# Patient Record
Sex: MALE | Race: WHITE | Employment: UNEMPLOYED | URBAN - METROPOLITAN AREA
[De-identification: names, ages, dates, MRNs, and addresses within clinical notes are randomized per-mention and may not be internally consistent; named-entity substitution may affect disease eponyms.]

---

## 2020-05-11 ENCOUNTER — APPOINTMENT (OUTPATIENT)
Dept: GENERAL RADIOLOGY | Age: 60
DRG: 174 | End: 2020-05-11
Payer: MEDICAID

## 2020-05-11 ENCOUNTER — HOSPITAL ENCOUNTER (INPATIENT)
Age: 60
LOS: 5 days | Discharge: HOME | DRG: 174 | End: 2020-05-16
Attending: EMERGENCY MEDICINE
Payer: MEDICAID

## 2020-05-11 PROBLEM — R60.0 LEG EDEMA: Status: ACTIVE | Noted: 2020-05-11

## 2020-05-11 LAB
ALBUMIN SERPL-MCNC: 3.6 G/DL (ref 3.4–5)
ALP BLD-CCNC: 119 U/L (ref 40–129)
ALT SERPL-CCNC: 21 U/L (ref 10–40)
ANION GAP SERPL CALCULATED.3IONS-SCNC: 10 MMOL/L (ref 3–16)
AST SERPL-CCNC: 30 U/L (ref 15–37)
BASOPHILS ABSOLUTE: 0 K/UL (ref 0–0.2)
BASOPHILS RELATIVE PERCENT: 0.6 %
BILIRUB SERPL-MCNC: 1.7 MG/DL (ref 0–1)
BILIRUBIN DIRECT: 0.4 MG/DL (ref 0–0.3)
BILIRUBIN, INDIRECT: 1.3 MG/DL (ref 0–1)
BUN BLDV-MCNC: 5 MG/DL (ref 7–20)
CALCIUM SERPL-MCNC: 8.9 MG/DL (ref 8.3–10.6)
CHLORIDE BLD-SCNC: 95 MMOL/L (ref 99–110)
CO2: 26 MMOL/L (ref 21–32)
CREAT SERPL-MCNC: 0.7 MG/DL (ref 0.8–1.3)
EOSINOPHILS ABSOLUTE: 0.1 K/UL (ref 0–0.6)
EOSINOPHILS RELATIVE PERCENT: 1.6 %
GFR AFRICAN AMERICAN: >60
GFR NON-AFRICAN AMERICAN: >60
GLUCOSE BLD-MCNC: 97 MG/DL (ref 70–99)
HCT VFR BLD CALC: 50.4 % (ref 40.5–52.5)
HEMOGLOBIN: 17.4 G/DL (ref 13.5–17.5)
LYMPHOCYTES ABSOLUTE: 1.9 K/UL (ref 1–5.1)
LYMPHOCYTES RELATIVE PERCENT: 26 %
MCH RBC QN AUTO: 36.2 PG (ref 26–34)
MCHC RBC AUTO-ENTMCNC: 34.5 G/DL (ref 31–36)
MCV RBC AUTO: 105 FL (ref 80–100)
MONOCYTES ABSOLUTE: 0.7 K/UL (ref 0–1.3)
MONOCYTES RELATIVE PERCENT: 9.9 %
NEUTROPHILS ABSOLUTE: 4.4 K/UL (ref 1.7–7.7)
NEUTROPHILS RELATIVE PERCENT: 61.9 %
PDW BLD-RTO: 15.1 % (ref 12.4–15.4)
PLATELET # BLD: 156 K/UL (ref 135–450)
PMV BLD AUTO: 8.1 FL (ref 5–10.5)
POTASSIUM REFLEX MAGNESIUM: 3.7 MMOL/L (ref 3.5–5.1)
PRO-BNP: 2010 PG/ML (ref 0–124)
RBC # BLD: 4.8 M/UL (ref 4.2–5.9)
SODIUM BLD-SCNC: 131 MMOL/L (ref 136–145)
TOTAL PROTEIN: 6.9 G/DL (ref 6.4–8.2)
TROPONIN: 0.01 NG/ML
WBC # BLD: 7.2 K/UL (ref 4–11)

## 2020-05-11 PROCEDURE — 99285 EMERGENCY DEPT VISIT HI MDM: CPT

## 2020-05-11 PROCEDURE — 80076 HEPATIC FUNCTION PANEL: CPT

## 2020-05-11 PROCEDURE — 71045 X-RAY EXAM CHEST 1 VIEW: CPT

## 2020-05-11 PROCEDURE — 2060000000 HC ICU INTERMEDIATE R&B

## 2020-05-11 PROCEDURE — 6360000002 HC RX W HCPCS: Performed by: STUDENT IN AN ORGANIZED HEALTH CARE EDUCATION/TRAINING PROGRAM

## 2020-05-11 PROCEDURE — 6370000000 HC RX 637 (ALT 250 FOR IP): Performed by: STUDENT IN AN ORGANIZED HEALTH CARE EDUCATION/TRAINING PROGRAM

## 2020-05-11 PROCEDURE — 84484 ASSAY OF TROPONIN QUANT: CPT

## 2020-05-11 PROCEDURE — 96374 THER/PROPH/DIAG INJ IV PUSH: CPT

## 2020-05-11 PROCEDURE — 6360000002 HC RX W HCPCS: Performed by: INTERNAL MEDICINE

## 2020-05-11 PROCEDURE — 1200000000 HC SEMI PRIVATE

## 2020-05-11 PROCEDURE — 83880 ASSAY OF NATRIURETIC PEPTIDE: CPT

## 2020-05-11 PROCEDURE — 96375 TX/PRO/DX INJ NEW DRUG ADDON: CPT

## 2020-05-11 PROCEDURE — 93005 ELECTROCARDIOGRAM TRACING: CPT | Performed by: STUDENT IN AN ORGANIZED HEALTH CARE EDUCATION/TRAINING PROGRAM

## 2020-05-11 PROCEDURE — 85025 COMPLETE CBC W/AUTO DIFF WBC: CPT

## 2020-05-11 PROCEDURE — 84443 ASSAY THYROID STIM HORMONE: CPT

## 2020-05-11 PROCEDURE — 87641 MR-STAPH DNA AMP PROBE: CPT

## 2020-05-11 PROCEDURE — 36415 COLL VENOUS BLD VENIPUNCTURE: CPT

## 2020-05-11 PROCEDURE — 6370000000 HC RX 637 (ALT 250 FOR IP): Performed by: INTERNAL MEDICINE

## 2020-05-11 PROCEDURE — 2580000003 HC RX 258: Performed by: INTERNAL MEDICINE

## 2020-05-11 PROCEDURE — 80048 BASIC METABOLIC PNL TOTAL CA: CPT

## 2020-05-11 PROCEDURE — 2580000003 HC RX 258: Performed by: STUDENT IN AN ORGANIZED HEALTH CARE EDUCATION/TRAINING PROGRAM

## 2020-05-11 RX ORDER — LORAZEPAM 1 MG/1
2 TABLET ORAL
Status: DISCONTINUED | OUTPATIENT
Start: 2020-05-11 | End: 2020-05-16 | Stop reason: HOSPADM

## 2020-05-11 RX ORDER — THIAMINE MONONITRATE (VIT B1) 100 MG
100 TABLET ORAL DAILY
Status: DISCONTINUED | OUTPATIENT
Start: 2020-05-12 | End: 2020-05-16 | Stop reason: HOSPADM

## 2020-05-11 RX ORDER — SODIUM CHLORIDE 0.9 % (FLUSH) 0.9 %
10 SYRINGE (ML) INJECTION PRN
Status: DISCONTINUED | OUTPATIENT
Start: 2020-05-11 | End: 2020-05-16 | Stop reason: HOSPADM

## 2020-05-11 RX ORDER — SULFAMETHOXAZOLE AND TRIMETHOPRIM 800; 160 MG/1; MG/1
1 TABLET ORAL ONCE
Status: COMPLETED | OUTPATIENT
Start: 2020-05-11 | End: 2020-05-11

## 2020-05-11 RX ORDER — LORAZEPAM 2 MG/ML
4 INJECTION INTRAMUSCULAR
Status: DISCONTINUED | OUTPATIENT
Start: 2020-05-11 | End: 2020-05-16 | Stop reason: HOSPADM

## 2020-05-11 RX ORDER — ONDANSETRON 2 MG/ML
4 INJECTION INTRAMUSCULAR; INTRAVENOUS EVERY 6 HOURS PRN
Status: DISCONTINUED | OUTPATIENT
Start: 2020-05-11 | End: 2020-05-16 | Stop reason: HOSPADM

## 2020-05-11 RX ORDER — SODIUM CHLORIDE 0.9 % (FLUSH) 0.9 %
10 SYRINGE (ML) INJECTION PRN
Status: DISCONTINUED | OUTPATIENT
Start: 2020-05-11 | End: 2020-05-11 | Stop reason: SDUPTHER

## 2020-05-11 RX ORDER — LORAZEPAM 2 MG/ML
2 INJECTION INTRAMUSCULAR
Status: DISCONTINUED | OUTPATIENT
Start: 2020-05-11 | End: 2020-05-16 | Stop reason: HOSPADM

## 2020-05-11 RX ORDER — POLYETHYLENE GLYCOL 3350 17 G/17G
17 POWDER, FOR SOLUTION ORAL DAILY PRN
Status: DISCONTINUED | OUTPATIENT
Start: 2020-05-11 | End: 2020-05-16 | Stop reason: HOSPADM

## 2020-05-11 RX ORDER — FOLIC ACID 1 MG/1
1 TABLET ORAL DAILY
Status: DISCONTINUED | OUTPATIENT
Start: 2020-05-12 | End: 2020-05-16 | Stop reason: HOSPADM

## 2020-05-11 RX ORDER — PROMETHAZINE HYDROCHLORIDE 12.5 MG/1
12.5 TABLET ORAL EVERY 6 HOURS PRN
Status: DISCONTINUED | OUTPATIENT
Start: 2020-05-11 | End: 2020-05-16 | Stop reason: HOSPADM

## 2020-05-11 RX ORDER — LORAZEPAM 1 MG/1
4 TABLET ORAL
Status: DISCONTINUED | OUTPATIENT
Start: 2020-05-11 | End: 2020-05-16 | Stop reason: HOSPADM

## 2020-05-11 RX ORDER — CEPHALEXIN 500 MG/1
500 CAPSULE ORAL 2 TIMES DAILY
Status: ON HOLD | COMMUNITY
End: 2020-05-16 | Stop reason: HOSPADM

## 2020-05-11 RX ORDER — NADOLOL 20 MG/1
20 TABLET ORAL DAILY
COMMUNITY
End: 2020-07-06 | Stop reason: SINTOL

## 2020-05-11 RX ORDER — ACETAMINOPHEN 325 MG/1
650 TABLET ORAL EVERY 6 HOURS PRN
Status: DISCONTINUED | OUTPATIENT
Start: 2020-05-11 | End: 2020-05-16 | Stop reason: HOSPADM

## 2020-05-11 RX ORDER — PRIMIDONE 50 MG/1
50 TABLET ORAL NIGHTLY
COMMUNITY
End: 2020-05-22

## 2020-05-11 RX ORDER — FUROSEMIDE 10 MG/ML
20 INJECTION INTRAMUSCULAR; INTRAVENOUS ONCE
Status: COMPLETED | OUTPATIENT
Start: 2020-05-11 | End: 2020-05-11

## 2020-05-11 RX ORDER — LORAZEPAM 2 MG/ML
3 INJECTION INTRAMUSCULAR
Status: DISCONTINUED | OUTPATIENT
Start: 2020-05-11 | End: 2020-05-16 | Stop reason: HOSPADM

## 2020-05-11 RX ORDER — LORAZEPAM 1 MG/1
3 TABLET ORAL
Status: DISCONTINUED | OUTPATIENT
Start: 2020-05-11 | End: 2020-05-16 | Stop reason: HOSPADM

## 2020-05-11 RX ORDER — LORAZEPAM 1 MG/1
1 TABLET ORAL
Status: DISCONTINUED | OUTPATIENT
Start: 2020-05-11 | End: 2020-05-16 | Stop reason: HOSPADM

## 2020-05-11 RX ORDER — MULTIVITAMIN WITH IRON
1 TABLET ORAL DAILY
Status: DISCONTINUED | OUTPATIENT
Start: 2020-05-12 | End: 2020-05-16 | Stop reason: HOSPADM

## 2020-05-11 RX ORDER — ASPIRIN 81 MG/1
81 TABLET, CHEWABLE ORAL DAILY
Status: DISCONTINUED | OUTPATIENT
Start: 2020-05-12 | End: 2020-05-16 | Stop reason: HOSPADM

## 2020-05-11 RX ORDER — CLOTRIMAZOLE 1 %
CREAM (GRAM) TOPICAL 2 TIMES DAILY
Status: DISCONTINUED | OUTPATIENT
Start: 2020-05-11 | End: 2020-05-16 | Stop reason: HOSPADM

## 2020-05-11 RX ORDER — POTASSIUM CHLORIDE 20 MEQ/1
20 TABLET, EXTENDED RELEASE ORAL ONCE
Status: COMPLETED | OUTPATIENT
Start: 2020-05-11 | End: 2020-05-11

## 2020-05-11 RX ORDER — SODIUM CHLORIDE 0.9 % (FLUSH) 0.9 %
10 SYRINGE (ML) INJECTION EVERY 12 HOURS SCHEDULED
Status: DISCONTINUED | OUTPATIENT
Start: 2020-05-11 | End: 2020-05-16 | Stop reason: HOSPADM

## 2020-05-11 RX ORDER — CALAMINE
LOTION (ML) TOPICAL 2 TIMES DAILY
Status: DISCONTINUED | OUTPATIENT
Start: 2020-05-11 | End: 2020-05-12 | Stop reason: CLARIF

## 2020-05-11 RX ORDER — FUROSEMIDE 20 MG/1
20 TABLET ORAL DAILY
Status: ON HOLD | COMMUNITY
End: 2020-07-28 | Stop reason: HOSPADM

## 2020-05-11 RX ORDER — ACETAMINOPHEN 650 MG/1
650 SUPPOSITORY RECTAL EVERY 6 HOURS PRN
Status: DISCONTINUED | OUTPATIENT
Start: 2020-05-11 | End: 2020-05-16 | Stop reason: HOSPADM

## 2020-05-11 RX ORDER — FUROSEMIDE 10 MG/ML
40 INJECTION INTRAMUSCULAR; INTRAVENOUS ONCE
Status: COMPLETED | OUTPATIENT
Start: 2020-05-11 | End: 2020-05-11

## 2020-05-11 RX ORDER — PRIMIDONE 50 MG/1
50 TABLET ORAL NIGHTLY
Status: DISCONTINUED | OUTPATIENT
Start: 2020-05-12 | End: 2020-05-12

## 2020-05-11 RX ORDER — SODIUM CHLORIDE 0.9 % (FLUSH) 0.9 %
10 SYRINGE (ML) INJECTION EVERY 12 HOURS SCHEDULED
Status: DISCONTINUED | OUTPATIENT
Start: 2020-05-11 | End: 2020-05-11 | Stop reason: SDUPTHER

## 2020-05-11 RX ORDER — NADOLOL 20 MG/1
20 TABLET ORAL DAILY
Status: DISCONTINUED | OUTPATIENT
Start: 2020-05-12 | End: 2020-05-16 | Stop reason: HOSPADM

## 2020-05-11 RX ORDER — LORAZEPAM 2 MG/ML
1 INJECTION INTRAMUSCULAR
Status: DISCONTINUED | OUTPATIENT
Start: 2020-05-11 | End: 2020-05-16 | Stop reason: HOSPADM

## 2020-05-11 RX ADMIN — FUROSEMIDE 20 MG: 10 INJECTION, SOLUTION INTRAMUSCULAR; INTRAVENOUS at 23:09

## 2020-05-11 RX ADMIN — CEFAZOLIN SODIUM 1 G: 1 POWDER, FOR SOLUTION INTRAMUSCULAR; INTRAVENOUS at 18:59

## 2020-05-11 RX ADMIN — VANCOMYCIN HYDROCHLORIDE 1000 MG: 10 INJECTION, POWDER, LYOPHILIZED, FOR SOLUTION INTRAVENOUS at 23:24

## 2020-05-11 RX ADMIN — POTASSIUM CHLORIDE 20 MEQ: 20 TABLET, EXTENDED RELEASE ORAL at 23:09

## 2020-05-11 RX ADMIN — CLOTRIMAZOLE: 10 CREAM TOPICAL at 23:24

## 2020-05-11 RX ADMIN — Medication 10 ML: at 23:01

## 2020-05-11 RX ADMIN — FUROSEMIDE 40 MG: 10 INJECTION, SOLUTION INTRAVENOUS at 18:59

## 2020-05-11 RX ADMIN — SULFAMETHOXAZOLE AND TRIMETHOPRIM 1 TABLET: 800; 160 TABLET ORAL at 18:59

## 2020-05-11 ASSESSMENT — ENCOUNTER SYMPTOMS
SHORTNESS OF BREATH: 0
COLOR CHANGE: 1
COUGH: 0
BACK PAIN: 0
NAUSEA: 0
DIARRHEA: 0
SORE THROAT: 0
EYE REDNESS: 0
ABDOMINAL PAIN: 0
VOMITING: 0
EYE DISCHARGE: 0

## 2020-05-11 ASSESSMENT — PAIN SCALES - GENERAL
PAINLEVEL_OUTOF10: 0
PAINLEVEL_OUTOF10: 8
PAINLEVEL_OUTOF10: 0

## 2020-05-11 ASSESSMENT — PAIN DESCRIPTION - PAIN TYPE: TYPE: ACUTE PAIN

## 2020-05-11 ASSESSMENT — PAIN DESCRIPTION - LOCATION: LOCATION: CHEST

## 2020-05-11 ASSESSMENT — PAIN DESCRIPTION - DESCRIPTORS: DESCRIPTORS: THROBBING

## 2020-05-11 ASSESSMENT — PAIN DESCRIPTION - FREQUENCY: FREQUENCY: INTERMITTENT

## 2020-05-11 NOTE — ED PROVIDER NOTES
[id number] [facility] RESIDENT NOTE       Date of evaluation: 5/11/2020    Chief Complaint     Chest Pain and Leg Swelling    History of Present Illness     Karla Spears is a 62 y.o. male who presents to the ED c/o BLE redness x weeks and swelling x months. States that his legs have been red and itchy, so he's been scratching them which have caused them to bleed. He's been on oral Keflex then Doxy without improvement. A physician also started him on Lasix but he has never had an echo and does not carry a heart failure diagnosis. States he came to the ED today because he was unable to get in contact with anyone on an outpatient basis. While he was triaged with CP, he did not mention this to me until I brought it up. Describes intermittent, non-provoked sharp, non-radiating pain in the center of his chest lasting 30 minutes at a time for months. Review of Systems     Review of Systems   Constitutional: Negative for chills and fatigue. HENT: Negative for congestion and sore throat. Eyes: Negative for discharge and redness. Respiratory: Negative for cough and shortness of breath. Cardiovascular: Positive for chest pain and leg swelling. Gastrointestinal: Negative for abdominal pain, diarrhea, nausea and vomiting. Genitourinary: Negative for dysuria and hematuria. Musculoskeletal: Negative for back pain and neck pain. Skin: Positive for color change, rash and wound. Neurological: Negative for syncope and headaches. Psychiatric/Behavioral: Negative for self-injury and suicidal ideas. Past Medical, Surgical, Family, and Social History     He has no past medical history on file. He has no past surgical history on file. His family history is not on file. He reports that he has never smoked. He has never used smokeless tobacco. He reports current alcohol use. He reports that he does not use drugs.     Medications     Previous Medications CEPHALEXIN (KEFLEX) 500 MG CAPSULE    Take 500 mg by mouth 2 times daily    FUROSEMIDE (LASIX) 20 MG TABLET    Take 20 mg by mouth daily    MINERAL OIL-HYDROPHILIC PETROLATUM (AQUAPHOR) OINTMENT    Apply 85 g topically as needed for Dry Skin Apply topically as needed. NADOLOL (CORGARD) 20 MG TABLET    Take 20 mg by mouth daily    PRIMIDONE (MYSOLINE) 50 MG TABLET    Take 50 mg by mouth nightly       Allergies     He has No Known Allergies. Physical Exam     INITIAL VITALS: BP: 135/83, Temp: 98 °F (36.7 °C), Pulse: 66, Resp: 18, SpO2: 99 %   Physical Exam  Vitals signs and nursing note reviewed. Constitutional:       General: He is not in acute distress. Appearance: Normal appearance. He is not ill-appearing, toxic-appearing or diaphoretic. HENT:      Head: Normocephalic and atraumatic. Mouth/Throat:      Mouth: Mucous membranes are moist.   Eyes:      General: No scleral icterus. Conjunctiva/sclera: Conjunctivae normal.   Neck:      Musculoskeletal: Neck supple. Cardiovascular:      Rate and Rhythm: Normal rate and regular rhythm. Pulses: Normal pulses. Pulmonary:      Effort: Pulmonary effort is normal. No respiratory distress. Breath sounds: Normal breath sounds. Abdominal:      General: There is no distension. Musculoskeletal:         General: Swelling present. No deformity. Comments: There is 1+ pitting edema to the knee with erythema and warmth to the anterior shins with excoriations and scrabbing but no skip lesions, fluctuance, or crepitus. Palpable DP pulses. Skin:     General: Skin is warm and dry. Capillary Refill: Capillary refill takes less than 2 seconds. Neurological:      General: No focal deficit present. Mental Status: He is alert and oriented to person, place, and time. Mental status is at baseline.    Psychiatric:         Mood and Affect: Mood normal.         DiagnosticResults     EKG   Interpreted in conjunction with emergencydepartment Allergies, and Family Hx were reviewed. The patient was given the followingmedications:  Orders Placed This Encounter   Medications    ceFAZolin (ANCEF) 1 g in dextrose 5 % 50 mL IVPB (mini-bag)    sulfamethoxazole-trimethoprim (BACTRIM DS;SEPTRA DS) 800-160 MG per tablet 1 tablet    furosemide (LASIX) injection 40 mg       CONSULTS:  IP CONSULT TO HOSPITALIST    MEDICAL DECISION MAKING / ASSESSMENT / Rafaela Joelle is a 62 y.o. male presenting with acute on chronic BLE swelling and redness, along with atypical CP. VS reassuring. Exam with signs concerning for BLE edema and PVD with possible secondary infection 2/2 scratching. EKG with nonspecific findings. CXR negative. Labs significant for elevated BNP concerning for heart failure. Since he's failed outpatient cellulitis management twice and has never had a workup for heart failure, all in the setting of COVID and inability to access primary care, I feel that he warrants admission for further evaluation and workup. Given Lasix 40 mg IV to help with swelling and covered with ancef/bactrim for possible cellulitis component. This patient was also evaluated by the attending physician. All care plans werediscussed and agreed upon. Clinical Impression     1. Leg swelling    2. Cellulitis, unspecified cellulitis site        Disposition     PATIENT REFERRED TO:  No follow-up provider specified.     DISCHARGE MEDICATIONS:  New Prescriptions    No medications on file     DISPOSITION    Sebastián Trevino MD  Resident  05/11/20 5293

## 2020-05-11 NOTE — H&P
Hospital Medicine History & Physical      PCP: No primary care provider on file. Date of Admission: 5/11/2020    Date of Service: Pt seen/examined on 5/11/2020 and Admitted to Inpatient with expected LOS greater than two midnights due to medical therapy. Chief Complaint:  Leg edema, erythema      History Of Present Illness: The patient is a 62 y.o. male with medical h/o essential tremor and alcohol abuse, who presents to [facility] with 2-3 months of leg edema, erythema, leg pain and chest pains. Patient just established PCP and was started on oral lasix and antibiotics which did not help. Describes chest pain as pressure, which occurs mostly with physical exertion and resolves with rest. It did occur at rest as well on some occasions. 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 Stopped smoking 1 year ago. Drinks 2 beers daily. Denies any recreational drug use. No dyspnea, fevers or chills, no known sick contacts. No prior cardiac work up, no EKGs. Past Medical History:    History reviewed. No pertinent past medical history. Past Surgical History:    History reviewed. No pertinent surgical history. Medications Prior to Admission:    Prior to Admission medications    Medication Sig Start Date End Date Taking? Authorizing Provider   furosemide (LASIX) 20 MG tablet Take 20 mg by mouth daily   Yes Historical Provider, MD   mineral oil-hydrophilic petrolatum (AQUAPHOR) ointment Apply 85 g topically as needed for Dry Skin Apply topically as needed. Yes Historical Provider, MD   primidone (MYSOLINE) 50 MG tablet Take 50 mg by mouth nightly   Yes Historical Provider, MD   nadolol (CORGARD) 20 MG tablet Take 20 mg by mouth daily   Yes Historical Provider, MD   cephALEXin (KEFLEX) 500 MG capsule Take 500 mg by mouth 2 times daily   Yes Historical Provider, MD       Allergies:  Patient has no known allergies.     Social History:  The patient currently lives at home    TOBACCO:   reports that he has never smoked. He has never used smokeless tobacco.  ETOH:   reports current alcohol use. Family History:  Reviewed in detail and negative for DM, Early CAD, Cancer, CVA. Positive as follows:    History reviewed. No pertinent family history. REVIEW OF SYSTEMS:   Positive and negative as noted in the HPI. All other systems reviewed and negative. PHYSICAL EXAM:    BP (!) 140/85   Pulse 68   Temp 98 °F (36.7 °C) (Oral)   Resp 22   SpO2 99%     General appearance: No apparent distress appears stated age and cooperative. HEENT Normal cephalic, atraumatic without obvious deformity. Conjunctivae/corneas clear. Neck: Supple, No jugular venous distention/bruits. Trachea midline without thyromegaly or adenopathy with full range of motion. Lungs: Clear to auscultation, bilaterally without Rales/Wheezes/Rhonchi with good respiratory effort. Chest wall- raised scaly erythematous patch  Heart: Regular rate and rhythm with Normal S1/S2 without murmurs, rubs or gallops, point of maximum impulse non-displaced  Abdomen: Soft, non-tender or non-distended without rigidity or guarding and positive bowel sounds all four quadrants. Extremities: No clubbing, cyanosis, LE edema 1+ below knee level  Skin:  Patch on anterior chest wall- likely fungal, below knee erythema with excoriations, weeping lesions,warm to touch  Neurologic: Alert and oriented X 3, grossly non-focal.  Mental status: Alert, oriented, thought content appropriate.   Capillary refill is brisk  Peripheral pulses 2+    CXR:  I have reviewed the CXR with the following interpretation: no infiltrate  EKG:  I have reviewed the EKG with the following interpretation: sinus at 64 with non specific ST TW changes, QW in V1V2, no prior EKG available    CBC   Recent Labs     05/11/20  1726   WBC 7.2   HGB 17.4   HCT 50.4         RENAL  Recent Labs     05/11/20  1725   *   K 3.7   CL 95*   CO2 26   BUN 5* CREATININE 0.7*     LFT'S  No results for input(s): AST, ALT, ALB, BILIDIR, BILITOT, ALKPHOS in the last 72 hours. COAG  No results for input(s): INR in the last 72 hours. CARDIAC ENZYMES  Recent Labs     05/11/20  1725   TROPONINI 0.01       U/A:  No results found for: NITRITE, COLORU, WBCUA, RBCUA, MUCUS, BACTERIA, CLARITYU, SPECGRAV, LEUKOCYTESUR, BLOODU, GLUCOSEU, AMORPHOUS    ABG  No results found for: MGV6UPA, BEART, P1PKGDRE, PHART, THGBART, DTV6BAA, PO2ART, BOT9XPS        Active Hospital Problems    Diagnosis Date Noted    Leg edema [R60.0] 05/11/2020         ASSESSMENT/PLAN:    Chest pain:  Exertional, resolves with rest, but also has non exertional component. Risk factors- smoker. Troponin is negative. Will get lipid panel, EKG in am, ECHO and stress test  Consider cardiology consult  Start low dose aspirin, try nitro s/l I has chest pain while inpatient    Possible CHF:  Has leg edema and elevated BNP. Will give lasix tonight, recheck renal panel in am, ECHO, venous doppler, more lasix based on response    Bilateral leg cellulitis:  IV vancomycin, check MRSA nares  Wound care consult    Macrocytosis:  Possible from alcohol abuse. Will monitor with CIWA  Check TSH, folate, B12  States never had withdrawal from alcohol- will not start RTC benzos    Essential tremor:  Continue with home meds      DVT Prophylaxis: lovenox  Diet: No diet orders on file  Code Status: No Order  PT/OT Eval Status: at baseline    Enrike Rodriguez MD    Thank you No primary care provider on file. for the opportunity to be involved in this patient's care. If you have any questions or concerns please feel free to contact me at [phone].

## 2020-05-12 ENCOUNTER — APPOINTMENT (OUTPATIENT)
Dept: CARDIAC CATH/INVASIVE PROCEDURES | Age: 60
DRG: 174 | End: 2020-05-12
Payer: MEDICAID

## 2020-05-12 ENCOUNTER — APPOINTMENT (OUTPATIENT)
Dept: VASCULAR LAB | Age: 60
DRG: 174 | End: 2020-05-12
Payer: MEDICAID

## 2020-05-12 PROBLEM — R93.89 ABNORMAL ANGIOGRAM: Status: ACTIVE | Noted: 2020-05-12

## 2020-05-12 LAB
ANION GAP SERPL CALCULATED.3IONS-SCNC: 12 MMOL/L (ref 3–16)
APTT: 30.7 SEC (ref 24.2–36.2)
BUN BLDV-MCNC: 6 MG/DL (ref 7–20)
CALCIUM SERPL-MCNC: 8.6 MG/DL (ref 8.3–10.6)
CHLORIDE BLD-SCNC: 98 MMOL/L (ref 99–110)
CHOLESTEROL, TOTAL: 119 MG/DL (ref 0–199)
CO2: 25 MMOL/L (ref 21–32)
CREAT SERPL-MCNC: 0.7 MG/DL (ref 0.8–1.3)
EKG ATRIAL RATE: 64 BPM
EKG ATRIAL RATE: 65 BPM
EKG DIAGNOSIS: NORMAL
EKG DIAGNOSIS: NORMAL
EKG P AXIS: 42 DEGREES
EKG P AXIS: 52 DEGREES
EKG P-R INTERVAL: 172 MS
EKG P-R INTERVAL: 176 MS
EKG Q-T INTERVAL: 440 MS
EKG Q-T INTERVAL: 480 MS
EKG QRS DURATION: 84 MS
EKG QRS DURATION: 90 MS
EKG QTC CALCULATION (BAZETT): 453 MS
EKG QTC CALCULATION (BAZETT): 499 MS
EKG R AXIS: -13 DEGREES
EKG R AXIS: -29 DEGREES
EKG T AXIS: 83 DEGREES
EKG T AXIS: 84 DEGREES
EKG VENTRICULAR RATE: 64 BPM
EKG VENTRICULAR RATE: 65 BPM
FOLATE: 3.13 NG/ML (ref 4.78–24.2)
GFR AFRICAN AMERICAN: >60
GFR NON-AFRICAN AMERICAN: >60
GLUCOSE BLD-MCNC: 102 MG/DL (ref 70–99)
HCT VFR BLD CALC: 51.2 % (ref 40.5–52.5)
HDLC SERPL-MCNC: 48 MG/DL (ref 40–60)
HEMOGLOBIN: 17.7 G/DL (ref 13.5–17.5)
INR BLD: 1.14 (ref 0.86–1.14)
LDL CHOLESTEROL CALCULATED: 56 MG/DL
LV EF: 33 %
LVEF MODALITY: NORMAL
MAGNESIUM: 1.3 MG/DL (ref 1.8–2.4)
MCH RBC QN AUTO: 36.8 PG (ref 26–34)
MCHC RBC AUTO-ENTMCNC: 34.6 G/DL (ref 31–36)
MCV RBC AUTO: 106.2 FL (ref 80–100)
MRSA SCREEN RT-PCR: NORMAL
PDW BLD-RTO: 15.3 % (ref 12.4–15.4)
PLATELET # BLD: 161 K/UL (ref 135–450)
PMV BLD AUTO: 7.9 FL (ref 5–10.5)
POC ACT LR: 348 SEC
POC ACT LR: >400 SEC
POTASSIUM REFLEX MAGNESIUM: 3.7 MMOL/L (ref 3.5–5.1)
PROTHROMBIN TIME: 13.3 SEC (ref 10–13.2)
RBC # BLD: 4.82 M/UL (ref 4.2–5.9)
SODIUM BLD-SCNC: 135 MMOL/L (ref 136–145)
TRIGL SERPL-MCNC: 75 MG/DL (ref 0–150)
TSH REFLEX: 2.8 UIU/ML (ref 0.27–4.2)
VITAMIN B-12: 488 PG/ML (ref 211–911)
VLDLC SERPL CALC-MCNC: 15 MG/DL
WBC # BLD: 6.7 K/UL (ref 4–11)

## 2020-05-12 PROCEDURE — 4A023N7 MEASUREMENT OF CARDIAC SAMPLING AND PRESSURE, LEFT HEART, PERCUTANEOUS APPROACH: ICD-10-PCS | Performed by: INTERNAL MEDICINE

## 2020-05-12 PROCEDURE — 82607 VITAMIN B-12: CPT

## 2020-05-12 PROCEDURE — 93010 ELECTROCARDIOGRAM REPORT: CPT | Performed by: INTERNAL MEDICINE

## 2020-05-12 PROCEDURE — 92943 PRQ TRLUML REVSC CH OCC ANT: CPT

## 2020-05-12 PROCEDURE — 2580000003 HC RX 258: Performed by: INTERNAL MEDICINE

## 2020-05-12 PROCEDURE — 2500000003 HC RX 250 WO HCPCS

## 2020-05-12 PROCEDURE — 99152 MOD SED SAME PHYS/QHP 5/>YRS: CPT

## 2020-05-12 PROCEDURE — 2060000000 HC ICU INTERMEDIATE R&B

## 2020-05-12 PROCEDURE — 36415 COLL VENOUS BLD VENIPUNCTURE: CPT

## 2020-05-12 PROCEDURE — 83735 ASSAY OF MAGNESIUM: CPT

## 2020-05-12 PROCEDURE — C1874 STENT, COATED/COV W/DEL SYS: HCPCS

## 2020-05-12 PROCEDURE — B2111ZZ FLUOROSCOPY OF MULTIPLE CORONARY ARTERIES USING LOW OSMOLAR CONTRAST: ICD-10-PCS | Performed by: INTERNAL MEDICINE

## 2020-05-12 PROCEDURE — 99223 1ST HOSP IP/OBS HIGH 75: CPT | Performed by: INTERNAL MEDICINE

## 2020-05-12 PROCEDURE — 93458 L HRT ARTERY/VENTRICLE ANGIO: CPT | Performed by: INTERNAL MEDICINE

## 2020-05-12 PROCEDURE — 6360000002 HC RX W HCPCS: Performed by: INTERNAL MEDICINE

## 2020-05-12 PROCEDURE — B2151ZZ FLUOROSCOPY OF LEFT HEART USING LOW OSMOLAR CONTRAST: ICD-10-PCS | Performed by: INTERNAL MEDICINE

## 2020-05-12 PROCEDURE — 85027 COMPLETE CBC AUTOMATED: CPT

## 2020-05-12 PROCEDURE — 85347 COAGULATION TIME ACTIVATED: CPT

## 2020-05-12 PROCEDURE — 80048 BASIC METABOLIC PNL TOTAL CA: CPT

## 2020-05-12 PROCEDURE — 93970 EXTREMITY STUDY: CPT

## 2020-05-12 PROCEDURE — 2709999900 HC NON-CHARGEABLE SUPPLY

## 2020-05-12 PROCEDURE — 6370000000 HC RX 637 (ALT 250 FOR IP): Performed by: INTERNAL MEDICINE

## 2020-05-12 PROCEDURE — 93005 ELECTROCARDIOGRAM TRACING: CPT | Performed by: INTERNAL MEDICINE

## 2020-05-12 PROCEDURE — C1725 CATH, TRANSLUMIN NON-LASER: HCPCS

## 2020-05-12 PROCEDURE — 92921 HC PRQ CARDIAC ANGIO ADDL ART: CPT

## 2020-05-12 PROCEDURE — 78451 HT MUSCLE IMAGE SPECT SING: CPT

## 2020-05-12 PROCEDURE — 92920 PRQ TRLUML C ANGIOP 1ART&/BR: CPT | Performed by: INTERNAL MEDICINE

## 2020-05-12 PROCEDURE — 3430000000 HC RX DIAGNOSTIC RADIOPHARMACEUTICAL: Performed by: INTERNAL MEDICINE

## 2020-05-12 PROCEDURE — 6360000002 HC RX W HCPCS

## 2020-05-12 PROCEDURE — 6370000000 HC RX 637 (ALT 250 FOR IP)

## 2020-05-12 PROCEDURE — C8929 TTE W OR WO FOL WCON,DOPPLER: HCPCS

## 2020-05-12 PROCEDURE — 78452 HT MUSCLE IMAGE SPECT MULT: CPT

## 2020-05-12 PROCEDURE — 02703ZZ DILATION OF CORONARY ARTERY, ONE ARTERY, PERCUTANEOUS APPROACH: ICD-10-PCS | Performed by: INTERNAL MEDICINE

## 2020-05-12 PROCEDURE — 6360000004 HC RX CONTRAST MEDICATION: Performed by: INTERNAL MEDICINE

## 2020-05-12 PROCEDURE — A9502 TC99M TETROFOSMIN: HCPCS | Performed by: INTERNAL MEDICINE

## 2020-05-12 PROCEDURE — 1200000000 HC SEMI PRIVATE

## 2020-05-12 PROCEDURE — C1769 GUIDE WIRE: HCPCS

## 2020-05-12 PROCEDURE — C1894 INTRO/SHEATH, NON-LASER: HCPCS

## 2020-05-12 PROCEDURE — 92928 PRQ TCAT PLMT NTRAC ST 1 LES: CPT | Performed by: INTERNAL MEDICINE

## 2020-05-12 PROCEDURE — 85610 PROTHROMBIN TIME: CPT

## 2020-05-12 PROCEDURE — 027035Z DILATION OF CORONARY ARTERY, ONE ARTERY WITH TWO DRUG-ELUTING INTRALUMINAL DEVICES, PERCUTANEOUS APPROACH: ICD-10-PCS | Performed by: INTERNAL MEDICINE

## 2020-05-12 PROCEDURE — 82746 ASSAY OF FOLIC ACID SERUM: CPT

## 2020-05-12 PROCEDURE — C1887 CATHETER, GUIDING: HCPCS

## 2020-05-12 PROCEDURE — 99153 MOD SED SAME PHYS/QHP EA: CPT

## 2020-05-12 PROCEDURE — 80061 LIPID PANEL: CPT

## 2020-05-12 PROCEDURE — 85730 THROMBOPLASTIN TIME PARTIAL: CPT

## 2020-05-12 PROCEDURE — 93454 CORONARY ARTERY ANGIO S&I: CPT

## 2020-05-12 RX ORDER — SODIUM CHLORIDE 0.9 % (FLUSH) 0.9 %
10 SYRINGE (ML) INJECTION PRN
Status: DISCONTINUED | OUTPATIENT
Start: 2020-05-12 | End: 2020-05-12 | Stop reason: SDUPTHER

## 2020-05-12 RX ORDER — HEPARIN SODIUM 10000 [USP'U]/100ML
18 INJECTION, SOLUTION INTRAVENOUS CONTINUOUS
Status: DISCONTINUED | OUTPATIENT
Start: 2020-05-12 | End: 2020-05-13

## 2020-05-12 RX ORDER — HEPARIN SODIUM 5000 [USP'U]/ML
80 INJECTION, SOLUTION INTRAVENOUS; SUBCUTANEOUS PRN
Status: DISCONTINUED | OUTPATIENT
Start: 2020-05-12 | End: 2020-05-13

## 2020-05-12 RX ORDER — SODIUM CHLORIDE 0.9 % (FLUSH) 0.9 %
10 SYRINGE (ML) INJECTION EVERY 12 HOURS SCHEDULED
Status: DISCONTINUED | OUTPATIENT
Start: 2020-05-12 | End: 2020-05-12 | Stop reason: SDUPTHER

## 2020-05-12 RX ORDER — LANOLIN ALCOHOL/MO/W.PET/CERES
CREAM (GRAM) TOPICAL 2 TIMES DAILY
Status: DISCONTINUED | OUTPATIENT
Start: 2020-05-12 | End: 2020-05-16 | Stop reason: HOSPADM

## 2020-05-12 RX ORDER — CLOPIDOGREL BISULFATE 75 MG/1
75 TABLET ORAL DAILY
Status: DISCONTINUED | OUTPATIENT
Start: 2020-05-13 | End: 2020-05-16 | Stop reason: HOSPADM

## 2020-05-12 RX ORDER — MORPHINE SULFATE 2 MG/ML
2 INJECTION, SOLUTION INTRAMUSCULAR; INTRAVENOUS
Status: ACTIVE | OUTPATIENT
Start: 2020-05-12 | End: 2020-05-12

## 2020-05-12 RX ORDER — WARFARIN SODIUM 5 MG/1
5 TABLET ORAL DAILY
Status: DISCONTINUED | OUTPATIENT
Start: 2020-05-12 | End: 2020-05-14

## 2020-05-12 RX ORDER — ACETAMINOPHEN 325 MG/1
650 TABLET ORAL EVERY 4 HOURS PRN
Status: DISCONTINUED | OUTPATIENT
Start: 2020-05-12 | End: 2020-05-12 | Stop reason: SDUPTHER

## 2020-05-12 RX ORDER — HEPARIN SODIUM 5000 [USP'U]/ML
40 INJECTION, SOLUTION INTRAVENOUS; SUBCUTANEOUS PRN
Status: DISCONTINUED | OUTPATIENT
Start: 2020-05-12 | End: 2020-05-13

## 2020-05-12 RX ORDER — HEPARIN SODIUM 5000 [USP'U]/ML
80 INJECTION, SOLUTION INTRAVENOUS; SUBCUTANEOUS ONCE
Status: COMPLETED | OUTPATIENT
Start: 2020-05-12 | End: 2020-05-12

## 2020-05-12 RX ORDER — ATORVASTATIN CALCIUM 40 MG/1
40 TABLET, FILM COATED ORAL NIGHTLY
Status: DISCONTINUED | OUTPATIENT
Start: 2020-05-12 | End: 2020-05-16 | Stop reason: HOSPADM

## 2020-05-12 RX ORDER — ATROPINE SULFATE 0.4 MG/ML
0.5 AMPUL (ML) INJECTION
Status: DISCONTINUED | OUTPATIENT
Start: 2020-05-12 | End: 2020-05-12 | Stop reason: SDUPTHER

## 2020-05-12 RX ORDER — ATROPINE SULFATE 0.1 MG/ML
0.5 INJECTION INTRAVENOUS
Status: ACTIVE | OUTPATIENT
Start: 2020-05-12 | End: 2020-05-12

## 2020-05-12 RX ORDER — PRIMIDONE 50 MG/1
50 TABLET ORAL 2 TIMES DAILY
Status: DISCONTINUED | OUTPATIENT
Start: 2020-05-12 | End: 2020-05-16 | Stop reason: HOSPADM

## 2020-05-12 RX ORDER — SODIUM CHLORIDE 9 MG/ML
INJECTION, SOLUTION INTRAVENOUS CONTINUOUS
Status: ACTIVE | OUTPATIENT
Start: 2020-05-12 | End: 2020-05-12

## 2020-05-12 RX ORDER — ASPIRIN 81 MG/1
81 TABLET, CHEWABLE ORAL DAILY
Status: DISCONTINUED | OUTPATIENT
Start: 2020-05-13 | End: 2020-05-12 | Stop reason: SDUPTHER

## 2020-05-12 RX ORDER — LISINOPRIL 2.5 MG/1
2.5 TABLET ORAL DAILY
Status: DISCONTINUED | OUTPATIENT
Start: 2020-05-12 | End: 2020-05-16 | Stop reason: HOSPADM

## 2020-05-12 RX ORDER — MAGNESIUM SULFATE IN WATER 40 MG/ML
2 INJECTION, SOLUTION INTRAVENOUS ONCE
Status: COMPLETED | OUTPATIENT
Start: 2020-05-12 | End: 2020-05-12

## 2020-05-12 RX ORDER — ONDANSETRON 2 MG/ML
4 INJECTION INTRAMUSCULAR; INTRAVENOUS EVERY 6 HOURS PRN
Status: DISCONTINUED | OUTPATIENT
Start: 2020-05-12 | End: 2020-05-12 | Stop reason: SDUPTHER

## 2020-05-12 RX ADMIN — Medication: at 02:18

## 2020-05-12 RX ADMIN — HEPARIN SODIUM 6500 UNITS: 5000 INJECTION INTRAVENOUS; SUBCUTANEOUS at 20:01

## 2020-05-12 RX ADMIN — PRIMIDONE 50 MG: 50 TABLET ORAL at 02:01

## 2020-05-12 RX ADMIN — VANCOMYCIN HYDROCHLORIDE 1250 MG: 10 INJECTION, POWDER, LYOPHILIZED, FOR SOLUTION INTRAVENOUS at 21:51

## 2020-05-12 RX ADMIN — WARFARIN SODIUM 5 MG: 5 TABLET ORAL at 21:49

## 2020-05-12 RX ADMIN — MAGNESIUM SULFATE HEPTAHYDRATE 2 G: 40 INJECTION, SOLUTION INTRAVENOUS at 13:19

## 2020-05-12 RX ADMIN — PRIMIDONE 50 MG: 50 TABLET ORAL at 21:48

## 2020-05-12 RX ADMIN — Medication 10 ML: at 21:59

## 2020-05-12 RX ADMIN — IOHEXOL 200 ML: 350 INJECTION, SOLUTION INTRAVENOUS at 11:48

## 2020-05-12 RX ADMIN — CLOTRIMAZOLE: 10 CREAM TOPICAL at 21:48

## 2020-05-12 RX ADMIN — Medication: at 21:48

## 2020-05-12 RX ADMIN — SODIUM CHLORIDE: 9 INJECTION, SOLUTION INTRAVENOUS at 15:29

## 2020-05-12 RX ADMIN — TETROFOSMIN 10.3 MILLICURIE: 1.38 INJECTION, POWDER, LYOPHILIZED, FOR SOLUTION INTRAVENOUS at 08:11

## 2020-05-12 RX ADMIN — HEPARIN SODIUM 18 UNITS/KG/HR: 10000 INJECTION, SOLUTION INTRAVENOUS at 20:01

## 2020-05-12 RX ADMIN — ATORVASTATIN CALCIUM 40 MG: 40 TABLET, FILM COATED ORAL at 20:02

## 2020-05-12 RX ADMIN — Medication 10 ML: at 08:11

## 2020-05-12 RX ADMIN — LISINOPRIL 2.5 MG: 2.5 TABLET ORAL at 15:29

## 2020-05-12 ASSESSMENT — PAIN SCALES - GENERAL
PAINLEVEL_OUTOF10: 0

## 2020-05-12 NOTE — PLAN OF CARE
Problem: Pain:  Goal: Pain level will decrease  Description: Pain level will decrease  5/12/2020 0755 by Loretta Diaz RN  Outcome: Ongoing       Problem: Skin Integrity - Impaired:  Goal: Will show no infection signs and symptoms  Description: Will show no infection signs and symptoms  5/12/2020 0755 by Loretta Diaz RN  Outcome: Ongoing    No complaints of pain this morning.

## 2020-05-12 NOTE — PROGRESS NOTES
Clinical Pharmacy Progress Note    Admit date: 5/11/2020     Subjective/Objective:  Pt is a 56yom with PMHx that includes essential tremor and EtOH abuse who is admitted with chest pain, possible new onset HF, and B/L LE cellulitis. Pharmacy is consulted to dose Vancomycin per Dr. Aretha Lennon    Current antibiotics:  Vancomycin - Pharmacy to dose - day #2   1g IV x1 5/11 23:00   1.25g IV q12h (5/12 - current)      Recent Labs     05/11/20  1725 05/12/20  0615   * 135*   K 3.7 3.7   CL 95* 98*   CO2 26 25   BUN 5* 6*   CREATININE 0.7* 0.7*   GLUCOSE 97 102*       Estimated Creatinine Clearance: 111 mL/min (A) (based on SCr of 0.7 mg/dL (L)). Lab Results   Component Value Date    WBC 7.2 05/11/2020    HGB 17.4 05/11/2020    HCT 50.4 05/11/2020    .0 (H) 05/11/2020     05/11/2020       No results found for: PROTIME, INR    Height:  5' 9\" (175.3 cm)  Weight:  178 lb 9.6 oz (81 kg)    Vancomycin Levels:  Trough  5/13 @ 09:00 = pending     Culture results:  Nasal MRSA PCR (5/11) = pending    Prophylaxis:  VTE:  Enoxaparin  GI:  Not indicated      Assessment/Plan:  1)  B/L LE cellulitis:  Vancomycin - day #2  · Vancomycin - Pharmacy to dose  · Received 1g IV x1 last night. Based on estimated kinetics and age / size, will continue with 1.25g IV q12h. · Trough has been ordered with 4th total dose, due tomorrow 09:00.  · Clinical condition will be monitored closely, and levels will be ordered & dose adjustments made as appropriate.     Please call with questions--  Thanks--  Veronica Shelton, PharmD, [address], [address] [place]   5/12/2020 8:40 AM

## 2020-05-12 NOTE — CONSULTS
hematuria. · Musculoskeletal:  No gait disturbance, weakness or joint complaints. · Integumentary: No rash or pruritis. Endocrine: No malaise, fatigue or temperature intolerance. Hematologic/Lymphatic: No abnormal bruising or bleeding, blood clots or swollen lymph nodes. · Allergic/Immunologic: No nasal congestion or hives. · All other ROS are reviewed and are unremarkable. Physical Examination:    Vitals:    05/11/20 2130 05/11/20 2217 05/12/20 0154 05/12/20 0628   BP: 117/77 120/73 109/60 (!) 104/58   Pulse:  61 59 61   Resp:  20 20 20   Temp:  99.2 °F (37.3 °C) 98 °F (36.7 °C) 98.6 °F (37 °C)   TempSrc:  Oral Oral Oral   SpO2: 94% 97% 98% 95%   Weight:  177 lb 7.5 oz (80.5 kg)  178 lb 9.6 oz (81 kg)   Height:  5' 9\" (1.753 m)          EXAMl and General Appearance:  Healthy. And alert . HEENT: eyes and ears intact. No nasal masses  THYROID: not enlarged  LUNGS:  · Clear to auscultation and percussion  HEART and VASCULAR:  · The apical impulses not displaced  · Heart tones are crisp and normal  · Cervical veins are not engorged  · The carotid upstroke is normal in amplitude and contour without delay or bruit  · Peripheral pulses are symmetrical and full  · There is no clubbing, cyanosis of the extremities. · No peripheral edema  · Femoral Arteries: 2+ and equal  · Pedal Pulses:2+ and equal   ABDOMEN[de-identified]  · No masses or tenderness  · Liver/Spleen: No Abnormalities Noted  NEUROLOGICAL:  . Moves all extremities to command. Cranial nerves 2-12 are in tact. PSYCHIATRIC: alert and lucid  and oriented and appropriate  SKIN: Significant erythema of both lower extremities from the knees down including the feet. There is tenderness present. There had been some mild edema but it has apparently retreated overnight.   LYMPH NODES: none enlarged    ·   ·   ·     CBC with Differential:    Lab Results   Component Value Date    WBC 7.2 05/11/2020    RBC 4.80 05/11/2020    HGB 17.4 05/11/2020    HCT 50.4 05/11/2020  05/11/2020    .0 05/11/2020    MCH 36.2 05/11/2020    MCHC 34.5 05/11/2020    RDW 15.1 05/11/2020    LYMPHOPCT 26.0 05/11/2020    MONOPCT 9.9 05/11/2020    BASOPCT 0.6 05/11/2020    MONOSABS 0.7 05/11/2020    LYMPHSABS 1.9 05/11/2020    EOSABS 0.1 05/11/2020    BASOSABS 0.0 05/11/2020     BMP:    Lab Results   Component Value Date     05/12/2020    K 3.7 05/12/2020    CL 98 05/12/2020    CO2 25 05/12/2020    BUN 6 05/12/2020    LABALBU 3.6 05/11/2020    CREATININE 0.7 05/12/2020    CALCIUM 8.6 05/12/2020    GFRAA >60 05/12/2020    LABGLOM >60 05/12/2020     Uric Acid:  No components found for: URIC  PT/INR:  No results found for: PROTIME, INR  Last 3 Troponin:    Lab Results   Component Value Date    TROPONINI 0.01 05/11/2020     FLP:  No results found for: TRIG, HDL, LDLCALC, LDLDIRECT, LABVLDL    EKG:  . On 5/12/2020 sinus rhythm 65/min. Early transition. Anterior wall ST and T wave changes consistent with and suggesting ischemia. Prolonged QT interval.  echocardiogram on 5/12/2020 showed large apical mural thrombus. There is distal anterior wall apex akinetic segment consistent with coronary artery disease. Assessment/ Plan     Patient Active Problem List    Diagnosis Date Noted    Leg edema 05/11/2020     Patient has a mural thrombus which is surprising finding. Anteroapical focal segmental disease of his myocardium. He needs angiography. We will proceed with a cardiac cath to assess his coronary anatomy. He will need to be on anticoagulant therapy pending the findings on his coronary disease. Thanks for allowing me the opportunity  to participate in the evaluation and care of your patients.  Please call if we can assist further [phone]    This chart was likely completed using voice recognition technology and may contain unintended grammatical , phraseology,and/or punctuation errors  Geoffrey Parikh M.D., [facility]  5/12/202010:15 AM

## 2020-05-12 NOTE — ANESTHESIA PRE-OP
Intravenous Q12H Lyssa Marley MD        magnesium sulfate 2 g in 50 mL IVPB premix  2 g Intravenous Once Chris Loyd MD        nadolol (CORGARD) tablet 20 mg  20 mg Oral Daily Lyssa Marley MD        sodium chloride flush 0.9 % injection 10 mL  10 mL Intravenous 2 times per day Lyssa Marley MD   10 mL at 05/11/20 2301    sodium chloride flush 0.9 % injection 10 mL  10 mL Intravenous PRN Lyssa Marley MD        acetaminophen (TYLENOL) tablet 650 mg  650 mg Oral Q6H PRN Lyssa Marley MD        Or    acetaminophen (TYLENOL) suppository 650 mg  650 mg Rectal Q6H PRN Lyssa Marley MD        polyethylene glycol (GLYCOLAX) packet 17 g  17 g Oral Daily PRN Lyssa Marley MD        promethazine (PHENERGAN) tablet 12.5 mg  12.5 mg Oral Q6H PRN Lyssa Marley MD        Or    [facility]dansetron [facility] injection 4 mg  4 mg Intravenous Q6H PRN Lyssa Marley MD        enoxaparin (LOVENOX) injection 40 mg  40 mg Subcutaneous Daily Lyssa Marley MD        perflutren lipid microspheres (DEFINITY) injection 1.65 mg  1.5 mL Intravenous ONCE PRN Lyssa Marley MD        regadenoson (LEXISCAN) injection 0.4 mg  0.4 mg Intravenous ONCE PRN Lyssa Marley MD        clotrimazole (LOTRIMIN) 1 % cream   Topical BID Lyssa Marley MD        aspirin chewable tablet 81 mg  81 mg Oral Daily Lyssa Marley MD        folic acid (FOLVITE) tablet 1 mg  1 mg Oral Daily Lyssa Marley MD        multivitamin 1 tablet  1 tablet Oral Daily Lyssa Marley MD        vitamin B-1 (THIAMINE) tablet 100 mg  100 mg Oral Daily Lyssa Marley MD        LORazepam (ATIVAN) tablet 1 mg  1 mg Oral Q1H PRN Lyssa Marley MD        Or    LORazepam (ATIVAN) injection 1 mg  1 mg Intravenous Q1H PRN Lyssa Marley MD        Or    LORazepam (ATIVAN) tablet 2 mg  2 mg Oral Q1H PRN Lyssa Marley MD        Or    LORazepam (ATIVAN) injection 2 mg  2 mg Intravenous Q1H PRN Lyssa Marley MD        Or    LORazepam (ATIVAN) tablet 3 mg  3 mg Oral Q1H

## 2020-05-12 NOTE — CONSULTS
Clinical Pharmacy Consult Note    Admit date: 5/11/2020    Subjective/Objective:  63 yo male admitted with chest pain and leg swelling - red and itchy, \"so he's been scratching them which have caused them to bleed\"    Pharmacy is consulted to dose Vancomycin per Dr. Kristen Dominguez    Pertinent Medications:  Vancomycin 1000 mg IV Q 12 hr -- day # 1  Ancef 1 g IV once -- day # 1  Bactrim 800-160 mg PO once -- day # 1    PERTINENT LABS:  Recent Labs     05/11/20  1725   *   K 3.7   CL 95*   CO2 26   BUN 5*   CREATININE 0.7*       Estimated Creatinine Clearance: 111 mL/min (A) (based on SCr of 0.7 mg/dL (L)). Recent Labs     05/11/20  1726   WBC 7.2   HGB 17.4   HCT 50.4   .0*          Height:  5' 9\" (175.3 cm)  Weight: 177 lb 7.5 oz (80.5 kg)    Assessment/Plan:  1. Cellulitis:  Vancomycin day #1  Vancomycin  · Will start 1000 mg IV Q 12 hr  Provides ~ 12.4 mg/kg and is based on a half-life elimination of ~7.2 hours. · Clinical pharmacist will follow-up in AM.  · Renal function will be monitored closely and dosing will be adjusted as appropriate. Please call with any questions. Thank you for consulting pharmacy! Please call pharmacy with any questions!    Robyn Sánchez, [facility]   5/11/2020 10:48 PM

## 2020-05-12 NOTE — PROGRESS NOTES
[facility] Wound Ostomy Continence Nurse  Follow-up Progress Note       NAME:  Law Gavin  MEDICAL RECORD NUMBER:  [id number]  AGE:  62 y.o. GENDER:  male  :  1960  TODAY'S DATE:  2020    Subjective:   Wound Identification: BLE  Wound Type: venous  Contributing Factors: PVD, chronic lower leg edema with resulting hemosiderin staining and pruritis with current cellulitis        Patient Goal of Care:  [x] Wound Healing  [] Odor Control  [] Palliative Care  [] Pain Control   [] Other:     Objective:    /81   Pulse 71   Temp 97.9 °F (36.6 °C) (Oral)   Resp 18   Ht 5' 9\" (1.753 m)   Wt 178 lb 9.6 oz (81 kg)   SpO2 95%   BMI 26.37 kg/m²   Mitch Risk Score: Mitch Scale Score: 21  Assessment:   Measurements:  Wound 20 Leg Lower; Left excoriation (scratches) linear scattered around in dark cellulitic skin (Active)   Wound Image   2020  2:06 PM   Wound Traumatic 2020  2:06 PM   Wound Assessment Dry; Intact; Brown;Dark edges;Maroon;Red;Edema 2020  2:06 PM   Drainage Amount None 2020  2:06 PM   Odor None 2020  2:06 PM   Margins Attached edges 2020  2:06 PM   Simin-wound Assessment Edema; Hyperpigmented;Red;Burgundy 2020  2:06 PM   Number of days: 0       Wound 20 Leg Lower;Right excoriation (linear scratches) scattered within dark cellujlitic discolored skin (Active)   Wound Image   2020  2:06 PM   Wound Traumatic 2020  2:06 PM   Wound Assessment Dry; Intact; Brown;Dark edges;Edema;Maroon; Red 2020  2:06 PM   Drainage Amount None 2020  2:06 PM   Odor None 2020  2:06 PM   Margins Attached edges 2020  2:06 PM   Simin-wound Assessment Burgundy;Red;Hyperpigmented;Edema 2020  2:06 PM   Number of days: 0       Response to treatment: only c/o pain around big toenail corner due to thick, long onychomycosis nails  Plan:   Plan of Care:  Recommend Unna boots for pruritis and to calm skin down with light

## 2020-05-13 LAB
ANION GAP SERPL CALCULATED.3IONS-SCNC: 8 MMOL/L (ref 3–16)
ANION GAP SERPL CALCULATED.3IONS-SCNC: 9 MMOL/L (ref 3–16)
ANTI-XA UNFRAC HEPARIN: 0.43 IU/ML (ref 0.3–0.7)
ANTI-XA UNFRAC HEPARIN: 0.65 IU/ML (ref 0.3–0.7)
ANTI-XA UNFRAC HEPARIN: 0.73 IU/ML (ref 0.3–0.7)
APTT: 88.6 SEC (ref 24.2–36.2)
BACTERIA: ABNORMAL /HPF
BILIRUBIN URINE: ABNORMAL
BLOOD, URINE: NEGATIVE
BUN BLDV-MCNC: 9 MG/DL (ref 7–20)
BUN BLDV-MCNC: 9 MG/DL (ref 7–20)
CALCIUM SERPL-MCNC: 8.8 MG/DL (ref 8.3–10.6)
CALCIUM SERPL-MCNC: 8.9 MG/DL (ref 8.3–10.6)
CHLORIDE BLD-SCNC: 98 MMOL/L (ref 99–110)
CHLORIDE BLD-SCNC: 98 MMOL/L (ref 99–110)
CLARITY: CLEAR
CO2: 26 MMOL/L (ref 21–32)
CO2: 28 MMOL/L (ref 21–32)
COLOR: YELLOW
CREAT SERPL-MCNC: 0.7 MG/DL (ref 0.8–1.3)
CREAT SERPL-MCNC: 0.7 MG/DL (ref 0.8–1.3)
EKG ATRIAL RATE: 67 BPM
EKG DIAGNOSIS: NORMAL
EKG P AXIS: 50 DEGREES
EKG P-R INTERVAL: 168 MS
EKG Q-T INTERVAL: 444 MS
EKG QRS DURATION: 86 MS
EKG QTC CALCULATION (BAZETT): 469 MS
EKG R AXIS: -19 DEGREES
EKG T AXIS: 89 DEGREES
EKG VENTRICULAR RATE: 67 BPM
EPITHELIAL CELLS, UA: ABNORMAL /HPF (ref 0–5)
GFR AFRICAN AMERICAN: >60
GFR AFRICAN AMERICAN: >60
GFR NON-AFRICAN AMERICAN: >60
GFR NON-AFRICAN AMERICAN: >60
GLUCOSE BLD-MCNC: 100 MG/DL (ref 70–99)
GLUCOSE BLD-MCNC: 98 MG/DL (ref 70–99)
GLUCOSE URINE: 100 MG/DL
HCT VFR BLD CALC: 50.9 % (ref 40.5–52.5)
HEMOGLOBIN: 17.4 G/DL (ref 13.5–17.5)
INR BLD: 1.22 (ref 0.86–1.14)
INR BLD: 1.27 (ref 0.86–1.14)
KETONES, URINE: 15 MG/DL
LACTIC ACID: 1.3 MMOL/L (ref 0.4–2)
LEUKOCYTE ESTERASE, URINE: NEGATIVE
MCH RBC QN AUTO: 36.3 PG (ref 26–34)
MCHC RBC AUTO-ENTMCNC: 34.2 G/DL (ref 31–36)
MCV RBC AUTO: 106 FL (ref 80–100)
MICROSCOPIC EXAMINATION: YES
MUCUS: ABNORMAL /LPF
NITRITE, URINE: NEGATIVE
PDW BLD-RTO: 15.2 % (ref 12.4–15.4)
PH UA: 7.5 (ref 5–8)
PLATELET # BLD: 167 K/UL (ref 135–450)
PMV BLD AUTO: 7.8 FL (ref 5–10.5)
POTASSIUM REFLEX MAGNESIUM: 3.9 MMOL/L (ref 3.5–5.1)
POTASSIUM REFLEX MAGNESIUM: 4.1 MMOL/L (ref 3.5–5.1)
PROTEIN UA: ABNORMAL MG/DL
PROTHROMBIN TIME: 14.2 SEC (ref 10–13.2)
PROTHROMBIN TIME: 14.8 SEC (ref 10–13.2)
RBC # BLD: 4.8 M/UL (ref 4.2–5.9)
RBC UA: ABNORMAL /HPF (ref 0–4)
REASON FOR REJECTION: NORMAL
REJECTED TEST: NORMAL
SODIUM BLD-SCNC: 133 MMOL/L (ref 136–145)
SODIUM BLD-SCNC: 134 MMOL/L (ref 136–145)
SPECIFIC GRAVITY UA: 1.01 (ref 1–1.03)
URINE TYPE: ABNORMAL
UROBILINOGEN, URINE: >=8 E.U./DL
VANCOMYCIN TROUGH: 8.8 UG/ML (ref 10–20)
WBC # BLD: 6.8 K/UL (ref 4–11)
WBC UA: ABNORMAL /HPF (ref 0–5)

## 2020-05-13 PROCEDURE — 93010 ELECTROCARDIOGRAM REPORT: CPT | Performed by: INTERNAL MEDICINE

## 2020-05-13 PROCEDURE — 81001 URINALYSIS AUTO W/SCOPE: CPT

## 2020-05-13 PROCEDURE — 85730 THROMBOPLASTIN TIME PARTIAL: CPT

## 2020-05-13 PROCEDURE — 85520 HEPARIN ASSAY: CPT

## 2020-05-13 PROCEDURE — 6360000002 HC RX W HCPCS: Performed by: INTERNAL MEDICINE

## 2020-05-13 PROCEDURE — 80202 ASSAY OF VANCOMYCIN: CPT

## 2020-05-13 PROCEDURE — 2060000000 HC ICU INTERMEDIATE R&B

## 2020-05-13 PROCEDURE — 93005 ELECTROCARDIOGRAM TRACING: CPT | Performed by: INTERNAL MEDICINE

## 2020-05-13 PROCEDURE — 6370000000 HC RX 637 (ALT 250 FOR IP): Performed by: INTERNAL MEDICINE

## 2020-05-13 PROCEDURE — 80048 BASIC METABOLIC PNL TOTAL CA: CPT

## 2020-05-13 PROCEDURE — 2580000003 HC RX 258: Performed by: INTERNAL MEDICINE

## 2020-05-13 PROCEDURE — 85027 COMPLETE CBC AUTOMATED: CPT

## 2020-05-13 PROCEDURE — 85610 PROTHROMBIN TIME: CPT

## 2020-05-13 PROCEDURE — 99233 SBSQ HOSP IP/OBS HIGH 50: CPT | Performed by: INTERNAL MEDICINE

## 2020-05-13 PROCEDURE — 83605 ASSAY OF LACTIC ACID: CPT

## 2020-05-13 PROCEDURE — 36415 COLL VENOUS BLD VENIPUNCTURE: CPT

## 2020-05-13 RX ORDER — HEPARIN SODIUM 10000 [USP'U]/100ML
16 INJECTION, SOLUTION INTRAVENOUS CONTINUOUS
Status: DISCONTINUED | OUTPATIENT
Start: 2020-05-13 | End: 2020-05-13

## 2020-05-13 RX ADMIN — VANCOMYCIN HYDROCHLORIDE 1250 MG: 10 INJECTION, POWDER, LYOPHILIZED, FOR SOLUTION INTRAVENOUS at 20:28

## 2020-05-13 RX ADMIN — Medication: at 09:14

## 2020-05-13 RX ADMIN — THERA TABS 1 TABLET: TAB at 09:07

## 2020-05-13 RX ADMIN — VANCOMYCIN HYDROCHLORIDE 1250 MG: 10 INJECTION, POWDER, LYOPHILIZED, FOR SOLUTION INTRAVENOUS at 09:28

## 2020-05-13 RX ADMIN — FOLIC ACID 1 MG: 1 TABLET ORAL at 09:07

## 2020-05-13 RX ADMIN — HEPARIN SODIUM 16 UNITS/KG/HR: 10000 INJECTION, SOLUTION INTRAVENOUS at 14:19

## 2020-05-13 RX ADMIN — LISINOPRIL 2.5 MG: 2.5 TABLET ORAL at 09:07

## 2020-05-13 RX ADMIN — CLOPIDOGREL BISULFATE 75 MG: 75 TABLET ORAL at 09:11

## 2020-05-13 RX ADMIN — Medication 100 MG: at 09:07

## 2020-05-13 RX ADMIN — PRIMIDONE 50 MG: 50 TABLET ORAL at 20:03

## 2020-05-13 RX ADMIN — WARFARIN SODIUM 5 MG: 5 TABLET ORAL at 18:40

## 2020-05-13 RX ADMIN — ASPIRIN 81 MG 81 MG: 81 TABLET ORAL at 09:11

## 2020-05-13 RX ADMIN — Medication 10 ML: at 20:04

## 2020-05-13 RX ADMIN — Medication 10 ML: at 09:08

## 2020-05-13 RX ADMIN — NADOLOL 20 MG: 20 TABLET ORAL at 09:06

## 2020-05-13 RX ADMIN — PRIMIDONE 50 MG: 50 TABLET ORAL at 09:08

## 2020-05-13 RX ADMIN — CLOTRIMAZOLE: 10 CREAM TOPICAL at 09:14

## 2020-05-13 RX ADMIN — ATORVASTATIN CALCIUM 40 MG: 40 TABLET, FILM COATED ORAL at 20:10

## 2020-05-13 RX ADMIN — Medication: at 20:07

## 2020-05-13 RX ADMIN — CLOTRIMAZOLE: 10 CREAM TOPICAL at 20:05

## 2020-05-13 ASSESSMENT — PAIN SCALES - GENERAL
PAINLEVEL_OUTOF10: 0

## 2020-05-13 NOTE — PROGRESS NOTES
no clubbing, cyanosis of the extremities. · No peripheral edema  · Femoral Arteries: 2+ and equal  · Pedal Pulses: 2+ and equal   ABDOMEN[de-identified]  · No masses or tenderness  · Liver/Spleen: No Abnormalities Noted  NEUROLOGICAL:  . Moves all extremities to command. Cranial nerves 2-12 are in tact.   PSYCHIATRIC: alert and lucid and oriented and appropriate  SKIN: No lesions or rashes  LYMPH NODES: none enlarged      Medications:    Hydrocerin   Topical BID    primidone  50 mg Oral BID    vancomycin  1,250 mg Intravenous Q12H    lisinopril  2.5 mg Oral Daily    atorvastatin  40 mg Oral Nightly    clopidogrel  75 mg Oral Daily    warfarin  5 mg Oral Daily    nadolol  20 mg Oral Daily    sodium chloride flush  10 mL Intravenous 2 times per day    clotrimazole   Topical BID    aspirin  81 mg Oral Daily    folic acid  1 mg Oral Daily    multivitamin  1 tablet Oral Daily    thiamine  100 mg Oral Daily      heparin (porcine) 16 Units/kg/hr (05/13/20 1419)     heparin (porcine), heparin (porcine), sodium chloride flush, acetaminophen **OR** acetaminophen, polyethylene glycol, promethazine **OR** ondansetron, perflutren lipid microspheres, regadenoson, LORazepam **OR** LORazepam **OR** LORazepam **OR** LORazepam **OR** LORazepam **OR** LORazepam **OR** LORazepam **OR** LORazepam    Lab Data:  CBC:   Recent Labs     05/11/20  1726 05/12/20  1817 05/13/20  0831   WBC 7.2 6.7 6.8   HGB 17.4 17.7* 17.4   HCT 50.4 51.2 50.9   .0* 106.2* 106.0*    161 167     BMP:   Recent Labs     05/12/20  0615 05/13/20  0457 05/13/20  0831   * 134* 133*   K 3.7 3.9 4.1   CL 98* 98* 98*   CO2 25 28 26   BUN 6* 9 9   CREATININE 0.7* 0.7* 0.7*     Troponin:Troponin:   Lab Results   Component Value Date    TROPONINI 0.01 05/11/2020     LIVER PROFILE:   Recent Labs     05/11/20  1725   AST 30   ALT 21   BILIDIR 0.4*   BILITOT 1.7*   ALKPHOS 119     PT/INR:   Recent Labs     05/12/20  1817 05/13/20  0456 05/13/20  1015 PROTIME 13.3* 14.2* 14.8*   INR 1.14 1.22* 1.27*     APTT:   Recent Labs     05/12/20  1817 05/13/20  1015   APTT 30.7 88.6*     BNP:  No results for input(s): BNP in the last 72 hours. IMAGING: Cardiac cath 5/12/2020 with anterior wall complete occlusion. Treated with balloon angioplasty and stenting. There is anterior apical infarct with reduced ejection fraction and a mural thrombus. Assessment:  Patient Active Problem List    Diagnosis Date Noted    Abnormal angiogram 05/12/2020    Mural thrombus of cardiac apex     Coronary artery disease due to lipid rich plaque     Leg edema 05/11/2020       Plan:   Continue current medications. Core Measures:  · Discharge instructions:   · LVEF documented:   · ACEI for LV dysfunction:   CAD with complete LAD occlusion treated with stenting on 5/12/2020  Mural thrombus secondary to anterior apical myocardial infarction  DVT left lower leg  · Bilateral cellulitis lower legs  ·   · At this time the patient needs to be on a an anticoagulant along with an antiplatelet drug. Currently on Plavix and heparin and being transitioned to loading with warfarin. Low-dose aspirin for the time being and the Aggrastat has been discontinued. Work-up for hypercoagulable state is in progress. Hematology has been consulted. Anticipate probably home in a couple days if no evidence for pulmonary emboli. Thanks for allowing me  the opportunity to participate in the evaluation and care of your patient.  If there are questions please call me [phone]    This note was likely completed using voice recognition technology and may contain unintended grammatical, phraseology and/or punctuation  errors      Su Littlejohn M.D.,[facility]  5/13/2020 3:34 PM

## 2020-05-13 NOTE — PROGRESS NOTES
Clinical Pharmacy Progress Note    ADMIT DATE: 5/11/2020     Interval update: Echo (5/12) showed apical L ventricular thrombus. Venous doppler (5/12) showed acute LE DVT. S/p Cardiac cath (5/12) which showed complete occlusion of LAD, now s/p stent placement. Now on heparin gtt -- warfarin started 5/12. Pt is a 56yom with PMHx that includes essential tremor and EtOH abuse who is admitted with chest pain, possible new onset HF, and B/L LE cellulitis. Pharmacy is consulted to dose Vancomycin per Dr. Bernadette Larry    Patient remains on heparin gtt at bridge to warfarin therapy - pharmacy asked to dose warfarin per Dr. Bernadette Larry. Goal INR 2-3. Current antibiotics:   5/11  Vancomycin - pharmacy to dose - day #3    5/11   Vancomycin 1.25g IV q12h (5/12-current)    LABORATORY:  Recent Labs     05/13/20  0457 05/13/20  0831   * 133*   K 3.9 4.1   CL 98* 98*   CO2 28 26   BUN 9 9   CREATININE 0.7* 0.7*   GLUCOSE 98 100*     Estimated Creatinine Clearance: 111 mL/min (A) (based on SCr of 0.7 mg/dL (L)). Lab Results   Component Value Date    WBC 6.8 05/13/2020    HGB 17.4 05/13/2020    HCT 50.9 05/13/2020    .0 (H) 05/13/2020     05/13/2020       Lab Results   Component Value Date    PROTIME 14.2 (H) 05/13/2020    INR 1.22 (H) 05/13/2020     Vancomycin levels:  Trough = 8.8 mcg/mL 5/13 08:34 - timed appropriately, drawn prior to 2nd dose on 1g IV q12h    VITALS:  /65   Pulse 57   Temp 97.2 °F (36.2 °C) (Oral)   Resp 16   Ht 5' 9\" (1.753 m)   Wt 178 lb 9.6 oz (81 kg)   SpO2 99%   BMI 26.37 kg/m²     DIET: DIET CARDIAC;    PROPHYLAXIS:  Stress ulcer: not indicated  VTE:  Heparin gtt + warfarin    ASSESSMENT/PLAN:    1) Bilateral LE cellulitis: Vancomycin - Day #3   · Vancomycin--pharmacy to dose  · Received vancomycin 1g IV x1 on 5/11 PM. Vancomycin 1.25g IV q12h was ordered to start 5/12 AM, but AM dose was not given (unsure why).   · Vancomycin trough drawn this AM = 8.8 mcg/mL - this was

## 2020-05-13 NOTE — PLAN OF CARE
Problem: Skin Integrity - Impaired:  Goal: Will show no infection signs and symptoms  Description: Will show no infection signs and symptoms  Note: Skin assessment is complete and documented. Pt complains of bilateral leg itching and pt continues to scratch. Abrasions noted on BLE. Pt encouraged to not scratch legs. Pt verbalized understanding. Pt provided with lotion. Will continue to monitor.

## 2020-05-14 LAB
ANTI-XA UNFRAC HEPARIN: 0.45 IU/ML (ref 0.3–0.7)
ANTI-XA UNFRAC HEPARIN: 0.51 IU/ML (ref 0.3–0.7)
APTT: 32 SEC (ref 24.2–36.2)
APTT: 33 SEC (ref 24.2–36.2)
BASOPHILS ABSOLUTE: 0.1 K/UL (ref 0–0.2)
BASOPHILS RELATIVE PERCENT: 1.2 %
D DIMER: 423 NG/ML DDU (ref 0–229)
EOSINOPHILS ABSOLUTE: 0.3 K/UL (ref 0–0.6)
EOSINOPHILS RELATIVE PERCENT: 4.8 %
FIBRINOGEN: 222 MG/DL (ref 200–397)
HCT VFR BLD CALC: 45.9 % (ref 40.5–52.5)
HCT VFR BLD CALC: 46.6 % (ref 40.5–52.5)
HEMOGLOBIN: 16 G/DL (ref 13.5–17.5)
HEMOGLOBIN: 16 G/DL (ref 13.5–17.5)
INR BLD: 2.18 (ref 0.86–1.14)
LYMPHOCYTES ABSOLUTE: 1.6 K/UL (ref 1–5.1)
LYMPHOCYTES RELATIVE PERCENT: 24.4 %
MCH RBC QN AUTO: 36.1 PG (ref 26–34)
MCH RBC QN AUTO: 36.5 PG (ref 26–34)
MCHC RBC AUTO-ENTMCNC: 34.3 G/DL (ref 31–36)
MCHC RBC AUTO-ENTMCNC: 34.8 G/DL (ref 31–36)
MCV RBC AUTO: 105.1 FL (ref 80–100)
MCV RBC AUTO: 105.1 FL (ref 80–100)
MONOCYTES ABSOLUTE: 0.7 K/UL (ref 0–1.3)
MONOCYTES RELATIVE PERCENT: 10 %
NEUTROPHILS ABSOLUTE: 4 K/UL (ref 1.7–7.7)
NEUTROPHILS RELATIVE PERCENT: 59.6 %
PDW BLD-RTO: 15 % (ref 12.4–15.4)
PDW BLD-RTO: 15.1 % (ref 12.4–15.4)
PLATELET # BLD: 161 K/UL (ref 135–450)
PLATELET # BLD: 168 K/UL (ref 135–450)
PMV BLD AUTO: 7.3 FL (ref 5–10.5)
PMV BLD AUTO: 7.5 FL (ref 5–10.5)
PROTHROMBIN TIME: 25.5 SEC (ref 10–13.2)
RBC # BLD: 4.37 M/UL (ref 4.2–5.9)
RBC # BLD: 4.43 M/UL (ref 4.2–5.9)
VANCOMYCIN TROUGH: 27.7 UG/ML (ref 10–20)
WBC # BLD: 6.4 K/UL (ref 4–11)
WBC # BLD: 6.8 K/UL (ref 4–11)

## 2020-05-14 PROCEDURE — 2060000000 HC ICU INTERMEDIATE R&B

## 2020-05-14 PROCEDURE — 2580000003 HC RX 258: Performed by: INTERNAL MEDICINE

## 2020-05-14 PROCEDURE — 6370000000 HC RX 637 (ALT 250 FOR IP): Performed by: INTERNAL MEDICINE

## 2020-05-14 PROCEDURE — 85610 PROTHROMBIN TIME: CPT

## 2020-05-14 PROCEDURE — 85730 THROMBOPLASTIN TIME PARTIAL: CPT

## 2020-05-14 PROCEDURE — 6360000002 HC RX W HCPCS: Performed by: INTERNAL MEDICINE

## 2020-05-14 PROCEDURE — 80202 ASSAY OF VANCOMYCIN: CPT

## 2020-05-14 PROCEDURE — 85025 COMPLETE CBC W/AUTO DIFF WBC: CPT

## 2020-05-14 PROCEDURE — 85520 HEPARIN ASSAY: CPT

## 2020-05-14 PROCEDURE — 85384 FIBRINOGEN ACTIVITY: CPT

## 2020-05-14 PROCEDURE — 36415 COLL VENOUS BLD VENIPUNCTURE: CPT

## 2020-05-14 PROCEDURE — 99233 SBSQ HOSP IP/OBS HIGH 50: CPT | Performed by: INTERNAL MEDICINE

## 2020-05-14 PROCEDURE — 85379 FIBRIN DEGRADATION QUANT: CPT

## 2020-05-14 PROCEDURE — 85027 COMPLETE CBC AUTOMATED: CPT

## 2020-05-14 RX ORDER — HEPARIN SODIUM 5000 [USP'U]/ML
80 INJECTION, SOLUTION INTRAVENOUS; SUBCUTANEOUS PRN
Status: DISCONTINUED | OUTPATIENT
Start: 2020-05-14 | End: 2020-05-15 | Stop reason: ALTCHOICE

## 2020-05-14 RX ORDER — HEPARIN SODIUM 10000 [USP'U]/100ML
18 INJECTION, SOLUTION INTRAVENOUS CONTINUOUS
Status: DISCONTINUED | OUTPATIENT
Start: 2020-05-14 | End: 2020-05-15

## 2020-05-14 RX ORDER — HEPARIN SODIUM 5000 [USP'U]/ML
40 INJECTION, SOLUTION INTRAVENOUS; SUBCUTANEOUS PRN
Status: DISCONTINUED | OUTPATIENT
Start: 2020-05-14 | End: 2020-05-15 | Stop reason: ALTCHOICE

## 2020-05-14 RX ORDER — HEPARIN SODIUM 5000 [USP'U]/ML
80 INJECTION, SOLUTION INTRAVENOUS; SUBCUTANEOUS ONCE
Status: COMPLETED | OUTPATIENT
Start: 2020-05-14 | End: 2020-05-14

## 2020-05-14 RX ADMIN — CLOTRIMAZOLE: 10 CREAM TOPICAL at 08:54

## 2020-05-14 RX ADMIN — ASPIRIN 81 MG 81 MG: 81 TABLET ORAL at 08:55

## 2020-05-14 RX ADMIN — Medication 10 ML: at 08:57

## 2020-05-14 RX ADMIN — VANCOMYCIN HYDROCHLORIDE 1250 MG: 10 INJECTION, POWDER, LYOPHILIZED, FOR SOLUTION INTRAVENOUS at 20:26

## 2020-05-14 RX ADMIN — HEPARIN SODIUM 6550 UNITS: 5000 INJECTION INTRAVENOUS; SUBCUTANEOUS at 10:30

## 2020-05-14 RX ADMIN — NADOLOL 20 MG: 20 TABLET ORAL at 08:55

## 2020-05-14 RX ADMIN — Medication 100 MG: at 08:55

## 2020-05-14 RX ADMIN — CLOPIDOGREL BISULFATE 75 MG: 75 TABLET ORAL at 08:55

## 2020-05-14 RX ADMIN — ATORVASTATIN CALCIUM 40 MG: 40 TABLET, FILM COATED ORAL at 20:24

## 2020-05-14 RX ADMIN — VANCOMYCIN HYDROCHLORIDE 1250 MG: 10 INJECTION, POWDER, LYOPHILIZED, FOR SOLUTION INTRAVENOUS at 08:57

## 2020-05-14 RX ADMIN — LISINOPRIL 2.5 MG: 2.5 TABLET ORAL at 08:56

## 2020-05-14 RX ADMIN — Medication: at 20:25

## 2020-05-14 RX ADMIN — Medication: at 08:55

## 2020-05-14 RX ADMIN — PRIMIDONE 50 MG: 50 TABLET ORAL at 08:56

## 2020-05-14 RX ADMIN — CLOTRIMAZOLE: 10 CREAM TOPICAL at 20:25

## 2020-05-14 RX ADMIN — THERA TABS 1 TABLET: TAB at 08:56

## 2020-05-14 RX ADMIN — Medication 10 ML: at 20:25

## 2020-05-14 RX ADMIN — HEPARIN SODIUM 18 UNITS/KG/HR: 10000 INJECTION, SOLUTION INTRAVENOUS at 10:29

## 2020-05-14 RX ADMIN — PRIMIDONE 50 MG: 50 TABLET ORAL at 20:24

## 2020-05-14 RX ADMIN — FOLIC ACID 1 MG: 1 TABLET ORAL at 08:56

## 2020-05-14 ASSESSMENT — PAIN SCALES - GENERAL
PAINLEVEL_OUTOF10: 0

## 2020-05-14 NOTE — PROGRESS NOTES
Pt BP 90/53. Pt asymptomatic. Notified Dr. Faith Willingham. No new orders, monitor at this time.  Will continue to monitor VS.

## 2020-05-14 NOTE — PROGRESS NOTES
clubbing, cyanosis of the extremities. · No peripheral edema  · Femoral Arteries: 2+ and equal  · Pedal Pulses: 2+ and equal   ABDOMEN[de-identified]  · No masses or tenderness  · Liver/Spleen: No Abnormalities Noted  NEUROLOGICAL:  . Moves all extremities to command. Cranial nerves 2-12 are in tact.   PSYCHIATRIC: alert and lucid and oriented and appropriate  SKIN: No lesions or rashes  LYMPH NODES: none enlarged      Medications:    Hydrocerin   Topical BID    primidone  50 mg Oral BID    vancomycin  1,250 mg Intravenous Q12H    lisinopril  2.5 mg Oral Daily    atorvastatin  40 mg Oral Nightly    clopidogrel  75 mg Oral Daily    warfarin  5 mg Oral Daily    nadolol  20 mg Oral Daily    sodium chloride flush  10 mL Intravenous 2 times per day    clotrimazole   Topical BID    aspirin  81 mg Oral Daily    folic acid  1 mg Oral Daily    multivitamin  1 tablet Oral Daily    thiamine  100 mg Oral Daily      heparin (porcine) 18 Units/kg/hr (05/14/20 1029)     heparin (porcine), heparin (porcine), menthol-zinc oxide, sodium chloride flush, acetaminophen **OR** acetaminophen, polyethylene glycol, promethazine **OR** ondansetron, perflutren lipid microspheres, regadenoson, LORazepam **OR** LORazepam **OR** LORazepam **OR** LORazepam **OR** LORazepam **OR** LORazepam **OR** LORazepam **OR** LORazepam    Lab Data:  CBC:   Recent Labs     05/13/20  0831 05/14/20  0548 05/14/20  0958   WBC 6.8 6.8 6.4   HGB 17.4 16.0 16.0   HCT 50.9 45.9 46.6   .0* 105.1* 105.1*    161 168     BMP:   Recent Labs     05/12/20  0615 05/13/20  0457 05/13/20  0831   * 134* 133*   K 3.7 3.9 4.1   CL 98* 98* 98*   CO2 25 28 26   BUN 6* 9 9   CREATININE 0.7* 0.7* 0.7*     Troponin:Troponin:   Lab Results   Component Value Date    TROPONINI 0.01 05/11/2020     LIVER PROFILE:   Recent Labs     05/11/20  1725   AST 30   ALT 21   BILIDIR 0.4*   BILITOT 1.7*   ALKPHOS 119     PT/INR:   Recent Labs     05/13/20  0456 05/13/20  101

## 2020-05-14 NOTE — H&P
Full h&p dictated (# [id number]).      pls call w questions and concerns [phone]     No Gomez MD

## 2020-05-14 NOTE — PLAN OF CARE
Problem: Pain:  Goal: Pain level will decrease  Description: Pain level will decrease  Outcome: Ongoing  Note: Pt denies pain tonight. Eucerin to blateral LE redness, which pt states helps with itching he has sometimes to LEs. Pt instructed to notify this RN of any pain or discomfort. Pt verbalized understanding of all instructions. Will continue to monitor comfort. Problem: Skin Integrity - Impaired:  Goal: Will show no infection signs and symptoms  Description: Will show no infection signs and symptoms  5/14/2020 0238 by Javon Bonds RN  Outcome: Ongoing  Note: Pt bilateral LE redness and swelling. Scratch marks to bilateral LE. Instructed pt not to scratch or touch fragile skin to LE. Eucerin lotion applied to bilateral LE. Pt verbalized understanding of all instructions. Will continue to monitor comfort/skin integrity.

## 2020-05-14 NOTE — CONSULTS
I  would not recommend a thrombophilia workup. He will need to continue on  anticoagulation long-term given his unprovoked DVT and the presence of  thrombosis in his left ventricle. 2.  Microcytosis. If cytopenia develops, we will discuss performing a  bone marrow biopsy. This can be addressed as an outpatient moving  forward. Thank you for this interesting consult, please do not hesitate to  contact me for questions or concerns regarding this patient's  evaluation. He will have an appointment in my office within the next  four to six weeks to reassess anticoagulation, possible thrombophilia  and microcytosis.         Marisa Lizama MD    D: 05/14/2020 10:55:06       T: 05/14/2020 13:00:32     [email or web address]  Job#: [id number]     Doc#: [id number]    CC:

## 2020-05-15 ENCOUNTER — TELEPHONE (OUTPATIENT)
Dept: PHARMACY | Age: 60
End: 2020-05-15

## 2020-05-15 LAB
ANION GAP SERPL CALCULATED.3IONS-SCNC: 12 MMOL/L (ref 3–16)
ANTI-XA UNFRAC HEPARIN: 0.53 IU/ML (ref 0.3–0.7)
BUN BLDV-MCNC: 9 MG/DL (ref 7–20)
CALCIUM SERPL-MCNC: 9.2 MG/DL (ref 8.3–10.6)
CHLORIDE BLD-SCNC: 98 MMOL/L (ref 99–110)
CO2: 23 MMOL/L (ref 21–32)
CREAT SERPL-MCNC: 0.6 MG/DL (ref 0.8–1.3)
GFR AFRICAN AMERICAN: >60
GFR NON-AFRICAN AMERICAN: >60
GLUCOSE BLD-MCNC: 97 MG/DL (ref 70–99)
HCT VFR BLD CALC: 45.5 % (ref 40.5–52.5)
HEMOGLOBIN: 15.8 G/DL (ref 13.5–17.5)
INR BLD: 2.45 (ref 0.86–1.14)
MCH RBC QN AUTO: 36.5 PG (ref 26–34)
MCHC RBC AUTO-ENTMCNC: 34.7 G/DL (ref 31–36)
MCV RBC AUTO: 105 FL (ref 80–100)
PDW BLD-RTO: 15.3 % (ref 12.4–15.4)
PLATELET # BLD: 171 K/UL (ref 135–450)
PMV BLD AUTO: 7.4 FL (ref 5–10.5)
POTASSIUM SERPL-SCNC: 4.2 MMOL/L (ref 3.5–5.1)
PROTHROMBIN TIME: 28.7 SEC (ref 10–13.2)
RBC # BLD: 4.33 M/UL (ref 4.2–5.9)
SODIUM BLD-SCNC: 133 MMOL/L (ref 136–145)
VANCOMYCIN TROUGH: 11.6 UG/ML (ref 10–20)
WBC # BLD: 6.2 K/UL (ref 4–11)

## 2020-05-15 PROCEDURE — 6370000000 HC RX 637 (ALT 250 FOR IP): Performed by: INTERNAL MEDICINE

## 2020-05-15 PROCEDURE — 99233 SBSQ HOSP IP/OBS HIGH 50: CPT | Performed by: INTERNAL MEDICINE

## 2020-05-15 PROCEDURE — 85520 HEPARIN ASSAY: CPT

## 2020-05-15 PROCEDURE — 85027 COMPLETE CBC AUTOMATED: CPT

## 2020-05-15 PROCEDURE — 2580000003 HC RX 258: Performed by: INTERNAL MEDICINE

## 2020-05-15 PROCEDURE — 6360000002 HC RX W HCPCS: Performed by: INTERNAL MEDICINE

## 2020-05-15 PROCEDURE — 2060000000 HC ICU INTERMEDIATE R&B

## 2020-05-15 PROCEDURE — 80048 BASIC METABOLIC PNL TOTAL CA: CPT

## 2020-05-15 PROCEDURE — 85610 PROTHROMBIN TIME: CPT

## 2020-05-15 PROCEDURE — 36415 COLL VENOUS BLD VENIPUNCTURE: CPT

## 2020-05-15 PROCEDURE — 80202 ASSAY OF VANCOMYCIN: CPT

## 2020-05-15 RX ORDER — WARFARIN SODIUM 1 MG/1
1 TABLET ORAL
Status: COMPLETED | OUTPATIENT
Start: 2020-05-15 | End: 2020-05-15

## 2020-05-15 RX ADMIN — ANORECTAL OINTMENT: 15.7; .44; 24; 20.6 OINTMENT TOPICAL at 21:37

## 2020-05-15 RX ADMIN — CLOTRIMAZOLE: 10 CREAM TOPICAL at 08:58

## 2020-05-15 RX ADMIN — FOLIC ACID 1 MG: 1 TABLET ORAL at 08:57

## 2020-05-15 RX ADMIN — ATORVASTATIN CALCIUM 40 MG: 40 TABLET, FILM COATED ORAL at 21:35

## 2020-05-15 RX ADMIN — THERA TABS 1 TABLET: TAB at 08:57

## 2020-05-15 RX ADMIN — VANCOMYCIN HYDROCHLORIDE 1250 MG: 10 INJECTION, POWDER, LYOPHILIZED, FOR SOLUTION INTRAVENOUS at 21:25

## 2020-05-15 RX ADMIN — WARFARIN SODIUM 1 MG: 1 TABLET ORAL at 18:54

## 2020-05-15 RX ADMIN — Medication: at 21:37

## 2020-05-15 RX ADMIN — Medication 100 MG: at 08:56

## 2020-05-15 RX ADMIN — Medication 10 ML: at 08:58

## 2020-05-15 RX ADMIN — Medication 10 ML: at 20:49

## 2020-05-15 RX ADMIN — LISINOPRIL 2.5 MG: 2.5 TABLET ORAL at 08:57

## 2020-05-15 RX ADMIN — Medication: at 08:59

## 2020-05-15 RX ADMIN — ANORECTAL OINTMENT: 15.7; .44; 24; 20.6 OINTMENT TOPICAL at 08:59

## 2020-05-15 RX ADMIN — ASPIRIN 81 MG 81 MG: 81 TABLET ORAL at 08:57

## 2020-05-15 RX ADMIN — PRIMIDONE 50 MG: 50 TABLET ORAL at 21:35

## 2020-05-15 RX ADMIN — NADOLOL 20 MG: 20 TABLET ORAL at 08:57

## 2020-05-15 RX ADMIN — PRIMIDONE 50 MG: 50 TABLET ORAL at 09:01

## 2020-05-15 RX ADMIN — CLOTRIMAZOLE: 10 CREAM TOPICAL at 21:36

## 2020-05-15 RX ADMIN — CLOPIDOGREL BISULFATE 75 MG: 75 TABLET ORAL at 08:56

## 2020-05-15 RX ADMIN — VANCOMYCIN HYDROCHLORIDE 1250 MG: 10 INJECTION, POWDER, LYOPHILIZED, FOR SOLUTION INTRAVENOUS at 11:15

## 2020-05-15 ASSESSMENT — PAIN SCALES - GENERAL
PAINLEVEL_OUTOF10: 0
PAINLEVEL_OUTOF10: 0
PAINLEVEL_OUTOF10: 3
PAINLEVEL_OUTOF10: 0

## 2020-05-15 ASSESSMENT — PAIN DESCRIPTION - ORIENTATION: ORIENTATION: RIGHT;LEFT

## 2020-05-15 ASSESSMENT — PAIN DESCRIPTION - LOCATION: LOCATION: LEG

## 2020-05-15 ASSESSMENT — PAIN DESCRIPTION - PAIN TYPE: TYPE: ACUTE PAIN

## 2020-05-15 ASSESSMENT — PAIN DESCRIPTION - DESCRIPTORS: DESCRIPTORS: THROBBING

## 2020-05-15 ASSESSMENT — PAIN DESCRIPTION - ONSET: ONSET: GRADUAL

## 2020-05-15 ASSESSMENT — PAIN DESCRIPTION - FREQUENCY: FREQUENCY: INTERMITTENT

## 2020-05-15 ASSESSMENT — PAIN - FUNCTIONAL ASSESSMENT: PAIN_FUNCTIONAL_ASSESSMENT: ACTIVITIES ARE NOT PREVENTED

## 2020-05-15 ASSESSMENT — PAIN DESCRIPTION - PROGRESSION: CLINICAL_PROGRESSION: GRADUALLY IMPROVING

## 2020-05-15 NOTE — PLAN OF CARE
Problem: Pain:  Goal: Pain level will decrease  Description: Pain level will decrease  Outcome: Ongoing  Note: Offers no complaints of pain at this time. Problem: Skin Integrity - Impaired:  Goal: Will show no infection signs and symptoms  Description: Will show no infection signs and symptoms  Outcome: Ongoing     Problem: Nutrition Deficit:  Goal: Ability to achieve adequate nutritional intake will improve  Description: Ability to achieve adequate nutritional intake will improve  Outcome: Ongoing     Problem: Bleeding:  Goal: Will show no signs and symptoms of excessive bleeding  Description: Will show no signs and symptoms of excessive bleeding  Outcome: Ongoing  Note: Anti-Xa is 0.53 this morning. Will continue with medication as ordered. No signs or symptoms of bleeding noted.       Problem: Anxiety:  Goal: Level of anxiety will decrease  Description: Level of anxiety will decrease  Outcome: Ongoing

## 2020-05-15 NOTE — PROGRESS NOTES
Clinical Pharmacy Progress Note    ADMIT DATE: 5/11/2020     Interval update: Warfarin held last night d/t rapid increase in INR. Remains on IV heparin gtt. Pt is a 56yom with PMHx that includes essential tremor and EtOH abuse who was admitted 5/11 with chest pain - found to have CAD with LAD occlusion - now s/p PCI with stent placement (5/12). Patient remains on IV vancomycin for bilateral LE cellulitis. During admission, pt was also found to have LV thrombus and acute LLE DVT. Pharmacy is consulted to dose Vancomycin per Dr. Norbert Boone; re-consulted on 5/14 per Dr. Berta Mcmillan. Patient remains on heparin gtt at bridge to warfarin therapy - pharmacy asked to dose warfarin per Dr. Norbert Boone. Goal INR 2-3. Current antibiotics:   5/11  Vancomycin - pharmacy to dose - day #5    5/11   Vancomycin 1.25g IV q12h (5/12-current)    LABORATORY:  Recent Labs     05/13/20  0457 05/13/20  0831   * 133*   K 3.9 4.1   CL 98* 98*   CO2 28 26   BUN 9 9   CREATININE 0.7* 0.7*   GLUCOSE 98 100*     Estimated Creatinine Clearance: 111 mL/min (A) (based on SCr of 0.7 mg/dL (L)).     Lab Results   Component Value Date    WBC 6.4 05/14/2020    HGB 16.0 05/14/2020    HCT 46.6 05/14/2020    .1 (H) 05/14/2020     05/14/2020       Lab Results   Component Value Date    PROTIME 25.5 (H) 05/14/2020    INR 2.18 (H) 05/14/2020     Vancomycin levels:  Trough = 8.8 mcg/mL 5/13 08:34 - timed appropriately, drawn prior to 2nd dose on 1g IV q12h  Trough = 27.7 mcg/mL 5/14 20:42 - drawn ~15 min after vancomycin dose hung - not accurate level  Trough = 11.6 mcg/mL 5/15 09:06 - timed appropriately on 1.25g IV q12h    Microbiology:  MRSA DNA PCR (5/11): Negative    VITALS:  /67   Pulse 63   Temp 98.3 °F (36.8 °C) (Oral)   Resp 17   Ht 5' 9\" (1.753 m)   Wt 181 lb 6 oz (82.3 kg)   SpO2 94%   BMI 26.78 kg/m²     DIET: DIET CARDIAC;    PROPHYLAXIS:  Stress ulcer: not indicated  VTE:  Heparin gtt +

## 2020-05-15 NOTE — PROGRESS NOTES
Hospitalist Progress Note      PCP: No primary care provider on file. Date of Admission: 5/11/2020    Chief Complaint: leg edema, erythema    Subjective:  Patient seen and examined at the bedside. No complaints at this time.     PFHS: reviewed as documented 5/11/2020, no changes      Medications:  Reviewed    Infusion Medications    heparin (porcine) 18 Units/kg/hr (05/14/20 1029)     Scheduled Medications    warfarin (COUMADIN) daily dosing (placeholder)   Other See Admin Instructions    Hydrocerin   Topical BID    primidone  50 mg Oral BID    vancomycin  1,250 mg Intravenous Q12H    lisinopril  2.5 mg Oral Daily    atorvastatin  40 mg Oral Nightly    clopidogrel  75 mg Oral Daily    nadolol  20 mg Oral Daily    sodium chloride flush  10 mL Intravenous 2 times per day    clotrimazole   Topical BID    aspirin  81 mg Oral Daily    folic acid  1 mg Oral Daily    multivitamin  1 tablet Oral Daily    thiamine  100 mg Oral Daily     PRN Meds: heparin (porcine), heparin (porcine), menthol-zinc oxide, sodium chloride flush, acetaminophen **OR** acetaminophen, polyethylene glycol, promethazine **OR** ondansetron, perflutren lipid microspheres, regadenoson, LORazepam **OR** LORazepam **OR** LORazepam **OR** LORazepam **OR** LORazepam **OR** LORazepam **OR** LORazepam **OR** LORazepam      Intake/Output Summary (Last 24 hours) at 5/15/2020 0942  Last data filed at 5/14/2020 1845  Gross per 24 hour   Intake 120 ml   Output 400 ml   Net -280 ml         Physical Exam Performed:    /67   Pulse 55   Temp 98.3 °F (36.8 °C) (Oral)   Resp 17   Ht 5' 9\" (1.753 m)   Wt 181 lb 6 oz (82.3 kg)   SpO2 94%   BMI 26.78 kg/m²     General appearance:  No apparent distress, appears stated age  Eyes: Pupils equal, round, reactive to light, conjunctiva/corneas clear  Ears/Nose/Mouth/Throat: No external lesions or scars, hearing intact to voice  Neck: Trachea midline, no masses noted  Respiratory:  Normal respiratory effort. Clear to auscultation bilaterally  Cardiovascular: Regular rate and rhythm, nl S1/S2, w/o murmurs, rubs or gallops, no lower extremity edema  Abdomen: Soft, non-tender, non-distended, no hepatosplenomegaly  Musculoskeletal: No cyanosis, clubbing or petechiae, no lower extremity misalignment, asymmetry, or crepitation  Skin: Normal skin color, texture, turgor. No rashes or lesions noted, BL lower extremity erythema, warmth below the knee, stable  Psychiatric: Alert and oriented x4, good insight and judgment    Labs:   Recent Labs     05/13/20  0831 05/14/20  0548 05/14/20  0958   WBC 6.8 6.8 6.4   HGB 17.4 16.0 16.0   HCT 50.9 45.9 46.6    161 168     Recent Labs     05/13/20  0457 05/13/20  0831   * 133*   K 3.9 4.1   CL 98* 98*   CO2 28 26   BUN 9 9   CREATININE 0.7* 0.7*   CALCIUM 8.8 8.9     No results for input(s): AST, ALT, BILIDIR, BILITOT, ALKPHOS in the last 72 hours. Recent Labs     05/13/20  0456 05/13/20  1015 05/14/20  0958   INR 1.22* 1.27* 2.18*     No results for input(s): CKTOTAL, TROPONINI in the last 72 hours. Urinalysis:      Lab Results   Component Value Date    NITRU Negative 05/13/2020    WBCUA 0-2 05/13/2020    BACTERIA 1+ 05/13/2020    RBCUA 0-2 05/13/2020    BLOODU Negative 05/13/2020    SPECGRAV 1.015 05/13/2020    GLUCOSEU 100 05/13/2020       Radiology:  VL Extremity Venous Bilateral   Final Result      NM Cardiac Stress Test Nuclear Imaging   Final Result      XR CHEST PORTABLE   Final Result      No acute disease.                          Assessment/Plan:    Active Hospital Problems    Diagnosis Date Noted    Abnormal angiogram [R93.89] 05/12/2020    Mural thrombus of cardiac apex [I51.3]     Coronary artery disease due to lipid rich plaque [I25.10, I25.83]     Leg edema [R60.0] 05/11/2020       Plan:    # Chest pain  # LV thrombus  # Acute LLE DVT  -INR up to 2.18 today, will hold warfarin due to significant INR increase from yesterday  -Not fully anticoagulated despite therapeutic INR (has only gotten 2 d warfarin)  -Restarted heparin gtt yesterday, no evidence of bleeding  -Will continue heparin for now  -telemetry     # HF, chronic, combined  # Anterior apical myocardial infarction  # Chronic combined systolic and diastolic heart failure. No exacerbation.  -Continue lasix     # BL leg cellulitis  -IV vancomycin  -Wound care consult     # Macrocytosis  -?  EtOH abuse  -CIWA  -Folate low, continue repletion  -B12, TSH WNL     # Essential tremor  -Continue home meds    DVT Prophylaxis: on heparin gtt  Diet: DIET CARDIAC;  Code Status: Full Code    PT/OT Eval Status: complete    Dispo - inpt, keep today to watch INR    Lorri Markham MD    # Medical Decision Making Complexity: high     Problem              Established problem, stable (1): LV thrombus              Established problem, stable (1): DVT              Established problem, stable (1): Cellulitis              Established problem, stable (1): Essential tremor     Risk:  High:              Drugs Requiring Intensive Monitoring for Toxicity: Heparin gtt

## 2020-05-15 NOTE — PROGRESS NOTES
of the extremities. · No peripheral edema the peripheral edema is gone. There is still cellulitis appearance and redness on both lower extremities to the knees. · Femoral Arteries: 2+ and equal  · Pedal Pulses: 2+ and equal   ABDOMEN[de-identified]  · No masses or tenderness  · Liver/Spleen: No Abnormalities Noted  NEUROLOGICAL:  . Moves all extremities to command. Cranial nerves 2-12 are in tact.   PSYCHIATRIC: alert and lucid and oriented and appropriate  SKIN: No lesions or rashes  LYMPH NODES: none enlarged      Medications:    warfarin  1 mg Oral Once    warfarin (COUMADIN) daily dosing (placeholder)   Other See Admin Instructions    Hydrocerin   Topical BID    primidone  50 mg Oral BID    vancomycin  1,250 mg Intravenous Q12H    lisinopril  2.5 mg Oral Daily    atorvastatin  40 mg Oral Nightly    clopidogrel  75 mg Oral Daily    nadolol  20 mg Oral Daily    sodium chloride flush  10 mL Intravenous 2 times per day    clotrimazole   Topical BID    aspirin  81 mg Oral Daily    folic acid  1 mg Oral Daily    multivitamin  1 tablet Oral Daily    thiamine  100 mg Oral Daily       heparin (porcine), heparin (porcine), menthol-zinc oxide, sodium chloride flush, acetaminophen **OR** acetaminophen, polyethylene glycol, promethazine **OR** ondansetron, perflutren lipid microspheres, regadenoson, LORazepam **OR** LORazepam **OR** LORazepam **OR** LORazepam **OR** LORazepam **OR** LORazepam **OR** LORazepam **OR** LORazepam    Lab Data:  CBC:   Recent Labs     05/13/20  0831 05/14/20  0548 05/14/20  0958   WBC 6.8 6.8 6.4   HGB 17.4 16.0 16.0   HCT 50.9 45.9 46.6   .0* 105.1* 105.1*    161 168     BMP:   Recent Labs     05/13/20  0457 05/13/20  0831 05/15/20  1104   * 133* 133*   K 3.9 4.1 4.2   CL 98* 98* 98*   CO2 28 26 23   BUN 9 9 9   CREATININE 0.7* 0.7* 0.6*     Troponin:Troponin:   Lab Results   Component Value Date    TROPONINI 0.01 05/11/2020     LIVER PROFILE:   No results for input(s): AST, ALT, LIPASE, BILIDIR, BILITOT, ALKPHOS in the last 72 hours. Invalid input(s): AMYLASE,  ALB  PT/INR:   Recent Labs     05/13/20  1015 05/14/20  0958 05/15/20  1104   PROTIME 14.8* 25.5* 28.7*   INR 1.27* 2.18* 2.45*     APTT:   Recent Labs     05/13/20  1015 05/14/20  0548 05/14/20  0958   APTT 88.6* 32.0 33.0     BNP:  No results for input(s): BNP in the last 72 hours. IMAGING: Cardiac cath 5/12/2020 with anterior wall complete occlusion. Treated with balloon angioplasty and stenting. There is anterior apical infarct with reduced ejection fraction and a mural thrombus. Assessment:  Patient Active Problem List    Diagnosis Date Noted    Abnormal angiogram 05/12/2020    Mural thrombus of cardiac apex     Coronary artery disease due to lipid rich plaque     Leg edema 05/11/2020       Plan:   Continue current medications. Core Measures:  · Discharge instructions:   · LVEF documented:   · ACEI for LV dysfunction:   CAD with complete LAD occlusion treated with stenting on 5/12/2020  Mural thrombus secondary to anterior apical myocardial infarction  DVT left lower leg  · Bilateral cellulitis lower legs  ·   Can be discharged home from cardiology perspective. Pharmacy dosing the Coumadin. Heparin should be stopped. Podiatry hopefully can see him before discharge. Thanks for allowing me  the opportunity to participate in the evaluation and care of your patient.  If there are questions please call me [phone]    This note was likely completed using voice recognition technology and may contain unintended grammatical, phraseology and/or punctuation  errors      Puneet Leal M.D.,[facility]  5/15/2020 2:25 PM

## 2020-05-15 NOTE — CONSULTS
Department of Podiatry Consult Note  Resident       Reason for Consult:  LE edema  Requesting Physician:  Agnes Peck MD    CHIEF COMPLAINT:  LE edema    HISTORY OF PRESENT ILLNESS:                The patient is a 62 y.o. male with significant past medical history listed below who is consulted for LE edema. Patient states his legs are typically dry, red, and itchy, and admits to scratching them causing them to bleed. He states this has been going on for years, however, he recently noticed swelling in his legs which was very painful and is what brought him to the hospital. He states that since being admitted, the swelling has significantly decreased, but that his legs are still dry, itchy, and red. He denies any previous treatments and has not seen a podiatrist in the past. He has no other pedal complaints at this time. Past Medical History:    History reviewed. No pertinent past medical history. Past Surgical History:    History reviewed. No pertinent surgical history.   Current Medications:    Current Facility-Administered Medications: warfarin (COUMADIN) tablet 1 mg, 1 mg, Oral, Once  heparin (porcine) injection 6,550 Units, 80 Units/kg, Intravenous, PRN  heparin (porcine) injection 3,300 Units, 40 Units/kg, Intravenous, PRN  menthol-zinc oxide (CALMOSEPTINE) 0.44-20.6 % ointment, , Topical, BID PRN  warfarin (COUMADIN) daily dosing (placeholder), , Other, See Admin Instructions  Hydrocerin cream CREA, , Topical, BID  primidone (MYSOLINE) tablet 50 mg, 50 mg, Oral, BID  vancomycin (VANCOCIN) 1,250 mg in dextrose 5 % 250 mL IVPB, 1,250 mg, Intravenous, Q12H  lisinopril (PRINIVIL;ZESTRIL) tablet 2.5 mg, 2.5 mg, Oral, Daily  atorvastatin (LIPITOR) tablet 40 mg, 40 mg, Oral, Nightly  clopidogrel (PLAVIX) tablet 75 mg, 75 mg, Oral, Daily  nadolol (CORGARD) tablet 20 mg, 20 mg, Oral, Daily  sodium chloride flush 0.9 % injection 10 mL, 10 mL, Intravenous, 2 times per day  sodium chloride flush 0.9 % injection 10 mL, 45.9 46.6    168     Recent Labs     05/15/20  1104   *   K 4.2   CL 98*   CO2 23   BUN 9   CREATININE 0.6*     Recent Labs     05/14/20  0548 05/14/20  0958 05/15/20  1104   INR  --  2.18* 2.45*   APTT 32.0 33.0  --          VASCULAR: DP and PT pulses palpable 2/4. CFT is brisk to the digits of the foot b/l. Skin temperature is warm to cool from proximal to distal with no focal calor noted. No edema noted. No pain with calf compression b/l. NEUROLOGIC: Gross and epicritic sensation is intact b/l. Protective sensation is appreciated at all pedal sites b/l. DERMATOLOGIC: Nails 1-5 b/l are thickened and elongated. Webspaces 1-4 b/l are clean, dry, and intact. Diffuse xerosis noted to b/l LE with violaceous discoloration noted to b/l LE. Multiple excoriations noted to b/l LE with scant hemorrhagic drainage. MUSCULOSKELETAL: Muscle strength is 5/5 for all pedal groups tested. No pain with palpation of the foot or ankle b/l. Ankle joint ROM is decreased in dorsiflexion with the knee extended. No obvious biomechanical abnormalities. IMAGING  Venous duplex b/l LE 5/12/20  Impressions   Right Impression   No evidence of deep vein or superficial vein thrombosis involving the right   lower extremity. Left Impression   Acute totally occluding deep vein thrombosis involving the left Popliteal.   Acute totally occluding deep vein thrombosis involving the left Gastrocnemius   veins. Acute totally occluding superficial venous thrombosis involving the left small   saphneous vein.    No other evidence of deep vein or superficial vein thrombosis involving the   left lower extremity.       Conclusions        Summary        Acute deep vein thrombosis involving the left Popliteal vein, Gastrocnemius    veins and superficial vein thrombosis of the left small saphneous vein.        No other deep or superficial vein thrombosis of the bilateral lower    extremity exam.     IMPRESSION/RECOMMENDATIONS:

## 2020-05-16 VITALS
RESPIRATION RATE: 16 BRPM | OXYGEN SATURATION: 98 % | HEIGHT: 69 IN | HEART RATE: 57 BPM | TEMPERATURE: 98 F | WEIGHT: 177.03 LBS | DIASTOLIC BLOOD PRESSURE: 65 MMHG | SYSTOLIC BLOOD PRESSURE: 108 MMHG | BODY MASS INDEX: 26.22 KG/M2

## 2020-05-16 PROBLEM — R60.0 LEG EDEMA: Status: RESOLVED | Noted: 2020-05-11 | Resolved: 2020-05-16

## 2020-05-16 LAB
ANION GAP SERPL CALCULATED.3IONS-SCNC: 10 MMOL/L (ref 3–16)
ANTI-XA UNFRAC HEPARIN: <0.04 IU/ML (ref 0.3–0.7)
BUN BLDV-MCNC: 9 MG/DL (ref 7–20)
CALCIUM SERPL-MCNC: 8.9 MG/DL (ref 8.3–10.6)
CHLORIDE BLD-SCNC: 101 MMOL/L (ref 99–110)
CO2: 23 MMOL/L (ref 21–32)
CREAT SERPL-MCNC: 0.6 MG/DL (ref 0.8–1.3)
GFR AFRICAN AMERICAN: >60
GFR NON-AFRICAN AMERICAN: >60
GLUCOSE BLD-MCNC: 98 MG/DL (ref 70–99)
INR BLD: 2 (ref 0.86–1.14)
POTASSIUM SERPL-SCNC: 4 MMOL/L (ref 3.5–5.1)
PROTHROMBIN TIME: 23.4 SEC (ref 10–13.2)
SODIUM BLD-SCNC: 134 MMOL/L (ref 136–145)

## 2020-05-16 PROCEDURE — 85520 HEPARIN ASSAY: CPT

## 2020-05-16 PROCEDURE — 6360000002 HC RX W HCPCS: Performed by: INTERNAL MEDICINE

## 2020-05-16 PROCEDURE — 6370000000 HC RX 637 (ALT 250 FOR IP): Performed by: INTERNAL MEDICINE

## 2020-05-16 PROCEDURE — 36415 COLL VENOUS BLD VENIPUNCTURE: CPT

## 2020-05-16 PROCEDURE — 2580000003 HC RX 258: Performed by: INTERNAL MEDICINE

## 2020-05-16 PROCEDURE — 80048 BASIC METABOLIC PNL TOTAL CA: CPT

## 2020-05-16 PROCEDURE — 85610 PROTHROMBIN TIME: CPT

## 2020-05-16 RX ORDER — LANOLIN ALCOHOL/MO/W.PET/CERES
CREAM (GRAM) TOPICAL 2 TIMES DAILY
Qty: 1 CONTAINER | Refills: 0 | Status: SHIPPED | OUTPATIENT
Start: 2020-05-16 | End: 2021-08-22

## 2020-05-16 RX ORDER — LANOLIN ALCOHOL/MO/W.PET/CERES
100 CREAM (GRAM) TOPICAL DAILY
Qty: 30 TABLET | Refills: 3 | Status: SHIPPED | OUTPATIENT
Start: 2020-05-17 | End: 2020-08-27 | Stop reason: SDUPTHER

## 2020-05-16 RX ORDER — CLOPIDOGREL BISULFATE 75 MG/1
75 TABLET ORAL DAILY
Qty: 30 TABLET | Refills: 3 | Status: SHIPPED | OUTPATIENT
Start: 2020-05-17 | End: 2020-08-27 | Stop reason: SDUPTHER

## 2020-05-16 RX ORDER — CLOTRIMAZOLE 1 %
CREAM (GRAM) TOPICAL
Qty: 1 TUBE | Refills: 2 | Status: SHIPPED | OUTPATIENT
Start: 2020-05-16 | End: 2020-05-23

## 2020-05-16 RX ORDER — LISINOPRIL 2.5 MG/1
2.5 TABLET ORAL DAILY
Qty: 30 TABLET | Refills: 3 | Status: ON HOLD | OUTPATIENT
Start: 2020-05-17 | End: 2020-07-28 | Stop reason: SDUPTHER

## 2020-05-16 RX ORDER — ASPIRIN 81 MG/1
81 TABLET, CHEWABLE ORAL DAILY
Qty: 30 TABLET | Refills: 3 | Status: SHIPPED | OUTPATIENT
Start: 2020-05-17 | End: 2020-08-27

## 2020-05-16 RX ORDER — WARFARIN SODIUM 2.5 MG/1
2.5 TABLET ORAL DAILY
Status: DISCONTINUED | OUTPATIENT
Start: 2020-05-16 | End: 2020-05-16 | Stop reason: HOSPADM

## 2020-05-16 RX ORDER — WARFARIN SODIUM 2.5 MG/1
2.5 TABLET ORAL DAILY
Qty: 30 TABLET | Refills: 3 | Status: ON HOLD | OUTPATIENT
Start: 2020-05-16 | End: 2020-07-25 | Stop reason: CLARIF

## 2020-05-16 RX ORDER — ATORVASTATIN CALCIUM 40 MG/1
40 TABLET, FILM COATED ORAL NIGHTLY
Qty: 30 TABLET | Refills: 3 | Status: SHIPPED | OUTPATIENT
Start: 2020-05-16 | End: 2020-08-27 | Stop reason: SDUPTHER

## 2020-05-16 RX ORDER — FOLIC ACID 1 MG/1
1 TABLET ORAL DAILY
Qty: 30 TABLET | Refills: 3 | Status: SHIPPED | OUTPATIENT
Start: 2020-05-17 | End: 2020-08-27 | Stop reason: SDUPTHER

## 2020-05-16 RX ADMIN — FOLIC ACID 1 MG: 1 TABLET ORAL at 07:55

## 2020-05-16 RX ADMIN — PRIMIDONE 50 MG: 50 TABLET ORAL at 07:54

## 2020-05-16 RX ADMIN — Medication: at 07:57

## 2020-05-16 RX ADMIN — ASPIRIN 81 MG 81 MG: 81 TABLET ORAL at 07:54

## 2020-05-16 RX ADMIN — LISINOPRIL 2.5 MG: 2.5 TABLET ORAL at 07:56

## 2020-05-16 RX ADMIN — CLOPIDOGREL BISULFATE 75 MG: 75 TABLET ORAL at 07:55

## 2020-05-16 RX ADMIN — Medication 100 MG: at 07:55

## 2020-05-16 RX ADMIN — THERA TABS 1 TABLET: TAB at 07:55

## 2020-05-16 RX ADMIN — CLOTRIMAZOLE: 10 CREAM TOPICAL at 08:00

## 2020-05-16 RX ADMIN — ANORECTAL OINTMENT: 15.7; .44; 24; 20.6 OINTMENT TOPICAL at 07:57

## 2020-05-16 RX ADMIN — VANCOMYCIN HYDROCHLORIDE 1250 MG: 10 INJECTION, POWDER, LYOPHILIZED, FOR SOLUTION INTRAVENOUS at 08:09

## 2020-05-16 RX ADMIN — NADOLOL 20 MG: 20 TABLET ORAL at 07:56

## 2020-05-16 RX ADMIN — Medication 10 ML: at 07:56

## 2020-05-16 ASSESSMENT — PAIN SCALES - GENERAL
PAINLEVEL_OUTOF10: 0

## 2020-05-16 NOTE — PROGRESS NOTES
Hospitalist Progress Note      PCP: No primary care provider on file. Date of Admission: 5/11/2020    Chief Complaint: leg edema, erythema    Subjective:  Patient seen and examined at the bedside. No complaints at this time. Podiatry has seen, does not believe legs are infected, erma JARRETT. PFHS: reviewed as documented 5/11/2020, no changes      Medications:  Reviewed    Infusion Medications   Scheduled Medications    warfarin  2.5 mg Oral Daily    Hydrocerin   Topical BID    primidone  50 mg Oral BID    vancomycin  1,250 mg Intravenous Q12H    lisinopril  2.5 mg Oral Daily    atorvastatin  40 mg Oral Nightly    clopidogrel  75 mg Oral Daily    nadolol  20 mg Oral Daily    sodium chloride flush  10 mL Intravenous 2 times per day    clotrimazole   Topical BID    aspirin  81 mg Oral Daily    folic acid  1 mg Oral Daily    multivitamin  1 tablet Oral Daily    thiamine  100 mg Oral Daily     PRN Meds: menthol-zinc oxide, sodium chloride flush, acetaminophen **OR** acetaminophen, polyethylene glycol, promethazine **OR** ondansetron, perflutren lipid microspheres, regadenoson, LORazepam **OR** LORazepam **OR** LORazepam **OR** LORazepam **OR** LORazepam **OR** LORazepam **OR** LORazepam **OR** LORazepam      Intake/Output Summary (Last 24 hours) at 5/16/2020 0848  Last data filed at 5/16/2020 0006  Gross per 24 hour   Intake 1157 ml   Output 400 ml   Net 757 ml         Physical Exam Performed:    /65   Pulse 57   Temp 98 °F (36.7 °C) (Oral)   Resp 16   Ht 5' 9\" (1.753 m)   Wt 177 lb 0.5 oz (80.3 kg)   SpO2 98%   BMI 26.14 kg/m²     General appearance:  No apparent distress, appears stated age  Eyes: Pupils equal, round, reactive to light, conjunctiva/corneas clear  Ears/Nose/Mouth/Throat: No external lesions or scars, hearing intact to voice  Neck: Trachea midline, no masses noted  Respiratory:  Normal respiratory effort.  Clear to auscultation bilaterally  Cardiovascular: Regular rate and

## 2020-05-16 NOTE — PROGRESS NOTES
Patient discharging home. IV and telemetry removed. Discharge instructions and medications reviewed, patient verbalizes understanding and states he has no further questions at this time. Discharging with personal belongings and AVS packet. Leaving the unit via wheelchair with nursing staff.

## 2020-05-16 NOTE — CARE COORDINATION
Yes    ADLS:  Current PT AM-PAC Score:   /24  Current OT AM-PAC Score:   /24      Discharge Disposition: Home- No Services Needed    LOC at discharge: Not Applicable  CINDY Completed: Not Indicated    Notification completed in HENS/PAS?:  Not Applicable    IMM Completed:   Not Indicated    Transportation:  Transportation Plan for discharge: family   Mode of Transport: Private Car    Home Care:  Home Care ordered at discharge: Not Indicated    Durable Medical Equipment:  DME Provider: None   Equipment obtained during hospitalization: No DME Needs     Home Oxygen and Respiratory Equipment:  Oxygen needed at discharge?: Not Indicated    Referrals made at [facility] for outpatient continued care:  Not Applicable    Additional CM Notes:   SW reviewed chart and handoff by CARLY EDMOND Patient to discharge home today per medical team. No home needs reported. Family to provide transport at discharge. SW available should needs arise prior to leaving. Patient to follow up with medical providers outpatient. The Plan for Transition of Care is related to the following treatment goals Leg edema [R60.0]     The Patient and/or patient representative Patient was provided with a choice of provider and agrees with the discharge plan Yes    Freedom of choice list was provided with basic dialogue that supports the patient's individualized plan of care/goals and shares the quality data associated with the providers.  Yes    Care Transitions patient: No    LUBNA Beckham  The [address] [place] [address]   Case Management Department  Ph: [phone]

## 2020-05-16 NOTE — PLAN OF CARE
Patient being monitored for bleeding at this time. Pain/discomfort being managed with PRN analgesics per MD orders. Pt able to express presence and absence of pain and rate pain appropriately using numerical scale.

## 2020-05-18 ENCOUNTER — TELEPHONE (OUTPATIENT)
Dept: INTERNAL MEDICINE CLINIC | Age: 60
End: 2020-05-18

## 2020-05-18 ENCOUNTER — ANTI-COAG VISIT (OUTPATIENT)
Dept: PHARMACY | Age: 60
End: 2020-05-18
Payer: MEDICAID

## 2020-05-18 DIAGNOSIS — I51.3 MURAL THROMBUS OF CARDIAC APEX: ICD-10-CM

## 2020-05-18 PROBLEM — I82.402 ACUTE DEEP VEIN THROMBOSIS OF LEFT LOWER EXTREMITY (HCC): Status: ACTIVE | Noted: 2020-05-18

## 2020-05-18 LAB
INR BLD: 3.29 (ref 0.86–1.14)
PROTHROMBIN TIME: 38.6 SEC (ref 10–13.2)

## 2020-05-18 PROCEDURE — 99213 OFFICE O/P EST LOW 20 MIN: CPT

## 2020-05-18 NOTE — PROGRESS NOTES
ANTICOAGULATION SERVICE    Dianne He is a 62 y.o. 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     Anticoagulation Indication(s):  DVT, LV thrombus (acute)   Referring Physician:  Dr. Kathryn Salinas  Goal INR Range:  2.5-3 per Dr. Arpit Marcus  Duration of Anticoagulation Therapy:  Unknown   Time of day dose taken:  PM  Product patient has at home:  warfarin 2.5 mg (green)     Recent INR Results:  Lab Results   Component Value Date    INR 3.29 (H) 05/18/2020    INR 2.00 (H) 05/16/2020    INR 2.45 (H) 05/15/2020    INR 2.18 (H) 05/14/2020       INR Summary                            Warfarin regimen (mg)  Date INR   A/P    Sun Mon Tue Wed Thu Fri Sat Mg/wk  5/18 3.29 Above goal, reduce x 1 2.5   1.25/2.5 2.5 2.5 2.5 2.5 2.5 17.5   5/16 2.0 On d/c from [facility]  2.5 2.5 2.5 2.5 2.5 2.5 2.5 17.5     Last CBC:  Lab Results   Component Value Date    RBC 4.33 05/15/2020    HGB 15.8 05/15/2020    HCT 45.5 05/15/2020    .0 (H) 05/15/2020    MCH 36.5 (H) 05/15/2020    MPV 7.4 05/15/2020    RDW 15.3 05/15/2020     05/15/2020       Patient History:  Recent hospitalizations/HC visits 5/11-5/16 admit [facility] for chest pain:   -found to have CAD with LAD occlusion  -s/p PCI with stent placement on 5/12/20  -treated for bilateral LE cellulitis during admission  -also found to have LV thrombus and acute LLE DVT  -started on heparin gtt and bridged to warfarin with goal INR 2.5-3 per cardio  -hematuria during admission   Recent medication changes New meds on discharge from [facility] 5/16:  -warfarin  -Plavix  -ASA 81 mg  -Lipitor  -lisinopril  -folic acid + vit B-1   Medications taken regularly that may interact with warfarin or alter INR ASA 81 mg, Plavix, primidone BID   Warfarin dose taken as prescribed Yes   Does not use pillbox, but has system for remembering to take meds   Signs/symptoms of bleeding Hematuria noted during hospital admission, but patient denies any bleeding   Vitamin K intake Normally has ~0 servings of green, leafy vegetables per week: eats iceberg lettuce salad 1-2x per month; will avoid for the most part   Recent vomiting/diarrhea/fever, changes in weight or activity level None reported   Tobacco or alcohol use Patient reports quitting smoking ~1 year ago  Patient reports having 0 drinks per day (h/o alcohol abuse, but quit after hospitalization)   Upcoming surgeries or procedures None reported     Assessment/Plan:  Patient's INR was supratherapeutic today (3.29) according to INR goal of 2.5-3. INR is trending up quickly from 2.0 on 5/16. Spoke with patient over the phone. He seemed a little overwhelmed with all the new medications he was prescribed at the hospital, but states that he is taking them all as prescribed. Although INR is trending up quickly, due to acute DVT and mural thrombus, I hesitate to decrease warfarin dose too drastically. Patient was instructed to take reduced dose of 1.25 mg today, then resume warfarin 2.5 mg daily. Repeat INR in 3 days. Patient was reminded to maintain consistent vitamin K intake and call with any bleeding, medication changes, or fever/vomiting/diarrhea. As it was the first visit to [facility] for Erica Led, the following was also reviewed with the patient in detail:  · Reason for and intended duration of therapy  · INR goal and frequency of INR monitoring  · Medication Interactions: Call clinic if new any new medications are started--especially antibiotics. Avoid NSAIDs for pain. Use Tylenol instead. · Food-Drug Interactions: Foods high in vitamin K can change the effects of warfarin (decrease INR)--Stressed consistency with these foods. Reviewed a list of high vitamin K foods- mostly leafy green vegetables. · Side effects:  May bruise easier and

## 2020-05-19 ENCOUNTER — VIRTUAL VISIT (OUTPATIENT)
Dept: CARDIOLOGY CLINIC | Age: 60
End: 2020-05-19
Payer: MEDICAID

## 2020-05-19 VITALS — WEIGHT: 179 LBS | BODY MASS INDEX: 26.43 KG/M2

## 2020-05-19 PROBLEM — R93.89 ABNORMAL ANGIOGRAM: Chronic | Status: ACTIVE | Noted: 2020-05-12

## 2020-05-19 PROCEDURE — 99213 OFFICE O/P EST LOW 20 MIN: CPT | Performed by: NURSE PRACTITIONER

## 2020-05-19 NOTE — PATIENT INSTRUCTIONS
Plan:  On coumadin for mural thrombus and DVT   On plavix for JACK   Fu with me in 3 months Julianna         On coumadin for mural thrombus and DVT   On plavix for JACK   Fu with me in 3 months Julianna   Echo & blood work  PTV / assess mural thrombus

## 2020-05-21 DIAGNOSIS — I51.3 MURAL THROMBUS OF CARDIAC APEX: ICD-10-CM

## 2020-05-21 DIAGNOSIS — I25.10 CORONARY ARTERY DISEASE INVOLVING NATIVE CORONARY ARTERY OF NATIVE HEART WITHOUT ANGINA PECTORIS: ICD-10-CM

## 2020-05-21 LAB
ALBUMIN SERPL-MCNC: 3.9 G/DL (ref 3.4–5)
ALP BLD-CCNC: 100 U/L (ref 40–129)
ALT SERPL-CCNC: 48 U/L (ref 10–40)
AST SERPL-CCNC: 51 U/L (ref 15–37)
BILIRUB SERPL-MCNC: 0.6 MG/DL (ref 0–1)
BILIRUBIN DIRECT: <0.2 MG/DL (ref 0–0.3)
BILIRUBIN, INDIRECT: ABNORMAL MG/DL (ref 0–1)
CHOLESTEROL, TOTAL: 117 MG/DL (ref 0–199)
HDLC SERPL-MCNC: 51 MG/DL (ref 40–60)
LDL CHOLESTEROL CALCULATED: 44 MG/DL
MAGNESIUM: 1.7 MG/DL (ref 1.8–2.4)
TOTAL PROTEIN: 6.8 G/DL (ref 6.4–8.2)
TRIGL SERPL-MCNC: 109 MG/DL (ref 0–150)
TSH REFLEX FT4: 3.19 UIU/ML (ref 0.27–4.2)
VITAMIN D 25-HYDROXY: 21 NG/ML
VLDLC SERPL CALC-MCNC: 22 MG/DL

## 2020-05-22 ENCOUNTER — ANTI-COAG VISIT (OUTPATIENT)
Dept: PHARMACY | Age: 60
End: 2020-05-22
Payer: MEDICAID

## 2020-05-22 ENCOUNTER — OFFICE VISIT (OUTPATIENT)
Dept: INTERNAL MEDICINE CLINIC | Age: 60
End: 2020-05-22
Payer: MEDICAID

## 2020-05-22 VITALS
DIASTOLIC BLOOD PRESSURE: 62 MMHG | TEMPERATURE: 98.8 F | RESPIRATION RATE: 20 BRPM | HEART RATE: 62 BPM | WEIGHT: 183 LBS | OXYGEN SATURATION: 98 % | BODY MASS INDEX: 27.11 KG/M2 | SYSTOLIC BLOOD PRESSURE: 102 MMHG | HEIGHT: 69 IN

## 2020-05-22 DIAGNOSIS — I51.3 MURAL THROMBUS OF CARDIAC APEX: ICD-10-CM

## 2020-05-22 DIAGNOSIS — I25.10 CORONARY ARTERY DISEASE INVOLVING NATIVE CORONARY ARTERY OF NATIVE HEART WITHOUT ANGINA PECTORIS: ICD-10-CM

## 2020-05-22 LAB
INR BLD: 6.31 (ref 0.86–1.14)
PROTHROMBIN TIME: 74.6 SEC (ref 10–13.2)

## 2020-05-22 PROCEDURE — 99212 OFFICE O/P EST SF 10 MIN: CPT

## 2020-05-22 PROCEDURE — 99213 OFFICE O/P EST LOW 20 MIN: CPT | Performed by: STUDENT IN AN ORGANIZED HEALTH CARE EDUCATION/TRAINING PROGRAM

## 2020-05-22 RX ORDER — UBIDECARENONE 75 MG
100 CAPSULE ORAL DAILY
Qty: 30 TABLET | Refills: 2 | Status: ON HOLD | OUTPATIENT
Start: 2020-05-22 | End: 2020-07-25 | Stop reason: CLARIF

## 2020-05-22 ASSESSMENT — PATIENT HEALTH QUESTIONNAIRE - PHQ9
SUM OF ALL RESPONSES TO PHQ QUESTIONS 1-9: 0
SUM OF ALL RESPONSES TO PHQ9 QUESTIONS 1 & 2: 0
2. FEELING DOWN, DEPRESSED OR HOPELESS: 0
SUM OF ALL RESPONSES TO PHQ QUESTIONS 1-9: 0
1. LITTLE INTEREST OR PLEASURE IN DOING THINGS: 0

## 2020-05-22 ASSESSMENT — ENCOUNTER SYMPTOMS
WHEEZING: 0
BACK PAIN: 0
SORE THROAT: 0
ABDOMINAL DISTENTION: 0
SHORTNESS OF BREATH: 0
CHEST TIGHTNESS: 0

## 2020-05-22 NOTE — PATIENT INSTRUCTIONS
Please follow up with Cardiology   Complete ultrasound of your abdomen   Complete Lab work   Discontinue Primidone   Call back if symptoms of tremor worsen   Call cardiology for approval to return to work

## 2020-05-22 NOTE — PROGRESS NOTES
2020    Viki Holt (:  1960) is a 62 y.o. male, here for evaluation of the following medical concerns:    HPI    Patient is a 62year old male with a past medical history of CAD status post stent earlier this month to the LAD. He was also found to have a left ventricular thrombus and was put on warfarin. He is here to establish primary care. Coronary Artery Disease: Patient presents for routine coronary artery disease follow-up. Current symptoms: non-existent Pain radiation: none Patient also complains of no other symptoms. Pain aggravating factors: none since stent placement. Cardiac risk factors include dyslipidemia, hypertension, male gender and smoking/ tobacco exposure. Edema: Patient complains of edema. The location of the edema is lower leg(s) bilateral.  The edema has been severe. Onset of symptoms was 7 months ago, gradually worsening since that time. The edema is present all day. The patient states the problem is long-standing. The swelling has been aggravated by dependency of involved area, relieved by diuretics, support stockings, elevation of involved area, and been associated with shortness of breath. Review of Systems   Constitutional: Negative for activity change (can walk a block outside), appetite change, chills, diaphoresis and fatigue. HENT: Negative for nosebleeds and sore throat. Respiratory: Negative for chest tightness, shortness of breath and wheezing. Cardiovascular: Positive for leg swelling (much improved). Negative for chest pain (improving since stent. ) and palpitations. Gastrointestinal: Negative for abdominal distention. Musculoskeletal: Negative for arthralgias and back pain. Neurological: Negative for dizziness, seizures and light-headedness. Psychiatric/Behavioral: Negative for agitation, dysphoric mood and sleep disturbance. Prior to Visit Medications    Medication Sig Taking?  Authorizing Provider   aspirin 81 MG chewable tablet Take 1 tablet by mouth daily Yes Kevin Goncalves MD   warfarin (COUMADIN) 2.5 MG tablet Take 1 tablet by mouth daily Yes Kevin Goncalves MD   atorvastatin (LIPITOR) 40 MG tablet Take 1 tablet by mouth nightly Yes Kevin Goncalves MD   lisinopril (PRINIVIL;ZESTRIL) 2.5 MG tablet Take 1 tablet by mouth daily Yes Kevin Goncalves MD   clotrimazole (LOTRIMIN) 1 % cream Apply topically 2 times daily. Yes Kevin Goncalves MD   Skin Protectants, Misc. (HYDROCERIN) CREA cream Apply topically 2 times daily Yes Kevin Goncalves MD   menthol-zinc oxide (CALMOSEPTINE) 0.44-20.6 % OINT ointment Apply topically 2 times daily as needed (itching) Max 30 ml per day. Yes Kevin Goncalves MD   folic acid (FOLVITE) 1 MG tablet Take 1 tablet by mouth daily Yes Kevin Goncalves MD   clopidogrel (PLAVIX) 75 MG tablet Take 1 tablet by mouth daily Yes Kevin Goncalves MD   vitamin B-1 100 MG tablet Take 1 tablet by mouth daily Yes Kevin Goncalves MD   furosemide (LASIX) 20 MG tablet Take 20 mg by mouth daily Yes Historical Provider, MD   mineral oil-hydrophilic petrolatum (AQUAPHOR) ointment Apply 85 g topically as needed for Dry Skin Apply topically as needed. Yes Historical Provider, MD   primidone (MYSOLINE) 50 MG tablet Take 50 mg by mouth nightly Yes Historical Provider, MD   nadolol (CORGARD) 20 MG tablet Take 20 mg by mouth daily Yes Historical Provider, MD        No Known Allergies    Past Medical History:   Diagnosis Date    Alcohol abuse     CAD (coronary artery disease)     with complete LAD occlusion    CHF (congestive heart failure) (HCC)     LVEF    HTN (hypertension)     Lower extremity cellulitis     MI (mitral incompetence)        No past surgical history on file.     Social History     Socioeconomic History    Marital status: Single     Spouse name: Not on file    Number of children: Not on file    Years of education: Not on file    Highest education level: Not on file   Occupational History    Not on file

## 2020-05-22 NOTE — LETTER
[address] [facility] [facility] [address] [facility] [address]  Phone: [phone]  Fax: [phone]    Arabella Garcia MD        May 22, 2020     Patient: Mary Francisco   YOB: 1960   Date of Visit: 5/22/2020       To Whom It May Concern: It is my medical opinion that Mary Francisco may return to work on 5/25 with the following restrictions: decreased physical load initially with slow increase to normal work. .    If you have any questions or concerns, please don't hesitate to call.     Sincerely,        Arabella Garcia MD

## 2020-05-22 NOTE — PROGRESS NOTES
ANTICOAGULATION SERVICE    Viki Holt is a 62 y.o. 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     Anticoagulation Indication(s):  DVT, LV thrombus (acute)   Referring Physician:  Dr. Jj Wisdom  Goal INR Range:  2.5-3 per Dr. Elen Forbes  Duration of Anticoagulation Therapy:  Unknown   Time of day dose taken:  PM  Product patient has at home:  warfarin 2.5 mg (green)       INR Summary                            Warfarin regimen (mg)  Date INR   A/P    Sun Mon Tue Wed Thu Fri Sat Mg/wk  5/22 6.31 Above goal, hold + dec 1.25 1.25 INR   0 0 TBD  5/18 3.29 Above goal, reduce x 1 2.5   1.25/2.5 2.5 2.5 2.5 2.5 2.5 17.5   5/16 2.0 On d/c from [facility]  2.5 2.5 2.5 2.5 2.5 2.5 2.5 17.5     Last CBC:  Lab Results   Component Value Date    RBC 4.33 05/15/2020    HGB 15.8 05/15/2020    HCT 45.5 05/15/2020    .0 (H) 05/15/2020    MCH 36.5 (H) 05/15/2020    MPV 7.4 05/15/2020    RDW 15.3 05/15/2020     05/15/2020       Patient History:  Recent hospitalizations/HC visits 5/11-5/16 admit [facility] for chest pain:   -found to have CAD with LAD occlusion  -s/p PCI with stent placement on 5/12/20  -treated for bilateral LE cellulitis during admission  -also found to have LV thrombus and acute LLE DVT  -started on heparin gtt and bridged to warfarin with goal INR 2.5-3 per cardio  -hematuria during admission   Recent medication changes New meds on discharge from [facility] 5/16:  -warfarin  -Plavix  -ASA 81 mg  -Lipitor  -lisinopril  -folic acid + vit B-1   Medications taken regularly that may interact with warfarin or alter INR ASA 81 mg, Plavix, primidone BID   Warfarin dose taken as prescribed No, has been taking 1.25 mg daily  Does not use pillbox, but has system for remembering to take meds   Signs/symptoms of bleeding Hematuria noted during hospital admission, but patient denies any bleeding   Vitamin K intake Normally has ~0 servings of green, leafy vegetables per week: eats iceberg lettuce salad 1-2x per month; will avoid for the most part   Recent vomiting/diarrhea/fever, changes in weight or activity level None reported   Tobacco or alcohol use Patient reports quitting smoking ~1 year ago  Patient reports having 0 drinks per day (h/o alcohol abuse, but quit after hospitalization)   Upcoming surgeries or procedures None reported     Assessment/Plan:  Patient's INR was supratherapeutic today (6.31) according to INR goal of 2.5-3. INR is trending up quickly from 3.29 on 5/18. Spoke with patient by phone. He reports taking 1/2 of warfarin tablet (1.25 mg) daily since 5/18, which is actually a lower dose than instructed. He denies any diet changes, medication changes, illness, etc since Monday. Unclear why INR is trending up so drastically on such a low dose of warfarin. Given that patient is on triple therapy (warfarin/ASA/Plavix) and INR is significantly elevated, will need to hold warfarin due to risk of bleeding. However, due to acute DVT and LV thrombus, patient is likely at higher risk if INR drops too low. As patient is already taking a very low dose of warfarin, dosing options are limited. Patient may need a lower strength of warfarin prescribed. Patient was instructed to hold warfarin today and tomorrow, then resume 1.25 mg (half tablet) daily. Repeat INR in 4 days due to holiday weekend. Patient was reminded to maintain consistent vitamin K intake and call with any bleeding, medication changes, or fever/vomiting/diarrhea. Patient understands dosing directions and information discussed. Dosing schedule and follow up appointment given to patient. Progress note routed to referring physician's office. Patient acknowledges working in consult agreement with pharmacist as referred by his/her physician.      Next

## 2020-05-23 ENCOUNTER — HOSPITAL ENCOUNTER (EMERGENCY)
Age: 60
Discharge: HOME | End: 2020-05-23
Attending: EMERGENCY MEDICINE
Payer: MEDICAID

## 2020-05-23 VITALS
BODY MASS INDEX: 26.36 KG/M2 | OXYGEN SATURATION: 99 % | SYSTOLIC BLOOD PRESSURE: 125 MMHG | TEMPERATURE: 98 F | HEART RATE: 54 BPM | WEIGHT: 178 LBS | DIASTOLIC BLOOD PRESSURE: 68 MMHG | RESPIRATION RATE: 16 BRPM | HEIGHT: 69 IN

## 2020-05-23 LAB
ANION GAP SERPL CALCULATED.3IONS-SCNC: 11 MMOL/L (ref 3–16)
BASOPHILS ABSOLUTE: 0.1 K/UL (ref 0–0.2)
BASOPHILS RELATIVE PERCENT: 0.8 %
BUN BLDV-MCNC: 7 MG/DL (ref 7–20)
CALCIUM SERPL-MCNC: 8.7 MG/DL (ref 8.3–10.6)
CHLORIDE BLD-SCNC: 96 MMOL/L (ref 99–110)
CO2: 27 MMOL/L (ref 21–32)
CREAT SERPL-MCNC: 0.5 MG/DL (ref 0.8–1.3)
EOSINOPHILS ABSOLUTE: 0.3 K/UL (ref 0–0.6)
EOSINOPHILS RELATIVE PERCENT: 3.9 %
GFR AFRICAN AMERICAN: >60
GFR NON-AFRICAN AMERICAN: >60
GLUCOSE BLD-MCNC: 91 MG/DL (ref 70–99)
HCT VFR BLD CALC: 42.7 % (ref 40.5–52.5)
HEMOGLOBIN: 15.1 G/DL (ref 13.5–17.5)
INR BLD: 3.62 (ref 0.86–1.14)
LYMPHOCYTES ABSOLUTE: 1.9 K/UL (ref 1–5.1)
LYMPHOCYTES RELATIVE PERCENT: 26.1 %
MCH RBC QN AUTO: 36.4 PG (ref 26–34)
MCHC RBC AUTO-ENTMCNC: 35.4 G/DL (ref 31–36)
MCV RBC AUTO: 102.7 FL (ref 80–100)
MONOCYTES ABSOLUTE: 0.8 K/UL (ref 0–1.3)
MONOCYTES RELATIVE PERCENT: 10.3 %
NEUTROPHILS ABSOLUTE: 4.3 K/UL (ref 1.7–7.7)
NEUTROPHILS RELATIVE PERCENT: 58.9 %
PDW BLD-RTO: 14.4 % (ref 12.4–15.4)
PLATELET # BLD: 219 K/UL (ref 135–450)
PMV BLD AUTO: 7.8 FL (ref 5–10.5)
POTASSIUM SERPL-SCNC: 3.9 MMOL/L (ref 3.5–5.1)
PROTHROMBIN TIME: 42.6 SEC (ref 10–13.2)
RBC # BLD: 4.16 M/UL (ref 4.2–5.9)
SODIUM BLD-SCNC: 134 MMOL/L (ref 136–145)
WBC # BLD: 7.4 K/UL (ref 4–11)

## 2020-05-23 PROCEDURE — 85610 PROTHROMBIN TIME: CPT

## 2020-05-23 PROCEDURE — 80048 BASIC METABOLIC PNL TOTAL CA: CPT

## 2020-05-23 PROCEDURE — 2500000003 HC RX 250 WO HCPCS: Performed by: EMERGENCY MEDICINE

## 2020-05-23 PROCEDURE — 99283 EMERGENCY DEPT VISIT LOW MDM: CPT

## 2020-05-23 PROCEDURE — 85025 COMPLETE CBC W/AUTO DIFF WBC: CPT

## 2020-05-23 RX ORDER — TRANEXAMIC ACID 100 MG/ML
1000 INJECTION, SOLUTION INTRAVENOUS ONCE
Status: COMPLETED | OUTPATIENT
Start: 2020-05-23 | End: 2020-05-23

## 2020-05-23 RX ADMIN — TRANEXAMIC ACID 1000 MG: 1 INJECTION, SOLUTION INTRAVENOUS at 15:00

## 2020-05-23 ASSESSMENT — ENCOUNTER SYMPTOMS
ABDOMINAL PAIN: 0
VOMITING: 0
COUGH: 0
DIARRHEA: 0
NAUSEA: 0
SHORTNESS OF BREATH: 0

## 2020-05-26 DIAGNOSIS — I51.3 MURAL THROMBUS OF CARDIAC APEX: ICD-10-CM

## 2020-05-26 DIAGNOSIS — F10.19 ALCOHOL ABUSE WITH ALCOHOL-INDUCED DISORDER (HCC): ICD-10-CM

## 2020-05-26 DIAGNOSIS — I25.10 CORONARY ARTERY DISEASE INVOLVING NATIVE CORONARY ARTERY OF NATIVE HEART WITHOUT ANGINA PECTORIS: ICD-10-CM

## 2020-05-26 LAB
A/G RATIO: 1.4 (ref 1.1–2.2)
ALBUMIN SERPL-MCNC: 4 G/DL (ref 3.4–5)
ALP BLD-CCNC: 83 U/L (ref 40–129)
ALT SERPL-CCNC: 35 U/L (ref 10–40)
ANION GAP SERPL CALCULATED.3IONS-SCNC: 12 MMOL/L (ref 3–16)
AST SERPL-CCNC: 49 U/L (ref 15–37)
BILIRUB SERPL-MCNC: 1.1 MG/DL (ref 0–1)
BUN BLDV-MCNC: 9 MG/DL (ref 7–20)
CALCIUM SERPL-MCNC: 8.8 MG/DL (ref 8.3–10.6)
CHLORIDE BLD-SCNC: 100 MMOL/L (ref 99–110)
CO2: 25 MMOL/L (ref 21–32)
CREAT SERPL-MCNC: 0.7 MG/DL (ref 0.8–1.3)
GFR AFRICAN AMERICAN: >60
GFR NON-AFRICAN AMERICAN: >60
GLOBULIN: 2.9 G/DL
GLUCOSE BLD-MCNC: 94 MG/DL (ref 70–99)
HCT VFR BLD CALC: 43.4 % (ref 40.5–52.5)
HEMOGLOBIN: 15.3 G/DL (ref 13.5–17.5)
INR BLD: 1.63 (ref 0.86–1.14)
MCH RBC QN AUTO: 36 PG (ref 26–34)
MCHC RBC AUTO-ENTMCNC: 35.2 G/DL (ref 31–36)
MCV RBC AUTO: 102.5 FL (ref 80–100)
PDW BLD-RTO: 14.3 % (ref 12.4–15.4)
PLATELET # BLD: 247 K/UL (ref 135–450)
PMV BLD AUTO: 8.5 FL (ref 5–10.5)
POTASSIUM SERPL-SCNC: 3.6 MMOL/L (ref 3.5–5.1)
PROTHROMBIN TIME: 19 SEC (ref 10–13.2)
RBC # BLD: 4.24 M/UL (ref 4.2–5.9)
SODIUM BLD-SCNC: 137 MMOL/L (ref 136–145)
TOTAL PROTEIN: 6.9 G/DL (ref 6.4–8.2)
WBC # BLD: 11.1 K/UL (ref 4–11)

## 2020-05-27 ENCOUNTER — TELEPHONE (OUTPATIENT)
Dept: CARDIOLOGY CLINIC | Age: 60
End: 2020-05-27

## 2020-05-27 ENCOUNTER — ANTI-COAG VISIT (OUTPATIENT)
Dept: PHARMACY | Age: 60
End: 2020-05-27
Payer: MEDICAID

## 2020-05-27 PROCEDURE — 99212 OFFICE O/P EST SF 10 MIN: CPT

## 2020-05-27 NOTE — TELEPHONE ENCOUNTER
inr 1.6 and  Not sure if  they should bridge him. He is on tripple blood thinners and has acute dvt and lv thrombus. he is new to [facility]  [phone].  Coumadin clinic

## 2020-05-28 ENCOUNTER — ANTI-COAG VISIT (OUTPATIENT)
Dept: PHARMACY | Age: 60
End: 2020-05-28
Payer: MEDICAID

## 2020-05-28 ENCOUNTER — TELEPHONE (OUTPATIENT)
Dept: INTERNAL MEDICINE CLINIC | Age: 60
End: 2020-05-28

## 2020-05-28 DIAGNOSIS — I51.3 MURAL THROMBUS OF CARDIAC APEX: ICD-10-CM

## 2020-05-28 LAB
INR BLD: 1.86 (ref 0.86–1.14)
PROTHROMBIN TIME: 21.7 SEC (ref 10–13.2)

## 2020-05-28 PROCEDURE — 99212 OFFICE O/P EST SF 10 MIN: CPT

## 2020-05-28 NOTE — TELEPHONE ENCOUNTER
Noted, thanks! I addressed with patient yesterday. He will remain on warfarin+ASA+Plavix, but no Lovenox per Dr. Valerie Sue. Felicity Sweeney.  Jacqueline Rosales, Mary KayD, [facility]  [facility]  Ph: [phone]  5/28/2020 10:40 AM

## 2020-06-02 ENCOUNTER — HOSPITAL ENCOUNTER (OUTPATIENT)
Dept: ULTRASOUND IMAGING | Age: 60
Discharge: HOME | End: 2020-06-02
Payer: MEDICAID

## 2020-06-02 ENCOUNTER — ANTI-COAG VISIT (OUTPATIENT)
Dept: PHARMACY | Age: 60
End: 2020-06-02
Payer: MEDICAID

## 2020-06-02 PROCEDURE — 99212 OFFICE O/P EST SF 10 MIN: CPT

## 2020-06-02 PROCEDURE — 76700 US EXAM ABDOM COMPLETE: CPT

## 2020-06-02 NOTE — PROGRESS NOTES
warfarin)    New meds on discharge from [facility] 113 5/16:  -warfarin  -Plavix  -ASA 81 mg  -Lipitor  -lisinopril  -folic acid + vit B-1   Medications taken regularly that may interact with warfarin or alter INR ASA 81 mg, Plavix   Warfarin dose taken as prescribed Yes  Does not use pillbox, but has system for remembering to take meds   Signs/symptoms of bleeding Hematuria noted during hospital admission, but patient denies any bleeding   Vitamin K intake Normally has ~0 servings of green, leafy vegetables per week: eats iceberg lettuce salad 1-2x per month; will avoid for the most part   Recent vomiting/diarrhea/fever, changes in weight or activity level None reported   Tobacco or alcohol use Patient reports quitting smoking ~1 year ago  Patient reports having 0 drinks per day (h/o alcohol abuse, but quit after hospitalization)   Upcoming surgeries or procedures None reported     Assessment/Plan:  Patient's INR was subtherapeutic today (1.84), essentially unchanged from last INR. Patient's INR goal is 2.5-3. Spoke with patient by phone. He reports taking warfarin as instructed since last check. INR increased from 3.3 to 6.3 over four days on a very low warfarin dose of 1.25 mg daily. However, after holding one warfarin dose on 5/22, patient's INR dropped from 6.3 on 5/22 to 3.6 on 5/23. On 5/27, patient discontinued primidone. Primidone has a significant, but delayed interaction with warfarin. Normally, we would anticipate needing a 30-60% warfarin dose decrease after stopping primidone. Because patient has only been taking warfarin for 2 weeks, we may not see the full potential of this interaction. Due to acute DVT and LV thrombus, patient has a high clotting risk when INR is subtherapeutic. However, as he is already taking warfarin+ASA+Plavix, adding Lovenox would significantly increase bleeding risk. Dr. Ryne ePrla recommended continuing triple therapy with warfarin+ASA+Plavix, but no Lovenox bridge. Patient has taken 10 mg total warfarin over the past week. Anticipate that patient will need ~11.25-12.5 mg/wk. I hesitate to increase warfarin too much based on labile INRs, triple therapy, and the possibility that his INR may still increase after d/c of primidone; however, his INR has been subtherapeutic since 5/26 with an acute DVT and LV thrombus. Patient was instructed to take warfarin 2.5 mg today and tomorrow, then 1.25 mg on Thursday. Repeat INR  6/5 before echo at [facility]. Patient seems to have difficulty keeping track of all the recent changes to his medication regimen; therefore, warfarin dosing instructions should be reviewed with patient thoroughly. Patient was reminded to maintain consistent vitamin K intake and call with any bleeding, medication changes, or fever/vomiting/diarrhea. Patient understands dosing directions and information discussed. Dosing schedule and follow up appointment given to patient. Progress note routed to referring physician's office. Patient acknowledges working in consult agreement with pharmacist as referred by his/her physician. Next INR Check:  6/5 per lab    Please call [facility] at [phone] with any questions. Thanks!   Leonel Thomas, Mary KayD, [facility]  [facility]   Tori Goldstein [address] Ph: [phone]  Angelene Holter Ph: [phone]  6/2/2020 3:38 PM    CLINICAL PHARMACY CONSULT: MED RECONCILIATION/REVIEW ADDENDUM    For Pharmacy Admin Tracking Only    PHSO: No  Total # of Interventions Recommended: 1  - Increased Dose #: 1  - Maintenance Safety Lab Monitoring #: 1  Total Interventions Accepted: 1  Time Spent (min): 15    Sunshine Sandoval PharmD

## 2020-06-03 PROCEDURE — 99212 OFFICE O/P EST SF 10 MIN: CPT

## 2020-06-05 ENCOUNTER — ANTI-COAG VISIT (OUTPATIENT)
Dept: PHARMACY | Age: 60
End: 2020-06-05
Payer: MEDICAID

## 2020-06-05 ENCOUNTER — HOSPITAL ENCOUNTER (OUTPATIENT)
Dept: NON INVASIVE DIAGNOSTICS | Age: 60
Discharge: HOME | End: 2020-06-05
Payer: MEDICAID

## 2020-06-05 DIAGNOSIS — I51.3 MURAL THROMBUS OF CARDIAC APEX: ICD-10-CM

## 2020-06-05 LAB
INR BLD: 2.99 (ref 0.86–1.14)
LV EF: 35 %
LVEF MODALITY: NORMAL
PROTHROMBIN TIME: 35.1 SEC (ref 10–13.2)

## 2020-06-05 PROCEDURE — C8923 2D TTE W OR W/O FOL W/CON,CO: HCPCS

## 2020-06-05 PROCEDURE — 6360000004 HC RX CONTRAST MEDICATION: Performed by: INTERNAL MEDICINE

## 2020-06-05 PROCEDURE — 99211 OFF/OP EST MAY X REQ PHY/QHP: CPT

## 2020-06-05 RX ADMIN — PERFLUTREN 2.2 MG: 6.52 INJECTION, SUSPENSION INTRAVENOUS at 08:18

## 2020-06-05 NOTE — PROGRESS NOTES
monitor INR closely. Patient has taken 11.25 mg of warfarin over the past week and INR is now therapeutic. Therefore, patient was instructed to take warfarin 2.5 mg on Tue+Sat, and 1.25 mg on all other days (11.25 mg/week). Repeat INR in 3-5 days. Patient seems to have difficulty keeping track of all the recent changes to his medication regimen; therefore, warfarin dosing instructions should be reviewed with patient thoroughly. Patient was reminded to maintain consistent vitamin K intake and call with any bleeding, medication changes, or fever/vomiting/diarrhea. Patient understands dosing directions and information discussed. Dosing schedule and follow up appointment given to patient. Progress note routed to referring physician's office. Patient acknowledges working in consult agreement with pharmacist as referred by his/her physician. Next INR Check:  6/8-6/10 per lab depending on patient schedule    Please call [facility] at [phone] with any questions. Thanks! Matthew Cadena.  Jaylene Munoz, PharmD, [facility]  [facility]  Ph: [phone]  6/5/2020 12:01 PM    CLINICAL PHARMACY CONSULT: MED RECONCILIATION/REVIEW ADDENDUM    For Pharmacy Admin Tracking Only    PHSO: No  Total # of Interventions Recommended: 1  - Increased Dose #: 1  - Maintenance Safety Lab Monitoring #: 1  Total Interventions Accepted: 1  Time Spent (min): 15

## 2020-06-09 DIAGNOSIS — I51.3 MURAL THROMBUS OF CARDIAC APEX: ICD-10-CM

## 2020-06-09 LAB
INR BLD: 3.14 (ref 0.86–1.14)
PROTHROMBIN TIME: 36.8 SEC (ref 10–13.2)

## 2020-06-10 ENCOUNTER — ANTI-COAG VISIT (OUTPATIENT)
Dept: PHARMACY | Age: 60
End: 2020-06-10
Payer: MEDICAID

## 2020-06-10 PROCEDURE — 99211 OFF/OP EST MAY X REQ PHY/QHP: CPT

## 2020-06-10 NOTE — PROGRESS NOTES
ANTICOAGULATION SERVICE    Adin Ortega is a 62 y.o. 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 Anticoagulation Indication(s):  DVT, LV thrombus (acute)   Referring Physician:  Dr. Mandy Baires (sent e-referral to Dr. Irwin Muñiz on 5/27)  Goal INR Range:  2.5-3 per Dr. Irwin Muñiz  Duration of Anticoagulation Therapy:  Unknown   Time of day dose taken:  PM  Product patient has at home:  warfarin 2.5 mg (green)      INR Summary                            Warfarin regimen (mg)  Date INR   A/P    Sun Mon Tue Wed Thu Fri Sat Mg/wk  6/9 3.14 Above goal, continue  1.25 1.25 2.5 1.25 1.25 1.25 2.5 11.25  6/5 2.99 At goal, see plan  1.25 1.25 2.5 1.25 1.25 1.25 2.5 11.25  6/2 1.84 Below goal, increase    2.5 2.5 1.25 INR  5/28 1.86 Below goal, continue  1.25 1.25 INR  1.25 1.25 1.25 8.75  5/26 1.63 Below goal, bolus x 1     2.5 INR      5/22 6.31 Above goal, hold + dec 1.25 1.25 INR   0 0 TBD  5/18 3.29 Above goal, reduce x 1 2.5   1.25/2.5 2.5 2.5 2.5 2.5 2.5 17.5   5/16 2.0 On d/c from [facility]  2.5 2.5 2.5 2.5 2.5 2.5 2.5 17.5     Last CBC:  Lab Results   Component Value Date    RBC 4.24 05/26/2020    HGB 15.3 05/26/2020    HCT 43.4 05/26/2020    .5 (H) 05/26/2020    MCH 36.0 (H) 05/26/2020    MPV 8.5 05/26/2020    RDW 14.3 05/26/2020     05/26/2020       Patient History:  Recent hospitalizations/HC visits 6/2: Abdominal u/s impression: Cholelithiasis or gallbladder sludge. No sonographic sequelae of acute cholecystitis.     5/23 [facility] ED for LLE bleeding from scratch: no meds prescribed; INR=3.62    5/11-5/16 admit [facility] for chest pain:   -found to have CAD with LAD occlusion  -s/p PCI with stent placement on 5/12/20  -treated for bilateral LE cellulitis during admission  -also found to have LV thrombus and acute LLE DVT  -started on heparin gtt and bridged to warfarin with goal INR 2.5-3 per cardio  -hematuria during admission   Recent medication changes 5/27 d/c primidone (strong interaction with warfarin)    New meds on discharge from [facility] 5/16:  -warfarin  -Plavix  -ASA 81 mg  -Lipitor  -lisinopril  -folic acid + vit B-1   Medications taken regularly that may interact with warfarin or alter INR ASA 81 mg, Plavix, off primidone   Warfarin dose taken as prescribed Yes  Does not use pillbox, but has system for remembering to take meds   Signs/symptoms of bleeding Hematuria noted during hospital admission, but patient denies any bleeding   Vitamin K intake Normally has ~0 servings of green, leafy vegetables per week: eats iceberg lettuce salad 1-2x per month; will avoid for the most part   Recent vomiting/diarrhea/fever, changes in weight or activity level None reported   Tobacco or alcohol use Patient reports quitting smoking ~1 year ago  Patient reports having 0 drinks per day (h/o alcohol abuse, but quit after hospitalization)   Upcoming surgeries or procedures None reported     Assessment/Plan:  Patient's INR was slightly supratherapeutic (3.14) according to INR goal of 2.5-3. Spoke with patient by phone. He reports taking warfarin as instructed since last check. INR initially increased from 3.3 to 6.3 over four days on a very low warfarin dose of 1.25 mg daily. However, after holding one warfarin dose on 5/22, patient's INR dropped from 6.3 on 5/22 to 3.6 on 5/23. On 5/27, patient discontinued primidone. Primidone has a significant, but delayed interaction with warfarin. Normally, would anticipate needing a 30-60% warfarin dose reduction after stopping primidone. However, since patient had only been taking warfarin for 2 weeks, may not see the full effect of this interaction. Due to acute DVT and LV thrombus, patient has a high clotting risk when INR is subtherapeutic.   However, as he is taking triple therapy with warfarin+ASA+Plavix, he also has high bleeding risk when INR is supratherapeutic. Will need to monitor INR closely. Patient has taken 11.25 mg of warfarin over the past week and INR only increased slightly from last check. Therefore, patient was instructed to continue warfarin 2.5 mg on Tue+Sat, and 1.25 mg on all other days (11.25 mg/week). Repeat INR in 3 days. Patient seems to have difficulty keeping track of all the recent changes to his medication regimen; therefore, warfarin dosing instructions should be reviewed with patient thoroughly. Patient was reminded to maintain consistent vitamin K intake and call with any bleeding, medication changes, or fever/vomiting/diarrhea. Patient understands dosing directions and information discussed. Dosing schedule and follow up appointment given to patient. Progress note routed to referring physician's office. Patient acknowledges working in consult agreement with pharmacist as referred by his/her physician. Next INR Check:  6/12 per lab    Please call [place] [address] [facility] at [phone] with any questions. Thanks! Katlyn Andres.  Carleen Urrutia, PharmD, BCPS  [place] [address] [facility]  Ph: [phone]  6/10/2020 8:56 AM    CLINICAL PHARMACY CONSULT: MED RECONCILIATION/REVIEW ADDENDUM    For Pharmacy Admin Tracking Only    PHSO: No  Total # of Interventions Recommended: 0  - Maintenance Safety Lab Monitoring #: 1  Total Interventions Accepted: 0  Time Spent (min): 15

## 2020-06-12 ENCOUNTER — ANTI-COAG VISIT (OUTPATIENT)
Dept: PHARMACY | Age: 60
End: 2020-06-12
Payer: MEDICAID

## 2020-06-12 LAB — INTERNATIONAL NORMALIZATION RATIO, POC: 4.3

## 2020-06-12 PROCEDURE — 85610 PROTHROMBIN TIME: CPT

## 2020-06-12 PROCEDURE — 99212 OFFICE O/P EST SF 10 MIN: CPT

## 2020-06-19 ENCOUNTER — ANTI-COAG VISIT (OUTPATIENT)
Dept: PHARMACY | Age: 60
End: 2020-06-19
Payer: MEDICAID

## 2020-06-19 LAB — INTERNATIONAL NORMALIZATION RATIO, POC: 2.7

## 2020-06-19 PROCEDURE — 85610 PROTHROMBIN TIME: CPT

## 2020-06-19 PROCEDURE — 99211 OFF/OP EST MAY X REQ PHY/QHP: CPT

## 2020-06-26 ENCOUNTER — ANTI-COAG VISIT (OUTPATIENT)
Dept: PHARMACY | Age: 60
End: 2020-06-26
Payer: MEDICAID

## 2020-06-26 LAB — INTERNATIONAL NORMALIZATION RATIO, POC: 2.4

## 2020-06-26 PROCEDURE — 85610 PROTHROMBIN TIME: CPT

## 2020-06-26 PROCEDURE — 99211 OFF/OP EST MAY X REQ PHY/QHP: CPT

## 2020-06-26 NOTE — PROGRESS NOTES
ANTICOAGULATION SERVICE    Ashley Perdue is a 62 y.o. 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 Anticoagulation Indication(s):  DVT, LV thrombus (acute)   Referring Physician:  Dr. Mirian Astorga (sent e-referral to Dr. Alison Healy on 5/27)  Goal INR Range:  2.5-3 per Dr. Alison Healy  Duration of Anticoagulation Therapy:  Unknown   Time of day dose taken:  PM  Product patient has at home:  warfarin 2.5 mg (green)      INR Summary                            Warfarin regimen (mg)  Date INR   A/P    Sun Mon Tue Wed Thu Fri Sat Mg/wk  6/26 2.4 Below goal, continue  1.25 1.25 1.25 1.25 1.25 1.25 2.5 10  6/19 2.7 At goal, no change  1.25 1.25 1.25 1.25 1.25 1.25 2.5 10  6/12 4.3 Above goal, hold + dec 1.25 1.25 1.25 1.25 1.25 0/1.25 2.5 10  6/9 3.14 Above goal, continue  1.25 1.25 2.5 1.25 1.25 1.25 2.5 11.25  6/5 2.99 At goal, see plan  1.25 1.25 2.5 1.25 1.25 1.25 2.5 11.25 6/2 1.84 Below goal, increase    2.5 2.5 1.25 INR  5/28 1.86 Below goal, continue  1.25 1.25 INR  1.25 1.25 1.25 8.75  5/26 1.63 Below goal, bolus x 1     2.5 INR      5/23 3.62 Per [facility] ED  5/22 6.31 Above goal, hold + dec 1.25 1.25 INR   0 0 TBD  5/18 3.29 Above goal, reduce x 1 2.5   1.25/2.5 2.5 2.5 2.5 2.5 2.5 17.5   5/16 2.0 On d/c from [facility]  2.5 2.5 2.5 2.5 2.5 2.5 2.5 17.5     Last CBC:  Lab Results   Component Value Date    RBC 4.24 05/26/2020    HGB 15.3 05/26/2020    HCT 43.4 05/26/2020    .5 (H) 05/26/2020    MCH 36.0 (H) 05/26/2020    MPV 8.5 05/26/2020    RDW 14.3 05/26/2020     05/26/2020       Patient History:  Recent hospitalizations/HC visits 6/2: Abdominal u/s impression: Cholelithiasis or gallbladder sludge. No sonographic sequelae of acute cholecystitis.     5/23 [facility] ED for LLE bleeding from scratch: no meds prescribed; INR=3.62    5/11-5/16 admit

## 2020-06-30 ENCOUNTER — HOSPITAL ENCOUNTER (EMERGENCY)
Age: 60
Discharge: HOME | End: 2020-06-30
Attending: EMERGENCY MEDICINE
Payer: MEDICAID

## 2020-06-30 VITALS
BODY MASS INDEX: 26.66 KG/M2 | SYSTOLIC BLOOD PRESSURE: 159 MMHG | WEIGHT: 180 LBS | RESPIRATION RATE: 16 BRPM | HEART RATE: 103 BPM | DIASTOLIC BLOOD PRESSURE: 77 MMHG | HEIGHT: 69 IN | OXYGEN SATURATION: 95 % | TEMPERATURE: 97.8 F

## 2020-06-30 LAB
ANION GAP SERPL CALCULATED.3IONS-SCNC: 15 MMOL/L (ref 3–16)
BASOPHILS ABSOLUTE: 0.1 K/UL (ref 0–0.2)
BASOPHILS RELATIVE PERCENT: 0.8 %
BUN BLDV-MCNC: 8 MG/DL (ref 7–20)
CALCIUM SERPL-MCNC: 8.8 MG/DL (ref 8.3–10.6)
CHLORIDE BLD-SCNC: 101 MMOL/L (ref 99–110)
CO2: 25 MMOL/L (ref 21–32)
CREAT SERPL-MCNC: 0.7 MG/DL (ref 0.8–1.3)
EOSINOPHILS ABSOLUTE: 0 K/UL (ref 0–0.6)
EOSINOPHILS RELATIVE PERCENT: 0.4 %
GFR AFRICAN AMERICAN: >60
GFR NON-AFRICAN AMERICAN: >60
GLUCOSE BLD-MCNC: 110 MG/DL (ref 70–99)
HCT VFR BLD CALC: 42.7 % (ref 40.5–52.5)
HEMOGLOBIN: 14.9 G/DL (ref 13.5–17.5)
INR BLD: 3.89 (ref 0.86–1.14)
LYMPHOCYTES ABSOLUTE: 1.7 K/UL (ref 1–5.1)
LYMPHOCYTES RELATIVE PERCENT: 13.3 %
MCH RBC QN AUTO: 35 PG (ref 26–34)
MCHC RBC AUTO-ENTMCNC: 35 G/DL (ref 31–36)
MCV RBC AUTO: 100 FL (ref 80–100)
MONOCYTES ABSOLUTE: 0.7 K/UL (ref 0–1.3)
MONOCYTES RELATIVE PERCENT: 5.3 %
NEUTROPHILS ABSOLUTE: 10 K/UL (ref 1.7–7.7)
NEUTROPHILS RELATIVE PERCENT: 80.2 %
PDW BLD-RTO: 13.3 % (ref 12.4–15.4)
PLATELET # BLD: 155 K/UL (ref 135–450)
PMV BLD AUTO: 8 FL (ref 5–10.5)
POTASSIUM REFLEX MAGNESIUM: 3.7 MMOL/L (ref 3.5–5.1)
PROTHROMBIN TIME: 45.7 SEC (ref 10–13.2)
RBC # BLD: 4.27 M/UL (ref 4.2–5.9)
SODIUM BLD-SCNC: 141 MMOL/L (ref 136–145)
WBC # BLD: 12.5 K/UL (ref 4–11)

## 2020-06-30 PROCEDURE — 85610 PROTHROMBIN TIME: CPT

## 2020-06-30 PROCEDURE — 2580000003 HC RX 258: Performed by: STUDENT IN AN ORGANIZED HEALTH CARE EDUCATION/TRAINING PROGRAM

## 2020-06-30 PROCEDURE — 80048 BASIC METABOLIC PNL TOTAL CA: CPT

## 2020-06-30 PROCEDURE — 96361 HYDRATE IV INFUSION ADD-ON: CPT

## 2020-06-30 PROCEDURE — 2580000003 HC RX 258

## 2020-06-30 PROCEDURE — 93005 ELECTROCARDIOGRAM TRACING: CPT | Performed by: STUDENT IN AN ORGANIZED HEALTH CARE EDUCATION/TRAINING PROGRAM

## 2020-06-30 PROCEDURE — 99284 EMERGENCY DEPT VISIT MOD MDM: CPT

## 2020-06-30 PROCEDURE — 85025 COMPLETE CBC W/AUTO DIFF WBC: CPT

## 2020-06-30 PROCEDURE — 96360 HYDRATION IV INFUSION INIT: CPT

## 2020-06-30 RX ORDER — 0.9 % SODIUM CHLORIDE 0.9 %
500 INTRAVENOUS SOLUTION INTRAVENOUS ONCE
Status: DISCONTINUED | OUTPATIENT
Start: 2020-06-30 | End: 2020-06-30

## 2020-06-30 RX ORDER — 0.9 % SODIUM CHLORIDE 0.9 %
1000 INTRAVENOUS SOLUTION INTRAVENOUS ONCE
Status: COMPLETED | OUTPATIENT
Start: 2020-06-30 | End: 2020-06-30

## 2020-06-30 RX ORDER — SODIUM CHLORIDE 9 MG/ML
INJECTION, SOLUTION INTRAVENOUS
Status: COMPLETED
Start: 2020-06-30 | End: 2020-06-30

## 2020-06-30 RX ADMIN — SODIUM CHLORIDE 1000 ML: 9 INJECTION, SOLUTION INTRAVENOUS at 21:41

## 2020-06-30 RX ADMIN — SODIUM CHLORIDE 1000 ML: 9 INJECTION, SOLUTION INTRAVENOUS at 18:43

## 2020-06-30 RX ADMIN — Medication 1000 ML: at 18:43

## 2020-06-30 ASSESSMENT — ENCOUNTER SYMPTOMS
NAUSEA: 0
DIARRHEA: 1
SHORTNESS OF BREATH: 0
VOMITING: 0
COUGH: 1
ABDOMINAL PAIN: 0
BLOOD IN STOOL: 0

## 2020-06-30 NOTE — ED TRIAGE NOTES
Pt is in the ED with CC dizziness and shakiness that started this morning. Denied N/V. He has had diarrhea. Recent stent placement due to DVT's.

## 2020-06-30 NOTE — ED PROVIDER NOTES
ED Attending Attestation Note     Date of evaluation: 6/30/2020    This patient was seen by the resident. I have seen and examined the patient, agree with the workup, evaluation, management and diagnosis. The care plan has been discussed. I have reviewed the ECG and concur with the resident's interpretation. My assessment reveals patient with lightheadedness with diarrhea. Markedly orthostatic, treated with IVF with improvement. Essential tremor likely contributory but unable to take primidone d/t warfarin interaction. Will advise cards followup to discuss change to DOAC.      Curtis Ballesteros MD  07/03/20 0105

## 2020-06-30 NOTE — ED NOTES
Patient states that he needs to have a bowel movement but is unsteady on his feet. Anabell Wilkins ED Tech set up a bedside commode and the patient refused to go.      Kassandra La RN  06/30/20 [id number]

## 2020-06-30 NOTE — ED NOTES
Pt to restroom immediately upon entering the unit. Episode of diarrhea. Urine sample at the bedside.       Fox Kaur RN  06/30/20 [id number]

## 2020-06-30 NOTE — ED PROVIDER NOTES
[address] [facility] RESIDENT NOTE       Date of evaluation: 6/30/2020    Chief Complaint     Dizziness (started this morning) and Diarrhea      History of Present Illness     Mino Ha is a 62 y.o. male who presents c/o diarrhea for the past two days and dizziness and shaky arms since this morning. He has a PMH consisting of CAD, DVT 5/11/2020, cellulitis, mural thrombus of cardiac apex, essential tremor, and heart failure with reduced EF, status post PTCA JACK to LAD. He reports no blood in the diarrhea and recalls no sick contacts. The diarrhea occurs 3X daily, but he has found relief with an OTC medication. He has had no appetite today because of the diarrhea. The dizziness started this morning when he woke up. He reports no falls, but it is difficult to walk because he feels like falling is a possibility. He denies feeling lightheaded or any syncope episodes. Regarding the shaky hands, this occurs both at rest and with use. He was previously on primidone for his essential tremors which helped, but was d/c at his last f/u with cardiology. Review of Systems     Review of Systems   Constitutional: Positive for appetite change. Negative for chills and fever. Respiratory: Positive for cough. Negative for shortness of breath. Cardiovascular: Negative for chest pain and palpitations. Gastrointestinal: Positive for diarrhea. Negative for abdominal pain, blood in stool, nausea and vomiting. Neurological: Positive for dizziness. Negative for syncope, weakness, light-headedness and headaches. All other systems reviewed and are negative. Past Medical, Surgical, Family, and Social History     He has a past medical history of Alcohol abuse, CAD (coronary artery disease), CHF (congestive heart failure) ([address] HTN (hypertension), Lower extremity cellulitis, and MI (mitral incompetence).   He has a past surgical history that includes Percutaneous Transluminal Coronary Angio. His family history includes Heart Disease in his father; Stroke in his mother. He reports that he has quit smoking. His smoking use included cigarettes. He has a 20.00 pack-year smoking history. He has never used smokeless tobacco. He reports current alcohol use of about 2.0 standard drinks of alcohol per week. He reports that he does not use drugs. Medications     Previous Medications    ASPIRIN 81 MG CHEWABLE TABLET    Take 1 tablet by mouth daily    ATORVASTATIN (LIPITOR) 40 MG TABLET    Take 1 tablet by mouth nightly    CLOPIDOGREL (PLAVIX) 75 MG TABLET    Take 1 tablet by mouth daily    FOLIC ACID (FOLVITE) 1 MG TABLET    Take 1 tablet by mouth daily    FUROSEMIDE (LASIX) 20 MG TABLET    Take 20 mg by mouth daily    LISINOPRIL (PRINIVIL;ZESTRIL) 2.5 MG TABLET    Take 1 tablet by mouth daily    MINERAL OIL-HYDROPHILIC PETROLATUM (AQUAPHOR) OINTMENT    Apply 85 g topically as needed for Dry Skin Apply topically as needed. NADOLOL (CORGARD) 20 MG TABLET    Take 20 mg by mouth daily    SKIN PROTECTANTS, MISC. (HYDROCERIN) CREA CREAM    Apply topically 2 times daily    VITAMIN B-1 100 MG TABLET    Take 1 tablet by mouth daily    VITAMIN B-12 (CYANOCOBALAMIN) 100 MCG TABLET    Take 1 tablet by mouth daily    WARFARIN (COUMADIN) 2.5 MG TABLET    Take 1 tablet by mouth daily       Allergies     He has No Known Allergies. Physical Exam     INITIAL VITALS: BP: (!) 157/88, Temp: 97.8 °F (36.6 °C), Pulse: 68, Resp: 20, SpO2: 98 %   Physical Exam  Vitals signs and nursing note reviewed. Constitutional:       General: He is not in acute distress. Appearance: Normal appearance. He is obese. HENT:      Head: Normocephalic and atraumatic. Cardiovascular:      Rate and Rhythm: Normal rate and regular rhythm. Heart sounds: Normal heart sounds. No murmur. No friction rub. No gallop. Pulmonary:      Effort: Pulmonary effort is normal. No respiratory distress.       Breath sounds: No wheezing, rhonchi or rales. Abdominal:      General: Abdomen is flat. Bowel sounds are normal. There is no distension. Palpations: Abdomen is soft. Tenderness: There is no abdominal tenderness. There is no guarding or rebound. Skin:     General: Skin is warm and dry. Neurological:      General: No focal deficit present. Mental Status: He is alert and oriented to person, place, and time.          Diagnostic Results     EKG   Interpreted inconjunction with emergency department physician Cheyenne Mari MD  Rhythm: normal sinus   Rate: normal  Axis: left  Ectopy: none  Conduction: normal  ST Segments: normal  T Waves: inversion in  v1  Q Waves: none  Clinical Impression: no acute changes      LABS:   Results for orders placed or performed during the hospital encounter of 96/29/51   Basic Metabolic Panel w/ Reflex to MG   Result Value Ref Range    Sodium 141 136 - 145 mmol/L    Potassium reflex Magnesium 3.7 3.5 - 5.1 mmol/L    Chloride 101 99 - 110 mmol/L    CO2 25 21 - 32 mmol/L    Anion Gap 15 3 - 16    Glucose 110 (H) 70 - 99 mg/dL    BUN 8 7 - 20 mg/dL    CREATININE 0.7 (L) 0.8 - 1.3 mg/dL    GFR Non-African American >60 >60    GFR African American >60 >60    Calcium 8.8 8.3 - 10.6 mg/dL   CBC Auto Differential   Result Value Ref Range    WBC 12.5 (H) 4.0 - 11.0 K/uL    RBC 4.27 4.20 - 5.90 M/uL    Hemoglobin 14.9 13.5 - 17.5 g/dL    Hematocrit 42.7 40.5 - 52.5 %    .0 80.0 - 100.0 fL    MCH 35.0 (H) 26.0 - 34.0 pg    MCHC 35.0 31.0 - 36.0 g/dL    RDW 13.3 12.4 - 15.4 %    Platelets 278 470 - 975 K/uL    MPV 8.0 5.0 - 10.5 fL    Neutrophils % 80.2 %    Lymphocytes % 13.3 %    Monocytes % 5.3 %    Eosinophils % 0.4 %    Basophils % 0.8 %    Neutrophils Absolute 10.0 (H) 1.7 - 7.7 K/uL    Lymphocytes Absolute 1.7 1.0 - 5.1 K/uL    Monocytes Absolute 0.7 0.0 - 1.3 K/uL    Eosinophils Absolute 0.0 0.0 - 0.6 K/uL    Basophils Absolute 0.1 0.0 - 0.2 K/uL   Protime-INR   Result 1. Orthostatic hypotension    2. Essential tremor    3. Diarrhea, unspecified type    4. Dehydration        Disposition     PATIENT REFERRED TO:  No follow-up provider specified.     DISCHARGE MEDICATIONS:  New Prescriptions    No medications on file       Daniel [address]ullon [address], DO  06/30/20 7740

## 2020-07-01 LAB
EKG ATRIAL RATE: 65 BPM
EKG DIAGNOSIS: NORMAL
EKG P AXIS: 46 DEGREES
EKG P-R INTERVAL: 172 MS
EKG Q-T INTERVAL: 456 MS
EKG QRS DURATION: 86 MS
EKG QTC CALCULATION (BAZETT): 474 MS
EKG R AXIS: -23 DEGREES
EKG T AXIS: 41 DEGREES
EKG VENTRICULAR RATE: 65 BPM

## 2020-07-01 NOTE — ED NOTES
Discharge instructions reviewed with patient. Pt verbalized understanding. IV d/c. Pt tolerated well. Pt discharged home with family.      Gena Stein RN  06/30/20 [id number]

## 2020-07-06 ENCOUNTER — OFFICE VISIT (OUTPATIENT)
Dept: INTERNAL MEDICINE CLINIC | Age: 60
End: 2020-07-06
Payer: MEDICAID

## 2020-07-06 VITALS
HEIGHT: 69 IN | HEART RATE: 63 BPM | OXYGEN SATURATION: 97 % | RESPIRATION RATE: 20 BRPM | BODY MASS INDEX: 26.78 KG/M2 | WEIGHT: 180.8 LBS | DIASTOLIC BLOOD PRESSURE: 69 MMHG | TEMPERATURE: 97 F | SYSTOLIC BLOOD PRESSURE: 104 MMHG

## 2020-07-06 PROCEDURE — 99213 OFFICE O/P EST LOW 20 MIN: CPT | Performed by: STUDENT IN AN ORGANIZED HEALTH CARE EDUCATION/TRAINING PROGRAM

## 2020-07-06 RX ORDER — CARVEDILOL 3.12 MG/1
3.12 TABLET ORAL 2 TIMES DAILY
Qty: 60 TABLET | Refills: 3 | Status: SHIPPED | OUTPATIENT
Start: 2020-07-06 | End: 2020-08-27 | Stop reason: SDUPTHER

## 2020-07-06 ASSESSMENT — ENCOUNTER SYMPTOMS
CHEST TIGHTNESS: 0
BACK PAIN: 0
ABDOMINAL DISTENTION: 0
APNEA: 0
COUGH: 0
SHORTNESS OF BREATH: 0
STRIDOR: 0
ABDOMINAL PAIN: 0
CHOKING: 0
WHEEZING: 0

## 2020-07-06 NOTE — PROGRESS NOTES
2020     Jeremy Mueller (:  1960) is a 62 y.o. male, here for evaluation of the following medical concerns:    HPI  Patient is a 62year old male with a past medical history of, alcohol abuse, HFrEF ( 35%),CAD status post stent to the LAD in may. He was also found to have a left ventricular thrombus and was put on warfarin. He established care with us in may. Since his last visit he reports he has been doing overall well. He is complaint with all his medications. He does report some dizziness this last week and also had an ED visit during which he was noted to be orthostatic. He was given IVFs and discharged. Today he denies any fevers, chills, nausea, vomiting, chest pain, SOB, abdominal pain, or any lower extremity edema. Continues to drink 2-3 beers daily, advised to cut down. Review of Systems   Constitutional: Negative for activity change, appetite change, chills, diaphoresis and fatigue. HENT: Negative for congestion. Respiratory: Negative for apnea, cough, choking, chest tightness, shortness of breath, wheezing and stridor. Cardiovascular: Negative for chest pain, palpitations and leg swelling. Gastrointestinal: Negative for abdominal distention and abdominal pain. Genitourinary: Negative for difficulty urinating and dysuria. Musculoskeletal: Negative for back pain. Neurological: Positive for dizziness. Negative for seizures, light-headedness and headaches. Psychiatric/Behavioral: Negative for agitation. Prior to Visit Medications    Medication Sig Taking?  Authorizing Provider   carvedilol (COREG) 3.125 MG tablet Take 1 tablet by mouth 2 times daily Yes Silvestre Pandey MD   vitamin B-12 (CYANOCOBALAMIN) 100 MCG tablet Take 1 tablet by mouth daily Yes Abdulkadir Mehta MD   aspirin 81 MG chewable tablet Take 1 tablet by mouth daily Yes Vangie Conner MD   warfarin (COUMADIN) 2.5 MG tablet Take 1 tablet by mouth daily Yes Vangie Conner MD   atorvastatin (LIPITOR) 40 MG tablet Take 1 tablet by mouth nightly Yes Joli Nyhan, MD   lisinopril (PRINIVIL;ZESTRIL) 2.5 MG tablet Take 1 tablet by mouth daily Yes Joli Nyhan, MD   Skin Protectants, Misc. (HYDROCERIN) CREA cream Apply topically 2 times daily Yes Joli Nyhan, MD   folic acid (FOLVITE) 1 MG tablet Take 1 tablet by mouth daily Yes Joli Nyhan, MD   clopidogrel (PLAVIX) 75 MG tablet Take 1 tablet by mouth daily Yes Joli Nyhan, MD   vitamin B-1 100 MG tablet Take 1 tablet by mouth daily Yes Joli Nyhan, MD   furosemide (LASIX) 20 MG tablet Take 20 mg by mouth daily Yes Historical Provider, MD   mineral oil-hydrophilic petrolatum (AQUAPHOR) ointment Apply 85 g topically as needed for Dry Skin Apply topically as needed. Yes Historical Provider, MD        Social History     Tobacco Use    Smoking status: Former Smoker     Packs/day: 0.50     Years: 40.00     Pack years: 20.00     Types: Cigarettes    Smokeless tobacco: Never Used   Substance Use Topics    Alcohol use: Yes     Alcohol/week: 2.0 standard drinks     Types: 2 Cans of beer per week     Comment: previously was drinkning 5 cans per day        Vitals:    07/06/20 0820 07/06/20 0822 07/06/20 0827   BP: 129/65 106/66 104/69   Site: Left Upper Arm Left Upper Arm Right Upper Arm   Position: Sitting Sitting Standing   Cuff Size: Medium Adult Medium Adult Medium Adult   Pulse: 62  63   Resp: 20     Temp: 97 °F (36.1 °C)     TempSrc: Oral     SpO2: 97%     Weight: 180 lb 12.8 oz (82 kg)     Height: 5' 9\" (1.753 m)       Estimated body mass index is 26.7 kg/m² as calculated from the following:    Height as of this encounter: 5' 9\" (1.753 m). Weight as of this encounter: 180 lb 12.8 oz (82 kg). Physical Exam  Constitutional:       Appearance: He is normal weight. HENT:      Head: Normocephalic and atraumatic. Nose: Nose normal.      Mouth/Throat:      Mouth: Mucous membranes are moist.   Eyes:      Extraocular Movements: Extraocular movements intact. Pupils: Pupils are equal, round, and reactive to light. Neck:      Musculoskeletal: Normal range of motion and neck supple. Cardiovascular:      Rate and Rhythm: Normal rate and regular rhythm. Pulses: Normal pulses. Pulmonary:      Effort: Pulmonary effort is normal. No respiratory distress. Breath sounds: Normal breath sounds. No stridor. Abdominal:      General: Abdomen is flat. Palpations: Abdomen is soft. Musculoskeletal: Normal range of motion. Skin:     General: Skin is warm. Neurological:      General: No focal deficit present. Mental Status: He is alert and oriented to person, place, and time. Mental status is at baseline. Cranial Nerves: No cranial nerve deficit. Psychiatric:         Mood and Affect: Mood normal.         Behavior: Behavior normal.         ASSESSMENT/PLAN:  1. Mural thrombus of cardiac apex  - currently on warfarin follow by cardiology     2. Coronary artery disease involving native coronary artery of native heart without angina pectoris  - continue Aspirin 81 mg   - Continue plavix      3. Alcohol abuse with alcohol-induced disorder ([facility] [address]  - US ABDOMEN COMPLETE and COMPREHENSIVE METABOLIC PANEL done on previous visit, results reviewed with no significant findings.      4. Essential Tremor   - Primidone discontinued last visit. - Nadolol discontinued due to dizziniess, we will switch him to Coreg 3.125 BID      5. Heart failure with reduced EF   - Coreg 3.125 BID   - Continue lasix 20 mg daily     Return in about 3 months (around 10/6/2020). An electronic signature was used to authenticate this note.     --Yandy Monterroso MD on 7/6/2020 at 9:19 AM

## 2020-07-08 ENCOUNTER — ANTI-COAG VISIT (OUTPATIENT)
Dept: PHARMACY | Age: 60
End: 2020-07-08
Payer: MEDICAID

## 2020-07-08 LAB — INTERNATIONAL NORMALIZATION RATIO, POC: 3.3

## 2020-07-08 PROCEDURE — 85610 PROTHROMBIN TIME: CPT

## 2020-07-08 PROCEDURE — 99212 OFFICE O/P EST SF 10 MIN: CPT

## 2020-07-08 NOTE — PROGRESS NOTES
ANTICOAGULATION SERVICE    Chris Chacon is a 62 y.o. male with PMHx significant for acute LLE DVT (5/12/20), mural thrombus 2/2 anterior apical MI (s/p LAD stent 5/12/20), alcohol abuse who presents to clinic 7/8/2020 for anticoagulation monitoring and adjustment.     Anticoagulation Indication(s):  DVT, LV thrombus (acute)   Referring Physician:  Dr. Eloise Cheema  Goal INR Range:  2.5-3 per Dr. Eloise Cheema  Duration of Anticoagulation Therapy:  Unknown   Time of day dose taken:  PM  Product patient has at home:  warfarin 2.5 mg (green)      INR Summary                            Warfarin regimen (mg)  Date INR   A/P    Sun Mon Tue Wed Thu Fri Sat Mg/wk  7/8 3.3  Above goal, decrease  1.25 1.25 1.25 1.25 1.25 1.25 1.25 8.75 6/26 2.4 Below goal, continue  1.25 1.25 1.25 1.25 1.25 1.25 2.5 10  6/19 2.7 At goal, no change  1.25 1.25 1.25 1.25 1.25 1.25 2.5 10  6/12 4.3 Above goal, hold + dec 1.25 1.25 1.25 1.25 1.25 0/1.25 2.5 10  6/9 3.14 Above goal, continue  1.25 1.25 2.5 1.25 1.25 1.25 2.5 11.25  6/5 2.99 At goal, see plan  1.25 1.25 2.5 1.25 1.25 1.25 2.5 11.25  6/2 1.84 Below goal, increase    2.5 2.5 1.25 INR  5/28 1.86 Below goal, continue  1.25 1.25 INR  1.25 1.25 1.25 8.75  5/26 1.63 Below goal, bolus x 1     2.5 INR      5/23 3.62 Per [facility] ED  5/22 6.31 Above goal, hold + dec 1.25 1.25 INR   0 0 TBD  5/18 3.29 Above goal, reduce x 1 2.5   1.25/2.5 2.5 2.5 2.5 2.5 2.5 17.5   5/16 2.0 On d/c from [facility]  2.5 2.5 2.5 2.5 2.5 2.5 2.5 17.5     Last CBC:  Lab Results   Component Value Date    RBC 4.27 06/30/2020    HGB 14.9 06/30/2020    HCT 42.7 06/30/2020    .0 06/30/2020    MCH 35.0 (H) 06/30/2020    MPV 8.0 06/30/2020    RDW 13.3 06/30/2020     06/30/2020       Patient History:  Recent hospitalizations/HC visits -7/6 PCP for ED follow-up: nadolol switched to carvedilol   -6/30 [facility] ED for dizziness, shaking, diarrhea: patient was given fluids for orthostatic hypotension; no meds prescribed; he continues to experience dizziness and shaking. He remains off primidone. Due to acute DVT and LV thrombus, patient has a high clotting risk when INR is subtherapeutic. However, as he is taking triple therapy with warfarin+ASA+Plavix, he also has high bleeding risk when INR is supratherapeutic. Will need to monitor INR closely. As INR is elevated despite patient taking a lower dose of warfarin than prescribed, he was instructed to decrease warfarin dose to 1.25 mg daily for now. Repeat INR in 1 week. Patient seems to have difficulty keeping track of all the recent changes to his medication regimen; therefore, warfarin dosing instructions should be reviewed with patient thoroughly. Patient was reminded to maintain consistent vitamin K intake and call with any bleeding, medication changes, or fever/vomiting/diarrhea. Patient understands dosing directions and information discussed. Dosing schedule and follow up appointment given to patient. Progress note routed to referring physician's office. Patient acknowledges working in consult agreement with pharmacist as referred by his/her physician. Next INR Check:  7/16    Please call [facility] at [phone] with any questions. Thanks! Sheridan Titus.  Ben Michele, PharmD, [facility]  [facility]  Ph: [phone]  7/8/2020 3:46 PM    CLINICAL PHARMACY CONSULT: MED RECONCILIATION/REVIEW ADDENDUM    For Pharmacy Admin Tracking Only    PHSO: No  Total # of Interventions Recommended: 1  - Decreased Dose #: 1  - Maintenance Safety Lab Monitoring #: 1  Total Interventions Accepted: 1  Time Spent (min): 30

## 2020-07-16 ENCOUNTER — ANTI-COAG VISIT (OUTPATIENT)
Dept: PHARMACY | Age: 60
End: 2020-07-16
Payer: MEDICAID

## 2020-07-16 LAB — INTERNATIONAL NORMALIZATION RATIO, POC: 2.5

## 2020-07-16 PROCEDURE — 85610 PROTHROMBIN TIME: CPT

## 2020-07-16 PROCEDURE — 99211 OFF/OP EST MAY X REQ PHY/QHP: CPT

## 2020-07-16 NOTE — PROGRESS NOTES
ANTICOAGULATION SERVICE    Blaire Guillory is a 62 y.o. male with PMHx significant for acute LLE DVT (5/12/20), mural thrombus 2/2 anterior apical MI (s/p LAD stent 5/12/20), alcohol abuse who presents to clinic 7/16/2020 for anticoagulation monitoring and adjustment.     Anticoagulation Indication(s):  DVT, LV thrombus (acute)   Referring Physician:  Dr. Quentin Connors  Goal INR Range:  2.5-3 per Dr. Quentin Connors  Duration of Anticoagulation Therapy:  Unknown   Time of day dose taken:  PM  Product patient has at home:  warfarin 2.5 mg (green)      INR Summary                            Warfarin regimen (mg)  Date INR   A/P    Sun Mon Tue Wed Thu Fri Sat Mg/wk  7/16 2.5 At goal, no change  1.25 1.25 1.25 1.25 1.25 1.25 1.25 8.75 7/8 3.3  Above goal, decrease  1.25 1.25 1.25 1.25 1.25 1.25 1.25 8.75 6/26 2.4 Below goal, continue  1.25 1.25 1.25 1.25 1.25 1.25 2.5 10  6/19 2.7 At goal, no change  1.25 1.25 1.25 1.25 1.25 1.25 2.5 10  6/12 4.3 Above goal, hold + dec 1.25 1.25 1.25 1.25 1.25 0/1.25 2.5 10  6/9 3.14 Above goal, continue  1.25 1.25 2.5 1.25 1.25 1.25 2.5 11.25  6/5 2.99 At goal, see plan  1.25 1.25 2.5 1.25 1.25 1.25 2.5 11.25  6/2 1.84 Below goal, increase    2.5 2.5 1.25 INR  5/28 1.86 Below goal, continue  1.25 1.25 INR  1.25 1.25 1.25 8.75 5/26 1.63 Below goal, bolus x 1     2.5 INR      5/23 3.62 Per [facility] ED  5/22 6.31 Above goal, hold + dec 1.25 1.25 INR   0 0 TBD  5/18 3.29 Above goal, reduce x 1 2.5   1.25/2.5 2.5 2.5 2.5 2.5 2.5 17.5   5/16 2.0 On d/c from [facility]  2.5 2.5 2.5 2.5 2.5 2.5 2.5 17.5     Last CBC:  Lab Results   Component Value Date    RBC 4.27 06/30/2020    HGB 14.9 06/30/2020    HCT 42.7 06/30/2020    .0 06/30/2020    MCH 35.0 (H) 06/30/2020    MPV 8.0 06/30/2020    RDW 13.3 06/30/2020     06/30/2020       Patient History:  Recent hospitalizations/HC visits -7/6 PCP for ED follow-up: nadolol switched to carvedilol   -6/30 [facility] ED for dizziness, shaking, diarrhea: patient was given fluids for orthostatic hypotension; no meds prescribed; INR=3.89  -5/23 [facility] ED for LLE bleeding from scratch: no meds prescribed; INR=3.62    5/11-5/16 admit [facility] for chest pain:   -found to have CAD with LAD occlusion  -s/p PCI with stent placement on 5/12/20  -treated for bilateral LE cellulitis during admission  -also found to have LV thrombus and acute LLE DVT  -started on heparin gtt and bridged to warfarin with goal INR 2.5-3 per cardio  -hematuria during admission   Recent medication changes -7/6 nadolol switched to carvedilol  -5/27 d/c primidone (strong interaction with warfarin)   Medications taken regularly that may interact with warfarin or alter INR ASA 81 mg, Plavix, off primidone   Warfarin dose taken as prescribed No, has been taking 1.25 mg daily  Does not use pillbox, but has system for remembering to take meds   Signs/symptoms of bleeding Hematuria noted during hospital admission, but patient denies any bleeding   Vitamin K intake Normally has ~0 servings of green, leafy vegetables per week: eats iceberg lettuce salad 1-2x per month; will avoid for the most part   Recent vomiting/diarrhea/fever, changes in weight or activity level None reported   Tobacco or alcohol use Patient reports quitting smoking ~1 year ago  Patient reports having 0 drinks per day (h/o alcohol abuse, but quit after hospitalization)   Upcoming surgeries or procedures None reported     Assessment/Plan:  Patient's INR was therapeutic today (2.5) according to INR goal of 2.5-3. Patient denies missed/incorrect warfarin doses, diet changes, and medication changes. INR has been fluctuating widely since starting warfarin. Due to acute DVT and LV thrombus, patient has a high clotting risk when INR is subtherapeutic. However, as he is taking triple therapy with warfarin+ASA+Plavix, he also has high bleeding risk when INR is supratherapeutic. Will need to monitor INR closely.             Patient was instructed to continue warfarin dose of 1.25 mg daily. Repeat INR in 2 weeks. Patient seems to have difficulty keeping track of all the recent changes to his medication regimen; therefore, warfarin dosing instructions should be reviewed with patient thoroughly. Patient was reminded to maintain consistent vitamin K intake and call with any bleeding, medication changes, or fever/vomiting/diarrhea. Patient understands dosing directions and information discussed. Dosing schedule and follow up appointment given to patient. Progress note routed to referring physician's office. Patient acknowledges working in consult agreement with pharmacist as referred by his/her physician. Next INR Check:  7/30    Please call [facility] at [phone] with any questions. Thanks!   Jessi Arzate, PGY1 Pharmacy Resident  [facility]  Ph: [phone]  7/16/2020 3:57 PM    CLINICAL PHARMACY CONSULT: MED RECONCILIATION/REVIEW ADDENDUM    For Pharmacy Admin Tracking Only    PHSO: No  Total # of Interventions Recommended: 0  - Maintenance Safety Lab Monitoring #: 1  Total Interventions Accepted: 0  Time Spent (min): 15

## 2020-07-25 ENCOUNTER — APPOINTMENT (OUTPATIENT)
Dept: MRI IMAGING | Age: 60
DRG: 720 | End: 2020-07-25
Payer: MEDICAID

## 2020-07-25 ENCOUNTER — HOSPITAL ENCOUNTER (INPATIENT)
Age: 60
LOS: 3 days | Discharge: HOME | DRG: 720 | End: 2020-07-28
Attending: EMERGENCY MEDICINE | Admitting: INTERNAL MEDICINE
Payer: MEDICAID

## 2020-07-25 ENCOUNTER — APPOINTMENT (OUTPATIENT)
Dept: CT IMAGING | Age: 60
DRG: 720 | End: 2020-07-25
Payer: MEDICAID

## 2020-07-25 PROBLEM — E87.6 HYPOKALEMIA: Status: ACTIVE | Noted: 2020-07-25

## 2020-07-25 LAB
A/G RATIO: 1.4 (ref 1.1–2.2)
ALBUMIN SERPL-MCNC: 3.8 G/DL (ref 3.4–5)
ALP BLD-CCNC: 118 U/L (ref 40–129)
ALT SERPL-CCNC: 31 U/L (ref 10–40)
ANION GAP SERPL CALCULATED.3IONS-SCNC: 16 MMOL/L (ref 3–16)
AST SERPL-CCNC: 45 U/L (ref 15–37)
BACTERIA: ABNORMAL /HPF
BASOPHILS ABSOLUTE: 0.2 K/UL (ref 0–0.2)
BASOPHILS RELATIVE PERCENT: 1 %
BILIRUB SERPL-MCNC: 2.2 MG/DL (ref 0–1)
BILIRUBIN URINE: NEGATIVE
BLOOD, URINE: ABNORMAL
BUN BLDV-MCNC: 7 MG/DL (ref 7–20)
C DIFF TOXIN/ANTIGEN: ABNORMAL
CALCIUM SERPL-MCNC: 7.8 MG/DL (ref 8.3–10.6)
CHLORIDE BLD-SCNC: 92 MMOL/L (ref 99–110)
CLARITY: CLEAR
CO2: 23 MMOL/L (ref 21–32)
COLOR: YELLOW
CREAT SERPL-MCNC: 0.6 MG/DL (ref 0.8–1.3)
EKG ATRIAL RATE: 96 BPM
EKG DIAGNOSIS: NORMAL
EKG P AXIS: 51 DEGREES
EKG P-R INTERVAL: 170 MS
EKG Q-T INTERVAL: 402 MS
EKG QRS DURATION: 88 MS
EKG QTC CALCULATION (BAZETT): 507 MS
EKG R AXIS: -18 DEGREES
EKG T AXIS: 63 DEGREES
EKG VENTRICULAR RATE: 96 BPM
EOSINOPHILS ABSOLUTE: 0 K/UL (ref 0–0.6)
EOSINOPHILS RELATIVE PERCENT: 0.2 %
EPITHELIAL CELLS, UA: ABNORMAL /HPF (ref 0–5)
GFR AFRICAN AMERICAN: >60
GFR NON-AFRICAN AMERICAN: >60
GLOBULIN: 2.8 G/DL
GLUCOSE BLD-MCNC: 107 MG/DL (ref 70–99)
GLUCOSE URINE: NEGATIVE MG/DL
HCT VFR BLD CALC: 38 % (ref 40.5–52.5)
HEMOGLOBIN: 13.6 G/DL (ref 13.5–17.5)
INR BLD: 7.47 (ref 0.86–1.14)
KETONES, URINE: NEGATIVE MG/DL
LEUKOCYTE ESTERASE, URINE: ABNORMAL
LYMPHOCYTES ABSOLUTE: 1.5 K/UL (ref 1–5.1)
LYMPHOCYTES RELATIVE PERCENT: 7.8 %
MAGNESIUM: 0.9 MG/DL (ref 1.8–2.4)
MAGNESIUM: 1.9 MG/DL (ref 1.8–2.4)
MCH RBC QN AUTO: 36.2 PG (ref 26–34)
MCHC RBC AUTO-ENTMCNC: 35.8 G/DL (ref 31–36)
MCV RBC AUTO: 101.3 FL (ref 80–100)
MICROSCOPIC EXAMINATION: YES
MONOCYTES ABSOLUTE: 0.8 K/UL (ref 0–1.3)
MONOCYTES RELATIVE PERCENT: 4 %
MUCUS: ABNORMAL /LPF
NEUTROPHILS ABSOLUTE: 16.7 K/UL (ref 1.7–7.7)
NEUTROPHILS RELATIVE PERCENT: 87 %
NITRITE, URINE: NEGATIVE
PDW BLD-RTO: 14.6 % (ref 12.4–15.4)
PH UA: 6 (ref 5–8)
PLATELET # BLD: 140 K/UL (ref 135–450)
PMV BLD AUTO: 8.2 FL (ref 5–10.5)
POTASSIUM REFLEX MAGNESIUM: 2.7 MMOL/L (ref 3.5–5.1)
POTASSIUM SERPL-SCNC: 3.1 MMOL/L (ref 3.5–5.1)
POTASSIUM SERPL-SCNC: 3.9 MMOL/L (ref 3.5–5.1)
PROTEIN UA: NEGATIVE MG/DL
PROTHROMBIN TIME: 88.4 SEC (ref 10–13.2)
RBC # BLD: 3.76 M/UL (ref 4.2–5.9)
RBC UA: ABNORMAL /HPF (ref 0–4)
SODIUM BLD-SCNC: 131 MMOL/L (ref 136–145)
SPECIFIC GRAVITY UA: 1.01 (ref 1–1.03)
TOTAL PROTEIN: 6.6 G/DL (ref 6.4–8.2)
TROPONIN: <0.01 NG/ML
URINE TYPE: ABNORMAL
UROBILINOGEN, URINE: 0.2 E.U./DL
WBC # BLD: 19.2 K/UL (ref 4–11)
WBC UA: ABNORMAL /HPF (ref 0–5)

## 2020-07-25 PROCEDURE — 83735 ASSAY OF MAGNESIUM: CPT

## 2020-07-25 PROCEDURE — 2580000003 HC RX 258: Performed by: PHYSICIAN ASSISTANT

## 2020-07-25 PROCEDURE — 36415 COLL VENOUS BLD VENIPUNCTURE: CPT

## 2020-07-25 PROCEDURE — 6360000002 HC RX W HCPCS: Performed by: PHYSICIAN ASSISTANT

## 2020-07-25 PROCEDURE — 93005 ELECTROCARDIOGRAM TRACING: CPT | Performed by: EMERGENCY MEDICINE

## 2020-07-25 PROCEDURE — 81001 URINALYSIS AUTO W/SCOPE: CPT

## 2020-07-25 PROCEDURE — 6360000004 HC RX CONTRAST MEDICATION: Performed by: PHYSICIAN ASSISTANT

## 2020-07-25 PROCEDURE — 70553 MRI BRAIN STEM W/O & W/DYE: CPT

## 2020-07-25 PROCEDURE — 87449 NOS EACH ORGANISM AG IA: CPT

## 2020-07-25 PROCEDURE — 6370000000 HC RX 637 (ALT 250 FOR IP): Performed by: STUDENT IN AN ORGANIZED HEALTH CARE EDUCATION/TRAINING PROGRAM

## 2020-07-25 PROCEDURE — 85025 COMPLETE CBC W/AUTO DIFF WBC: CPT

## 2020-07-25 PROCEDURE — 6370000000 HC RX 637 (ALT 250 FOR IP): Performed by: INTERNAL MEDICINE

## 2020-07-25 PROCEDURE — 70450 CT HEAD/BRAIN W/O DYE: CPT

## 2020-07-25 PROCEDURE — 84484 ASSAY OF TROPONIN QUANT: CPT

## 2020-07-25 PROCEDURE — 87324 CLOSTRIDIUM AG IA: CPT

## 2020-07-25 PROCEDURE — 99285 EMERGENCY DEPT VISIT HI MDM: CPT

## 2020-07-25 PROCEDURE — 1200000000 HC SEMI PRIVATE

## 2020-07-25 PROCEDURE — 6360000002 HC RX W HCPCS: Performed by: STUDENT IN AN ORGANIZED HEALTH CARE EDUCATION/TRAINING PROGRAM

## 2020-07-25 PROCEDURE — 6370000000 HC RX 637 (ALT 250 FOR IP): Performed by: PHYSICIAN ASSISTANT

## 2020-07-25 PROCEDURE — 80053 COMPREHEN METABOLIC PANEL: CPT

## 2020-07-25 PROCEDURE — 84132 ASSAY OF SERUM POTASSIUM: CPT

## 2020-07-25 PROCEDURE — A9579 GAD-BASE MR CONTRAST NOS,1ML: HCPCS | Performed by: PHYSICIAN ASSISTANT

## 2020-07-25 PROCEDURE — 85610 PROTHROMBIN TIME: CPT

## 2020-07-25 RX ORDER — MAGNESIUM SULFATE IN WATER 40 MG/ML
4 INJECTION, SOLUTION INTRAVENOUS ONCE
Status: COMPLETED | OUTPATIENT
Start: 2020-07-25 | End: 2020-07-25

## 2020-07-25 RX ORDER — ASPIRIN 81 MG/1
81 TABLET, CHEWABLE ORAL DAILY
Status: DISCONTINUED | OUTPATIENT
Start: 2020-07-26 | End: 2020-07-25

## 2020-07-25 RX ORDER — ATORVASTATIN CALCIUM 20 MG/1
40 TABLET, FILM COATED ORAL NIGHTLY
Status: DISCONTINUED | OUTPATIENT
Start: 2020-07-25 | End: 2020-07-28 | Stop reason: HOSPADM

## 2020-07-25 RX ORDER — CLOPIDOGREL BISULFATE 75 MG/1
75 TABLET ORAL ONCE
Status: DISCONTINUED | OUTPATIENT
Start: 2020-07-25 | End: 2020-07-25

## 2020-07-25 RX ORDER — POTASSIUM CHLORIDE AND SODIUM CHLORIDE 900; 300 MG/100ML; MG/100ML
INJECTION, SOLUTION INTRAVENOUS CONTINUOUS
Status: DISCONTINUED | OUTPATIENT
Start: 2020-07-25 | End: 2020-07-25 | Stop reason: SDUPTHER

## 2020-07-25 RX ORDER — ASPIRIN 81 MG/1
81 TABLET, CHEWABLE ORAL DAILY
Status: DISCONTINUED | OUTPATIENT
Start: 2020-07-25 | End: 2020-07-25

## 2020-07-25 RX ORDER — UBIDECARENONE 75 MG
100 CAPSULE ORAL DAILY
Status: DISCONTINUED | OUTPATIENT
Start: 2020-07-25 | End: 2020-07-28 | Stop reason: HOSPADM

## 2020-07-25 RX ORDER — UBIDECARENONE 75 MG
100 CAPSULE ORAL NIGHTLY
COMMUNITY
End: 2020-08-24 | Stop reason: SDUPTHER

## 2020-07-25 RX ORDER — ACETAMINOPHEN 325 MG/1
325 TABLET ORAL ONCE
Status: COMPLETED | OUTPATIENT
Start: 2020-07-25 | End: 2020-07-25

## 2020-07-25 RX ORDER — WARFARIN SODIUM 2.5 MG/1
1.25 TABLET ORAL NIGHTLY
COMMUNITY
End: 2020-08-07 | Stop reason: DRUGHIGH

## 2020-07-25 RX ORDER — 0.9 % SODIUM CHLORIDE 0.9 %
500 INTRAVENOUS SOLUTION INTRAVENOUS ONCE
Status: COMPLETED | OUTPATIENT
Start: 2020-07-25 | End: 2020-07-25

## 2020-07-25 RX ORDER — CLOPIDOGREL BISULFATE 75 MG/1
75 TABLET ORAL DAILY
Status: DISCONTINUED | OUTPATIENT
Start: 2020-07-26 | End: 2020-07-28 | Stop reason: HOSPADM

## 2020-07-25 RX ORDER — POTASSIUM CHLORIDE 20 MEQ/1
40 TABLET, EXTENDED RELEASE ORAL ONCE
Status: COMPLETED | OUTPATIENT
Start: 2020-07-25 | End: 2020-07-25

## 2020-07-25 RX ORDER — ASPIRIN 81 MG/1
81 TABLET, CHEWABLE ORAL DAILY
Status: DISCONTINUED | OUTPATIENT
Start: 2020-07-26 | End: 2020-07-28 | Stop reason: HOSPADM

## 2020-07-25 RX ORDER — CARVEDILOL 3.12 MG/1
3.12 TABLET ORAL 2 TIMES DAILY WITH MEALS
Status: DISCONTINUED | OUTPATIENT
Start: 2020-07-25 | End: 2020-07-28 | Stop reason: HOSPADM

## 2020-07-25 RX ORDER — ACETAMINOPHEN 500 MG
1000 TABLET ORAL EVERY 6 HOURS PRN
Status: DISCONTINUED | OUTPATIENT
Start: 2020-07-26 | End: 2020-07-28 | Stop reason: HOSPADM

## 2020-07-25 RX ORDER — MAGNESIUM SULFATE IN WATER 40 MG/ML
2 INJECTION, SOLUTION INTRAVENOUS ONCE
Status: COMPLETED | OUTPATIENT
Start: 2020-07-25 | End: 2020-07-25

## 2020-07-25 RX ORDER — ACETAMINOPHEN 325 MG/1
650 TABLET ORAL EVERY 4 HOURS PRN
Status: DISCONTINUED | OUTPATIENT
Start: 2020-07-25 | End: 2020-07-25

## 2020-07-25 RX ORDER — LISINOPRIL 5 MG/1
2.5 TABLET ORAL DAILY
Status: DISCONTINUED | OUTPATIENT
Start: 2020-07-26 | End: 2020-07-28 | Stop reason: HOSPADM

## 2020-07-25 RX ORDER — SODIUM CHLORIDE, SODIUM LACTATE, POTASSIUM CHLORIDE, CALCIUM CHLORIDE 600; 310; 30; 20 MG/100ML; MG/100ML; MG/100ML; MG/100ML
1000 INJECTION, SOLUTION INTRAVENOUS ONCE
Status: DISCONTINUED | OUTPATIENT
Start: 2020-07-25 | End: 2020-07-25

## 2020-07-25 RX ORDER — PHYTONADIONE 5 MG/1
2.5 TABLET ORAL ONCE
Status: COMPLETED | OUTPATIENT
Start: 2020-07-25 | End: 2020-07-25

## 2020-07-25 RX ADMIN — PHYTONADIONE 2.5 MG: 5 TABLET ORAL at 11:10

## 2020-07-25 RX ADMIN — Medication 125 MG: at 21:13

## 2020-07-25 RX ADMIN — MAGNESIUM SULFATE HEPTAHYDRATE 2 G: 40 INJECTION, SOLUTION INTRAVENOUS at 11:10

## 2020-07-25 RX ADMIN — VITAM B12 100 MCG: 100 TAB at 16:02

## 2020-07-25 RX ADMIN — SODIUM CHLORIDE 500 ML: 9 INJECTION, SOLUTION INTRAVENOUS at 11:10

## 2020-07-25 RX ADMIN — POTASSIUM CHLORIDE 40 MEQ: 20 TABLET, EXTENDED RELEASE ORAL at 11:10

## 2020-07-25 RX ADMIN — MAGNESIUM SULFATE HEPTAHYDRATE 4 G: 40 INJECTION, SOLUTION INTRAVENOUS at 15:40

## 2020-07-25 RX ADMIN — GADOTERIDOL 17 ML: 279.3 INJECTION, SOLUTION INTRAVENOUS at 14:09

## 2020-07-25 RX ADMIN — ATORVASTATIN CALCIUM 40 MG: 20 TABLET, FILM COATED ORAL at 20:17

## 2020-07-25 RX ADMIN — CARVEDILOL 3.12 MG: 3.12 TABLET, FILM COATED ORAL at 16:02

## 2020-07-25 RX ADMIN — POTASSIUM CHLORIDE: 2 INJECTION, SOLUTION, CONCENTRATE INTRAVENOUS at 15:40

## 2020-07-25 RX ADMIN — ACETAMINOPHEN 325 MG: 325 TABLET ORAL at 21:36

## 2020-07-25 RX ADMIN — ACETAMINOPHEN 650 MG: 325 TABLET ORAL at 20:18

## 2020-07-25 ASSESSMENT — PAIN SCALES - GENERAL
PAINLEVEL_OUTOF10: 0

## 2020-07-25 ASSESSMENT — ENCOUNTER SYMPTOMS
DIARRHEA: 1
VOMITING: 0
SHORTNESS OF BREATH: 1
EYE REDNESS: 0
NAUSEA: 1
ABDOMINAL PAIN: 0
COUGH: 0
EYE DISCHARGE: 0
SORE THROAT: 0
BLOOD IN STOOL: 0
CONSTIPATION: 0

## 2020-07-25 NOTE — PROGRESS NOTES
Pt has temperature of 102. 3. He is reporting that he has been SOB the past few days and this is new for him. He denies chills, chest pain, or other symptoms. He denies recent travel. His c-diff sample result is positive. Secure message sent to medical resident to report above and to request order for tylenol, awaiting response.

## 2020-07-25 NOTE — PROGRESS NOTES
Three residents came to see patient then went down to MRI.   Pharmacy aware that patient does not know medications when he took them last and also aware of low bp

## 2020-07-25 NOTE — ED PROVIDER NOTES
[facility]          PHYSICIAN ASSISTANT NOTE       Date of evaluation: 7/25/2020    Chief Complaint     Tremors and Dizziness    History of Present Illness     HPI:  This is a 62 y.o. male with past medical history of CHF with an ejection fraction of 35%, coronary artery disease status post LAD stenting in May 2020, hypertension and DVT presenting with complaints of dizziness that has been going on for the last month. He states that the dizziness is worse upon standing and is not present when he is just laying down. He states that when he does stand up he does not feel as if the room is spinning but rather that he becomes very off balance like he may fall, but denies feeling as if he would pass out. He states that he was seen for this at the end of June where he was given IV fluids and ultimately discharged. He states he saw his primary care provider following this visit and they stopped his nadolol and started him on Coreg 3.125 mg twice a day. He additionally takes Lasix and lisinopril. Patient follows with the Coumadin clinic for his warfarin dosing and has been in a therapeutic range at his most recent visit approximately 10 days ago. He currently denies any headaches, fevers or chills, he states he sometimes has dry heaving but has had no productive vomiting, no chest pain, no shortness of breath but does state every now and then he feels as if he has to take a deep breath. He denies any abdominal pain or changes to bladder habits. He does endorse diarrhea which she states is improving although not completely resolved, he was having diarrhea several times a day, he now has it intermittently and uses over-the-counter antidiarrheal agents for management. He denies any blood in his stool. He does endorse a tremor that is worse with intention. He has had this for over 2 years and was taking nadolol for this.   He does also have a history of alcohol abuse but states he has been cutting back significantly, per the patient and his sister, he used to get drunk nearly daily until he had his heart attack in May and he has been cutting back since then, he states that he was drinking 1 beer a day but more recently has been drinking 2 beers every few days and he states he has not had a drink of alcohol in the last 3 days. With the exception of the above, there are no aggravating or alleviating factors. Review of Systems     Review of Systems   Constitutional: Negative for chills and fever. HENT: Negative for sore throat. Eyes: Negative for discharge and redness. Respiratory: Positive for shortness of breath (\"feels like I have to take a deep breath sometimes\"). Negative for cough. Cardiovascular: Negative for chest pain. Gastrointestinal: Positive for diarrhea and nausea (dry heaving). Negative for abdominal pain, blood in stool, constipation and vomiting. Genitourinary: Negative for dysuria, frequency and hematuria. Musculoskeletal: Positive for gait problem (feels off balance). Skin: Negative for rash. Neurological: Positive for dizziness. Negative for facial asymmetry. Psychiatric/Behavioral: Negative. Alcohol use     Past Medical, Surgical, Family, and Social History     He has a past medical history of Alcohol abuse, CAD (coronary artery disease), CHF (congestive heart failure) ([address] HTN (hypertension), Lower extremity cellulitis, and MI (mitral incompetence). He has a past surgical history that includes Percutaneous Transluminal Coronary Angio. His family history includes Heart Disease in his father; Stroke in his mother. He reports that he has quit smoking. His smoking use included cigarettes. He has a 20.00 pack-year smoking history. He has never used smokeless tobacco. He reports current alcohol use of about 2.0 standard drinks of alcohol per week. He reports that he does not use drugs.     Medications     Previous Medications ASPIRIN 81 MG CHEWABLE TABLET    Take 1 tablet by mouth daily    ATORVASTATIN (LIPITOR) 40 MG TABLET    Take 1 tablet by mouth nightly    CARVEDILOL (COREG) 3.125 MG TABLET    Take 1 tablet by mouth 2 times daily    CLOPIDOGREL (PLAVIX) 75 MG TABLET    Take 1 tablet by mouth daily    FOLIC ACID (FOLVITE) 1 MG TABLET    Take 1 tablet by mouth daily    FUROSEMIDE (LASIX) 20 MG TABLET    Take 20 mg by mouth daily    LISINOPRIL (PRINIVIL;ZESTRIL) 2.5 MG TABLET    Take 1 tablet by mouth daily    MINERAL OIL-HYDROPHILIC PETROLATUM (AQUAPHOR) OINTMENT    Apply 85 g topically as needed for Dry Skin Apply topically as needed. SKIN PROTECTANTS, MISC. (HYDROCERIN) CREA CREAM    Apply topically 2 times daily    VITAMIN B-1 100 MG TABLET    Take 1 tablet by mouth daily    VITAMIN B-12 (CYANOCOBALAMIN) 100 MCG TABLET    Take 1 tablet by mouth daily    WARFARIN (COUMADIN) 2.5 MG TABLET    Take 1 tablet by mouth daily       Allergies     He has No Known Allergies. Physical Exam     INITIAL VITALS: BP: 114/72, Temp: 98 °F (36.7 °C), Pulse: 97, Resp: 22, SpO2: 97 %  Physical Exam  Constitutional:       Appearance: Normal appearance. HENT:      Head: Normocephalic and atraumatic. Mouth/Throat:      Pharynx: Oropharynx is clear. Eyes:      General: No scleral icterus. Right eye: No discharge. Left eye: No discharge. Extraocular Movements: Extraocular movements intact. Conjunctiva/sclera: Conjunctivae normal.   Neck:      Musculoskeletal: Normal range of motion. Cardiovascular:      Rate and Rhythm: Normal rate and regular rhythm. Pulmonary:      Effort: Pulmonary effort is normal.      Breath sounds: Normal breath sounds. Abdominal:      General: There is no distension. Palpations: Abdomen is soft. Tenderness: There is no abdominal tenderness. There is no guarding. Musculoskeletal: Normal range of motion. Skin:     General: Skin is warm.    Neurological:      General: No focal deficit present. Mental Status: He is alert. Motor: Tremor (L>R hand, worse with intention) present. Comments: Neuro Exam    MENTAL STATUS:   Appearance: well-developed, well-nourished and in no acute distress   Language/Speech: No aphasia and No dysarthria  Atten/concentration: Awake and Alert  Orientation: Oriented to self, year, month, day, date, situation. Fund of Knowledge: Knows the president     CRANIAL NERVES:    CN VII: Facial motion intact and symmetric  CN IX/X: Palate elevation symmetric BL   CN XI: Sternocleidomastoid 5/5 symmetric strength and Trapezius 5/5 symmetric strength  CN XII: Tongue protrudes midline, no deviation     MOTOR:   Tone: Normal in all 4 extremities  Strength: Deltoid 5/5 BL        Biceps 5/5 BL        Wrist Flexion 5/5 BL        Wrist Extension 5/5 BL         5/5 BL        Hip Flexion 5/5 BL        Quadriceps 5/5 BL        Hamstrings 5/5 BL        Plantar Flexion 5/5 BL        Dorsiflexion 5/5 BL     GAIT: unsteady gait      Psychiatric:         Mood and Affect: Mood normal.         Diagnostic Results     EKG:  Seen and Interpreted by myself and the attending physician,     Indication:  Finding: No evidence for acute ischemia or ST segment changes. Ventricular Rate: 96  MI: Interval: 170  QRS Duration: 88  QT: 402  QTc: 507  P-R-T axes: 51 -18 63    RADIOLOGY:  CT Head WO Contrast   Final Result         1. No hemorrhage. 2. Focal area of hypoattenuation involving cortical and subcortical white matter in the high right parietal lobe. This is age-indeterminate, however, given patient's history this could be a remote insult. There is any further concern, MRI is indicated.          MRI BRAIN W WO CONTRAST    (Results Pending)       LABS:   Results for orders placed or performed during the hospital encounter of 07/25/20   Comprehensive Metabolic Panel w/ Reflex to MG   Result Value Ref Range    Sodium 131 (L) 136 - 145 mmol/L    Potassium reflex Magnesium 2.7 (LL) 3.5 - 5.1 mmol/L    Chloride 92 (L) 99 - 110 mmol/L    CO2 23 21 - 32 mmol/L    Anion Gap 16 3 - 16    Glucose 107 (H) 70 - 99 mg/dL    BUN 7 7 - 20 mg/dL    CREATININE 0.6 (L) 0.8 - 1.3 mg/dL    GFR Non-African American >60 >60    GFR African American >60 >60    Calcium 7.8 (L) 8.3 - 10.6 mg/dL    Total Protein 6.6 6.4 - 8.2 g/dL    Alb 3.8 3.4 - 5.0 g/dL    Albumin/Globulin Ratio 1.4 1.1 - 2.2    Total Bilirubin 2.2 (H) 0.0 - 1.0 mg/dL    Alkaline Phosphatase 118 40 - 129 U/L    ALT 31 10 - 40 U/L    AST 45 (H) 15 - 37 U/L    Globulin 2.8 g/dL   CBC Auto Differential   Result Value Ref Range    WBC 19.2 (H) 4.0 - 11.0 K/uL    RBC 3.76 (L) 4.20 - 5.90 M/uL    Hemoglobin 13.6 13.5 - 17.5 g/dL    Hematocrit 38.0 (L) 40.5 - 52.5 %    .3 (H) 80.0 - 100.0 fL    MCH 36.2 (H) 26.0 - 34.0 pg    MCHC 35.8 31.0 - 36.0 g/dL    RDW 14.6 12.4 - 15.4 %    Platelets 806 844 - 953 K/uL    MPV 8.2 5.0 - 10.5 fL    Neutrophils % 87.0 %    Lymphocytes % 7.8 %    Monocytes % 4.0 %    Eosinophils % 0.2 %    Basophils % 1.0 %    Neutrophils Absolute 16.7 (H) 1.7 - 7.7 K/uL    Lymphocytes Absolute 1.5 1.0 - 5.1 K/uL    Monocytes Absolute 0.8 0.0 - 1.3 K/uL    Eosinophils Absolute 0.0 0.0 - 0.6 K/uL    Basophils Absolute 0.2 0.0 - 0.2 K/uL   Troponin   Result Value Ref Range    Troponin <0.01 <0.01 ng/mL   Protime-INR   Result Value Ref Range    Protime 88.4 (H) 10.0 - 13.2 sec    INR 7.47 () 0.86 - 1.14   Urinalysis, reflex to microscopic   Result Value Ref Range    Color, UA Yellow Straw/Yellow    Clarity, UA Clear Clear    Glucose, Ur Negative Negative mg/dL    Bilirubin Urine Negative Negative    Ketones, Urine Negative Negative mg/dL    Specific Gravity, UA 1.015 1.005 - 1.030    Blood, Urine TRACE-INTACT (A) Negative    pH, UA 6.0 5.0 - 8.0    Protein, UA Negative Negative mg/dL    Urobilinogen, Urine 0.2 <2.0 E.U./dL    Nitrite, Urine Negative Negative    Leukocyte Esterase, Urine TRACE (A) Negative    Microscopic Examination YES     Urine Type NotGiven    Magnesium   Result Value Ref Range    Magnesium 0.90 (LL) 1.80 - 2.40 mg/dL   EKG 12 Lead   Result Value Ref Range    Ventricular Rate 96 BPM    Atrial Rate 96 BPM    P-R Interval 170 ms    QRS Duration 88 ms    Q-T Interval 402 ms    QTc Calculation (Bazett) 507 ms    P Axis 51 degrees    R Axis -18 degrees    T Axis 63 degrees    Diagnosis       EKG performed in ER and to be interpreted by ER physician. Confirmed by MD, ER ([id number]),  Satya [facility] ([phone]) on 7/25/2020 9:39:17 AM       RECENT VITALS:  BP: 125/75, Temp: 98 °F (36.7 °C), Pulse: 93, Resp: 25, SpO2: 97 %     Procedures     None    ED Course     Nursing Notes, Past Medical Hx,Past Surgical Hx, Social Hx, Allergies, and Family Hx were reviewed. The patient was given the following medications:  Orders Placed This Encounter   Medications    DISCONTD: lactated ringers infusion 1,000 mL    0.9 % sodium chloride bolus    phytonadione (VITAMIN K) tablet 2.5 mg    potassium chloride (KLOR-CON M) extended release tablet 40 mEq    DISCONTD: 0.9% NaCl with KCl 40 mEq infusion    magnesium sulfate 2 g in 50 mL IVPB premix    potassium chloride 40 mEq in sodium chloride 0.9 % 1,000 mL infusion       CONSULTS:  IP CONSULT TO HOSPITALIST    MEDICAL DECISION MAKING / ASSESSMENT / PLAN   Vital signs, medical history, social history, allergies and nursing notes reviewed. Vitals:  /75   Pulse 93   Temp 98 °F (36.7 °C) (Oral)   Resp 25   SpO2 97%     Briefly this is a 62 y.o. male who presents to the emergency department with dizziness. Patient presented afebrile with normal vitals, but orthostatic hypotension. Patient was in no acute distress, nontoxic and non-hypoxic. Patient was able to complete full sentences at bedside. Patient was not using accessory muscles. Patient was able to cooperate with history and physical exam.  Patient's previous charts, labs and imaging was reviewed.   Please see HPI and physical for further details. On exam, this patient is in no acute cardiorespiratory distress, his lungs are clear to auscultation bilaterally and heart rate and rhythm are regular, he does seem to be having frequent PVCs but otherwise his EKG reveals no acute ST segment elevations or depressions, no dysrhythmia. His abdomen is diffusely nontender to palpation without rebound, guarding or rigidity. He has no focal neurologic deficits but does have a tremor to his bilateral hands, worse on the left, that is worse with intention. When the patient stands he becomes quite dizzy and off balance which is consistent with his blood pressure reduction with standing where his blood pressure falls to 64/55 and his heart rate increased to 122 consistent with orthostatic hypotension. Work-up today includes a CBC which reveals a new leukocytosis of 19.2, no anemia, comprehensive metabolic panel reveals evidence of hypokalemia with a potassium of 2.7, creatinine is 0.6, sodium is also low at 131 with a chloride of 92. Magnesium is subsequently low at 0.9. Urinalysis reveals trace leukocytes, microscopic urinalysis is pending at the time of admission, the patient's INR today is elevated to 7.47, therefore the patient is given 2.5 mg of vitamin K. He has no signs today of acute bleed. CT of the head without contrast reveals no hemorrhage, there is focal areas of hypoattenuation in the high right parietal lobes that is age-indeterminate consistent with possible remote insult, therefore we did order an MRI of the brain which is pending at the time of admission. The patient is ultimately initiated on electrolyte repletion with 40 mEq of oral potassium as well as 40 mEq of IV potassium over 4 hours, he is given small aliquots of IV fluids starting with a 500 mL bolus of normal saline, and provided 2 g of IV magnesium.   The patient is admitted for ongoing management, I did discuss with the admitting team and the benefit

## 2020-07-25 NOTE — H&P
Internal Medicine PGY- 1 Resident History & Physical      PCP: No primary care provider on file. Date of Admission: 7/25/2020    Chief Complaint:  Orthostatic light-headedness and tremors    History Of Present Illness:      Reji Bah is a 62 y.o. male w/ PMHX of CHF with rEF 35%, CAD s/p LAD stent (May 2020), HTN and DVT who presented to [facility] with complaints of light-headedness on standing, worsening intention tremors and bouts of diarrhea. The patient said that he feels off-balance  In his head, while standing, without any sensation of spinning or otherwise. He said the last time he was admitted for this reason, he was given IV fluids and discharged. He also said that he has had his intention tremors for quite sometime, and that his PCP had changed his medications recently. He also c/o of diarrhea, stating he was having 5-6 BMs, watery with clumps everyday for about 3 weeks, which he tried to control with anti-diarrheals and it helped him. He was not able to give much information about his warfarin/coumadin use. He also c/o having blurred vision. He denied any feelings of dizziness, pain, N/V, headache. Past Medical History:        Diagnosis Date    Alcohol abuse     CAD (coronary artery disease)     with complete LAD occlusion    CHF (congestive heart failure) (HCC)     LVEF    HTN (hypertension)     Lower extremity cellulitis     MI (mitral incompetence)        Past Surgical History:          Procedure Laterality Date    PTCA         Medications Prior to Admission:      Prior to Admission medications    Medication Sig Start Date End Date Taking?  Authorizing Provider   aspirin 81 MG chewable tablet Take 1 tablet by mouth daily 5/17/20  Yes Rere Harrington MD   carvedilol (COREG) 3.125 MG tablet Take 1 tablet by mouth 2 times daily 7/6/20   Ruth Holm MD   vitamin B-12 (CYANOCOBALAMIN) 100 MCG tablet Take 1 tablet by mouth daily 5/22/20 7/6/20  Wilfrido Aragon MD warfarin (COUMADIN) 2.5 MG tablet Take 1 tablet by mouth daily 5/16/20   Osei Salinas MD   atorvastatin (LIPITOR) 40 MG tablet Take 1 tablet by mouth nightly 5/16/20   Osei Salinas MD   lisinopril (PRINIVIL;ZESTRIL) 2.5 MG tablet Take 1 tablet by mouth daily 5/17/20   Osei Salinas MD   Skin Protectants, Misc. (HYDROCERIN) CREA cream Apply topically 2 times daily 5/16/20   Osei Salinas MD   folic acid (FOLVITE) 1 MG tablet Take 1 tablet by mouth daily 5/17/20   Osei Salinas MD   clopidogrel (PLAVIX) 75 MG tablet Take 1 tablet by mouth daily 5/17/20   Osei Salinas MD   vitamin B-1 100 MG tablet Take 1 tablet by mouth daily 5/17/20   Osei Salinas MD   furosemide (LASIX) 20 MG tablet Take 20 mg by mouth daily    Historical Provider, MD   mineral oil-hydrophilic petrolatum (AQUAPHOR) ointment Apply 85 g topically as needed for Dry Skin Apply topically as needed. Historical Provider, MD       Allergies:  Patient has no known allergies. Social History:      The patient currently lives with his sister    TOBACCO:   reports that he has quit smoking. His smoking use included cigarettes. He has a 20.00 pack-year smoking history. He has never used smokeless tobacco.  ETOH:   reports current alcohol use of about 2.0 standard drinks of alcohol per week. Dc Alexander he has been cutting down on his alcohol use, and hasn't had a drink for a while. History:          Problem Relation Age of Onset    Stroke Mother     Heart Disease Father        REVIEW OF SYSTEMS:   Pertinent positives as noted in the HPI. All other systems reviewed and negative. ROS: Review of Systems    10 point ROS negative except as listed above. PHYSICALEXAM PERFORMED:    /65   Pulse 84   Temp 99.3 °F (37.4 °C) (Oral)   Resp 16   SpO2 100%     General appearance:  No apparent distress, appears stated age and cooperative. HEENT:  Normal cephalic, atraumaticwithout obvious deformity. Pupils equal, round, and reactive to light.

## 2020-07-25 NOTE — CONSULTS
Clinical Pharmacy Consult Note    Admit date: 7/25/2020    Subjective/Objective:  Carol Ann Munroe is a 62 y.o. 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 Patient presented to [facility] with tremors and dizziness. Pharmacy is consulted to dose warfarin per Dr. Carlos Eduardo Huynh. Home anticoagulation regimen: 1.25mg daily   INR is managed outpatient by Dru Mitchell   · Last appt 7/16, INR = 2.5  · INR goal = 2.5-3.5   · Next INR check scheduled 7/30       Date INR Warfarin Dose   7/25 7.47 HOLD   Vit K 2.5mg x1                      Recent Labs     07/25/20  0931   *   K 2.7*   CL 92*   CO2 23   BUN 7   CREATININE 0.6*   GLUCOSE 107*       CrCl cannot be calculated (Unknown ideal weight.). Lab Results   Component Value Date    WBC 19.2 (H) 07/25/2020    HGB 13.6 07/25/2020    HCT 38.0 (L) 07/25/2020    .3 (H) 07/25/2020     07/25/2020       Lab Results   Component Value Date    PROTIME 88.4 (H) 07/25/2020    INR 7.47 (HH) 07/25/2020       Assessment/Plan:  1. Anticoagulation: DVT + mural thrombus (INR goal = 2.5-3.5)   · INR today = 7.47, elevated for unknown reason. · Vitamin K 2.5mg x1 given upon admission. Will hold warfarin until INR approaches therapeutic level. Warfarin placeholder and daily INRs ordered. · Medication profile reviewed for potential drug interactions. No significant drug interactions with warfarin noted. · Daily INR will be monitored and dose adjustments made as needed. Please call with any questions. Thanks for consulting pharmacy!   Sherry Faith, PharmD, [facility]: [phone]  [address] [place] [address] wireless: [phone]  7/25/2020 3:32 PM

## 2020-07-25 NOTE — ED PROVIDER NOTES
ED Attending Attestation Note     Date of evaluation: 7/25/2020    This patient was seen by the advance practice provider. I have seen and examined the patient, agree with the workup, evaluation, management and diagnosis. The care plan has been discussed. I have reviewed the ECG and concur with the ANNETTA's interpretation. My assessment reveals an overall well-appearing gentleman, in no acute distress. He presents to the emergency department today with complaints primarily of orthostatic dizziness and lightheadedness, as well as persistent primarily left upper extremity intention tremor. He has been seen and evaluated for these symptoms similarly in the past.  On my examination, he has no focal weakness, but does express dizziness when he stands up. He is noted to be significantly orthostatic on vital signs. His head CT shows what may be a subacute infarct, although he does not have focal anterior circulation findings on examination. His laboratory evaluation reveals significant metabolic derangements, in the setting of a persistent diarrhea. Repletion has been initiated in the emergency department, but will need to be continued inpatient.          Stacy Benitez MD  07/25/20 0403

## 2020-07-25 NOTE — PROGRESS NOTES
Home Med List is complete. Discussed home meds with sister, Ronny Ames [phone]. She sets up his pill box and ensures that he gets his morning and evening doses. She is very knowledgeable about his meds and able to speak to changes in the last couple of months. Sister states he did have all morning meds.          Juan C Khan PharmD., BCPS   7/25/2020 2:07 PM  Wireless: [phone] c/o dysuria x today , states was here last week for same

## 2020-07-25 NOTE — PROGRESS NOTES
Secure message sent to Dr. Val Gonzáles to notify him patient has arrived and needs orders for unit. Attempted to call sister to see when patient took medication last per patient's request.  Fall precautions in place. Bed alarm is on and shown how to use call light. Also placed on telemetry.   Chart given to [facility]

## 2020-07-25 NOTE — PROGRESS NOTES
4 Eyes Admission Assessment     I agree as the admission nurse that 2 RN's have performed a thorough Head to Toe Skin Assessment on the patient. ALL assessment sites listed below have been assessed on admission. Areas assessed by both nurses:   [x]   Head, Face, and Ears   [x]   Shoulders, Back, and Chest  [x]   Arms, Elbows, and Hands   [x]   Coccyx, Sacrum, and Ischum  [x]   Legs, Feet, and Heels        Does the Patient have Skin Breakdown?   Eddie left leg, bruising scatter especially on left foot and left chest, elbows red but blanchable, scabs scattered and healed scabs        Mitch Prevention initiated:  n/a   Wound Care Orders initiated:  n/a      WOC nurse consulted for Pressure Injury (Stage 3,4, Unstageable, DTI, NWPT, and Complex wounds):  n/a    Nurse 1 eSignature: Electronically signed by Betsy Figueroa RN on 7/25/20 at 6:02 PM EDT    **SHARE this note so that the co-signing nurse is able to place an eSignature    Nurse 2 eSignature: Electronically signed by Jona Hernandez RN on 7/25/20 at 6:13 PM EDT

## 2020-07-25 NOTE — H&P
Hypomagnesemia:   - replace Mg and K  - K>4 and Mg >2      CHF with rEF 35%:  - lisinopril 2.5  - Coreg    CAD s/p LAD stent:  - Plavix  - Aspirin    HTN: Normotensive now  - Coreg  - lisinopril  - hold lasix for now    DVT:  - Hx.  Of DVT  - INR 7.47  - No DVT prophylaxis for now, check INR tomorrow      Diet: DIET GENERAL; Low Sodium (2 GM)  Code Status: Prior    Discussed the patient with MD Kecia Dee MD  Internal Medicine Resident PGY-1  Contact via [employer]

## 2020-07-25 NOTE — ED NOTES
Report called to RN caring for [id number]. MRI tech has been notified that patient has been taken to his room.      Merline Hertz, RN  07/25/20 1131

## 2020-07-26 LAB
ALBUMIN SERPL-MCNC: 3.2 G/DL (ref 3.4–5)
ALP BLD-CCNC: 97 U/L (ref 40–129)
ALT SERPL-CCNC: 19 U/L (ref 10–40)
ANION GAP SERPL CALCULATED.3IONS-SCNC: 10 MMOL/L (ref 3–16)
AST SERPL-CCNC: 28 U/L (ref 15–37)
BASOPHILS ABSOLUTE: 0.1 K/UL (ref 0–0.2)
BASOPHILS RELATIVE PERCENT: 0.4 %
BILIRUB SERPL-MCNC: 2 MG/DL (ref 0–1)
BILIRUBIN DIRECT: 0.6 MG/DL (ref 0–0.3)
BILIRUBIN, INDIRECT: 1.4 MG/DL (ref 0–1)
BUN BLDV-MCNC: 7 MG/DL (ref 7–20)
CALCIUM SERPL-MCNC: 7.2 MG/DL (ref 8.3–10.6)
CHLORIDE BLD-SCNC: 98 MMOL/L (ref 99–110)
CO2: 24 MMOL/L (ref 21–32)
CREAT SERPL-MCNC: 0.5 MG/DL (ref 0.8–1.3)
EOSINOPHILS ABSOLUTE: 0 K/UL (ref 0–0.6)
EOSINOPHILS RELATIVE PERCENT: 0.1 %
GFR AFRICAN AMERICAN: >60
GFR NON-AFRICAN AMERICAN: >60
GLUCOSE BLD-MCNC: 110 MG/DL (ref 70–99)
HCT VFR BLD CALC: 32.3 % (ref 40.5–52.5)
HEMOGLOBIN: 11.6 G/DL (ref 13.5–17.5)
INR BLD: 2.08 (ref 0.86–1.14)
LYMPHOCYTES ABSOLUTE: 1.2 K/UL (ref 1–5.1)
LYMPHOCYTES RELATIVE PERCENT: 7.7 %
MAGNESIUM: 2.2 MG/DL (ref 1.8–2.4)
MCH RBC QN AUTO: 36.6 PG (ref 26–34)
MCHC RBC AUTO-ENTMCNC: 35.8 G/DL (ref 31–36)
MCV RBC AUTO: 102.2 FL (ref 80–100)
MONOCYTES ABSOLUTE: 1.3 K/UL (ref 0–1.3)
MONOCYTES RELATIVE PERCENT: 8 %
NEUTROPHILS ABSOLUTE: 13.4 K/UL (ref 1.7–7.7)
NEUTROPHILS RELATIVE PERCENT: 83.8 %
PDW BLD-RTO: 14.5 % (ref 12.4–15.4)
PLATELET # BLD: 98 K/UL (ref 135–450)
PLATELET SLIDE REVIEW: ABNORMAL
PMV BLD AUTO: 7.9 FL (ref 5–10.5)
POTASSIUM REFLEX MAGNESIUM: 3.3 MMOL/L (ref 3.5–5.1)
PROTHROMBIN TIME: 24.3 SEC (ref 10–13.2)
RBC # BLD: 3.16 M/UL (ref 4.2–5.9)
SODIUM BLD-SCNC: 132 MMOL/L (ref 136–145)
TOTAL PROTEIN: 5.6 G/DL (ref 6.4–8.2)
TSH REFLEX: 1.3 UIU/ML (ref 0.27–4.2)
WBC # BLD: 15.9 K/UL (ref 4–11)

## 2020-07-26 PROCEDURE — 85610 PROTHROMBIN TIME: CPT

## 2020-07-26 PROCEDURE — 6360000002 HC RX W HCPCS: Performed by: STUDENT IN AN ORGANIZED HEALTH CARE EDUCATION/TRAINING PROGRAM

## 2020-07-26 PROCEDURE — 6370000000 HC RX 637 (ALT 250 FOR IP): Performed by: STUDENT IN AN ORGANIZED HEALTH CARE EDUCATION/TRAINING PROGRAM

## 2020-07-26 PROCEDURE — 36415 COLL VENOUS BLD VENIPUNCTURE: CPT

## 2020-07-26 PROCEDURE — 84443 ASSAY THYROID STIM HORMONE: CPT

## 2020-07-26 PROCEDURE — 2580000003 HC RX 258: Performed by: STUDENT IN AN ORGANIZED HEALTH CARE EDUCATION/TRAINING PROGRAM

## 2020-07-26 PROCEDURE — 80076 HEPATIC FUNCTION PANEL: CPT

## 2020-07-26 PROCEDURE — 85025 COMPLETE CBC W/AUTO DIFF WBC: CPT

## 2020-07-26 PROCEDURE — 80048 BASIC METABOLIC PNL TOTAL CA: CPT

## 2020-07-26 PROCEDURE — 83735 ASSAY OF MAGNESIUM: CPT

## 2020-07-26 PROCEDURE — 1200000000 HC SEMI PRIVATE

## 2020-07-26 PROCEDURE — 2500000003 HC RX 250 WO HCPCS: Performed by: STUDENT IN AN ORGANIZED HEALTH CARE EDUCATION/TRAINING PROGRAM

## 2020-07-26 RX ORDER — SODIUM CHLORIDE 0.9 % (FLUSH) 0.9 %
10 SYRINGE (ML) INJECTION PRN
Status: DISCONTINUED | OUTPATIENT
Start: 2020-07-26 | End: 2020-07-28 | Stop reason: HOSPADM

## 2020-07-26 RX ORDER — POTASSIUM CHLORIDE 20 MEQ/1
40 TABLET, EXTENDED RELEASE ORAL
Status: COMPLETED | OUTPATIENT
Start: 2020-07-26 | End: 2020-07-26

## 2020-07-26 RX ORDER — WARFARIN SODIUM 2.5 MG/1
1.25 TABLET ORAL DAILY
Status: DISCONTINUED | OUTPATIENT
Start: 2020-07-26 | End: 2020-07-27

## 2020-07-26 RX ORDER — SODIUM CHLORIDE 0.9 % (FLUSH) 0.9 %
10 SYRINGE (ML) INJECTION EVERY 12 HOURS SCHEDULED
Status: DISCONTINUED | OUTPATIENT
Start: 2020-07-26 | End: 2020-07-28 | Stop reason: HOSPADM

## 2020-07-26 RX ORDER — SODIUM CHLORIDE, SODIUM LACTATE, POTASSIUM CHLORIDE, CALCIUM CHLORIDE 600; 310; 30; 20 MG/100ML; MG/100ML; MG/100ML; MG/100ML
INJECTION, SOLUTION INTRAVENOUS CONTINUOUS
Status: DISPENSED | OUTPATIENT
Start: 2020-07-26 | End: 2020-07-26

## 2020-07-26 RX ADMIN — POTASSIUM CHLORIDE 40 MEQ: 20 TABLET, EXTENDED RELEASE ORAL at 10:58

## 2020-07-26 RX ADMIN — Medication 125 MG: at 09:01

## 2020-07-26 RX ADMIN — Medication 125 MG: at 20:09

## 2020-07-26 RX ADMIN — Medication 125 MG: at 10:58

## 2020-07-26 RX ADMIN — POTASSIUM CHLORIDE 40 MEQ: 20 TABLET, EXTENDED RELEASE ORAL at 14:55

## 2020-07-26 RX ADMIN — Medication 10 ML: at 20:09

## 2020-07-26 RX ADMIN — SODIUM CHLORIDE, POTASSIUM CHLORIDE, SODIUM LACTATE AND CALCIUM CHLORIDE: 600; 310; 30; 20 INJECTION, SOLUTION INTRAVENOUS at 09:00

## 2020-07-26 RX ADMIN — ASPIRIN 81 MG: 81 TABLET, CHEWABLE ORAL at 09:00

## 2020-07-26 RX ADMIN — WARFARIN SODIUM 1.25 MG: 2.5 TABLET ORAL at 16:37

## 2020-07-26 RX ADMIN — POTASSIUM CHLORIDE 40 MEQ: 20 TABLET, EXTENDED RELEASE ORAL at 09:00

## 2020-07-26 RX ADMIN — Medication 125 MG: at 14:55

## 2020-07-26 RX ADMIN — ATORVASTATIN CALCIUM 40 MG: 20 TABLET, FILM COATED ORAL at 20:09

## 2020-07-26 RX ADMIN — CLOPIDOGREL BISULFATE 75 MG: 75 TABLET ORAL at 09:00

## 2020-07-26 RX ADMIN — FOLIC ACID: 5 INJECTION, SOLUTION INTRAMUSCULAR; INTRAVENOUS; SUBCUTANEOUS at 16:37

## 2020-07-26 RX ADMIN — VITAM B12 100 MCG: 100 TAB at 09:00

## 2020-07-26 ASSESSMENT — PAIN SCALES - GENERAL
PAINLEVEL_OUTOF10: 0

## 2020-07-26 NOTE — PROGRESS NOTES
Secure message sent to Dr. Edwin Moreland done: Lying 176/132 HR 85, Sitting 105/67 HR 93, standing 84/63  & denies lightheadedness.

## 2020-07-26 NOTE — PROGRESS NOTES
Sister was updated on phone with plan for patient. She was also made aware of patient being in isolation and unable to have visitors. Did discuss this with patient as well.

## 2020-07-26 NOTE — PROGRESS NOTES
Internal Medicine PGY- 3 Resident Progress Note    PCP: Dr. Louis Kraft    Date of Admission: 7/25/2020    Chief Complaint: Dizziness    HPI:    Jeremy Mueller is a 62 y.o. male w/ PMHX of systolic CHF (EF 57%, global hypokinesis), CAD s/p LAD stent (May 2020), on coumadin for DVT and LV apical thrombus (not visualized on follow-up echo 6/2020), HTN and DVT who presented to [facility], [facility] with complaints of light-headedness and dizziness upon standing. He states that he was seen for this problem at the end of June where he was given IV fluids and ultimately discharged. He saw his primary care provider following this visit and they stopped his nadolol and started him on Coreg 3.125 mg twice a day. He also endorses having diarrhea for the past three weeks and has taken antidiarrheal medications. He has been having 5-6 BMs, watery with clumps everyday. He denies having fever, chest pain, SOB, abdominal pain, or changes in his urinary symptoms. He does not know if he has lost any weight but endorses decrease oral intake. The patient also endorses feeling off-balance  In his head. He also said that he has had his intention tremors for quite sometime, and that his PCP had changed his medications recently (nodolol to coreg). He was not able to give much information about his warfarin/coumadin use. He also c/o having blurred vision. He mentions that he used to drink 2 can of beers a day; however, he has cut down on his ETOH drinking. In ED, his /72, HR 97, T 98, and SpO2 97. CT head was done: No hemorrhage, Focal area of hypoattenuation involving cortical and subcortical white matter in the high right parietal lobe. MRI brain did not identify acute infarction, recent intracranial hemorrhage, or mass lesion. Scattered areas of small infarcts with suggested cortical laminar necrosis within the bilateral parietal lobes and right temporal lobe. Mild diffuse atrophy and chronic small vessel ischemic change. Patient was started on oral vancomycin for C.difficile diarrhea. UA with 10-20 WBC and trace leuk esterase, will not treat so as not to worsen C.difficile diarrhea with antibiotics. Febrile overnight to Tmax 102. 3. He was orthostatic positive this morning for heart rate, will give judicious fluids given CHF history. Subjective:    Patient still with dizziness, VINCENT for the past three weeks since he was last seen in the ED 3 weeks ago for orthostatic hypotension. No recent abx use. He denies chest pain, palpitations. He had to stop working due to the dizziness and blurry vision. He wants to get back to work and really needs the dizziness to stop. Denies ear pain, nausea, vomiting. Diarrhea. Denies any significant weight loss. No family history of Parkinsons or Diabetes. He has an essential tremor for less than a year. Denies numbness. Denies alcohol use, states he drinks 2 beers daily. Medications:  Reviewed    Infusion Medications    lactated ringers 75 mL/hr at 07/26/20 0900     Scheduled Medications    potassium chloride  40 mEq Oral Q2H    warfarin  1.25 mg Oral Daily    vitamin B-12  100 mcg Oral Daily    atorvastatin  40 mg Oral Nightly    carvedilol  3.125 mg Oral BID WC    clopidogrel  75 mg Oral Daily    [Held by provider] lisinopril  2.5 mg Oral Daily    aspirin  81 mg Oral Daily    vancomycin  125 mg Oral 4x daily     PRN Meds:       Intake/Output Summary (Last 24 hours) at 7/26/2020 1229  Last data filed at 7/26/2020 1049  Gross per 24 hour   Intake 840 ml   Output 1 ml   Net 839 ml       Physical Exam Performed:    /88   Pulse 61   Temp 99.6 °F (37.6 °C) (Oral)   Resp 16   Ht 5' 9\" (1.753 m)   Wt 180 lb (81.6 kg)   SpO2 96%   BMI 26.58 kg/m²     General appearance:  No apparent distress, appears stated age and cooperative. HEENT:  Normal cephalic, atraumaticwithout obvious deformity. Pupils equal, round, and reactive to light. Extra ocular muscles intact. Respiratory:  Normal respiratory effort. Clear to auscultation, bilaterally without Rales/Wheezes/Rhonchi. Cardiovascular:  Regular rate and rhythm with normal S1/S2 without murmurs, rubs or gallops. Abdomen: Soft,non-tender, non-distended with normal bowel sounds. Musculoskeletal:  No clubbing, cyanosis or edema bilaterally. Full range of motion without deformity. Decreased strength on arms bilaterally. Positive for intention tremor both arms (L>R). Neurologic: Neurovascularly intact with some focalsensory/motor deficits. Cranial nerves: CN I - XI, grossly non-focal  CN XII: Tongue protrudes midline with tremor  Psychiatric:  Alert and oriented,thought content appropriate, normal insight  Peripheral Pulses: +2 palpable, equal bilaterally     Labs:   Recent Labs     07/25/20 0931 07/26/20  0541   WBC 19.2* 15.9*   HGB 13.6 11.6*   HCT 38.0* 32.3*    98*     Recent Labs     07/25/20  0931 07/25/20  1647 07/25/20  2231 07/26/20  0541   *  --   --  132*   K 2.7* 3.1* 3.9 3.3*   CL 92*  --   --  98*   CO2 23  --   --  24   BUN 7  --   --  7   CREATININE 0.6*  --   --  0.5*   CALCIUM 7.8*  --   --  7.2*     Recent Labs     07/25/20  0931   AST 45*   ALT 31   BILITOT 2.2*   ALKPHOS 118     Recent Labs     07/25/20  0931 07/26/20  0541   INR 7.47* 2.08*     Recent Labs     07/25/20  0931   TROPONINI <0.01       Urinalysis:      Lab Results   Component Value Date    NITRU Negative 07/25/2020    WBCUA 10-20 07/25/2020    BACTERIA 1+ 07/25/2020    RBCUA 3-4 07/25/2020    BLOODU TRACE-INTACT 07/25/2020    SPECGRAV 1.015 07/25/2020    GLUCOSEU Negative 07/25/2020       Radiology:  MRI BRAIN W WO CONTRAST   Final Result      No acute infarction, recent intracranial hemorrhage, or mass lesion. Scattered areas of small infarcts with suggested cortical laminar necrosis within the bilateral parietal lobes and right temporal lobe. Mild diffuse atrophy and chronic small vessel ischemic change. Favored artifactual signal abnormalities within the dural venous sinuses as detailed. CT Head WO Contrast   Final Result         1. No hemorrhage. 2. Focal area of hypoattenuation involving cortical and subcortical white matter in the high right parietal lobe. This is age-indeterminate, however, given patient's history this could be a remote insult. There is any further concern, MRI is indicated. Assessment/Plan:    Esperanza Ayoub, 62 y.o. male w/ PMHX of CHF with rEF 35%, CAD s/p LAD stent (May 2020), HTN and DVT who presented to [facility], [facility]. with complaints of light-headedness. Dizziness 2/2 orthostatic hypotension vs dehydration d/t C.diff colitis: Pt mentions that his dizziness is worse upon standing and is not present when he is just laying down. Takes Lasix and lisinopril. Was seen by the end of June for dizziness where he was given IV fluids and ultimately discharged. Has had diarrhea for past three weeks. On lab, he has elevated WBC (19.2). - Orthostatic positive this morning for HR (89 to 122 from lying to standing), will give judicious fluids given CHF history. - encourage PO fluid intake   - treating C. Diff   - holding lasix d/t dehydration   - [facility] referral upon discharge for blurry vision.  - consider MRV to r/o venous sinus thrombosis, however patient already anticoagulated    Severe C.difficile colitis  -leukocytosis and febrile overnight  Patient was started on oral vancomycin 125 mg PO QID x 14 days  UA with 10-20 WBC and trace leuk esterase, will not treat so as not to worsen C.difficile diarrhea with antibiotics. Essential tremor 2/2 unclear etiology: Had this for over 2 years and was taking nadolol for this. CT head was done: Focal area of hypoattenuation involving cortical and subcortical white matter in the high right parietal lobe.    - MRI Scattered areas of small infarcts with suggested cortical laminar necrosis within the bilateral parietal lobes and right temporal lobe.  -made worse with primidone    Elevated INR:   -pharmacy to dose coumadin  -s/p vitamin K for INR 7.47 on admission  -daily INR  -goal 5.1-0.7 INR    Systolic heart failure (EF 35%, global hypokinesis)  - last echo 6/5/2020 with some improvement from 5/2020 echo (EF 30% and apical mural thrombus, not visualized on most recent echo)  - f/u limited echo  - lisinopril 2.5  - Coreg 3.125 mg PO BID, hold for HR <60 and BP <110  - K>4 and Mg >2    CAD s/p LAD stent:  - Plavix  - Aspirin    HTN  - Coreg with hold parameters  - holding lasix and lisinopril for soft BPs    DVT ppx: coumadin  Diet: DIET GENERAL; Low Sodium (2 GM)  Code Status: Prior    Discussed the patient with MD Katty Tejada MD  Internal Medicine Resident PGY-3  Contact via [employer]

## 2020-07-26 NOTE — PROGRESS NOTES
Pt SBA to BR and returned to bed. Repositioned self in bed for comfort. Pt denies pain. HR elevated, although immediately after ambulation. All other VS WDL. No fever. Pt denies other needs at this time other than rest. Call light is in reach. Bed alarm is engaged.

## 2020-07-26 NOTE — PLAN OF CARE
Problem: Falls - Risk of:  Goal: Will remain free from falls  Description: Will remain free from falls  Outcome: Ongoing   Pt is a high fall risk. High fall precautions are in place: nonskid socks, yellow blanket, fall wristband, call light in reach, bed is locked and in lowest position, bed alarm engaged. Problem: Skin Integrity:  Goal: Will show no infection signs and symptoms  Description: Will show no infection signs and symptoms  Outcome: Ongoing   Encourage to turn and reposition q 2 hrs.

## 2020-07-26 NOTE — CONSULTS
Neurology Consult Note  Reason for Consult: abnormal CT and MRI    Chief complaint: woozy feeling in head    Dr Geoffrey Moseley MD asked me to see Laron Melo in consultation for evaluation of abnormal CT and MRI    History of Present Illness:  Laron Melo is a 62 y.o. male who presents with dizziness. I obtained my information via interview w/ the patient, supplemented by chart review. The patient is a poor historian so most details were obtained from the chart. The patient has a hx of action tremor. This is documented as far back as April of 2018. It was suspected at the time that his hx of ETOH abuse and cerebellar atrophy noted on brain imaging was responsible. Nadolol was started at the time for symptomatic control. More recently he had been on primidone, though this year he was diagnosed w/ a DVT and cardiac mural thrombus (s/p PCI w/ stents) for which he was put on warfarin (also on asa/Plavix). Primidone was stopped by Cardiology and he was again started on Nadolol. For the past 4+ weeks, the patient has been feeling very woozy when upright. If he lays down or stays still, he feels OK. He has not fully lost consciousness. He has fallen once w/out injury. He was seen in the ED at the end of June, diagnosed w/ orthostatic hypotension. Nadolol was stopped, Coreg initiated. He has continued to feel woozy when upright. He says for the past month or so his vision was been constantly blurry, R>L though can fluctuate. He says he can see better long distances, not so much up close. He recently has been complaining of some diarrhea. Given his ongoing symptoms, he decided to come to the ED yesterday to be evaluated. CT head w/ age indeterminate R parietal hypoattenuation. He was again found to be significantly orthostatic then standing (BP 64/55, ). WBC was elevated. He is c.diff positive. Currently, he is comfortably lying in bed in no acute distress. Medical History:  Past Medical History:   Diagnosis Date    Alcohol abuse     CAD (coronary artery disease)     with complete LAD occlusion    CHF (congestive heart failure) (HCC)     LVEF    HTN (hypertension)     Lower extremity cellulitis     MI (mitral incompetence)      Past Surgical History:   Procedure Laterality Date    PTCA       Scheduled Meds:   potassium chloride  40 mEq Oral Q2H    warfarin  1.25 mg Oral Daily    vitamin B-12  100 mcg Oral Daily    atorvastatin  40 mg Oral Nightly    carvedilol  3.125 mg Oral BID WC    clopidogrel  75 mg Oral Daily    [Held by provider] lisinopril  2.5 mg Oral Daily    aspirin  81 mg Oral Daily    vancomycin  125 mg Oral 4x daily     Continuous Infusions:   lactated ringers 75 mL/hr at 07/26/20 0900     Medications Prior to Admission:   warfarin (COUMADIN) 2.5 MG tablet, Take 1.25 mg by mouth nightly  vitamin B-12 (CYANOCOBALAMIN) 100 MCG tablet, Take 100 mcg by mouth nightly  carvedilol (COREG) 3.125 MG tablet, Take 1 tablet by mouth 2 times daily  aspirin 81 MG chewable tablet, Take 1 tablet by mouth daily  atorvastatin (LIPITOR) 40 MG tablet, Take 1 tablet by mouth nightly  lisinopril (PRINIVIL;ZESTRIL) 2.5 MG tablet, Take 1 tablet by mouth daily  Skin Protectants, Misc.  (HYDROCERIN) CREA cream, Apply topically 2 times daily (Patient taking differently: Apply topically 2 times daily Apply topically to legs)  folic acid (FOLVITE) 1 MG tablet, Take 1 tablet by mouth daily  clopidogrel (PLAVIX) 75 MG tablet, Take 1 tablet by mouth daily  vitamin B-1 100 MG tablet, Take 1 tablet by mouth daily  furosemide (LASIX) 20 MG tablet, Take 20 mg by mouth daily  mineral oil-hydrophilic petrolatum (AQUAPHOR) ointment, Apply 85 g topically as needed for Dry Skin Apply topically to both legs    No Known Allergies    Family History   Problem Relation Age of Onset    Stroke Mother     Heart Disease Father      Social History     Tobacco Use   Smoking Status intact  CN9: palate elevated symmetrically  CN11: trap full strength on shoulder shrug  CN12: tongue midline with protrusion  Strength: No pronator drift. Good strength in all 4 extremities. Action tremor. Deep tendon reflexes: normal in all 4 extremities  Sensory: light touch intact in all 4 extremities. Cerebellar/coordination: finger nose finger normal without ataxia  Tone: normal in all 4 extremities  Gait: deferred at this time for safety given orthostatic hypotension. Labs  Glucose 110  Na 132  K 2.7 -> 3.3  BUN 7  Cr 0.5  Mg 0.90 -> 2.20    WBC 19.2K -> 15.9K  Hg 11.6  Platelets 98    INR 4.21 -> 2.08    +c. diff  UA trace LE, 10-20 WBCs, 1+ bacteria. Studies  MRI brain w/wo 7/25/20, independently reviewed  No acute infarction, recent intracranial hemorrhage, or mass lesion. Scattered areas of small infarcts with suggested cortical laminar necrosis within the bilateral parietal lobes and right temporal lobe. Mild diffuse atrophy and chronic small vessel ischemic change. Favored artifactual signal abnormalities within the dural venous sinuses as detailed. TTE 6/5/20  EF 35%    Impresion  1. MRI brain is w/out any acute findings. 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 There is some question of venous sinus thrombosis, though favored to be artifactual per Radiology. 2.  Chronic action tremor. May be exacerbated in the setting of acute issues. 3.  Severe orthostatic hypotension. 4.  Cardiomyopathy. 5.  C.diff. 6.  Hypomagnesemia. 7.  Supra-therapeutic INR. 8.  Hx ETOH abuse. Recommendations  1. MRV head would be reasonable to r/o venous sinus thrombosis given his blurry vision, though may not necessarily  as he is already anticoagulated. Will leave up to primary team.    2.  Will discuss options for tremor w/ attending this afternoon. 3.  Management of identified acute medical issues underway.     4. Will likely need Ophthalmology evaluation.       Dariela Madison NP  [address] [place] [address] [id number] Neurology    A copy of this note was provided for Dr Pauline Hensley MD

## 2020-07-26 NOTE — PROGRESS NOTES
Secure message sent to Dr. Venkatesh Ferreira for them to review UA results from yesterday? Also potassium 3.3, na 132, cl 98, creat. 0.5. Orthostatics done per order lying 106/61 hr 96, sitting 99/62 hr 111, standing 111/79 hr 112. Clarifying if coreg is to be given. Also spoke with pharmacy and notified them of INR 2.08 and they said it is still ok for patient to get aspirin and plavix this morning.

## 2020-07-26 NOTE — PROGRESS NOTES
Secure message sent to Dr. Yovany Velazquez to report temperature of 101.1 post tylenol. Awaiting response/orders. Pt has ice packs placed in axillary regions and cool wash cloth to forehead. Blankets are off.

## 2020-07-26 NOTE — PROGRESS NOTES
Clinical Pharmacy Consult Note    Admit date: 7/25/2020    Subjective/Objective:  Cris Gotti is a 62 y.o. 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 Patient presented to [facility] with tremors, dizziness, and diarrhea. Interval update:  C.diff positive - started on oral Vancomycin. Pharmacy is consulted to dose warfarin per Dr. Heide Lagos. Home anticoagulation regimen: 1.25mg daily   INR is managed outpatient by [facility]   · Last appt 7/16, INR = 2.5  · INR goal = 2.5-3.0  · Next INR check scheduled 7/30       Date INR Warfarin Dose Other   7/25 7.47 -- Vit K 2.5mg   7/26 2.08                     Recent Labs     07/25/20  0931  07/25/20  2231 07/26/20  0541   *  --   --  132*   K 2.7*   < > 3.9 3.3*   CL 92*  --   --  98*   CO2 23  --   --  24   BUN 7  --   --  7   CREATININE 0.6*  --   --  0.5*   GLUCOSE 107*  --   --  110*    < > = values in this interval not displayed. Estimated Creatinine Clearance: 155 mL/min (A) (based on SCr of 0.5 mg/dL (L)). Lab Results   Component Value Date    WBC 15.9 (H) 07/26/2020    HGB 11.6 (L) 07/26/2020    HCT 32.3 (L) 07/26/2020    .2 (H) 07/26/2020    PLT 98 (L) 07/26/2020       Lab Results   Component Value Date    PROTIME 24.3 (H) 07/26/2020    INR 2.08 (H) 07/26/2020       Assessment/Plan:  1) DVT + mural thrombus, on Warfarin:  Pharmacy to dose, target INR = 2.5-3.0 per coumadin clinic   · INR today = 2.08, down from 7.41 after receiving Vit K yesterday. Elevated INR on admission likely 2/2 significant diarrhea the last few weeks at home. · Will resume home dose of Warfarin 1.25mg po daily. · INR may drop further tomorrow due to Vit K - may need to consider bridge therapy with Enoxaparin if INR < 2.  · Daily INR will be monitored closely and dose adjustments will be made as appropriate.       Please call with questions--  Thanks--  Alexx Yuen, PharmD, [address], [facility]  [phone]   7/26/2020 10:52 AM

## 2020-07-27 LAB
ANION GAP SERPL CALCULATED.3IONS-SCNC: 9 MMOL/L (ref 3–16)
BASOPHILS ABSOLUTE: 0 K/UL (ref 0–0.2)
BASOPHILS RELATIVE PERCENT: 0.3 %
BUN BLDV-MCNC: 8 MG/DL (ref 7–20)
CALCIUM SERPL-MCNC: 7.6 MG/DL (ref 8.3–10.6)
CHLORIDE BLD-SCNC: 101 MMOL/L (ref 99–110)
CO2: 24 MMOL/L (ref 21–32)
CREAT SERPL-MCNC: <0.5 MG/DL (ref 0.8–1.3)
EOSINOPHILS ABSOLUTE: 0.1 K/UL (ref 0–0.6)
EOSINOPHILS RELATIVE PERCENT: 1.5 %
GFR AFRICAN AMERICAN: >60
GFR NON-AFRICAN AMERICAN: >60
GLUCOSE BLD-MCNC: 96 MG/DL (ref 70–99)
HCT VFR BLD CALC: 29.4 % (ref 40.5–52.5)
HCT VFR BLD CALC: 32.3 % (ref 40.5–52.5)
HEMOGLOBIN: 10.6 G/DL (ref 13.5–17.5)
HEMOGLOBIN: 11.4 G/DL (ref 13.5–17.5)
INR BLD: 1.43 (ref 0.86–1.14)
LYMPHOCYTES ABSOLUTE: 1.7 K/UL (ref 1–5.1)
LYMPHOCYTES RELATIVE PERCENT: 19 %
MAGNESIUM: 1.8 MG/DL (ref 1.8–2.4)
MCH RBC QN AUTO: 37.1 PG (ref 26–34)
MCHC RBC AUTO-ENTMCNC: 36.1 G/DL (ref 31–36)
MCV RBC AUTO: 102.7 FL (ref 80–100)
MONOCYTES ABSOLUTE: 0.8 K/UL (ref 0–1.3)
MONOCYTES RELATIVE PERCENT: 9.1 %
NEUTROPHILS ABSOLUTE: 6.3 K/UL (ref 1.7–7.7)
NEUTROPHILS RELATIVE PERCENT: 70.1 %
OCCULT BLOOD DIAGNOSTIC: ABNORMAL
PDW BLD-RTO: 14.5 % (ref 12.4–15.4)
PLATELET # BLD: 91 K/UL (ref 135–450)
PMV BLD AUTO: 8.2 FL (ref 5–10.5)
POTASSIUM REFLEX MAGNESIUM: 3.2 MMOL/L (ref 3.5–5.1)
PROTHROMBIN TIME: 16.7 SEC (ref 10–13.2)
RBC # BLD: 2.86 M/UL (ref 4.2–5.9)
SODIUM BLD-SCNC: 134 MMOL/L (ref 136–145)
WBC # BLD: 8.9 K/UL (ref 4–11)

## 2020-07-27 PROCEDURE — 97165 OT EVAL LOW COMPLEX 30 MIN: CPT

## 2020-07-27 PROCEDURE — 1200000000 HC SEMI PRIVATE

## 2020-07-27 PROCEDURE — 6360000002 HC RX W HCPCS: Performed by: STUDENT IN AN ORGANIZED HEALTH CARE EDUCATION/TRAINING PROGRAM

## 2020-07-27 PROCEDURE — 85014 HEMATOCRIT: CPT

## 2020-07-27 PROCEDURE — 85018 HEMOGLOBIN: CPT

## 2020-07-27 PROCEDURE — 93308 TTE F-UP OR LMTD: CPT

## 2020-07-27 PROCEDURE — 6370000000 HC RX 637 (ALT 250 FOR IP): Performed by: STUDENT IN AN ORGANIZED HEALTH CARE EDUCATION/TRAINING PROGRAM

## 2020-07-27 PROCEDURE — 2580000003 HC RX 258: Performed by: STUDENT IN AN ORGANIZED HEALTH CARE EDUCATION/TRAINING PROGRAM

## 2020-07-27 PROCEDURE — 85610 PROTHROMBIN TIME: CPT

## 2020-07-27 PROCEDURE — 97116 GAIT TRAINING THERAPY: CPT

## 2020-07-27 PROCEDURE — 85025 COMPLETE CBC W/AUTO DIFF WBC: CPT

## 2020-07-27 PROCEDURE — G0328 FECAL BLOOD SCRN IMMUNOASSAY: HCPCS

## 2020-07-27 PROCEDURE — 83735 ASSAY OF MAGNESIUM: CPT

## 2020-07-27 PROCEDURE — 36415 COLL VENOUS BLD VENIPUNCTURE: CPT

## 2020-07-27 PROCEDURE — 80048 BASIC METABOLIC PNL TOTAL CA: CPT

## 2020-07-27 PROCEDURE — 97530 THERAPEUTIC ACTIVITIES: CPT

## 2020-07-27 PROCEDURE — 97161 PT EVAL LOW COMPLEX 20 MIN: CPT

## 2020-07-27 RX ORDER — POTASSIUM CHLORIDE 20 MEQ/1
40 TABLET, EXTENDED RELEASE ORAL ONCE
Status: COMPLETED | OUTPATIENT
Start: 2020-07-27 | End: 2020-07-27

## 2020-07-27 RX ORDER — PANTOPRAZOLE SODIUM 40 MG/1
40 TABLET, DELAYED RELEASE ORAL
Status: DISCONTINUED | OUTPATIENT
Start: 2020-07-27 | End: 2020-07-28 | Stop reason: HOSPADM

## 2020-07-27 RX ORDER — HEPARIN SODIUM 10000 [USP'U]/100ML
12 INJECTION, SOLUTION INTRAVENOUS CONTINUOUS
Status: CANCELLED | OUTPATIENT
Start: 2020-07-27

## 2020-07-27 RX ORDER — 0.9 % SODIUM CHLORIDE 0.9 %
500 INTRAVENOUS SOLUTION INTRAVENOUS ONCE
Status: COMPLETED | OUTPATIENT
Start: 2020-07-27 | End: 2020-07-27

## 2020-07-27 RX ORDER — FOLIC ACID 1 MG/1
1 TABLET ORAL DAILY
Status: DISCONTINUED | OUTPATIENT
Start: 2020-07-27 | End: 2020-07-28 | Stop reason: HOSPADM

## 2020-07-27 RX ORDER — MAGNESIUM SULFATE 1 G/100ML
1 INJECTION INTRAVENOUS ONCE
Status: COMPLETED | OUTPATIENT
Start: 2020-07-27 | End: 2020-07-27

## 2020-07-27 RX ORDER — HEPARIN SODIUM 5000 [USP'U]/ML
60 INJECTION, SOLUTION INTRAVENOUS; SUBCUTANEOUS PRN
Status: CANCELLED | OUTPATIENT
Start: 2020-07-27

## 2020-07-27 RX ORDER — POTASSIUM CHLORIDE 20 MEQ/1
40 TABLET, EXTENDED RELEASE ORAL 2 TIMES DAILY WITH MEALS
Status: DISCONTINUED | OUTPATIENT
Start: 2020-07-27 | End: 2020-07-28 | Stop reason: HOSPADM

## 2020-07-27 RX ORDER — HEPARIN SODIUM 5000 [USP'U]/ML
30 INJECTION, SOLUTION INTRAVENOUS; SUBCUTANEOUS PRN
Status: CANCELLED | OUTPATIENT
Start: 2020-07-27

## 2020-07-27 RX ADMIN — ENOXAPARIN SODIUM 80 MG: 80 INJECTION SUBCUTANEOUS at 21:36

## 2020-07-27 RX ADMIN — PANTOPRAZOLE SODIUM 40 MG: 40 TABLET, DELAYED RELEASE ORAL at 13:09

## 2020-07-27 RX ADMIN — SODIUM CHLORIDE 500 ML: 9 INJECTION, SOLUTION INTRAVENOUS at 11:07

## 2020-07-27 RX ADMIN — Medication 125 MG: at 08:13

## 2020-07-27 RX ADMIN — POTASSIUM CHLORIDE 40 MEQ: 20 TABLET, EXTENDED RELEASE ORAL at 09:59

## 2020-07-27 RX ADMIN — VITAM B12 100 MCG: 100 TAB at 08:13

## 2020-07-27 RX ADMIN — Medication 125 MG: at 21:36

## 2020-07-27 RX ADMIN — ENOXAPARIN SODIUM 80 MG: 80 INJECTION SUBCUTANEOUS at 11:35

## 2020-07-27 RX ADMIN — POTASSIUM CHLORIDE 40 MEQ: 20 TABLET, EXTENDED RELEASE ORAL at 08:13

## 2020-07-27 RX ADMIN — Medication 10 ML: at 08:14

## 2020-07-27 RX ADMIN — Medication 125 MG: at 17:36

## 2020-07-27 RX ADMIN — POTASSIUM CHLORIDE 40 MEQ: 20 TABLET, EXTENDED RELEASE ORAL at 17:36

## 2020-07-27 RX ADMIN — ATORVASTATIN CALCIUM 40 MG: 20 TABLET, FILM COATED ORAL at 21:36

## 2020-07-27 RX ADMIN — MAGNESIUM SULFATE HEPTAHYDRATE 1 G: 1 INJECTION, SOLUTION INTRAVENOUS at 10:00

## 2020-07-27 RX ADMIN — FOLIC ACID 1 MG: 1 TABLET ORAL at 13:09

## 2020-07-27 RX ADMIN — Medication 125 MG: at 11:35

## 2020-07-27 RX ADMIN — Medication 10 ML: at 21:36

## 2020-07-27 RX ADMIN — ASPIRIN 81 MG: 81 TABLET, CHEWABLE ORAL at 08:13

## 2020-07-27 RX ADMIN — CLOPIDOGREL BISULFATE 75 MG: 75 TABLET ORAL at 08:13

## 2020-07-27 ASSESSMENT — ENCOUNTER SYMPTOMS
ABDOMINAL DISTENTION: 0
VOMITING: 0
NAUSEA: 0
BLOOD IN STOOL: 0
SHORTNESS OF BREATH: 0
COLOR CHANGE: 0
ABDOMINAL PAIN: 0
DIARRHEA: 1

## 2020-07-27 ASSESSMENT — PAIN SCALES - GENERAL: PAINLEVEL_OUTOF10: 0

## 2020-07-27 NOTE — PROGRESS NOTES
Scheduled evening medications administered. Pt denies pain. SBA to BR and returned to bed. Pt repositions self frequently. Denies other needs at this time. Call light is in reach. Bed alarm is engaged.

## 2020-07-27 NOTE — PROGRESS NOTES
Medical Student Progress Note    Admit Date: 2020    PCP: No primary care provider on file. : 1960  MRN: [id number]  Chief Complaint   Patient presents with    Tremors    Dizziness       62 y. o. male w/ PMHx of systolic CHF (EF 33%, global hypokinesis), CAD s/p LAD stent (May 2020), on coumadin and DAPT for DVT and LV apical thrombus (not visualized on follow-up echo 2020) and HTN who p/w light-headedness, dizziness upon standing and diarrhea. Was seen for same problem at end of ; given IV fluids and d/c'd. PCP following this admission stopped nadolol and started Coreg 3.125 mg BID. Endorses diarrhea for the past 3 weeks and has taken antidiarrheal medications with 5-6 BMs, watery with clumps daily. Denied fever, chest pain, SOB, abdominal pain, or changes in his urinary symptoms. Unsure of recent weight loss but endorses decrease oral intake. Also endorses feeling off-balance in his head. Patient has hx of intention tremor for approximately 2 years. C/o blurred vision. Patient used to drink 2 can of beers a day; however, he has cut down on his ETOH drinking.     In ED, patient was stable sating on RA. Labs were significant for leukocytosis of 19.2, K 2.7, Mg 0.9. INR was supratherapeutic at 7.47. CT head showed no hemorrhage, focal area of hypoattenuation involving cortical and subcortical white matter in the high right parietal lobe. MRI brain did not identify acute infarction, recent intracranial hemorrhage, or mass lesion. Scattered areas of small infarcts with suggested cortical laminar necrosis within the bilateral parietal lobes and right temporal lobe. Mild diffuse atrophy and chronic small vessel ischemic change. In the ED, given electrolyte replacement,  mL bolus, 2.5 mg Vitamin K, and started on oral vancomycin for C.difficile diarrhea. UA with 10-20 WBC and trace leuk esterase, however will not treat so as not to worsen C.difficile diarrhea with antibiotics. He was orthostatic positive since admission for heart rate and pressures, judicious fluids given CHF history. Subjective:     No acute events overnight. Patient seen at bedside this morning. Patient states that he is still having diarrhea, endorsed 3 bouts by 930 am but does say that they have become less watery. Patient states he is still dizzy. Denies any significant weight loss. No family history of Parkinsons or Diabetes. States he drinks 2 beers daily but since heart attack in May he has significantly cut back, having 2 beers every 3 days. The patient denies fevers, chills, chest pain, shortness of breath, nausea, vomiting, or constipation. Data:     Scheduled Meds:   potassium chloride  40 mEq Oral BID WC    sodium chloride  500 mL Intravenous Once    enoxaparin  80 mg Subcutaneous BID    warfarin  1.25 mg Oral Daily    sodium chloride flush  10 mL Intravenous 2 times per day    vitamin B-12  100 mcg Oral Daily    atorvastatin  40 mg Oral Nightly    [Held by provider] carvedilol  3.125 mg Oral BID WC    clopidogrel  75 mg Oral Daily    [Held by provider] lisinopril  2.5 mg Oral Daily    aspirin  81 mg Oral Daily    vancomycin  125 mg Oral 4x daily     Continuous Infusions:  PRN Meds:sodium chloride flush, acetaminophen  I/O last 3 completed shifts: In: 1100 [P.O.:720; I.V.:380]  Out: 1 [Urine:1]  I/O this shift:  In: 120 [P.O.:120]  Out: -     Intake/Output Summary (Last 24 hours) at 7/27/2020 1124  Last data filed at 7/27/2020 0827  Gross per 24 hour   Intake 980 ml   Output --   Net 980 ml       Objective:     Vitals: /69   Pulse 77   Temp 98.6 °F (37 °C) (Oral)   Resp 18   Ht 5' 9\" (1.753 m)   Wt 176 lb 2.4 oz (79.9 kg)   SpO2 98%   BMI 26.01 kg/m²   I/O last 3 completed shifts: In: 1100 [P.O.:720;  I.V.:380]  Out: 1 [Urine:1]  I/O this shift:  In: 120 [P.O.:120]  Out: -     Intake/Output Summary (Last 24 hours) at 7/27/2020 1124  Last data filed at 7/27/2020 0827  Gross per 24 hour   Intake 980 ml   Output --   Net 980 ml       General appearance:  No apparent distress, appears stated age and cooperative. HEENT:  Normal cephalic, atraumaticwithout obvious deformity. Pupils equal, round, and reactive to light. Extra ocular muscles intact. Respiratory:  Normal respiratory effort. Clear to auscultation, bilaterally without Rales/Wheezes/Rhonchi. Cardiovascular:  Regular rate and rhythm with normal S1/S2 without murmurs, rubs or gallops. Abdomen: Soft,non-tender, non-distended with normal bowel sounds. Musculoskeletal:  No clubbing, cyanosis or edema bilaterally.  Full range of motion without deformity. Good strength in all 4 extremities. Intention tremor b/l (L>R). Neurologic: Neurovascularly intact with some focalsensory/motor deficits. Cranial nerves: CN I - XII, grossly non-focal  Psychiatric:  Alert and oriented,thought content appropriate, normal insight  Peripheral Pulses: +2 palpable, equal bilaterally       LABS:    CBC:   Recent Labs     07/25/20 0931 07/26/20 0541 07/27/20 0444   WBC 19.2* 15.9* 8.9   HGB 13.6 11.6* 10.6*   HCT 38.0* 32.3* 29.4*   .3* 102.2* 102.7*    98* 91*                                                                BMP:    Recent Labs     07/25/20 0931  07/25/20 2231 07/26/20 0541 07/27/20 0444   *  --   --  132* 134*   K 2.7*   < > 3.9 3.3* 3.2*   CL 92*  --   --  98* 101   CO2 23  --   --  24 24   BUN 7  --   --  7 8   CREATININE 0.6*  --   --  0.5* <0.5*   GLUCOSE 107*  --   --  110* 96    < > = values in this interval not displayed. LFT's:   Recent Labs     07/25/20 0931 07/26/20 0541   AST 45* 28   ALT 31 19   BILITOT 2.2* 2.0*   ALKPHOS 118 97       Troponin:   Recent Labs     07/25/20 0931   TROPONINI <0.01       BNP:No results for input(s): BNP in the last 72 hours. Lipids: No results for input(s): CHOL, HDL in the last 72 hours.     Invalid input(s): LDLCALCU    ABGs: No results found for: PHART, VBG7JCT, PO2ART    INR:   Recent Labs     07/25/20  0931 07/26/20  0541 07/27/20  0444   INR 7.47* 2.08* 1.43*       U/A:  Recent Labs     07/25/20  1120   COLORU Yellow   PHUR 6.0   WBCUA 10-20*   RBCUA 3-4   MUCUS 2+*   BACTERIA 1+*   CLARITYU Clear   SPECGRAV 1.015   LEUKOCYTESUR TRACE*   UROBILINOGEN 0.2   BILIRUBINUR Negative   BLOODU TRACE-INTACT*   GLUCOSEU Negative      -----------------------------------------------------------------  RAD:   MRI BRAIN W WO CONTRAST   Final Result      No acute infarction, recent intracranial hemorrhage, or mass lesion. Scattered areas of small infarcts with suggested cortical laminar necrosis within the bilateral parietal lobes and right temporal lobe. Mild diffuse atrophy and chronic small vessel ischemic change. Favored artifactual signal abnormalities within the dural venous sinuses as detailed. CT Head WO Contrast   Final Result         1. No hemorrhage. 2. Focal area of hypoattenuation involving cortical and subcortical white matter in the high right parietal lobe. This is age-indeterminate, however, given patient's history this could be a remote insult. There is any further concern, MRI is indicated. Echo: pending    Assessment/Plan:     Laurie Pack, 62 y.o. male w/ PMHX of CHF with rEF 35%, CAD s/p LAD stent (May 2020), HTN and DVT who presented to [facility] with complaints of light-headedness.      Dizziness 2/2 orthostatic hypotension 2/2 dehydration d/t C.diff colitis; Dizziness worse upon standing and not present when laying down. On home Lasix and lisinopril. Recently seen in ED (end of June) for dizziness; given IVF and d/c'd. Diarrhea for past 3 weeks. Elevated WBC (19.2). - Orthostatic positive today for BP and HR (120/70 to 92/53 from lying to standing w/ HR 74 to 99), giving 500 cc bolus today, given CHF history will be judicious   - Encourage PO fluid intake   - Treating C.  Diff   - Holding lasix d/t dehydration - [facility] referral upon d/c for blurry vision (past 1 month, R> L, intermittent)  - Consider MRV to r/o venous sinus thrombosis, however unlikely given pt on Warfarin and DAPT  - PT/OT consulted      Dehydration 2/2 diarrhea from severe C. difficile colitis  - Improving with treatment, afebrile overnight, leukocytosis resolved. Diarrhea improving  - Oral vancomycin 125 mg PO QID x 14 days (on day 3)     Acute macrocytic anemia of unknown etiology; Hbg decreasing since admission (13.6 > 11.6 > 10.6). .5. History of EtOH abuse. LFTs wnl. Per pt, no history of falls but given EtOH abuse, CT head was done on admission which was negative for hemorrhage. Benign physical exam. Per pt, no abdominal pain, hematuria or hematochezia. US abd (June 2020) showed normal liver size and echogenicity, no SM. No history of esophageal varices. No EGD on file. Patient is on aspirin, plavix and coumadin.   - FOBT positive  - GI consulted; appreciate recs   - Repeat H&H increased from 10.6 to 11.4  - H&H q12h   - Folate 1 mg and B12 100 mcg PO   - Clear liquid diet   - Protonix 40 mg   - Will change Lovenox bridge to low dose heparin infusion    Systolic heart failure (EF 35%, global hypokinesis); last echo 6/5/2020 with some improvement from 5/2020 echo (EF 30% and apical mural thrombus, not visualized on most recent echo)  - Currently euvolemic/ hypovolemic   - F/u limited echo today   - Lisinopril 2.5 held for now d/t soft BPs  - Coreg 3.125 mg PO BID, hold for HR <60 and BP <110  - Keep K> 4 and Mg> 2; electrolytes replenished this morning     Elevated INR likely 2/2 severe diarrhea; S/p vitamin K for INR 7.47 on admission. Home dose is 1.25 mg qd. INR managed outpatient by [facility] anticoagulation clinic. Last appt on 7/16 INR was 2.5.  Next INR check scheduled 7/30  - Resumed home dose Warfarin 1.25 mg PO daily  - INR this morning 1.48 likely d/t vitamin K given on admission  - Received 1 dose of Lovenox 80 mg for bridge this morning for INR <2, will change to heparin infusion tonight at 9 PM given concern for bleed. Will discontinue once INR >2.5   - Daily INR  - Goal 2.5-3.0 INR     Chronic essential tremor 2/2 unclear etiology: 2 year hx; Initially on Nadolol, switched to Primidone (however DVT (2020) for which Warfarin and DAPT were started, Primidone was d/c by Cardiology and he was again started back on Nadolol). Seen in ED for orthostatic hypotension end of June and Nadolol was switched to Coreg.   - CT head showed focal area of hypoattenuation involving cortical and subcortical white matter in the high R parietal lobe  - MRI scattered areas of small infarcts with suggested cortical laminar necrosis within the bilateral parietal lobes and right temporal lobe. - Avoid primidone as CI d/t pt on A/C  - Avoid BB d/t severe orthostatic hypotension   - Neurology consulted; appreciate recs - tremors likely 2/2 to severe EtOH abuse and cerebellar atrophy  - Pt does not wish to try any new medications for tremor at this point     CHRONIC ISSUES:  CAD s/p LAD stent: continue home DAPT and statin   HTN: Coreg with hold parameters, holding lasix (orthostatic hypotension) and lisinopril for soft BPs  Hx LLE DVT and cardiac mural thrombus 2/2 anterior apical MI s/p LAD stent 5/2020: on heparin and DAPT     Code Status:Prior   FEN: DIET GENERAL; Low Sodium (2 GM)  PPx: coumadin, heparin starting 9 PM   Dispo:  Wards    Patient will be discussed with senior resident and attending, Troy Quinn MD    Electronically Signed:  ARTHUR Haro  7/27/2020 11:24 AM

## 2020-07-27 NOTE — PROGRESS NOTES
Clinical Pharmacy Consult Note    Admit date: 7/25/2020    Subjective/Objective:  Laurie Wray is a 62 y.o. 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 Patient presented to [facility] with tremors, dizziness, and diarrhea. C.diff positive; on oral vancomycin. Interval update:  No events overnight. Per Neurology, no good therapeutic options for his tremors. Pharmacy is consulted to dose warfarin per Dr. Melanie Mosquera. Home anticoagulation regimen: 1.25mg daily   INR is managed outpatient by [facility]   · Last appt 7/16, INR = 2.5  · INR goal = 2.5-3.0  · Next INR check scheduled 7/30       Date INR Warfarin Dose Other   7/25 7.47 -- Vit K 2.5mg   7/26 2.08 1.25mg    7/27 1.43               Recent Labs     07/26/20  0541 07/27/20  0444   * 134*   K 3.3* 3.2*   CL 98* 101   CO2 24 24   BUN 7 8   CREATININE 0.5* <0.5*   GLUCOSE 110* 96       CrCl cannot be calculated (This lab value cannot be used to calculate CrCl because it is not a number: <0.5). Lab Results   Component Value Date    WBC 8.9 07/27/2020    HGB 10.6 (L) 07/27/2020    HCT 29.4 (L) 07/27/2020    .7 (H) 07/27/2020    PLT 91 (L) 07/27/2020       Lab Results   Component Value Date    PROTIME 16.7 (H) 07/27/2020    INR 1.43 (H) 07/27/2020       Assessment/Plan:  1) DVT + mural thrombus, on Warfarin:  Pharmacy to dose, target INR = 2.5-3.0 per coumadin clinic   · INR today = 1.43, a decrease from yesterday. Decrease expected as he received Vit K on admission. (Elevated INR on admission likely 2/2 significant diarrhea the last few weeks at home.)  · Currently on home dose of Warfarin 1.25mg po daily. · Continue home dose. Reluctant to administer warfarin bolus, as patient with ongoing EtOH abuse. · Perfect Serve sent to Dr. Jinny Flores to recommend bridging until INR 2 or greater.   Enoxaparin 80mg SQ BID is appropriate, or a standard dose heparin weight-based drip. · Daily INR will be monitored closely and dose adjustments will be made as appropriate.       Please call with questions--  Thanks--  Pedro Walton PharmD., [facility]   7/27/2020 10:30 AM  Wireless: [phone]

## 2020-07-27 NOTE — PROGRESS NOTES
Physician Progress Note      PATIENT:               Roma Taylor  CSN #:                  [id number]  :                       1960  ADMIT DATE:       2020 8:45 AM  DISCH DATE:  RESPONDING  PROVIDER #:        Chilango Larson MD          QUERY TEXT:    Pt with C difficile colitis . Pt noted to have  sepsis documented . If   possible, please document in the progress notes and discharge summary the   present on admission status of sepsis. The medical record reflects the following:  Risk Factors: C difficile colitis  Clinical Indicators: Documentation of Dehydration secondary to diarrhea   secondary to sepsis C. difficile colitis  Start oral vancomycin  SIRS on admission: WBC 19.2, Temp 99.3- 102.3 within 10 hrs of stay, HR 90-     Infectious source: C  difficile colitis  Organ dysfunction/Other: none   : C. Difficile positive  Treatment: Oral vancomycin, 500 ml NS boluses x 2  Options provided:  -- Sepsis, present on admission  -- Sepsis, not present on admission,  -- Other - I will add my own diagnosis  -- Disagree - Clinically unable to determine / Unknown  -- Refer to Clinical Documentation Reviewer    PROVIDER RESPONSE TEXT:    Will add to progress note.     Query created by: Radha Longoria on 2020 2:20 PM      Electronically signed by:  Chilango Larson MD 2020 6:51 PM

## 2020-07-27 NOTE — CARE COORDINATION
Case Management Assessment           Initial Evaluation                Date / Time of Evaluation: 7/27/2020 7:23 PM                 Assessment Completed by: Steve Victoria     Spoke with patient regarding discharge needs. Pt did well with therapy and denies needs from CM at d/c. Patient Name: Mino Ha     YOB: 1960  Diagnosis: Hypokalemia [E87.6]  Hypokalemia [E87.6]     Date / Time: 7/25/2020  8:45 AM    Patient Admission Status: Inpatient    If patient is discharged prior to next notation, then this note serves as note for discharge by case management. Current PCP: No primary care provider on file. Clinic Patient: No    Chart Reviewed: Yes  Patient/ Family Interviewed: Yes    Initial assessment completed at bedside with: patient    Hospitalization in the last 30 days: No    Emergency Contacts:  Extended Emergency Contact Information  Primary Emergency Contact: Joannandjennifer Fernandez  [place] Phone: [phone]  Mobile Phone: [phone]  Relation: Brother/Sister    Advance Directives:   Code Status: Prior    Healthcare Power of : No  Agent: NA  Contact Number: NA    Financial  Payor: Jina Raymundo / Plan: Jina Raymundo / Product Type: *No Product type* /     Pre-cert required for SNF: Yes    [address], [address] [place] [id number]  Phone: [phone] Fax: [phone]      Potential assistance Purchasing Medications: Potential Assistance Purchasing Medications: No  Does Patient want to participate in local refill/ meds to beds program?: Not Assessed    Meds To Beds General Rules:  1. Can ONLY be done Monday- Friday between 8:30am-5pm  2. Prescription(s) must be in pharmacy by 3pm to be filled same day  3. Copy of patient's insurance/ prescription drug card and patient face sheet must be sent along with the prescription(s)  4.

## 2020-07-27 NOTE — CONSULTS
Consult Note      Danielle Siddiqui  1960    Consultant:  Margi Yu MD  Reason for Consult: On anticoagulation with drop in Hgb, concern for GIB  Requesting Physician:  Dr. Steve Naranjo,     CHIEF COMPLAINT:  Dizziness, diarrhea    History Obtained From: patient    HISTORY OF PRESENT ILLNESS:                Mr. Precious Reid is a 63 yo gentleman with PMHx of HFrEF, CAD s/p stent in May 2020, on DAPT since March 2020 for DVT and left apical thrombus. Thrombus not visualized on repeat ECHO in June 2020. He was admitted for dizziness upon standing and ~3 weeks of diarrhea. Work-up revealed c diff toxin/Ag. He was given vitamin K in ED for INR of 7.47. GI consulted for recommendations regarding anticoagulation in setting of suspected GI bleed; patient has +FOBT today and Hgb is 11.4 from 13.6 on admission. Mr. Precious Reid denies abdominal pain, n/v, hematemesis, history of bleeds. Denies heavy etOH or NSAID use. He had one episode of black diarrhea about a week ago, but none since; denies hematochezia. Still having multiple episodes of diarrhea per day; has been able to tolerate clear liquids, but not much appetite. Past Medical History:    Past Medical History:   Diagnosis Date    Alcohol abuse     CAD (coronary artery disease)     with complete LAD occlusion    CHF (congestive heart failure) (HCC)     LVEF    HTN (hypertension)     Lower extremity cellulitis     MI (mitral incompetence)        Past Surgical History:    Past Surgical History:   Procedure Laterality Date    PTCA         Medications at Home:  Prior to Admission medications    Medication Sig Start Date End Date Taking?  Authorizing Provider   warfarin (COUMADIN) 2.5 MG tablet Take 1.25 mg by mouth nightly   Yes Historical Provider, MD   vitamin B-12 (CYANOCOBALAMIN) 100 MCG tablet Take 100 mcg by mouth nightly   Yes Historical Provider, MD   carvedilol (COREG) 3.125 MG tablet Take 1 tablet by mouth 2 times daily 7/6/20  Yes Ren Ontiveros MD   aspirin 81 MG chewable tablet Take 1 tablet by mouth daily 5/17/20  Yes Edwina Means MD   atorvastatin (LIPITOR) 40 MG tablet Take 1 tablet by mouth nightly 5/16/20  Yes Edwina Means MD   lisinopril (PRINIVIL;ZESTRIL) 2.5 MG tablet Take 1 tablet by mouth daily 5/17/20  Yes Edwina Means MD   Skin Protectants, Misc.  (HYDROCERIN) CREA cream Apply topically 2 times daily  Patient taking differently: Apply topically 2 times daily Apply topically to legs 5/16/20  Yes Edwina Means MD   folic acid (FOLVITE) 1 MG tablet Take 1 tablet by mouth daily 5/17/20  Yes Edwina Means MD   clopidogrel (PLAVIX) 75 MG tablet Take 1 tablet by mouth daily 5/17/20  Yes Edwina Means MD   vitamin B-1 100 MG tablet Take 1 tablet by mouth daily 5/17/20  Yes Edwina Means MD   furosemide (LASIX) 20 MG tablet Take 20 mg by mouth daily   Yes Historical Provider, MD   mineral oil-hydrophilic petrolatum (AQUAPHOR) ointment Apply 85 g topically as needed for Dry Skin Apply topically to both legs   Yes Historical Provider, MD       Current Medications:      Current Facility-Administered Medications:     potassium chloride (KLOR-CON M) extended release tablet 40 mEq, 40 mEq, Oral, BID WC, Talisha Camara MD, 40 mEq at 07/27/20 0959    [Held by provider] enoxaparin (LOVENOX) injection 80 mg, 80 mg, Subcutaneous, BID, Jett Maravilla MD, 80 mg at 55/22/84 7872    folic acid (FOLVITE) tablet 1 mg, 1 mg, Oral, Daily, Jett Maravilla MD, 1 mg at 07/27/20 1309    pantoprazole (PROTONIX) tablet 40 mg, 40 mg, Oral, QAM AC, Jett Maravilla MD, 40 mg at 07/27/20 1309    sodium chloride flush 0.9 % injection 10 mL, 10 mL, Intravenous, 2 times per day, Phong Liz MD, 10 mL at 07/27/20 0814    sodium chloride flush 0.9 % injection 10 mL, 10 mL, Intravenous, PRN, Phong Liz MD    vitamin B-12 (CYANOCOBALAMIN) tablet 100 mcg, 100 mcg, Oral, Daily, Juan Hernadez MD, 100 mcg at 07/27/20 0813    atorvastatin (LIPITOR) tablet 40 mg, 40 mg, Oral, Nightly, Chase Tran MD, 40 mg at 07/26/20 2009    [Held by provider] carvedilol (COREG) tablet 3.125 mg, 3.125 mg, Oral, BID WC, Chiquis Curiel MD, Stopped at 07/26/20 1700    clopidogrel (PLAVIX) tablet 75 mg, 75 mg, Oral, Daily, Chase Tran MD, 75 mg at 07/27/20 0813    [Held by provider] lisinopril (PRINIVIL;ZESTRIL) tablet 2.5 mg, 2.5 mg, Oral, Daily, Chase Tran MD, Stopped at 07/26/20 0900    aspirin chewable tablet 81 mg, 81 mg, Oral, Daily, Chase Tran MD, 81 mg at 07/27/20 0813    vancomycin (VANCOCIN) oral solution 125 mg, 125 mg, Oral, 4x daily, Chiquis Curiel MD, 663 mg at 07/27/20 1135    acetaminophen (TYLENOL) tablet 1,000 mg, 1,000 mg, Oral, Q6H PRN, Rere Harrington MD    Allergies:  Patient has no known allergies. Social History:    Social History     Tobacco Use    Smoking status: Former Smoker     Packs/day: 0.50     Years: 40.00     Pack years: 20.00     Types: Cigarettes    Smokeless tobacco: Never Used   Substance Use Topics    Alcohol use: Yes     Alcohol/week: 2.0 standard drinks     Types: 2 Cans of beer per week     Comment: previously was drinkning 5 cans per day    Drug use: Never         Family History:   Family History   Problem Relation Age of Onset    Stroke Mother     Heart Disease Father        REVIEW OF SYSTEMS:    Review of Systems   Constitutional: Positive for appetite change. HENT: Negative for congestion. Eyes: Positive for visual disturbance. Respiratory: Negative for shortness of breath. Cardiovascular: Negative for chest pain. Gastrointestinal: Positive for diarrhea. Negative for abdominal distention, abdominal pain, blood in stool, nausea and vomiting. Skin: Negative for color change. Neurological: Positive for dizziness and tremors.             PHYSICAL EXAM:      Vitals:    /64   Pulse 82   Temp 98.7 °F (37.1 °C) (Oral)   Resp 16   Ht 5' 9\" (1.753 m)   Wt 176 lb 2.4 oz (79.9 kg)   SpO2 97% BMI 26.01 kg/m²     General: No acute distress  HEENT: PERRL, EOMI, oral mucosa moist and intact, no scleral icterus  Neck: no thyromegaly  Lymphatic: no cervical or supraclavicular lymphadenopathy  Heart: no m/r/g; +s1/s2 rrr  Lungs: CTA bilaterally  Abdomen: soft, nontender, nondistended, BS+  Extremities: 2+pulses, no edema  Skin: no rashes or lesions  Neuro: a&o x 3; no gross deficit  DATA:    Recent Labs     07/25/20  0931 07/26/20  0541 07/27/20  0444 07/27/20  1400   WBC 19.2* 15.9* 8.9  --    HGB 13.6 11.6* 10.6* 11.4*   HCT 38.0* 32.3* 29.4* 32.3*   .3* 102.2* 102.7*  --     98* 91*  --      Recent Labs     07/25/20 0931  07/25/20 2231 07/26/20  0541 07/27/20  0444   *  --   --  132* 134*   K 2.7*   < > 3.9 3.3* 3.2*   CL 92*  --   --  98* 101   CO2 23  --   --  24 24   BUN 7  --   --  7 8   CREATININE 0.6*  --   --  0.5* <0.5*    < > = values in this interval not displayed. Recent Labs     07/25/20  0931 07/26/20  0541   AST 45* 28   ALT 31 19   BILIDIR  --  0.6*   BILITOT 2.2* 2.0*   ALKPHOS 118 97     No results for input(s): LIPASE, AMYLASE in the last 72 hours. Recent Labs     07/25/20 0931 07/26/20  0541 07/27/20  0444   PROT 6.6 5.6*  --    INR 7.47* 2.08* 1.43*     No results for input(s): PTT in the last 72 hours. No results for input(s): OCCULTBLD in the last 72 hours. Imaging:  No new abdominal imaging      IMPRESSION/RECOMMENDATIONS:      Mr. Maribel Lin is a 63 yo gentleman with PMHx of HFrEF, on DAPT and anticoagulation for DVT, left apical thrombus, and CAD s/p stent in May 2020. Work-up positive for c-diff infection.   No abdominal pain on exam.    Mixed indirect/direct hyperbilirubinemia noted, appears to be chronic;    Differential includes Gilbert's syndrome; Hepatic enzymes WNL    +FOBT, Hgb decrease by >2 in 48 hours; 10.6 today however recheck was 11.4  Do not recommend stopping DAPT as patient has cardiac stent placed <1 year  Recommend continuing Lovenox at current dose given history of apical  thrombus and DVT    Will plan on EGD tomorrow AM to assess for upper GI bleed  Plan for colonoscopy with resolution of c diff infection      Thank you for allowing me to participate in Wendy Cabrera's care.    Radha Carcamo MD

## 2020-07-27 NOTE — PLAN OF CARE
Problem: Falls - Risk of:  Goal: Will remain free from falls  Description: Will remain free from falls  7/27/2020 0846 by Marianne Weir RN  Outcome: Ongoing  Note: Patient has remained free from falls. Bed is low and locked, bed alarm on, side rails up 2/4, non skid socks on patient. Call light and bedside table are within reach. Patient calls out appropriately. Will continue to monitor. Problem: Skin Integrity:  Goal: Absence of new skin breakdown  Description: Absence of new skin breakdown  Outcome: Ongoing  Note: Patient shows no signs of new skin breakdown. Patient encouraged to turn and reposition frequently. Will continue to monitor.

## 2020-07-27 NOTE — PROGRESS NOTES
YUNIOR  Entrance Stairs - Rails: Both  Bathroom Shower/Tub: Tub/Shower unit  Home Equipment: (no DME)  ADL Assistance: Independent  Homemaking Assistance: Independent  Homemaking Responsibilities: Yes  Ambulation Assistance: Independent  Transfer Assistance: Independent  Active : Yes  Type of occupation: [employer]  Additional Comments: 1 fall in past 3 months; fell against chair when lightheaded       Objective   Vision: Within Functional Limits(Blurry vision from one eye to the other)  Hearing: Within functional limits      Orientation  Overall Orientation Status: Within Normal Limits        Balance  Sitting Balance: Independent  Standing Balance: Independent    Functional Mobility  Functional - Mobility Device: No device  Activity: To/from bathroom(60' in room)  Assist Level: Independent  Functional Mobility Comments: pt steady, no LOB observed, denied fatigue. Reported mild dizziness that did not worsen or improve with mobility    Orthostatics:   BP supine 125/76  BP seated 120/79. Pt reported dizziness with onset of sitting eob and remained throughout mobility/walking. BP standing 113/75    Toilet Transfers  Toilet - Technique: Ambulating  Equipment Used: Standard toilet  Toilet Transfer: Independent    ADL  Feeding: Independent  Grooming: Independent  LE Dressing: Independent  Toileting: Independent           Bed mobility  Supine to Sit: Independent  Sit to Supine: Independent     Transfers  Sit to stand: Independent  Stand to sit: Independent        Cognition  Overall Cognitive Status: WNL      LUE AROM (degrees)  LUE AROM : WNL  RUE AROM (degrees)  RUE AROM : WNL  LUE Strength  Gross LUE Strength: WFL  RUE Strength  Gross RUE Strength: WFL           Treatment consisted of:  ADLs, transfer training, education on activity promotion and fall precautions.            Plan   Plan  Times per week: discharge             AM-PAC Score     AM-PAC Inpatient Daily Activity Raw Score: 24 (07/27/20 1356)  AM-PAC Inpatient ADL T-Scale Score : 57.54 (07/27/20 1356)  ADL Inpatient CMS 0-100% Score: 0 (07/27/20 1356)  ADL Inpatient CMS G-Code Modifier : CH (07/27/20 1356)      Therapy Time   Individual Concurrent Group Co-treatment   Time In 1310         Time Out 1333         Minutes 23         Timed Code Treatment Minutes:   8  Total Treatment Minutes:  [facility] OTR/L, 5159

## 2020-07-27 NOTE — PROGRESS NOTES
Progress Note    Admit Date: 2020    PCP: No primary care provider on file. : 1960  MRN: [id number]  Chief Complaint   Patient presents with    Tremors    Dizziness       62 y. o. male w/ PMHx of systolic CHF (EF 42%, global hypokinesis), CAD s/p LAD stent (May 2020), on coumadin and DAPT for DVT and LV apical thrombus (not visualized on follow-up echo 2020) and HTN who p/w light-headedness, dizziness upon standing and diarrhea. Was seen for same problem at end of ; given IV fluids and d/c'd. PCP following this admission stopped nadolol and started Coreg 3.125 mg BID. Endorses diarrhea for the past 3 weeks and has taken antidiarrheal medications with 5-6 BMs, watery with clumps daily. Denied fever, chest pain, SOB, abdominal pain, or changes in his urinary symptoms. Unsure of recent weight loss but endorses decrease oral intake. Also endorses feeling off-balance in his head. Patient has hx of intention tremor for approximately 2 years. C/o blurred vision. Patient used to drink 2 can of beers a day; however, he has cut down on his ETOH drinking.     In ED, patient was stable sating on RA. Labs were significant for leukocytosis of 19.2, K 2.7, Mg 0.9. INR was supratherapeutic at 7.47. CT head showed no hemorrhage, focal area of hypoattenuation involving cortical and subcortical white matter in the high right parietal lobe. MRI brain did not identify acute infarction, recent intracranial hemorrhage, or mass lesion. Scattered areas of small infarcts with suggested cortical laminar necrosis within the bilateral parietal lobes and right temporal lobe. Mild diffuse atrophy and chronic small vessel ischemic change. In the ED, given electrolyte replacement,  mL bolus, 2.5 mg Vitamin K, and started on oral vancomycin for C.difficile diarrhea. UA with 10-20 WBC and trace leuk esterase, however will not treat so as not to worsen C.difficile diarrhea with antibiotics.  He was orthostatic positive since admission for heart rate and pressures, judicious fluids given CHF history. Subjective:     No acute events overnight. Patient states that he is still having diarrhea, but stool less watery. Still dizzy. Denies irritative or obstructive LUTS  No fevers, chills, chest pain, shortness of breath, nausea, vomiting, or constipation. Data:     Scheduled Meds:   potassium chloride  40 mEq Oral BID WC    enoxaparin  80 mg Subcutaneous BID    folic acid  1 mg Oral Daily    pantoprazole  40 mg Oral QAM AC    warfarin  1.25 mg Oral Daily    sodium chloride flush  10 mL Intravenous 2 times per day    vitamin B-12  100 mcg Oral Daily    atorvastatin  40 mg Oral Nightly    [Held by provider] carvedilol  3.125 mg Oral BID WC    clopidogrel  75 mg Oral Daily    [Held by provider] lisinopril  2.5 mg Oral Daily    aspirin  81 mg Oral Daily    vancomycin  125 mg Oral 4x daily     Continuous Infusions:  PRN Meds:sodium chloride flush, acetaminophen  I/O last 3 completed shifts: In: 720 [P.O.:720]  Out: -   No intake/output data recorded. Intake/Output Summary (Last 24 hours) at 7/27/2020 1508  Last data filed at 7/27/2020 1242  Gross per 24 hour   Intake 720 ml   Output --   Net 720 ml       Objective:     Vitals: /64   Pulse 82   Temp 98.7 °F (37.1 °C) (Oral)   Resp 16   Ht 5' 9\" (1.753 m)   Wt 176 lb 2.4 oz (79.9 kg)   SpO2 97%   BMI 26.01 kg/m²   I/O last 3 completed shifts: In: 720 [P.O.:720]  Out: -   No intake/output data recorded. Intake/Output Summary (Last 24 hours) at 7/27/2020 1508  Last data filed at 7/27/2020 1242  Gross per 24 hour   Intake 720 ml   Output --   Net 720 ml       General appearance:  No apparent distress, appears stated age and cooperative. Respiratory:  Normal respiratory effort. Clear to auscultation, bilaterally  Cardiovascular:  Regular rate and rhythm with normal S1/S2 without murmurs, rubs or gallops.   Abdomen: Soft,non-tender, non-distended with normal bowel sounds. Neurologic:  grossly non-focal  Psychiatric:  Alert and oriented,thought content appropriate, normal insight        LABS:    CBC:   Recent Labs     07/25/20  0931 07/26/20  0541 07/27/20 0444 07/27/20  1400   WBC 19.2* 15.9* 8.9  --    HGB 13.6 11.6* 10.6* 11.4*   HCT 38.0* 32.3* 29.4* 32.3*   .3* 102.2* 102.7*  --     98* 91*  --                                                                 BMP:    Recent Labs     07/25/20 0931  07/25/20 2231 07/26/20 0541 07/27/20 0444   *  --   --  132* 134*   K 2.7*   < > 3.9 3.3* 3.2*   CL 92*  --   --  98* 101   CO2 23  --   --  24 24   BUN 7  --   --  7 8   CREATININE 0.6*  --   --  0.5* <0.5*   GLUCOSE 107*  --   --  110* 96    < > = values in this interval not displayed. LFT's:   Recent Labs     07/25/20 0931 07/26/20  0541   AST 45* 28   ALT 31 19   BILITOT 2.2* 2.0*   ALKPHOS 118 97       Troponin:   Recent Labs     07/25/20 0931   TROPONINI <0.01       BNP:No results for input(s): BNP in the last 72 hours. Lipids: No results for input(s): CHOL, HDL in the last 72 hours. Invalid input(s): LDLCALCU    ABGs: No results found for: PHART, INA9VRO, PO2ART    INR:   Recent Labs     07/25/20 0931 07/26/20 0541 07/27/20 0444   INR 7.47* 2.08* 1.43*       U/A:  Recent Labs     07/25/20  1120   COLORU Yellow   PHUR 6.0   WBCUA 10-20*   RBCUA 3-4   MUCUS 2+*   BACTERIA 1+*   CLARITYU Clear   SPECGRAV 1.015   LEUKOCYTESUR TRACE*   UROBILINOGEN 0.2   BILIRUBINUR Negative   BLOODU TRACE-INTACT*   GLUCOSEU Negative      -----------------------------------------------------------------  RAD:   MRI BRAIN W WO CONTRAST   Final Result      No acute infarction, recent intracranial hemorrhage, or mass lesion. Scattered areas of small infarcts with suggested cortical laminar necrosis within the bilateral parietal lobes and right temporal lobe.       Mild diffuse atrophy and chronic small vessel ischemic change. Favored artifactual signal abnormalities within the dural venous sinuses as detailed. CT Head WO Contrast   Final Result         1. No hemorrhage. 2. Focal area of hypoattenuation involving cortical and subcortical white matter in the high right parietal lobe. This is age-indeterminate, however, given patient's history this could be a remote insult. There is any further concern, MRI is indicated. Echo: pending    Assessment/Plan:     62 y.o. male w/  CHF with rEF 35%, CAD s/p LAD stent (May 2020), HTN and DVT who presented to [facility]. with complaints of light-headedness.          Dizziness   Orthostatic hypotension  C.diff colitis, severe C. difficile colitis   Sepsis: POA. Sec to C diff colitis. Acute on chronic anemia with macrocytosis  Chronic Systolic heart failure (EF 35%, global hypokinesis)  CAD s/p LAD stent: continue home DAPT and statin   HTN: Coreg with hold parameters, holding lasix (orthostatic hypotension) and lisinopril for soft BPs  Hx LLE DVT and cardiac mural thrombus 2/2 anterior apical MI s/p LAD stent 5/2020: on heparin and DAPT         Sepsis: POA   SIRS on admission: WBC 19.2, Temp 99.3- 102.3 within 10 hrs of stay, HR 90-   Sec to C diff infection, colitis. Resolving    Continue PO vancomycin    Drop in hgb concerning, diogo as on [address] and DAPT  His dizziness may be from GIB  Monitor hgb more closely  Check stool OB. GI consult    Echo 6/5/2020 with some improvement from 5/2020 echo (EF 30% and apical mural thrombus, not visualized on most recent echo)    Hold AC until cleared by GI, if stool shows OB positive stool, or further decline in hgb. Resume home Folic acid: low Folate recently  Counseling re ETOH    - Protonix IV 40 mg BID  - Clears/NPO    O/p F/u for tremors      Code Status:Prior   FEN: DIET CLEAR LIQUID; Low Sodium (2 GM)  PPx: SCDs  Dispo:  Wero Vang MD  7/27/2020 3:08 PM

## 2020-07-27 NOTE — PROGRESS NOTES
Angio. Restrictions  Restrictions/Precautions  Restrictions/Precautions: Seizure, Fall Risk, Contact Precautions  Position Activity Restriction  Other position/activity restrictions: C-Diff-Contact Isolation, orthostatic+  Vision/Hearing  Vision: Within Functional Limits(Blurry vision from one eye to the other)  Hearing: Within functional limits     Subjective  General  Chart Reviewed: Yes  Patient assessed for rehabilitation services?: Yes  Additional Pertinent Hx: Pt is a 61 y/o male presenting to [facility] with dizziness. PMH: PMHX of systolic CHF (EF 43%, global hypokinesis), CAD s/p LAD stent (May 2020), on coumadin for DVT and LV apical thrombus (not visualized on follow-up echo 6/2020), HTN, DVT and action tremor. April of 2018 it was suspected at the time that his hx of ETOH abuse and cerebellar atrophy noted on brain imaging was responsible. Response To Previous Treatment: Not applicable  Referring Practitioner: Pao Perdomo MD  Referral Date : 07/27/20  Subjective  Subjective: Pt agreeable to therapy.   Pain Screening  Patient Currently in Pain: Denies  Vital Signs  Patient Currently in Pain: Denies       Orientation  Orientation  Overall Orientation Status: Within Normal Limits  Social/Functional History  Social/Functional History  Lives With: Family(sister)  Type of Home: House  Home Layout: Two level, Able to Live on Main level with bedroom/bathroom  Home Access: Stairs to enter with rails  Entrance Stairs - Number of Steps: 6 YUNIOR  Entrance Stairs - Rails: Both  Bathroom Shower/Tub: Tub/Shower unit  Home Equipment: (no DME)  ADL Assistance: Independent  Homemaking Assistance: Independent  Homemaking Responsibilities: Yes  Ambulation Assistance: Independent  Transfer Assistance: Independent  Active : Yes  Type of occupation: [employer]  Additional Comments: 1 fall in past 3 months; fell against chair when lightheaded  Cognition   Cognition  Overall Cognitive Status: WNL    Objective          AROM RLE (degrees)  RLE AROM: WFL  AROM LLE (degrees)  LLE AROM : WFL  Strength RLE  Strength RLE: WFL  Strength LLE  Strength LLE: WFL  Tone RLE  RLE Tone: Normotonic  Tone LLE  LLE Tone: Normotonic  Sensation  Overall Sensation Status: WFL  Bed mobility  Supine to Sit: Independent  Sit to Supine: Independent  Transfers  Sit to Stand: Independent  Stand to sit: Independent  Bed to Chair: Independent  Comment: without AD  Ambulation  Ambulation?: Yes  More Ambulation?: No  Ambulation 1  Surface: level tile  Device: No Device  Assistance: Independent  Quality of Gait: Pt demos minimal gait deviations from his baseline. Pt reports slight off balance that improves with distance with no overt LOB. Gait Deviations: None  Distance: 150 feet  Stairs/Curb  Stairs?: No     Balance  Posture: Fair  Sitting - Static: Good  Sitting - Dynamic: Good  Standing - Static: Good  Standing - Dynamic: Good        Plan   Plan  Times per week: 1x only  Safety Devices  Type of devices: All fall risk precautions in place, Bed alarm in place, Call light within reach, Left in bed, Patient at risk for falls, Nurse notified, Gait belt    AM-PAC Score  AM-PAC Inpatient Mobility Raw Score : 23 (07/27/20 1432)  AM-PAC Inpatient T-Scale Score : 56.93 (07/27/20 1432)  Mobility Inpatient CMS 0-100% Score: 11.2 (07/27/20 1432)  Mobility Inpatient CMS G-Code Modifier : CI (07/27/20 1432)        Goals  Short term goals  Time Frame for Short term goals: 1x visit only, no goals needed  Patient Goals   Patient goals :  To feel better and go home     Therapy Time   Individual Concurrent Group Co-treatment   Time In 1310         Time Out 1331         Minutes 21         Timed Code Treatment Minutes: 11 Minutes(10 minute eval)     Paulie Mccauley, PT

## 2020-07-28 ENCOUNTER — ANESTHESIA (OUTPATIENT)
Dept: ENDOSCOPY | Age: 60
DRG: 720 | End: 2020-07-28
Payer: MEDICAID

## 2020-07-28 ENCOUNTER — ANESTHESIA EVENT (OUTPATIENT)
Dept: ENDOSCOPY | Age: 60
DRG: 720 | End: 2020-07-28
Payer: MEDICAID

## 2020-07-28 VITALS
TEMPERATURE: 97.9 F | OXYGEN SATURATION: 99 % | BODY MASS INDEX: 26.09 KG/M2 | SYSTOLIC BLOOD PRESSURE: 136 MMHG | RESPIRATION RATE: 16 BRPM | HEIGHT: 69 IN | HEART RATE: 73 BPM | WEIGHT: 176.15 LBS | DIASTOLIC BLOOD PRESSURE: 78 MMHG

## 2020-07-28 VITALS
DIASTOLIC BLOOD PRESSURE: 67 MMHG | OXYGEN SATURATION: 99 % | SYSTOLIC BLOOD PRESSURE: 108 MMHG | TEMPERATURE: 96.8 F | RESPIRATION RATE: 14 BRPM

## 2020-07-28 LAB
ANION GAP SERPL CALCULATED.3IONS-SCNC: 9 MMOL/L (ref 3–16)
BASOPHILS ABSOLUTE: 0 K/UL (ref 0–0.2)
BASOPHILS RELATIVE PERCENT: 0.5 %
BUN BLDV-MCNC: 6 MG/DL (ref 7–20)
CALCIUM SERPL-MCNC: 8 MG/DL (ref 8.3–10.6)
CHLORIDE BLD-SCNC: 106 MMOL/L (ref 99–110)
CO2: 21 MMOL/L (ref 21–32)
CREAT SERPL-MCNC: <0.5 MG/DL (ref 0.8–1.3)
EOSINOPHILS ABSOLUTE: 0.1 K/UL (ref 0–0.6)
EOSINOPHILS RELATIVE PERCENT: 1.9 %
GFR AFRICAN AMERICAN: >60
GFR NON-AFRICAN AMERICAN: >60
GLUCOSE BLD-MCNC: 85 MG/DL (ref 70–99)
HCT VFR BLD CALC: 30.7 % (ref 40.5–52.5)
HCT VFR BLD CALC: 38 % (ref 40.5–52.5)
HEMOGLOBIN: 11 G/DL (ref 13.5–17.5)
HEMOGLOBIN: 13.3 G/DL (ref 13.5–17.5)
INR BLD: 1.28 (ref 0.86–1.14)
LYMPHOCYTES ABSOLUTE: 1.8 K/UL (ref 1–5.1)
LYMPHOCYTES RELATIVE PERCENT: 31.1 %
MCH RBC QN AUTO: 36.8 PG (ref 26–34)
MCHC RBC AUTO-ENTMCNC: 35.9 G/DL (ref 31–36)
MCV RBC AUTO: 102.4 FL (ref 80–100)
MONOCYTES ABSOLUTE: 0.7 K/UL (ref 0–1.3)
MONOCYTES RELATIVE PERCENT: 11.7 %
NEUTROPHILS ABSOLUTE: 3.2 K/UL (ref 1.7–7.7)
NEUTROPHILS RELATIVE PERCENT: 54.8 %
PDW BLD-RTO: 14.6 % (ref 12.4–15.4)
PLATELET # BLD: 109 K/UL (ref 135–450)
PMV BLD AUTO: 8.2 FL (ref 5–10.5)
POTASSIUM REFLEX MAGNESIUM: 4.3 MMOL/L (ref 3.5–5.1)
PROTHROMBIN TIME: 14.9 SEC (ref 10–13.2)
RBC # BLD: 3 M/UL (ref 4.2–5.9)
SODIUM BLD-SCNC: 136 MMOL/L (ref 136–145)
WBC # BLD: 5.9 K/UL (ref 4–11)

## 2020-07-28 PROCEDURE — 85025 COMPLETE CBC W/AUTO DIFF WBC: CPT

## 2020-07-28 PROCEDURE — 7100000010 HC PHASE II RECOVERY - FIRST 15 MIN: Performed by: INTERNAL MEDICINE

## 2020-07-28 PROCEDURE — 2580000003 HC RX 258: Performed by: NURSE ANESTHETIST, CERTIFIED REGISTERED

## 2020-07-28 PROCEDURE — 2500000003 HC RX 250 WO HCPCS: Performed by: NURSE ANESTHETIST, CERTIFIED REGISTERED

## 2020-07-28 PROCEDURE — 6370000000 HC RX 637 (ALT 250 FOR IP): Performed by: STUDENT IN AN ORGANIZED HEALTH CARE EDUCATION/TRAINING PROGRAM

## 2020-07-28 PROCEDURE — 3609012400 HC EGD TRANSORAL BIOPSY SINGLE/MULTIPLE: Performed by: INTERNAL MEDICINE

## 2020-07-28 PROCEDURE — 6360000002 HC RX W HCPCS: Performed by: NURSE ANESTHETIST, CERTIFIED REGISTERED

## 2020-07-28 PROCEDURE — 85018 HEMOGLOBIN: CPT

## 2020-07-28 PROCEDURE — 2709999900 HC NON-CHARGEABLE SUPPLY: Performed by: INTERNAL MEDICINE

## 2020-07-28 PROCEDURE — 0DB98ZX EXCISION OF DUODENUM, VIA NATURAL OR ARTIFICIAL OPENING ENDOSCOPIC, DIAGNOSTIC: ICD-10-PCS | Performed by: INTERNAL MEDICINE

## 2020-07-28 PROCEDURE — 85014 HEMATOCRIT: CPT

## 2020-07-28 PROCEDURE — 3700000001 HC ADD 15 MINUTES (ANESTHESIA): Performed by: INTERNAL MEDICINE

## 2020-07-28 PROCEDURE — 88305 TISSUE EXAM BY PATHOLOGIST: CPT

## 2020-07-28 PROCEDURE — 85610 PROTHROMBIN TIME: CPT

## 2020-07-28 PROCEDURE — 7100000011 HC PHASE II RECOVERY - ADDTL 15 MIN: Performed by: INTERNAL MEDICINE

## 2020-07-28 PROCEDURE — 36415 COLL VENOUS BLD VENIPUNCTURE: CPT

## 2020-07-28 PROCEDURE — 2580000003 HC RX 258: Performed by: STUDENT IN AN ORGANIZED HEALTH CARE EDUCATION/TRAINING PROGRAM

## 2020-07-28 PROCEDURE — 88342 IMHCHEM/IMCYTCHM 1ST ANTB: CPT

## 2020-07-28 PROCEDURE — 3700000000 HC ANESTHESIA ATTENDED CARE: Performed by: INTERNAL MEDICINE

## 2020-07-28 PROCEDURE — 80048 BASIC METABOLIC PNL TOTAL CA: CPT

## 2020-07-28 RX ORDER — LIDOCAINE HYDROCHLORIDE 20 MG/ML
INJECTION, SOLUTION INTRAVENOUS PRN
Status: DISCONTINUED | OUTPATIENT
Start: 2020-07-28 | End: 2020-07-28 | Stop reason: SDUPTHER

## 2020-07-28 RX ORDER — PANTOPRAZOLE SODIUM 40 MG/1
40 TABLET, DELAYED RELEASE ORAL
Qty: 30 TABLET | Refills: 3 | Status: SHIPPED | OUTPATIENT
Start: 2020-07-29 | End: 2021-01-05

## 2020-07-28 RX ORDER — ONDANSETRON 2 MG/ML
4 INJECTION INTRAMUSCULAR; INTRAVENOUS
Status: DISCONTINUED | OUTPATIENT
Start: 2020-07-28 | End: 2020-07-28 | Stop reason: HOSPADM

## 2020-07-28 RX ORDER — SODIUM CHLORIDE, SODIUM LACTATE, POTASSIUM CHLORIDE, CALCIUM CHLORIDE 600; 310; 30; 20 MG/100ML; MG/100ML; MG/100ML; MG/100ML
INJECTION, SOLUTION INTRAVENOUS CONTINUOUS PRN
Status: DISCONTINUED | OUTPATIENT
Start: 2020-07-28 | End: 2020-07-28 | Stop reason: SDUPTHER

## 2020-07-28 RX ORDER — PANTOPRAZOLE SODIUM 40 MG/1
40 TABLET, DELAYED RELEASE ORAL
Qty: 30 TABLET | Refills: 3 | Status: CANCELLED | OUTPATIENT
Start: 2020-07-29

## 2020-07-28 RX ORDER — GLYCOPYRROLATE 0.2 MG/ML
INJECTION INTRAMUSCULAR; INTRAVENOUS PRN
Status: DISCONTINUED | OUTPATIENT
Start: 2020-07-28 | End: 2020-07-28 | Stop reason: SDUPTHER

## 2020-07-28 RX ORDER — PROPOFOL 10 MG/ML
INJECTION, EMULSION INTRAVENOUS PRN
Status: DISCONTINUED | OUTPATIENT
Start: 2020-07-28 | End: 2020-07-28 | Stop reason: SDUPTHER

## 2020-07-28 RX ORDER — LISINOPRIL 2.5 MG/1
2.5 TABLET ORAL NIGHTLY
Qty: 30 TABLET | Refills: 3 | Status: SHIPPED | OUTPATIENT
Start: 2020-07-28 | End: 2020-08-27 | Stop reason: SDUPTHER

## 2020-07-28 RX ADMIN — PROPOFOL 50 MG: 10 INJECTION, EMULSION INTRAVENOUS at 09:04

## 2020-07-28 RX ADMIN — LIDOCAINE HYDROCHLORIDE 50 MG: 20 INJECTION, SOLUTION INTRAVENOUS at 09:05

## 2020-07-28 RX ADMIN — PROPOFOL 50 MG: 10 INJECTION, EMULSION INTRAVENOUS at 09:03

## 2020-07-28 RX ADMIN — PROPOFOL 100 MG: 10 INJECTION, EMULSION INTRAVENOUS at 09:01

## 2020-07-28 RX ADMIN — LIDOCAINE HYDROCHLORIDE 50 MG: 20 INJECTION, SOLUTION INTRAVENOUS at 09:01

## 2020-07-28 RX ADMIN — Medication 125 MG: at 11:37

## 2020-07-28 RX ADMIN — Medication 10 ML: at 07:31

## 2020-07-28 RX ADMIN — GLYCOPYRROLATE 0.2 MG: 0.2 INJECTION, SOLUTION INTRAMUSCULAR; INTRAVENOUS at 09:01

## 2020-07-28 RX ADMIN — SODIUM CHLORIDE, SODIUM LACTATE, POTASSIUM CHLORIDE, AND CALCIUM CHLORIDE: 600; 310; 30; 20 INJECTION, SOLUTION INTRAVENOUS at 08:03

## 2020-07-28 RX ADMIN — Medication 125 MG: at 17:00

## 2020-07-28 RX ADMIN — PROPOFOL 50 MG: 10 INJECTION, EMULSION INTRAVENOUS at 09:07

## 2020-07-28 ASSESSMENT — PULMONARY FUNCTION TESTS
PIF_VALUE: 1

## 2020-07-28 ASSESSMENT — PAIN SCALES - GENERAL: PAINLEVEL_OUTOF10: 0

## 2020-07-28 NOTE — PROGRESS NOTES
Waiting on gi to clarify anticoagulation dose sister aware , she will pick him up when we get info from medical resident

## 2020-07-28 NOTE — ANESTHESIA POSTPROCEDURE EVALUATION
Department of Anesthesiology  Postprocedure Note    Patient: Bharathi Granger  MRN: [id number]  YOB: 1960  Date of evaluation: 7/28/2020  Time:  9:41 AM     Procedure Summary     Date:  07/28/20 Room / Location:  [facility]    Anesthesia Start:  2442 Anesthesia Stop:  0369    Procedure:  EGD BIOPSY (N/A ) Diagnosis:  (GI BLEED)    Surgeon:  Maria T Hoffmann MD Responsible Provider:  Jacque Hester DO    Anesthesia Type:  MAC ASA Status:  3          Anesthesia Type: MAC    Genaro Phase I: Genaro Score: 10    Genaro Phase II:      Last vitals: Reviewed and per EMR flowsheets.        Anesthesia Post Evaluation    Patient location during evaluation: bedside  Patient participation: complete - patient participated  Level of consciousness: awake  Pain score: 0  Airway patency: patent  Nausea & Vomiting: no nausea and no vomiting  Complications: no  Cardiovascular status: blood pressure returned to baseline  Respiratory status: acceptable  Hydration status: euvolemic

## 2020-07-28 NOTE — CARE COORDINATION
Case Management Assessment            Discharge Note                    Date / Time of Note: 7/28/2020 4:40 PM                  Discharge Note Completed by: Zachary Spain    Patient Name: Reji Bah   YOB: 1960  Diagnosis: Hypokalemia [E87.6]  Hypokalemia [E87.6]   Date / Time: 7/25/2020  8:45 AM    Current PCP: No primary care provider on file. Clinic patient: No    Hospitalization in the last 30 days: No    Advance Directives:  Code Status: Prior  [place] DNR form completed and on chart: No    Financial:  Payor: Jina Raymundo / Plan: Jina Raymundo / Product Type: *No Product type* /      Pharmacy:    [address], [address] [place] [id number]  Phone: [phone] Fax: [phone]      Assistance purchasing medications?: Potential Assistance Purchasing Medications: No  Assistance provided by Case Management: None at this time    Does patient want to participate in local refill/ meds to beds program?: Not Assessed    Meds To Beds General Rules:  1. Can ONLY be done Monday- Friday between 8:30am-5pm  2. Prescription(s) must be in pharmacy by 3pm to be filled same day  3. Copy of patient's insurance/ prescription drug card and patient face sheet must be sent along with the prescription(s)  4. Cost of Rx cannot be added to hospital bill. If financial assistance is needed, please contact unit  or ;  or  CANNOT provide pharmacy voucher for patients co-pays  5.  Patients can then  the prescription on their way out of the hospital at discharge, or pharmacy can deliver to the bedside if staff is available. (payment due at time of pick-up or delivery - cash, check, or card accepted)     Able to afford home medications/ co-pay costs: Yes    ADLS:  Current PT AM-PAC Score: 23 /24  Current OT AM-PAC Score: 24 /24      DISCHARGE Disposition: Home- No Services Needed    LOC at discharge: Not Applicable  CINDY Completed: Not Indicated    Notification completed in HENS/PAS?:  Not Applicable    IMM Completed:   Not Indicated    Transportation:  Transportation PLAN for discharge: family   Mode of Transport: Private Car    The Plan for Transition of Care is related to the following treatment goals of Hypokalemia [E87.6]  Hypokalemia [E87.6]    The Patient and/or patient representative Mio Christian and his family were provided with a choice of provider and agrees with the discharge plan Not Indicated    Freedom of choice list was provided with basic dialogue that supports the patient's individualized plan of care/goals and shares the quality data associated with the providers.  Not Indicated    Care Transitions patient: No    Magno Cruz RN  [facility]  Case Management Department  Ph: [phone]  Fax: [phone]

## 2020-07-28 NOTE — PROGRESS NOTES
Will instruct family member on lovenox injections , and discharge instructions , plan discharge to car per wheel chair

## 2020-07-28 NOTE — DISCHARGE SUMMARY
INTERNAL MEDICINE DEPARTMENT AT [address] [place] [address]  DISCHARGE SUMMARY    Patient ID: Rob Ha                                             Discharge Date: 7/28/2020   Patient's PCP: No primary care provider on file. Discharge Physician: Maryse Ledezma MD  Admit Date: 7/25/2020   Admitting Physician: Mario Santana MD    DISCHARGE DIAGNOSES:  1- Clostridioides difficile colitis    Chronic, Stable Medical Conditions, POA:  1. CAD s/p LAD stent  2. HTN  3. LLE DVT   4. Essential Tremor  5. HFrEF    Hospital Course:     Mr. Julisa Ramirez is a 62 y.o. male with history of HFrEF (EF of 35%), CAD s/p LAD stent (5/2020), HTN, LV apical thrombus and DVT on coumadin DAPT, who presented to the hospital with lightheadedness for 1 month and diarrhea for 3 weeks. He was recently seen at the end of June for lightheadedness and was discharged after receiving fluids. On admission he was positive for c.difficile toxin. Mr. Julisa Ramirez denied prior history of c.difficile. WBC was 19.2, hemoglobin was 13.6, MCV was 101.3, and INR was supratherapeutic at 7.37. CT head and MRI brain were negative for acute infarction, recent intracranial hemorrhage, or mass lesion. He was started on oral vancomycin as well as vitamin B12. Coumadin was held and vitamin K was given. Home blood pressure medication was also held for lower pressures. Hemoglobin began to drop 7/26 to 11.6. Orthostatics were positive. Due to orthostatic hypotension, natalol for chronic essential tremors was held. Mr. Julisa Ramirez declined to try any new medications for the tremor. On 7/27 Hgb declined further to 10.6. Folate was added. Started lovenox bridge in order to restart warfarin. However, due to concern for GI bleed, Protonix was started and lovenox and warfarin were re-held. Plavix and aspirin were continued due to cardiac risk. Echocardiogram was virtually unchanged from June with EF of 35-40% and global LV hypokinesis. EGD was completeted 7/28, which showed small gastric ulcerations and duodenitis. H.pylori and celiac disease testing is pending. At time of discharge diarrheal symptoms have greatly improved and blood pressure is stable. Hemoglobin is stable at 11.0 without signs of active bleeding. INR was 1.28. He was discharged with a short term lovenox bridge with warfarin, and plan for rapid follow up. The patient was found to not be in any acute distress, with vital signs within normal limits, and no new abnormalities on physical examination. Further, the patient expressed appropriate understanding of, and agreement with, the discharge recommendations, medications, and plan. Physical Exam:  /78   Pulse 73   Temp 97.9 °F (36.6 °C) (Oral)   Resp 16   Ht 5' 9\" (1.753 m)   Wt 176 lb 2.4 oz (79.9 kg)   SpO2 99%   BMI 26.01 kg/m²   General appearance: alert, appears stated age and cooperative  Head: Normocephalic, without obvious abnormality, atraumatic. Eyes: conjunctivae/corneas clear. EOM's intact. Ears: normal TM's and external ear canals both ears  Nose: Nares normal. Septum midline. Mucosa normal. No drainage or sinus tenderness. Throat: lips, mucosa, and tongue normal; teeth and gums fair. Heart: regular rate and rhythm, S1, S2 normal, no murmur, click, rub or gallop  Abdomen: soft, non-tender; bowel sounds normal; no masses,  no organomegaly, no distension or fluid shifts. Extremities: extremities normal, atraumatic, minimal edema, L lower extremity with mild peripheral cyanosis. Neurologic: Grossly normal, minimal disability on finger-to-nose testing. Consults: GI, Neurology  Disposition: Home  Discharged Condition: Stable  Follow Up:  With Cardiology and outpatient clinic in 3 days    Laboratory Results:  Recent Labs     07/28/20  0459 07/27/20  1400 07/27/20  0444 07/26/20  0541 07/25/20  0931   WBC 5.9  --  8.9 15.9* 19.2*   HGB 11.0* 11.4* 10.6* 11.6* 13.6   HCT 30.7* 32.3* 29.4* 32.3* 38.0*   MCV 102.4*  --  102.7* 102.2* 101.3*   *  --  91* 98* 140     Lab Results   Component Value Date    INR 1.28 (H) 07/28/2020    INR 1.43 (H) 07/27/2020    INR 2.08 (H) 07/26/2020    INR 7.47 (HH) 07/25/2020    PROTIME 14.9 (H) 07/28/2020    PROTIME 16.7 (H) 07/27/2020    PROTIME 24.3 (H) 07/26/2020    PROTIME 88.4 (H) 07/25/2020       Imaging Results:  MRI BRAIN W WO CONTRAST   Final Result      No acute infarction, recent intracranial hemorrhage, or mass lesion. Scattered areas of small infarcts with suggested cortical laminar necrosis within the bilateral parietal lobes and right temporal lobe. Mild diffuse atrophy and chronic small vessel ischemic change. Favored artifactual signal abnormalities within the dural venous sinuses as detailed. CT Head WO Contrast   Final Result         1. No hemorrhage. 2. Focal area of hypoattenuation involving cortical and subcortical white matter in the high right parietal lobe. This is age-indeterminate, however, given patient's history this could be a remote insult. There is any further concern, MRI is indicated.              DISCHARGE MEDICATION:     Medication List      START taking these medications    enoxaparin 80 MG/0.8ML injection  Commonly known as:  LOVENOX  Inject 0.8 mLs into the skin 2 times daily for 5 days     pantoprazole 40 MG tablet  Commonly known as:  PROTONIX  Take 1 tablet by mouth every morning (before breakfast)  Start taking on:  July 29, 2020     vancomycin 50 mg/mL oral solution  Commonly known as:  VANCOCIN  Take 2.5 mLs by mouth 4 times daily for 7 days        CHANGE how you take these medications    Hydrocerin Crea cream  Apply topically 2 times daily  What changed:  additional instructions     lisinopril 2.5 MG tablet  Commonly known as:  PRINIVIL;ZESTRIL  Take 1 tablet by mouth nightly  What changed:  when to take this        CONTINUE taking these medications    aspirin 81 MG chewable tablet  Take 1 tablet by mouth daily atorvastatin 40 MG tablet  Commonly known as:  LIPITOR  Take 1 tablet by mouth nightly     carvedilol 3.125 MG tablet  Commonly known as:  Coreg  Take 1 tablet by mouth 2 times daily     clopidogrel 75 MG tablet  Commonly known as:  PLAVIX  Take 1 tablet by mouth daily     folic acid 1 MG tablet  Commonly known as:  FOLVITE  Take 1 tablet by mouth daily     mineral oil-hydrophilic petrolatum ointment     thiamine 100 MG tablet  Take 1 tablet by mouth daily     vitamin B-12 100 MCG tablet  Commonly known as:  CYANOCOBALAMIN     warfarin 2.5 MG tablet  Commonly known as:  COUMADIN  Take as directed. If you are unsure how to take this medication, talk to your nurse or doctor. STOP taking these medications    furosemide 20 MG tablet  Commonly known as:  LASIX           Where to Get Your Medications      These medications were sent to [facility] [address], [address], [address] [place] [id number]    Phone:  [phone]   · enoxaparin 80 MG/0.8ML injection  · lisinopril 2.5 MG tablet  · pantoprazole 40 MG tablet     You can get these medications from any pharmacy    Bring a paper prescription for each of these medications  · vancomycin 50 mg/mL oral solution       Activity: as tolerated.   Diet: regular diet     Time Spent on discharge is more than 30 minutes    Signed:  Carlyn Shay MD   Internal Medicine, PGY1  7/28/2020

## 2020-07-28 NOTE — PROGRESS NOTES
Clinical Pharmacy Consult Note    Admit date: 7/25/2020    Subjective/Objective:  Devi Bhatia is a 62 y.o. 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 Patient presented to [facility] with tremors, dizziness, and diarrhea. C.diff positive; on oral vancomycin. Interval update:  Warfarin stopped, 2/2 drop in Hgb. Enoxaparin given last PM, but held today (along with held ASA and clopidogrel this AM) to await EGD results from this AM.     Pharmacy is signed off warfarin consult, as it has been PR'ed. Home anticoagulation regimen: 1.25mg daily   INR is managed outpatient by [facility]   · Last appt 7/16, INR = 2.5  · INR goal = 2.5-3.0  · Next INR check scheduled 7/30       Date INR Warfarin Dose Other   7/25 7.47 -- Vit K 2.5mg   7/26 2.08 1.25mg    7/27 1.43 --    7/28 1.28         Recent Labs     07/27/20  0444 07/28/20  0459   * 136   K 3.2* 4.3    106   CO2 24 21   BUN 8 6*   CREATININE <0.5* <0.5*   GLUCOSE 96 85       CrCl cannot be calculated (This lab value cannot be used to calculate CrCl because it is not a number: <0.5). Lab Results   Component Value Date    WBC 5.9 07/28/2020    HGB 11.0 (L) 07/28/2020    HCT 30.7 (L) 07/28/2020    .4 (H) 07/28/2020     (L) 07/28/2020       Lab Results   Component Value Date    PROTIME 14.9 (H) 07/28/2020    INR 1.28 (H) 07/28/2020       Assessment/Plan:  1) DVT + mural thrombus:    · Warfarin stopped yesterday, in anticipation of EGD today. · Current bridge with enoxaparin on hold today, too. · Please re-consult pharmacy if dosing assistance desired once able to resume.     Please call with questions--  Thanks--  Chelita MuñizD., BCPS   7/28/2020 11:44 AM  Wireless: [phone]

## 2020-07-28 NOTE — ANESTHESIA PRE PROCEDURE
Department of Anesthesiology  Preprocedure Note       Name:  Augie Sadler   Age:  62 y.o.  :  1960                                          MRN:  [id number]         Date:  2020      Surgeon: Roman Chew):  Artemisa Snellen, MD    Procedure: Procedure(s):  EGD DIAGNOSTIC ONLY    Medications prior to admission:   Prior to Admission medications    Medication Sig Start Date End Date Taking? Authorizing Provider   warfarin (COUMADIN) 2.5 MG tablet Take 1.25 mg by mouth nightly   Yes Historical Provider, MD   vitamin B-12 (CYANOCOBALAMIN) 100 MCG tablet Take 100 mcg by mouth nightly   Yes Historical Provider, MD   carvedilol (COREG) 3.125 MG tablet Take 1 tablet by mouth 2 times daily 20  Yes Denny Contreras MD   aspirin 81 MG chewable tablet Take 1 tablet by mouth daily 20  Yes Ramandeep Soto MD   atorvastatin (LIPITOR) 40 MG tablet Take 1 tablet by mouth nightly 20  Yes Ramandeep Soto MD   lisinopril (PRINIVIL;ZESTRIL) 2.5 MG tablet Take 1 tablet by mouth daily 20  Yes Ramandeep Soto MD   Skin Protectants, Misc.  (HYDROCERIN) CREA cream Apply topically 2 times daily  Patient taking differently: Apply topically 2 times daily Apply topically to legs 20  Yes Ramandeep Soto MD   folic acid (FOLVITE) 1 MG tablet Take 1 tablet by mouth daily 20  Yes Ramandeep Soto MD   clopidogrel (PLAVIX) 75 MG tablet Take 1 tablet by mouth daily 20  Yes Ramandeep Soto MD   vitamin B-1 100 MG tablet Take 1 tablet by mouth daily 20  Yes Ramandeep Soto MD   furosemide (LASIX) 20 MG tablet Take 20 mg by mouth daily   Yes Historical Provider, MD   mineral oil-hydrophilic petrolatum (AQUAPHOR) ointment Apply 85 g topically as needed for Dry Skin Apply topically to both legs   Yes Historical Provider, MD       Current medications:    Current Facility-Administered Medications   Medication Dose Route Frequency Provider Last Rate Last Dose    potassium chloride (KLOR-CON M) extended release tablet 40 mEq  40 mEq Oral BID  Marcos Hernandez MD   40 mEq at 07/27/20 1736    enoxaparin (LOVENOX) injection 80 mg  80 mg Subcutaneous BID Sharon Ley MD   80 mg at 46/68/20 0462    folic acid (FOLVITE) tablet 1 mg  1 mg Oral Daily Zen Gonzalez MD   1 mg at 07/27/20 1309    pantoprazole (PROTONIX) tablet 40 mg  40 mg Oral QAM AC Zen Gonzalez MD   40 mg at 07/27/20 1309    sodium chloride flush 0.9 % injection 10 mL  10 mL Intravenous 2 times per day Aftab Watt MD   10 mL at 07/27/20 2136    sodium chloride flush 0.9 % injection 10 mL  10 mL Intravenous PRN Aftab Watt MD        vitamin B-12 (CYANOCOBALAMIN) tablet 100 mcg  100 mcg Oral Daily Chase Tran MD   100 mcg at 07/27/20 0813    atorvastatin (LIPITOR) tablet 40 mg  40 mg Oral Nightly Chase Tran MD   40 mg at 07/27/20 2136    [Held by provider] carvedilol (COREG) tablet 3.125 mg  3.125 mg Oral BID  Aftab Watt MD   Stopped at 07/26/20 1700    clopidogrel (PLAVIX) tablet 75 mg  75 mg Oral Daily Chase Tran MD   75 mg at 07/27/20 0813    [Held by provider] lisinopril (PRINIVIL;ZESTRIL) tablet 2.5 mg  2.5 mg Oral Daily Chase Tran MD   Stopped at 07/26/20 0900    aspirin chewable tablet 81 mg  81 mg Oral Daily Chase Tran MD   81 mg at 07/27/20 0813    vancomycin (VANCOCIN) oral solution 125 mg  125 mg Oral 4x daily Aftab Watt MD   484 mg at 07/27/20 2136    acetaminophen (TYLENOL) tablet 1,000 mg  1,000 mg Oral Q6H PRN Rere Harrington MD           Allergies:  No Known Allergies    Problem List:    Patient Active Problem List   Diagnosis Code    Mural 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    Coronary artery disease due to lipid rich plaque I25.10, I25.83    Abnormal angiogram R93.89    Acute deep vein thrombosis of left lower extremity (HCC) I82.402    Hypokalemia E87.6       Past Medical History:        Diagnosis Date    Alcohol abuse     CAD (coronary artery disease)     with complete LAD occlusion    CHF (congestive heart failure) (HCC)     LVEF    HTN (hypertension)     Lower extremity cellulitis     MI (mitral incompetence)        Past Surgical History:        Procedure Laterality Date    PTCA         Social History:    Social History     Tobacco Use    Smoking status: Former Smoker     Packs/day: 0.50     Years: 40.00     Pack years: 20.00     Types: Cigarettes    Smokeless tobacco: Never Used   Substance Use Topics    Alcohol use: Yes     Alcohol/week: 2.0 standard drinks     Types: 2 Cans of beer per week     Comment: previously was drinkning 5 cans per day                                Counseling given: Not Answered      Vital Signs (Current):   Vitals:    07/27/20 1416 07/27/20 1849 07/27/20 2245 07/28/20 0200   BP: 104/64 117/75 126/80 (!) 142/79   Pulse: 82 74 81 94   Resp: 16 16 16 16   Temp: 98.7 °F (37.1 °C) 98.7 °F (37.1 °C) 98.2 °F (36.8 °C) 97.9 °F (36.6 °C)   TempSrc: Oral Oral Oral Oral   SpO2: 97% 99% 99% 100%   Weight:       Height:                                                  BP Readings from Last 3 Encounters:   07/28/20 (!) 142/79   07/06/20 104/69   06/30/20 (!) 159/77       NPO Status:                                                                                 BMI:   Wt Readings from Last 3 Encounters:   07/27/20 176 lb 2.4 oz (79.9 kg)   07/06/20 180 lb 12.8 oz (82 kg)   06/30/20 180 lb (81.6 kg)     Body mass index is 26.01 kg/m².     CBC:   Lab Results   Component Value Date    WBC 5.9 07/28/2020    RBC 3.00 07/28/2020    HGB 11.0 07/28/2020    HCT 30.7 07/28/2020    .4 07/28/2020    RDW 14.6 07/28/2020     07/28/2020       CMP:   Lab Results   Component Value Date     07/28/2020    K 4.3 07/28/2020     07/28/2020    CO2 21 07/28/2020    BUN 6 07/28/2020    CREATININE <0.5 07/28/2020    GFRAA >60 07/28/2020    AGRATIO 1.4 07/25/2020    LABGLOM >60 07/28/2020    GLUCOSE 85 07/28/2020    PROT 5.6 07/26/2020    CALCIUM 8.0 07/28/2020    BILITOT 2.0 07/26/2020    ALKPHOS 97 07/26/2020    AST 28 07/26/2020    ALT 19 07/26/2020       POC Tests: No results for input(s): POCGLU, POCNA, POCK, POCCL, POCBUN, POCHEMO, POCHCT in the last 72 hours. Coags:   Lab Results   Component Value Date    PROTIME 14.9 07/28/2020    INR 1.28 07/28/2020    APTT 33.0 05/14/2020       HCG (If Applicable): No results found for: PREGTESTUR, PREGSERUM, HCG, HCGQUANT     ABGs: No results found for: PHART, PO2ART, GRK4AWJ, ZMP3XOK, BEART, H5HSRMQL     Type & Screen (If Applicable):  No results found for: LABABO, LABRH    Drug/Infectious Status (If Applicable):  No results found for: HIV, HEPCAB    COVID-19 Screening (If Applicable): No results found for: COVID19      Anesthesia Evaluation  Patient summary reviewed and Nursing notes reviewed  Airway: Mallampati: II  TM distance: >3 FB   Neck ROM: full  Mouth opening: > = 3 FB Dental: normal exam         Pulmonary:Negative Pulmonary ROS and normal exam  breath sounds clear to auscultation            Patient did not smoke on day of surgery. Cardiovascular:    (+) hypertension: mild, valvular problems/murmurs: MR, CAD: no interval change, CHF: no interval change,       ECG reviewed  Rhythm: regular             Beta Blocker:  Dose within 24 Hrs         Neuro/Psych:   (+) psychiatric history: stable with treatment            GI/Hepatic/Renal: Neg GI/Hepatic/Renal ROS            Endo/Other: Negative Endo/Other ROS                    Abdominal:       Abdomen: soft. Vascular:                                        Anesthesia Plan      MAC     ASA 3       Induction: intravenous. MIPS: Postoperative opioids intended and Prophylactic antiemetics administered. Anesthetic plan and risks discussed with patient. Use of blood products discussed with patient whom consented to blood products. Plan discussed with attending and CRNA.     Attending anesthesiologist reviewed and agrees with Pre Eval content              Benjy Cherry DO   7/28/2020

## 2020-07-28 NOTE — PROGRESS NOTES
Patient is alert and oriented. Vital signs are stable. Patient denies any pain. Patient ambulates x1 with a walker. Patient tolerates ambulation well. Patient NPO since midnight. Bed is in the lowest position. Bed alarm is activated. Call light is within reach. Will continue to monitor and reassess.

## 2020-07-28 NOTE — PLAN OF CARE
Problem: Falls - Risk of:  Goal: Will remain free from falls  Description: Will remain free from falls  7/27/2020 2059 by Leslie Seals RN  Outcome: Ongoing  Patient remains free from falls during this shift. Patient is up x1 person stand by assist. Bed is in the lowest position and the bed and chair alarm is activated. Anti-slip socks are on. Call light is within reach. Will continue to monitor and reassess. Problem: Skin Integrity:  Goal: Absence of new skin breakdown  Description: Absence of new skin breakdown  7/27/2020 2100 by Leslie Seals RN  Outcome: Ongoing  Skin assessed by RN. Patient has no new skin breakdown thus far this shift. Will continue to monitor and reassess.

## 2020-07-29 ENCOUNTER — TELEPHONE (OUTPATIENT)
Dept: PHARMACY | Age: 60
End: 2020-07-29

## 2020-07-29 RX ORDER — VANCOMYCIN HYDROCHLORIDE 125 MG/1
125 CAPSULE ORAL 4 TIMES DAILY
Qty: 28 CAPSULE | Refills: 0 | Status: SHIPPED | OUTPATIENT
Start: 2020-07-29 | End: 2020-08-05

## 2020-07-29 NOTE — TELEPHONE ENCOUNTER
Patient's sister, Moy Callejas, called today stating discharge instructions from hospital said to take warfarin 2.5mg daily + Lovenox bridge upon discharge. Patient's usual home dose is 1.25mg daily, and INR was 7 upon hospital admission. Discussed with Moy Callejas that I would recommend continuing previous home dose of 1.25mg daily + Lovenox bridge for now. Patient did take 2.5mg last evening, and will start Lovenox injections today. Patient is getting numerous labs drawn on Friday, therefore will add INR to labs and call patient and Moy Callejas with results and instructions on what to do based on level.      Sunday Katina, PharmD, BCPS  [facility]  [place]: [phone]  [place]: [phone]  7/29/2020 8:58 AM

## 2020-07-30 ENCOUNTER — OFFICE VISIT (OUTPATIENT)
Dept: CARDIOLOGY CLINIC | Age: 60
End: 2020-07-30
Payer: MEDICAID

## 2020-07-30 VITALS
WEIGHT: 176.8 LBS | HEIGHT: 69 IN | BODY MASS INDEX: 26.19 KG/M2 | SYSTOLIC BLOOD PRESSURE: 110 MMHG | TEMPERATURE: 97.6 F | HEART RATE: 72 BPM | DIASTOLIC BLOOD PRESSURE: 64 MMHG

## 2020-07-30 PROCEDURE — 1111F DSCHRG MED/CURRENT MED MERGE: CPT | Performed by: NURSE PRACTITIONER

## 2020-07-30 PROCEDURE — 3017F COLORECTAL CA SCREEN DOC REV: CPT | Performed by: NURSE PRACTITIONER

## 2020-07-30 PROCEDURE — G8427 DOCREV CUR MEDS BY ELIG CLIN: HCPCS | Performed by: NURSE PRACTITIONER

## 2020-07-30 PROCEDURE — 99214 OFFICE O/P EST MOD 30 MIN: CPT | Performed by: NURSE PRACTITIONER

## 2020-07-30 PROCEDURE — G8419 CALC BMI OUT NRM PARAM NOF/U: HCPCS | Performed by: NURSE PRACTITIONER

## 2020-07-30 PROCEDURE — 1036F TOBACCO NON-USER: CPT | Performed by: NURSE PRACTITIONER

## 2020-07-30 NOTE — PATIENT INSTRUCTIONS
Plan:  Now on asa, plavix and coumadin, echo x 2 no mural thrombus / will recheck his us legs to asses for DVT if none would consider stopping coumadin and cont asa /plavix  / goes to coumadin clinic   VL extremities venus duy for DVT with hx DVT   Fu in 2-3 weeks   emotional and educational support given / much needed

## 2020-07-30 NOTE — PROGRESS NOTES
[address]  Office Visit           Diana Valencia MD,  [address]  APRN FNP CVNP       Cardiology             Laurie Wray  1960 July 30, 2020    Primary Cardiologist:  Loren Morales       CC: Interval Hx:  CAD/ stent / Mural thrombus / now on coumadin      [facility] 5/12/20 PTCA stent to the LAD proximally with a drug-eluting stent [place]  PTCA to the diagonal branch    Today no c/o cp / SOB/edema/PND or JC / c/o some dizziness b/p soft has been dehydrated with diarrhea   Encouraged fluids / he is followed by PCP for diarrhea   He c/o diarrhea today / no c/o fever/cough /  rash / n/v  /  Now on vanco with improvement   Now on asa, plavix and coumadin, echo x 2 no mural / will recheck his us legs to asses for DVT if none would consider stopping coumadin and cont asa /plavix        7/27/20 echo  Limited echo. No changes from prior echo 6/5/20. Left ventricular cavity size is normal. Normal left ventricular wall   thickness. Left ventricular function is reduced with ejection fraction   estimated at 35-40%. Global left ventricular hypokinesis is present. 6/5/20 echo  Left ventricular cavity size is normal. Normal left ventricular wall   thickness. Left ventricular function is reduced with ejection fraction   estimated at 35%. Global left ventricular hypokinesis is present. No   evidence of an apical thrombus in the left ventricle. 5/12/20 echo  Left ventricular cavity size is normal. Normal left ventricular wall   thickness. Left ventricular function is reduced with ejection fraction   estimated at 30-35%. Global left ventricular hypokinesis is present. Diastolic filling parameters suggest grade I diastolic dysfunction. There is   evidence of an apical thrombus in the left ventricle. Aortic valve appears   slightly thickened/calcified but opens adequately. Mild tricuspid   regurgitation.  Estimated pulmonary artery systolic pressure is at 34 mmHg   assuming a right atrial pressure of 3 mmHg. 5/12/20 us legs    Acute deep vein thrombosis involving the left Popliteal vein, Gastrocnemius     veins and superficial vein thrombosis of the left small saphneous vein. 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 Review of Systems:  Constitutional: Denies  fatigue, weakness, night sweats or fever. HEENT: Denies new visual changes, ringing in ears, nosebleeds,nasal congestion  Respiratory: Denies new or change in SOB, PND, orthopnea or cough. Cardiovascular: see HPI  GI: Denies N/V, diarrhea, constipation, abdominal pain, change in bowel habits, melena or hematochezia  : Denies urinary frequency, urgency, incontinence, hematuria or dysuria. Skin: Denies rash, hives, or cyanosis  Musculoskeletal: Denies joint or muscle aches/pain  Neurological: Denies syncope or TIA-like symptoms.   Psychiatric: Denies anxiety, insomnia or depression     Past Medical History:   Diagnosis Date    Alcohol abuse     CAD (coronary artery disease)     with complete LAD occlusion    CHF (congestive heart failure) (HCC)     LVEF    HTN (hypertension)     Lower extremity cellulitis     MI (mitral incompetence)      Past Surgical History:   Procedure Laterality Date    PTCA      UPPER GASTROINTESTINAL ENDOSCOPY N/A 7/28/2020    EGD BIOPSY performed by Mary Hameed MD at [facility] ENDOSCOPY     Family History   Problem Relation Age of Onset    Stroke Mother     Heart Disease Father      Social History     Tobacco Use    Smoking status: Former Smoker Packs/day: 0.50     Years: 40.00     Pack years: 20.00     Types: Cigarettes    Smokeless tobacco: Never Used   Substance Use Topics    Alcohol use: Yes     Alcohol/week: 2.0 standard drinks     Types: 2 Cans of beer per week     Comment: previously was drinkning 5 cans per day    Drug use: Never       No Known Allergies  Current Outpatient Medications   Medication Sig Dispense Refill    vancomycin (VANCOCIN) 125 MG capsule Take 1 capsule by mouth 4 times daily for 7 days 28 capsule 0    enoxaparin (LOVENOX) 80 MG/0.8ML injection Inject 0.8 mLs into the skin 2 times daily for 5 days 8 mL 0    lisinopril (PRINIVIL;ZESTRIL) 2.5 MG tablet Take 1 tablet by mouth nightly 30 tablet 3    pantoprazole (PROTONIX) 40 MG tablet Take 1 tablet by mouth every morning (before breakfast) 30 tablet 3    warfarin (COUMADIN) 2.5 MG tablet Take 1.25 mg by mouth nightly      vitamin B-12 (CYANOCOBALAMIN) 100 MCG tablet Take 100 mcg by mouth nightly      carvedilol (COREG) 3.125 MG tablet Take 1 tablet by mouth 2 times daily 60 tablet 3    aspirin 81 MG chewable tablet Take 1 tablet by mouth daily 30 tablet 3    atorvastatin (LIPITOR) 40 MG tablet Take 1 tablet by mouth nightly 30 tablet 3    Skin Protectants, Misc. (HYDROCERIN) CREA cream Apply topically 2 times daily (Patient taking differently: Apply topically 2 times daily Apply topically to legs) 1 Container 0    folic acid (FOLVITE) 1 MG tablet Take 1 tablet by mouth daily 30 tablet 3    clopidogrel (PLAVIX) 75 MG tablet Take 1 tablet by mouth daily 30 tablet 3    vitamin B-1 100 MG tablet Take 1 tablet by mouth daily 30 tablet 3    mineral oil-hydrophilic petrolatum (AQUAPHOR) ointment Apply 85 g topically as needed for Dry Skin Apply topically to both legs       No current facility-administered medications for this visit.         Physical Exam:   /64   Pulse 72   Temp 97.6 °F (36.4 °C)   Ht 5' 9\" (1.753 m)   Wt 176 lb 12.8 oz (80.2 kg)   BMI 26.11 kg/m² stent / Mural thrombus / now on coumadin      [facility] 5/12/20 PTCA stent to the LAD proximally with a drug-eluting stent [place]  PTCA to the diagonal branch    Today no c/o cp / SOB/edema/PND or CJ   He c/o diarrhea today / no c/o fever/cough /  rash / n/v     Now on vanco   Now on asa, plavix and coumadin, echo x 2 no mural / will recheck his us legs to asses for DVT if none would consider stopping coumadin and cont asa /plavix        7/27/20 echo  Limited echo. No changes from prior echo 6/5/20. Left ventricular cavity size is normal. Normal left ventricular wall   thickness. Left ventricular function is reduced with ejection fraction   estimated at 35-40%. Global left ventricular hypokinesis is present. 6/5/20 echo  Left ventricular cavity size is normal. Normal left ventricular wall   thickness. Left ventricular function is reduced with ejection fraction   estimated at 35%. Global left ventricular hypokinesis is present. No   evidence of an apical thrombus in the left ventricle. 5/12/20 echo  Left ventricular cavity size is normal. Normal left ventricular wall   thickness. Left ventricular function is reduced with ejection fraction   estimated at 30-35%. Global left ventricular hypokinesis is present. Diastolic filling parameters suggest grade I diastolic dysfunction. There is   evidence of an apical thrombus in the left ventricle. Aortic valve appears   slightly thickened/calcified but opens adequately. Mild tricuspid   regurgitation. Estimated pulmonary artery systolic pressure is at 34 mmHg   assuming a right atrial pressure of 3 mmHg. DVT   5/12/20 us legs    Acute deep vein thrombosis involving the left Popliteal vein, Gastrocnemius     veins and superficial vein thrombosis of the left small saphneous vein. Alcoholism  Improved    Tremor  Managed per PCP    Plan:   Today no c/o cp / SOB/edema/PND or JC   He c/o diarrhea today / no c/o fever/cough /  rash / n/v   now on vanco   Now on asa, plavix and coumadin, echo x 2 no mural thrombus / will recheck his us legs to asses for DVT if none would consider stopping coumadin and cont asa /plavix  / goes to coumadin clinic   VL extremities venus duy for DVT with hx DVT   Fu in 2-3 weeks   emotional and educational support given / much needed     Fe Paris APRN,CVNP

## 2020-07-31 ENCOUNTER — ANTI-COAG VISIT (OUTPATIENT)
Dept: PHARMACY | Age: 60
End: 2020-07-31

## 2020-07-31 DIAGNOSIS — I95.1 ORTHOSTATIC HYPOTENSION: ICD-10-CM

## 2020-07-31 DIAGNOSIS — R79.1 ELEVATED INR: ICD-10-CM

## 2020-07-31 DIAGNOSIS — E87.6 HYPOKALEMIA: ICD-10-CM

## 2020-07-31 LAB
ANION GAP SERPL CALCULATED.3IONS-SCNC: 12 MMOL/L (ref 3–16)
BASOPHILS ABSOLUTE: 0 K/UL (ref 0–0.2)
BASOPHILS RELATIVE PERCENT: 0.7 %
BUN BLDV-MCNC: 5 MG/DL (ref 7–20)
CALCIUM SERPL-MCNC: 8.4 MG/DL (ref 8.3–10.6)
CHLORIDE BLD-SCNC: 103 MMOL/L (ref 99–110)
CO2: 25 MMOL/L (ref 21–32)
CREAT SERPL-MCNC: 0.6 MG/DL (ref 0.8–1.3)
EOSINOPHILS ABSOLUTE: 0.1 K/UL (ref 0–0.6)
EOSINOPHILS RELATIVE PERCENT: 2.1 %
GFR AFRICAN AMERICAN: >60
GFR NON-AFRICAN AMERICAN: >60
GLUCOSE BLD-MCNC: 89 MG/DL (ref 70–99)
HCT VFR BLD CALC: 34.3 % (ref 40.5–52.5)
HEMOGLOBIN: 11.9 G/DL (ref 13.5–17.5)
INR BLD: 4.03 (ref 0.86–1.14)
LYMPHOCYTES ABSOLUTE: 2 K/UL (ref 1–5.1)
LYMPHOCYTES RELATIVE PERCENT: 34.7 %
MCH RBC QN AUTO: 36.1 PG (ref 26–34)
MCHC RBC AUTO-ENTMCNC: 34.8 G/DL (ref 31–36)
MCV RBC AUTO: 103.6 FL (ref 80–100)
MONOCYTES ABSOLUTE: 0.9 K/UL (ref 0–1.3)
MONOCYTES RELATIVE PERCENT: 15.3 %
NEUTROPHILS ABSOLUTE: 2.7 K/UL (ref 1.7–7.7)
NEUTROPHILS RELATIVE PERCENT: 47.2 %
PDW BLD-RTO: 15.1 % (ref 12.4–15.4)
PLATELET # BLD: 234 K/UL (ref 135–450)
PMV BLD AUTO: 7.8 FL (ref 5–10.5)
POTASSIUM SERPL-SCNC: 3.9 MMOL/L (ref 3.5–5.1)
PROTHROMBIN TIME: 47.4 SEC (ref 10–13.2)
RBC # BLD: 3.31 M/UL (ref 4.2–5.9)
SODIUM BLD-SCNC: 140 MMOL/L (ref 136–145)
WBC # BLD: 5.8 K/UL (ref 4–11)

## 2020-07-31 PROCEDURE — 99212 OFFICE O/P EST SF 10 MIN: CPT | Performed by: PHARMACIST

## 2020-07-31 NOTE — PROGRESS NOTES
PCP for ED follow-up: nadolol switched to carvedilol   -6/30 [facility] ED for dizziness, shaking, diarrhea: patient was given fluids for orthostatic hypotension; no meds prescribed; INR=3.89  -5/23 [facility] ED for LLE bleeding from scratch: no meds prescribed; INR=3.62    5/11-5/16 admit [facility] for chest pain:   -found to have CAD with LAD occlusion  -s/p PCI with stent placement on 5/12/20  -treated for bilateral LE cellulitis during admission  -also found to have LV thrombus and acute LLE DVT  -started on heparin gtt and bridged to warfarin with goal INR 2.5-3 per cardio  -hematuria during admission   Recent medication changes -7/6 nadolol switched to carvedilol  -5/27 d/c primidone (strong interaction with warfarin)   Medications taken regularly that may interact with warfarin or alter INR ASA 81 mg, Plavix, off primidone   Warfarin dose taken as prescribed No, has been taking 1.25 mg daily  Does not use pillbox, but has system for remembering to take meds   Signs/symptoms of bleeding Hematuria noted during hospital admission, but patient denies any bleeding   Vitamin K intake Normally has ~0 servings of green, leafy vegetables per week: eats iceberg lettuce salad 1-2x per month; will avoid for the most part   Recent vomiting/diarrhea/fever, changes in weight or activity level None reported   Tobacco or alcohol use Patient reports quitting smoking ~1 year ago  Patient reports having 0 drinks per day (h/o alcohol abuse, but quit after hospitalization)   Upcoming surgeries or procedures None reported     Assessment/Plan:  Patient's INR was supratherapeutic today (4.0) according to INR goal of 2.5-3. Patient denies missed/incorrect warfarin doses, diet changes, and medication changes. INR has been fluctuating widely since starting warfarin. Due to acute DVT and LV thrombus, patient has a high clotting risk when INR is subtherapeutic.   However, as he is taking triple therapy with warfarin+ASA+Plavix, he also has high bleeding risk when INR is supratherapeutic. Will need to monitor INR closely. Patient was recently admitted at [facility] from 7/25-7/28 for [address] On admission his INR was 7.37, head CT and MRI were negative for bleed. Patient received 2.5mg of oral vitamin K on 7/25. Hgb also dropped from 11.6 to 10.6 and concerned for GI bleed, pantoprazole was started per GI recommendations. EGD showed small gastric ulcerations and duodenitis. He was discharged on oral vancomycin and pantoprazole, and furosemide was d/c'd, none of which should affect INRs. His warfarin was re-started upon discharge and he was put on a Lovenox bridge given INR of 1.28. See anticoag track for inpatient dosing information. Patient saw Cardiology Fletcher Matos) yesterday (7/30) who noted: \"Now on asa, plavix and coumadin, echo x 2 no mural thrombus / will recheck his us legs to asses for DVT if none would consider stopping coumadin and cont asa /plavix\". Patient is following up with her in 2 weeks so will see what she recommends at that time. Patient was instructed to HOLD warfarin tonight and tomorrow and take 1.25mg (1/2 tablet) on Sunday. Instructed patient to stop Lovenox injections. Left instructions on VM for patient, and sister Karla Pathak. Karla Pathak called back stating she received the instructions. Repeat INR on Monday at clinic. Will likely need to change tablet strength from 2.5mg tablets to 1mg tablets to allow for more precise dose adjustments. Will discuss with patient at next appointment. Patient seems to have difficulty keeping track of all the recent changes to his medication regimen; therefore, warfarin dosing instructions should be reviewed with patient thoroughly. Patient was reminded to maintain consistent vitamin K intake and call with any bleeding, medication changes, or fever/vomiting/diarrhea. Patient understands dosing directions and information discussed. Dosing schedule and follow up appointment given to patient.   Progress note routed to referring physician's office. Patient acknowledges working in consult agreement with pharmacist as referred by his/her physician. Next INR Check:  8/3    Please call [facility] at [phone] with any questions. Thanks!   Silvia Camacho, PharmD, [facility]  [facility]  [place]: [phone]  [place]: [phone]  7/31/2020 1:58 PM    CLINICAL PHARMACY CONSULT: MED RECONCILIATION/REVIEW ADDENDUM    For Pharmacy Admin Tracking Only    PHSO: No  Total # of Interventions Recommended: 1  - Decreased Dose #: 1  - Maintenance Safety Lab Monitoring #: 1  Total Interventions Accepted: 1  Time Spent (min): 45

## 2020-08-03 ENCOUNTER — ANTI-COAG VISIT (OUTPATIENT)
Dept: PHARMACY | Age: 60
End: 2020-08-03
Payer: MEDICAID

## 2020-08-03 LAB — INTERNATIONAL NORMALIZATION RATIO, POC: 2.1

## 2020-08-03 PROCEDURE — 99212 OFFICE O/P EST SF 10 MIN: CPT

## 2020-08-03 PROCEDURE — 85610 PROTHROMBIN TIME: CPT

## 2020-08-03 NOTE — PROGRESS NOTES
ANTICOAGULATION SERVICE    Lou Preston is a 62 y.o. male with PMHx significant for acute LLE DVT (5/12/20), mural thrombus 2/2 anterior apical MI (s/p LAD stent 5/12/20), alcohol abuse who presents to clinic 8/3/2020 for anticoagulation monitoring and adjustment.     Anticoagulation Indication(s):  DVT, LV thrombus (acute)   Referring Physician:  Dr. Carl Victoria  Goal INR Range:  2.5-3 per Dr. Carl Victoria  Duration of Anticoagulation Therapy:  Unknown   Time of day dose taken:  PM  Product patient has at home:  warfarin 2.5 mg (green)      INR Summary                            Warfarin regimen (mg)  Date INR   A/P    Sun Mon Tue Wed Thu Fri Sat Mg/wk  8/3 2.1 Below goal, resume  1.25 1.25 1.25 1.25 1.25 1.25 1.25 8.75 7/31 4.03 Above goal, see plan  1.25 INR    0 0   7/16 2.5 At goal, no change  1.25 1.25 1.25 1.25 1.25 1.25 1.25 8.75 7/8 3.3  Above goal, decrease  1.25 1.25 1.25 1.25 1.25 1.25 1.25 8.75 6/26 2.4 Below goal, continue  1.25 1.25 1.25 1.25 1.25 1.25 2.5 10  6/19 2.7 At goal, no change  1.25 1.25 1.25 1.25 1.25 1.25 2.5 10  6/12 4.3 Above goal, hold + dec 1.25 1.25 1.25 1.25 1.25 0/1.25 2.5 10  6/9 3.14 Above goal, continue  1.25 1.25 2.5 1.25 1.25 1.25 2.5 11.25  6/5 2.99 At goal, see plan  1.25 1.25 2.5 1.25 1.25 1.25 2.5 11.25  6/2 1.84 Below goal, increase    2.5 2.5 1.25 INR  5/28 1.86 Below goal, continue  1.25 1.25 INR  1.25 1.25 1.25 8.75 5/26 1.63 Below goal, bolus x 1     2.5 INR      5/23 3.62 Per [facility] ED  5/22 6.31 Above goal, hold + dec 1.25 1.25 INR   0 0 TBD  5/18 3.29 Above goal, reduce x 1 2.5   1.25/2.5 2.5 2.5 2.5 2.5 2.5 17.5   5/16 2.0 On d/c from [facility]  2.5 2.5 2.5 2.5 2.5 2.5 2.5 17.5     Last CBC:  Lab Results   Component Value Date    RBC 3.31 (L) 07/31/2020    HGB 11.9 (L) 07/31/2020    HCT 34.3 (L) 07/31/2020    .6 (H) 07/31/2020    MCH 36.1 (H) 07/31/2020    MPV 7.8 07/31/2020    RDW 15.1 07/31/2020     07/31/2020       Patient History:  Recent hospitalizations/HC visits -7/30 cardio NP: no med changes, see note below    7/25-7/28 admit [facility] for dizziness, orthostatic hypotension, and C. difficile:  -INR=7.37 on admission   -head CT and MRI negative for bleed  -patient received 2.5mg of oral vitamin K on 7/25   -Hgb dropped from 11.6 to 10.6 and there was concern for GIB   -EGD showed small gastric ulcerations and duodenitis   -warfarin restarted on discharge + Lovenox bridge given INR of 1.28    -7/6 PCP for ED follow-up: nadolol switched to carvedilol   -6/30 [facility] ED for dizziness, shaking, diarrhea: patient was given fluids for orthostatic hypotension; no meds prescribed; INR=3.89  -5/23 [facility] ED for LLE bleeding from scratch: no meds prescribed; INR=3.62    5/11-5/16 admit [facility] for chest pain:   -found to have CAD with LAD occlusion  -s/p PCI with stent placement on 5/12/20  -treated for bilateral LE cellulitis during admission  -also found to have LV thrombus and acute LLE DVT  -started on heparin gtt and bridged to warfarin with goal INR 2.5-3 per cardio  -hematuria during admission   Recent medication changes -7/28 on discharge from [facility]:   --oral vancomycin x 7 days  --start Protonix  --resume warfarin + Lovenox  --d/c Lasix    -7/6 nadolol switched to carvedilol  -5/27 d/c primidone (strong interaction with warfarin)   Medications taken regularly that may interact with warfarin or alter INR ASA 81 mg, Plavix, off primidone   Warfarin dose taken as prescribed See anticoag track for inpatient warfarin dosing  Does not use pillbox, but has system for remembering to take meds   Signs/symptoms of bleeding Hematuria noted during hospital admission, but patient denies any bleeding   Vitamin K intake Normally has ~0 servings of green, leafy vegetables per week: eats iceberg lettuce salad 1-2x per month; will avoid for the most part   Recent vomiting/diarrhea/fever, changes in weight or activity level None reported   Tobacco or alcohol use Patient reports Progress note routed to referring physician's office. Patient acknowledges working in consult agreement with pharmacist as referred by his/her physician. Next INR Check:  8/7    Please call [facility] at [phone] with any questions. Thanks! Elias Truong.  Brayden García, PharmD, [facility]  [facility]  Ph: [phone]  8/3/2020 6:13 PM    CLINICAL PHARMACY CONSULT: MED RECONCILIATION/REVIEW ADDENDUM    For Pharmacy Admin Tracking Only    PHSO: No  Total # of Interventions Recommended: 0  - Maintenance Safety Lab Monitoring #: 1  Total Interventions Accepted: 0  Time Spent (min): 30

## 2020-08-04 ENCOUNTER — OFFICE VISIT (OUTPATIENT)
Dept: INTERNAL MEDICINE CLINIC | Age: 60
End: 2020-08-04
Payer: MEDICAID

## 2020-08-04 VITALS
DIASTOLIC BLOOD PRESSURE: 82 MMHG | WEIGHT: 177 LBS | OXYGEN SATURATION: 99 % | TEMPERATURE: 98.1 F | HEART RATE: 78 BPM | RESPIRATION RATE: 18 BRPM | BODY MASS INDEX: 26.14 KG/M2 | SYSTOLIC BLOOD PRESSURE: 138 MMHG

## 2020-08-04 PROCEDURE — 99213 OFFICE O/P EST LOW 20 MIN: CPT | Performed by: STUDENT IN AN ORGANIZED HEALTH CARE EDUCATION/TRAINING PROGRAM

## 2020-08-04 ASSESSMENT — ENCOUNTER SYMPTOMS
GASTROINTESTINAL NEGATIVE: 1
RESPIRATORY NEGATIVE: 1

## 2020-08-04 NOTE — PROGRESS NOTES
2020     Mino Ha (:  1960) is a 62 y.o. male, here for evaluation of the following medical concerns:    Patient is a 62year old male with a past medical history of, alcohol abuse, HFrEF ( 35%),CAD status post stent to the LAD in may. He was also found to have a left ventricular thrombus and DVT, he was put on warfarin. He was hospitalized on - for orthostatic hypotension. His lasix was discontinued. Additionally, he tested positive for c.diff and was started on  PO vanc. He presents today for hospital follow-up. CAD s/p LAD stent- No CP or SOB. Follows with cardiology. Compliant with ASA and plavix. Diarrhea, c.diff- Patient reports that diarrhea has nearly resolved. He no longer is passing watery stools, just loose stools. Complaint with PO vanc, has 3 more doses to complete. Appetite ok. No abdominal pain, nausea/vomiting. Orthostatic hypotension- Lasix stopped while hospitalized. Tolerating BB, ACE. Denies dizziness, but still says he feels weak overall. /82 today. HFrEF- Follows with cardiology. Last ECHO shows EF of 35%. Denies SOB, lower extremity edema. No issues with ambulation/exertion. Mural thrombus, LE DVT- On warfarin. Would like to get off warfarin completely if possible. If longer term AC is needed, patient would like to transition to NOAC. Denies any leg cramping, swelling or skin discoloration. Tremor- Was previously on primidone which was discontinued due to interaction with warfarin. He can't identity what triggers his tremor. He says sometimes it's worse with activity other times it occurs with rest.     Review of Systems   Constitutional: Negative. HENT: Negative. Respiratory: Negative. Cardiovascular: Negative. Gastrointestinal: Negative. Genitourinary: Negative. Musculoskeletal: Negative. Neurological:        Hand tremor    Hematological: Negative. Prior to Visit Medications    Medication Sig Taking? Authorizing Provider   vancomycin (VANCOCIN) 125 MG capsule Take 1 capsule by mouth 4 times daily for 7 days Yes Chantal Espinoza MD   lisinopril (PRINIVIL;ZESTRIL) 2.5 MG tablet Take 1 tablet by mouth nightly Yes Chantal Espinoza MD   pantoprazole (PROTONIX) 40 MG tablet Take 1 tablet by mouth every morning (before breakfast) Yes Chantal Espinoza MD   warfarin (COUMADIN) 2.5 MG tablet Take 1.25 mg by mouth nightly Yes Historical Provider, MD   vitamin B-12 (CYANOCOBALAMIN) 100 MCG tablet Take 100 mcg by mouth nightly Yes Historical Provider, MD   carvedilol (COREG) 3.125 MG tablet Take 1 tablet by mouth 2 times daily Yes Radha Cruz MD   aspirin 81 MG chewable tablet Take 1 tablet by mouth daily Yes Yo Gray MD   atorvastatin (LIPITOR) 40 MG tablet Take 1 tablet by mouth nightly Yes Yo Gray MD   Skin Protectants, Misc. (HYDROCERIN) CREA cream Apply topically 2 times daily  Patient taking differently: Apply topically 2 times daily Apply topically to legs Yes Yo Gray MD   folic acid (FOLVITE) 1 MG tablet Take 1 tablet by mouth daily Yes Yo Gray MD   clopidogrel (PLAVIX) 75 MG tablet Take 1 tablet by mouth daily Yes Yo Gray MD   vitamin B-1 100 MG tablet Take 1 tablet by mouth daily Yes Yo Gray MD   mineral oil-hydrophilic petrolatum (AQUAPHOR) ointment Apply 85 g topically as needed for Dry Skin Apply topically to both legs Yes Historical Provider, MD        Social History     Tobacco Use    Smoking status: Former Smoker     Packs/day: 0.50     Years: 40.00     Pack years: 20.00     Types: Cigarettes    Smokeless tobacco: Never Used   Substance Use Topics    Alcohol use:  Yes     Alcohol/week: 2.0 standard drinks     Types: 2 Cans of beer per week     Comment: previously was drinkning 5 cans per day        Vitals:    08/04/20 1538   BP: 138/82   Site: Right Upper Arm   Position: Sitting   Cuff Size: Medium Adult   Pulse: 78   Resp: 18   Temp: 98.1 °F (36.7 °C)   TempSrc: Oral   SpO2: 99%   Weight: 177 lb (80.3 kg)     Estimated body mass index is 26.14 kg/m² as calculated from the following:    Height as of 7/30/20: 5' 9\" (1.753 m). Weight as of this encounter: 177 lb (80.3 kg). Physical Exam  Constitutional:       Appearance: Normal appearance. HENT:      Head: Normocephalic. Nose: Nose normal.      Mouth/Throat:      Mouth: Mucous membranes are moist.   Eyes:      Pupils: Pupils are equal, round, and reactive to light. Neck:      Musculoskeletal: Normal range of motion. Cardiovascular:      Rate and Rhythm: Normal rate and regular rhythm. Heart sounds: No murmur. No friction rub. No gallop. Pulmonary:      Effort: Pulmonary effort is normal.      Breath sounds: Normal breath sounds. No wheezing, rhonchi or rales. Abdominal:      General: Abdomen is flat. Bowel sounds are normal.      Palpations: Abdomen is soft. Tenderness: There is no abdominal tenderness. Musculoskeletal: Normal range of motion. General: No swelling. Skin:     General: Skin is warm and dry. Neurological:      General: No focal deficit present. Mental Status: He is alert. Comments: No tremor appreciated on exam.          ASSESSMENT/PLAN:  1. Coronary artery disease involving native coronary artery of native heart without angina pectoris- Stable. Compliant with ASA and Plavix. Follows with cardiology closely. No chest pain or SOB. - Cont ASA, plavix, ACE and BB  - Cards on board     2. Mural thrombus of cardiac apex- Echo x 2 no mural. Per Cardiology note, plan to recheck lower extremity dopplers to assess for DVT, if none would consider stopping coumadin and continue with just asa /plavix  If long term AC is needed, patient would like to transition to NOAC if possible  - Cont warfarin, last INR 2.1     3. Hypertension, unspecified type- Stable. No further episodes of orthostatic hypotension since hospital discharge. - Cont BB, ACE.   - Hold lasix     4.  Clostridium difficile diarrhea- Resolving  - Complete course of PO vanc     5. Systolic heart failure, unspecified HF chronicity ([facility] [address]- Stable   - Cont medications. Hold lasix. No concern for fluid overload status at this time       Return in about 3 months (around 11/4/2020). An electronic signature was used to authenticate this note.     --Dori Barry MD on 8/4/2020 at 4:13 PM

## 2020-08-07 ENCOUNTER — ANTI-COAG VISIT (OUTPATIENT)
Dept: PHARMACY | Age: 60
End: 2020-08-07
Payer: MEDICAID

## 2020-08-07 ENCOUNTER — HOSPITAL ENCOUNTER (OUTPATIENT)
Dept: VASCULAR LAB | Age: 60
Discharge: HOME | End: 2020-08-07
Payer: MEDICAID

## 2020-08-07 ENCOUNTER — TELEPHONE (OUTPATIENT)
Dept: CARDIOLOGY CLINIC | Age: 60
End: 2020-08-07

## 2020-08-07 LAB — INTERNATIONAL NORMALIZATION RATIO, POC: 3.4

## 2020-08-07 PROCEDURE — 85610 PROTHROMBIN TIME: CPT

## 2020-08-07 PROCEDURE — 99212 OFFICE O/P EST SF 10 MIN: CPT

## 2020-08-07 PROCEDURE — 93970 EXTREMITY STUDY: CPT

## 2020-08-07 RX ORDER — WARFARIN SODIUM 1 MG/1
1 TABLET ORAL DAILY
Qty: 30 TABLET | Refills: 3 | Status: SHIPPED | OUTPATIENT
Start: 2020-08-07 | End: 2020-08-27

## 2020-08-07 NOTE — TELEPHONE ENCOUNTER
Kristian with vascular lab at [address] [place] [address] calling in to let Parul Smith know patient went home because he is already on medication. But the patients Venus Duplex is different from the previous. Does not look acute but it is subacute now its seen bilaterally. Before it was only in the left leg. Patient still on coumadin so he was sent home. His test was to see if he could get off of coumadin. patient would like to know if he can get off of coumadin. Please call patient to advise.      Chad Wilson # [phone]

## 2020-08-07 NOTE — PROGRESS NOTES
ANTICOAGULATION SERVICE    Xiao Meneses is a 62 y.o. 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     Anticoagulation Indication(s):  DVT, LV thrombus (acute)   Referring Physician:  Dr. Pam Philippe  Goal INR Range:  2.5-3 per Dr. Pam Philippe  Duration of Anticoagulation Therapy:  Unknown   Time of day dose taken:  PM  Product patient has at home:  warfarin 2.5 mg (green)      INR Summary                            Warfarin regimen (mg)  Date INR   A/P    Sun Mon Tue Wed Thu Fri Sat Mg/wk  8/7 3.4 Above goal, decrease  1 1 1 1 1 1 1 7  8/3 2.1 Below goal, resume  1.25 1.25 1.25 1.25 1.25 1.25 1.25 8.75   7/31 4.03 Above goal, see plan  1.25 INR    0 0   7/16 2.5 At goal, no change  1.25 1.25 1.25 1.25 1.25 1.25 1.25 8.75  7/8 3.3  Above goal, decrease  1.25 1.25 1.25 1.25 1.25 1.25 1.25 8.75 6/26 2.4 Below goal, continue  1.25 1.25 1.25 1.25 1.25 1.25 2.5 10  6/19 2.7 At goal, no change  1.25 1.25 1.25 1.25 1.25 1.25 2.5 10  6/12 4.3 Above goal, hold + dec 1.25 1.25 1.25 1.25 1.25 0/1.25 2.5 10  6/9 3.14 Above goal, continue  1.25 1.25 2.5 1.25 1.25 1.25 2.5 11.25  6/5 2.99 At goal, see plan  1.25 1.25 2.5 1.25 1.25 1.25 2.5 11.25  6/2 1.84 Below goal, increase    2.5 2.5 1.25 INR  5/28 1.86 Below goal, continue  1.25 1.25 INR  1.25 1.25 1.25 8.75  5/26 1.63 Below goal, bolus x 1     2.5 INR      5/23 3.62 Per [facility] ED  5/22 6.31 Above goal, hold + dec 1.25 1.25 INR   0 0 TBD  5/18 3.29 Above goal, reduce x 1 2.5   1.25/2.5 2.5 2.5 2.5 2.5 2.5 17.5   5/16 2.0 On d/c from [facility]  2.5 2.5 2.5 2.5 2.5 2.5 2.5 17.5     Last CBC:  Lab Results   Component Value Date    RBC 3.31 (L) 07/31/2020    HGB 11.9 (L) 07/31/2020    HCT 34.3 (L) 07/31/2020    .6 (H) 07/31/2020    MCH 36.1 (H) 07/31/2020    MPV 7.8 07/31/2020    RDW 15.1 07/31/2020     07/31/2020       Patient History:  Recent hospitalizations/HC visits -7/30 cardio NP: no med changes, see note below    7/25-7/28 admit [facility] for dizziness, orthostatic hypotension, and C. difficile:  -INR=7.37 on admission   -head CT and MRI negative for bleed  -patient received 2.5mg of oral vitamin K on 7/25   -Hgb dropped from 11.6 to 10.6 and there was concern for GIB   -EGD showed small gastric ulcerations and duodenitis   -warfarin restarted on discharge + Lovenox bridge given INR of 1.28    -7/6 PCP for ED follow-up: nadolol switched to carvedilol   -6/30 [facility] ED for dizziness, shaking, diarrhea: patient was given fluids for orthostatic hypotension; no meds prescribed; INR=3.89  -5/23 [facility] ED for LLE bleeding from scratch: no meds prescribed; INR=3.62    5/11-5/16 admit [facility] for chest pain:   -found to have CAD with LAD occlusion  -s/p PCI with stent placement on 5/12/20  -treated for bilateral LE cellulitis during admission  -also found to have LV thrombus and acute LLE DVT  -started on heparin gtt and bridged to warfarin with goal INR 2.5-3 per cardio  -hematuria during admission   Recent medication changes -7/28 on discharge from [facility]:   --oral vancomycin x 7 days  --start Protonix  --resume warfarin + Lovenox  --d/c Lasix    -7/6 nadolol switched to carvedilol  -5/27 d/c primidone (strong interaction with warfarin)   Medications taken regularly that may interact with warfarin or alter INR ASA 81 mg, Plavix, off primidone   Warfarin dose taken as prescribed See anticoag track for inpatient warfarin dosing  Does not use pillbox, but has system for remembering to take meds   Signs/symptoms of bleeding Hematuria noted during hospital admission, but patient denies any bleeding   Vitamin K intake Normally has ~0 servings of green, leafy vegetables per week: eats iceberg lettuce salad 1-2x per month; will avoid for the most part   Recent vomiting/diarrhea/fever, changes in weight or activity level None reported Tobacco or alcohol use Patient reports quitting smoking ~1 year ago  Patient reports having 0 drinks per day (h/o alcohol abuse, but quit after hospitalization)   Upcoming surgeries or procedures None reported     Assessment/Plan:  Patient's INR was supratherapeutic today (3.4) according to INR goal of 2.5-3. Patient was recently hospitalized as detailed above, but none of the medication changes should affect the INR level (Protonix, vancomycin, d/c Lasix). Patient reports that he has finished oral vancomycin course and his diarrhea has improved. He is no longer taking Lovenox injections due to INR of 4.03 on 7/31. INR has been fluctuating widely since starting warfarin. Due to acute DVT and LV thrombus (now resolved), patient has a high clotting risk when INR is subtherapeutic. However, as he is taking triple therapy with warfarin+ASA+Plavix, he also has high bleeding risk when INR is supratherapeutic. Will need to monitor INR closely. Patient saw cardiology NP Lindsay Jaime on 7/30, who noted: \"Now on asa, plavix and coumadin; echo x 2 no mural thrombus; will recheck US legs to asses for DVT; if none, would consider stopping coumadin and cont asa/plavix\". Patient has LE venous duplex scan scheduled on 8/7 @ [place] [id number]. He is following up with cardio NP on 8/20. Patient was instructed to decrease warfarin dose to 1 mg daily (20% reduction). Rx for 1 mg tablets sent to [facility]. Repeat INR in 1 week. Patient seems to have difficulty keeping track of all the recent changes to his medication regimen; therefore, warfarin dosing instructions should be reviewed with patient thoroughly. He is also becoming increasingly frustrated with routine INR checks. Patient was reminded to maintain consistent vitamin K intake and call with any bleeding, medication changes, or fever/vomiting/diarrhea. Patient understands dosing directions and information discussed.   Dosing schedule and follow up appointment given to patient. Progress note routed to referring physician's office. Patient acknowledges working in consult agreement with pharmacist as referred by his/her physician. Next INR Check:  8/17    Please call [facility] at [phone] with any questions. Thanks! Unknown Mg.  Parviz Armstrong, PharmD, [facility]  [facility]  Ph: [phone]  8/7/2020 10:27 AM    CLINICAL PHARMACY CONSULT: MED RECONCILIATION/REVIEW ADDENDUM    For Pharmacy Admin Tracking Only    PHSO: No  Total # of Interventions Recommended: 2  - Decreased Dose #: 1  - Refills Provided #: 1  - Maintenance Safety Lab Monitoring #: 1  Total Interventions Accepted: 2  Time Spent (min): 30

## 2020-08-09 NOTE — TELEPHONE ENCOUNTER
Now on asa, plavix and coumadin,   Will discuss VL results   Cont meds for now   Will discuss on visit 8/20/20 with pt

## 2020-08-16 ENCOUNTER — TELEPHONE (OUTPATIENT)
Dept: OTHER | Facility: CLINIC | Age: 60
End: 2020-08-16

## 2020-08-16 ENCOUNTER — HOSPITAL ENCOUNTER (EMERGENCY)
Age: 60
Discharge: ANOTHER HOSPITAL | End: 2020-08-16
Attending: EMERGENCY MEDICINE
Payer: MEDICAID

## 2020-08-16 ENCOUNTER — APPOINTMENT (OUTPATIENT)
Dept: CT IMAGING | Age: 60
End: 2020-08-16
Payer: MEDICAID

## 2020-08-16 VITALS
RESPIRATION RATE: 17 BRPM | BODY MASS INDEX: 25.18 KG/M2 | DIASTOLIC BLOOD PRESSURE: 86 MMHG | SYSTOLIC BLOOD PRESSURE: 161 MMHG | HEIGHT: 69 IN | HEART RATE: 110 BPM | WEIGHT: 170 LBS | TEMPERATURE: 98.9 F | OXYGEN SATURATION: 100 %

## 2020-08-16 LAB
ANION GAP SERPL CALCULATED.3IONS-SCNC: 15 MMOL/L (ref 3–16)
BASOPHILS ABSOLUTE: 0 K/UL (ref 0–0.2)
BASOPHILS RELATIVE PERCENT: 0.3 %
BUN BLDV-MCNC: 6 MG/DL (ref 7–20)
C-REACTIVE PROTEIN: <0.3 MG/L (ref 0–5.1)
CALCIUM SERPL-MCNC: 8.2 MG/DL (ref 8.3–10.6)
CHLORIDE BLD-SCNC: 100 MMOL/L (ref 99–110)
CO2: 21 MMOL/L (ref 21–32)
CREAT SERPL-MCNC: 0.7 MG/DL (ref 0.8–1.3)
EOSINOPHILS ABSOLUTE: 0 K/UL (ref 0–0.6)
EOSINOPHILS RELATIVE PERCENT: 0.1 %
GFR AFRICAN AMERICAN: >60
GFR NON-AFRICAN AMERICAN: >60
GLUCOSE BLD-MCNC: 111 MG/DL (ref 70–99)
HCT VFR BLD CALC: 37.5 % (ref 40.5–52.5)
HEMOGLOBIN: 13.1 G/DL (ref 13.5–17.5)
LYMPHOCYTES ABSOLUTE: 1.2 K/UL (ref 1–5.1)
LYMPHOCYTES RELATIVE PERCENT: 8.4 %
MCH RBC QN AUTO: 35.6 PG (ref 26–34)
MCHC RBC AUTO-ENTMCNC: 35 G/DL (ref 31–36)
MCV RBC AUTO: 101.7 FL (ref 80–100)
MONOCYTES ABSOLUTE: 0.8 K/UL (ref 0–1.3)
MONOCYTES RELATIVE PERCENT: 5.5 %
NEUTROPHILS ABSOLUTE: 11.9 K/UL (ref 1.7–7.7)
NEUTROPHILS RELATIVE PERCENT: 85.7 %
PDW BLD-RTO: 14.1 % (ref 12.4–15.4)
PLATELET # BLD: 152 K/UL (ref 135–450)
PMV BLD AUTO: 8 FL (ref 5–10.5)
POTASSIUM REFLEX MAGNESIUM: 4.1 MMOL/L (ref 3.5–5.1)
RBC # BLD: 3.69 M/UL (ref 4.2–5.9)
SEDIMENTATION RATE, ERYTHROCYTE: 9 MM/HR (ref 0–20)
SODIUM BLD-SCNC: 136 MMOL/L (ref 136–145)
WBC # BLD: 13.8 K/UL (ref 4–11)

## 2020-08-16 PROCEDURE — 6370000000 HC RX 637 (ALT 250 FOR IP): Performed by: STUDENT IN AN ORGANIZED HEALTH CARE EDUCATION/TRAINING PROGRAM

## 2020-08-16 PROCEDURE — 86140 C-REACTIVE PROTEIN: CPT

## 2020-08-16 PROCEDURE — 85025 COMPLETE CBC W/AUTO DIFF WBC: CPT

## 2020-08-16 PROCEDURE — 80048 BASIC METABOLIC PNL TOTAL CA: CPT

## 2020-08-16 PROCEDURE — 85652 RBC SED RATE AUTOMATED: CPT

## 2020-08-16 PROCEDURE — 70481 CT ORBIT/EAR/FOSSA W/DYE: CPT

## 2020-08-16 PROCEDURE — 6360000004 HC RX CONTRAST MEDICATION: Performed by: EMERGENCY MEDICINE

## 2020-08-16 PROCEDURE — 99284 EMERGENCY DEPT VISIT MOD MDM: CPT

## 2020-08-16 RX ORDER — LORAZEPAM 0.5 MG/1
0.5 TABLET ORAL ONCE
Status: COMPLETED | OUTPATIENT
Start: 2020-08-16 | End: 2020-08-16

## 2020-08-16 RX ORDER — TETRACAINE HYDROCHLORIDE 5 MG/ML
1 SOLUTION OPHTHALMIC ONCE
Status: COMPLETED | OUTPATIENT
Start: 2020-08-16 | End: 2020-08-16

## 2020-08-16 RX ADMIN — IOPAMIDOL 80 ML: 755 INJECTION, SOLUTION INTRAVENOUS at 16:54

## 2020-08-16 RX ADMIN — LORAZEPAM 0.5 MG: 0.5 TABLET ORAL at 13:33

## 2020-08-16 RX ADMIN — TETRACAINE HYDROCHLORIDE 1 DROP: 5 SOLUTION OPHTHALMIC at 13:33

## 2020-08-16 ASSESSMENT — VISUAL ACUITY
OU: 20/25
OS: 20/25

## 2020-08-16 ASSESSMENT — PAIN DESCRIPTION - LOCATION: LOCATION: EYE

## 2020-08-16 ASSESSMENT — PAIN DESCRIPTION - PAIN TYPE: TYPE: ACUTE PAIN

## 2020-08-16 ASSESSMENT — PAIN DESCRIPTION - ORIENTATION: ORIENTATION: RIGHT

## 2020-08-16 ASSESSMENT — ENCOUNTER SYMPTOMS
EYE REDNESS: 0
VOMITING: 0
DIARRHEA: 0
SHORTNESS OF BREATH: 0
COUGH: 0
EYE PAIN: 1
RHINORRHEA: 0

## 2020-08-16 ASSESSMENT — PAIN SCALES - GENERAL: PAINLEVEL_OUTOF10: 6

## 2020-08-16 NOTE — ED TRIAGE NOTES
Pt arrived to ED with right eye pain and swelling that started yesterday and worsening of tremors in hands.

## 2020-08-16 NOTE — ED PROVIDER NOTES
[id number] RESIDENT NOTE       Date of evaluation: 8/16/2020    Chief Complaint     Eye pain and tremors    History of Present Illness     Lou Preston is a 56 y.o. male with a significant history as below who presented to the emergency department with right eye pain and swelling, and tremors. The patient reports that yesterday he woke up and noticed swelling around his right eye as well as pain. He states that this has significantly worsened since the onset. Pain is aching and throbbing in nature, the pain is nonradiating, the pain worsens when he tries to manipulate his eye and is currently a 4 out of 10 in severity. He is unsure of the pain worsens when he moves his eye. Patient denies any trauma or injuries to the area. Patient denies any headache, nausea, vomiting or issues with his vision prior to the onset of symptoms. He denies any history of glaucoma, exposure to any chemicals or trauma. Patient also reports that he has a history of essential tremors, states that his tremors have been gradually worsening over the past several weeks. Patient reports that he was taken off of his medication for central tremors when he was started on Coumadin for a DVT because of a interaction with Coumadin. he denies headache, dizziness, focal weakness, numbness, neck pain, neck stiffness, chest pain, palpitations, leg pain, leg swelling, back pain, dysuria, frequency, urgency, abdominal pain, black/bloody stool,nausea, vomiting, diarrhea, rash, weight loss, fever, or chills. With the exception of above, the patient denies any aggravating or alleviating factors as well as any other associated signs or symptoms. Review of Systems     Review of Systems   Constitutional: Negative for chills and fever. HENT: Negative for ear pain and rhinorrhea. Eyes: Positive for pain and visual disturbance. Negative for redness.    Respiratory: Negative for cough and shortness of breath. Cardiovascular: Negative for chest pain and palpitations. Gastrointestinal: Negative for diarrhea and vomiting. Endocrine: Negative for polydipsia and polyuria. Genitourinary: Negative for dysuria and hematuria. Musculoskeletal: Negative for neck pain and neck stiffness. Skin: Negative for rash and wound. Neurological: Negative for dizziness and weakness. Psychiatric/Behavioral: Negative for behavioral problems and confusion. Past Medical, Surgical, Family, and Social History     He has a past medical history of Alcohol abuse, CAD (coronary artery disease), CHF (congestive heart failure) ([address] Essential tremor, HTN (hypertension), Lower extremity cellulitis, and MI (mitral incompetence). He has a past surgical history that includes Percutaneous Transluminal Coronary Angio and Upper gastrointestinal endoscopy (N/A, 7/28/2020). His family history includes Heart Disease in his father; Stroke in his mother. He reports that he has quit smoking. His smoking use included cigarettes. He has a 20.00 pack-year smoking history. He has never used smokeless tobacco. He reports current alcohol use of about 2.0 standard drinks of alcohol per week. He reports that he does not use drugs.     Medications     Previous Medications    ASPIRIN 81 MG CHEWABLE TABLET    Take 1 tablet by mouth daily    ATORVASTATIN (LIPITOR) 40 MG TABLET    Take 1 tablet by mouth nightly    CARVEDILOL (COREG) 3.125 MG TABLET    Take 1 tablet by mouth 2 times daily    CLOPIDOGREL (PLAVIX) 75 MG TABLET    Take 1 tablet by mouth daily    FOLIC ACID (FOLVITE) 1 MG TABLET    Take 1 tablet by mouth daily    LISINOPRIL (PRINIVIL;ZESTRIL) 2.5 MG TABLET    Take 1 tablet by mouth nightly    MINERAL OIL-HYDROPHILIC PETROLATUM (AQUAPHOR) OINTMENT    Apply 85 g topically as needed for Dry Skin Apply topically to both legs    PANTOPRAZOLE (PROTONIX) 40 MG TABLET    Take 1 tablet by mouth every morning (before breakfast)  LORazepam (ATIVAN) tablet 0.5 mg    tetracaine (TETRAVISC) 0.5 % ophthalmic solution 1 drop    iopamidol (ISOVUE-370) 76 % injection 80 mL       CONSULTS:  None    MEDICAL DECISION MAKING / ASSESSMENT / Dora Pacheco is a 56 y.o. male who presented to the emergency department with a history and presentation as described above in HPI. The patient was evaluated by myself and the ED Attending Physician. A full history and physical was performed. On presentation the patient was uncomfortable, in mild pain and in no acute distress. Vitals were unremarkable, and he was hemodynamically stable. Exam as above. Based upon history and physical examination, there was concern for periorbital cellulitis, orbital cellulitis, and orbital injury. Given the patient's history and physical exam, there is low suspicion for acute angle-closure glaucoma or chemical injury. The patient was treated with tetracaine. Exam was limited secondary to the amount of soft tissue edema as well as discomfort. I attempted to take intraocular pressure measurements of the patient's right orbit which was difficult and also limited, measurements of 41, 40 and 42 were obtained but likely inaccurate given the difficulty with examination. Labs were largely unremarkable with the exception of a leukocytosis of 13. CRP and ESR were within normal limits. CT orbit significant for Marked right periorbital/supraorbital soft tissue swelling and edema, soft tissue swelling is preseptal, abutting the globe with a small amount of fluid or air immediately anterior to the globe, no intraorbital edema or mass, well-defined low-density or fluid collection, 11 x 17, is present superficially just lateral to the orbit. Given the patient's measured elevated intraocular pressure, limited examination and significant edema, I believe the patient would benefit from ophthalmology evaluation.   I discussed the case with ophthalmology at [facility] who agreed that the patient should be transferred to the emergency department for evaluation by ophthalmology. I discussed the case with Dr. Rivera Hernandes, [address] [place] [address] emergency physician who accepted the patient for transfer. At this time the patient has been accepted for transfer to [address] [place] [address] ED for further evaluation by ophtho and further management. All results were discussed with the patient at length, concerns were addressed and allquestions answered. Risks, benefits, and alternatives were discussed with the patient, and he agrees on this disposition. The patient will continue to be monitored here in the emergency department until which time he is moved to his new treatment location. This patient was also evaluated by the attending physician. All care plans were discussed and agreed upon. Clinical Impression     1. Periorbital swelling    2. Chemosis of right conjunctiva        Disposition     PATIENT REFERRED TO:  No follow-up provider specified.     DISCHARGE MEDICATIONS:  New Prescriptions    No medications on file       DISPOSITION Decision To Transfer 08/16/2020 04:52:18 Rivka López MD  Resident  08/16/20 [id number]

## 2020-08-16 NOTE — TELEPHONE ENCOUNTER
Writer's attempt to contact ED provider was unsuccessful. No Decision on disposition at this time. Will continue to monitor for disposition recommendation.

## 2020-08-16 NOTE — ED PROVIDER NOTES
ED Attending Attestation Note     Date of evaluation: 8/16/2020    This patient was seen by the resident. I have seen and examined the patient, agree with the workup, evaluation, management and diagnosis. The care plan has been discussed. This is a 64-year-old male with a past medical history of coronary artery disease status post stents, hypertension, and left lower extremity DVT now with right lower extremity chronic DVT on Coumadin that presents today for evaluation of right eye pain/swelling. He is accompanied today by his eldest sister. The patient does not wear contacts. He denies any trauma. He states that he did have some itching of the right eye. He denies any pain with extraocular movements. On exam, he is alert and oriented x4. He has market swelling of the right eye, when the eye is manually open, there is some ecchymosis that is noted on the right eye, pupils remain equal reactive to light bilaterally. His visual acuity was noted to be 20/25 in the left eye, right eye visual acuity was unable to be obtained. Both eyes visual acuity was noted to be 20/25. Given the large amount of swelling, work-up in the emergency department will consist of labs as well as a CT maxilla face to evaluate for abscess versus hematoma. The work-up shows no acute findings, anticipate patient be discharged home with preseptal cellulitis   diagnosis with antibiotics with ophthalmology follow-up and return precautions. Please see resident note for reassessment and final disposition.      Lizabeth Munoz MD  08/16/20 [id number]

## 2020-08-17 ENCOUNTER — TELEPHONE (OUTPATIENT)
Dept: PHARMACY | Age: 60
End: 2020-08-17

## 2020-08-20 NOTE — TELEPHONE ENCOUNTER
Patient remains admitted. Eliquis 5 mg BID was started on 8/18. Emy Fuentes.  Cierra Norris, PharmD, BCPS  [facility]  Ph: [phone]  8/20/2020 9:20 AM

## 2020-08-21 ENCOUNTER — ANTI-COAG VISIT (OUTPATIENT)
Dept: PHARMACY | Age: 60
End: 2020-08-21

## 2020-08-21 NOTE — TELEPHONE ENCOUNTER
Patient's sister Jing Pavon: [phone] ext [phone]) LVM notifying clinic that patient was discharged from [facility] on 8/20. He is now taking Eliquis instead of warfarin. He has an appointment with cardiology NP on 8/27 for follow up. Will discharge patient from [place] [address] as INR monitoring is no longer necessary. Patient was informed that he no longer needs to come to clinic for INR tests. Daniella Adamson.  Karla Hernandez, PharmD, [facility]  [facility]  Ph: [phone]  8/21/2020 1:30 PM

## 2020-08-24 RX ORDER — UBIDECARENONE 75 MG
100 CAPSULE ORAL NIGHTLY
Qty: 30 TABLET | Refills: 0 | Status: SHIPPED | OUTPATIENT
Start: 2020-08-24 | End: 2020-08-27 | Stop reason: SDUPTHER

## 2020-08-27 ENCOUNTER — OFFICE VISIT (OUTPATIENT)
Dept: CARDIOLOGY CLINIC | Age: 60
End: 2020-08-27
Payer: MEDICAID

## 2020-08-27 VITALS
BODY MASS INDEX: 25.98 KG/M2 | WEIGHT: 175.4 LBS | HEART RATE: 80 BPM | DIASTOLIC BLOOD PRESSURE: 80 MMHG | SYSTOLIC BLOOD PRESSURE: 136 MMHG | HEIGHT: 69 IN | TEMPERATURE: 98.9 F

## 2020-08-27 PROCEDURE — 99214 OFFICE O/P EST MOD 30 MIN: CPT | Performed by: NURSE PRACTITIONER

## 2020-08-27 PROCEDURE — 1036F TOBACCO NON-USER: CPT | Performed by: NURSE PRACTITIONER

## 2020-08-27 PROCEDURE — G8419 CALC BMI OUT NRM PARAM NOF/U: HCPCS | Performed by: NURSE PRACTITIONER

## 2020-08-27 PROCEDURE — 1111F DSCHRG MED/CURRENT MED MERGE: CPT | Performed by: NURSE PRACTITIONER

## 2020-08-27 PROCEDURE — G8427 DOCREV CUR MEDS BY ELIG CLIN: HCPCS | Performed by: NURSE PRACTITIONER

## 2020-08-27 PROCEDURE — 3017F COLORECTAL CA SCREEN DOC REV: CPT | Performed by: NURSE PRACTITIONER

## 2020-08-27 RX ORDER — UBIDECARENONE 75 MG
100 CAPSULE ORAL NIGHTLY
Qty: 90 TABLET | Refills: 3 | Status: ON HOLD | OUTPATIENT
Start: 2020-08-27 | End: 2021-09-07

## 2020-08-27 RX ORDER — APIXABAN 5 MG/1
5 TABLET, FILM COATED ORAL 2 TIMES DAILY
Qty: 60 TABLET | Refills: 3 | Status: SHIPPED | OUTPATIENT
Start: 2020-08-27 | End: 2020-12-28

## 2020-08-27 RX ORDER — LANOLIN ALCOHOL/MO/W.PET/CERES
100 CREAM (GRAM) TOPICAL DAILY
Qty: 90 TABLET | Refills: 3 | Status: SHIPPED | OUTPATIENT
Start: 2020-08-27 | End: 2021-06-16

## 2020-08-27 RX ORDER — LISINOPRIL 2.5 MG/1
2.5 TABLET ORAL NIGHTLY
Qty: 30 TABLET | Refills: 3 | Status: SHIPPED | OUTPATIENT
Start: 2020-08-27 | End: 2020-12-28

## 2020-08-27 RX ORDER — CLOPIDOGREL BISULFATE 75 MG/1
75 TABLET ORAL DAILY
Qty: 30 TABLET | Refills: 3 | Status: SHIPPED | OUTPATIENT
Start: 2020-08-27 | End: 2020-12-28

## 2020-08-27 RX ORDER — ATORVASTATIN CALCIUM 40 MG/1
40 TABLET, FILM COATED ORAL NIGHTLY
Qty: 30 TABLET | Refills: 3 | Status: SHIPPED | OUTPATIENT
Start: 2020-08-27 | End: 2020-12-28

## 2020-08-27 RX ORDER — APIXABAN 5 MG/1
5 TABLET, FILM COATED ORAL 2 TIMES DAILY
COMMUNITY
Start: 2020-08-18 | End: 2020-08-27 | Stop reason: SDUPTHER

## 2020-08-27 RX ORDER — FOLIC ACID 1 MG/1
1 TABLET ORAL DAILY
Qty: 90 TABLET | Refills: 3 | Status: SHIPPED | OUTPATIENT
Start: 2020-08-27 | End: 2022-08-01 | Stop reason: SDUPTHER

## 2020-08-27 RX ORDER — CARVEDILOL 3.12 MG/1
3.12 TABLET ORAL 2 TIMES DAILY
Qty: 60 TABLET | Refills: 3 | Status: SHIPPED | OUTPATIENT
Start: 2020-08-27 | End: 2020-12-15

## 2020-08-27 NOTE — PROGRESS NOTES
[facility]  Office Visit           Yelena Goode MD,  [address] [facility]       Cardiology             Xiao Peer  1960 August 27, 2020    Primary Cardiologist:  Pam Philippe     CC:Interval Hx: CAD/ stent / Mural thrombus / now on coumadin    [facility] 5/12/20 PTCA stent to the LAD proximally with a drug-eluting stent Ezra PTCA to the diagonal branch  echo  6/20 EF WAS 35%  echo 7/20 EF 35-40%   5/12/20 us legs Acute deep vein thrombosis involving the left Popliteal vein, Gastrocnemius   veins and superficial vein thrombosis of the left small saphneous vein. Essential tremors  / hx alcoholism   MRI brain 7/20   No acute infarction, recent intracranial hemorrhage, or mass lesion. Scattered areas of small infarcts with suggested cortical laminar necrosis within the bilateral parietal lobes and right temporal lobe. Mild diffuse atrophy and chronic small vessel ischemic change.         Favored artifactual signal abnormalities within the dural venous sinuses as detailed.       PMH: CAD with complete LAD occlusion treated with stenting on 5/12/2020  Mural thrombus secondary to anterior apical myocardial infarction  Hx DVT left lower leg / bilateral cellulitis lower legs   Hx Alcoholism  tremor /stable     Today no c/o cp/SOB edema  He has recently had eye infection and on abx   He was changed from coumadin to eliquis ( was having trouble with INR ranges)    after reviewing his   VSS wt  stable    Last visit plan: Now on asa, plavix and coumadin, echo x 2 no mural thrombus / will recheck his us legs to asses for DVT if none would consider stopping coumadin and cont asa /plavix  / goes to coumadin clinic   VL extremities venus duy for DVT with hx DVT     Today we are reviewing his us leg 8/7/20 Monika Friday is an aging acute deep venous thrombosis involving the right common     femoral vein, subacute deep venous thrombosis involving the right popliteal Domenica Tri, and aging acute superficial venous thrombosis in the saphenofemoral     junction.     There is evidence of a chronic thrombotic process noted in the right     posterior tibial vein.     There is deep venous reflux in the right common femoral vein and superficial     venous reflux in the right saphenofemoral junction.     There is a subacute deep venous thrombosis involving the left deep femoral     vein, femoral vein, popliteal vein, and some of the gastrocnemius veins.    Elesa Pound is an aging acute deep venous thrombosis in some of the left     gastrocnemius veins.     There is evidence of a chronic thrombotic process noted in the left great     saphenous vein at the distal thigh, posterior tibial vein, peroneal vein,     and soleal vein.     There is evidence of deep venous reflux in the left femoral vein. I have examined pt and reviewed notes / any lab work EKGs stress test, angiograms, & images reviewed  I  have spent >20 minutes of face to face time with the patient with more than 50% spent counseling and coordinating care this patient. Review of Systems:  Constitutional: Denies  fatigue, weakness, night sweats or fever. HEENT: Denies new visual changes, ringing in ears, nosebleeds,nasal congestion  Respiratory: Denies new or change in SOB, PND, orthopnea or cough. Cardiovascular: see HPI  GI: Denies N/V, diarrhea, constipation, abdominal pain, change in bowel habits, melena or hematochezia  : Denies urinary frequency, urgency, incontinence, hematuria or dysuria. Skin: Denies rash, hives, or cyanosis  Musculoskeletal: Denies joint or muscle aches/pain  Neurological: Denies syncope or TIA-like symptoms.   Psychiatric: Denies anxiety, insomnia or depression     Past Medical History:   Diagnosis Date    Alcohol abuse     CAD (coronary artery disease)     with complete LAD occlusion    CHF (congestive heart failure) (HCC)     LVEF    Essential tremor     HTN (hypertension)     Lower extremity cellulitis     MI (mitral incompetence)      Past Surgical History:   Procedure Laterality Date    PTCA      UPPER GASTROINTESTINAL ENDOSCOPY N/A 7/28/2020    EGD BIOPSY performed by Avtar Jade MD at [address] ENDOSCOPY     Family History   Problem Relation Age of Onset    Stroke Mother     Heart Disease Father      Social History     Tobacco Use    Smoking status: Former Smoker     Packs/day: 0.50     Years: 40.00     Pack years: 20.00     Types: Cigarettes    Smokeless tobacco: Never Used   Substance Use Topics    Alcohol use: Yes     Alcohol/week: 2.0 standard drinks     Types: 2 Cans of beer per week     Comment: previously was drinkning 5 cans per day    Drug use: Never       No Known Allergies  Current Outpatient Medications   Medication Sig Dispense Refill    ELIQUIS 5 MG TABS tablet Take 5 mg by mouth 2 times daily      WHITE PETROLATUM EX Apply 61 % topically daily      vitamin B-12 (CYANOCOBALAMIN) 100 MCG tablet Take 1 tablet by mouth nightly 30 tablet 0    lisinopril (PRINIVIL;ZESTRIL) 2.5 MG tablet Take 1 tablet by mouth nightly 30 tablet 3    pantoprazole (PROTONIX) 40 MG tablet Take 1 tablet by mouth every morning (before breakfast) 30 tablet 3    carvedilol (COREG) 3.125 MG tablet Take 1 tablet by mouth 2 times daily 60 tablet 3    aspirin 81 MG chewable tablet Take 1 tablet by mouth daily 30 tablet 3    atorvastatin (LIPITOR) 40 MG tablet Take 1 tablet by mouth nightly 30 tablet 3    Skin Protectants, Misc.  (HYDROCERIN) CREA cream Apply topically 2 times daily (Patient taking differently: Apply topically 2 times daily Apply topically to legs) 1 Container 0    folic acid (FOLVITE) 1 MG tablet Take 1 tablet by mouth daily 30 tablet 3    clopidogrel (PLAVIX) 75 MG tablet Take 1 tablet by mouth daily 30 tablet 3    vitamin B-1 100 MG tablet Take 1 tablet by mouth daily 30 tablet 3    mineral oil-hydrophilic petrolatum (AQUAPHOR) ointment Apply 85 g topically as needed for Dry Skin Apply topically to both legs       No current facility-administered medications for this visit. Physical Exam:   /80   Pulse 80   Temp 98.9 °F (37.2 °C)   Ht 5' 9\" (1.753 m)   Wt 175 lb 6.4 oz (79.6 kg)   BMI 25.90 kg/m²   BP Readings from Last 3 Encounters:   08/27/20 136/80   08/16/20 (!) 161/86   08/04/20 138/82     Pulse Readings from Last 3 Encounters:   08/27/20 80   08/16/20 110   08/04/20 78     Wt Readings from Last 3 Encounters:   08/27/20 175 lb 6.4 oz (79.6 kg)   08/16/20 170 lb (77.1 kg)   08/04/20 177 lb (80.3 kg)     Constitutional: He is oriented to person, place, and time. He appears well-developed and well-nourished. In no acute distress. HEENT: Normocephalic and atraumatic. Sclerae anicteric. No xanthelasmas. Conjunctiva white, no subconjunctival hemorrhage   External inspection of ears nose teeth & gums   Eyes:PERRLA EOM's intact. Neck: Neck supple. No JVD present. Carotids without bruits. No mass and no thyromegaly present. No lymphadenopathy present. Cardiovascular: RRR, normal S1 and S2; no murmur/gallop or rub, PMI nondisplaced  Pulmonary/Chest: Effort normal.  Lungs clear to auscultation. Chest wall nontender  Abdominal: soft, nontender, nondistended. + bowel sounds; no organomegaly or bruits. Aorta normal  Extremities: No edema, cyanosis, or clubbing. Pulses are 2+ radial/carotid/dorsalis pedis bilaterally. Cap refill brisk. Neurological: No cranial nerve deficit. Psychiatric: He has a normal mood and affect.  His speech is normal and behavior is normal.     Lab Review:   Lab Results   Component Value Date    TRIG 109 05/21/2020    HDL 51 05/21/2020    LDLCALC 44 05/21/2020    LABVLDL 22 05/21/2020     Lab Results   Component Value Date     08/16/2020    K 4.1 08/16/2020     08/16/2020    CO2 21 08/16/2020    BUN 6 08/16/2020    CREATININE 0.7 08/16/2020    GLUCOSE 111 08/16/2020    CALCIUM 8.2 08/16/2020      Lab Results   Component Value Date WBC 13.8 (H) 08/16/2020    HGB 13.1 (L) 08/16/2020    HCT 37.5 (L) 08/16/2020    .7 (H) 08/16/2020     08/16/2020       I have reviewed any available labs, images, any stress test, [facility] on this pt         Assessment:    CAD/ stent / Mural thrombus / now on eliquis / his echo reveal no thrombi but has DVT   LHC 5/12/20 PTCA stent to the LAD proximally with a drug-eluting stent Ezra PTCA to the diagonal branch  CAD with complete LAD occlusion treated with stenting on 5/12/2020      HFrEF   Compensated   echo  6/20 EF WAS 35%  echo 7/20 EF 35-40%     DVT   5/12/20 us legs Acute deep vein thrombosis involving the left Popliteal vein, Gastrocnemius   veins and superficial vein thrombosis of the left small saphneous vein. Essential tremors  / hx alcoholism   MRI brain 7/20   No acute infarction, recent intracranial hemorrhage, or mass lesion. Scattered areas of small infarcts with suggested cortical laminar necrosis within the bilateral parietal lobes and right temporal lobe. Mild diffuse atrophy and chronic small vessel ischemic change.         Favored artifactual signal abnormalities within the dural venous sinuses as detailed.       Hx mural thrombus secondary to anterior apical myocardial infarction  See echo     Hx DVT left lower leg / bilateral cellulitis lower legs     Today we are reviewing his us leg 8/7/20  Olman Martell is an aging acute deep venous thrombosis involving the right common     femoral vein, subacute deep venous thrombosis involving the right popliteal     vein, and aging acute superficial venous thrombosis in the saphenofemoral     junction.     There is evidence of a chronic thrombotic process noted in the right     posterior tibial vein.     There is deep venous reflux in the right common femoral vein and superficial     venous reflux in the right saphenofemoral junction.     There is a subacute deep venous thrombosis involving the left deep femoral     vein, femoral vein, popliteal vein, and some of the gastrocnemius veins.    Monika Friday is an aging acute deep venous thrombosis in some of the left     gastrocnemius veins.     There is evidence of a chronic thrombotic process noted in the left great     saphenous vein at the distal thigh, posterior tibial vein, peroneal vein,     and soleal vein.     There is evidence of deep venous reflux in the left femoral vein.         Hx Alcoholism  tremor /stable     GERD  PPI if needed       Plan:  Last visit plan: Now on asa, plavix and coumadin, echo x 2 no mural thrombus / will recheck his us legs to asses for DVT if none would consider stopping coumadin and cont asa /plavix  / goes to coumadin clinic   VL extremities venus duy for DVT with hx DVT       He has recently had eye infection and on abx   He was changed from coumadin to eliquis ( was having trouble with INR ranges)   with the result of us of lower ext we will continue the eliquis and plavix dc the asa   Cont coreg / lisinopril / lipitor / eliquis / plavix and stop the asa  Fu and fu in one month      [place]in Damico APRN,CVNP

## 2020-10-03 DIAGNOSIS — I51.3 MURAL THROMBUS OF CARDIAC APEX: ICD-10-CM

## 2020-10-03 DIAGNOSIS — E87.6 HYPOKALEMIA: ICD-10-CM

## 2020-10-03 DIAGNOSIS — I25.83 CORONARY ARTERY DISEASE DUE TO LIPID RICH PLAQUE: ICD-10-CM

## 2020-10-03 DIAGNOSIS — I82.402 ACUTE DEEP VEIN THROMBOSIS (DVT) OF LEFT LOWER EXTREMITY, UNSPECIFIED VEIN (HCC): ICD-10-CM

## 2020-10-03 DIAGNOSIS — E55.9 VITAMIN D DEFICIENCY: ICD-10-CM

## 2020-10-03 DIAGNOSIS — I25.10 CORONARY ARTERY DISEASE DUE TO LIPID RICH PLAQUE: ICD-10-CM

## 2020-10-03 LAB
A/G RATIO: 1.4 (ref 1.1–2.2)
ALBUMIN SERPL-MCNC: 3.3 G/DL (ref 3.4–5)
ALP BLD-CCNC: 134 U/L (ref 40–129)
ALT SERPL-CCNC: 38 U/L (ref 10–40)
ANION GAP SERPL CALCULATED.3IONS-SCNC: 11 MMOL/L (ref 3–16)
AST SERPL-CCNC: 83 U/L (ref 15–37)
BILIRUB SERPL-MCNC: 0.5 MG/DL (ref 0–1)
BILIRUBIN DIRECT: <0.2 MG/DL (ref 0–0.3)
BILIRUBIN, INDIRECT: NORMAL MG/DL (ref 0–1)
BUN BLDV-MCNC: 5 MG/DL (ref 7–20)
CALCIUM SERPL-MCNC: 8.2 MG/DL (ref 8.3–10.6)
CHLORIDE BLD-SCNC: 105 MMOL/L (ref 99–110)
CHOLESTEROL, TOTAL: 84 MG/DL (ref 0–199)
CO2: 26 MMOL/L (ref 21–32)
CREAT SERPL-MCNC: 0.6 MG/DL (ref 0.8–1.3)
GFR AFRICAN AMERICAN: >60
GFR NON-AFRICAN AMERICAN: >60
GLOBULIN: 2.4 G/DL
GLUCOSE BLD-MCNC: 98 MG/DL (ref 70–99)
HCT VFR BLD CALC: 36.5 % (ref 40.5–52.5)
HDLC SERPL-MCNC: 56 MG/DL (ref 40–60)
HEMOGLOBIN: 12.4 G/DL (ref 13.5–17.5)
LDL CHOLESTEROL CALCULATED: ABNORMAL MG/DL
LDL CHOLESTEROL DIRECT: 18 MG/DL
MAGNESIUM: 1.7 MG/DL (ref 1.8–2.4)
MCH RBC QN AUTO: 34 PG (ref 26–34)
MCHC RBC AUTO-ENTMCNC: 34 G/DL (ref 31–36)
MCV RBC AUTO: 99.9 FL (ref 80–100)
PDW BLD-RTO: 13.2 % (ref 12.4–15.4)
PLATELET # BLD: 178 K/UL (ref 135–450)
PMV BLD AUTO: 8.6 FL (ref 5–10.5)
POTASSIUM SERPL-SCNC: 4.2 MMOL/L (ref 3.5–5.1)
RBC # BLD: 3.65 M/UL (ref 4.2–5.9)
SODIUM BLD-SCNC: 142 MMOL/L (ref 136–145)
TOTAL PROTEIN: 5.7 G/DL (ref 6.4–8.2)
TRIGL SERPL-MCNC: 371 MG/DL (ref 0–150)
TSH REFLEX FT4: 2.04 UIU/ML (ref 0.27–4.2)
VITAMIN D 25-HYDROXY: 14.6 NG/ML
VLDLC SERPL CALC-MCNC: ABNORMAL MG/DL
WBC # BLD: 5.7 K/UL (ref 4–11)

## 2020-10-08 ENCOUNTER — OFFICE VISIT (OUTPATIENT)
Dept: CARDIOLOGY CLINIC | Age: 60
End: 2020-10-08
Payer: MEDICAID

## 2020-10-08 ENCOUNTER — TELEPHONE (OUTPATIENT)
Dept: CARDIOLOGY CLINIC | Age: 60
End: 2020-10-08

## 2020-10-08 VITALS
BODY MASS INDEX: 25.62 KG/M2 | DIASTOLIC BLOOD PRESSURE: 80 MMHG | WEIGHT: 173 LBS | TEMPERATURE: 97.8 F | SYSTOLIC BLOOD PRESSURE: 110 MMHG | HEIGHT: 69 IN | HEART RATE: 76 BPM

## 2020-10-08 PROCEDURE — G8484 FLU IMMUNIZE NO ADMIN: HCPCS | Performed by: NURSE PRACTITIONER

## 2020-10-08 PROCEDURE — 1036F TOBACCO NON-USER: CPT | Performed by: NURSE PRACTITIONER

## 2020-10-08 PROCEDURE — G8419 CALC BMI OUT NRM PARAM NOF/U: HCPCS | Performed by: NURSE PRACTITIONER

## 2020-10-08 PROCEDURE — 3017F COLORECTAL CA SCREEN DOC REV: CPT | Performed by: NURSE PRACTITIONER

## 2020-10-08 PROCEDURE — G8427 DOCREV CUR MEDS BY ELIG CLIN: HCPCS | Performed by: NURSE PRACTITIONER

## 2020-10-08 PROCEDURE — 99214 OFFICE O/P EST MOD 30 MIN: CPT | Performed by: NURSE PRACTITIONER

## 2020-10-08 NOTE — PROGRESS NOTES
[facility]  Office Visit           Lieutenant Marleen SEGOVIA,  [facility] [address]       Cardiology             Sarah Martin  1960 October 8, 2020    Primary Cardiologist: Helen Valentino Hx:Mr Julissa Rodriguez is here with CAD/ stent / Mural thrombus / off coumadin on eliquis/ no c/o bleeding or falls      Cont eliquis plavix no asa   CAD with complete LAD occlusion treated with stenting on 5/12/2020  Mural thrombus secondary to anterior apical myocardial infarction  Hx DVT left lower leg / bilateral cellulitis lower legs   Hx Alcoholism  tremor [place]  Low mag start mg OTC supplement    Vit D 14.6 / start  Vit D supplement     LHC 5/12/20 PTCA stent to the LAD proximally with a drug-eluting stent Duncan PTCA to the diagonal branch  echo  6/20 EF WAS 35%  echo 7/20 EF 35-40%   5/12/20 us legs Acute deep vein thrombosis involving the left Popliteal vein, Gastrocnemius   veins and superficial vein thrombosis of the left small saphneous vein.      8/7/20 us lower ext   Juris Nurys is an aging acute deep venous thrombosis involving the right common     femoral vein, subacute deep venous thrombosis involving the right popliteal     vein, and aging acute superficial venous thrombosis in the saphenofemoral     junction.     There is evidence of a chronic thrombotic process noted in the right     posterior tibial vein.     There is deep venous reflux in the right common femoral vein and superficial     venous reflux in the right saphenofemoral junction.     There is a subacute deep venous thrombosis involving the left deep femoral     vein, femoral vein, popliteal vein, and some of the gastrocnemius veins.    Juris Nurys is an aging acute deep venous thrombosis in some of the left     gastrocnemius veins.     There is evidence of a chronic thrombotic process noted in the left great     saphenous vein at the distal thigh, posterior tibial vein, peroneal vein,     and soleal vein.    Jamin Nurys is evidence of deep venous reflux in the left femoral vein. I have examined pt and reviewed notes / any lab work EKGs stress test, angiograms, & images reviewed  I  have spent >20 minutes of face to face time with the patient with more than 50% spent counseling and coordinating care this patient. Review of Systems:  Constitutional: Denies  fatigue, weakness, night sweats or fever. HEENT: Denies new visual changes, ringing in ears, nosebleeds,nasal congestion  Respiratory: Denies new or change in SOB, PND, orthopnea or cough. Cardiovascular: see HPI  GI: Denies N/V, diarrhea, constipation, abdominal pain, change in bowel habits, melena or hematochezia  : Denies urinary frequency, urgency, incontinence, hematuria or dysuria. Skin: Denies rash, hives, or cyanosis  Musculoskeletal: Denies joint or muscle aches/pain  Neurological: Denies syncope or TIA-like symptoms. Psychiatric: Denies anxiety, insomnia or depression     Past Medical History:   Diagnosis Date    Alcohol abuse     CAD (coronary artery disease)     with complete LAD occlusion    CHF (congestive heart failure) (HCC)     LVEF    Essential tremor     HTN (hypertension)     Lower extremity cellulitis     MI (mitral incompetence)      Past Surgical History:   Procedure Laterality Date    PTCA      UPPER GASTROINTESTINAL ENDOSCOPY N/A 7/28/2020    EGD BIOPSY performed by Bk Dean MD at [address] ENDOSCOPY     Family History   Problem Relation Age of Onset    Stroke Mother     Heart Disease Father      Social History     Tobacco Use    Smoking status: Former Smoker     Packs/day: 0.50     Years: 40.00     Pack years: 20.00     Types: Cigarettes    Smokeless tobacco: Never Used   Substance Use Topics    Alcohol use:  Yes     Alcohol/week: 2.0 standard drinks     Types: 2 Cans of beer per week     Comment: previously was drinkning 5 cans per day    Drug use: Never       No Known Allergies  Current no subconjunctival hemorrhage   External inspection of ears nose teeth & gums   Eyes:PERRLA EOM's intact. Neck: Neck supple. No JVD present. Carotids without bruits. No mass and no thyromegaly present. No lymphadenopathy present. Cardiovascular: RRR, normal S1 and S2; no murmur/gallop or rub, PMI nondisplaced  Pulmonary/Chest: Effort normal.  Lungs clear to auscultation. Chest wall nontender  Abdominal: soft, nontender, nondistended. + bowel sounds; no organomegaly or bruits. Aorta normal  Extremities: No edema, cyanosis, or clubbing. Pulses are 2+ radial/carotid/dorsalis pedis bilaterally. Cap refill brisk. Neurological: No cranial nerve deficit. Psychiatric: He has a normal mood and affect.  His speech is normal and behavior is normal.     Lab Review:   Lab Results   Component Value Date    TRIG 371 10/03/2020    HDL 56 10/03/2020    LDLCALC see below 10/03/2020    LDLDIRECT 18 10/03/2020    LABVLDL see below 10/03/2020     Lab Results   Component Value Date     10/03/2020    K 4.2 10/03/2020    K 4.1 08/16/2020     10/03/2020    CO2 26 10/03/2020    BUN 5 10/03/2020    CREATININE 0.6 10/03/2020    GLUCOSE 98 10/03/2020    CALCIUM 8.2 10/03/2020      Lab Results   Component Value Date    WBC 5.7 10/03/2020    HGB 12.4 (L) 10/03/2020    HCT 36.5 (L) 10/03/2020    MCV 99.9 10/03/2020     10/03/2020         I have reviewed any available labs, images, any stress test, [facility] on this pt       Assessment:    CAD/ stent   CAD with complete LAD occlusion treated with stenting on 5/12/2020    complaint with meds   Discussed nutrition / sodium intake / fluid intake   Recommend activity as tolerated   On GDMT     LHC 5/12/20 PTCA stent to the LAD proximally with a drug-eluting stent Ezra PTCA to the diagonal branch  echo  6/20 EF WAS 35%  echo 7/20 EF 35-40%     HFrEF  35-40%     Mural thrombus / off coumadin on eliquis/ no c/o bleeding or falls     secondary to anterior apical myocardial infarction Cont eliquis plavix no asa     Hx DVT left lower leg / bilateral cellulitis lower legs     Hx alcoholism tremor /stable     Low mag start mg OTC supplement      Vit D 14.6 / start  Vit D supplement     DVC   5/12/20 us legs acute deep vein thrombosis involving the left Popliteal vein, Gastrocnemius   veins and superficial vein thrombosis of the left small saphneous vein.      8/7/20 us lower ext   Kalyanianearika Emmett is an aging acute deep venous thrombosis involving the right common     femoral vein, subacute deep venous thrombosis involving the right popliteal     vein, and aging acute superficial venous thrombosis in the saphenofemoral     junction.     There is evidence of a chronic thrombotic process noted in the right     posterior tibial vein.     There is deep venous reflux in the right common femoral vein and superficial     venous reflux in the right saphenofemoral junction.     There is a subacute deep venous thrombosis involving the left deep femoral     vein, femoral vein, popliteal vein, and some of the gastrocnemius veins.    Esthelaa Emmett is an aging acute deep venous thrombosis in some of the left     gastrocnemius veins.     There is evidence of a chronic thrombotic process noted in the left great     saphenous vein at the distal thigh, posterior tibial vein, peroneal vein,     and soleal vein.     There is evidence of deep venous reflux in the left femoral vein.         Plan:  Fu in 6 months /blood work PTV     SHERRI Ramirez

## 2020-12-15 RX ORDER — CARVEDILOL 3.12 MG/1
TABLET ORAL
Qty: 180 TABLET | Refills: 3 | Status: SHIPPED | OUTPATIENT
Start: 2020-12-15 | End: 2022-08-01 | Stop reason: SDUPTHER

## 2020-12-15 NOTE — TELEPHONE ENCOUNTER
Requested Prescriptions     Pending Prescriptions Disp Refills    carvedilol (COREG) 3.125 MG tablet [Pharmacy Med Name: CARVEDILOL 3.125MG TABLETS] 180 tablet 3     Sig: TAKE 1 TABLET BY MOUTH TWICE DAILY                Last Office Visit: 10/8/2020     Next Office Visit: 4/8/2021

## 2020-12-28 RX ORDER — LISINOPRIL 2.5 MG/1
TABLET ORAL
Qty: 30 TABLET | Refills: 3 | Status: SHIPPED | OUTPATIENT
Start: 2020-12-28 | End: 2021-05-03

## 2020-12-28 RX ORDER — CLOPIDOGREL BISULFATE 75 MG/1
75 TABLET ORAL DAILY
Qty: 30 TABLET | Refills: 3 | Status: SHIPPED | OUTPATIENT
Start: 2020-12-28 | End: 2021-05-03

## 2020-12-28 RX ORDER — ATORVASTATIN CALCIUM 40 MG/1
TABLET, FILM COATED ORAL
Qty: 30 TABLET | Refills: 3 | Status: SHIPPED | OUTPATIENT
Start: 2020-12-28 | End: 2021-05-03

## 2020-12-28 RX ORDER — APIXABAN 5 MG/1
TABLET, FILM COATED ORAL
Qty: 60 TABLET | Refills: 3 | Status: SHIPPED | OUTPATIENT
Start: 2020-12-28 | End: 2021-05-10

## 2021-01-05 RX ORDER — PANTOPRAZOLE SODIUM 40 MG/1
TABLET, DELAYED RELEASE ORAL
Qty: 30 TABLET | Refills: 2 | Status: SHIPPED | OUTPATIENT
Start: 2021-01-05 | End: 2021-03-29

## 2021-01-10 ENCOUNTER — APPOINTMENT (OUTPATIENT)
Dept: GENERAL RADIOLOGY | Age: 61
DRG: 422 | End: 2021-01-10
Payer: MEDICAID

## 2021-01-10 ENCOUNTER — HOSPITAL ENCOUNTER (INPATIENT)
Age: 61
LOS: 2 days | Discharge: HOME | DRG: 422 | End: 2021-01-12
Attending: EMERGENCY MEDICINE | Admitting: INTERNAL MEDICINE
Payer: MEDICAID

## 2021-01-10 ENCOUNTER — APPOINTMENT (OUTPATIENT)
Dept: CT IMAGING | Age: 61
DRG: 422 | End: 2021-01-10
Payer: MEDICAID

## 2021-01-10 DIAGNOSIS — F10.930 ALCOHOL WITHDRAWAL SYNDROME WITHOUT COMPLICATION (HCC): ICD-10-CM

## 2021-01-10 DIAGNOSIS — R29.6 FREQUENT FALLS: ICD-10-CM

## 2021-01-10 DIAGNOSIS — D53.9 MACROCYTIC ANEMIA: ICD-10-CM

## 2021-01-10 DIAGNOSIS — R55 POSTURAL DIZZINESS WITH PRESYNCOPE: Primary | ICD-10-CM

## 2021-01-10 DIAGNOSIS — R42 POSTURAL DIZZINESS WITH PRESYNCOPE: Primary | ICD-10-CM

## 2021-01-10 PROBLEM — F10.139 ALCOHOL ABUSE WITH WITHDRAWAL (HCC): Status: ACTIVE | Noted: 2021-01-10

## 2021-01-10 LAB
A/G RATIO: 0.9 (ref 1.1–2.2)
ALBUMIN SERPL-MCNC: 3.1 G/DL (ref 3.4–5)
ALP BLD-CCNC: 167 U/L (ref 40–129)
ALT SERPL-CCNC: 30 U/L (ref 10–40)
AMPHETAMINE SCREEN, URINE: NORMAL
ANION GAP SERPL CALCULATED.3IONS-SCNC: 7 MMOL/L (ref 3–16)
AST SERPL-CCNC: 93 U/L (ref 15–37)
BARBITURATE SCREEN URINE: NORMAL
BASOPHILS ABSOLUTE: 0 K/UL (ref 0–0.2)
BASOPHILS RELATIVE PERCENT: 0.5 %
BENZODIAZEPINE SCREEN, URINE: NORMAL
BILIRUB SERPL-MCNC: 2.3 MG/DL (ref 0–1)
BILIRUBIN URINE: NEGATIVE
BLOOD, URINE: NEGATIVE
BUN BLDV-MCNC: 7 MG/DL (ref 7–20)
CALCIUM SERPL-MCNC: 8.1 MG/DL (ref 8.3–10.6)
CANNABINOID SCREEN URINE: NORMAL
CHLORIDE BLD-SCNC: 99 MMOL/L (ref 99–110)
CLARITY: CLEAR
CO2: 27 MMOL/L (ref 21–32)
COCAINE METABOLITE SCREEN URINE: NORMAL
COLOR: YELLOW
CREAT SERPL-MCNC: <0.5 MG/DL (ref 0.8–1.3)
EOSINOPHILS ABSOLUTE: 0 K/UL (ref 0–0.6)
EOSINOPHILS RELATIVE PERCENT: 0.6 %
ETHANOL: NORMAL MG/DL (ref 0–0.08)
GFR AFRICAN AMERICAN: >60
GFR NON-AFRICAN AMERICAN: >60
GLOBULIN: 3.3 G/DL
GLUCOSE BLD-MCNC: 105 MG/DL (ref 70–99)
GLUCOSE URINE: NEGATIVE MG/DL
HCT VFR BLD CALC: 39.1 % (ref 40.5–52.5)
HEMOGLOBIN: 13.2 G/DL (ref 13.5–17.5)
INR BLD: 1.62 (ref 0.86–1.14)
KETONES, URINE: NEGATIVE MG/DL
LEUKOCYTE ESTERASE, URINE: NEGATIVE
LYMPHOCYTES ABSOLUTE: 2.3 K/UL (ref 1–5.1)
LYMPHOCYTES RELATIVE PERCENT: 28.1 %
Lab: NORMAL
MCH RBC QN AUTO: 34.8 PG (ref 26–34)
MCHC RBC AUTO-ENTMCNC: 33.8 G/DL (ref 31–36)
MCV RBC AUTO: 102.9 FL (ref 80–100)
METHADONE SCREEN, URINE: NORMAL
MICROSCOPIC EXAMINATION: NORMAL
MONOCYTES ABSOLUTE: 0.6 K/UL (ref 0–1.3)
MONOCYTES RELATIVE PERCENT: 7.6 %
NEUTROPHILS ABSOLUTE: 5.3 K/UL (ref 1.7–7.7)
NEUTROPHILS RELATIVE PERCENT: 63.2 %
NITRITE, URINE: NEGATIVE
OPIATE SCREEN URINE: NORMAL
OXYCODONE URINE: NORMAL
PDW BLD-RTO: 13.9 % (ref 12.4–15.4)
PH UA: 6
PH UA: 6 (ref 5–8)
PHENCYCLIDINE SCREEN URINE: NORMAL
PLATELET # BLD: 135 K/UL (ref 135–450)
PMV BLD AUTO: 8.4 FL (ref 5–10.5)
POTASSIUM REFLEX MAGNESIUM: 3.8 MMOL/L (ref 3.5–5.1)
PRO-BNP: 720 PG/ML (ref 0–124)
PROPOXYPHENE SCREEN: NORMAL
PROTEIN UA: NEGATIVE MG/DL
PROTHROMBIN TIME: 18.9 SEC (ref 10–13.2)
RBC # BLD: 3.8 M/UL (ref 4.2–5.9)
SODIUM BLD-SCNC: 133 MMOL/L (ref 136–145)
SPECIFIC GRAVITY UA: 1.01 (ref 1–1.03)
TOTAL PROTEIN: 6.4 G/DL (ref 6.4–8.2)
TROPONIN: <0.01 NG/ML
URINE REFLEX TO CULTURE: NORMAL
URINE TYPE: NORMAL
UROBILINOGEN, URINE: 0.2 E.U./DL
WBC # BLD: 8.4 K/UL (ref 4–11)

## 2021-01-10 PROCEDURE — 83880 ASSAY OF NATRIURETIC PEPTIDE: CPT

## 2021-01-10 PROCEDURE — 99284 EMERGENCY DEPT VISIT MOD MDM: CPT

## 2021-01-10 PROCEDURE — 96361 HYDRATE IV INFUSION ADD-ON: CPT

## 2021-01-10 PROCEDURE — 85610 PROTHROMBIN TIME: CPT

## 2021-01-10 PROCEDURE — 6370000000 HC RX 637 (ALT 250 FOR IP): Performed by: STUDENT IN AN ORGANIZED HEALTH CARE EDUCATION/TRAINING PROGRAM

## 2021-01-10 PROCEDURE — 81003 URINALYSIS AUTO W/O SCOPE: CPT

## 2021-01-10 PROCEDURE — 2580000003 HC RX 258: Performed by: STUDENT IN AN ORGANIZED HEALTH CARE EDUCATION/TRAINING PROGRAM

## 2021-01-10 PROCEDURE — 36415 COLL VENOUS BLD VENIPUNCTURE: CPT

## 2021-01-10 PROCEDURE — 80307 DRUG TEST PRSMV CHEM ANLYZR: CPT

## 2021-01-10 PROCEDURE — 2500000003 HC RX 250 WO HCPCS: Performed by: STUDENT IN AN ORGANIZED HEALTH CARE EDUCATION/TRAINING PROGRAM

## 2021-01-10 PROCEDURE — 84484 ASSAY OF TROPONIN QUANT: CPT

## 2021-01-10 PROCEDURE — 93005 ELECTROCARDIOGRAM TRACING: CPT | Performed by: INTERNAL MEDICINE

## 2021-01-10 PROCEDURE — G0378 HOSPITAL OBSERVATION PER HR: HCPCS

## 2021-01-10 PROCEDURE — 71045 X-RAY EXAM CHEST 1 VIEW: CPT

## 2021-01-10 PROCEDURE — 80053 COMPREHEN METABOLIC PANEL: CPT

## 2021-01-10 PROCEDURE — 1200000000 HC SEMI PRIVATE

## 2021-01-10 PROCEDURE — 85025 COMPLETE CBC W/AUTO DIFF WBC: CPT

## 2021-01-10 PROCEDURE — 96360 HYDRATION IV INFUSION INIT: CPT

## 2021-01-10 PROCEDURE — 70450 CT HEAD/BRAIN W/O DYE: CPT

## 2021-01-10 PROCEDURE — G0480 DRUG TEST DEF 1-7 CLASSES: HCPCS

## 2021-01-10 PROCEDURE — 6360000002 HC RX W HCPCS: Performed by: STUDENT IN AN ORGANIZED HEALTH CARE EDUCATION/TRAINING PROGRAM

## 2021-01-10 RX ORDER — DIAZEPAM 5 MG/1
5 TABLET ORAL ONCE
Status: COMPLETED | OUTPATIENT
Start: 2021-01-10 | End: 2021-01-10

## 2021-01-10 RX ORDER — LANOLIN ALCOHOL/MO/W.PET/CERES
CREAM (GRAM) TOPICAL 2 TIMES DAILY
Status: DISCONTINUED | OUTPATIENT
Start: 2021-01-10 | End: 2021-01-12 | Stop reason: HOSPADM

## 2021-01-10 RX ORDER — ONDANSETRON 2 MG/ML
4 INJECTION INTRAMUSCULAR; INTRAVENOUS EVERY 6 HOURS PRN
Status: CANCELLED | OUTPATIENT
Start: 2021-01-10

## 2021-01-10 RX ORDER — MULTIVITAMIN WITH IRON
1 TABLET ORAL DAILY
Status: CANCELLED | OUTPATIENT
Start: 2021-01-10

## 2021-01-10 RX ORDER — LISINOPRIL 2.5 MG/1
2.5 TABLET ORAL NIGHTLY
Status: DISCONTINUED | OUTPATIENT
Start: 2021-01-10 | End: 2021-01-12 | Stop reason: HOSPADM

## 2021-01-10 RX ORDER — MAGNESIUM 200 MG
400 TABLET ORAL DAILY
COMMUNITY
End: 2022-08-01 | Stop reason: SDUPTHER

## 2021-01-10 RX ORDER — LANOLIN ALCOHOL/MO/W.PET/CERES
100 CREAM (GRAM) TOPICAL DAILY
Status: DISCONTINUED | OUTPATIENT
Start: 2021-01-13 | End: 2021-01-12 | Stop reason: HOSPADM

## 2021-01-10 RX ORDER — MOXIFLOXACIN 5 MG/ML
1 SOLUTION/ DROPS OPHTHALMIC DAILY
Status: DISCONTINUED | OUTPATIENT
Start: 2021-01-11 | End: 2021-01-12 | Stop reason: HOSPADM

## 2021-01-10 RX ORDER — SODIUM CHLORIDE, SODIUM LACTATE, POTASSIUM CHLORIDE, CALCIUM CHLORIDE 600; 310; 30; 20 MG/100ML; MG/100ML; MG/100ML; MG/100ML
1000 INJECTION, SOLUTION INTRAVENOUS ONCE
Status: COMPLETED | OUTPATIENT
Start: 2021-01-10 | End: 2021-01-10

## 2021-01-10 RX ORDER — UBIDECARENONE 75 MG
100 CAPSULE ORAL NIGHTLY
Status: DISCONTINUED | OUTPATIENT
Start: 2021-01-10 | End: 2021-01-12 | Stop reason: HOSPADM

## 2021-01-10 RX ORDER — PREDNISOLONE ACETATE 10 MG/ML
1 SUSPENSION/ DROPS OPHTHALMIC DAILY
Status: DISCONTINUED | OUTPATIENT
Start: 2021-01-11 | End: 2021-01-12 | Stop reason: HOSPADM

## 2021-01-10 RX ORDER — ATORVASTATIN CALCIUM 40 MG/1
40 TABLET, FILM COATED ORAL NIGHTLY
Status: DISCONTINUED | OUTPATIENT
Start: 2021-01-10 | End: 2021-01-12 | Stop reason: HOSPADM

## 2021-01-10 RX ORDER — MOXIFLOXACIN 5 MG/ML
1 SOLUTION/ DROPS OPHTHALMIC DAILY
COMMUNITY
Start: 2021-01-11 | End: 2021-01-13

## 2021-01-10 RX ORDER — ACETAMINOPHEN 325 MG/1
650 TABLET ORAL EVERY 6 HOURS PRN
Status: DISCONTINUED | OUTPATIENT
Start: 2021-01-10 | End: 2021-01-12 | Stop reason: HOSPADM

## 2021-01-10 RX ORDER — FOLIC ACID 1 MG/1
1 TABLET ORAL DAILY
Status: CANCELLED | OUTPATIENT
Start: 2021-01-10

## 2021-01-10 RX ORDER — PROMETHAZINE HYDROCHLORIDE 25 MG/1
12.5 TABLET ORAL EVERY 6 HOURS PRN
Status: CANCELLED | OUTPATIENT
Start: 2021-01-10

## 2021-01-10 RX ORDER — PREDNISOLONE ACETATE 10 MG/ML
1 SUSPENSION/ DROPS OPHTHALMIC DAILY
COMMUNITY
Start: 2021-01-11 | End: 2021-01-13

## 2021-01-10 RX ORDER — ACETAMINOPHEN 650 MG/1
650 SUPPOSITORY RECTAL EVERY 6 HOURS PRN
Status: DISCONTINUED | OUTPATIENT
Start: 2021-01-10 | End: 2021-01-12 | Stop reason: HOSPADM

## 2021-01-10 RX ORDER — LANOLIN ALCOHOL/MO/W.PET/CERES
100 CREAM (GRAM) TOPICAL DAILY
Status: CANCELLED | OUTPATIENT
Start: 2021-01-10

## 2021-01-10 RX ORDER — POLYETHYLENE GLYCOL 3350 17 G/17G
17 POWDER, FOR SOLUTION ORAL DAILY PRN
Status: DISCONTINUED | OUTPATIENT
Start: 2021-01-10 | End: 2021-01-12 | Stop reason: HOSPADM

## 2021-01-10 RX ORDER — PANTOPRAZOLE SODIUM 40 MG/1
40 TABLET, DELAYED RELEASE ORAL
Status: DISCONTINUED | OUTPATIENT
Start: 2021-01-11 | End: 2021-01-12 | Stop reason: HOSPADM

## 2021-01-10 RX ORDER — CLOPIDOGREL BISULFATE 75 MG/1
75 TABLET ORAL DAILY
Status: DISCONTINUED | OUTPATIENT
Start: 2021-01-11 | End: 2021-01-12 | Stop reason: HOSPADM

## 2021-01-10 RX ORDER — SODIUM CHLORIDE 0.9 % (FLUSH) 0.9 %
10 SYRINGE (ML) INJECTION EVERY 12 HOURS SCHEDULED
Status: DISCONTINUED | OUTPATIENT
Start: 2021-01-10 | End: 2021-01-12 | Stop reason: HOSPADM

## 2021-01-10 RX ORDER — CARVEDILOL 3.12 MG/1
1 TABLET ORAL 2 TIMES DAILY
Status: CANCELLED | OUTPATIENT
Start: 2021-01-10

## 2021-01-10 RX ORDER — SODIUM CHLORIDE 0.9 % (FLUSH) 0.9 %
10 SYRINGE (ML) INJECTION PRN
Status: DISCONTINUED | OUTPATIENT
Start: 2021-01-10 | End: 2021-01-12 | Stop reason: HOSPADM

## 2021-01-10 RX ORDER — CLOPIDOGREL BISULFATE 75 MG/1
1 TABLET ORAL DAILY
Status: CANCELLED | OUTPATIENT
Start: 2021-01-10

## 2021-01-10 RX ADMIN — Medication 10 ML: at 21:31

## 2021-01-10 RX ADMIN — VITAM B12 100 MCG: 100 TAB at 21:31

## 2021-01-10 RX ADMIN — FOLIC ACID: 5 INJECTION, SOLUTION INTRAMUSCULAR; INTRAVENOUS; SUBCUTANEOUS at 16:01

## 2021-01-10 RX ADMIN — LISINOPRIL 2.5 MG: 2.5 TABLET ORAL at 21:30

## 2021-01-10 RX ADMIN — DIAZEPAM 5 MG: 5 TABLET ORAL at 11:19

## 2021-01-10 RX ADMIN — ATORVASTATIN CALCIUM 40 MG: 40 TABLET, FILM COATED ORAL at 21:30

## 2021-01-10 RX ADMIN — Medication: at 21:31

## 2021-01-10 RX ADMIN — APIXABAN 5 MG: 5 TABLET, FILM COATED ORAL at 21:30

## 2021-01-10 RX ADMIN — SODIUM CHLORIDE, POTASSIUM CHLORIDE, SODIUM LACTATE AND CALCIUM CHLORIDE 1000 ML: 600; 310; 30; 20 INJECTION, SOLUTION INTRAVENOUS at 11:20

## 2021-01-10 ASSESSMENT — ENCOUNTER SYMPTOMS
VOMITING: 0
ABDOMINAL PAIN: 0
NAUSEA: 0
COUGH: 0
BLOOD IN STOOL: 0
CONSTIPATION: 0
RHINORRHEA: 0
DIARRHEA: 0
SORE THROAT: 0

## 2021-01-10 ASSESSMENT — PAIN SCALES - GENERAL: PAINLEVEL_OUTOF10: 0

## 2021-01-10 NOTE — ED PROVIDER NOTES
[address] [facility] RESIDENT NOTE       Date of evaluation: 1/10/2021    Chief Complaint     Dizziness (dizzy, freq falls, diff taking a deep breath)      History of Present Illness     Glory Moses is a 56 y.o. male with coronary artery disease status post PCI to the LAD, CHF, alcohol use disorder who presents with dizziness and frequent falls. The patient is brought in by his sister who reports that he has had several falls over the past 4 days. He describes these episodes as always occurring after rising from a seated position when he gets dizzy, without chest pain or trouble breathing, and then seems to stumble. His sister believes that he is having increasing \"shaking\" that leads to the falls. He reports he has not lost consciousness with the falls, and states he has not hit his head. Sister voices concerns regarding alcohol use, the patient reports he does not drink daily anymore, but previously drank 3 to 4 16 ounce beers daily. He has never been admitted to the ICU for alcohol withdrawal.  He reports no history of alcohol withdrawal seizures. No recent medication changes. Additionally he does report some issues taking deep breaths, but has no chest pain or trouble breathing at rest.  No lower extremity edema. He is on anticoagulation for prior DVT. No neck, back pain, weakness, numbness, tingling, visual or voice changes. Review of Systems     Review of Systems   Constitutional: Negative for chills, fever and unexpected weight change. HENT: Negative for rhinorrhea and sore throat. Eyes: Negative for visual disturbance. Respiratory: Negative for cough. Cardiovascular: Negative for chest pain and leg swelling. Gastrointestinal: Negative for abdominal pain, blood in stool, constipation, diarrhea, nausea and vomiting. Genitourinary: Negative for dysuria. Musculoskeletal: Positive for gait problem. Skin: Negative for rash. Neurological: Positive for tremors and light-headedness. Negative for dizziness, speech difficulty, weakness and headaches. Physical Exam     INITIAL VITALS: BP: (!) 145/90, Temp: 98 °F (36.7 °C), Pulse: 68, Resp: 20, SpO2: 100 %   General:  Chronically ill but nontoxic male  HEENT:  Normocephalic, atraumatic. PERRL. Conjunctivae pink, moist. No scleral icterus. No nasal discharge. Moist mucus membranes. Neck:  Supple. Trachea midline. Pulmonary:   Normal respiratory effort. CTAB  Cardiac: Regular rate and rhythm. No murmurs, gallops or rubs. Radial pulses 2+ bilaterally. Capillary refill < 2 seconds in fingers. Abdomen:  Soft, not tender, not distended. Bowel sounds present. Musculoskeletal:  Normal bulk and tone for age. Skin:  Warm, dry. No rashes, ecchymosis or cyanosis. Neuro: Alert and oriented x 4. Cranial nerves intact bilaterally. Moving all extremities equally, strength is at least antigravity with resistance in the major muscle groups in the upper and lower extremities. There is a diffuse intention tremor throughout. There are no cerebellar signs including truncal ataxia, dysmetria, dysdiadochokinesia. Gait antalgic. Speech is fluent without dysarthria. Extremities: 1+ bilateral lower extremity pitting edema. Psych: Euthymic affect. Normal rate and rhythm of speech with full prosody. Linear thought process. ED Course     Nursing Notes, Past Medical Hx, Past Surgical Hx, Social Hx, Allergies, and Family Hx were reviewed. The patient was given the following medications:  Orders Placed This Encounter   Medications    lactated ringers infusion 1,000 mL    diazePAM (VALIUM) tablet 5 mg       CONSULTS:  IP CONSULT TO HOSPITALIST    MEDICAL DECISION MAKING / ASSESSMENT / Nissa Noah is a 56 y.o. male who presented to the emergency department with Dizziness (dizzy, freq falls, diff taking a deep breath)   with a history and presentation as described above in HPI.  The patient was evaluated by myself and the ED attending physician. All management and disposition plans were discussed and agreed upon. ED Course as of Tom 10 1326   Georgia Borden Tom 10, 2021   1102 The patient presents with several falls over the past 4 days that sound orthostatic in nature in the context of a history of problematic alcohol use. Initial exam he is hypertensive, tremulous, raising concerns for early withdrawal - his last drink was on the evening prior to presentation. His orthostasis may be due to relative volume depletion as well, although he is certainly not in hypovolemic shock. Broad laboratory evaluation was obtained. He was given lactated Ringer's for fluid resuscitation as well as a small dose of diazepam.  CT of the head was obtained given his history of Eliquis use and falls. [CS]   1140 ECG demonstrates signs of prior ischemia with Q waves in V3, no significant changes compared to prior. Head CT demonstrates no intracranial hemorrhage. Labs are notable for evidence of chronic alcohol use including hyperbilirubinemia, elevated INR, macrocytosis with mild anemia. He does have mild hyponatremia, which is likely due to poor oral intake. His BNP is elevated to 720, which is actually improved from prior. Given his anticoagulation, frequent falls, and risk for significant intracranial hemorrhage as a result, he will be admitted for further evaluation. 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    [CS]   1211 On reassessment the patient's tremulousness has improved after diazepam, as well as his blood pressure. He was symptomatic with orthostasis testing.    [CS]      ED Course User Index  [CS] Vlad Shea MD       This patient was also evaluated by the attending physician. All care plans were discussed and agreed upon. Clinical Impression     1.  Postural dizziness with presyncope 2. Frequent falls    3. Macrocytic anemia    4. Alcohol withdrawal syndrome without complication ([address]        Disposition     At this time the patient has been admitted for further evaluation and management. The patient will continue to be monitored here in the emergency department until which time he is moved to his new treatment location. PATIENT REFERRED TO:  No follow-up provider specified. DISCHARGE MEDICATIONS:  New Prescriptions    No medications on file       DISPOSITION        Diagnostic Results     EKG   Interpreted in conjunction with emergency department physician No att. providers found  Normal sinus rhythm with a rate of 69 bpm, left axis deviation, Q waves in V3, V1, no evidence of acute ischemia. No significant change compared to prior. RADIOLOGY:  XR CHEST PORTABLE   Final Result      No acute cardiopulmonary disease. CT Head WO Contrast   Final Result      No acute intracranial hemorrhage or mass effect.           LABS:   Results for orders placed or performed during the hospital encounter of 01/10/21   Comprehensive Metabolic Panel w/ Reflex to MG   Result Value Ref Range    Sodium 133 (L) 136 - 145 mmol/L    Potassium reflex Magnesium 3.8 3.5 - 5.1 mmol/L    Chloride 99 99 - 110 mmol/L    CO2 27 21 - 32 mmol/L    Anion Gap 7 3 - 16    Glucose 105 (H) 70 - 99 mg/dL    BUN 7 7 - 20 mg/dL    CREATININE <0.5 (L) 0.8 - 1.3 mg/dL    GFR Non-African American >60 >60    GFR African American >60 >60    Calcium 8.1 (L) 8.3 - 10.6 mg/dL    Total Protein 6.4 6.4 - 8.2 g/dL    Alb 3.1 (L) 3.4 - 5.0 g/dL    Albumin/Globulin Ratio 0.9 (L) 1.1 - 2.2    Total Bilirubin 2.3 (H) 0.0 - 1.0 mg/dL    Alkaline Phosphatase 167 (H) 40 - 129 U/L    ALT 30 10 - 40 U/L    AST 93 (H) 15 - 37 U/L    Globulin 3.3 g/dL   Brain Natriuretic Peptide   Result Value Ref Range    Pro- (H) 0 - 124 pg/mL   Troponin   Result Value Ref Range    Troponin <0.01 <0.01 ng/mL   Ethanol   Result Value Ref Range Ethanol Lvl None Detected mg/dL   CBC Auto Differential   Result Value Ref Range    WBC 8.4 4.0 - 11.0 K/uL    RBC 3.80 (L) 4.20 - 5.90 M/uL    Hemoglobin 13.2 (L) 13.5 - 17.5 g/dL    Hematocrit 39.1 (L) 40.5 - 52.5 %    .9 (H) 80.0 - 100.0 fL    MCH 34.8 (H) 26.0 - 34.0 pg    MCHC 33.8 31.0 - 36.0 g/dL    RDW 13.9 12.4 - 15.4 %    Platelets 940 196 - 834 K/uL    MPV 8.4 5.0 - 10.5 fL    Neutrophils % 63.2 %    Lymphocytes % 28.1 %    Monocytes % 7.6 %    Eosinophils % 0.6 %    Basophils % 0.5 %    Neutrophils Absolute 5.3 1.7 - 7.7 K/uL    Lymphocytes Absolute 2.3 1.0 - 5.1 K/uL    Monocytes Absolute 0.6 0.0 - 1.3 K/uL    Eosinophils Absolute 0.0 0.0 - 0.6 K/uL    Basophils Absolute 0.0 0.0 - 0.2 K/uL   PT - INR   Result Value Ref Range    Protime 18.9 (H) 10.0 - 13.2 sec    INR 1.62 (H) 0.86 - 1.14       ED BEDSIDE ULTRASOUND:  None     RECENT VITALS:  BP: (!) 145/90, Temp: 98 °F (36.7 °C), Pulse: 76, Resp: 18, SpO2: 100 %     Procedures     None     Past Medical, Surgical, Family, and Social History     He has a past medical history of Alcohol abuse, CAD (coronary artery disease), CHF (congestive heart failure) ([facility] [address] Essential tremor, HTN (hypertension), Lower extremity cellulitis, and MI (mitral incompetence). He has a past surgical history that includes Percutaneous Transluminal Coronary Angio and Upper gastrointestinal endoscopy (N/A, 7/28/2020). His family history includes Heart Disease in his father; Stroke in his mother. He reports that he has quit smoking. His smoking use included cigarettes. He has a 20.00 pack-year smoking history. He has never used smokeless tobacco. He reports current alcohol use of about 2.0 standard drinks of alcohol per week. He reports that he does not use drugs.     Medications     Previous Medications    ATORVASTATIN (LIPITOR) 40 MG TABLET    TAKE 1 TABLET BY MOUTH EVERY NIGHT    CARVEDILOL (COREG) 3.125 MG TABLET    TAKE 1 TABLET BY MOUTH TWICE DAILY    CLOPIDOGREL

## 2021-01-10 NOTE — LETTER
[address] [facility]  [address] [address]  Phone: [phone]             January 12, 2021    Patient: Roly Loera   YOB: 1960   Date of Visit: 1/10/2021       To Whom It May Concern:    Esha Stein was seen and treated in our facility  beginning 1/10/2021 until . He may return to work on Friday, January 15, 2021.       Sincerely,       Charlotte Gibbons RN         Signature:__________________________________

## 2021-01-10 NOTE — H&P
Internal Medicine PGY- 1 Resident History & Physical      PCP: Nathaly Lewis MD    Date of Admission: 1/10/2021    Chief Complaint:  Dizziness and frequent falls    History Of Present Illness:      Liberty Burr is a 56 y.o. male w/ PMHX of CAD (s/p PCI to LAD), CHF, alcohol abuse who presented to [facility] with dizziness and frequent falls. He endorses dizziness when standing up from sitting for the last few months. He says he has fallen twice, one time a while back, in which he let himself down gently to the floor, and another time this past Thursday. This past Thursday he felll onto his knees and elbows. Ever since then, he says his symptoms of dizziness have worsened. He reports having quit drinking alcohol a few weeks ago, and denies any withdrawal symptoms since. He used to drink 3 large beers a day for more years than he can remember. He also endorses some very mild RUQ/RLQ abdominal pain. He denies N/V, F/C, CP, SOB, diaphoresis, dizziness at rest.     In the ED he was given LR for fluid resuscitation and a dose of diazepam. EKG showed signs of prior ischemia with no significant changes compared to prior. CT head showed no acute intracranial findings. Labs were significant for elevated total bilirubin, ALP, and AST. Past Medical History:        Diagnosis Date    Alcohol abuse     CAD (coronary artery disease)     with complete LAD occlusion    CHF (congestive heart failure) (HCC)     LVEF    Essential tremor     HTN (hypertension)     Lower extremity cellulitis     MI (mitral incompetence)        Past Surgical History:          Procedure Laterality Date    PTCA      UPPER GASTROINTESTINAL ENDOSCOPY N/A 7/28/2020    EGD BIOPSY performed by Patricia Velásquez MD at [facility] ENDOSCOPY       Medications Prior to Admission:      Prior to Admission medications    Medication Sig Start Date End Date Taking?  Authorizing Provider   pantoprazole (PROTONIX) 40 MG tablet TAKE 1 TABLET BY MOUTH EVERY MORNING( BEFORE BREAKFAST) 1/5/21   Jesi Rojas MD   ELIQUIS 5 MG TABS tablet TAKE 1 TABLET BY MOUTH TWICE DAILY 12/28/20   MERVIN Cohn CNP   atorvastatin (LIPITOR) 40 MG tablet TAKE 1 TABLET BY MOUTH EVERY NIGHT 12/28/20   MERVIN Cohn CNP   clopidogrel (PLAVIX) 75 MG tablet TAKE 1 TABLET BY MOUTH DAILY 12/28/20   MERVIN Cohn CNP   lisinopril (PRINIVIL;ZESTRIL) 2.5 MG tablet TAKE 1 TABLET BY MOUTH EVERY NIGHT 12/28/20   MERVIN Cohn CNP   carvedilol (COREG) 3.125 MG tablet TAKE 1 TABLET BY MOUTH TWICE DAILY 12/15/20   MERVIN Cohn CNP   WHITE PETROLATUM EX Apply 61 % topically daily 5/16/20   Historical Provider, MD   folic acid (FOLVITE) 1 MG tablet Take 1 tablet by mouth daily 8/27/20   MERVIN Cohn CNP   thiamine 100 MG tablet Take 1 tablet by mouth daily 8/27/20   MERVIN Cohn CNP   vitamin B-12 (CYANOCOBALAMIN) 100 MCG tablet Take 1 tablet by mouth nightly 8/27/20   MERVIN Cohn CNP   Skin Protectants, Misc. (HYDROCERIN) CREA cream Apply topically 2 times daily  Patient taking differently: Apply topically 2 times daily Apply topically to legs 5/16/20   Radha Hatch MD   mineral oil-hydrophilic petrolatum (AQUAPHOR) ointment Apply 85 g topically as needed for Dry Skin Apply topically to both legs    Historical Provider, MD       Allergies:  Patient has no known allergies. Social History:      The patient currently lives at home with his sister. TOBACCO:   reports that he has quit smoking. His smoking use included cigarettes. He has a 20.00 pack-year smoking history. He has never used smokeless tobacco.  ETOH:   reports current alcohol use of about 2.0 standard drinks of alcohol per week. History:          Problem Relation Age of Onset    Stroke Mother     Heart Disease Father        REVIEW OF SYSTEMS:   Pertinentpositives as noted in the HPI. All other systems reviewed and negative.   ROS: Review of Systems All other systems reviewed and are negative. 10 point ROS negative except as listed above. PHYSICALEXAM PERFORMED:    BP (!) 141/84   Pulse 73   Temp 98 °F (36.7 °C) (Oral)   Resp 20   Ht 5' 9\" (1.753 m)   Wt 180 lb (81.6 kg)   SpO2 97%   BMI 26.58 kg/m²     General appearance:  No apparent distress, appears stated age and cooperative. HEENT:  Normal cephalic, atraumaticwithout obvious deformity. Pupils equal, round, and reactive to light. Extra ocular muscles intact. Respiratory:  Normal respiratory effort. Clear to auscultation, bilaterally without Rales/Wheezes/Rhonchi. Cardiovascular:  Regular rate and rhythm with normal S1/S2 without murmurs, rubs or gallops. Abdomen: Soft,non-tender, non-distended with normal bowel sounds. Musculoskeletal:  No clubbing, cyanosis or edemabilaterally. Full range of motion without deformity. Scrapes on bilateral knees from fall. Neurologic: Neurovascularly intact without any focalsensory/motor deficits.  Cranial nerves: II-XII intact, grossly non-focal.  Psychiatric:  Alert and oriented,thought content appropriate, normal insight  Peripheral Pulses: +2 palpable, equal bilaterally     Labs:     Recent Labs     01/10/21  1111   WBC 8.4   HGB 13.2*   HCT 39.1*        Recent Labs     01/10/21  1111   *   K 3.8   CL 99   CO2 27   BUN 7   CREATININE <0.5*   CALCIUM 8.1*     Recent Labs     01/10/21  1111   AST 93*   ALT 30   BILITOT 2.3*   ALKPHOS 167*     Recent Labs     01/10/21  1111   INR 1.62*     Recent Labs     01/10/21  1111   TROPONINI <0.01       Urinalysis:   Lab Results   Component Value Date    NITRU Negative 01/10/2021    WBCUA 10-20 07/25/2020    BACTERIA 1+ 07/25/2020    RBCUA 3-4 07/25/2020    BLOODU Negative 01/10/2021    SPECGRAV 1.010 01/10/2021    GLUCOSEU Negative 01/10/2021       Radiology:   CXR: I have reviewed the CXR with the following interpretation:   EKG:  I have reviewed the EKG with the following interpretation:

## 2021-01-10 NOTE — PROGRESS NOTES
CIWA of 4 upon arrival to [facility]. Orthostatics retaken-bp decreased when pt stood up-but not significantly. Reported feeling slightly lightheaded. Placed on tele-NSR. Urine sample collected and sent to lab. Skin assessment complete-see 4eye. Band-Aid reapplied to elbow &gauze to L arm. Lungs clear, on Rm air. A+Ox4. Bed alarm on. States he has pain in RUQ/epigastric region-says it's not bad. IV Vitamins infusing.  Will cont to monitor

## 2021-01-10 NOTE — ED PROVIDER NOTES
ED Attending Attestation Note     Date of evaluation: 1/10/2021    This patient was seen by the resident. I have seen and examined the patient, agree with the workup, evaluation, management and diagnosis. The care plan has been discussed. I have reviewed the ECG and concur with the resident's interpretation. My assessment reveals a well appearing male, looks slightly older than stated age, sitting up on the stretcher in no distress. He has no m/r/g, clear breath sounds bilaterally. Mildly tremulous but a&ox4, moving all extremities purposefully. Abrasion noted over the L knee.        Aurelia Lizama MD  01/10/21 1496

## 2021-01-10 NOTE — PROGRESS NOTES
4 Eyes Admission Assessment     I agree as the admission nurse that 2 RN's have performed a thorough Head to Toe Skin Assessment on the patient. ALL assessment sites listed below have been assessed on admission. Areas assessed by both nurses:   [x]   Head, Face, and Ears   [x]   Shoulders, Back, and Chest  [x]   Arms, Elbows, and Hands   [x]   Coccyx, Sacrum, and Ischium  [x]   Legs, Feet, and Heels        Does the Patient have Skin Breakdown? Bilateral abrasions on knees (scabbing over). 2 Bruises RUQ and scattered elsewhere, blanchable redness buttocks, Left elbow and forearm wounds from fall prior to admission (covered scabs). LLE discoloration below the knee (maikel)-hx dvt--normal for pt.         Mitch Prevention initiated:  No   Wound Care Orders initiated:  No      [facility] nurse consulted for Pressure Injury (Stage 3,4, Unstageable, DTI, NWPT, and Complex wounds) or Mitch score 18 or lower:  No      Nurse 1 eSignature: Electronically signed by Marga Jarvis RN on 1/10/21 at 3:15 PM EST    **SHARE this note so that the co-signing nurse is able to place an eSignature**    Nurse 2 eSignature: Electronically signed by Wilfrid Gore on 1/10/21 at 4:21 PM EST

## 2021-01-10 NOTE — ED NOTES
PT presents to the ED from home with sister at bedside. Pt and his sister states pt has been [place] a lot\" and having frequent falls. Pt with large abrasions to bilateral knees. Pt states he takes Eliquis.       Juliette Posada RN  01/10/21 0379

## 2021-01-11 ENCOUNTER — APPOINTMENT (OUTPATIENT)
Dept: ULTRASOUND IMAGING | Age: 61
DRG: 422 | End: 2021-01-11
Payer: MEDICAID

## 2021-01-11 ENCOUNTER — APPOINTMENT (OUTPATIENT)
Dept: MRI IMAGING | Age: 61
DRG: 422 | End: 2021-01-11
Payer: MEDICAID

## 2021-01-11 LAB
A/G RATIO: 0.9 (ref 1.1–2.2)
ALBUMIN SERPL-MCNC: 2.5 G/DL (ref 3.4–5)
ALBUMIN SERPL-MCNC: 2.6 G/DL (ref 3.4–5)
ALP BLD-CCNC: 125 U/L (ref 40–129)
ALP BLD-CCNC: 127 U/L (ref 40–129)
ALT SERPL-CCNC: 21 U/L (ref 10–40)
ALT SERPL-CCNC: 22 U/L (ref 10–40)
ANION GAP SERPL CALCULATED.3IONS-SCNC: 7 MMOL/L (ref 3–16)
AST SERPL-CCNC: 78 U/L (ref 15–37)
AST SERPL-CCNC: 78 U/L (ref 15–37)
BASOPHILS ABSOLUTE: 0 K/UL (ref 0–0.2)
BASOPHILS RELATIVE PERCENT: 0.7 %
BILIRUB SERPL-MCNC: 2.4 MG/DL (ref 0–1)
BILIRUB SERPL-MCNC: 2.5 MG/DL (ref 0–1)
BILIRUBIN DIRECT: 0.8 MG/DL (ref 0–0.3)
BILIRUBIN, INDIRECT: 1.6 MG/DL (ref 0–1)
BUN BLDV-MCNC: 7 MG/DL (ref 7–20)
CALCIUM SERPL-MCNC: 8.2 MG/DL (ref 8.3–10.6)
CHLORIDE BLD-SCNC: 104 MMOL/L (ref 99–110)
CO2: 27 MMOL/L (ref 21–32)
CREAT SERPL-MCNC: <0.5 MG/DL (ref 0.8–1.3)
EKG ATRIAL RATE: 69 BPM
EKG DIAGNOSIS: NORMAL
EKG P AXIS: 33 DEGREES
EKG P-R INTERVAL: 162 MS
EKG Q-T INTERVAL: 434 MS
EKG QRS DURATION: 82 MS
EKG QTC CALCULATION (BAZETT): 465 MS
EKG R AXIS: -28 DEGREES
EKG T AXIS: 63 DEGREES
EKG VENTRICULAR RATE: 69 BPM
EOSINOPHILS ABSOLUTE: 0 K/UL (ref 0–0.6)
EOSINOPHILS RELATIVE PERCENT: 0.6 %
FOLATE: 11.23 NG/ML (ref 4.78–24.2)
GFR AFRICAN AMERICAN: >60
GFR NON-AFRICAN AMERICAN: >60
GLOBULIN: 2.9 G/DL
GLUCOSE BLD-MCNC: 85 MG/DL (ref 70–99)
HCT VFR BLD CALC: 33.3 % (ref 40.5–52.5)
HEMOGLOBIN: 11.4 G/DL (ref 13.5–17.5)
LIPASE: 30 U/L (ref 13–60)
LYMPHOCYTES ABSOLUTE: 1.9 K/UL (ref 1–5.1)
LYMPHOCYTES RELATIVE PERCENT: 38.9 %
MAGNESIUM: 1.3 MG/DL (ref 1.8–2.4)
MCH RBC QN AUTO: 35.2 PG (ref 26–34)
MCHC RBC AUTO-ENTMCNC: 34.2 G/DL (ref 31–36)
MCV RBC AUTO: 103 FL (ref 80–100)
MONOCYTES ABSOLUTE: 0.3 K/UL (ref 0–1.3)
MONOCYTES RELATIVE PERCENT: 6.9 %
NEUTROPHILS ABSOLUTE: 2.6 K/UL (ref 1.7–7.7)
NEUTROPHILS RELATIVE PERCENT: 52.9 %
PDW BLD-RTO: 13.6 % (ref 12.4–15.4)
PLATELET # BLD: 83 K/UL (ref 135–450)
PLATELET SLIDE REVIEW: ABNORMAL
PMV BLD AUTO: 8.7 FL (ref 5–10.5)
POTASSIUM REFLEX MAGNESIUM: 4.2 MMOL/L (ref 3.5–5.1)
RBC # BLD: 3.23 M/UL (ref 4.2–5.9)
SODIUM BLD-SCNC: 138 MMOL/L (ref 136–145)
TOTAL PROTEIN: 5.2 G/DL (ref 6.4–8.2)
TOTAL PROTEIN: 5.4 G/DL (ref 6.4–8.2)
VITAMIN B-12: 904 PG/ML (ref 211–911)
WBC # BLD: 4.9 K/UL (ref 4–11)

## 2021-01-11 PROCEDURE — G0378 HOSPITAL OBSERVATION PER HR: HCPCS

## 2021-01-11 PROCEDURE — 70551 MRI BRAIN STEM W/O DYE: CPT

## 2021-01-11 PROCEDURE — 36415 COLL VENOUS BLD VENIPUNCTURE: CPT

## 2021-01-11 PROCEDURE — 82746 ASSAY OF FOLIC ACID SERUM: CPT

## 2021-01-11 PROCEDURE — 96365 THER/PROPH/DIAG IV INF INIT: CPT

## 2021-01-11 PROCEDURE — 76705 ECHO EXAM OF ABDOMEN: CPT

## 2021-01-11 PROCEDURE — 80053 COMPREHEN METABOLIC PANEL: CPT

## 2021-01-11 PROCEDURE — 83735 ASSAY OF MAGNESIUM: CPT

## 2021-01-11 PROCEDURE — 83690 ASSAY OF LIPASE: CPT

## 2021-01-11 PROCEDURE — 2580000003 HC RX 258: Performed by: STUDENT IN AN ORGANIZED HEALTH CARE EDUCATION/TRAINING PROGRAM

## 2021-01-11 PROCEDURE — 96366 THER/PROPH/DIAG IV INF ADDON: CPT

## 2021-01-11 PROCEDURE — 6360000002 HC RX W HCPCS: Performed by: STUDENT IN AN ORGANIZED HEALTH CARE EDUCATION/TRAINING PROGRAM

## 2021-01-11 PROCEDURE — 85025 COMPLETE CBC W/AUTO DIFF WBC: CPT

## 2021-01-11 PROCEDURE — 2500000003 HC RX 250 WO HCPCS: Performed by: STUDENT IN AN ORGANIZED HEALTH CARE EDUCATION/TRAINING PROGRAM

## 2021-01-11 PROCEDURE — 6370000000 HC RX 637 (ALT 250 FOR IP): Performed by: STUDENT IN AN ORGANIZED HEALTH CARE EDUCATION/TRAINING PROGRAM

## 2021-01-11 PROCEDURE — 1200000000 HC SEMI PRIVATE

## 2021-01-11 PROCEDURE — 6360000004 HC RX CONTRAST MEDICATION: Performed by: STUDENT IN AN ORGANIZED HEALTH CARE EDUCATION/TRAINING PROGRAM

## 2021-01-11 PROCEDURE — 82607 VITAMIN B-12: CPT

## 2021-01-11 PROCEDURE — C8923 2D TTE W OR W/O FOL W/CON,CO: HCPCS

## 2021-01-11 RX ADMIN — Medication: at 20:03

## 2021-01-11 RX ADMIN — CLOPIDOGREL BISULFATE 75 MG: 75 TABLET ORAL at 07:50

## 2021-01-11 RX ADMIN — PANTOPRAZOLE SODIUM 40 MG: 40 TABLET, DELAYED RELEASE ORAL at 06:50

## 2021-01-11 RX ADMIN — MOXIFLOXACIN HYDROCHLORIDE 1 DROP: 5 SOLUTION/ DROPS OPHTHALMIC at 07:52

## 2021-01-11 RX ADMIN — ATORVASTATIN CALCIUM 40 MG: 40 TABLET, FILM COATED ORAL at 20:03

## 2021-01-11 RX ADMIN — VITAM B12 100 MCG: 100 TAB at 20:03

## 2021-01-11 RX ADMIN — PREDNISOLONE ACETATE 1 DROP: 10 SUSPENSION/ DROPS OPHTHALMIC at 07:51

## 2021-01-11 RX ADMIN — MAGNESIUM SULFATE HEPTAHYDRATE 6 G: 500 INJECTION, SOLUTION INTRAMUSCULAR; INTRAVENOUS at 10:39

## 2021-01-11 RX ADMIN — FOLIC ACID: 5 INJECTION, SOLUTION INTRAMUSCULAR; INTRAVENOUS; SUBCUTANEOUS at 10:39

## 2021-01-11 RX ADMIN — Medication 10 ML: at 07:52

## 2021-01-11 RX ADMIN — PERFLUTREN 1.65 MG: 6.52 INJECTION, SUSPENSION INTRAVENOUS at 10:12

## 2021-01-11 RX ADMIN — LISINOPRIL 2.5 MG: 2.5 TABLET ORAL at 20:03

## 2021-01-11 RX ADMIN — Medication: at 07:51

## 2021-01-11 RX ADMIN — APIXABAN 5 MG: 5 TABLET, FILM COATED ORAL at 07:50

## 2021-01-11 RX ADMIN — Medication 10 ML: at 20:04

## 2021-01-11 RX ADMIN — APIXABAN 5 MG: 5 TABLET, FILM COATED ORAL at 20:03

## 2021-01-11 NOTE — PROGRESS NOTES
Physician Progress Note      PATIENT:               Gia Armando  CSN #:                  [id number]  :                       1960  ADMIT DATE:       1/10/2021 10:40 AM  DISCH DATE:  RESPONDING  PROVIDER #:        Kristy Villeda MD          QUERY TEXT:    Pt admitted with Syncope likely 2/2 orthostatic hypotension and has CHF   documented. If possible, please document in progress notes and discharge   summary further specificity regarding the type and acuity of CHF:    The medical record reflects the following:  Risk Factors: 55 y/o with a Hx of CHF  Clinical Indicators: H&P: \"CHF  ? Echo 20: EF   ?35-40% with global?LV?hypokinesis. \"  Per Card NP PN dated on 10/8/2020 :   \"HFrEF 35-40%  Treatment: - holding coreg, on lisinopril  Options provided:  -- Chronic Systolic CHF  -- Other - I will add my own diagnosis  -- Disagree - Not applicable / Not valid  -- Disagree - Clinically unable to determine / Unknown  -- Refer to Clinical Documentation Reviewer    PROVIDER RESPONSE TEXT:    This patient has chronic systolic CHF.     Query created by: Asuncion Gold on 2021 12:07 PM      Electronically signed by:  Kristy Villeda MD 2021 1:43 PM

## 2021-01-11 NOTE — PROGRESS NOTES
Patient A&Ox4. VVS. Patient denies pain. Patient CIWA score remains a 4. Patient having visible tremors. Patient denies needs at this time. Call light in reach, bed alarm activated. Will continue to monitor.

## 2021-01-11 NOTE — PROGRESS NOTES
Progress Note      PCP: Ashly Whittington MD    Date of Admission: 1/10/2021    Chief Complaint: dizziness and fall     Hospital Course:     Pepe Cancino is a 56 y.o. male w/ PMHX of CAD (s/p PCI to LAD), CHF, alcohol abuse who presented to [facility] with a fall and frequent episodes of dizziness and lightheadedness. He endorses dizziness when standing up from sitting for the last few months. He experienced a near-syncope 7-8 months age; He states that he lowered himself gently to the floor prior to falling. This past Thursday he felll onto his knees and elbows after leaving work. He did hit his head on the floor as well. Ever since then, he says his symptoms of dizziness have worsened. He reports drinking alcohol, and denies any withdrawal symptoms since. He used to drink 3 large beers a day for more years than he can remember. He also endorses some very mild RUQ/RLQ abdominal pain. He denies N/V, F/C, CP, SOB, diaphoresis, dizziness at rest.      In the ED he was given LR for fluid resuscitation and a dose of diazepam. EKG showed signs of prior ischemia with no significant changes compared to prior. CT head showed no acute intracranial findings. Labs were significant for elevated total bilirubin, ALP, and AST    Subjective:      He continues to experience dizziness and lightheadedness today and denies any changes to his symptoms since yesterday. He also denies any abdominal pain, headaches, chest pain, and dyspnea today. His last bowel movement was this morning.      Medications:  Reviewed    Infusion Medications   Scheduled Medications    magnesium sulfate  6 g Intravenous Once    atorvastatin  40 mg Oral Nightly    apixaban  5 mg Oral BID    lisinopril  2.5 mg Oral Nightly    pantoprazole  40 mg Oral QAM AC    vitamin B-12  100 mcg Oral Nightly    Hydrocerin   Topical BID    sodium chloride flush  10 mL Intravenous 2 times per day    clopidogrel  75 mg Oral Daily    folic acid, thiamine, multi-vitamin with vitamin K infusion   Intravenous Daily    [START ON 1/13/2021] thiamine  100 mg Oral Daily    moxifloxacin  1 drop Right Eye Daily    prednisoLONE acetate  1 drop Right Eye Daily     PRN Meds: sodium chloride flush, polyethylene glycol, acetaminophen **OR** acetaminophen, perflutren lipid microspheres      Intake/Output Summary (Last 24 hours) at 1/11/2021 0950  Last data filed at 1/11/2021 0059  Gross per 24 hour   Intake 160 ml   Output 480 ml   Net -320 ml       Physical Exam Performed:    /80   Pulse 73   Temp 98 °F (36.7 °C) (Oral)   Resp 16   Ht 5' 9\" (1.753 m)   Wt 176 lb 2.4 oz (79.9 kg)   SpO2 97%   BMI 26.01 kg/m²     General appearance: No apparent distress, appears stated age and cooperative. Respiratory:  Normal respiratory effort. Clear to auscultation, bilaterally without Rales/Wheezes/Rhonchi. Cardiovascular: Regular rate and rhythm with normal S1/S2 without murmurs, rubs or gallops. Abdomen: Soft, non-tender, non-distended with normal bowel sounds. Cadena's negative   Musculoskeletal: No clubbing, cyanosis or edema bilaterally. Full range of motion without deformity. Skin: Skin color, texture, turgor normal.  B/L lesions on knees   Neurologic:  Neurovascularly intact without any focal sensory/motor deficits.  Cranial nerves: II-XII intact, grossly non-focal, heel to shin test negative   Psychiatric: Alert and oriented, thought content appropriate, normal insight        Labs:   Recent Labs     01/10/21  1111   WBC 8.4   HGB 13.2*   HCT 39.1*        Recent Labs     01/10/21  1111 01/11/21  0530   * 138   K 3.8 4.2   CL 99 104   CO2 27 27   BUN 7 7   CREATININE <0.5* <0.5*   CALCIUM 8.1* 8.2*     Recent Labs     01/10/21  1111 01/11/21  0530   AST 93* 78*   ALT 30 21   BILITOT 2.3* 2.5*   ALKPHOS 167* 127     Recent Labs     01/10/21  1111   INR 1.62*     Recent Labs     01/10/21  1111   TROPONINI <0.01       Urinalysis:      Lab Results Component Value Date    NITRU Negative 01/10/2021    WBCUA 10-20 07/25/2020    BACTERIA 1+ 07/25/2020    RBCUA 3-4 07/25/2020    BLOODU Negative 01/10/2021    SPECGRAV 1.010 01/10/2021    GLUCOSEU Negative 01/10/2021       Radiology:  US ABDOMEN LIMITED Specify organ? LIVER, GALLBLADDER   Final Result   IMPRESSION :      Gallstone versus tumefactive sludge in the gallbladder. Otherwise unremarkable study. XR CHEST PORTABLE   Final Result      No acute cardiopulmonary disease. CT Head WO Contrast   Final Result      No acute intracranial hemorrhage or mass effect. Assessment/Plan:    Enmanuel Barragan is a 56 y.o. male w/ PMHx significant for acute LLE DVT (5/12/20), mural thrombus 2/2 anterior apical MI (s/p LAD stent 5/12/20), alcohol abuse who presented after a fall. Dizziness and near-syncopal episode likely 2/2 orthostatic hypotension  Received IVF resuscitation in ED. CT head shows no acute intracranial disease.  - MRI brain wo contrast ordered  - B12, folate ordered   orthostatic vitals >20mmHg drop   - holding coreg  - tele  - fall precautions  - UDS negative   - q4 neuro checks  - limited echo: LVEF 50-55%, borderline normal left systolic function        Alcohol abuse  Received diazepam in ED.  - CIWA  - Rally pack  - alcohol cessation counseling  - on protonix     Abnormal LFTs  , total bili 2.3. AST 93 on admission. RUQ U/S - gallstones vs tumefactive sludge. Patient denies abdominal pain, Cadena's negative    - lipase wnl (30.0)   - hepatic function panel ordered      Thrombocytopenia. 135 on admission. 83 today. May be 2/2 to liver disease  - daily CBC  - continue monitoring      Macrocytic Anemia ()  -unclear baseline  -f/u folate and B12    Hx of LLE DVT (05/2020)  - on eliquis     CHF   Echo 7/27/20: EF ~ 35-40% with global LV hypokinesis.   - holding coreg  - on lisinopril     CAD s/p LAD stent (05/2020)  Mural thrombus 2/2 anterior apical MI in May 2020.  - on lipitor  - on plavix      DVT Prophylaxis: Eliquis  Diet: DIET CARDIAC; Carb Control: 4 carb choices (60 gms)/meal; Low Sodium (2 GM)  Code Status: Full Code    PT/OT Eval Status: ordered     Dispo - inpatient     Sanket Din

## 2021-01-11 NOTE — PLAN OF CARE
Problem: Falls - Risk of:  Goal: Will remain free from falls  Description: Will remain free from falls  Outcome: Ongoing  Note: SBA dt tremors. Calls appropriately. Bed in lowest position w/ wheels locked and 2/4 rails up. Near nursing station     Problem: Nutrition Deficit:  Goal: Ability to achieve adequate nutritional intake will improve  Description: Ability to achieve adequate nutritional intake will improve  Outcome: Ongoing  Note: Took two bites of dinner. Little appetite at this time. Denied snacks offered.

## 2021-01-12 VITALS
DIASTOLIC BLOOD PRESSURE: 63 MMHG | SYSTOLIC BLOOD PRESSURE: 103 MMHG | WEIGHT: 176.15 LBS | TEMPERATURE: 98.2 F | BODY MASS INDEX: 26.09 KG/M2 | HEART RATE: 93 BPM | OXYGEN SATURATION: 100 % | HEIGHT: 69 IN | RESPIRATION RATE: 16 BRPM

## 2021-01-12 LAB
A/G RATIO: 1 (ref 1.1–2.2)
ALBUMIN SERPL-MCNC: 3 G/DL (ref 3.4–5)
ALP BLD-CCNC: 149 U/L (ref 40–129)
ALT SERPL-CCNC: 21 U/L (ref 10–40)
ANION GAP SERPL CALCULATED.3IONS-SCNC: 11 MMOL/L (ref 3–16)
AST SERPL-CCNC: 70 U/L (ref 15–37)
BASOPHILS ABSOLUTE: 0 K/UL (ref 0–0.2)
BASOPHILS RELATIVE PERCENT: 0.2 %
BILIRUB SERPL-MCNC: 1.7 MG/DL (ref 0–1)
BUN BLDV-MCNC: 7 MG/DL (ref 7–20)
CALCIUM SERPL-MCNC: 8.2 MG/DL (ref 8.3–10.6)
CHLORIDE BLD-SCNC: 101 MMOL/L (ref 99–110)
CO2: 24 MMOL/L (ref 21–32)
CREAT SERPL-MCNC: <0.5 MG/DL (ref 0.8–1.3)
EOSINOPHILS ABSOLUTE: 0.1 K/UL (ref 0–0.6)
EOSINOPHILS RELATIVE PERCENT: 0.9 %
GFR AFRICAN AMERICAN: >60
GFR NON-AFRICAN AMERICAN: >60
GLOBULIN: 3.1 G/DL
GLUCOSE BLD-MCNC: 63 MG/DL (ref 70–99)
HCT VFR BLD CALC: 37.3 % (ref 40.5–52.5)
HEMOGLOBIN: 12.4 G/DL (ref 13.5–17.5)
LYMPHOCYTES ABSOLUTE: 1.6 K/UL (ref 1–5.1)
LYMPHOCYTES RELATIVE PERCENT: 26.3 %
MAGNESIUM: 1.9 MG/DL (ref 1.8–2.4)
MCH RBC QN AUTO: 34.6 PG (ref 26–34)
MCHC RBC AUTO-ENTMCNC: 33.3 G/DL (ref 31–36)
MCV RBC AUTO: 104.1 FL (ref 80–100)
MONOCYTES ABSOLUTE: 0.4 K/UL (ref 0–1.3)
MONOCYTES RELATIVE PERCENT: 6.4 %
NEUTROPHILS ABSOLUTE: 4 K/UL (ref 1.7–7.7)
NEUTROPHILS RELATIVE PERCENT: 66.2 %
PDW BLD-RTO: 13.3 % (ref 12.4–15.4)
PLATELET # BLD: 96 K/UL (ref 135–450)
PMV BLD AUTO: 8.7 FL (ref 5–10.5)
POTASSIUM REFLEX MAGNESIUM: 4.4 MMOL/L (ref 3.5–5.1)
RBC # BLD: 3.58 M/UL (ref 4.2–5.9)
SODIUM BLD-SCNC: 136 MMOL/L (ref 136–145)
TOTAL PROTEIN: 6.1 G/DL (ref 6.4–8.2)
WBC # BLD: 6 K/UL (ref 4–11)

## 2021-01-12 PROCEDURE — G0378 HOSPITAL OBSERVATION PER HR: HCPCS

## 2021-01-12 PROCEDURE — 85025 COMPLETE CBC W/AUTO DIFF WBC: CPT

## 2021-01-12 PROCEDURE — 36415 COLL VENOUS BLD VENIPUNCTURE: CPT

## 2021-01-12 PROCEDURE — 6360000002 HC RX W HCPCS: Performed by: STUDENT IN AN ORGANIZED HEALTH CARE EDUCATION/TRAINING PROGRAM

## 2021-01-12 PROCEDURE — 2580000003 HC RX 258: Performed by: STUDENT IN AN ORGANIZED HEALTH CARE EDUCATION/TRAINING PROGRAM

## 2021-01-12 PROCEDURE — 97161 PT EVAL LOW COMPLEX 20 MIN: CPT

## 2021-01-12 PROCEDURE — 97165 OT EVAL LOW COMPLEX 30 MIN: CPT

## 2021-01-12 PROCEDURE — 97116 GAIT TRAINING THERAPY: CPT

## 2021-01-12 PROCEDURE — 97530 THERAPEUTIC ACTIVITIES: CPT

## 2021-01-12 PROCEDURE — 83735 ASSAY OF MAGNESIUM: CPT

## 2021-01-12 PROCEDURE — 80053 COMPREHEN METABOLIC PANEL: CPT

## 2021-01-12 PROCEDURE — 2500000003 HC RX 250 WO HCPCS: Performed by: STUDENT IN AN ORGANIZED HEALTH CARE EDUCATION/TRAINING PROGRAM

## 2021-01-12 PROCEDURE — 6370000000 HC RX 637 (ALT 250 FOR IP): Performed by: STUDENT IN AN ORGANIZED HEALTH CARE EDUCATION/TRAINING PROGRAM

## 2021-01-12 RX ADMIN — MOXIFLOXACIN HYDROCHLORIDE 1 DROP: 5 SOLUTION/ DROPS OPHTHALMIC at 08:43

## 2021-01-12 RX ADMIN — FOLIC ACID: 5 INJECTION, SOLUTION INTRAMUSCULAR; INTRAVENOUS; SUBCUTANEOUS at 08:42

## 2021-01-12 RX ADMIN — PREDNISOLONE ACETATE 1 DROP: 10 SUSPENSION/ DROPS OPHTHALMIC at 08:43

## 2021-01-12 RX ADMIN — PANTOPRAZOLE SODIUM 40 MG: 40 TABLET, DELAYED RELEASE ORAL at 06:02

## 2021-01-12 RX ADMIN — Medication: at 08:42

## 2021-01-12 RX ADMIN — Medication 10 ML: at 08:46

## 2021-01-12 RX ADMIN — CLOPIDOGREL BISULFATE 75 MG: 75 TABLET ORAL at 08:42

## 2021-01-12 RX ADMIN — APIXABAN 5 MG: 5 TABLET, FILM COATED ORAL at 08:42

## 2021-01-12 ASSESSMENT — PAIN SCALES - GENERAL: PAINLEVEL_OUTOF10: 0

## 2021-01-12 NOTE — PROGRESS NOTES
Patient alert and oriented. VSS and afebrile. Patient has stated no pain. Patient had a CIWA score of 6. Patient has been mildly anxious throughout the shift with visual tremor noted in bilateral upper extremties with arms extended. Patient was able to receive MRI tonight and has since come back up to the unit. Patient has been up to the bathroom and has been voiding adequately. Fall precautions in place with bed alarm on. No needs stated at this time.

## 2021-01-12 NOTE — PROGRESS NOTES
Discharge Progress Note  1/12/2021 4:21 PM    Data:  Discharge order received. Action:  IV D/C'd without difficulty. See Doc Flowsheets for assessment. Patient given discharge instructions with prescriptions. Response:  Patient verbalized understanding of discharge instructions. Patient left with all belongings.     Stacy Ortega RN  ________________________________________________________________________

## 2021-01-12 NOTE — PLAN OF CARE
Problem: Falls - Risk of:  Goal: Absence of physical injury  Description: Absence of physical injury  Outcome: Ongoing  Note: Patient has socks and bed alarm on. Patient has been calling out when needing to use bathroom and has been stand-by assistance. Patient has call light in reach and bed in lowest position. Will continue to monitor. Problem: Fluid Volume - Deficit:  Goal: Absence of fluid volume deficit signs and symptoms  Description: Absence of fluid volume deficit signs and symptoms  Outcome: Ongoing  Note: Patient has been able to drink an adequate amount of fluids and has been voiding adequately. No signs of fluid deficiency noted.

## 2021-01-12 NOTE — DISCHARGE SUMMARY
Hospitalist Discharge Summary    Patient ID:  Michael Perdue  [id number]  66 y.o.  1960    Admit date: 1/10/2021    Discharge date: 1/12/2021    Disposition: home    Admission Diagnoses:   Patient Active Problem List   Diagnosis    Mural thrombus of cardiac apex    Coronary artery disease due to lipid rich plaque    Abnormal angiogram    Acute deep vein thrombosis of left lower extremity (HCC)    Hypokalemia    Alcohol abuse with withdrawal       Discharge Diagnoses: Active Problems:    Alcohol abuse with withdrawal  Resolved Problems:    * No resolved hospital problems. *      Code Status:  Full Code    Condition:  Stable    Discharge Diet: Diet:  DIET CARDIAC; Carb Control: 4 carb choices (60 gms)/meal; Low Sodium (2 GM)    PCP to do list: Follow for improvement of symptoms    Hospital Course:60 y.o. male w/ PMHX of CAD (s/p PCI to LAD), CHF, alcohol abuse who presented to [facility] with dizziness and frequent falls. He endorses dizziness when standing up from sitting for the last few months. He says he has fallen twice, one time a while back, in which he let himself down gently to the floor, and another time this past Thursday. This past Thursday he felll onto his knees and elbows. Ever since then, he says his symptoms of dizziness have worsened. Due to these complaints patient presented to the hospital.  He was admitted to the hospital for further management. Following problems were addressed during his stay    Dizziness and near-syncopal episode   Received IVF resuscitation in ED.  CT head shows no acute intracranial disease.  - CT head and MRI of the brain were unremarkable for any acute problems  -Vitamin B12 and folate levels were normal  - limited echo: LVEF 50-55%, borderline normal left systolic function   -Patient symptoms likely due to dehydration as symptoms improved with IV fluid hydration    Alcohol abuse  - CIWA  - Rally pack  - alcohol cessation counseling  - on protonix     Abnormal LFTs  , total bili 2.3. AST 93 on admission. RUQ U/S - gallstones vs tumefactive sludge. Patient denies abdominal pain, Cadena's negative    - lipase wnl (30.0)      Thrombocytopenia  -Monitor as inpatient       Hx of LLE DVT (05/2020)  - on eliquis     CHF   Echo 7/27/20: EF ~ 35-40% with global LV hypokinesis. - holding coreg  - on lisinopril     CAD s/p LAD stent (05/2020)  Mural thrombus 2/2 anterior apical MI in May 2020.  - on lipitor  - on plavix       Discharge Medications:   Current Discharge Medication List        Current Discharge Medication List        Current Discharge Medication List      CONTINUE these medications which have NOT CHANGED    Details   magnesium 200 MG TABS tablet Take 200 mg by mouth daily      prednisoLONE acetate (PRED FORTE) 1 % ophthalmic suspension Place 1 drop into the right eye daily One drop R eye daily  I/11, I/12, I/13      moxifloxacin (VIGAMOX) 0.5 % ophthalmic solution Place 1 drop into the right eye daily One drop daily 1/11, 1/12, 1/13      pantoprazole (PROTONIX) 40 MG tablet TAKE 1 TABLET BY MOUTH EVERY MORNING( BEFORE BREAKFAST)  Qty: 30 tablet, Refills: 2      ELIQUIS 5 MG TABS tablet TAKE 1 TABLET BY MOUTH TWICE DAILY  Qty: 60 tablet, Refills: 3      atorvastatin (LIPITOR) 40 MG tablet TAKE 1 TABLET BY MOUTH EVERY NIGHT  Qty: 30 tablet, Refills: 3      clopidogrel (PLAVIX) 75 MG tablet TAKE 1 TABLET BY MOUTH DAILY  Qty: 30 tablet, Refills: 3      lisinopril (PRINIVIL;ZESTRIL) 2.5 MG tablet TAKE 1 TABLET BY MOUTH EVERY NIGHT  Qty: 30 tablet, Refills: 3      carvedilol (COREG) 3.125 MG tablet TAKE 1 TABLET BY MOUTH TWICE DAILY  Qty: 180 tablet, Refills: 3      folic acid (FOLVITE) 1 MG tablet Take 1 tablet by mouth daily  Qty: 90 tablet, Refills: 3      thiamine 100 MG tablet Take 1 tablet by mouth daily  Qty: 90 tablet, Refills: 3      Skin Protectants, Misc.  (HYDROCERIN) CREA cream Apply topically 2 times daily  Qty: 1 Container, Refills: 0      mineral oil-hydrophilic petrolatum (AQUAPHOR) ointment Apply 85 g topically as needed for Dry Skin Apply topically to both legs      WHITE PETROLATUM EX Apply 61 % topically daily      vitamin B-12 (CYANOCOBALAMIN) 100 MCG tablet Take 1 tablet by mouth nightly  Qty: 90 tablet, Refills: 3           Current Discharge Medication List              Procedures: none    Assessment on Discharge: Stable, improved     Discharge ROS:  A complete review of systems was asked and negative except for none    Discharge Exam:  /63   Pulse 93   Temp 98.2 °F (36.8 °C) (Oral)   Resp 16   Ht 5' 9\" (1.753 m)   Wt 176 lb 2.4 oz (79.9 kg)   SpO2 100%   BMI 26.01 kg/m²     Gen: NAD  HEENT: NC/AT, moist mucous membranes, no oropharyngeal erythema or exudate  Neck: supple, trachea midline, no anterior cervical or SC LAD  Heart:  Normal s1/s2, RRR, no murmurs, gallops, or rubs.  no leg edema  Lungs:  CTA bilaterally, no wheeze,no rales or rhonchi, no use of accessory muscles  Abd: bowel sounds present, soft, nontender, nondistended, no masses  Extrem:  No clubbing, cyanosis,  no edema  Skin: no lesion or masses  Psych:  A & O x3  Neuro: grossly intact, moves all four extremities    Pertinent Studies During Hospital Stay:  Radiology:  Echo Limited    Result Date: 1/11/2021  Transthoracic Echocardiography Report (TTE)  Demographics   Patient Name       Kathie Gonzalez   Date of Study      01/11/2021        Gender              Male   Patient Number     [id number]        Date of Birth       1960   Visit Number       [id number]         Age                 56 year(s)   Accession Number   [id number]        Room Number         2152   Corporate ID       [id number]          [address], [place],                                                           [address] [facility]   Ordering Physician Jorge Schwarz MD Interpreting        [facility]                                       Physician           Jessenia Mera, Dimension: 3.93 cm  LV Septum Diastolic: 1.2 cm  LV PW Diastolic: 3.27 cm        AO Root Dimension: 3.3 cm  RV Diastolic Dimension: 4.22 cm LA Dimension: 4 cm  Aorta   Aortic Root: 3.3 cm      Ct Head Wo Contrast    Result Date: 1/10/2021  EXAM: CT HEAD WO CONTRAST INDICATION: fall on blood thinners, head pain COMPARISON: July 25, 2020 TECHNIQUE: Standard per department protocol without contrast. Up-to-date CT dose lowering techniques were utilized. FINDINGS: No acute intracranial hemorrhage. No extra-axial fluid collections. Small remote infarcts are present in the right frontal lobe and left parietal lobe. Mild patchy hypoattenuation the periventricular and subcortical white matter. The ventricles and extra-axial spaces are normal in size and configuration. The globes and orbits are normal in appearance. The paranasal sinuses and mastoid air cells are clear. No abnormality of the calvarium. No acute intracranial hemorrhage or mass effect. Xr Chest Portable    Result Date: 1/10/2021  EXAM: XR CHEST PORTABLE INDICATION: dyspnea after fall, eval rib fractures, hemoPTX COMPARISON: May 11, 2020 FINDINGS: Medical Devices: None. Lungs: Clear. Heart and Mediastinum: Normal. Other: N/A. No acute cardiopulmonary disease. Us Abdomen Limited Specify Organ? Liver, Gallbladder    Result Date: 1/11/2021  ULTRASOUND ABDOMEN LIMITED History : Abdominal pain, elevated LFTs Comparison : 6/2/2020 COMMENTS : Liver : Normal Gallbladder and Bile ducts : Noncalcified adherent gallbladder calculus versus tumefactive sludge measuring 7 mm similar to prior. No evidence of acute cholecystitis. No ductal dilation. Pancreas : Visualized portions are unremarkable. Right kidney : Normal     IMPRESSION : Gallstone versus tumefactive sludge in the gallbladder. Otherwise unremarkable study. Mri Brain Wo Contrast    Result Date: 1/11/2021  MRI OF THE HEAD WITHOUT CONTRAST: HISTORY: Dizziness, history of coronary artery disease. 6.1 (L) 6.4 - 8.2 g/dL    Alb 3.0 (L) 3.4 - 5.0 g/dL    Albumin/Globulin Ratio 1.0 (L) 1.1 - 2.2    Total Bilirubin 1.7 (H) 0.0 - 1.0 mg/dL    Alkaline Phosphatase 149 (H) 40 - 129 U/L    ALT 21 10 - 40 U/L    AST 70 (H) 15 - 37 U/L    Globulin 3.1 g/dL   CBC auto differential    Collection Time: 01/12/21  6:01 AM   Result Value Ref Range    WBC 6.0 4.0 - 11.0 K/uL    RBC 3.58 (L) 4.20 - 5.90 M/uL    Hemoglobin 12.4 (L) 13.5 - 17.5 g/dL    Hematocrit 37.3 (L) 40.5 - 52.5 %    .1 (H) 80.0 - 100.0 fL    MCH 34.6 (H) 26.0 - 34.0 pg    MCHC 33.3 31.0 - 36.0 g/dL    RDW 13.3 12.4 - 15.4 %    Platelets 96 (L) 927 - 450 K/uL    MPV 8.7 5.0 - 10.5 fL    Neutrophils % 66.2 %    Lymphocytes % 26.3 %    Monocytes % 6.4 %    Eosinophils % 0.9 %    Basophils % 0.2 %    Neutrophils Absolute 4.0 1.7 - 7.7 K/uL    Lymphocytes Absolute 1.6 1.0 - 5.1 K/uL    Monocytes Absolute 0.4 0.0 - 1.3 K/uL    Eosinophils Absolute 0.1 0.0 - 0.6 K/uL    Basophils Absolute 0.0 0.0 - 0.2 K/uL         Follow up: with Danya Elder MD    Note that over 30 minutes was spent in preparing discharge papers, discussing discharge with patient, medication review, etc.    Thank you Danya Elder MD for the opportunity to be involved in this patient's care. If you have any questions or concerns please feel free to contact me at [phone].     Electronically signed by [facility], MD on 1/12/2021 at 1:08 PM

## 2021-01-12 NOTE — PROGRESS NOTES
Occupational Therapy   Occupational Therapy Initial Assessment/tx/discharge  Date: 2021   Patient Name: Dwayne Valle  MRN: [id number]     : 1960    Date of Service: 2021    Discharge Recommendations:  Dwayne Valle scored a 24/24 on the [facility] ADL Inpatient form. At this time, no further OT is recommended upon discharge due to pt's level of indep and assist at home. OT Equipment Recommendations  Equipment Needed: No    Assessment   Assessment: Pt is functioning close to baseline, S/I with functional transfers, SBA for safety during functional mobility, indep LE dressing. With sit to stand transfer, pt demonstrated orthostatic hypotension, but was asymptomatic. OT educated pt in moving slowly with supine<>sit and sit ><stand-he verbalized understanding. He resides with sister, who does [de-identified] of home mgt. No acute OT needs, d/c OT. Recommend home with PRN assist.  Decision Making: Low Complexity  OT Education: OT Role  Patient Education: moving slowly during transitional movements, verbalized understanding  REQUIRES OT FOLLOW UP: No  Activity Tolerance  Activity Tolerance: seated CF=891/73, standing BP=96/65-no symptoms  Safety Devices  Safety Devices in place: Not Applicable           Patient Diagnosis(es): The primary encounter diagnosis was Postural dizziness with presyncope. Diagnoses of Frequent falls, Macrocytic anemia, and Alcohol withdrawal syndrome without complication ([address] were also pertinent to this visit. has a past medical history of Alcohol abuse, CAD (coronary artery disease), CHF (congestive heart failure) ([address] Essential tremor, HTN (hypertension), Lower extremity cellulitis, and MI (mitral incompetence). has a past surgical history that includes Percutaneous Transluminal Coronary Angio and Upper gastrointestinal endoscopy (N/A, 2020).            Restrictions  Position Activity Restriction  Other position/activity restrictions: up with assist    Subjective   General  Chart Reviewed: Yes  Patient assessed for rehabilitation services?: Yes  Additional Pertinent Hx: 1/10 admit from ED: sister brought him after multiple falls in 4 days, reporting dizziness & difficulty taking a deep breath. Hx of ETOH, now showing signs of withdrawal (tremors, anxiety). CT head / chest xr / brain MRI neg. for major acute changes. PMHx: CAD s/p PCI to LAD, CHF, and hx of alcohol abuse. Family / Caregiver Present: No  Subjective  Subjective: Pt supine in bed, reporting he is being discharged today.   Pain Assessment  Pain Assessment: 0-10  Pain Level: 0    Social/Functional History  Social/Functional History  Lives With: (sister-she works at the [place])  Type of Home: [employer] Layout: Two level, 1/2 bath on main level, Laundry in basement(bedroom and full bath upstairs)  Home Access: Stairs to enter with rails  Entrance Stairs - Number of Steps: 4  Entrance Stairs - Rails: Right  Bathroom Shower/Tub: Tub/Shower unit  Bathroom Toilet: Standard  Bathroom Equipment: Hand-held shower  ADL Assistance: Independent  Homemaking Assistance: Needs assistance(sister does most of it)  Homemaking Responsibilities: Yes  Ambulation Assistance: Independent  Transfer Assistance: Independent  Active : Yes  Occupation: Full time employment  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 time  Additional Comments: 2 within a year and last fall Thursday and unable to get up, due to dizziness       Objective        Orientation  Overall Orientation Status: Within Functional Limits     Functional Mobility  Functional Mobility Comments: without A device, in room, moving quickly, steady, SBA  Toilet Transfers  Toilet - Technique: Ambulating(without assistive device)  Equipment Used: (simulated 3 in1 commode)  Toilet Transfer: Supervision(S/I for safety)  ADL  LE Dressing: Independent(doffing/donning hospital socks)  Additional Comments: pt declined donning street clothes        Bed mobility  Supine to Sit: Independent(flat bed, no bed rail)  Sit to Supine: Independent  Scooting: (flat bed, no bed rail)  Transfers  Sit to stand: Independent  Stand to sit:  Independent     Cognition  Overall Cognitive Status: WFL        Sensation  Overall Sensation Status: WFL        LUE AROM (degrees)  LUE AROM : WNL  Left Hand AROM (degrees)  Left Hand AROM: WNL  RUE AROM (degrees)  RUE AROM : WNL  Right Hand AROM (degrees)  Right Hand AROM: WNL  LUE Strength  Gross LUE Strength: WFL  RUE Strength  Gross RUE Strength: WFL     Hand Dominance  Hand Dominance: Right         Patient participated in OT eval and tx including ADLs and transfer     Plan   Plan  Plan Comment: d/c OT    G-Code     OutComes Score                                                  AM-PAC Score        AM-PAC Inpatient Daily Activity Raw Score: 24 (01/12/21 1349)  AM-PAC Inpatient ADL T-Scale Score : 57.54 (01/12/21 1349)  ADL Inpatient CMS 0-100% Score: 0 (01/12/21 1349)  ADL Inpatient CMS G-Code Modifier : CH (01/12/21 1349)            Therapy Time   Individual Concurrent Group Co-treatment   Time In 3624         Time Out 1338         Minutes 23           Timed Code Treatment Minutes:  8    Total Treatment Minutes:  Cayla 119, OTR/L 94 31 11

## 2021-01-12 NOTE — CARE COORDINATION
/24      DISCHARGE Disposition: Home- No Services Needed    LOC at discharge: Not Applicable  CINDY Completed: Not Indicated    Notification completed in HENS/PAS?:  Not Applicable    IMM Completed:   Not Indicated    Transportation:  Transportation PLAN for discharge: family   Mode of Transport: [address] ordered at discharge: Not [address]: Not Applicable  Orders faxed: No    Durable Medical Equipment:  DME Provider: none  Equipment obtained during hospitalization:     Home Oxygen and Respiratory Equipment:  Oxygen needed at discharge?: [address]: Not Applicable  Portable tank available for discharge?: Not Indicated    Dialysis:  Dialysis patient: No    Dialysis Center:  Not Applicable      Additional CM Notes: Pt from home no needs a DC    The Plan for Transition of Care is related to the following treatment goals of Alcohol abuse with withdrawal [F10.139]    The Patient and/or patient representative Angy Sage and his family were provided with a choice of provider and agrees with the discharge plan Yes    Freedom of choice list was provided with basic dialogue that supports the patient's individualized plan of care/goals and shares the quality data associated with the providers.  Not Indicated    Care Transitions patient: No    Torres Jacques RN  [facility]  Case Management Department  Ph: [phone]  Fax: [phone]

## 2021-01-12 NOTE — PLAN OF CARE
Problem: Falls - Risk of:  Goal: Will remain free from falls  Description: Will remain free from falls  Outcome: Completed  Note: Pt is A&Ox4, is high fall risk, Pt educated and encouraged to use call light to notify and wait for assistance from staff before getting OOB, pt uses call light appropriately, has made no unsafe movements, no attempts to get OOB without assistance. Pt's bed alarm remained on throughout shift, bed in lowest position, 2/4 side rails up, call light and bedside table within reach. No falls so far this shift, will continue to monitor. Goal: Absence of physical injury  Description: Absence of physical injury  1/12/2021 1215 by Mary Richardson RN  Outcome: Completed  1/12/2021 0101 by Anmol Hernandez  Outcome: Ongoing  Note: Patient has socks and bed alarm on. Patient has been calling out when needing to use bathroom and has been stand-by assistance. Patient has call light in reach and bed in lowest position. Will continue to monitor. Problem: Discharge Planning:  Goal: Discharged to appropriate level of care  Description: Discharged to appropriate level of care  Outcome: Completed     Problem: Fluid Volume - Deficit:  Goal: Absence of fluid volume deficit signs and symptoms  Description: Absence of fluid volume deficit signs and symptoms  1/12/2021 1215 by Mary Richardson RN  Outcome: Completed  1/12/2021 0101 by Anmol Hernandez  Outcome: Ongoing  Note: Patient has been able to drink an adequate amount of fluids and has been voiding adequately. No signs of fluid deficiency noted.        Problem: Nutrition Deficit:  Goal: Ability to achieve adequate nutritional intake will improve  Description: Ability to achieve adequate nutritional intake will improve  Outcome: Completed     Problem: Sleep Pattern Disturbance:  Goal: Appears well-rested  Description: Appears well-rested  Outcome: Completed     Problem: Violence - Risk of, Self/Other-Directed:  Goal: Knowledge of developmental care interventions  Description: Absence of violence  Outcome: Completed

## 2021-03-29 RX ORDER — PANTOPRAZOLE SODIUM 40 MG/1
TABLET, DELAYED RELEASE ORAL
Qty: 30 TABLET | Refills: 1 | Status: SHIPPED | OUTPATIENT
Start: 2021-03-29 | End: 2021-06-01

## 2021-04-15 ENCOUNTER — OFFICE VISIT (OUTPATIENT)
Dept: CARDIOLOGY CLINIC | Age: 61
End: 2021-04-15
Payer: MEDICAID

## 2021-04-15 VITALS
DIASTOLIC BLOOD PRESSURE: 60 MMHG | SYSTOLIC BLOOD PRESSURE: 110 MMHG | WEIGHT: 191.2 LBS | BODY MASS INDEX: 28.32 KG/M2 | HEIGHT: 69 IN | OXYGEN SATURATION: 97 % | HEART RATE: 70 BPM

## 2021-04-15 DIAGNOSIS — R93.89 ABNORMAL ANGIOGRAM: ICD-10-CM

## 2021-04-15 DIAGNOSIS — E87.6 HYPOKALEMIA: ICD-10-CM

## 2021-04-15 DIAGNOSIS — I51.3 MURAL THROMBUS OF CARDIAC APEX: Primary | ICD-10-CM

## 2021-04-15 PROCEDURE — 1036F TOBACCO NON-USER: CPT | Performed by: NURSE PRACTITIONER

## 2021-04-15 PROCEDURE — G8427 DOCREV CUR MEDS BY ELIG CLIN: HCPCS | Performed by: NURSE PRACTITIONER

## 2021-04-15 PROCEDURE — 3017F COLORECTAL CA SCREEN DOC REV: CPT | Performed by: NURSE PRACTITIONER

## 2021-04-15 PROCEDURE — 99214 OFFICE O/P EST MOD 30 MIN: CPT | Performed by: NURSE PRACTITIONER

## 2021-04-15 PROCEDURE — G8419 CALC BMI OUT NRM PARAM NOF/U: HCPCS | Performed by: NURSE PRACTITIONER

## 2021-04-15 NOTE — PROGRESS NOTES
the right common     femoral vein, subacute deep venous thrombosis involving the right popliteal     vein, and aging acute superficial venous thrombosis in the saphenofemoral     junction.     There is evidence of a chronic thrombotic process noted in the right     posterior tibial vein.     There is deep venous reflux in the right common femoral vein and superficial     venous reflux in the right saphenofemoral junction.     There is a subacute deep venous thrombosis involving the left deep femoral     vein, femoral vein, popliteal vein, and some of the gastrocnemius veins.    Lodema [place] is an aging acute deep venous thrombosis in some of the left     gastrocnemius veins.     There is evidence of a chronic thrombotic process noted in the left great     saphenous vein at the distal thigh, posterior tibial vein, peroneal vein,     and soleal vein.     There is evidence of deep venous reflux in the left femoral vein        Last EKG Echo:Stress Test:if any any past Angiograms: last labs; reviewed with pt. / the results are utilized to determine my plan of care.   20-29 minutes with pt including reviewing tests/meds/plan of care       Past Medical History:   Diagnosis Date    Alcohol abuse     CAD (coronary artery disease)     with complete LAD occlusion    CHF (congestive heart failure) (HCC)     LVEF    Essential tremor     HTN (hypertension)     Lower extremity cellulitis     MI (mitral incompetence)      Social History:    Social History     Tobacco Use   Smoking Status Former Smoker    Packs/day: 0.50    Years: 40.00    Pack years: 20.00    Types: Cigarettes   Smokeless Tobacco Never Used     Current Medications:  Current Outpatient Medications   Medication Sig Dispense Refill    pantoprazole (PROTONIX) 40 MG tablet TAKE 1 TABLET BY MOUTH EVERY MORNING BEFORE BREAKFAST 30 tablet 1    magnesium 200 MG TABS tablet Take 200 mg by mouth daily      ELIQUIS 5 MG TABS tablet TAKE 1 TABLET BY MOUTH TWICE DAILY 60 tablet 3    atorvastatin (LIPITOR) 40 MG tablet TAKE 1 TABLET BY MOUTH EVERY NIGHT 30 tablet 3    clopidogrel (PLAVIX) 75 MG tablet TAKE 1 TABLET BY MOUTH DAILY 30 tablet 3    lisinopril (PRINIVIL;ZESTRIL) 2.5 MG tablet TAKE 1 TABLET BY MOUTH EVERY NIGHT 30 tablet 3    carvedilol (COREG) 3.125 MG tablet TAKE 1 TABLET BY MOUTH TWICE DAILY 180 tablet 3    WHITE PETROLATUM EX Apply 61 % topically daily      folic acid (FOLVITE) 1 MG tablet Take 1 tablet by mouth daily 90 tablet 3    thiamine 100 MG tablet Take 1 tablet by mouth daily 90 tablet 3    vitamin B-12 (CYANOCOBALAMIN) 100 MCG tablet Take 1 tablet by mouth nightly 90 tablet 3    Skin Protectants, Misc. (HYDROCERIN) CREA cream Apply topically 2 times daily (Patient taking differently: Apply topically 2 times daily Apply topically to legs) 1 Container 0    mineral oil-hydrophilic petrolatum (AQUAPHOR) ointment Apply 85 g topically as needed for Dry Skin Apply topically to both legs       No current facility-administered medications for this visit. REVIEW OF SYSTEMS:    CONSTITUTIONAL: No major weight gain or loss, night sweats, fever, fatigue, or weakness. HEENT: No new vision difficulties or ringing in the ears. RESPIRATORY: No new SOB, PND, orthopnea or cough. CARDIOVASCULAR: See HPI  GI: No N/V/D, constipation, or abdominal pain. No black/tarry stools  : No urinary urgency, incontinence, or hematuria. SKIN: No cyanosis or skin lesions. MUSCULOSKELETAL: No new muscle or joint pain. NEUROLOGICAL: No syncope or TIA-like symptoms.   PSYCHIATRIC: No anxiety, pain, insomnia or depression        Objective:   PHYSICAL EXAM:        Vitals:    04/15/21 1058   BP: 110/60   Site: Right Upper Arm   Position: Sitting   Cuff Size: Medium Adult   Pulse: 70   SpO2: 97%   Weight: 191 lb 3.2 oz (86.7 kg)   Height: 5' 9\" (1.753 m)      VITALS:  /60 (Site: Right Upper Arm, Position: Sitting, Cuff Size: Medium Adult)   Pulse 70   Ht 5' 9\" (1.753 m) this visit and reveals the following:    Assessment:     CAD/ stent / Mural thrombus / off coumadin on eliquis/ no c/o bleeding or falls      Cont eliquis plavix no asa   CAD with complete LAD occlusion treated with stenting on 5/12/2020    Mural thrombus secondary to anterior apical myocardial infarction  On eliquis     Hx DVT left lower leg / bilateral cellulitis lower legs     Hx Alcoholism  States drinking less     tremor Sapna Helm    Low mag start mg OTC supplement      Vit D 14.6 / started  Vit D supplement   recheck     TTE 1/10/2021  Limited echo. Definity contrast administered. Left ventricular cavity size is normal. There is mild concentric left   ventricular hypertrophy. Overall left ventricular systolic function appears   borderline normal with an estimated ejection fraction of 50-55%. There is   hypokinesis of the basal-mid inferior and inferolateral wall. No evidence of   left ventricular mass or thrombus noted. Hx CVA 1/2021 MRI brain   No acute infarction, evidence of intracranial hemorrhage or mass lesion. Evidence of mild chronic small vessel white matter disease. Small remote cortical infarcts in the right frontoparietal and left parietal regions.        [facility] 5/12/20 PTCA stent to the LAD proximally with a drug-eluting stent Ezra PTCA to the diagonal branch  echo  6/20 EF WAS 35%  echo 7/20 EF 35-40%     5/12/20 us legs Acute deep vein thrombosis involving the left Popliteal vein, Gastrocnemius   veins and superficial vein thrombosis of the left small saphneous vein.       8/7/20 us lower ext   Rebecca Copper is an aging acute deep venous thrombosis involving the right common     femoral vein, subacute deep venous thrombosis involving the right popliteal     vein, and aging acute superficial venous thrombosis in the saphenofemoral     junction.     There is evidence of a chronic thrombotic process noted in the right     posterior tibial vein.     There is deep venous reflux in the right common femoral vein and superficial     venous reflux in the right saphenofemoral junction.     There is a subacute deep venous thrombosis involving the left deep femoral     vein, femoral vein, popliteal vein, and some of the gastrocnemius veins.    Lodema [place] is an aging acute deep venous thrombosis in some of the left     gastrocnemius veins.     There is evidence of a chronic thrombotic process noted in the left great     saphenous vein at the distal thigh, posterior tibial vein, peroneal vein,     and soleal vein.     There is evidence of deep venous reflux in the left femoral vein      Plan:   No c/o cp/SOB/edema/PND or JC    complaint with meds   Discussed nutrition / sodium intake / fluid intake   Recommend activity as tolerated   On plavix and eliquis/ no NSAIDS  / no c/ofalls or bleeds / Hgb 12.4 stable   Will cont eliquis due to hx DVT/ CVA and mural thrombus   Fu in 6 months blood work PTV     Overall the patient is stable from CV standpoint    I have addressed the patient's cardiac risk factors and adjusted pharmacologic treatment as needed. In addition, I have reinforced the need for patient directed risk factor modification. Further evaluation will be based upon the patient's clinical course and testing results. All questions and concerns were addressed to the patient. Alternatives to my treatment were discussed. The patient verbalizes understanding not to stop medications without discussing with us. Patient is on a beta-blocker  Patient is on an ACE / ARB  Patient is on a statin  Dual Antiplatelet therapy    Discussed exercise: 30min of sustained cardiovascular exercise most days of the week   Discussed Low saturated fat / Cholesterol diet. Thank you for allowing us to participate in the care of Davidaluca Krueger.     Jonathan Kelsey     Documentation of today's visit sent to PCP

## 2021-05-03 RX ORDER — ATORVASTATIN CALCIUM 40 MG/1
TABLET, FILM COATED ORAL
Qty: 30 TABLET | Refills: 3 | Status: SHIPPED | OUTPATIENT
Start: 2021-05-03 | End: 2021-08-26

## 2021-05-03 RX ORDER — LISINOPRIL 2.5 MG/1
TABLET ORAL
Qty: 30 TABLET | Refills: 3 | Status: SHIPPED | OUTPATIENT
Start: 2021-05-03 | End: 2021-08-26

## 2021-05-03 RX ORDER — CLOPIDOGREL BISULFATE 75 MG/1
75 TABLET ORAL DAILY
Qty: 30 TABLET | Refills: 3 | Status: ON HOLD | OUTPATIENT
Start: 2021-05-03 | End: 2021-09-09 | Stop reason: HOSPADM

## 2021-05-10 RX ORDER — APIXABAN 5 MG/1
TABLET, FILM COATED ORAL
Qty: 180 TABLET | Refills: 3 | Status: ON HOLD | OUTPATIENT
Start: 2021-05-10 | End: 2021-09-09 | Stop reason: HOSPADM

## 2021-06-01 RX ORDER — PANTOPRAZOLE SODIUM 40 MG/1
TABLET, DELAYED RELEASE ORAL
Qty: 30 TABLET | Refills: 0 | Status: SHIPPED | OUTPATIENT
Start: 2021-06-01 | End: 2021-08-22

## 2021-06-16 RX ORDER — CALCIUM CARBONATE/VITAMIN D3 600 MG-10
TABLET ORAL
Qty: 90 TABLET | Refills: 3 | Status: SHIPPED | OUTPATIENT
Start: 2021-06-16 | End: 2022-08-01 | Stop reason: SDUPTHER

## 2021-08-09 ENCOUNTER — OFFICE VISIT (OUTPATIENT)
Dept: INTERNAL MEDICINE CLINIC | Age: 61
End: 2021-08-09
Payer: MEDICAID

## 2021-08-09 VITALS
HEIGHT: 69 IN | WEIGHT: 179 LBS | BODY MASS INDEX: 26.51 KG/M2 | OXYGEN SATURATION: 98 % | HEART RATE: 114 BPM | TEMPERATURE: 98.1 F | SYSTOLIC BLOOD PRESSURE: 102 MMHG | RESPIRATION RATE: 20 BRPM | DIASTOLIC BLOOD PRESSURE: 66 MMHG

## 2021-08-09 DIAGNOSIS — R21 RASH: ICD-10-CM

## 2021-08-09 DIAGNOSIS — F10.139 ALCOHOL ABUSE WITH WITHDRAWAL (HCC): ICD-10-CM

## 2021-08-09 DIAGNOSIS — R10.13 EPIGASTRIC ABDOMINAL PAIN: Primary | ICD-10-CM

## 2021-08-09 PROCEDURE — 99213 OFFICE O/P EST LOW 20 MIN: CPT | Performed by: STUDENT IN AN ORGANIZED HEALTH CARE EDUCATION/TRAINING PROGRAM

## 2021-08-09 RX ORDER — DIAPER,BRIEF,INFANT-TODD,DISP
EACH MISCELLANEOUS
Qty: 1 TUBE | Refills: 1 | Status: SHIPPED | OUTPATIENT
Start: 2021-08-09 | End: 2021-08-16

## 2021-08-09 ASSESSMENT — ENCOUNTER SYMPTOMS
CHEST TIGHTNESS: 0
COUGH: 0
WHEEZING: 0
RESPIRATORY NEGATIVE: 1
CONSTIPATION: 0
SHORTNESS OF BREATH: 0
ABDOMINAL PAIN: 1
VOMITING: 1
EYES NEGATIVE: 1
DIARRHEA: 1
NAUSEA: 1

## 2021-08-09 NOTE — LETTER
[address] [place] [address] [address] [facility] [address]  Phone: [phone]  Fax: [phone]    oSphie Wray MD        August 9, 2021    [address] [facility] [address]      To Whom It May Concern:    Elise Landon was absent from work due to illness from Saturday August 7th through Monday August 9th. He was seen in my office today Aug.9th and needs to be off work from August 9th  through Saturday August 14th. He may return to work on Sunday August 15th,2021. If you have any questions or concerns, please don't hesitate to call.     Sincerely,        Sophie Wray MD

## 2021-08-09 NOTE — PATIENT INSTRUCTIONS
Please get your fasting labs drawn soon as possible. You can use hydrocortisone cream for the rash on your hand. Please stop drinking alcohol. Please drink water and/or Gatorade to stay hydrated. Try to eat very lightly for now. If your belly pain gets much worse or you get much more dizzy please present to the emergency department.

## 2021-08-09 NOTE — PROGRESS NOTES
Outpatient Clinic Visit Note    Patient: Rand Valadez  : 1960 (83 y.o.)  Date: 2021    CC: Dizziness, diarrhea    HPI:    Suzie Echols is a 64-year-old male with a past medical history as below who presents today for dizziness and diarrhea for the past 2 to 3 weeks. Patient reports diarrhea, epigastric abdominal pain with tenderness to palpation, alcohol use. He also endorses more recently dizziness with standing. Patient states that he drinks 1-2 beers 2-3 times a week. Speaking to the sister who he lives with outside of the room, he seems to drink daily at least 1 tall beer. According to the sister \" he knows better than to drink in front of me\". He has been having poor p.o. intake aside from the beer and soda pop [coke]. Patient has also been retching and vomiting every now and then. He denied any hematemesis, hematochezia, melena. Denies difficulty flushing  stool, denied especially foul stool. Endorses less frequent urination. Otherwise denied any chest pain, fever, recent illness, shortness of breath, dysuria. Patient also states that he began having a rash on his left wrist and hand and is requesting a cream for it. ROS:  Review of Systems   Constitutional: Positive for chills and fatigue. Negative for fever. HENT: Negative. Eyes: Negative. Respiratory: Negative. Negative for cough, chest tightness, shortness of breath and wheezing. Cardiovascular: Negative. Negative for chest pain, palpitations and leg swelling. Gastrointestinal: Positive for abdominal pain, diarrhea, nausea and vomiting. Negative for constipation. Endocrine: Negative. Negative for polyuria. Genitourinary: Negative. Negative for dysuria, frequency and urgency. Musculoskeletal: Negative. Skin: Negative. Neurological: Positive for dizziness, tremors (Chronic) and light-headedness. Negative for weakness and headaches. Psychiatric/Behavioral: Positive for agitation.        Past Medical History:    Past Medical History:   Diagnosis Date    Alcohol abuse     CAD (coronary artery disease)     with complete LAD occlusion    CHF (congestive heart failure) (HCC)     LVEF    Essential tremor     HTN (hypertension)     Lower extremity cellulitis     MI (mitral incompetence)        Past Surgical History:  Past Surgical History:   Procedure Laterality Date    PTCA      UPPER GASTROINTESTINAL ENDOSCOPY N/A 7/28/2020    EGD BIOPSY performed by Chico Damon MD at [facility] Medications:  Prior to Visit Medications    Medication Sig Taking? Authorizing Provider   hydrocortisone (ALA-NAVI) 1 % cream Apply topically 2 times daily. Yes Jean Temple MD   Thiamine Mononitrate (B1) 100 MG TABS TAKE 1 TABLET BY MOUTH DAILY Yes Henry Rash, APRN - CNP   pantoprazole (PROTONIX) 40 MG tablet TAKE 1 TABLET BY MOUTH EVERY MORNING BEFORE BREAKFAST Yes Basim Lacey MD   ELIQUIS 5 MG TABS tablet TAKE 1 TABLET BY MOUTH TWICE DAILY Yes [place] Rash, APRN - CNP   atorvastatin (LIPITOR) 40 MG tablet TAKE 1 TABLET BY MOUTH EVERY NIGHT Yes [place] Rash, APRN - CNP   clopidogrel (PLAVIX) 75 MG tablet TAKE 1 TABLET BY MOUTH DAILY Yes [place] Rash, APRN - CNP   lisinopril (PRINIVIL;ZESTRIL) 2.5 MG tablet TAKE 1 TABLET BY MOUTH EVERY NIGHT Yes Henry Rash, APRN - CNP   magnesium 200 MG TABS tablet Take 200 mg by mouth daily Yes Historical Provider, MD   carvedilol (COREG) 3.125 MG tablet TAKE 1 TABLET BY MOUTH TWICE DAILY Yes [place] Rash, APRN - CNP   WHITE PETROLATUM EX Apply 61 % topically daily Yes Historical Provider, MD   folic acid (FOLVITE) 1 MG tablet Take 1 tablet by mouth daily Yes Henry Rash, APRN - CNP   vitamin B-12 (CYANOCOBALAMIN) 100 MCG tablet Take 1 tablet by mouth nightly Yes [place] Rash, APRN - CNP   Skin Protectants, Misc.  (HYDROCERIN) CREA cream Apply topically 2 times daily  Patient taking differently: Apply topically 2 times daily Apply topically hematochezia. Denies difficulty flushing  stool, denied especially foul stool.  -     AFL - Raphael Bowser MD, Gastroenterology, [place]  -     CBC WITH AUTO DIFFERENTIAL; Future  -     COMPREHENSIVE METABOLIC PANEL; Future  -     LIPASE; Future  -     HEPATITIS PANEL, ACUTE; Future  -     Lipid, Fasting; Future    Alcohol abuse with withdrawal ([facility] [address]  - Per patient he drinks 1-2 beers 2-3 times a week. Per sister who he lives with, patient drinks daily. Counseled on importance of cessation of drinking. Rash  -     hydrocortisone (ALA-NAVI) 1 % cream; Apply topically 2 times daily. Will see him back in office in 3 weeks.     Dispo: Pt has been staffed with Dr. Geri Pineda  _______________  Saloni Leonard MD  Internal Medicine, PGY-2  8/9/2021 4:51 PM

## 2021-08-22 ENCOUNTER — APPOINTMENT (OUTPATIENT)
Dept: CT IMAGING | Age: 61
End: 2021-08-22
Payer: MEDICAID

## 2021-08-22 ENCOUNTER — HOSPITAL ENCOUNTER (INPATIENT)
Age: 61
LOS: 1 days | Discharge: HOME | End: 2021-08-23
Attending: STUDENT IN AN ORGANIZED HEALTH CARE EDUCATION/TRAINING PROGRAM | Admitting: INTERNAL MEDICINE
Payer: MEDICAID

## 2021-08-22 DIAGNOSIS — R10.13 ABDOMINAL PAIN, EPIGASTRIC: Primary | ICD-10-CM

## 2021-08-22 PROBLEM — E87.8 ELECTROLYTE ABNORMALITY: Status: ACTIVE | Noted: 2021-08-22

## 2021-08-22 LAB
ALBUMIN SERPL-MCNC: 2.8 G/DL (ref 3.4–5)
ALP BLD-CCNC: 275 U/L (ref 40–129)
ALT SERPL-CCNC: 44 U/L (ref 10–40)
ANION GAP SERPL CALCULATED.3IONS-SCNC: 15 MMOL/L (ref 3–16)
AST SERPL-CCNC: 158 U/L (ref 15–37)
BASOPHILS ABSOLUTE: 0 K/UL (ref 0–0.2)
BASOPHILS RELATIVE PERCENT: 0.5 %
BILIRUB SERPL-MCNC: 2.7 MG/DL (ref 0–1)
BILIRUBIN DIRECT: 1 MG/DL (ref 0–0.3)
BILIRUBIN, INDIRECT: 1.7 MG/DL (ref 0–1)
BUN BLDV-MCNC: 7 MG/DL (ref 7–20)
CALCIUM SERPL-MCNC: 7.8 MG/DL (ref 8.3–10.6)
CHLORIDE BLD-SCNC: 88 MMOL/L (ref 99–110)
CO2: 23 MMOL/L (ref 21–32)
CREAT SERPL-MCNC: 0.9 MG/DL (ref 0.8–1.3)
EOSINOPHILS ABSOLUTE: 0 K/UL (ref 0–0.6)
EOSINOPHILS RELATIVE PERCENT: 0.2 %
ETHANOL: NORMAL MG/DL (ref 0–0.08)
GFR AFRICAN AMERICAN: >60
GFR NON-AFRICAN AMERICAN: >60
GLUCOSE BLD-MCNC: 110 MG/DL (ref 70–99)
HCT VFR BLD CALC: 34.3 % (ref 40.5–52.5)
HEMOGLOBIN: 11.9 G/DL (ref 13.5–17.5)
INR BLD: 2.13 (ref 0.88–1.12)
LIPASE: 32 U/L (ref 13–60)
LYMPHOCYTES ABSOLUTE: 1.6 K/UL (ref 1–5.1)
LYMPHOCYTES RELATIVE PERCENT: 17.6 %
MAGNESIUM: 0.9 MG/DL (ref 1.8–2.4)
MCH RBC QN AUTO: 36 PG (ref 26–34)
MCHC RBC AUTO-ENTMCNC: 34.6 G/DL (ref 31–36)
MCV RBC AUTO: 103.8 FL (ref 80–100)
MONOCYTES ABSOLUTE: 0.7 K/UL (ref 0–1.3)
MONOCYTES RELATIVE PERCENT: 7.6 %
NEUTROPHILS ABSOLUTE: 6.8 K/UL (ref 1.7–7.7)
NEUTROPHILS RELATIVE PERCENT: 74.1 %
PDW BLD-RTO: 15.3 % (ref 12.4–15.4)
PLATELET # BLD: 118 K/UL (ref 135–450)
PMV BLD AUTO: 9.1 FL (ref 5–10.5)
POTASSIUM REFLEX MAGNESIUM: 2.8 MMOL/L (ref 3.5–5.1)
PRO-BNP: 364 PG/ML (ref 0–124)
PROTHROMBIN TIME: 24.9 SEC (ref 9.9–12.7)
RBC # BLD: 3.31 M/UL (ref 4.2–5.9)
SODIUM BLD-SCNC: 126 MMOL/L (ref 136–145)
TOTAL PROTEIN: 6.8 G/DL (ref 6.4–8.2)
TROPONIN: <0.01 NG/ML
WBC # BLD: 9.1 K/UL (ref 4–11)

## 2021-08-22 PROCEDURE — 96365 THER/PROPH/DIAG IV INF INIT: CPT

## 2021-08-22 PROCEDURE — 6370000000 HC RX 637 (ALT 250 FOR IP): Performed by: STUDENT IN AN ORGANIZED HEALTH CARE EDUCATION/TRAINING PROGRAM

## 2021-08-22 PROCEDURE — 85025 COMPLETE CBC W/AUTO DIFF WBC: CPT

## 2021-08-22 PROCEDURE — 96368 THER/DIAG CONCURRENT INF: CPT

## 2021-08-22 PROCEDURE — 93005 ELECTROCARDIOGRAM TRACING: CPT | Performed by: INTERNAL MEDICINE

## 2021-08-22 PROCEDURE — 82077 ASSAY SPEC XCP UR&BREATH IA: CPT

## 2021-08-22 PROCEDURE — 6360000002 HC RX W HCPCS: Performed by: STUDENT IN AN ORGANIZED HEALTH CARE EDUCATION/TRAINING PROGRAM

## 2021-08-22 PROCEDURE — 99285 EMERGENCY DEPT VISIT HI MDM: CPT

## 2021-08-22 PROCEDURE — 74177 CT ABD & PELVIS W/CONTRAST: CPT

## 2021-08-22 PROCEDURE — 70450 CT HEAD/BRAIN W/O DYE: CPT

## 2021-08-22 PROCEDURE — 1200000000 HC SEMI PRIVATE

## 2021-08-22 PROCEDURE — 85610 PROTHROMBIN TIME: CPT

## 2021-08-22 PROCEDURE — 96366 THER/PROPH/DIAG IV INF ADDON: CPT

## 2021-08-22 PROCEDURE — 2580000003 HC RX 258: Performed by: STUDENT IN AN ORGANIZED HEALTH CARE EDUCATION/TRAINING PROGRAM

## 2021-08-22 PROCEDURE — 6360000004 HC RX CONTRAST MEDICATION: Performed by: STUDENT IN AN ORGANIZED HEALTH CARE EDUCATION/TRAINING PROGRAM

## 2021-08-22 PROCEDURE — 80076 HEPATIC FUNCTION PANEL: CPT

## 2021-08-22 PROCEDURE — 83735 ASSAY OF MAGNESIUM: CPT

## 2021-08-22 PROCEDURE — 80048 BASIC METABOLIC PNL TOTAL CA: CPT

## 2021-08-22 PROCEDURE — 96375 TX/PRO/DX INJ NEW DRUG ADDON: CPT

## 2021-08-22 PROCEDURE — 84484 ASSAY OF TROPONIN QUANT: CPT

## 2021-08-22 PROCEDURE — 83880 ASSAY OF NATRIURETIC PEPTIDE: CPT

## 2021-08-22 PROCEDURE — 83690 ASSAY OF LIPASE: CPT

## 2021-08-22 PROCEDURE — 36415 COLL VENOUS BLD VENIPUNCTURE: CPT

## 2021-08-22 RX ORDER — M-VIT,TX,IRON,MINS/CALC/FOLIC 27MG-0.4MG
1 TABLET ORAL ONCE
Status: COMPLETED | OUTPATIENT
Start: 2021-08-22 | End: 2021-08-22

## 2021-08-22 RX ORDER — LORAZEPAM 1 MG/1
4 TABLET ORAL
Status: DISCONTINUED | OUTPATIENT
Start: 2021-08-22 | End: 2021-08-23 | Stop reason: HOSPADM

## 2021-08-22 RX ORDER — SODIUM CHLORIDE 9 MG/ML
25 INJECTION, SOLUTION INTRAVENOUS PRN
Status: DISCONTINUED | OUTPATIENT
Start: 2021-08-22 | End: 2021-08-23 | Stop reason: HOSPADM

## 2021-08-22 RX ORDER — POTASSIUM CHLORIDE 7.45 MG/ML
10 INJECTION INTRAVENOUS
Status: DISPENSED | OUTPATIENT
Start: 2021-08-22 | End: 2021-08-22

## 2021-08-22 RX ORDER — MULTIVITAMIN WITH IRON
1 TABLET ORAL DAILY
Status: DISCONTINUED | OUTPATIENT
Start: 2021-08-23 | End: 2021-08-23 | Stop reason: HOSPADM

## 2021-08-22 RX ORDER — POLYETHYLENE GLYCOL 3350 17 G/17G
17 POWDER, FOR SOLUTION ORAL DAILY PRN
Status: DISCONTINUED | OUTPATIENT
Start: 2021-08-22 | End: 2021-08-23 | Stop reason: HOSPADM

## 2021-08-22 RX ORDER — LORAZEPAM 2 MG/ML
2 INJECTION INTRAMUSCULAR
Status: DISCONTINUED | OUTPATIENT
Start: 2021-08-22 | End: 2021-08-23 | Stop reason: HOSPADM

## 2021-08-22 RX ORDER — ONDANSETRON 2 MG/ML
8 INJECTION INTRAMUSCULAR; INTRAVENOUS ONCE
Status: COMPLETED | OUTPATIENT
Start: 2021-08-22 | End: 2021-08-22

## 2021-08-22 RX ORDER — SODIUM CHLORIDE 9 MG/ML
INJECTION, SOLUTION INTRAVENOUS CONTINUOUS
Status: CANCELLED | OUTPATIENT
Start: 2021-08-22

## 2021-08-22 RX ORDER — LORAZEPAM 1 MG/1
1 TABLET ORAL
Status: DISCONTINUED | OUTPATIENT
Start: 2021-08-22 | End: 2021-08-23 | Stop reason: HOSPADM

## 2021-08-22 RX ORDER — SODIUM CHLORIDE 0.9 % (FLUSH) 0.9 %
5-40 SYRINGE (ML) INJECTION EVERY 12 HOURS SCHEDULED
Status: DISCONTINUED | OUTPATIENT
Start: 2021-08-22 | End: 2021-08-23 | Stop reason: HOSPADM

## 2021-08-22 RX ORDER — ONDANSETRON 4 MG/1
4 TABLET, ORALLY DISINTEGRATING ORAL EVERY 8 HOURS PRN
Status: DISCONTINUED | OUTPATIENT
Start: 2021-08-22 | End: 2021-08-23 | Stop reason: HOSPADM

## 2021-08-22 RX ORDER — LORAZEPAM 2 MG/ML
1 INJECTION INTRAMUSCULAR
Status: DISCONTINUED | OUTPATIENT
Start: 2021-08-22 | End: 2021-08-23 | Stop reason: HOSPADM

## 2021-08-22 RX ORDER — LORAZEPAM 1 MG/1
2 TABLET ORAL
Status: DISCONTINUED | OUTPATIENT
Start: 2021-08-22 | End: 2021-08-23 | Stop reason: HOSPADM

## 2021-08-22 RX ORDER — CARVEDILOL 3.12 MG/1
3.12 TABLET ORAL 2 TIMES DAILY WITH MEALS
Status: DISCONTINUED | OUTPATIENT
Start: 2021-08-23 | End: 2021-08-23 | Stop reason: HOSPADM

## 2021-08-22 RX ORDER — LORAZEPAM 2 MG/ML
3 INJECTION INTRAMUSCULAR
Status: DISCONTINUED | OUTPATIENT
Start: 2021-08-22 | End: 2021-08-23 | Stop reason: HOSPADM

## 2021-08-22 RX ORDER — GAUZE BANDAGE 2" X 2"
100 BANDAGE TOPICAL DAILY
Status: DISCONTINUED | OUTPATIENT
Start: 2021-08-23 | End: 2021-08-23 | Stop reason: HOSPADM

## 2021-08-22 RX ORDER — ONDANSETRON 2 MG/ML
4 INJECTION INTRAMUSCULAR; INTRAVENOUS EVERY 6 HOURS PRN
Status: DISCONTINUED | OUTPATIENT
Start: 2021-08-22 | End: 2021-08-23 | Stop reason: HOSPADM

## 2021-08-22 RX ORDER — FOLIC ACID 1 MG/1
1 TABLET ORAL ONCE
Status: COMPLETED | OUTPATIENT
Start: 2021-08-22 | End: 2021-08-22

## 2021-08-22 RX ORDER — ATORVASTATIN CALCIUM 40 MG/1
40 TABLET, FILM COATED ORAL DAILY
Status: DISCONTINUED | OUTPATIENT
Start: 2021-08-23 | End: 2021-08-23 | Stop reason: HOSPADM

## 2021-08-22 RX ORDER — LORAZEPAM 2 MG/ML
4 INJECTION INTRAMUSCULAR
Status: DISCONTINUED | OUTPATIENT
Start: 2021-08-22 | End: 2021-08-23 | Stop reason: HOSPADM

## 2021-08-22 RX ORDER — LISINOPRIL 2.5 MG/1
2.5 TABLET ORAL DAILY
Status: DISCONTINUED | OUTPATIENT
Start: 2021-08-23 | End: 2021-08-23 | Stop reason: HOSPADM

## 2021-08-22 RX ORDER — DIAPER,BRIEF,INFANT-TODD,DISP
EACH MISCELLANEOUS 2 TIMES DAILY
COMMUNITY

## 2021-08-22 RX ORDER — CLOPIDOGREL BISULFATE 75 MG/1
75 TABLET ORAL DAILY
Status: DISCONTINUED | OUTPATIENT
Start: 2021-08-23 | End: 2021-08-23 | Stop reason: HOSPADM

## 2021-08-22 RX ORDER — SODIUM CHLORIDE 0.9 % (FLUSH) 0.9 %
5-40 SYRINGE (ML) INJECTION PRN
Status: DISCONTINUED | OUTPATIENT
Start: 2021-08-22 | End: 2021-08-23 | Stop reason: HOSPADM

## 2021-08-22 RX ORDER — LORAZEPAM 1 MG/1
3 TABLET ORAL
Status: DISCONTINUED | OUTPATIENT
Start: 2021-08-22 | End: 2021-08-23 | Stop reason: HOSPADM

## 2021-08-22 RX ORDER — ACETAMINOPHEN 650 MG/1
650 SUPPOSITORY RECTAL EVERY 6 HOURS PRN
Status: DISCONTINUED | OUTPATIENT
Start: 2021-08-22 | End: 2021-08-23 | Stop reason: HOSPADM

## 2021-08-22 RX ORDER — ACETAMINOPHEN 325 MG/1
650 TABLET ORAL EVERY 6 HOURS PRN
Status: DISCONTINUED | OUTPATIENT
Start: 2021-08-22 | End: 2021-08-23 | Stop reason: HOSPADM

## 2021-08-22 RX ORDER — GAUZE BANDAGE 2" X 2"
100 BANDAGE TOPICAL ONCE
Status: COMPLETED | OUTPATIENT
Start: 2021-08-22 | End: 2021-08-22

## 2021-08-22 RX ADMIN — IOPAMIDOL 80 ML: 755 INJECTION, SOLUTION INTRAVENOUS at 17:03

## 2021-08-22 RX ADMIN — MAGNESIUM SULFATE 6000 MG: 500 INJECTION, SOLUTION INTRAMUSCULAR; INTRAVENOUS at 16:47

## 2021-08-22 RX ADMIN — FOLIC ACID 1 MG: 1 TABLET ORAL at 16:28

## 2021-08-22 RX ADMIN — THIAMINE HCL TAB 100 MG 100 MG: 100 TAB at 16:28

## 2021-08-22 RX ADMIN — ONDANSETRON 8 MG: 2 INJECTION INTRAMUSCULAR; INTRAVENOUS at 14:39

## 2021-08-22 RX ADMIN — MULTIPLE VITAMINS W/ MINERALS TAB 1 TABLET: TAB at 16:28

## 2021-08-22 RX ADMIN — POTASSIUM CHLORIDE 10 MEQ: 7.45 INJECTION INTRAVENOUS at 18:19

## 2021-08-22 RX ADMIN — POTASSIUM CHLORIDE 10 MEQ: 7.45 INJECTION INTRAVENOUS at 20:48

## 2021-08-22 RX ADMIN — POTASSIUM CHLORIDE 10 MEQ: 7.45 INJECTION INTRAVENOUS at 19:24

## 2021-08-22 RX ADMIN — POTASSIUM CHLORIDE 10 MEQ: 7.45 INJECTION INTRAVENOUS at 22:35

## 2021-08-22 RX ADMIN — APIXABAN 5 MG: 5 TABLET, FILM COATED ORAL at 22:43

## 2021-08-22 RX ADMIN — POTASSIUM CHLORIDE 10 MEQ: 7.45 INJECTION INTRAVENOUS at 16:49

## 2021-08-22 NOTE — H&P
Internal Medicine  PGY 1  History & Physical      CC Chest pain    History Obtained From:  patient    HISTORY OF PRESENT ILLNESS:  Mr. Sarah Cr is a 60 yo male with PMH Etoh abuse, CAD s/p stent 5/2020, CHF, HTN, DVT 5/2020 p/w N/V/diarrhea and falls. Patient states that for the last two weeks he has been increasingly unbalanced and has fallen several times. He says he has not hit his head however. He states that he feels wobbly and is having a hard time getting around. His daughter states that he has always had a resting tremor but now it seems like it has gotten worse. Patient states that he feels shaky. He has a history of alcohol use but says that he only has 1-2 drinks per day with his last drink being yesterday. His daughter states that she thinks he drinks more than that. The patient also complains of nausea and vomiting. He has vomited several times but now just dry heaves. This has been causing him epigastric abdominal pain as well. His vomitus has not been bloody. He also complains of nonbloody diarrhea. Patient afebrile and HDS in ED. Labs significant for Na 126, K 2.8, Cl 88, Mg 0.90, ProBNP 364, alk phos 275. ALT 44, , direct bili 1.0, indirect 1.7, albumin 2.8, PT/INR 24/2. 13. CT head without incranial abnormality and CTAP with diffuse heterogeneous liver suggestive of chronic hepatocellular disease.      Past Medical History:        Diagnosis Date    Alcohol abuse     CAD (coronary artery disease)     with complete LAD occlusion    CHF (congestive heart failure) (HCC)     LVEF    Essential tremor     HTN (hypertension)     Lower extremity cellulitis     MI (mitral incompetence)    ·     Past Surgical History:        Procedure Laterality Date    PTCA      UPPER GASTROINTESTINAL ENDOSCOPY N/A 7/28/2020    EGD BIOPSY performed by Cielo Roland MD at [facility]   ·     Medications Priorto Admission:    Medication Details Provider Last Reconciliation Status   hydrocortisone 1 % cream Apply topically 2 times daily Kwame Bergman MD Not Ordered   Thiamine Mononitrate (B1) 100 MG TABS TAKE 1 TABLET BY MOUTH DAILY Kwame Bergman MD Not Ordered   ELIQUIS 5 MG TABS tablet TAKE 1 TABLET BY MOUTH TWICE DAILY wKame Bergman MD Reordered   Ordered as: apixaban (ELIQUIS) tablet 5 mg - 5 mg, Oral, 2 TIMES DAILY, First dose on Sun 8/22/21 at 2215 ANTICOAGULANT    atorvastatin (LIPITOR) 40 MG tablet TAKE 1 TABLET BY MOUTH EVERY NIGHT Kwame Bergman MD Reordered   Ordered as: atorvastatin (LIPITOR) tablet 40 mg - 40 mg, Oral, DAILY, First dose on Mon 8/23/21 at 0900   clopidogrel (PLAVIX) 75 MG tablet TAKE 1 TABLET BY MOUTH DAILY Kwame Bergman MD Reordered   Ordered as: clopidogrel (PLAVIX) tablet 75 mg - 75 mg, Oral, DAILY, First dose on Mon 8/23/21 at 0900   lisinopril (PRINIVIL;ZESTRIL) 2.5 MG tablet TAKE 1 TABLET BY MOUTH EVERY NIGHT Kwame Bergman MD Reordered   Ordered as: lisinopril (PRINIVIL;ZESTRIL) tablet 2.5 mg - 2.5 mg, Oral, DAILY, First dose on Mon 8/23/21 at 0900   magnesium 200 MG TABS tablet Take 200 mg by mouth daily Kwame Bergman MD Not Ordered   carvedilol (COREG) 3.125 MG tablet TAKE 1 TABLET BY MOUTH TWICE DAILY Kwame Bergman MD Reordered   Ordered as: carvedilol (COREG) tablet 3.125 mg - 3.125 mg, Oral, 2 TIMES DAILY WITH MEALS, First dose on Mon 8/23/21 at 0800 Administer with food to minimize the risk of orthostatic hypotension    vitamin B-12 (CYANOCOBALAMIN) 100 MCG tablet Take 1 tablet by mouth nightly Kwame Bergman MD Not Ordered   pantoprazole (PROTONIX) 40 MG tablet TAKE 1 TABLET BY MOUTH EVERY MORNING BEFORE BREAKFAST Kwame Bergman MD Not Ordered   WHITE PETROLATUM EX Apply 61 % topically daily Kwame Bergman MD Not Ordered   thiamine 100 MG tablet Take 1 tablet by mouth daily Kwame Bergman MD Not Ordered   Skin Protectants, Misc.  (HYDROCERIN) CREA cream Apply topically 2 times daily Kwame Bergman MD Not Ordered    Patient taking differently: Apply topically 2 times daily Apply topically to legs     mineral oil-hydrophilic petrolatum (AQUAPHOR) ointment Apply 85 g topically as needed for Dry Skin Apply topically to both legs Gianna Roque MD Not Ordered       Allergies:  Patient has no known allergies. Social History:   · TOBACCO:   reports that he has quit smoking. His smoking use included cigarettes. He has a 20.00 pack-year smoking history. He has never used smokeless tobacco.  · ETOH:   reports current alcohol use of about 2.0 standard drinks of alcohol per week. · DRUGS : Denies   · Patient currently lives with daughter. Family History:       Problem Relation Age of Onset    Stroke Mother     Heart Disease Father    ·     Review of Systems    ROS: A 10 point review of systems was conducted, significant findings as noted in HPI. Physical Exam     Vitals:    08/22/21 1530   BP: 105/70   Pulse: 87   Resp: 20   Temp:    SpO2: 93%     General: NAD  Head: NCAT  Neck: supple, no LAD, no thyromegaly  Cardiac: RRR, normal S1/S2, no m/r/g  Pulm: CTAB   Abdo: nondistended, nontender, no masses or organomegaly   Skin: no lesions  Ext: no swelling, PT pulses 1+   Neuro: CN II-XII grossly intact, resting tremor in b/l upper extremities that worsens with intention, finger to nose normal, AxOx3, sensation normal, strength 5/5 throughout    DATA:    Labs:  CBC:   Recent Labs     08/22/21  1441   WBC 9.1   HGB 11.9*   HCT 34.3*   *       BMP:   Recent Labs     08/22/21  1441   *   K 2.8*   CL 88*   CO2 23   BUN 7   CREATININE 0.9   GLUCOSE 110*     LFT's:   Recent Labs     08/22/21  1441   *   ALT 44*   BILITOT 2.7*   ALKPHOS 275*     Troponin:   Recent Labs     08/22/21  1441   TROPONINI <0.01     BNP:No results for input(s): BNP in the last 72 hours. ABGs: No results for input(s): PHART, CID6AJW, PO2ART in the last 72 hours. INR: No results for input(s): INR in the last 72 hours.     U/A:No results for input(s): NITRITE, COLORU, PHUR, LABCAST, WBCUA, RBCUA, MUCUS, TRICHOMONAS, YEAST, BACTERIA, CLARITYU, SPECGRAV, LEUKOCYTESUR, UROBILINOGEN, BILIRUBINUR, BLOODU, GLUCOSEU, AMORPHOUS in the last 72 hours. Invalid input(s): KETONESU    CT Head WO Contrast   Final Result      No acute intracranial abnormality or mass effect. CT ABDOMEN PELVIS W IV CONTRAST Additional Contrast? None   Final Result         1. Diffuse heterogenous liver suggestive of chronic hepatocellular disease. Recanalized paramedical brain suggestive of early cirrhosis. Recommended correlation with LFT and follow-up as indicated. 2. 5 mm mm hypoattenuating lesion in the spleen, specific. 3. Small perihepatic free fluid. 4. Mild colonic diverticulosis. ASSESSMENT AND PLAN:  Symptoms are likely due to alcohol abuse. Electrolyte abnormalities, PTINR, thrombocytopenia, increased MCV and chronically abnormal LFTs support this reasoning. Nausea, vomiting, diarrhea and weakness  This is likely due to patient's history of chronic alcohol abuse. LFT's consistent with chronic alcohol abuse and notable for synthetic liver dysfunction. Patient presented with multiple electrolyte abnormalities which would explain weakness. Expect to get better with elec repletion. - replete elecs as needed   - zofran for nausea   - cdiff, GI panel, fecal fat, giardia antigen, O and P, stool rotavirus       Hyponatremia, hypochloremia, hypomagnesemia and hypokalemia   Likely due to poor PO, vomiting and diarrhea   - fluid restrict, expect hyponatremia to resolve in morning  - daily renal panel   - urine sodium, urine osmolality, serum osmolality   - sodium Q6H     Alcohol abuse  - CIWA protocol with Ativan   - daily thiamine   - elec replacement PRN   - etoh level and UDS     Thrombocytopenia   Likely due to liver dysfunction  - monitor with daily CBC     Tremor  Likely benign tremor but will monitory.  If worsens could be signs of alcohol withdrawal.

## 2021-08-22 NOTE — ED TRIAGE NOTES
Patient reports to ED with chest pain, tremors, dizziness, recent falls x2. Patients sister at bedside, reports patient fell down the steps a couple weeks ago d/t the tremors/dizziness and fell last night. Stated with chest pain yesterday. Also has had diarrhea, nausea, for a few weeks now.

## 2021-08-22 NOTE — ED PROVIDER NOTES
ED Attending Attestation Note     Date of evaluation: 8/22/2021    This patient was seen by the resident. I have seen and examined the patient, agree with the workup, evaluation, management and diagnosis. The care plan has been discussed. I have reviewed the ECG and concur with the resident's interpretation. My assessment reveals-year-old male presenting with a chief complaint of 3 days of substernal chest pain as well as intermittent dizziness which is present for the past week. Dates that he feels a lightheaded type sensation which is associated with intermittent falls. History of coronary artery disease. Does not think he hit his head when he fell. He denies any headache at this point. Labs and CT scan pending at this time, likely admission.      Dori Larsen MD  08/22/21 [id number]

## 2021-08-22 NOTE — ED PROVIDER NOTES
[address] [facility] RESIDENT NOTE       Date of evaluation: 8/22/2021    Chief Complaint     Chest Pain    History of Present Illness     Rand Valadez is a 59 y.o. male with a history of EtOH abuse, CAD, mural thrombus, DVT, CHF, HTN who presents with several complaints. His sister presents with him and provides some of the history, pt lives with her. Patient has been having nausea, dry heaving, and diarrhea for the past 2 weeks. He has an essential tremor, which his sister thinks has worsened over the past several weeks as well. He has been very unsteady on his feet, and has had 2 falls within the past 2 days. 2 days ago, he fell down about 11 stairs. He did not hit his head or lose consciousness. Last night, he lost his balance and fell while holding onto a wall. He also complains of epigastric pain, which he feels is secondary to all the dry heaving. Pt minimizes his drinking but his sister states that she thinks he drinks every day. Pt states he drinks 1-2 times per week, a few beers at a time. Review of Systems     Review of Systems  Complete ROS reviewed and negative except as noted in HPI     Past Medical, Surgical, Family, and Social History     He has a past medical history of Alcohol abuse, CAD (coronary artery disease), CHF (congestive heart failure) ([address] Essential tremor, HTN (hypertension), Lower extremity cellulitis, and MI (mitral incompetence). He has a past surgical history that includes Percutaneous Transluminal Coronary Angio and Upper gastrointestinal endoscopy (N/A, 7/28/2020). His family history includes Heart Disease in his father; Stroke in his mother. He reports that he has quit smoking. His smoking use included cigarettes. He has a 20.00 pack-year smoking history. He has never used smokeless tobacco. He reports current alcohol use of about 2.0 standard drinks of alcohol per week.  He reports that he does not use drugs.    Medications     Previous Medications    ATORVASTATIN (LIPITOR) 40 MG TABLET    TAKE 1 TABLET BY MOUTH EVERY NIGHT    CARVEDILOL (COREG) 3.125 MG TABLET    TAKE 1 TABLET BY MOUTH TWICE DAILY    CLOPIDOGREL (PLAVIX) 75 MG TABLET    TAKE 1 TABLET BY MOUTH DAILY    ELIQUIS 5 MG TABS TABLET    TAKE 1 TABLET BY MOUTH TWICE DAILY    FOLIC ACID (FOLVITE) 1 MG TABLET    Take 1 tablet by mouth daily    LISINOPRIL (PRINIVIL;ZESTRIL) 2.5 MG TABLET    TAKE 1 TABLET BY MOUTH EVERY NIGHT    MAGNESIUM 200 MG TABS TABLET    Take 200 mg by mouth daily    MINERAL OIL-HYDROPHILIC PETROLATUM (AQUAPHOR) OINTMENT    Apply 85 g topically as needed for Dry Skin Apply topically to both legs    PANTOPRAZOLE (PROTONIX) 40 MG TABLET    TAKE 1 TABLET BY MOUTH EVERY MORNING BEFORE BREAKFAST    SKIN PROTECTANTS, MISC. (HYDROCERIN) CREA CREAM    Apply topically 2 times daily    THIAMINE MONONITRATE (B1) 100 MG TABS    TAKE 1 TABLET BY MOUTH DAILY    VITAMIN B-12 (CYANOCOBALAMIN) 100 MCG TABLET    Take 1 tablet by mouth nightly    WHITE PETROLATUM EX    Apply 61 % topically daily       Allergies     He has No Known Allergies. Physical Exam     INITIAL VITALS: BP: 115/78, Temp: 98.7 °F (37.1 °C), Pulse: 86, Resp: 16, SpO2: 97 %   Physical Exam  Constitutional:       General: He is not in acute distress. Appearance: He is well-developed. HENT:      Head: Normocephalic and atraumatic. Mouth/Throat:      Pharynx: No oropharyngeal exudate. Eyes:      Conjunctiva/sclera: Conjunctivae normal.      Pupils: Pupils are equal, round, and reactive to light. Cardiovascular:      Rate and Rhythm: Normal rate and regular rhythm. Heart sounds: No murmur heard. No friction rub. No gallop. Pulmonary:      Effort: Pulmonary effort is normal.      Breath sounds: No wheezing or rales. Abdominal:      General: There is no distension. Palpations: Abdomen is soft. Tenderness: There is no abdominal tenderness. Musculoskeletal:      Cervical back: Neck supple. Skin:     General: Skin is warm and dry. Comments: Scattered ecchymosis. Abrasion to R forearm. Neurological:      General: No focal deficit present. Mental Status: He is alert and oriented to person, place, and time. Cranial Nerves: No cranial nerve deficit. Comments: Finger to nose intact. Resting tremor worsens with intention. Full strength upper and lower extremities. No CN deficit. DiagnosticResults     RADIOLOGY:  CT Head WO Contrast   Final Result      No acute intracranial abnormality or mass effect. CT ABDOMEN PELVIS W IV CONTRAST Additional Contrast? None   Final Result         1. Diffuse heterogenous liver suggestive of chronic hepatocellular disease. Recanalized paramedical brain suggestive of early cirrhosis. Recommended correlation with LFT and follow-up as indicated. 2. 5 mm mm hypoattenuating lesion in the spleen, specific. 3. Small perihepatic free fluid. 4. Mild colonic diverticulosis.           LABS:   Results for orders placed or performed during the hospital encounter of 08/22/21   CBC Auto Differential   Result Value Ref Range    WBC 9.1 4.0 - 11.0 K/uL    RBC 3.31 (L) 4.20 - 5.90 M/uL    Hemoglobin 11.9 (L) 13.5 - 17.5 g/dL    Hematocrit 34.3 (L) 40.5 - 52.5 %    .8 (H) 80.0 - 100.0 fL    MCH 36.0 (H) 26.0 - 34.0 pg    MCHC 34.6 31.0 - 36.0 g/dL    RDW 15.3 12.4 - 15.4 %    Platelets 345 (L) 676 - 450 K/uL    MPV 9.1 5.0 - 10.5 fL    Neutrophils % 74.1 %    Lymphocytes % 17.6 %    Monocytes % 7.6 %    Eosinophils % 0.2 %    Basophils % 0.5 %    Neutrophils Absolute 6.8 1.7 - 7.7 K/uL    Lymphocytes Absolute 1.6 1.0 - 5.1 K/uL    Monocytes Absolute 0.7 0.0 - 1.3 K/uL    Eosinophils Absolute 0.0 0.0 - 0.6 K/uL    Basophils Absolute 0.0 0.0 - 0.2 K/uL   Basic Metabolic Panel w/ Reflex to MG   Result Value Ref Range    Sodium 126 (L) 136 - 145 mmol/L    Potassium reflex Magnesium 2.8 (LL) 3.5 - 5.1 mmol/L    Chloride 88 (L) 99 - 110 mmol/L    CO2 23 21 - 32 mmol/L    Anion Gap 15 3 - 16    Glucose 110 (H) 70 - 99 mg/dL    BUN 7 7 - 20 mg/dL    CREATININE 0.9 0.8 - 1.3 mg/dL    GFR Non-African American >60 >60    GFR African American >60 >60    Calcium 7.8 (L) 8.3 - 10.6 mg/dL   Hepatic Function Panel   Result Value Ref Range    Total Protein 6.8 6.4 - 8.2 g/dL    Albumin 2.8 (L) 3.4 - 5.0 g/dL    Alkaline Phosphatase 275 (H) 40 - 129 U/L    ALT 44 (H) 10 - 40 U/L     (H) 15 - 37 U/L    Total Bilirubin 2.7 (H) 0.0 - 1.0 mg/dL    Bilirubin, Direct 1.0 (H) 0.0 - 0.3 mg/dL    Bilirubin, Indirect 1.7 (H) 0.0 - 1.0 mg/dL   Lipase   Result Value Ref Range    Lipase 32.0 13.0 - 60.0 U/L   Troponin   Result Value Ref Range    Troponin <0.01 <0.01 ng/mL   Brain Natriuretic Peptide   Result Value Ref Range    Pro- (H) 0 - 124 pg/mL   Magnesium   Result Value Ref Range    Magnesium 0.90 (LL) 1.80 - 2.40 mg/dL     RECENT VITALS:  BP: 105/70, Temp: 98.7 °F (37.1 °C), Pulse: 87,Resp: 20, SpO2: 93 %     Procedures     None    ED Course     Nursing Notes, Past Medical Hx, Past Surgical Hx, Social Hx, Allergies, and Family Hx were reviewed. The patient was given the followingmedications:  Orders Placed This Encounter   Medications    ondansetron (ZOFRAN) injection 8 mg    magnesium sulfate 6,000 mg in dextrose 5 % 100 mL IVPB    potassium chloride 10 mEq/100 mL IVPB (Peripheral Line)    DISCONTD: sodium chloride 0.9 % 8,390 mL with folic acid 1 mg, adult multi-vitamin with vitamin k 10 mL, thiamine 100 mg    thiamine mononitrate tablet 100 mg    therapeutic multivitamin-minerals 1 tablet    folic acid (FOLVITE) tablet 1 mg    iopamidol (ISOVUE-370) 76 % injection 80 mL     CONSULTS:  IP CONSULT TO HOSPITALIST    MEDICAL DECISION MAKING / ASSESSMENT / Dana Venice is a 59 y.o. male presenting with multiple falls, abdominal pain, nausea, vomiting.  On exam has a resting tremor, no focal deficits although he does reportedly have unsteadiness on his feet and was unable to ambulate for me. Ddx includes gastroenteritis, dehydration, electrolyte derangement, pancreatitis, hepatitis, intracranial hemorrhage. Will perform labs and CT head, CT abd/pelvis. CT head normal. CT abd/pelvis showing signs of cirrhosis. LFTs elevated including bilirubin. Severe hypokalemia, hypomagnesemia, mild hyponatremia. Discussed with medicine who will admit. Given potassium and magnesium and rally pack. This patient was also evaluated by the attending physician. All care plans werediscussed and agreed upon. Clinical Impression     1. Abdominal pain, epigastric        Disposition     PATIENT REFERRED TO:  No follow-up provider specified.     DISCHARGE MEDICATIONS:  New Prescriptions    No medications on file       Garry Harry MD  Resident  08/22/21 [id number]

## 2021-08-22 NOTE — LETTER
[address] [facility]  [address]  Phone: [phone]    No name on file. August 23, 2021     Patient: Pedro Wyatt   YOB: 1960   Date of Visit: 8/22/2021       To Whom It May Concern: It is my medical opinion that Alicia Frias should remain out of work until 8/30. If you have any questions or concerns, please don't hesitate to call.     Sincerely,

## 2021-08-23 VITALS
RESPIRATION RATE: 16 BRPM | OXYGEN SATURATION: 97 % | HEIGHT: 69 IN | BODY MASS INDEX: 25.92 KG/M2 | HEART RATE: 79 BPM | DIASTOLIC BLOOD PRESSURE: 52 MMHG | SYSTOLIC BLOOD PRESSURE: 91 MMHG | TEMPERATURE: 98.2 F | WEIGHT: 175 LBS

## 2021-08-23 LAB
AMPHETAMINE SCREEN, URINE: NORMAL
ANION GAP SERPL CALCULATED.3IONS-SCNC: 13 MMOL/L (ref 3–16)
BARBITURATE SCREEN URINE: NORMAL
BASOPHILS ABSOLUTE: 0 K/UL (ref 0–0.2)
BASOPHILS RELATIVE PERCENT: 0 %
BENZODIAZEPINE SCREEN, URINE: NORMAL
BUN BLDV-MCNC: 9 MG/DL (ref 7–20)
CALCIUM SERPL-MCNC: 7.4 MG/DL (ref 8.3–10.6)
CANNABINOID SCREEN URINE: NORMAL
CHLORIDE BLD-SCNC: 95 MMOL/L (ref 99–110)
CO2: 22 MMOL/L (ref 21–32)
COCAINE METABOLITE SCREEN URINE: NORMAL
CREAT SERPL-MCNC: 0.6 MG/DL (ref 0.8–1.3)
EKG ATRIAL RATE: 86 BPM
EKG DIAGNOSIS: NORMAL
EKG P AXIS: 67 DEGREES
EKG P-R INTERVAL: 158 MS
EKG Q-T INTERVAL: 442 MS
EKG QRS DURATION: 88 MS
EKG QTC CALCULATION (BAZETT): 528 MS
EKG R AXIS: -32 DEGREES
EKG T AXIS: 100 DEGREES
EKG VENTRICULAR RATE: 86 BPM
EOSINOPHILS ABSOLUTE: 0 K/UL (ref 0–0.6)
EOSINOPHILS RELATIVE PERCENT: 0 %
GFR AFRICAN AMERICAN: >60
GFR NON-AFRICAN AMERICAN: >60
GLUCOSE BLD-MCNC: 78 MG/DL (ref 70–99)
HAV IGM SER IA-ACNC: NORMAL
HCT VFR BLD CALC: 30.3 % (ref 40.5–52.5)
HEMOGLOBIN: 10.7 G/DL (ref 13.5–17.5)
HEPATITIS B CORE IGM ANTIBODY: NORMAL
HEPATITIS B SURFACE ANTIGEN INTERPRETATION: NORMAL
HEPATITIS C ANTIBODY INTERPRETATION: NORMAL
HEPATITIS C ANTIBODY INTERPRETATION: NORMAL
LYMPHOCYTES ABSOLUTE: 1.3 K/UL (ref 1–5.1)
LYMPHOCYTES RELATIVE PERCENT: 20 %
Lab: NORMAL
MCH RBC QN AUTO: 36.9 PG (ref 26–34)
MCHC RBC AUTO-ENTMCNC: 35.2 G/DL (ref 31–36)
MCV RBC AUTO: 104.7 FL (ref 80–100)
METHADONE SCREEN, URINE: NORMAL
MONOCYTES ABSOLUTE: 0.3 K/UL (ref 0–1.3)
MONOCYTES RELATIVE PERCENT: 5 %
NEUTROPHILS ABSOLUTE: 4.7 K/UL (ref 1.7–7.7)
NEUTROPHILS RELATIVE PERCENT: 75 %
OPIATE SCREEN URINE: NORMAL
OSMOLALITY URINE: 548 MOSM/KG (ref 390–1070)
OXYCODONE URINE: NORMAL
PDW BLD-RTO: 15.2 % (ref 12.4–15.4)
PH UA: 5
PHENCYCLIDINE SCREEN URINE: NORMAL
PLATELET # BLD: 76 K/UL (ref 135–450)
PLATELET SLIDE REVIEW: ABNORMAL
PMV BLD AUTO: 9.3 FL (ref 5–10.5)
POTASSIUM REFLEX MAGNESIUM: 3.7 MMOL/L (ref 3.5–5.1)
PROPOXYPHENE SCREEN: NORMAL
RBC # BLD: 2.89 M/UL (ref 4.2–5.9)
SODIUM BLD-SCNC: 129 MMOL/L (ref 136–145)
SODIUM BLD-SCNC: 130 MMOL/L (ref 136–145)
SODIUM URINE: 21 MMOL/L
WBC # BLD: 6.3 K/UL (ref 4–11)

## 2021-08-23 PROCEDURE — 84295 ASSAY OF SERUM SODIUM: CPT

## 2021-08-23 PROCEDURE — 80307 DRUG TEST PRSMV CHEM ANLYZR: CPT

## 2021-08-23 PROCEDURE — 2580000003 HC RX 258: Performed by: STUDENT IN AN ORGANIZED HEALTH CARE EDUCATION/TRAINING PROGRAM

## 2021-08-23 PROCEDURE — 85025 COMPLETE CBC W/AUTO DIFF WBC: CPT

## 2021-08-23 PROCEDURE — 36415 COLL VENOUS BLD VENIPUNCTURE: CPT

## 2021-08-23 PROCEDURE — 80074 ACUTE HEPATITIS PANEL: CPT

## 2021-08-23 PROCEDURE — 6370000000 HC RX 637 (ALT 250 FOR IP): Performed by: STUDENT IN AN ORGANIZED HEALTH CARE EDUCATION/TRAINING PROGRAM

## 2021-08-23 PROCEDURE — 80048 BASIC METABOLIC PNL TOTAL CA: CPT

## 2021-08-23 PROCEDURE — 6360000002 HC RX W HCPCS: Performed by: STUDENT IN AN ORGANIZED HEALTH CARE EDUCATION/TRAINING PROGRAM

## 2021-08-23 PROCEDURE — 83935 ASSAY OF URINE OSMOLALITY: CPT

## 2021-08-23 PROCEDURE — 84300 ASSAY OF URINE SODIUM: CPT

## 2021-08-23 RX ADMIN — POTASSIUM CHLORIDE 10 MEQ: 7.45 INJECTION INTRAVENOUS at 00:41

## 2021-08-23 RX ADMIN — APIXABAN 5 MG: 5 TABLET, FILM COATED ORAL at 10:00

## 2021-08-23 RX ADMIN — CLOPIDOGREL BISULFATE 75 MG: 75 TABLET ORAL at 10:00

## 2021-08-23 RX ADMIN — THERA TABS 1 TABLET: TAB at 10:00

## 2021-08-23 RX ADMIN — CARVEDILOL 3.12 MG: 3.12 TABLET, FILM COATED ORAL at 10:02

## 2021-08-23 RX ADMIN — Medication 10 ML: at 10:03

## 2021-08-23 RX ADMIN — POTASSIUM CHLORIDE 10 MEQ: 7.45 INJECTION INTRAVENOUS at 03:10

## 2021-08-23 RX ADMIN — Medication 100 MG: at 10:00

## 2021-08-23 RX ADMIN — ATORVASTATIN CALCIUM 40 MG: 40 TABLET, FILM COATED ORAL at 10:00

## 2021-08-23 RX ADMIN — LISINOPRIL 2.5 MG: 2.5 TABLET ORAL at 10:00

## 2021-08-23 NOTE — PLAN OF CARE
Problem: Falls - Risk of:  Goal: Will remain free from falls  Description: Will remain free from falls  Outcome: Ongoing  Note: Non-skid socks on. Call light within reach.

## 2021-08-23 NOTE — DISCHARGE SUMMARY
INTERNAL MEDICINE DEPARTMENT AT [address] [place] [address]  DISCHARGE SUMMARY    Patient ID: Javid Main                                             Discharge Date: 8/23/2021   Patient's PCP: Que Quiñones MD                                          Discharge Physician: Kevon Cummings DO   Admit Date: 8/22/2021   Admitting Physician: Tae Cardoso MD    PROBLEMS DURING HOSPITALIZATION:  Present on Admission:   Electrolyte abnormality      DISCHARGE DIAGNOSES:    HPI: Mr. Marlin Garza is a 58 yo male with PMH Etoh abuse, CAD s/p stent 5/2020, CHF, HTN, DVT 5/2020 p/w N/V/diarrhea and falls. Patient states that for the last two weeks he has been increasingly unbalanced and has fallen several times. He says he has not hit his head however. He states that he feels wobbly and is having a hard time getting around. His daughter states that he has always had a resting tremor but now it seems like it has gotten worse. The patient also complains of nausea and dry heaves. This has been causing him epigastric abdominal pain as well. His vomitus has not been bloody. He also complains of nonbloody diarrhea.      Patient afebrile and HDS in ED. Labs significant for Na 126, K 2.8, Cl 88, Mg 0.90, ProBNP 364, alk phos 275. ALT 44, , direct bili 1.0, indirect 1.7, albumin 2.8, PT/INR 24/2. 13. CT head without incranial abnormality and CTAP with diffuse heterogeneous liver suggestive of chronic hepatocellular disease. The following issues were addressed during hospitalization:    Nausea, vomiting, diarrhea and weakness  This is likely due to patient's history of chronic alcohol abuse. LFT's consistent with chronic alcohol abuse and notable for synthetic liver dysfunction. Patient presented with multiple electrolyte abnormalities which would explain weakness.   - zofran for nausea   - cdiff, GI panel, fecal fat, giardia antigen, O and P, stool rotavirus      Multiple electrolyte abnormalities  Likely due to poor PO, vomiting and diarrhea   - fluid restrict, hyponatremia resolved with fluid restriction  - electrolytes replaced as needed     Alcohol abuse  - [facility] protocol with Ativan   - daily thiamine      Thrombocytopenia   Likely due to liver dysfunction     HTN  - continue lisinopril 2.5 mg      CHF  Echo 1/21 EF 50-55%  - continue coreg 3.125 mg BID      History of mural thrombus secondary to anterior apical MI  - continue eliquis      CAD s/p LAD stent 5/2020  - continue plavix and statin     Patient hemodynamically stable. Electrolyte abnormalities resolved. Patient will be discharged today in stable condition. Follow up with primary care physician in 1 week.       Physical Exam:  BP (!) 91/52   Pulse 79   Temp 98.2 °F (36.8 °C) (Oral)   Resp 16   Ht 5' 9\" (1.753 m)   Wt 175 lb (79.4 kg)   SpO2 97%   BMI 25.84 kg/m²   General: NAD  Head: NCAT  Neck: supple, no LAD, no thyromegaly  Cardiac: RRR, normal S1/S2, no m/r/g  Pulm: CTAB   Abdo: nondistended, nontender, no masses or organomegaly   Skin: no lesions  Ext: no swelling  Neuro: CN II-XII grossly intact, resting tremor in b/l upper extremities that worsens with intention, finger to nose normal, AxOx3, sensation normal, strength 5/5 throughout    Consults: none  Significant Diagnostic Studies:  CT scan head, abdomen pelvis  Treatments: electrolyte replacement  Disposition: home  Discharged Condition: Stable  Follow Up: Primary Care Physician in one week    DISCHARGE MEDICATION:     Medication List      CONTINUE taking these medications    atorvastatin 40 MG tablet  Commonly known as: LIPITOR  TAKE 1 TABLET BY MOUTH EVERY NIGHT     B1 100 MG Tabs  TAKE 1 TABLET BY MOUTH DAILY     carvedilol 3.125 MG tablet  Commonly known as: COREG  TAKE 1 TABLET BY MOUTH TWICE DAILY     clopidogrel 75 MG tablet  Commonly known as: PLAVIX  TAKE 1 TABLET BY MOUTH DAILY     Eliquis 5 MG Tabs tablet  Generic drug: apixaban  TAKE 1 TABLET BY MOUTH TWICE DAILY     folic acid 1 MG tablet  Commonly known as: FOLVITE  Take 1 tablet by mouth daily     hydrocortisone 1 % cream     lisinopril 2.5 MG tablet  Commonly known as: PRINIVIL;ZESTRIL  TAKE 1 TABLET BY MOUTH EVERY NIGHT     magnesium 200 MG Tabs tablet     vitamin B-12 100 MCG tablet  Commonly known as: CYANOCOBALAMIN  Take 1 tablet by mouth nightly          Activity: activity as tolerated  Diet: low sodium  Wound Care: none    Time Spent on discharge is more than 45 minutes    Signed:  Kevon Cummings DO  8/23/2021

## 2021-08-23 NOTE — PROGRESS NOTES
Discharge Progress Note  8/23/2021 3:00 PM    Data:  Discharge order received. Action:  IV D/C'd without difficulty. See Doc Flowsheets for assessment. Patient given discharge instructions with prescriptions. Response:  Patient verbalized understanding of discharge instructions.  [facility]  ________________________________________________________________________

## 2021-08-23 NOTE — PROGRESS NOTES
4 Eyes Admission Assessment     I agree as the admission nurse that 2 RN's have performed a thorough Head to Toe Skin Assessment on the patient. ALL assessment sites listed below have been assessed on admission. Areas assessed by both nurses:   [x]   Head, Face, and Ears   [x]   Shoulders, Back, and Chest  [x]   Arms, Elbows, and Hands   [x]   Coccyx, Sacrum, and Ischium  [x]   Legs, Feet, and Heels        Does the Patient have Skin Breakdown? Scattered bruising, arms. Healing abrasion right forearm. Dry rash on upper back.  Dry and flaky skin all over          Mitch Prevention initiated:yes  Wound Care Orders initiated:  no      WOC nurse consulted for Pressure Injury (Stage 3,4, Unstageable, DTI, NWPT, and Complex wounds) or Mitch score 18 or lower:    Nurse 1 eSignature: Electronically signed by Chidi Fernandez RN on 8/22/21 at 11:43 PM EDT      **SHARE this note so that the co-signing nurse is able to place an eSignature**    Nurse 2 eSignature: Electronically signed by Lavinia Marques RN on 8/23/21 at 12:49 AM EDT

## 2021-08-23 NOTE — PROGRESS NOTES
Clinical Pharmacy Progress Note  Medication History     Admit Date: 8/22/21    List of of current medications patient is taking is complete. Home Medication list in EPIC updated to reflect changes noted below. Source of information: Patient's sister Angeles : [phone])    Changes made to medication list:   Medications removed (no longer taking):  Pantoprazole  Aquaphor    Other notes: They are unaware if patient is supposed to be on Primidone (last fill 7/2/21). Patient's sister reports that he has stopped taking it, but said that she would verify with cardiologist.     Complete Home Medication List:  Medication Sig    hydrocortisone 1 % cream Apply topically 2 times daily    Thiamine Mononitrate (B1) 100 MG TABS TAKE 1 TABLET BY MOUTH DAILY    ELIQUIS 5 MG TABS tablet TAKE 1 TABLET BY MOUTH TWICE DAILY    atorvastatin (LIPITOR) 40 MG tablet TAKE 1 TABLET BY MOUTH EVERY NIGHT    clopidogrel (PLAVIX) 75 MG tablet TAKE 1 TABLET BY MOUTH DAILY    lisinopril (PRINIVIL;ZESTRIL) 2.5 MG tablet TAKE 1 TABLET BY MOUTH EVERY NIGHT    magnesium 200 MG TABS tablet Take 200 mg by mouth daily    carvedilol (COREG) 3.125 MG tablet TAKE 1 TABLET BY MOUTH TWICE DAILY    folic acid (FOLVITE) 1 MG tablet Take 1 tablet by mouth daily    vitamin B-12 (CYANOCOBALAMIN) 100 MCG tablet Take 1 tablet by mouth nightly     Please call with questions!     [address] [facility] [address]  8/22/2021 7:55 PM

## 2021-08-26 RX ORDER — ATORVASTATIN CALCIUM 40 MG/1
TABLET, FILM COATED ORAL
Qty: 30 TABLET | Refills: 3 | Status: SHIPPED | OUTPATIENT
Start: 2021-08-26 | End: 2022-08-01 | Stop reason: SDUPTHER

## 2021-08-26 RX ORDER — LISINOPRIL 2.5 MG/1
TABLET ORAL
Qty: 30 TABLET | Refills: 3 | Status: SHIPPED | OUTPATIENT
Start: 2021-08-26 | End: 2022-08-01

## 2021-08-26 NOTE — TELEPHONE ENCOUNTER
Requested Prescriptions     Pending Prescriptions Disp Refills    lisinopril (PRINIVIL;ZESTRIL) 2.5 MG tablet [Pharmacy Med Name: LISINOPRIL 2.5MG TABLETS] 30 tablet 3     Sig: TAKE 1 TABLET BY MOUTH EVERY NIGHT    atorvastatin (LIPITOR) 40 MG tablet [Pharmacy Med Name: ATORVASTATIN 40MG TABLETS] 30 tablet 3     Sig: TAKE 1 TABLET BY MOUTH EVERY NIGHT          Number: 30    Refills: 5    Last Office Visit: 4/15/2021     Next Office Visit: 10/21/2021     Last Labs: 08/22/21

## 2021-09-03 ENCOUNTER — APPOINTMENT (OUTPATIENT)
Dept: GENERAL RADIOLOGY | Age: 61
DRG: 930 | End: 2021-09-03
Payer: MEDICAID

## 2021-09-03 ENCOUNTER — APPOINTMENT (OUTPATIENT)
Dept: CT IMAGING | Age: 61
DRG: 930 | End: 2021-09-03
Payer: MEDICAID

## 2021-09-03 ENCOUNTER — HOSPITAL ENCOUNTER (INPATIENT)
Age: 61
LOS: 6 days | Discharge: NURSING, REHAB OR LONG-TERM CARE | DRG: 930 | End: 2021-09-10
Attending: STUDENT IN AN ORGANIZED HEALTH CARE EDUCATION/TRAINING PROGRAM | Admitting: INTERNAL MEDICINE
Payer: MEDICAID

## 2021-09-03 DIAGNOSIS — I61.9 INTRAPARENCHYMAL HEMORRHAGE OF BRAIN (HCC): Primary | ICD-10-CM

## 2021-09-03 LAB
A/G RATIO: 0.7 (ref 1.1–2.2)
ALBUMIN SERPL-MCNC: 2.9 G/DL (ref 3.4–5)
ALP BLD-CCNC: 240 U/L (ref 40–129)
ALT SERPL-CCNC: 54 U/L (ref 10–40)
ANION GAP SERPL CALCULATED.3IONS-SCNC: 14 MMOL/L (ref 3–16)
AST SERPL-CCNC: 225 U/L (ref 15–37)
BASE EXCESS VENOUS: 2.9 MMOL/L (ref -2–3)
BASOPHILS ABSOLUTE: 0 K/UL (ref 0–0.2)
BASOPHILS RELATIVE PERCENT: 0.2 %
BILIRUB SERPL-MCNC: 1.2 MG/DL (ref 0–1)
BUN BLDV-MCNC: 5 MG/DL (ref 7–20)
CALCIUM SERPL-MCNC: 8.1 MG/DL (ref 8.3–10.6)
CARBOXYHEMOGLOBIN: 1.4 % (ref 0–1.5)
CHLORIDE BLD-SCNC: 90 MMOL/L (ref 99–110)
CO2: 24 MMOL/L (ref 21–32)
CREAT SERPL-MCNC: 0.7 MG/DL (ref 0.8–1.3)
EOSINOPHILS ABSOLUTE: 0 K/UL (ref 0–0.6)
EOSINOPHILS RELATIVE PERCENT: 0.1 %
GFR AFRICAN AMERICAN: >60
GFR NON-AFRICAN AMERICAN: >60
GLOBULIN: 4.1 G/DL
GLUCOSE BLD-MCNC: 138 MG/DL (ref 70–99)
HCO3 VENOUS: 27.4 MMOL/L (ref 24–28)
HCT VFR BLD CALC: 38.1 % (ref 40.5–52.5)
HEMOGLOBIN, VEN, REDUCED: 49.1 %
HEMOGLOBIN: 13.2 G/DL (ref 13.5–17.5)
INR BLD: 1.84 (ref 0.88–1.12)
LACTIC ACID: 5.4 MMOL/L (ref 0.4–2)
LIPASE: 30 U/L (ref 13–60)
LYMPHOCYTES ABSOLUTE: 2 K/UL (ref 1–5.1)
LYMPHOCYTES RELATIVE PERCENT: 16.6 %
MAGNESIUM: 1.6 MG/DL (ref 1.8–2.4)
MCH RBC QN AUTO: 36.3 PG (ref 26–34)
MCHC RBC AUTO-ENTMCNC: 34.6 G/DL (ref 31–36)
MCV RBC AUTO: 104.8 FL (ref 80–100)
METHEMOGLOBIN VENOUS: 0.4 % (ref 0–1.5)
MONOCYTES ABSOLUTE: 0.8 K/UL (ref 0–1.3)
MONOCYTES RELATIVE PERCENT: 6.5 %
NEUTROPHILS ABSOLUTE: 9.1 K/UL (ref 1.7–7.7)
NEUTROPHILS RELATIVE PERCENT: 76.6 %
O2 SAT, VEN: 50 %
PCO2, VEN: 40.8 MMHG (ref 41–51)
PDW BLD-RTO: 14.6 % (ref 12.4–15.4)
PH VENOUS: 7.44 (ref 7.35–7.45)
PLATELET # BLD: 218 K/UL (ref 135–450)
PMV BLD AUTO: 8.1 FL (ref 5–10.5)
PO2, VEN: <30 MMHG (ref 25–40)
POTASSIUM REFLEX MAGNESIUM: 3.5 MMOL/L (ref 3.5–5.1)
PRO-BNP: 509 PG/ML (ref 0–124)
PROTHROMBIN TIME: 21.3 SEC (ref 9.9–12.7)
RBC # BLD: 3.64 M/UL (ref 4.2–5.9)
SODIUM BLD-SCNC: 128 MMOL/L (ref 136–145)
TCO2 CALC VENOUS: 29 MMOL/L
TOTAL PROTEIN: 7 G/DL (ref 6.4–8.2)
TROPONIN: <0.01 NG/ML
WBC # BLD: 11.8 K/UL (ref 4–11)

## 2021-09-03 PROCEDURE — 99283 EMERGENCY DEPT VISIT LOW MDM: CPT

## 2021-09-03 PROCEDURE — 71046 X-RAY EXAM CHEST 2 VIEWS: CPT

## 2021-09-03 PROCEDURE — 83690 ASSAY OF LIPASE: CPT

## 2021-09-03 PROCEDURE — 80053 COMPREHEN METABOLIC PANEL: CPT

## 2021-09-03 PROCEDURE — 84484 ASSAY OF TROPONIN QUANT: CPT

## 2021-09-03 PROCEDURE — 93005 ELECTROCARDIOGRAM TRACING: CPT | Performed by: STUDENT IN AN ORGANIZED HEALTH CARE EDUCATION/TRAINING PROGRAM

## 2021-09-03 PROCEDURE — 85025 COMPLETE CBC W/AUTO DIFF WBC: CPT

## 2021-09-03 PROCEDURE — 70450 CT HEAD/BRAIN W/O DYE: CPT

## 2021-09-03 PROCEDURE — 83880 ASSAY OF NATRIURETIC PEPTIDE: CPT

## 2021-09-03 PROCEDURE — 83735 ASSAY OF MAGNESIUM: CPT

## 2021-09-03 PROCEDURE — 83605 ASSAY OF LACTIC ACID: CPT

## 2021-09-03 PROCEDURE — 82803 BLOOD GASES ANY COMBINATION: CPT

## 2021-09-03 PROCEDURE — 85610 PROTHROMBIN TIME: CPT

## 2021-09-04 ENCOUNTER — APPOINTMENT (OUTPATIENT)
Dept: CT IMAGING | Age: 61
DRG: 930 | End: 2021-09-04
Payer: MEDICAID

## 2021-09-04 ENCOUNTER — APPOINTMENT (OUTPATIENT)
Dept: GENERAL RADIOLOGY | Age: 61
DRG: 930 | End: 2021-09-04
Payer: MEDICAID

## 2021-09-04 PROBLEM — I62.9 INTRACRANIAL HEMORRHAGE (HCC): Status: ACTIVE | Noted: 2021-09-04

## 2021-09-04 LAB
ANION GAP SERPL CALCULATED.3IONS-SCNC: 12 MMOL/L (ref 3–16)
BACTERIA: ABNORMAL /HPF
BILIRUBIN URINE: ABNORMAL
BLOOD, URINE: NEGATIVE
BUN BLDV-MCNC: 6 MG/DL (ref 7–20)
CALCIUM SERPL-MCNC: 7.8 MG/DL (ref 8.3–10.6)
CHLORIDE BLD-SCNC: 91 MMOL/L (ref 99–110)
CLARITY: CLEAR
CO2: 25 MMOL/L (ref 21–32)
COLOR: YELLOW
CREAT SERPL-MCNC: 0.6 MG/DL (ref 0.8–1.3)
EKG ATRIAL RATE: 94 BPM
EKG DIAGNOSIS: NORMAL
EKG P AXIS: -1 DEGREES
EKG P-R INTERVAL: 182 MS
EKG Q-T INTERVAL: 412 MS
EKG QRS DURATION: 82 MS
EKG QTC CALCULATION (BAZETT): 515 MS
EKG R AXIS: -38 DEGREES
EKG T AXIS: 74 DEGREES
EKG VENTRICULAR RATE: 94 BPM
EPITHELIAL CELLS, UA: ABNORMAL /HPF (ref 0–5)
GFR AFRICAN AMERICAN: >60
GFR NON-AFRICAN AMERICAN: >60
GLUCOSE BLD-MCNC: 119 MG/DL (ref 70–99)
GLUCOSE URINE: NEGATIVE MG/DL
HCT VFR BLD CALC: 35.1 % (ref 40.5–52.5)
HEMOGLOBIN: 12.2 G/DL (ref 13.5–17.5)
KETONES, URINE: ABNORMAL MG/DL
LACTIC ACID: 1.4 MMOL/L (ref 0.4–2)
LACTIC ACID: 2.4 MMOL/L (ref 0.4–2)
LACTIC ACID: 3.9 MMOL/L (ref 0.4–2)
LACTIC ACID: 4.8 MMOL/L (ref 0.4–2)
LEUKOCYTE ESTERASE, URINE: NEGATIVE
MCH RBC QN AUTO: 36.1 PG (ref 26–34)
MCHC RBC AUTO-ENTMCNC: 34.8 G/DL (ref 31–36)
MCV RBC AUTO: 103.9 FL (ref 80–100)
MICROSCOPIC EXAMINATION: YES
MUCUS: ABNORMAL /LPF
NITRITE, URINE: POSITIVE
PDW BLD-RTO: 15 % (ref 12.4–15.4)
PH UA: 6 (ref 5–8)
PLATELET # BLD: 192 K/UL (ref 135–450)
PMV BLD AUTO: 8.1 FL (ref 5–10.5)
POTASSIUM SERPL-SCNC: 3.8 MMOL/L (ref 3.5–5.1)
PROTEIN UA: ABNORMAL MG/DL
RBC # BLD: 3.38 M/UL (ref 4.2–5.9)
RBC UA: ABNORMAL /HPF (ref 0–4)
SODIUM BLD-SCNC: 128 MMOL/L (ref 136–145)
SPECIFIC GRAVITY UA: >=1.03 (ref 1–1.03)
URINE REFLEX TO CULTURE: ABNORMAL
URINE TYPE: ABNORMAL
UROBILINOGEN, URINE: 1 E.U./DL
WBC # BLD: 11.5 K/UL (ref 4–11)
WBC UA: ABNORMAL /HPF (ref 0–5)

## 2021-09-04 PROCEDURE — 2580000003 HC RX 258

## 2021-09-04 PROCEDURE — 6360000002 HC RX W HCPCS: Performed by: STUDENT IN AN ORGANIZED HEALTH CARE EDUCATION/TRAINING PROGRAM

## 2021-09-04 PROCEDURE — 6370000000 HC RX 637 (ALT 250 FOR IP): Performed by: STUDENT IN AN ORGANIZED HEALTH CARE EDUCATION/TRAINING PROGRAM

## 2021-09-04 PROCEDURE — 2000000000 HC ICU R&B

## 2021-09-04 PROCEDURE — 83605 ASSAY OF LACTIC ACID: CPT

## 2021-09-04 PROCEDURE — 74018 RADEX ABDOMEN 1 VIEW: CPT

## 2021-09-04 PROCEDURE — 74176 CT ABD & PELVIS W/O CONTRAST: CPT

## 2021-09-04 PROCEDURE — 92610 EVALUATE SWALLOWING FUNCTION: CPT

## 2021-09-04 PROCEDURE — 92523 SPEECH SOUND LANG COMPREHEN: CPT

## 2021-09-04 PROCEDURE — 6360000002 HC RX W HCPCS

## 2021-09-04 PROCEDURE — 2580000003 HC RX 258: Performed by: STUDENT IN AN ORGANIZED HEALTH CARE EDUCATION/TRAINING PROGRAM

## 2021-09-04 PROCEDURE — 6370000000 HC RX 637 (ALT 250 FOR IP)

## 2021-09-04 PROCEDURE — 36415 COLL VENOUS BLD VENIPUNCTURE: CPT

## 2021-09-04 PROCEDURE — 80048 BASIC METABOLIC PNL TOTAL CA: CPT

## 2021-09-04 PROCEDURE — 99223 1ST HOSP IP/OBS HIGH 75: CPT | Performed by: INTERNAL MEDICINE

## 2021-09-04 PROCEDURE — 70450 CT HEAD/BRAIN W/O DYE: CPT

## 2021-09-04 PROCEDURE — 81001 URINALYSIS AUTO W/SCOPE: CPT

## 2021-09-04 PROCEDURE — 85027 COMPLETE CBC AUTOMATED: CPT

## 2021-09-04 RX ORDER — SODIUM CHLORIDE 0.9 % (FLUSH) 0.9 %
5-40 SYRINGE (ML) INJECTION EVERY 12 HOURS SCHEDULED
Status: DISCONTINUED | OUTPATIENT
Start: 2021-09-04 | End: 2021-09-10 | Stop reason: HOSPADM

## 2021-09-04 RX ORDER — SODIUM CHLORIDE 9 MG/ML
50 INJECTION, SOLUTION INTRAVENOUS ONCE
Status: COMPLETED | OUTPATIENT
Start: 2021-09-04 | End: 2021-09-04

## 2021-09-04 RX ORDER — UBIDECARENONE 75 MG
100 CAPSULE ORAL NIGHTLY
Status: DISCONTINUED | OUTPATIENT
Start: 2021-09-04 | End: 2021-09-10 | Stop reason: HOSPADM

## 2021-09-04 RX ORDER — LABETALOL HYDROCHLORIDE 5 MG/ML
10 INJECTION, SOLUTION INTRAVENOUS EVERY 4 HOURS PRN
Status: DISCONTINUED | OUTPATIENT
Start: 2021-09-04 | End: 2021-09-10 | Stop reason: HOSPADM

## 2021-09-04 RX ORDER — FOLIC ACID 1 MG/1
1 TABLET ORAL DAILY
Status: DISCONTINUED | OUTPATIENT
Start: 2021-09-04 | End: 2021-09-10 | Stop reason: HOSPADM

## 2021-09-04 RX ORDER — TRAMADOL HYDROCHLORIDE 50 MG/1
25 TABLET ORAL ONCE
Status: COMPLETED | OUTPATIENT
Start: 2021-09-04 | End: 2021-09-04

## 2021-09-04 RX ORDER — ONDANSETRON 4 MG/1
4 TABLET, ORALLY DISINTEGRATING ORAL EVERY 8 HOURS PRN
Status: DISCONTINUED | OUTPATIENT
Start: 2021-09-04 | End: 2021-09-07

## 2021-09-04 RX ORDER — SODIUM CHLORIDE 0.9 % (FLUSH) 0.9 %
5-40 SYRINGE (ML) INJECTION PRN
Status: DISCONTINUED | OUTPATIENT
Start: 2021-09-04 | End: 2021-09-10 | Stop reason: HOSPADM

## 2021-09-04 RX ORDER — ACETAMINOPHEN 325 MG/1
650 TABLET ORAL EVERY 4 HOURS PRN
Status: DISCONTINUED | OUTPATIENT
Start: 2021-09-04 | End: 2021-09-10 | Stop reason: HOSPADM

## 2021-09-04 RX ORDER — SODIUM CHLORIDE 9 MG/ML
25 INJECTION, SOLUTION INTRAVENOUS PRN
Status: DISCONTINUED | OUTPATIENT
Start: 2021-09-04 | End: 2021-09-10 | Stop reason: HOSPADM

## 2021-09-04 RX ORDER — ONDANSETRON 2 MG/ML
4 INJECTION INTRAMUSCULAR; INTRAVENOUS EVERY 6 HOURS PRN
Status: DISCONTINUED | OUTPATIENT
Start: 2021-09-04 | End: 2021-09-07

## 2021-09-04 RX ORDER — LANOLIN ALCOHOL/MO/W.PET/CERES
200 CREAM (GRAM) TOPICAL DAILY
Status: DISCONTINUED | OUTPATIENT
Start: 2021-09-04 | End: 2021-09-10 | Stop reason: HOSPADM

## 2021-09-04 RX ORDER — THIAMINE HYDROCHLORIDE 100 MG/ML
100 INJECTION, SOLUTION INTRAMUSCULAR; INTRAVENOUS DAILY
Status: DISCONTINUED | OUTPATIENT
Start: 2021-09-05 | End: 2021-09-05

## 2021-09-04 RX ORDER — ATORVASTATIN CALCIUM 40 MG/1
40 TABLET, FILM COATED ORAL DAILY
Status: DISCONTINUED | OUTPATIENT
Start: 2021-09-04 | End: 2021-09-10 | Stop reason: HOSPADM

## 2021-09-04 RX ORDER — MAGNESIUM SULFATE 1 G/100ML
1000 INJECTION INTRAVENOUS
Status: DISPENSED | OUTPATIENT
Start: 2021-09-04 | End: 2021-09-04

## 2021-09-04 RX ORDER — THIAMINE HYDROCHLORIDE 100 MG/ML
100 INJECTION, SOLUTION INTRAMUSCULAR; INTRAVENOUS DAILY
Status: DISCONTINUED | OUTPATIENT
Start: 2021-09-05 | End: 2021-09-04

## 2021-09-04 RX ORDER — CARVEDILOL 3.12 MG/1
3.12 TABLET ORAL 2 TIMES DAILY
Status: DISCONTINUED | OUTPATIENT
Start: 2021-09-04 | End: 2021-09-10 | Stop reason: HOSPADM

## 2021-09-04 RX ORDER — CALCIUM CARBONATE/VITAMIN D3 600 MG-10
1 TABLET ORAL DAILY
Status: DISCONTINUED | OUTPATIENT
Start: 2021-09-04 | End: 2021-09-04 | Stop reason: SDUPTHER

## 2021-09-04 RX ORDER — GAUZE BANDAGE 2" X 2"
100 BANDAGE TOPICAL DAILY
Status: DISCONTINUED | OUTPATIENT
Start: 2021-09-04 | End: 2021-09-04

## 2021-09-04 RX ORDER — LISINOPRIL 2.5 MG/1
2.5 TABLET ORAL DAILY
Status: DISCONTINUED | OUTPATIENT
Start: 2021-09-04 | End: 2021-09-10 | Stop reason: HOSPADM

## 2021-09-04 RX ADMIN — SODIUM CHLORIDE 25 ML: 9 INJECTION, SOLUTION INTRAVENOUS at 07:45

## 2021-09-04 RX ADMIN — TRAMADOL HYDROCHLORIDE 25 MG: 50 TABLET, FILM COATED ORAL at 16:18

## 2021-09-04 RX ADMIN — FOLIC ACID 1 MG: 1 TABLET ORAL at 11:00

## 2021-09-04 RX ADMIN — LEVETIRACETAM 1000 MG: 100 INJECTION, SOLUTION INTRAVENOUS at 07:46

## 2021-09-04 RX ADMIN — CARVEDILOL 3.12 MG: 3.12 TABLET, FILM COATED ORAL at 21:28

## 2021-09-04 RX ADMIN — MAGNESIUM OXIDE TAB 400 MG (240 MG ELEMENTAL MG) 200 MG: 400 (240 MG) TAB at 11:00

## 2021-09-04 RX ADMIN — SODIUM CHLORIDE 25 ML: 9 INJECTION, SOLUTION INTRAVENOUS at 18:47

## 2021-09-04 RX ADMIN — Medication 10 ML: at 21:28

## 2021-09-04 RX ADMIN — MAGNESIUM SULFATE HEPTAHYDRATE 1000 MG: 1 INJECTION, SOLUTION INTRAVENOUS at 01:51

## 2021-09-04 RX ADMIN — TRAMADOL HYDROCHLORIDE 25 MG: 50 TABLET, FILM COATED ORAL at 22:22

## 2021-09-04 RX ADMIN — PROTHROMBIN, COAGULATION FACTOR VII HUMAN, COAGULATION FACTOR IX HUMAN, COAGULATION FACTOR X HUMAN, PROTEIN C, PROTEIN S HUMAN, AND WATER 4212 UNITS: KIT at 01:00

## 2021-09-04 RX ADMIN — ACETAMINOPHEN 650 MG: 325 TABLET ORAL at 11:59

## 2021-09-04 RX ADMIN — ATORVASTATIN CALCIUM 40 MG: 40 TABLET, FILM COATED ORAL at 10:59

## 2021-09-04 RX ADMIN — Medication 100 MG: at 11:00

## 2021-09-04 RX ADMIN — Medication 10 ML: at 07:45

## 2021-09-04 RX ADMIN — SODIUM CHLORIDE 500 MG: 900 INJECTION, SOLUTION INTRAVENOUS at 18:48

## 2021-09-04 RX ADMIN — CARVEDILOL 3.12 MG: 3.12 TABLET, FILM COATED ORAL at 11:00

## 2021-09-04 RX ADMIN — SODIUM CHLORIDE 50 ML: 9 INJECTION, SOLUTION INTRAVENOUS at 00:58

## 2021-09-04 RX ADMIN — VITAM B12 100 MCG: 100 TAB at 21:29

## 2021-09-04 ASSESSMENT — PAIN DESCRIPTION - PROGRESSION: CLINICAL_PROGRESSION: NOT CHANGED

## 2021-09-04 ASSESSMENT — PAIN DESCRIPTION - ONSET: ONSET: ON-GOING

## 2021-09-04 ASSESSMENT — PAIN - FUNCTIONAL ASSESSMENT: PAIN_FUNCTIONAL_ASSESSMENT: PREVENTS OR INTERFERES SOME ACTIVE ACTIVITIES AND ADLS

## 2021-09-04 ASSESSMENT — PAIN SCALES - GENERAL
PAINLEVEL_OUTOF10: 8
PAINLEVEL_OUTOF10: 10
PAINLEVEL_OUTOF10: 0
PAINLEVEL_OUTOF10: 3
PAINLEVEL_OUTOF10: 0

## 2021-09-04 ASSESSMENT — PAIN DESCRIPTION - LOCATION: LOCATION: ABDOMEN

## 2021-09-04 ASSESSMENT — PAIN DESCRIPTION - PAIN TYPE: TYPE: ACUTE PAIN

## 2021-09-04 ASSESSMENT — PAIN DESCRIPTION - DESCRIPTORS: DESCRIPTORS: ACHING;CONSTANT

## 2021-09-04 ASSESSMENT — PAIN DESCRIPTION - FREQUENCY: FREQUENCY: CONTINUOUS

## 2021-09-04 ASSESSMENT — PAIN DESCRIPTION - ORIENTATION: ORIENTATION: RIGHT

## 2021-09-04 NOTE — PROGRESS NOTES
RN). Pt with positive oral acceptance, adequate oral prep, positive laryngeal swallow movement, no overt s/s aspiration/penetration. Pt did not complete full 3 oz swallow screen (did not follow direction, endorsed fullness), however did complete sequential swallow of thins w/o issue (with RN also reporting pt passed her screen earlier). Recommend full liquid diet, as tolerated. Dysphagia Outcome Severity Scale: Level 5: Mild dysphagia- Distant supervision. May need one diet consistency restricted     Treatment Plan  Requires SLP Intervention: Yes  Duration/Frequency of Treatment: 2-4x/wk for LOS  D/C Recommendations: To be determined    Recommended Diet and Intervention  Liquid Consistency Recommendation: Full  Recommended Form of Meds: PO  Recommendations: Dysphagia treatment  Therapeutic Interventions: Patient/Family education;Diet tolerance monitoring; Therapeutic PO trials with SLP    Compensatory Swallowing Strategies  Compensatory Swallowing Strategies: Upright as possible for all oral intake;Small bites/sips; Alternate solids and liquids    Treatment/Goals  Short-term Goals  Timeframe for Short-term Goals: 1-2 wks or LOS  Goal 1: Patient will tolerate least restrictive diet without overt signs of aspiration or associated decline in respiratory status. Goal 2: Patient/caregiver will demonstrate understanding of swallowing concerns/recommendations. General  Chart Reviewed: Yes  Comments: Per admitting H&P (09/03/2021): 'Hung Barragan is a 59 y.o. male with a PMH alcohol abuse, CAD s/p 5/20, CHF, Essential tremor, HTN, DVT 5/20. The patient baseline gait is unsteady and the patient reports two episodes of falls 1 week ago approximately, in the last episode he states he hit his head. His sister states that these falls episodes are related also with diarrhea and dehydration that the patient endorsed days before the fall.  Patient denies any exertional chest pain, dyspnea, palpitations, syncope, or prodromal symptoms related to the fall. He also patient denies any symptoms of neurological impairment or TIA's; no amaurosis, headache diplopia, dysphasia, or unilateral disturbance of motor or sensory function. No loss of balance or vertigo. Patient also complains of abdominal pain in his RUQ. Alk Phos 240, , ALT 55 Bili 1.2. He also reports bilateral edema accentuated in his right leg. The patient denies shortness of breath, chest pain palpitations, cough, nausea, vomiting. His sister reports that the patient complained this morning of some abdominal pain but the patient was not able to described it correctly and had some difficult findings words and some confusion. CT scan was done and showed multifocal acute intraparenchymal hemorrhage in the left frontoparietal lobe, small acute subarachnoid hemorrhage and bifrontal sulci, and a small subdural hematoma along the left temporal convexity and left anterior tentorium. And small right frontotemporal scalp hematoma. Neurosurgery was consulted and recommended to reverse the anticoagulation. Safia Mowers was ordered. Patient was admitted to the ICU for close neurological follow up. '  Subjective  Subjective: Patient awake, alert, resting in bed, on room air. Behavior/Cognition: Alert; Cooperative  Respiratory Status: Room air  O2 Device: None (Room air)  Communication Observation: Functional;Aphasia  Follows Directions: Simple  Dentition: Adequate  Patient Positioning: Upright in bed  Baseline Vocal Quality: Normal  Volitional Cough: Strong  Prior Dysphagia History: None found in chart review. Consistencies Administered: Dysphagia Pureed (Dysphagia I); Thin - cup; Thin - teaspoon; Thin - straw    Vision/Hearing  Hearing  Hearing: Within functional limits    Oral Motor Deficits  Oral/Motor  Oral Motor:  Within functional limits    Prognosis  Prognosis  Prognosis for safe diet advancement: fair  Individuals consulted  Consulted and agree with results and recommendations: Patient;RN    Education  Patient Education: Educated pt to purpose of visit, swallow function, aspiration concerns, recommendations. Patient Education Response: Verbalizes understanding  Safety Devices in place: Yes  Type of devices: All fall risk precautions in place; Left in bed;Bed alarm in place;Call light within reach;Nurse notified       Therapy Time  SLP Individual Minutes  Time In: 3655  Time Out: [facility]  Minutes: 20     SLP Total Treatment Time  Timed Code Treatment Minutes: 0 Minutes  Total Treatment Time: 20    Plan  Diet Recommendations: Full liquid diet, meds PO; as tolerated. Discharge Plan:  TBD  Discussed with RN, Jose L Foster. Needs within reach. Electronically Signed by:  Daniella Sandoval M.A., Kelly Bond   Speech-Language Pathologist  Pager #[phone]    This document will serve as a discharge summary if pt discharges before next treatment.

## 2021-09-04 NOTE — ED NOTES
Attempted report RN to call back as in isolation     Ernie Ro, [address] [facility]  09/04/21 8017

## 2021-09-04 NOTE — PROGRESS NOTES
GIM Attending    Pxt seen and admitted earlier today by my colleague, Dr. Musa Lewis    #Multifocal acute IPH in the left frontoparietal lobe, acute SAH in bifrontal sulci and small SDH along the left temporal convexity and the left anterior tentorium: Traumatic ICH complicated by anticoagulation   #Right frontotemporal scalp hematoma  #Multiple falls-1 week ago with head injury during the last fall  #Chronic alcohol abuse  #Chronic anticoagulation for prior DVT  #CAD with LAD stent  #Essential hypertension  #Abdominal pain along with abnormal LFTs  #Chronic diarrhea, rule out chronic pancreatitis and enzyme deficiency. Stool studies      -6h repeat Head CT:  Stable  - KCENTRA to reverse AC  -Keppra x 2 weeks  -Seen by NSGY: No neurosurgical intervention anticipated  -Repeat head CT with new neuro deficits  - Q1h neuro checks    Please see plan of care as per admit H and P.     Remains in ICU    Hussein Palmer MD

## 2021-09-04 NOTE — PROGRESS NOTES
Pt states his sister organizes his daily medications in a pill organizer. States he does not know what all he is taking, but last took medications 9/2/21. TC to pt's sister Joanie Bose for med rec. LM for sister to call back.

## 2021-09-04 NOTE — ED PROVIDER NOTES
ED Attending Attestation Note     Date of evaluation: 9/3/2021    This patient was seen by the resident. I have seen and examined the patient, agree with the workup, evaluation, management and diagnosis. The care plan has been discussed. I have reviewed the ECG and concur with the resident's interpretation. My assessment reveals an elderly gentleman sitting upright in bed without signs of ataxia. He has some dried blood on the right aspect of his head. On exam:  Neuro:  Mental Status: GCS 15. AAOx4. Speech is clear and fluent, with no dysarthria. He has no obvious aphasia, and certainly no word finding difficulty on my exam.  He does occasionally leave out a word when asked to repeat phrases, but this seems to be due to difficulty hearing and when the instructions shouted in his ear he is able to complete the task. CN: Visual fields are full to confrontation. Pupils are 4->2 mm bilaterally, round and briskly reactive to light. EOMI with no nystagmus. Facial sensation is intact in all 3 divisions bilaterally. Face is symmetric with normal eye closure and smile. Hearing is grossly intact bilaterally. Palate elevates symmetrically. Phonation is normal. Shoulder shrug is intact bilaterally. Tongue is midline with normal movement and no atrophy. Motor:  No pronator drift,  intact b/l. No drift of LE b/l, intact ankle plantar/dorsiflexion. Coordination: Finger-to-nose normal bilaterally. Gait/Balance/Proprioception: Gait antalgic but without ataxia. His NIH stroke scale is 0. His last known normal time is before 6 AM today. His fingerstick is 148. His sister is providing the history here. She returned from work at 9 PM and found him to be confused, trying to express something about pain in his right hip but without the ability to use words to clearly express himself. She was concerned about his confusion and difficulty with speech and brings him here for this reason.   He is supposed to be anticoagulated. He has had 2 recent falls, which the sister tells me she has had a evaluate in the emergency department for, but is unable to evaluate whether or not he had another fall today. On exam, he is able to range all joints in the lower extremities bilaterally, but he does have some asymmetric edema in the lower extremities.        Yanira Luther MD  09/03/21 1610

## 2021-09-04 NOTE — PROGRESS NOTES
Speech Language Pathology  [facility]  Initial Speech/Language/Cognitive Assessment    NAME: Kenna Dukes  : 1960   MRN: [id number]  ADMISSION DATE: 9/3/2021  ADMITTING DIAGNOSIS: has Mural thrombus of cardiac apex; Coronary artery disease due to lipid rich plaque; Abnormal angiogram; Acute deep vein thrombosis of left lower extremity ([address] Hypokalemia; Alcohol abuse with withdrawal ([address] Electrolyte abnormality; and Intracranial hemorrhage (HCC) on their problem list.  DATE ONSET: 2021    Date of Eval: 2021   Evaluating Therapist: AJIT Valdez    RECENT RESULTS  CT OF HEAD: (2021)  Impression:   Stable acute parenchymal hemorrhage left frontoparietal lobe. 2. Acute bilateral subarachnoid hemorrhage stable. 3. Subdural hematoma stable. 4. Acute intradural extra medullary hemorrhage surrounding the upper cervical cord stable. 4. Parenchymal volume loss and chronic small vessel ischemic changes in the white matter. 5. Remote infarcts as above. Primary Complaint: word finding    Pain:  Pain Assessment  Pain Assessment: 0-10  Pain Level: 3  Patient's Stated Pain Goal: No pain    Assessment:  Aphasia Diagnosis: Mild-moderate aphasia   Diagnosis: Patient presents with mild-moderate expressive/receptive aphasia. Specific areas of difficulty include naming (with paraphasias noted), repetition, following multi-step/complex directions, reading. Would benefit from ongoing speech therapy. Recommendations:  Requires SLP Intervention: Yes  Duration/Frequency of Treatment: 2-4x/wk for LOS  D/C Recommendations: To be determined       Plan:   Goals:  Short-term Goals  Timeframe for Short-term Goals: 1-2 wks or LOS  Goal 1: Patient will follow 2 step directions with 80% accuracy given min cues. Goal 2: Patient will complete graded naming tasks with 90% accuracy given min cues.   Goal 3: Patient will complete repetition tasks with 80% accuracy given min cues. Goal 4: Patient will improve reading comprehension at the sentence level with min cues. Goal 5: Patient will tolerate ongoing cognitive-linguistic assessment. Patient/family involved in developing goals and treatment plan: patient    Subjective:   Previous level of function and limitations: Independent  General  Chart Reviewed: Yes  Patient assessed for rehabilitation services?: Yes  Family / Caregiver Present: No  General Comment  Comments: Per admitting H&P (09/04/2021): 'Elida Marcano is a 59 y.o. male with a PMH alcohol abuse, CAD s/p 5/20, CHF, Essential tremor, HTN, DVT 5/20. The patient baseline gait is unsteady and the patient reports two episodes of falls 1 week ago approximately, in the last episode he states he hit his head. His sister states that these falls episodes are related also with diarrhea and dehydration that the patient endorsed days before the fall. Patient denies any exertional chest pain, dyspnea, palpitations, syncope, or prodromal symptoms related to the fall. He also patient denies any symptoms of neurological impairment or TIA's; no amaurosis, headache diplopia, dysphasia, or unilateral disturbance of motor or sensory function. No loss of balance or vertigo. Patient also complains of abdominal pain in his RUQ. Alk Phos 240, , ALT 55 Bili 1.2. He also reports bilateral edema accentuated in his right leg. The patient denies shortness of breath, chest pain palpitations, cough, nausea, vomiting. His sister reports that the patient complained this morning of some abdominal pain but the patient was not able to described it correctly and had some difficult findings words and some confusion. CT scan was done and showed multifocal acute intraparenchymal hemorrhage in the left frontoparietal lobe, small acute subarachnoid hemorrhage and bifrontal sulci, and a small subdural hematoma along the left temporal convexity and left anterior tentorium.   And small right frontotemporal scalp hematoma. Neurosurgery was consulted and recommended to reverse the anticoagulation. Safia Mowers was ordered. Patient was admitted to the ICU for close neurological follow up. '  Subjective  Subjective: Patient awake, alert, resting in bed. Complaining of some discomfort; RN aware and provided intervention. Participated in evaluation with encouragment. Social/Functional History  Lives With: Other (comment) (sister)  Hearing  Hearing: Within functional limits           Objective:     Oral/Motor  Oral Motor: Within functional limits    Auditory Comprehension  Comprehension: Exceptions  Two Step Basic Commands: Moderate    Reading Comprehension  Reading Status: Exceptions to [facility]  Sentence Impairment Severity: Moderate    Expression  Primary Mode of Expression: Verbal    Verbal Expression  Verbal Expression: Exceptions to functional limits  Repetition: Moderate  Confrontation: Mild  Conversation: Mild    Motor Speech  Motor Speech: Within Functional Limits    Cognition:      Attention  Attention: Unable to assess  Memory  Memory: Unable to assess  Problem Solving  Problem Solving: Unable to assess  Numeric Reasoning  Numeric Reasoning: Unable to assess    Prognosis:  Speech Therapy Prognosis  Prognosis: Fair  Individuals consulted  Consulted and agree with results and recommendations: Patient;RN    Education:  Patient Education: Educated pt to purpose of visit, speech/language deficits/therapy. Patient Education Response: Needs reinforcement  Safety Devices in place: Yes  Type of devices: All fall risk precautions in place; Left in bed;Bed alarm in place;Call light within reach;Nurse notified    Therapy Time:   Individual Concurrent Group Co-treatment   Time In 1155         Time Out 1213         Minutes 18            Timed Code Treatment Minutes: 0 Minutes  Total Treatment Time: [address], Patrick [id number]  [address] [place] [address]  Pager: [phone].     This document will serve

## 2021-09-04 NOTE — CONSULTS
[address] [facility]   Neurosurgery Consultation        Patient: Karol James  Consultant: Saad Wyatt MD, PhD        HPI: Karol James is a 59 y.o. male patient being seen for complaints of aphasia and confusion with falls x 2. On anticoagulants. Past Medical History:   Diagnosis Date    Alcohol abuse     CAD (coronary artery disease)     with complete LAD occlusion    CHF (congestive heart failure) (HCC)     LVEF    Essential tremor     HTN (hypertension)     Lower extremity cellulitis     MI (mitral incompetence)      Past Surgical History:   Procedure Laterality Date    PTCA      UPPER GASTROINTESTINAL ENDOSCOPY N/A 7/28/2020    EGD BIOPSY performed by Avril Centeno MD at [facility] ENDOSCOPY     Patient has no known allergies.     Current Facility-Administered Medications:     sodium chloride flush 0.9 % injection 5-40 mL, 5-40 mL, IntraVENous, 2 times per day, Karena Massey MD    sodium chloride flush 0.9 % injection 5-40 mL, 5-40 mL, IntraVENous, PRN, Lexi Pollock MD    0.9 % sodium chloride infusion, 25 mL, IntraVENous, PRN, Karena Massey MD, Stopped at 09/04/21 0805    acetaminophen (TYLENOL) tablet 650 mg, 650 mg, Oral, Q4H PRN, Lexi Pollock MD    ondansetron (ZOFRAN-ODT) disintegrating tablet 4 mg, 4 mg, Oral, Q8H PRN **OR** ondansetron (ZOFRAN) injection 4 mg, 4 mg, IntraVENous, Q6H PRN, Lexi Pollock MD    [COMPLETED] levETIRAcetam (KEPPRA) 1,000 mg in sodium chloride 0.9 % 100 mL IVPB, 1,000 mg, IntraVENous, Once, Stopped at 09/04/21 0805 **FOLLOWED BY** levETIRAcetam (KEPPRA) 500 mg in sodium chloride 0.9 % 100 mL IVPB, 500 mg, IntraVENous, Q12H, Lexi Pollock MD    labetalol (NORMODYNE;TRANDATE) injection 10 mg, 10 mg, IntraVENous, Q4H PRN, Lexi Pollock MD    atorvastatin (LIPITOR) tablet 40 mg, 40 mg, Oral, Daily, Lexi Pollock MD    folic acid (FOLVITE) tablet 1 mg, 1 mg, Oral, Daily, Oliva Prieto MD    vitamin B-12 (CYANOCOBALAMIN) tablet 100 mcg, 100 mcg, Oral, Nightly, Thane Hashimoto Murillo-Garcia, MD    carvedilol (COREG) tablet 3.125 mg, 1 tablet, Oral, BID, Chico Spencer MD    magnesium oxide (MAG-OX) tablet 200 mg, 200 mg, Oral, Daily, Thane Hashimoto Murillo-Garcia, MD    B1 TABS 1 tablet, 1 tablet, Oral, Daily, Thane Hashimoto Murillo-Garcia, MD    lisinopril (PRINIVIL;ZESTRIL) tablet 2.5 mg, 2.5 mg, Oral, Daily, Thane Hashimoto Murillo-Garcia, MD    thiamine mononitrate tablet 100 mg, 100 mg, Oral, Daily, Oliva Prieto MD  Social History     Socioeconomic History    Marital status: Single     Spouse name: Not on file    Number of children: Not on file    Years of education: Not on file    Highest education level: Not on file   Occupational History    Not on file   Tobacco Use    Smoking status: Former Smoker     Packs/day: 0.50     Years: 40.00     Pack years: 20.00     Types: Cigarettes    Smokeless tobacco: Never Used   Vaping Use    Vaping Use: Never used   Substance and Sexual Activity    Alcohol use: Yes     Alcohol/week: 2.0 standard drinks     Types: 2 Cans of beer per week     Comment: previously was drinkning 5 cans per day    Drug use: Never    Sexual activity: Not Currently   Other Topics Concern    Not on file   Social History Narrative    Not on file     Social Determinants of Health     Financial Resource Strain:     Difficulty of Paying Living Expenses:    Food Insecurity:     Worried About Running Out of Food in the Last Year:     [address] in the Last Year:    Transportation Needs:     Lack of Transportation (Medical):      Lack of Transportation (Non-Medical):    Physical Activity:     Days of Exercise per Week:     Minutes of Exercise per Session:    Stress:     Feeling of Stress :    Social Connections:     Frequency of Communication with Friends and Family:     Frequency of Social Gatherings with Friends and Family:     Attends [employer] witness Services:     Active Member of Clubs or Organizations:     Attends Club or Organization Meetings:     Marital Status:    Intimate Partner Violence:     Fear of Current or Ex-Partner:     Emotionally Abused:     Physically Abused:     Sexually Abused:      Family History   Problem Relation Age of Onset    Stroke Mother     Heart Disease Father        ROS: Complete 10 point ROS was obtained with positives in HPI, otherwise negative. Physical Exam:    Temp (24hrs), Av.4 °F (36.9 °C), Min:98.3 °F (36.8 °C), Max:98.5 °F (36.9 °C)      Neuro Exam  Awake and alert  Follows all commands  Moves all 4s equally  Occasional word finding errors      Labs:  Recent Labs     21   WBC 11.5*   HGB 12.2*   HCT 35.1*          Recent Labs     21   *   < > 128*   K 3.5  --  3.8   CL 90*   < > 91*   CO2 24   < > 25   BUN 5*   < > 6*   CREATININE 0.7*   < > 0.6*   GLUCOSE 138*   < > 119*   CALCIUM 8.1*   < > 7.8*   MG 1.60*  --   --     < > = values in this interval not displayed. Recent Labs     21   PROTIME 21.3*   INR 1.84*       Imaging Studies:   CT Head  Impression:   Stable acute parenchymal hemorrhage left frontoparietal lobe. 2. Acute bilateral subarachnoid hemorrhage stable. 3. Subdural hematoma stable. 4. Acute intradural extra medullary hemorrhage surrounding the upper cervical cord stable. 4. Parenchymal volume loss and chronic small vessel ischemic changes in the white matter. 5. Remote infarcts as above. Assessment:   -Traumatic ICH complicated by anticoagulation (6h repeat Head CT stable)  -Reverse anticoagulants  -Keep SBP less than 160 mm Hg  -ICU with q1h neuro checks  -Keppra x 2 weeks  -No neurosurgical intervention anticipated          Thank you for asking me to see this patient.     Elisabeth Poe MD, PhD  [facility]  [phone] cell

## 2021-09-04 NOTE — PROGRESS NOTES
Clinical Pharmacy Progress Note     All IVs in Normal Saline - Management by Pharmacy    Consult Date(s): 9/4/21  Consulting Provider(s): Dr. Dicky Prader     Neurologic Injury (Intraparenchymal brain hemorrhage/SDH) - All IVs in Normal Saline   Drips will be adjusted to normal saline as appropriate based on compatibility, in an effort to avoid fluid shifts, as D5W is osmotically active.  The following intermittent IV drips / infusions have been adjusted to saline:  o Keppra IVPB - Already in 0.9%NS   The following medications must remain in D5W due to incompatibility with normal saline:  o Amphotericin  o Mycophenolate  o Nitroprusside  o Penicillin G Potassium   Note: Patient has D5W ordered as part of hypoglycemia orderset.  Total IV fluid delivered to patient over last 24 hrs: Not yet due   [facility] will follow daily to ensure all IVPBs / drips are in NS where possible. Thank you for consulting Pharmacy!   Jered Dyer [facility]

## 2021-09-04 NOTE — H&P
ICU HISTORY AND PHYSICAL       Hospital Day:   ICU Day:                                                          Code:Prior  Admit Date: 9/3/2021  PCP: Loly Bowles MD                                  CC: Abdominal pain and aphasia    HISTORY OF PRESENT ILLNESS:   Ray Kahn is a 59 y.o. male with a PMH alcohol abuse, CAD s/p 5/20, CHF, Essential tremor, HTN, DVT 5/20. The patient baseline gait is unsteady and the patient reports two episodes of falls 1 week ago approximately, in the last episode he states he hit his head. His sister states that these falls episodes are related also with diarrhea and dehydration that the patient endorsed days before the fall. Patient denies any exertional chest pain, dyspnea, palpitations, syncope, or prodromal symptoms related to the fall. He also patient denies any symptoms of neurological impairment or TIA's; no amaurosis, headache diplopia, dysphasia, or unilateral disturbance of motor or sensory function. No loss of balance or vertigo. Patient also complains of abdominal pain in his RUQ. Alk Phos 240, , ALT 55 Bili 1.2. He also reports bilateral edema accentuated in his right leg. The patient denies shortness of breath, chest pain palpitations, cough, nausea, vomiting. His sister reports that the patient complained this morning of some abdominal pain but the patient was not able to described it correctly and had some difficult findings words and some confusion. CT scan was done and showed multifocal acute intraparenchymal hemorrhage in the left frontoparietal lobe, small acute subarachnoid hemorrhage and bifrontal sulci, and a small subdural hematoma along the left temporal convexity and left anterior tentorium. And small right frontotemporal scalp hematoma. Neurosurgery was consulted and recommended to reverse the anticoagulation. Chloe Inoue was ordered. Patient was admitted to the ICU for close neurological follow up.          PAST HISTORY:     Past sodium chloride       PRN Meds:    Allergies: No Known Allergies    REVIEW OF SYSTEMS:       History obtained from sister and the patient    Review of Systems  A 10 point review of systems was conducted, significant findings as noted in HPI. PHYSICAL EXAM:       Vitals: /80   Pulse 94   Temp 98.5 °F (36.9 °C) (Oral)   Resp 25   Wt 179 lb (81.2 kg)   SpO2 99%   BMI 26.43 kg/m²     I/O:  No intake or output data in the 24 hours ending 21 0035  No intake/output data recorded. No intake/output data recorded. Physical Examination:       Physical Exam  Constitutional:       Appearance: Normal appearance. HENT:      Head: Normocephalic. Mouth/Throat:      Mouth: Mucous membranes are moist.   Eyes:      Extraocular Movements: Extraocular movements intact. Conjunctiva/sclera: Conjunctivae normal.      Pupils: Pupils are equal, round, and reactive to light. Cardiovascular:      Rate and Rhythm: Normal rate and regular rhythm. Pulses: Normal pulses. Heart sounds: Normal heart sounds. Pulmonary:      Effort: Pulmonary effort is normal.      Breath sounds: Normal breath sounds. Abdominal:      General: Abdomen is flat. Bowel sounds are normal.      Palpations: Abdomen is soft. Musculoskeletal:         General: Normal range of motion. Cervical back: Normal range of motion. Right lower le+ Pitting Edema present. Left lower le+ Pitting Edema present. Comments: More accentuated in the RLE   Skin:     Comments: Abrasion present on the right side of the head in temporal region   Neurological:      General: No focal deficit present. Mental Status: He is alert and oriented to person, place, and time. Mental status is at baseline.       Comments: Patient slow to respond, some difficult finding words   Psychiatric:         Mood and Affect: Mood normal.         Behavior: Behavior normal.             Access:   -Central Access Day #: NA -Peripheral Access Day#:1  -Arterial line Day#:    NA                              Miles Day#:NA    NGT Day#:   NA                                          ETT Day#:NA    Vent Settings: NA      No results for input(s): PHART, YBK0WVR, PO2ART in the last 72 hours. DATA:       Labs:  CBC:   Recent Labs     09/02/21 0726 09/03/21 2233   WBC 9.2 11.8*   HGB 13.0* 13.2*   HCT 37.6* 38.1*    218       BMP:   Recent Labs     09/02/21 0726 09/03/21 2233   * 128*   K 4.4 3.5   CL 94* 90*   CO2 24 24   BUN 6* 5*   CREATININE 0.7* 0.7*   GLUCOSE 96 138*     LFT's:   Recent Labs     09/02/21 0726 09/03/21 2233   * 225*   ALT 40 54*   BILITOT 1.2* 1.2*   ALKPHOS 217* 240*     Troponin:   Recent Labs     09/03/21 2233   TROPONINI <0.01     BNP:No results for input(s): BNP in the last 72 hours. ABGs: No results for input(s): PHART, ZVU8OLN, PO2ART in the last 72 hours. INR:   Recent Labs     09/03/21 2232   INR 1.84*       U/A:No results for input(s): NITRITE, COLORU, PHUR, LABCAST, WBCUA, RBCUA, MUCUS, TRICHOMONAS, YEAST, BACTERIA, CLARITYU, SPECGRAV, LEUKOCYTESUR, UROBILINOGEN, BILIRUBINUR, BLOODU, GLUCOSEU, AMORPHOUS in the last 72 hours. Invalid input(s): [facility]    [facility] HEAD WO CONTRAST   Final Result      1. Multifocal acute intraparenchymal hemorrhage in the left frontoparietal lobe. Small volume acute subarachnoid hemorrhage in bifrontal sulci, and small subdural hematoma along the left temporal convexity and left anterior tentorium. 2.  Small right frontotemporal scalp hematoma. Discussed with Dr. Ramon Plaza at 1116 hours. XR CHEST (2 VW)   Final Result      No acute pulmonary disease. EKG: normal sinus rhythm, nonspecific ST and T waves changes. Echo:      Limited echo. Definity contrast administered. Left ventricular cavity size is normal. There is mild concentric left   ventricular hypertrophy.  Overall left ventricular systolic function appears borderline normal with an estimated ejection fraction of 50-55%. There is   hypokinesis of the basal-mid inferior and inferolateral wall. No evidence of   left ventricular mass or thrombus noted. ASSESSMENT AND PLAN:   Kayden Banks is a 59 y.o. male with a PMH alcohol abuse, CAD s/p 5/20, CHF, Essential tremor, HTN, DVT 5/20. He presented with abdominal pain, expressive aphasia and confusion. He was diagnosed with multifocal acute intraparenchymal hemorrhage in the left frontoparietal lobe, small acute subarachnoid hemorrhage and bifrontal sulci, and a small subdural hematoma along the left temporal convexity and left anterior tentorium. Patient was admitted to the ICU for close neurological follow up      Left frontoparietal lobe hemorrhage 2/2 fall  Presented as expressive aphasia and confusion. On Eliquis for DVT 5/20. Fall 1 week ago with head trauma. CT scan was done and showed multifocal acute intraparenchymal hemorrhage in the left frontoparietal lobe, small acute subarachnoid hemorrhage and bifrontal sulci, and a small subdural hematoma along the left temporal convexity and left anterior tentorium. And small right frontotemporal scalp hematoma. Patient denies nausea, vomiting, headache, photophobia. - Neurochecks Q1H if changes contact NSGY  - Keep HOB >30 degrees   - F/u CT Head w/o contrast 6 hours from previous scan  - MRI Brain w wo r/o brain mass  - Maintain SBP <160; If PRN med insufficient, then may start Nicardipine infusion  - Keep Plt >100k & INR <1.4  - Hold all anticoagulation & antiplatelet for 2 weeks  - KCENTRA to reverse AC  - Seizure prophylaxis: Keppra 1000 mg loading dose then 500 mg BID  - DVT Prophylaxis: SCD's  -F/u lactic acid   -F/u NSGY recs    Abdominal pain  2/2 likely cholecystitis   Abdominal pain in his RUQ. Alk Phos 240, , ALT 55 Bili 1.2.  -F/u RUQ ultrasound    Alcohol abuse  Last drink was a week ago.  Patient asymptomatic   -Daily thiamine when able

## 2021-09-04 NOTE — PLAN OF CARE
Problem: Nutrition  Intervention: Swallowing evaluation  SLP completed evaluation. Please refer to notes in EMR.     Gigi Conde M.A., [address]  Speech-Language Pathologist

## 2021-09-04 NOTE — PLAN OF CARE
Problem: Falls - Risk of:  Goal: Will remain free from falls  Description: Will remain free from falls  Outcome: Ongoing  Note: Pt is a fall risk. Fall risk protocol in place. See Velshreya Mayodan Fall Score. Pt bed is in low position, bed alarm is on, side rails up, fall risk bracelet applied. non-skid footwear in use. Patient/family educated on fall risk protocol, instructed to call for assistance when needed and belongings are in reach. Will continue with hourly rounds for po intake, pain needs, toileting and repositioning as needed. Will continue to monitor for needs. Problem: Pain:  Description: Pain management should include both nonpharmacologic and pharmacologic interventions. Goal: Control of acute pain  Description: Control of acute pain  Outcome: Ongoing  Note: Will continue to monitor and encourage participation in pain management plan. Problem: Skin Integrity:  Goal: Will show no infection signs and symptoms  Description: Will show no infection signs and symptoms  Outcome: Ongoing  Note: Pt has abrasion to R side of head, scattered bruising and blanchable redness to sacrum. Sacral heart in place. BLE edema R>L, +2 to +3. Heels elevated off of bed. Pt turns self. Will continue to monitor. Problem: HEMODYNAMIC STATUS  Goal: Patient has stable vital signs and fluid balance  Outcome: Ongoing  Note: BP stable with systolic 733-655'C. Will maintain SBP <160 per order. Problem: ACTIVITY INTOLERANCE/IMPAIRED MOBILITY  Goal: Mobility/activity is maintained at optimum level for patient  Outcome: Ongoing  Note: PT in bed at this time. PT ordered. Will continue to monitor. Problem: COMMUNICATION IMPAIRMENT  Goal: Ability to express needs and understand communication  Outcome: Ongoing  Note: Pt displays some word finding difficulty and aphasia at times. Will continue to encourage verbal expression of needs and thoughts. SLP ordered, pt will benefit from therapies.

## 2021-09-04 NOTE — ED PROVIDER NOTES
1 [facility]  EMERGENCY DEPARTMENT ENCOUNTER          Sarah Infante RESIDENT NOTE       Date of evaluation: 9/3/2021    Chief Complaint     Leg Pain (leg pain for the past week  ) and Aphasia (patient is having difficulty saying \"the sun always shines in [place]\" but patient states he's not having a hard time speaking, its just takes some time to get it into his head with all he has going on. )      History of Present Illness     Sagar De León is a 59 y.o. male with PMHx of ETOH use, CAD, CHF, HTN, DVT on Eliquis who presents with chief complaint of altered mental status. Patient was brought by his sister. His sister mentions that she did not notice patient had difficulty expressing some words. She also noticed that patient seemed a little confused. His last well-known was around 6 AM according to his sister when she went to work. His sister noticed the his confusion and difficulty expressing words when she got home. Patient also complain of cramping pain in his RLE radiating towards his abdomen. Per sister, Pt had a fall a week ago. Pt also endorses bilateral LE edema. Denies having chest pain, SOB, cough, Nausea, vomiting, abdominal pain, constipation, diarrhea. Pt has history of DVT and is on eliquis. Review of Systems     Review of Systems   All other systems reviewed and are negative. Past Medical, Surgical, Family, and Social History     He has a past medical history of Alcohol abuse, CAD (coronary artery disease), CHF (congestive heart failure) ([address] Essential tremor, HTN (hypertension), Lower extremity cellulitis, and MI (mitral incompetence). He has a past surgical history that includes Percutaneous Transluminal Coronary Angio and Upper gastrointestinal endoscopy (N/A, 7/28/2020). His family history includes Heart Disease in his father; Stroke in his mother. He reports that he has quit smoking. His smoking use included cigarettes. He has a 20.00 pack-year smoking history.  He has never used smokeless tobacco. He reports current alcohol use of about 2.0 standard drinks of alcohol per week. He reports that he does not use drugs. Medications     Previous Medications    ATORVASTATIN (LIPITOR) 40 MG TABLET    TAKE 1 TABLET BY MOUTH EVERY NIGHT    CARVEDILOL (COREG) 3.125 MG TABLET    TAKE 1 TABLET BY MOUTH TWICE DAILY    CLOPIDOGREL (PLAVIX) 75 MG TABLET    TAKE 1 TABLET BY MOUTH DAILY    ELIQUIS 5 MG TABS TABLET    TAKE 1 TABLET BY MOUTH TWICE DAILY    FOLIC ACID (FOLVITE) 1 MG TABLET    Take 1 tablet by mouth daily    HYDROCORTISONE 1 % CREAM    Apply topically 2 times daily    LISINOPRIL (PRINIVIL;ZESTRIL) 2.5 MG TABLET    TAKE 1 TABLET BY MOUTH EVERY NIGHT    MAGNESIUM 200 MG TABS TABLET    Take 200 mg by mouth daily    THIAMINE MONONITRATE (B1) 100 MG TABS    TAKE 1 TABLET BY MOUTH DAILY    VITAMIN B-12 (CYANOCOBALAMIN) 100 MCG TABLET    Take 1 tablet by mouth nightly       Allergies     He has No Known Allergies. Physical Exam     INITIAL VITALS: BP: 129/83, Temp: 98.5 °F (36.9 °C), Pulse: 89, Resp: 17, SpO2: 100 %   Physical Exam  Constitutional:       Appearance: Normal appearance. HENT:      Head: Normocephalic. Comments: Abrasion present on the right side of the head in temporal region     Nose: Nose normal.      Mouth/Throat:      Mouth: Mucous membranes are moist.   Eyes:      Extraocular Movements: Extraocular movements intact. Conjunctiva/sclera: Conjunctivae normal.      Pupils: Pupils are equal, round, and reactive to light. Cardiovascular:      Rate and Rhythm: Normal rate and regular rhythm. Pulmonary:      Effort: Pulmonary effort is normal.      Breath sounds: Normal breath sounds. Abdominal:      Palpations: Abdomen is soft. Musculoskeletal:         General: Normal range of motion. Cervical back: Normal range of motion and neck supple. Right lower leg: Edema present. Left lower leg: Edema present.    Neurological:      Mental Status: He is alert. Comments: Pt is alert and oriented x4, CN II-XII is intake, no dysmetria present, slow to respond. Diagnostic Results     EKG   Interpreted inconjunction with emergency department physician Nawaf Arvizu MD  Rhythm: normal sinus   Rate: normal  Axis: normal  Ectopy: none  Conduction: normal  ST Segments: no acute change  T Waves: no acute change  Clinical Impression: no acute changes      RADIOLOGY:  CT HEAD WO CONTRAST   Final Result      1. Multifocal acute intraparenchymal hemorrhage in the left frontoparietal lobe. Small volume acute subarachnoid hemorrhage in bifrontal sulci, and small subdural hematoma along the left temporal convexity and left anterior tentorium. 2.  Small right frontotemporal scalp hematoma. Discussed with Dr. Derrell Guadarrama at 1116 hours. XR CHEST (2 VW)   Final Result      No acute pulmonary disease.              LABS:   Results for orders placed or performed during the hospital encounter of 09/03/21   CBC Auto Differential   Result Value Ref Range    WBC 11.8 (H) 4.0 - 11.0 K/uL    RBC 3.64 (L) 4.20 - 5.90 M/uL    Hemoglobin 13.2 (L) 13.5 - 17.5 g/dL    Hematocrit 38.1 (L) 40.5 - 52.5 %    .8 (H) 80.0 - 100.0 fL    MCH 36.3 (H) 26.0 - 34.0 pg    MCHC 34.6 31.0 - 36.0 g/dL    RDW 14.6 12.4 - 15.4 %    Platelets 939 517 - 151 K/uL    MPV 8.1 5.0 - 10.5 fL    Neutrophils % 76.6 %    Lymphocytes % 16.6 %    Monocytes % 6.5 %    Eosinophils % 0.1 %    Basophils % 0.2 %    Neutrophils Absolute 9.1 (H) 1.7 - 7.7 K/uL    Lymphocytes Absolute 2.0 1.0 - 5.1 K/uL    Monocytes Absolute 0.8 0.0 - 1.3 K/uL    Eosinophils Absolute 0.0 0.0 - 0.6 K/uL    Basophils Absolute 0.0 0.0 - 0.2 K/uL   Comprehensive Metabolic Panel w/ Reflex to MG   Result Value Ref Range    Sodium 128 (L) 136 - 145 mmol/L    Potassium reflex Magnesium 3.5 3.5 - 5.1 mmol/L    Chloride 90 (L) 99 - 110 mmol/L    CO2 24 21 - 32 mmol/L    Anion Gap 14 3 - 16    Glucose 138 (H) 70 - 99 mg/dL    BUN 5 (L) 7 - 20 mg/dL    CREATININE 0.7 (L) 0.8 - 1.3 mg/dL    GFR Non-African American >60 >60    GFR African American >60 >60    Calcium 8.1 (L) 8.3 - 10.6 mg/dL    Total Protein 7.0 6.4 - 8.2 g/dL    Albumin 2.9 (L) 3.4 - 5.0 g/dL    Albumin/Globulin Ratio 0.7 (L) 1.1 - 2.2    Total Bilirubin 1.2 (H) 0.0 - 1.0 mg/dL    Alkaline Phosphatase 240 (H) 40 - 129 U/L    ALT 54 (H) 10 - 40 U/L     (H) 15 - 37 U/L    Globulin 4.1 g/dL   Lipase   Result Value Ref Range    Lipase 30.0 13.0 - 60.0 U/L   Troponin   Result Value Ref Range    Troponin <0.01 <0.01 ng/mL   Brain Natriuretic Peptide   Result Value Ref Range    Pro- (H) 0 - 124 pg/mL   Protime-INR   Result Value Ref Range    Protime 21.3 (H) 9.9 - 12.7 sec    INR 1.84 (H) 0.88 - 1.12   Blood Gas, Venous   Result Value Ref Range    pH, Arnoldo 7.436 7.350 - 7.450    pCO2, Arnoldo 40.8 (L) 41.0 - 51.0 mmHg    pO2, Arnoldo <30.0 25 - 40 mmHg    HCO3, Venous 27.4 24.0 - 28.0 mmol/L    Base Excess, Arnoldo 2.9 -2.0 - 3.0 mmol/L    O2 Sat, Arnoldo 50 Not established %    Carboxyhemoglobin 1.4 0.0 - 1.5 %    MetHgb, Arnoldo 0.4 0.0 - 1.5 %    TC02 (Calc), Arnoldo 29 mmol/L    Hemoglobin, Arnoldo, Reduced 49.10 %   Lactic Acid, Plasma   Result Value Ref Range    Lactic Acid 5.4 (HH) 0.4 - 2.0 mmol/L   Magnesium   Result Value Ref Range    Magnesium 1.60 (L) 1.80 - 2.40 mg/dL       ED BEDSIDE ULTRASOUND:      RECENT VITALS:  BP: 117/80, Temp: 98.5 °F (36.9 °C),Pulse: 94, Resp: 25, SpO2: 99 %     Procedures         ED Course     Nursing Notes, Past Medical Hx, Past Surgical Hx, Social Hx, Allergies, and FamilyHx were reviewed.     The patient was giventhe following medications:  Orders Placed This Encounter   Medications    prothrombin complex concentrate (human) (KCENTRA) infusion 4,060 Units    0.9 % sodium chloride infusion       CONSULTS:  IP CONSULT TO HOSPITALIST  IP CONSULT TO CRITICAL CARE  IP CONSULT TO [address] [place] [address] / ASSESSMENT / Dana Millard is a 59 y.o. male past medical history of DVT on Eliquis, CAD, CHF presented with chief complaint of altered mental status. Patient was brought to ED by his sister. Her sister mentions that she noticed patient little confused after she returned from work. Patient had a fall a week ago. CT scan was done and showed multifocal acute intraparenchymal hemorrhage in the left frontoparietal lobe, small acute subarachnoid hemorrhage and bifrontal sulci, and a small subdural hematoma along the left temporal convexity and left anterior tentorium. And small right frontotemporal scalp hematoma. Neurosurgery was consulted and recommended to reverse the anticoagulation. Chloe Inoue was ordered and patient is being admitted for further evaluation. This patient was also evaluated by the attending physician. All care plans were discussed and agreed upon. Clinical Impression     1. Intraparenchymal hemorrhage of brain (HCC)        Disposition     PATIENT REFERRED TO:  No follow-up provider specified.     DISCHARGE MEDICATIONS:  New Prescriptions    No medications on file       DISPOSITION Decision To Admit 09/03/2021 11:40:48 PM     Imani Woody MD  Resident  09/03/21 [id number]       Imani Woody MD  Resident  09/04/21 [id number]

## 2021-09-04 NOTE — PROGRESS NOTES
Pt admitted to 4526 from the ED. A&Ox4. PERRLA intact. Moves all extremities. Speech clear. Vision intact. NIH 0. +4 pitting edema and pain noted in BLE. ICU residents made aware. Scattered bruising and blanchable redness on bottom. Pt states he lives with sister and would like her to be his POC. Room air. Q1h neuro checks. Will monitor closely.

## 2021-09-04 NOTE — PROGRESS NOTES
4 Eyes Admission Assessment     I agree as the admission nurse that 2 RN's have performed a thorough Head to Toe Skin Assessment on the patient. ALL assessment sites listed below have been assessed on admission. Areas assessed by both nurses: ***  [x]   Head, Face, and Ears   [x]   Shoulders, Back, and Chest  [x]   Arms, Elbows, and Hands   [x]   Coccyx, Sacrum, and Ischium  [x]   Legs, Feet, and Heels        Does the Patient have Skin Breakdown?   No         Mitch Prevention initiated:  No   Wound Care Orders initiated:  No      [facility] nurse consulted for Pressure Injury (Stage 3,4, Unstageable, DTI, NWPT, and Complex wounds) or Mitch score 18 or lower:  No      Nurse 1 eSignature: Electronically signed by Estefania Del Cid RN on 9/4/21 at 4:55 AM EDT    **SHARE this note so that the co-signing nurse is able to place an eSignature**    Nurse 2 eSignature: {Esignature:[id number]}

## 2021-09-04 NOTE — ED NOTES
Attempted to call report, RN states will be down, just got bed in room     Lorie Saunders, NYASIA  09/04/21 0156

## 2021-09-04 NOTE — CONSULTS
ICU HISTORY AND 2025 [facility] Day:   ICU Day:                                                          Code:Full Code  Admit Date: 9/3/2021  PCP: Chidi Yun MD                                  CC: slurred speech and confusion     HISTORY OF PRESENT ILLNESS:   Sander Mccarty is a 59 y.o. male with a PMH alcohol abuse, CAD s/p 5/20, CHF, Essential tremor, HTN, DVT 5/20. The patient baseline gait is unsteady and the patient reports two episodes of falls 1 week ago approximately, in the last episode he states he hit his head. His sister states that these falls episodes are related also with diarrhea and dehydration that the patient endorsed days before the fall. Patient denies any exertional chest pain, dyspnea, palpitations, syncope, or prodromal symptoms related to the fall. He also patient denies any symptoms of neurological impairment or TIA's; no amaurosis, headache diplopia, dysphasia, or unilateral disturbance of motor or sensory function. No loss of balance or vertigo.      Patient also complains of abdominal pain in his RUQ. Alk Phos 240, , ALT 55 Bili 1.2. He also reports bilateral edema accentuated in his right leg. The patient denies shortness of breath, chest pain palpitations, cough, nausea, vomiting.      His sister reports that the patient complained this morning of some abdominal pain but the patient was not able to described it correctly and had some difficult findings words and some confusion. CT scan was done and showed multifocal acute intraparenchymal hemorrhage in the left frontoparietal lobe, small acute subarachnoid hemorrhage and bifrontal sulci, and a small subdural hematoma along the left temporal convexity and left anterior tentorium.  And small right frontotemporal scalp hematoma. Neurosurgery was consulted and recommended to reverse the anticoagulation. Adolfo Alonso was ordered. Patient was admitted to the ICU for close neurological follow up.      Interval Hx:  Pt seen and examined at bedside. NIHSS 0. Vitals afebrile, RR 15, , /86, spo2: 100% on RA     PAST HISTORY:     Past Medical History:   Diagnosis Date    Alcohol abuse     CAD (coronary artery disease)     with complete LAD occlusion    CHF (congestive heart failure) (HCC)     LVEF    Essential tremor     HTN (hypertension)     Lower extremity cellulitis     MI (mitral incompetence)        Past Surgical History:   Procedure Laterality Date    PTCA      UPPER GASTROINTESTINAL ENDOSCOPY N/A 7/28/2020    EGD BIOPSY performed by Gomez Ambrose [facility] [place]:   The patient lives at home     Alcohol: last drink 1 week back   Illicit drugs: no use      Family History:  Family History   Problem Relation Age of Onset    Stroke Mother     Heart Disease Father        MEDICATIONS:     No current facility-administered medications on file prior to encounter.      Current Outpatient Medications on File Prior to Encounter   Medication Sig Dispense Refill    lisinopril (PRINIVIL;ZESTRIL) 2.5 MG tablet TAKE 1 TABLET BY MOUTH EVERY NIGHT 30 tablet 3    atorvastatin (LIPITOR) 40 MG tablet TAKE 1 TABLET BY MOUTH EVERY NIGHT 30 tablet 3    hydrocortisone 1 % cream Apply topically 2 times daily      Thiamine Mononitrate (B1) 100 MG TABS TAKE 1 TABLET BY MOUTH DAILY 90 tablet 3    ELIQUIS 5 MG TABS tablet TAKE 1 TABLET BY MOUTH TWICE DAILY 180 tablet 3    clopidogrel (PLAVIX) 75 MG tablet TAKE 1 TABLET BY MOUTH DAILY 30 tablet 3    magnesium 200 MG TABS tablet Take 200 mg by mouth daily      carvedilol (COREG) 3.125 MG tablet TAKE 1 TABLET BY MOUTH TWICE DAILY 438 tablet 3    folic acid (FOLVITE) 1 MG tablet Take 1 tablet by mouth daily 90 tablet 3    vitamin B-12 (CYANOCOBALAMIN) 100 MCG tablet Take 1 tablet by mouth nightly 90 tablet 3    [DISCONTINUED] thiamine 100 MG tablet Take 1 tablet by mouth daily 90 tablet 3         Scheduled Meds:   sodium chloride flush  5-40 mL IntraVENous 2 times per day    levetiracetam  500 mg IntraVENous Q12H    atorvastatin  40 mg Oral Daily    folic acid  1 mg Oral Daily    vitamin B-12  100 mcg Oral Nightly    carvedilol  1 tablet Oral BID    magnesium oxide  200 mg Oral Daily    B1  1 tablet Oral Daily    lisinopril  2.5 mg Oral Daily    thiamine  100 mg Oral Daily      Continuous Infusions:   sodium chloride Stopped (21 0805)     PRN Meds:sodium chloride flush, sodium chloride, acetaminophen, ondansetron **OR** ondansetron, labetalol    Allergies: No Known Allergies    REVIEW OF SYSTEMS:       History obtained from chart review and the patient    Review of Systems  As pertinent in HPI. PHYSICAL EXAM:       Vitals: /86   Pulse 102   Temp 98.5 °F (36.9 °C) (Oral)   Resp 15   Wt 179 lb (81.2 kg)   SpO2 100%   BMI 26.43 kg/m²     I/O:      Intake/Output Summary (Last 24 hours) at 2021 0914  Last data filed at 2021 0820  Gross per 24 hour   Intake 125.87 ml   Output --   Net 125.87 ml     I/O this shift:  In: 125.9 [I.V.:25.8; IV Piggyback:100.1]  Out: -   No intake/output data recorded. Physical Examination:     Physical Exam  Constitutional:       Appearance: Normal appearance. HENT:      Head: Normocephalic. Mouth/Throat:      Mouth: Mucous membranes are moist.   Eyes:      Extraocular Movements: Extraocular movements intact. Conjunctiva/sclera: Conjunctivae normal.      Pupils: Pupils are equal, round, and reactive to light. Cardiovascular:      Rate and Rhythm: Normal rate and regular rhythm. Pulses: Normal pulses. Heart sounds: Normal heart sounds. Pulmonary:      Effort: Pulmonary effort is normal.      Breath sounds: Normal breath sounds. Abdominal:      General: Abdomen is flat. Bowel sounds are normal.      Palpations: Abdomen is soft. Musculoskeletal:         General: Normal range of motion. Cervical back: Normal range of motion.       Right lower le+ Pitting Edema present. Left lower le+ Pitting Edema present. Comments: More accentuated in the RLE   Skin:     Comments: Abrasion present on the right side of the head in temporal region   Neurological:      General: No focal deficit present. Mental Status: He is alert and oriented to person, place, and time. Mental status is at baseline. Psychiatric:         Mood and Affect: Mood normal.         Behavior: Behavior normal.     No results for input(s): PHART, KHI0JOQ, PO2ART in the last 72 hours. DATA:       Labs:  CBC:   Recent Labs     21   WBC 9.2 11.8* 11.5*   HGB 13.0* 13.2* 12.2*   HCT 37.6* 38.1* 35.1*    218 192       BMP:   Recent Labs     21   * 128* 128*   K 4.4 3.5 3.8   CL 94* 90* 91*   CO2 24 24 25   BUN 6* 5* 6*   CREATININE 0.7* 0.7* 0.6*   GLUCOSE 96 138* 119*     LFT's:   Recent Labs     21   * 225*   ALT 40 54*   BILITOT 1.2* 1.2*   ALKPHOS 217* 240*     Troponin:   Recent Labs     21   TROPONINI <0.01     BNP:No results for input(s): BNP in the last 72 hours. ABGs: No results for input(s): PHART, PRE2AJD, PO2ART in the last 72 hours. INR:   Recent Labs     21   INR 1.84*       U/A:No results for input(s): NITRITE, COLORU, PHUR, LABCAST, WBCUA, RBCUA, MUCUS, TRICHOMONAS, YEAST, BACTERIA, CLARITYU, SPECGRAV, LEUKOCYTESUR, UROBILINOGEN, BILIRUBINUR, BLOODU, GLUCOSEU, AMORPHOUS in the last 72 hours. Invalid input(s): Sheryle Myrtle    CT head without contrast   Final Result   Impression:   Stable acute parenchymal hemorrhage left frontoparietal lobe. 2. Acute bilateral subarachnoid hemorrhage stable. 3. Subdural hematoma stable. 4. Acute intradural extra medullary hemorrhage surrounding the upper cervical cord stable. 4. Parenchymal volume loss and chronic small vessel ischemic changes in the white matter.    5. Remote infarcts as above. CT HEAD WO CONTRAST   Final Result      1. Multifocal acute intraparenchymal hemorrhage in the left frontoparietal lobe. Small volume acute subarachnoid hemorrhage in bifrontal sulci, and small subdural hematoma along the left temporal convexity and left anterior tentorium. 2.  Small right frontotemporal scalp hematoma. Discussed with Dr. Darcy Cancino at 1116 hours. XR CHEST (2 VW)   Final Result      No acute pulmonary disease. US GALLBLADDER RUQ    (Results Pending)   VL Extremity Venous Bilateral    (Results Pending)       EKG:   Echo:  Micro:     ASSESSMENT AND PLAN:   Carmen Vazquez is a 59 y.o. male, who was     CNS  # Traumatic Intraparenchymal hemorrhage likely 2/2 falls [facility] [place]   CTH wo contrast: Multifocal acute IPH in the left frontoparietal lobe, acute SAH in bifrontal sulci and small SDH along the left temporal convexity and the left anterior tentorium  - repeat CTH 9/4/21: stable; unchanged from previous   - per NSGY, no acute sx intervention anticipated   - Neurochecks Q1H if changes contact NSGY  - Keep HOB >30 degrees   - MRI Brain w wo r/o brain mass: pending   - Maintain SBP <160; If PRN med insufficient, then may start Nicardipine infusion  - Keep Plt >100k & INR <1.4  - Hold all anticoagulation & antiplatelet for 2 weeks  - KCENTRA to reverse AC  - Seizure prophylaxis: Keppra 1000 mg loading dose then 500 mg BID for 2 weeks   - DVT Prophylaxis: SCD's  - bedside speech/swallow     #Right frontotemporal scalp hematoma    #Multiple falls-1 week ago with head injury during the last fall    #Chronic alcohol abuse  Last drink was a week ago. Patient asymptomatic   -Daily thiamine when able to PO    [facility]  #Chronic anticoagulation for prior DVT  Patient with bilateral leg edema, more accentuated in the right lower extremity where he also endorses mild cramping pain. Patient was on Eliquis.    - Hold all anticoagulation & antiplatelet for 2 weeks due to

## 2021-09-05 LAB
ANION GAP SERPL CALCULATED.3IONS-SCNC: 13 MMOL/L (ref 3–16)
BUN BLDV-MCNC: 8 MG/DL (ref 7–20)
CALCIUM SERPL-MCNC: 7.8 MG/DL (ref 8.3–10.6)
CHLORIDE BLD-SCNC: 96 MMOL/L (ref 99–110)
CHOLESTEROL, TOTAL: 69 MG/DL (ref 0–199)
CO2: 24 MMOL/L (ref 21–32)
CREAT SERPL-MCNC: <0.5 MG/DL (ref 0.8–1.3)
GFR AFRICAN AMERICAN: >60
GFR NON-AFRICAN AMERICAN: >60
GLUCOSE BLD-MCNC: 148 MG/DL (ref 70–99)
GLUCOSE BLD-MCNC: 90 MG/DL (ref 70–99)
HCT VFR BLD CALC: 33.6 % (ref 40.5–52.5)
HDLC SERPL-MCNC: 39 MG/DL (ref 40–60)
HEMOGLOBIN: 11.6 G/DL (ref 13.5–17.5)
LDL CHOLESTEROL CALCULATED: 20 MG/DL
MCH RBC QN AUTO: 37 PG (ref 26–34)
MCHC RBC AUTO-ENTMCNC: 34.6 G/DL (ref 31–36)
MCV RBC AUTO: 106.8 FL (ref 80–100)
PDW BLD-RTO: 14.9 % (ref 12.4–15.4)
PERFORMED ON: ABNORMAL
PLATELET # BLD: 107 K/UL (ref 135–450)
PMV BLD AUTO: 8.3 FL (ref 5–10.5)
POTASSIUM SERPL-SCNC: 3.8 MMOL/L (ref 3.5–5.1)
RBC # BLD: 3.15 M/UL (ref 4.2–5.9)
SODIUM BLD-SCNC: 133 MMOL/L (ref 136–145)
TRIGL SERPL-MCNC: 51 MG/DL (ref 0–150)
VLDLC SERPL CALC-MCNC: 10 MG/DL
WBC # BLD: 7.8 K/UL (ref 4–11)

## 2021-09-05 PROCEDURE — 85027 COMPLETE CBC AUTOMATED: CPT

## 2021-09-05 PROCEDURE — 2060000000 HC ICU INTERMEDIATE R&B

## 2021-09-05 PROCEDURE — 6370000000 HC RX 637 (ALT 250 FOR IP)

## 2021-09-05 PROCEDURE — 92526 ORAL FUNCTION THERAPY: CPT

## 2021-09-05 PROCEDURE — 6360000002 HC RX W HCPCS: Performed by: STUDENT IN AN ORGANIZED HEALTH CARE EDUCATION/TRAINING PROGRAM

## 2021-09-05 PROCEDURE — 6370000000 HC RX 637 (ALT 250 FOR IP): Performed by: STUDENT IN AN ORGANIZED HEALTH CARE EDUCATION/TRAINING PROGRAM

## 2021-09-05 PROCEDURE — 83036 HEMOGLOBIN GLYCOSYLATED A1C: CPT

## 2021-09-05 PROCEDURE — 99233 SBSQ HOSP IP/OBS HIGH 50: CPT | Performed by: INTERNAL MEDICINE

## 2021-09-05 PROCEDURE — 2580000003 HC RX 258: Performed by: STUDENT IN AN ORGANIZED HEALTH CARE EDUCATION/TRAINING PROGRAM

## 2021-09-05 PROCEDURE — 80061 LIPID PANEL: CPT

## 2021-09-05 PROCEDURE — 2580000003 HC RX 258

## 2021-09-05 PROCEDURE — 36415 COLL VENOUS BLD VENIPUNCTURE: CPT

## 2021-09-05 PROCEDURE — 6360000002 HC RX W HCPCS

## 2021-09-05 PROCEDURE — 80048 BASIC METABOLIC PNL TOTAL CA: CPT

## 2021-09-05 RX ORDER — SODIUM CHLORIDE 0.9 % (FLUSH) 0.9 %
5-40 SYRINGE (ML) INJECTION EVERY 12 HOURS SCHEDULED
Status: DISCONTINUED | OUTPATIENT
Start: 2021-09-05 | End: 2021-09-10 | Stop reason: HOSPADM

## 2021-09-05 RX ORDER — SODIUM CHLORIDE 9 MG/ML
25 INJECTION, SOLUTION INTRAVENOUS PRN
Status: DISCONTINUED | OUTPATIENT
Start: 2021-09-05 | End: 2021-09-10 | Stop reason: HOSPADM

## 2021-09-05 RX ORDER — SODIUM CHLORIDE 0.9 % (FLUSH) 0.9 %
5-40 SYRINGE (ML) INJECTION PRN
Status: DISCONTINUED | OUTPATIENT
Start: 2021-09-05 | End: 2021-09-10 | Stop reason: HOSPADM

## 2021-09-05 RX ADMIN — FOLIC ACID 1 MG: 1 TABLET ORAL at 08:18

## 2021-09-05 RX ADMIN — CARVEDILOL 3.12 MG: 3.12 TABLET, FILM COATED ORAL at 08:18

## 2021-09-05 RX ADMIN — ATORVASTATIN CALCIUM 40 MG: 40 TABLET, FILM COATED ORAL at 08:18

## 2021-09-05 RX ADMIN — CARVEDILOL 3.12 MG: 3.12 TABLET, FILM COATED ORAL at 21:58

## 2021-09-05 RX ADMIN — Medication 10 ML: at 22:00

## 2021-09-05 RX ADMIN — Medication 10 ML: at 21:59

## 2021-09-05 RX ADMIN — SODIUM CHLORIDE 500 MG: 900 INJECTION, SOLUTION INTRAVENOUS at 17:56

## 2021-09-05 RX ADMIN — LISINOPRIL 2.5 MG: 2.5 TABLET ORAL at 08:18

## 2021-09-05 RX ADMIN — MAGNESIUM OXIDE TAB 400 MG (240 MG ELEMENTAL MG) 200 MG: 400 (240 MG) TAB at 08:18

## 2021-09-05 RX ADMIN — SODIUM CHLORIDE 500 MG: 900 INJECTION, SOLUTION INTRAVENOUS at 05:57

## 2021-09-05 RX ADMIN — Medication 10 ML: at 08:19

## 2021-09-05 RX ADMIN — THIAMINE HYDROCHLORIDE 100 MG: 100 INJECTION, SOLUTION INTRAMUSCULAR; INTRAVENOUS at 11:35

## 2021-09-05 RX ADMIN — VITAM B12 100 MCG: 100 TAB at 21:58

## 2021-09-05 ASSESSMENT — PAIN DESCRIPTION - PAIN TYPE: TYPE: ACUTE PAIN

## 2021-09-05 ASSESSMENT — PAIN DESCRIPTION - FREQUENCY: FREQUENCY: CONTINUOUS

## 2021-09-05 ASSESSMENT — PAIN DESCRIPTION - LOCATION: LOCATION: ABDOMEN

## 2021-09-05 ASSESSMENT — PAIN DESCRIPTION - ORIENTATION: ORIENTATION: RIGHT

## 2021-09-05 ASSESSMENT — PAIN DESCRIPTION - DESCRIPTORS: DESCRIPTORS: ACHING;CONSTANT

## 2021-09-05 ASSESSMENT — PAIN SCALES - GENERAL: PAINLEVEL_OUTOF10: 7

## 2021-09-05 ASSESSMENT — PAIN DESCRIPTION - ONSET: ONSET: ON-GOING

## 2021-09-05 ASSESSMENT — PAIN DESCRIPTION - PROGRESSION: CLINICAL_PROGRESSION: NOT CHANGED

## 2021-09-05 ASSESSMENT — PAIN - FUNCTIONAL ASSESSMENT: PAIN_FUNCTIONAL_ASSESSMENT: PREVENTS OR INTERFERES SOME ACTIVE ACTIVITIES AND ADLS

## 2021-09-05 NOTE — PROGRESS NOTES
Speech Language Pathology  Facility/Department: [facility] ICU  Dysphagia Daily Treatment Note    NAME: Filipe Hui  : 1960  MRN: [id number]    Patient Diagnosis(es):   Patient Active Problem List    Diagnosis Date Noted    Intracranial hemorrhage ([facility] 2021    Electrolyte abnormality 2021    Alcohol abuse with withdrawal ([facility] 01/10/2021    Hypokalemia 2020    Acute deep vein thrombosis of left lower extremity ([facility] 2020    Abnormal angiogram 2020    Mural thrombus of cardiac apex     Coronary artery disease due to lipid rich plaque      Allergies: No Known Allergies    Recent Chest Xray: (2021)      Impression       No acute pulmonary disease.            Recent Head CT (2021): Impression:   Stable acute parenchymal hemorrhage left frontoparietal lobe. 2. Acute bilateral subarachnoid hemorrhage stable. 3. Subdural hematoma stable. 4. Acute intradural extra medullary hemorrhage surrounding the upper cervical cord stable. 4. Parenchymal volume loss and chronic small vessel ischemic changes in the white matter. 5. Remote infarcts as above. Chart reviewed. Medical Diagnosis:ICH  Treatment Diagnosis:dysphagia     BSE Impression 21  Needs further assessment; limited PO trials (pt declined all solid PO except some puree). Assessed tolerance thins via tsp/straw/cup, puree, whole meds (administeredy RN). Pt with positive oral acceptance, adequate oral prep, positive laryngeal swallow movement, no overt s/s aspiration/penetration. Pt did not complete full 3 oz swallow screen (did not follow direction, endorsed fullness), however did complete sequential swallow of thins w/o issue (with RN also reporting pt passed her screen earlier). Recommend full liquid diet, as tolerated.     MBS results - not indicated this date     Pain: c/o abdominal pain- RN informed    Current Diet : full liquid diet     Treatment:  Pt seen bedside to address the following goals:  Goal 1: Patient will tolerate least restrictive diet without overt signs of aspiration or associated decline in respiratory status. 9/5- RN reported no concerns re:swallowing. Pt alert, but not very receptive to treatment. Pt reluctantly took 1 trial with ice chip, one sip of water by straw and one bite of a cracker. Mastication with ice chip and cracker were prolonged. Pt with limited dentition. Pt c/o feeling as if he was going to vomit after trial with cracker. Emesis basin provided. Pt displayed no s/s aspiration with any consistency. Attempted to have conversation with pt re: possible diet upgrade, preferences, but pt not receptive, stating he hasn't eaten much, and con't to complain of abdominal pain. Recommend con't full liquid diet. con't goal    Goal 2: Patient/caregiver will demonstrate understanding of swallowing concerns/recommendations. 9/5- attempted to educate pt to the purpose of the visit and possibility for diet upgrade, but pt not receptive to teaching. con't goal     Patient/Family/Caregiver Education:  .see above     Compensatory Strategies:  Upright for all PO intake and 30\" after meals     Plan:  Continued daily Dysphagia treatment with goals per  plan of care. Diet recommendations:  con't full liquids as pt c/o abdominal pain and would only consume one bite of cracker-Make NPO if s/s aspiration emerge and alert SLP    DC recommendation:TBD closer to discharge  Treatment: 10  D/W nursing, Tammy   Needs met prior to leaving room, call button in reach.     Mary Truong, [address]  Speech-Language Pathologist  Pager [phone]      If patient is discharged prior to next treatment, this note will serve as the discharge summary

## 2021-09-05 NOTE — PROGRESS NOTES
Physical Therapy/Occupational Therapy Hold Note    PT/OT orders received and chart reviewed. Pt with Doppler ordered for DVT and on strict bedrest. PT and OT to try back at later date when pt appropriate. JONG Morejon, [address] [place] [address] patient

## 2021-09-05 NOTE — PROGRESS NOTES
ICU Progress Note    Admit Date: 9/3/2021  Day: 2  Vent Day: None  IV Access:Peripheral  IV Fluids:None  Vasopressors:None                Antibiotics: None  Diet: ADULT DIET; Full Liquid    CC:  slurred speech and confusion     Interval history: Pt seen and examined at bedside. NIHSS 0. NAEO. Pt started on full liq diet. This am, [t reports feeling well. Denies HA, N/V, abd pain. Vitals afebrile, RR 15, , /86, spo2: 100% on RA     Medications:     Scheduled Meds:   sodium chloride flush  5-40 mL IntraVENous 2 times per day    levetiracetam  500 mg IntraVENous Q12H    atorvastatin  40 mg Oral Daily    folic acid  1 mg Oral Daily    vitamin B-12  100 mcg Oral Nightly    carvedilol  3.125 mg Oral BID    magnesium oxide  200 mg Oral Daily    lisinopril  2.5 mg Oral Daily    thiamine  100 mg IntraVENous Daily     Continuous Infusions:   sodium chloride 25 mL (09/04/21 1847)     PRN Meds:sodium chloride flush, sodium chloride, acetaminophen, ondansetron **OR** ondansetron, labetalol    Objective:   Vitals:   T-max:  Patient Vitals for the past 8 hrs:   BP Temp Temp src Pulse Resp SpO2 Weight   09/05/21 0800 136/83 98.4 °F (36.9 °C) Oral 81 22 96 % --   09/05/21 0700 128/85 -- -- 78 24 97 % --   09/05/21 0600 (!) 140/79 -- -- 80 21 95 % --   09/05/21 0500 129/78 -- -- 77 19 93 % --   09/05/21 0400 (!) 144/87 98.3 °F (36.8 °C) Oral 81 23 95 % 174 lb 9.7 oz (79.2 kg)   09/05/21 0300 137/79 -- -- 91 18 95 % --       Intake/Output Summary (Last 24 hours) at 9/5/2021 1013  Last data filed at 9/5/2021 0625  Gross per 24 hour   Intake 220 ml   Output 165 ml   Net 55 ml         Physical Exam  Physical Exam  Constitutional:       Appearance: Normal appearance.    HENT:      Head: Normocephalic.      Mouth/Throat:      Mouth: Mucous membranes are moist.   Eyes:      Extraocular Movements: Extraocular movements intact.      Conjunctiva/sclera: Conjunctivae normal.      Pupils: Pupils are equal, round, and reactive to light. Cardiovascular:      Rate and Rhythm: Normal rate and regular rhythm.      Pulses: Normal pulses.      Heart sounds: Normal heart sounds. Pulmonary:      Effort: Pulmonary effort is normal.      Breath sounds: Normal breath sounds. Abdominal:      General: Abdomen is flat. Bowel sounds are normal.      Palpations: Abdomen is soft. Musculoskeletal:         General: Normal range of motion.      Cervical back: Normal range of motion.      Right lower le+ Pitting Edema present.      Left lower le+ Pitting Edema present.      Comments: More accentuated in the RLE   Skin:     Comments: Abrasion present on the right side of the head in temporal region   Neurological:      General: No focal deficit present.      Mental Status: He is alert and oriented to person, place, and time. Mental status is at baseline. Psychiatric:         Mood and Affect: Mood normal.         Behavior: Behavior normal.        LABS:    CBC:   Recent Labs     21  0515   WBC 11.8* 11.5* 7.8   HGB 13.2* 12.2* 11.6*   HCT 38.1* 35.1* 33.6*    192 107*   .8* 103.9* 106.8*     Renal:    Recent Labs     21  0516   * 128* 133*   K 3.5 3.8 3.8   CL 90* 91* 96*   CO2 24 25 24   BUN 5* 6* 8   CREATININE 0.7* 0.6* <0.5*   GLUCOSE 138* 119* 90   CALCIUM 8.1* 7.8* 7.8*   MG 1.60*  --   --    ANIONGAP 14 12 13     Hepatic:   Recent Labs     21   *   ALT 54*   BILITOT 1.2*   PROT 7.0   LABALBU 2.9*   ALKPHOS 240*     Troponin:   Recent Labs     21   TROPONINI <0.01     BNP: No results for input(s): BNP in the last 72 hours. Lipids: No results for input(s): CHOL, HDL in the last 72 hours. Invalid input(s): LDLCALCU, TRIGLYCERIDE  ABGs:  No results for input(s): PHART, [id number], PO2ART, [id number], BEART, THGBART, [id number], DHL6AQH in the last 72 hours.     INR:   Recent Labs     21  2232   INR 1.84* Lactate: No results for input(s): LACTATE in the last 72 hours. Cultures:  -----------------------------------------------------------------  RAD:   CT ABDOMEN PELVIS WO CONTRAST Additional Contrast? None   Final Result      1. Mild ascites. Chronic hepatocellular disease. 2.  Small bilateral pleural effusions. XR ABDOMEN (KUB) (SINGLE AP VIEW)   Final Result   Impression:   1. Nonobstructive small bowel gas pattern. CT head without contrast   Final Result   Impression:   Stable acute parenchymal hemorrhage left frontoparietal lobe. 2. Acute bilateral subarachnoid hemorrhage stable. 3. Subdural hematoma stable. 4. Acute intradural extra medullary hemorrhage surrounding the upper cervical cord stable. 4. Parenchymal volume loss and chronic small vessel ischemic changes in the white matter. 5. Remote infarcts as above. CT HEAD WO CONTRAST   Final Result      1. Multifocal acute intraparenchymal hemorrhage in the left frontoparietal lobe. Small volume acute subarachnoid hemorrhage in bifrontal sulci, and small subdural hematoma along the left temporal convexity and left anterior tentorium. 2.  Small right frontotemporal scalp hematoma. Discussed with Dr. Leigha Garcia at 1116 hours. XR CHEST (2 VW)   Final Result      No acute pulmonary disease. US GALLBLADDER RUQ    (Results Pending)   VL Extremity Venous Bilateral    (Results Pending)         Assessment/Plan:   CNS  # Traumatic Intraparenchymal hemorrhage likely 2/2 falls complicated by [facility] [place]   CTH wo contrast: Multifocal acute IPH in the left frontoparietal lobe, acute SAH in bifrontal sulci and small SDH along the left temporal convexity and the left anterior tentorium  - repeat CTH 9/4/21: stable; unchanged from previous   - per NSGY, no acute sx intervention anticipated   - Neurochecks Q1H if changes contact NSGY  - Keep HOB >30 degrees   - MRI Brain w wo r/o brain mass: pending   - Maintain SBP <160;  If PRN med insufficient, then may start Nicardipine infusion  - Keep Plt >100k & INR <1.4  - Hold all anticoagulation & antiplatelet for 2 weeks  - KCENTRA to reverse AC  - Seizure prophylaxis: Keppra 1000 mg loading dose then 500 mg BID for 2 weeks   - DVT Prophylaxis: SCD's  - full liquid diet     #Right frontotemporal scalp hematoma    #Multiple falls-1 week ago with head injury during the last fall    #Chronic alcohol abuse  Last drink was a week ago. Patient asymptomatic   -changed thiamine to IV   - lactic acidosis 2/2 alcohol      [facility]  #Chronic anticoagulation for prior DVT  Patient with bilateral leg edema, more accentuated in the right lower extremity where he also endorses mild cramping pain. Patient was on Eliquis.   - Hold all anticoagulation & antiplatelet for 2 weeks due to [address]  -F/u LE doppler    #CAD with LAD stent  Patient denies any exertional chest pain, dyspnea, palpitations, syncope, orthopnea, edema or paroxysmal nocturnal dyspnea. EKG normal sinus rhythm, nonspecific ST and T waves changes.  -Hold antiplatelet for 2 weeks  -Continue home meds when able to PO    #Essential hypertension  Stable -102s  -continue home meds when able to PO  -Labetalol PRN SBP<160     GI  # lactic acidosis 2/2 alcohol abuse - resolved   - do not give IVF     # Abdominal pain along with abnormal LFTs from alcohol induced hepatic steatosis   Abdominal pain in his RUQ.  Alk Phos 240, , ALT 55 Bili 1.2.  - LA 4.8 > 1.4   - ct abd pelvis revealing mild acites w chronic hepatocellular disease.   - RUQ ultrasound pending     #Chronic diarrhea, rule out chronic pancreatitis and enzyme deficiency.    -  last bowel movement 2 days back   - Stool studies pending      #Nutrition   npo; can start diet after bed speech/swallow      Renal  # electrolyte imbalance   Hyponatremia 2/2 brain edema causing SIADH  - improving   - Na 128 > 133   - fluid restriction, avoid IVF   - daily RFP     Code Status:  FEN:  Full liq diet   PPX: SCDs   DISPO: ICU     Briana Morton MD, PGY-1   09/05/21  10:13 AM    This patient has been staffed and discussed with the attending physician. Patient was seen, examined and discussed with Dr. Bhavesh Linda. I agree with the interval history. My physical exam confirms the findings listed below  Chart was reviewed including labs and medical records confirm the findings noted    Patient had multiple falls and alcohol use. Small intraparenchymal hemorrhage, SAH and subdural hematoma. He also has small cervical cord hematoma. Clinically remains stable.     Lactic acidosis is resolved   Hypernatremia is better  CT abdomen with hepatic steatosis and ascites     Change neurochecks to [id number]  Monitor for signs of DT  OK to transfer to the floor

## 2021-09-05 NOTE — PROGRESS NOTES
TRANSFER ACCEPTANCE NOTE:    S: The patient was seen and examined. Reports nil complaints. NIHSS 0. Denies any headaches, Vision changes, N/V.   /75 - within goal.     ICU Course:   Katarina Vences, 59 y.o. male with intracranial parenchymal hemorrhage in the left frontoparietal lobe, acute subarachnoid hemorrhage in bifrontal sulci and subdural hemorrhage to left temporal and left anterior tentorium which was complicated by anticoagulation. Pt received KCentra to reverse anticoagulation. Monitored in ICU for Q1hr neurochecks and had no progression of hemorrhage or worsening of symptoms. Lactic acidosis and hypernatremia improved. SBPs maintained less than 180 on lisinopril and carvedilol. NIHSS 0. Remained clinically stable. Vitals:    09/05/21 1700   BP: 132/86   Pulse: 83   Resp: 25   Temp:    SpO2:      Scheduled Meds:   thiamine (VITAMIN B1) IVPB  100 mg IntraVENous Q24H    sodium chloride flush  5-40 mL IntraVENous 2 times per day    levetiracetam  500 mg IntraVENous Q12H    atorvastatin  40 mg Oral Daily    folic acid  1 mg Oral Daily    vitamin B-12  100 mcg Oral Nightly    carvedilol  3.125 mg Oral BID    magnesium oxide  200 mg Oral Daily    lisinopril  2.5 mg Oral Daily     Continuous Infusions:   sodium chloride 25 mL (09/04/21 1847)     PRN Meds:sodium chloride flush, sodium chloride, acetaminophen, ondansetron **OR** ondansetron, labetalol    /75   Pulse 86   Temp 98.1 °F (36.7 °C) (Oral)   Resp 28   Wt 174 lb 9.7 oz (79.2 kg)   SpO2 96%   BMI 25.78 kg/m²     General Appearance: In semi cano's position, Alert, cooperative, no distress, appears stated age. Head:    Abrasion to R temporal region.    Eyes:    PERRL, conjunctiva/corneas clear, EOM's intact, fundi     benign, both eyes        Ears:    Normal TM's and external ear canals, both ears   Nose:   Nares normal, septum midline, mucosa normal, no drainage    or sinus tenderness   Throat:   Lips, mucosa, and tongue normal; teeth and gums normal   Neck:   Supple, symmetrical, trachea midline, no adenopathy;        thyroid:  No enlargement/tenderness/nodules; no carotid    bruit or JVD   Back:     Symmetric, no curvature, ROM normal, no CVA tenderness   Lungs:     Clear to auscultation bilaterally, respirations unlabored   Chest wall:    No tenderness or deformity   Heart:    Regular rate and rhythm, S1 and S2 normal, no murmur, rub   or gallop   Abdomen:     Soft, non-tender, bowel sounds active all four quadrants,     no masses, no organomegaly   Genitalia:    Normal male without lesion, discharge or tenderness   Rectal:    Normal tone, normal prostate, no masses or tenderness;    guaiac negative stool   Extremities:   Pitting edema b/l- mild. Pulses:   2+ and symmetric all extremities   Skin:   Skin color, texture, turgor normal, no rashes or lesions   Lymph nodes:   Cervical, supraclavicular, and axillary nodes normal   Neurologic:   Pt AOx4. No focal deficit. CNII-XII intact. Normal. strength, sensation and reflexes       throughout       CBC:   Recent Labs     09/03/21 2233 09/04/21 0327 09/05/21  0515   WBC 11.8* 11.5* 7.8   HGB 13.2* 12.2* 11.6*   HCT 38.1* 35.1* 33.6*    192 107*     Lab Results   Component Value Date    TSHFT4 2.04 10/03/2020     Lab Results   Component Value Date    FOLATE 11.23 01/11/2021    RNBUPWEI99 904 01/11/2021     BMP:  Recent Labs     09/03/21 2233 09/04/21 0327 09/05/21  0516   * 128* 133*   K 3.5 3.8 3.8   CL 90* 91* 96*   CO2 24 25 24   BUN 5* 6* 8   CREATININE 0.7* 0.6* <0.5*   GLUCOSE 138* 119* 90   CALCIUM 8.1* 7.8* 7.8*   MG 1.60*  --   --      LFT's:  Recent Labs     09/03/21 2233   *   ALT 54*   BILITOT 1.2*   ALKPHOS 240*     Troponin:  Recent Labs     09/03/21 2233   TROPONINI <0.01     BNP:  No results for input(s): BNP in the last 72 hours.   Lipids:  Lab Results   Component Value Date    CHOL 69 09/05/2021    HDL 39 (L) 09/05/2021    LDLCALC 20 09/05/2021    TRIG 51 09/05/2021     No results found for: LABA1C  ABGs:  No results for input(s): PHART, VOW1VCL, PO2ART in the last 72 hours. INR:  Lab Results   Component Value Date    INR 1.84 (H) 09/03/2021    INR 2.13 (H) 08/22/2021    INR 1.62 (H) 01/10/2021     U/A:  Recent Labs     09/04/21  1759   COLORU Yellow   PHUR 6.0   WBCUA 3-5   RBCUA 3-4   MUCUS 3+*   BACTERIA 2+*   CLARITYU Clear   SPECGRAV >=1.030   LEUKOCYTESUR Negative   UROBILINOGEN 1.0   BILIRUBINUR MODERATE*   BLOODU Negative   GLUCOSEU Negative        Plan per ICU progress note for 9/5/2021  - [facility] protocol   - PT/INR tomorrow  - Keep SBP less than 160 mmHg    The objective and subjective findings as well as the ICU course of treatment have been reviewed with the ICU team. The treatment plan has been reviewed with the ICU team. The patient is being transferred to [place] [address] in stable condition.      Shell Guaman MD  Internal Medicine Resident, PGY-1  Contact via [facility]  9/5/2021, 7:55 PM

## 2021-09-05 NOTE — PROGRESS NOTES
Progress Note  Admit Date: 9/3/2021           CC: F/U for Recurrent falls with Intracranial hemorrhage        59 y.o. male with Hx of alcohol abuse, CAD s/p LAD stent /2020 on plavix, History of mural thrombus secondary to anterior apical MI on eliquis, CHF (EF 50-55%), HTN, and DVT 5/20/20  Who presented to the ED 9/3/2021 with recurrent falls with head injury. Reportedly has been increasingly unbalanced and has had recurrent falls, including at least two episodes approximately 1 week ago. In the last episode he states he hit his head. His sister states that these falls episodes are related also with diarrhea and dehydration days before the fall. Appears to have been admitted 8/22-8/23 with N/V/diarrhea and falls as well as multiple electrolyte abnormalities. He developed difficulty findings words and some confusion. His sister noticed the his confusion and difficulty expressing words when she got joseph    Patient denied exertional chest pain, dyspnea, palpitations, syncope, or prodromal symptoms related to the fall. He also patient denied headache diplopia, dysphasia, or unilateral disturbance of motor or sensory function. No loss of balance or vertigo.      Patient also complained of abdominal pain in his RUQ.  and bilateral edema, worse  in his right leg. In the ED, he had satisfactory vitals. Labwork showed macrocytosis (B12, Folate WNL 1/2021) and elevated liver enzymes mostly AST, ALP and mild increase in total Bilirubin. CT scan showed multifocal acute intraparenchymal hemorrhage in the left frontoparietal lobe, small acute subarachnoid hemorrhage and bifrontal sulci, and a small subdural hematoma along the left temporal convexity and left anterior tentorium; and small right frontotemporal scalp hematoma. Vanessa Georgia was ordered and patient was admitted to the ICU.       Interval History:   No new neuro s/s  Denies HA, N/V  Still has abd pain. Points to the LLQ.    No chest pain  No dyspnea      Review of Systems - Negative except  As in HPI. Scheduled Medications:    thiamine (VITAMIN B1) IVPB  100 mg IntraVENous Q24H    sodium chloride flush  5-40 mL IntraVENous 2 times per day    levetiracetam  500 mg IntraVENous Q12H    atorvastatin  40 mg Oral Daily    folic acid  1 mg Oral Daily    vitamin B-12  100 mcg Oral Nightly    carvedilol  3.125 mg Oral BID    magnesium oxide  200 mg Oral Daily    lisinopril  2.5 mg Oral Daily      PRN Medications: sodium chloride flush, sodium chloride, acetaminophen, ondansetron **OR** ondansetron, labetalol  Diet: ADULT DIET; Full Liquid    Continuous Infusions:   sodium chloride 25 mL (09/04/21 1847)       PHYSICAL EXAM:  /83   Pulse 81   Temp 98.4 °F (36.9 °C) (Oral)   Resp 22   Wt 174 lb 9.7 oz (79.2 kg)   SpO2 96%   BMI 25.78 kg/m²   No results for input(s): POCGLU in the last 72 hours. Intake/Output Summary (Last 24 hours) at 9/5/2021 1140  Last data filed at 9/5/2021 9801  Gross per 24 hour   Intake 170 ml   Output 165 ml   Net 5 ml       General appearance: alert, appears stated age and cooperative  Head: Normocephalic, without obvious abnormality, atraumatic  Eyes: conjunctivae/corneas clear. PERRL, EOM's intact. Fundi benign. Neck: no adenopathy, no carotid bruit, no JVD, supple, symmetrical, trachea midline and thyroid not enlarged, symmetric, no tenderness/mass/nodules  Lungs: clear to auscultation bilaterally  Heart: regular rate and rhythm, S1, S2 normal, no murmur, click, rub or gallop  Abdomen: soft, non-tender; bowel sounds normal; no masses,  no organomegaly  Extremities: extremities normal, atraumatic, no cyanosis.  No edema  Pulses: 2+ and symmetric  Skin: Abrasion present on the right side of the head in temporal region   Neurologic: Grossly normal    LABS:  Recent Labs     09/03/21 2233 09/04/21  0327 09/05/21  0515   WBC 11.8* 11.5* 7.8   HGB 13.2* 12.2* 11.6*   HCT 38.1* 35.1* 33.6*    192 107* Recent Labs     09/03/21  2233 09/04/21  0327 09/05/21  0516   * 128* 133*   K 3.5 3.8 3.8   CL 90* 91* 96*   CO2 24 25 24   BUN 5* 6* 8   CREATININE 0.7* 0.6* <0.5*   GLUCOSE 138* 119* 90     Recent Labs     09/03/21 2233   *   ALT 54*   BILITOT 1.2*   ALKPHOS 240*     Recent Labs     09/03/21 2233   TROPONINI <0.01     No results for input(s): BNP in the last 72 hours. No results for input(s): CHOL, HDL in the last 72 hours. Invalid input(s): LDLCALCU  Recent Labs     09/03/21 2232   INR 1.84*       Assessment & Plan:    59 y.o. male with Hx of alcohol abuse, CAD s/p LAD stent /2020 on plavix, History of mural thrombus secondary to anterior apical MI on eliquis, CHF (EF 50-55%), HTN, and DVT 5/20/20 who presented to the ED 9/3/2021 with recurrent falls with head injury. Traumatic ICH secondary to falls, complicated by anticoagulation, alcoholism   Multifocal acute IPH in the left frontoparietal lobe  Acute SAH in bifrontal sulci   Small SDH along the left temporal convexity and the left anterior tentorium:   Right frontotemporal scalp hematoma  - Sec to recurrent falls, alcoholism  - S/p KCentra to reverse AC  - Stability CTH: stable  - Seen by NSGY:  no acute sx intervention anticipated   - Neurochecks Q1H if changes contact NSGY  - Keep HOB >30 degrees   - MRI Brain w wo r/o brain mass: pending   - Maintain SBP <160;  If PRN med insufficient, then may start Nicardipine infusion  - Keep Plt >100k & INR <1.4  - Hold all anticoagulation & antiplatelet for 2 weeks, and until cleared by neurosurgery  - Seizure prophylaxis: Keppra 1000 mg loading dose then 500 mg BID for 2 weeks   - DVT Prophylaxis: SCD's      Recurrent Falls: likely multifactorial etiology  - Multiple falls-1 week ago with head injury during the last fall  - Falls likely sec to alcoholism, hx of balance issues, with tremors  - Also recent hospital stay; admitted 8/22-8/23 with N/V/diarrhea and falls as well as multiple electrolyte abnormalities. - At this time, does not appear other etiology, but must continue surveillance: Update B12, TSH levels, Telemetry  - Continue mgt for acute issues that may precipitate or increase fall risk  - PT/OT eval      Chronic alcohol abuse  Alcoholic hepatitis   Macrocytosis sec to alcohol  Lactic acidosis sec to alcohol  - Last drink reportedly was a week ago. Patient asymptomatic   - Continue thiamine, MVI   - Monitor for s/s of withdrawal  - Social works consult       #Chronic anticoagulation  - Documentation suggests for prior DVT; although also mention of History of mural thrombus secondary to anterior apical MI on eliquis,  - Patient with bilateral leg edema, more accentuated in the right lower extremity where she also endorses mild cramping pain. Patient was on Eliquis.   - Hold all anticoagulation & antiplatelet for 2 weeks due to [address]  - F/u LE doppler: if DVT, then will need IVC filter  - Obtain echo to eval mural thrombus      #CAD with LAD stent  Chronic CHF (EF 50-55%)  - Appears stable, appears euvolemic at this time  - EKG normal sinus rhythm, nonspecific ST and T waves changes. - Hold antiplatelet for 2 weeks, and until cleared by neurosurgery  - Also need to review choice of antiplatelets   - Continue home meds when able to PO      #Essential hypertension  - BP at goal  -Continue home meds  -Labetalol PRN SBP<160        Abdominal pain   Elevated LFTs   - Abdominal pain  - Appears was felt to be in his RUQ on admission, but on my eval, appears more Right lumber and RLQ  - Apart from tenderness, abdominal exam benign  -  Alk Phos 240, , ALT 55 Bili 1.2.  - CT abd/pelvis without contrast: revealed mild acites w chronic hepatocellular disease likely sec to ETOH.   - UA: not supportive of infection  - RUQ ultrasound ordered: follow report.   - Serial abdominal exam  - If persists, may need repeat imaging with contrast enhancement. #Chronic diarrhea  - Last bowel movement 2 days back   - Stool studies pending   - Review any previous endoscopies. - Re-eval if diarrhea recurs.         The patient and / or the family were informed of the results of any tests, a time was given to answer questions, a plan was proposed and they agreed with plan. Full Code    Disposition: Pxt is in the ICU. Appears stable for transfer when ok with NSGY/CC service.        Vimal Villavicencio MD

## 2021-09-05 NOTE — PROGRESS NOTES
Clinical Pharmacy Progress Note     All IVs in Normal Saline - Management by Pharmacy    Consult Date(s): 9/4/21  Consulting Provider(s): Dr. Jose Ramachandran     Neurologic Injury (Intraparenchymal brain hemorrhage/SDH) - All IVs in Normal Saline   Drips will be adjusted to normal saline as appropriate based on compatibility, in an effort to avoid fluid shifts, as D5W is osmotically active.  The following intermittent IV drips / infusions have been adjusted to saline:  o Keppra IVPB - Already in 0.9%NS   Total IV fluid delivered to patient over last 24 hrs: ~200 mL   RPh will follow daily to ensure all IVPBs / drips are in NS where possible. Please call with any questions.   Jeff Nuñez PharmD, BCPS  Wireless: [id number]  Main pharmacy: [id number]  9/5/2021 9:58 AM

## 2021-09-06 LAB
ESTIMATED AVERAGE GLUCOSE: 93.9 MG/DL
HBA1C MFR BLD: 4.9 %
HCT VFR BLD CALC: 38.1 % (ref 40.5–52.5)
HEMOGLOBIN: 12.8 G/DL (ref 13.5–17.5)
INR BLD: 1.59 (ref 0.88–1.12)
MCH RBC QN AUTO: 36.8 PG (ref 26–34)
MCHC RBC AUTO-ENTMCNC: 33.7 G/DL (ref 31–36)
MCV RBC AUTO: 109.3 FL (ref 80–100)
PDW BLD-RTO: 15.2 % (ref 12.4–15.4)
PLATELET # BLD: 108 K/UL (ref 135–450)
PMV BLD AUTO: 8.8 FL (ref 5–10.5)
PROTHROMBIN TIME: 18.3 SEC (ref 9.9–12.7)
RBC # BLD: 3.48 M/UL (ref 4.2–5.9)
WBC # BLD: 9.6 K/UL (ref 4–11)

## 2021-09-06 PROCEDURE — 92526 ORAL FUNCTION THERAPY: CPT

## 2021-09-06 PROCEDURE — 6370000000 HC RX 637 (ALT 250 FOR IP): Performed by: STUDENT IN AN ORGANIZED HEALTH CARE EDUCATION/TRAINING PROGRAM

## 2021-09-06 PROCEDURE — 6360000002 HC RX W HCPCS: Performed by: STUDENT IN AN ORGANIZED HEALTH CARE EDUCATION/TRAINING PROGRAM

## 2021-09-06 PROCEDURE — 92507 TX SP LANG VOICE COMM INDIV: CPT

## 2021-09-06 PROCEDURE — 2580000003 HC RX 258: Performed by: STUDENT IN AN ORGANIZED HEALTH CARE EDUCATION/TRAINING PROGRAM

## 2021-09-06 PROCEDURE — 6370000000 HC RX 637 (ALT 250 FOR IP)

## 2021-09-06 PROCEDURE — 2580000003 HC RX 258

## 2021-09-06 PROCEDURE — 85027 COMPLETE CBC AUTOMATED: CPT

## 2021-09-06 PROCEDURE — 36415 COLL VENOUS BLD VENIPUNCTURE: CPT

## 2021-09-06 PROCEDURE — 2060000000 HC ICU INTERMEDIATE R&B

## 2021-09-06 PROCEDURE — 94761 N-INVAS EAR/PLS OXIMETRY MLT: CPT

## 2021-09-06 PROCEDURE — 6360000002 HC RX W HCPCS

## 2021-09-06 PROCEDURE — 85610 PROTHROMBIN TIME: CPT

## 2021-09-06 RX ORDER — PHYTONADIONE 5 MG/1
5 TABLET ORAL ONCE
Status: COMPLETED | OUTPATIENT
Start: 2021-09-06 | End: 2021-09-06

## 2021-09-06 RX ADMIN — Medication 10 ML: at 20:54

## 2021-09-06 RX ADMIN — LISINOPRIL 2.5 MG: 2.5 TABLET ORAL at 09:08

## 2021-09-06 RX ADMIN — SODIUM CHLORIDE 500 MG: 900 INJECTION, SOLUTION INTRAVENOUS at 07:19

## 2021-09-06 RX ADMIN — SODIUM CHLORIDE 500 MG: 900 INJECTION, SOLUTION INTRAVENOUS at 18:34

## 2021-09-06 RX ADMIN — CARVEDILOL 3.12 MG: 3.12 TABLET, FILM COATED ORAL at 09:08

## 2021-09-06 RX ADMIN — Medication 10 ML: at 20:53

## 2021-09-06 RX ADMIN — ATORVASTATIN CALCIUM 40 MG: 40 TABLET, FILM COATED ORAL at 09:08

## 2021-09-06 RX ADMIN — THIAMINE HYDROCHLORIDE 100 MG: 100 INJECTION, SOLUTION INTRAMUSCULAR; INTRAVENOUS at 11:21

## 2021-09-06 RX ADMIN — FOLIC ACID 1 MG: 1 TABLET ORAL at 09:08

## 2021-09-06 RX ADMIN — VITAM B12 100 MCG: 100 TAB at 20:53

## 2021-09-06 RX ADMIN — Medication 10 ML: at 09:11

## 2021-09-06 RX ADMIN — CARVEDILOL 3.12 MG: 3.12 TABLET, FILM COATED ORAL at 20:53

## 2021-09-06 RX ADMIN — SODIUM CHLORIDE 25 ML: 9 INJECTION, SOLUTION INTRAVENOUS at 18:32

## 2021-09-06 RX ADMIN — MAGNESIUM OXIDE TAB 400 MG (240 MG ELEMENTAL MG) 200 MG: 400 (240 MG) TAB at 09:08

## 2021-09-06 RX ADMIN — PHYTONADIONE 5 MG: 5 TABLET ORAL at 14:56

## 2021-09-06 ASSESSMENT — PAIN DESCRIPTION - ONSET
ONSET: ON-GOING
ONSET: ON-GOING

## 2021-09-06 ASSESSMENT — PAIN DESCRIPTION - PROGRESSION
CLINICAL_PROGRESSION: NOT CHANGED
CLINICAL_PROGRESSION: NOT CHANGED

## 2021-09-06 ASSESSMENT — PAIN SCALES - GENERAL
PAINLEVEL_OUTOF10: 7
PAINLEVEL_OUTOF10: 0
PAINLEVEL_OUTOF10: 7
PAINLEVEL_OUTOF10: 0
PAINLEVEL_OUTOF10: 0

## 2021-09-06 ASSESSMENT — PAIN DESCRIPTION - ORIENTATION
ORIENTATION: RIGHT
ORIENTATION: RIGHT

## 2021-09-06 ASSESSMENT — PAIN DESCRIPTION - DESCRIPTORS
DESCRIPTORS: ACHING;CONSTANT
DESCRIPTORS: ACHING;CONSTANT

## 2021-09-06 ASSESSMENT — PAIN DESCRIPTION - LOCATION
LOCATION: ABDOMEN
LOCATION: ABDOMEN

## 2021-09-06 ASSESSMENT — PAIN DESCRIPTION - FREQUENCY
FREQUENCY: CONTINUOUS
FREQUENCY: CONTINUOUS

## 2021-09-06 ASSESSMENT — PAIN DESCRIPTION - DIRECTION: RADIATING_TOWARDS: N/ A

## 2021-09-06 ASSESSMENT — PAIN DESCRIPTION - PAIN TYPE
TYPE: ACUTE PAIN
TYPE: ACUTE PAIN

## 2021-09-06 ASSESSMENT — ENCOUNTER SYMPTOMS
NAUSEA: 0
ABDOMINAL DISTENTION: 0
ABDOMINAL PAIN: 0
VOMITING: 0
SHORTNESS OF BREATH: 0
COUGH: 0

## 2021-09-06 ASSESSMENT — PAIN - FUNCTIONAL ASSESSMENT
PAIN_FUNCTIONAL_ASSESSMENT: PREVENTS OR INTERFERES SOME ACTIVE ACTIVITIES AND ADLS
PAIN_FUNCTIONAL_ASSESSMENT: PREVENTS OR INTERFERES SOME ACTIVE ACTIVITIES AND ADLS

## 2021-09-06 NOTE — PLAN OF CARE
Problem: Falls - Risk of:  Goal: Will remain free from falls  Description: Will remain free from falls  9/6/2021 1803 by Dharmesh Cintron RN  Outcome: Ongoing   All fall precautions in place. Bed locked and in lowest position with alarm on. Over-bed table and personal belongings within reach. Patient instructed to use call light for assistance. Non-skids socks on. Video monitoring on in patient room for safety and seizure precautions. Will continue to monitor. Problem: COMMUNICATION IMPAIRMENT  Goal: Ability to express needs and understand communication  9/6/2021 1803 by Dharmesh Cintron RN  Outcome: Ongoing  Patient oriented only to self. Needs frequent reorientation. Often answers questions with nonsensical sentences.

## 2021-09-06 NOTE — PLAN OF CARE
Problem: Falls - Risk of:  Goal: Will remain free from falls  Description: Will remain free from falls  Outcome: Ongoing  Note: Patient is a high fall risk with a rice fall score of 85. Bed in lowest position with wheels locked. Patient instructed on use of call light for assistance. Continuing to frequently round on patient to offer assistance with pain, positioning, toileting etc.  Will continue to monitor. Problem: Pain:  Goal: Control of acute pain  Description: Control of acute pain  Outcome: Ongoing     Problem: Skin Integrity:  Goal: Absence of new skin breakdown  Description: Absence of new skin breakdown  Outcome: Ongoing  Note: No new signs of skin breakdown assessed during shift. Patient encouraged to reposition self in bed. Patient continues to be checked and changed as needed. Will continue to monitor.       Problem: HEMODYNAMIC STATUS  Goal: Patient has stable vital signs and fluid balance  Outcome: Ongoing     Problem: ACTIVITY INTOLERANCE/IMPAIRED MOBILITY  Goal: Mobility/activity is maintained at optimum level for patient  Outcome: Ongoing     Problem: COMMUNICATION IMPAIRMENT  Goal: Ability to express needs and understand communication  Outcome: Ongoing

## 2021-09-06 NOTE — PROGRESS NOTES
Speech Language Pathology  Facility/Department: [facility] ICU  Dysphagia Daily Treatment Note    NAME: Rand Valadez  : 1960  MRN: [id number]    Patient Diagnosis(es):   Patient Active Problem List    Diagnosis Date Noted    Intracranial hemorrhage ([facility] [address] 2021    Electrolyte abnormality 2021    Alcohol abuse with withdrawal ([facility] [address] 01/10/2021    Hypokalemia 2020    Acute deep vein thrombosis of left lower extremity ([facility] 2020    Abnormal angiogram 2020    Mural thrombus of cardiac apex     Coronary artery disease due to lipid rich plaque      Allergies: No Known Allergies      Chart reviewed. Pain: Grimacing intermittently but continued to deny pain    Medical diagnosis: ICH  Treatment diagnosis: mixed receptive / expressive aphasia    Subjective: Pt in bed, alert. Agitation exhibited throughout - appeared exacerbated d/t language impairments with poor insight into errors. Treatment:  Pt seen to address the following goals:  Goal 1: Patient will follow 2 step directions with 80% accuracy given min cues. : Mod cues required to follow 1 step directions. Continue    Goal 2: Patient will complete graded naming tasks with 90% accuracy given min cues. : <25% accuracy for naming items in concrete basic category - expression marked by neologisms and jargon without awareness. Continue goal, consider modifying criterion given severity of deficits exhibited this date. Goal 3: Patient will complete repetition tasks with 80% accuracy given min cues. : 0% accuracy for repeating target words for naming - responded \"yeah\" to SLP and no initiation to repeat. Continue goal.    Goal 4: Patient will improve reading comprehension at the sentence level with min cues. : Goal not targeted. Goal 5: Patient will tolerate ongoing cognitive-linguistic assessment. : Suspected R inattention x 1 when pt knocked cup over on table with RUE. Stated \"that wasn't me. \" Dependent to identify errors made, indicated insight x 1 during session otherwise absent error awareness. Continue goal.    Education:  Attempted to educate pt to role of SLP, purpose of visit, aphasia, and deficits noted as well as importance of ST. Pt unreceptive to education and unable to demonstrate comprehension given language impairments. Assessment: Fluent aphasia with severely impaired comprehension and verbal expression marked by neologisms and jargon without awareness. Agitated with poor insight into deficits limiting meaningful participation and stimulability in tx. Prognosis for improvement guarded. Plan:  Continue speech/language therapy to address above goals, 2-4 x/week x LOS  DC recommendations: ongoing ST indicated    D/W nursing, Mervin  Needs met prior to leaving room, call button in reach. Treatment time: 15 min speech tx      Amber Armas MA CCC-SLP; [id number]  Speech-Language Pathologist  Pager # [phone]  Phone # [phone]  Office # [phone]    If patient is discharged prior to next session, this note will serve as discharge summary.

## 2021-09-06 NOTE — PROGRESS NOTES
vomiting.      His sister reports that the patient complained this morning of some abdominal pain but the patient was not able to described it correctly and had some difficult findings words and some confusion. CT scan was done and showed multifocal acute intraparenchymal hemorrhage in the left frontoparietal lobe, small acute subarachnoid hemorrhage and bifrontal sulci, and a small subdural hematoma along the left temporal convexity and left anterior tentorium.  And small right frontotemporal scalp hematoma. Neurosurgery was consulted and recommended to reverse the anticoagulation. Annie Munda was ordered. Patient was admitted to the ICU for close neurological follow up.      Medications:     Scheduled Meds:   thiamine (VITAMIN B1) IVPB  100 mg IntraVENous Q24H    sodium chloride flush  5-40 mL IntraVENous 2 times per day    sodium chloride flush  5-40 mL IntraVENous 2 times per day    levetiracetam  500 mg IntraVENous Q12H    atorvastatin  40 mg Oral Daily    folic acid  1 mg Oral Daily    vitamin B-12  100 mcg Oral Nightly    carvedilol  3.125 mg Oral BID    magnesium oxide  200 mg Oral Daily    lisinopril  2.5 mg Oral Daily     Continuous Infusions:   sodium chloride      sodium chloride 25 mL (09/04/21 1847)     PRN Meds:sodium chloride flush, sodium chloride, sodium chloride flush, sodium chloride, acetaminophen, ondansetron **OR** ondansetron, labetalol    Objective:   Vitals:   T-max:  Patient Vitals for the past 8 hrs:   BP Temp Temp src Pulse Resp SpO2   09/06/21 0600 134/76 -- -- 76 -- 97 %   09/06/21 0500 (!) 126/108 -- -- 87 -- 96 %   09/06/21 0400 120/70 98.1 °F (36.7 °C) Oral 80 20 96 %   09/06/21 0300 124/77 -- -- 87 26 --   09/06/21 0200 128/79 -- -- 87 23 96 %   09/06/21 0100 118/74 -- -- 85 24 --       Intake/Output Summary (Last 24 hours) at 9/6/2021 0800  Last data filed at 9/6/2021 0549  Gross per 24 hour   Intake 413 ml   Output 100 ml   Net 313 ml       Review of Systems Constitutional: Negative for chills and fever. Respiratory: Negative for cough and shortness of breath. Cardiovascular: Negative for chest pain, palpitations and leg swelling. Gastrointestinal: Negative for abdominal distention, abdominal pain, nausea and vomiting. Neurological: Positive for tremors. Negative for weakness and headaches. Physical Exam  Constitutional:       General: He is not in acute distress. Appearance: Normal appearance. HENT:      Head: Normocephalic and atraumatic. Eyes:      Extraocular Movements: Extraocular movements intact. Cardiovascular:      Rate and Rhythm: Normal rate and regular rhythm. Pulses: Normal pulses. Pulmonary:      Effort: Pulmonary effort is normal.      Breath sounds: Normal breath sounds. No wheezing, rhonchi or rales. Abdominal:      General: Abdomen is flat. There is no distension. Palpations: Abdomen is soft. Tenderness: There is no abdominal tenderness. Neurological:      General: No focal deficit present. Mental Status: He is alert. Cranial Nerves: No cranial nerve deficit. Motor: No weakness. LABS:    CBC:   Recent Labs     09/04/21 0327 09/05/21 0515 09/06/21 0614   WBC 11.5* 7.8 9.6   HGB 12.2* 11.6* 12.8*   HCT 35.1* 33.6* 38.1*    107* 108*   .9* 106.8* 109.3*     Renal:    Recent Labs     09/03/21 2233 09/04/21 0327 09/05/21 0516   * 128* 133*   K 3.5 3.8 3.8   CL 90* 91* 96*   CO2 24 25 24   BUN 5* 6* 8   CREATININE 0.7* 0.6* <0.5*   GLUCOSE 138* 119* 90   CALCIUM 8.1* 7.8* 7.8*   MG 1.60*  --   --    ANIONGAP 14 12 13     Hepatic:   Recent Labs     09/03/21 2233   *   ALT 54*   BILITOT 1.2*   PROT 7.0   LABALBU 2.9*   ALKPHOS 240*     Troponin:   Recent Labs     09/03/21 2233   TROPONINI <0.01     BNP: No results for input(s): BNP in the last 72 hours.   Lipids:   Recent Labs     09/05/21 0516   CHOL 69   HDL 39*     ABGs:  No results for input(s): PHART, JQK5MZH, PO2ART, EAY3HEO, BEART, THGBART, H4KTUEZL, UHK5OEQ in the last 72 hours. INR:   Recent Labs     09/03/21  2232 09/06/21  0614   INR 1.84* 1.59*     Lactate: No results for input(s): LACTATE in the last 72 hours. Cultures:  -----------------------------------------------------------------  RAD:   CT ABDOMEN PELVIS WO CONTRAST Additional Contrast? None   Final Result      1. Mild ascites. Chronic hepatocellular disease. 2.  Small bilateral pleural effusions. XR ABDOMEN (KUB) (SINGLE AP VIEW)   Final Result   Impression:   1. Nonobstructive small bowel gas pattern. CT head without contrast   Final Result   Impression:   Stable acute parenchymal hemorrhage left frontoparietal lobe. 2. Acute bilateral subarachnoid hemorrhage stable. 3. Subdural hematoma stable. 4. Acute intradural extra medullary hemorrhage surrounding the upper cervical cord stable. 4. Parenchymal volume loss and chronic small vessel ischemic changes in the white matter. 5. Remote infarcts as above. CT HEAD WO CONTRAST   Final Result      1. Multifocal acute intraparenchymal hemorrhage in the left frontoparietal lobe. Small volume acute subarachnoid hemorrhage in bifrontal sulci, and small subdural hematoma along the left temporal convexity and left anterior tentorium. 2.  Small right frontotemporal scalp hematoma. Discussed with Dr. Henrry Stover at 1116 hours. XR CHEST (2 VW)   Final Result      No acute pulmonary disease.          US GALLBLADDER RUQ    (Results Pending)   VL Extremity Venous Bilateral    (Results Pending)       Assessment/Plan:       ICH 2/2 mechanical fall  -Had two falls week prior to presentation, hit his head second time  -CT (9/3) - multifocal acute intraparenchymal hemorrhage in left frontoparietal lobe, acute SAH in bifrontal sulci, small SDH along the left temporal convexity and the left anterior tentorium- repeat CT stable  - Maintain SBP < 160   - In 120s-140s with 2.5 lisinopril and 3.125 carvedilol    - neurochecks q4hrs   - NSGY consulted - no acute intervention  - Hold AC/antiplatelets for 2 weeks; keep INR < 1.4   - INR 1.59, takes 5 mg eliquis and 75 mg plavix    - keppra 500 mg q12  - SLP eval (9/6): Recommend trial advancement to dysphagia soft and bite sized + thin liquids   -PT/OT eval    Alcoholic hepatitis/alcohol abuse   - AST/ALT: 225/54;  Alk phos 240, Lipase 30, Bili 1.2, INR 1.59  - Hgb 12.8, .3   - lactic acid - previously 5.4 on admission, trended down to 1.4  - hepatitis panel negative  - CT abd: mild ascites w/ chronic hepatocellular disease 2/2 EtOH  Plan:  - - will need outpatient follow up for alcoholic hepatitis/early cirrhosis and alcohol abuse  - f/u RUQ ultrasound   - [facility] protocol   - thiamine, folate, B12  - f/u TSH    CAD/chronic combined systolic/diastolic HF; not in acute exacerbation   - stent placement 5/20, echo 1/21  -   Plan:  - continue lisinopril and coreg  - hold antiplatelets for 2 weeks    H/o DVT/PE  -acute LLE DVT in 5/2020 found onadmission for cellulitis, found to have LV thrombus on echo, discharged on warfarin, currently on eliquis   Plan:  -follow up LE doppler    HTN  - controlled with usual home meds  - Labetalol PRN      Code Status: Full  FEN: full liquid  PPX: SCD  DISPO: Delma Rush MD, PGY-1  09/06/21  8:00 AM    This patient has been staffed and discussed with Lexi Padgett MD.

## 2021-09-06 NOTE — PROGRESS NOTES
Physical Therapy/Occupational Therapy  No treatment  Chart reviewed for PT/OT evaluation. Patient with LE dopplers pending to rule out DVT. Will hold treatment until results of test are known and patient has been cleared for mobility. RN aware and in agreement with plan.       Nain CARE[facility].  Ronel Ferguson, OTR/L #[id number]

## 2021-09-06 NOTE — PROGRESS NOTES
Speech Language Pathology  Facility/Department: [facility] ICU  Dysphagia Daily Treatment Note    NAME: Dana Payne  : 1960  MRN: [id number]    Patient Diagnosis(es):   Patient Active Problem List    Diagnosis Date Noted    Intracranial hemorrhage ([facility] 2021    Electrolyte abnormality 2021    Alcohol abuse with withdrawal ([facility] 01/10/2021    Hypokalemia 2020    Acute deep vein thrombosis of left lower extremity ([facility] 2020    Abnormal angiogram 2020    Mural thrombus of cardiac apex     Coronary artery disease due to lipid rich plaque      Allergies: No Known Allergies    Recent Chest Xray: (2021)      Impression       No acute pulmonary disease.            Recent Head CT (2021): Impression:   Stable acute parenchymal hemorrhage left frontoparietal lobe. 2. Acute bilateral subarachnoid hemorrhage stable. 3. Subdural hematoma stable. 4. Acute intradural extra medullary hemorrhage surrounding the upper cervical cord stable. 4. Parenchymal volume loss and chronic small vessel ischemic changes in the white matter. 5. Remote infarcts as above. Chart reviewed. Medical Diagnosis: ICH  Treatment Diagnosis: dysphagia     BSE Impression 21  Needs further assessment; limited PO trials (pt declined all solid PO except some puree). Assessed tolerance thins via tsp/straw/cup, puree, whole meds (administeredy RN). Pt with positive oral acceptance, adequate oral prep, positive laryngeal swallow movement, no overt s/s aspiration/penetration. Pt did not complete full 3 oz swallow screen (did not follow direction, endorsed fullness), however did complete sequential swallow of thins w/o issue (with RN also reporting pt passed her screen earlier). Recommend full liquid diet, as tolerated.     MBS results - not indicated this date; suspect pt would not participate     Pain: grimacing intermittently but continued to deny pain    Current Diet : full liquid diet (recommend advance to dysphagia soft and bite sized + thin liquids)    Treatment:  Pt seen bedside to address the following goals:  Goal 1: Patient will tolerate least restrictive diet without overt signs of aspiration or associated decline in respiratory status. 9/5- RN reported no concerns re:swallowing. Pt alert, but not very receptive to treatment. Pt reluctantly took 1 trial with ice chip, one sip of water by straw and one bite of a cracker. Mastication with ice chip and cracker were prolonged. Pt with limited dentition. Pt c/o feeling as if he was going to vomit after trial with cracker. Emesis basin provided. Pt displayed no s/s aspiration with any consistency. Attempted to have conversation with pt re: possible diet upgrade, preferences, but pt not receptive, stating he hasn't eaten much, and con't to complain of abdominal pain. Recommend con't full liquid diet. con't goal  9/6: Pt continued with poor participation in dysphagia tx, requiring MAX encouragement to take PO. Analyzed pt with bites of soft solid sandwich, mechanical soft gelatin, puree, thin liquids via straw, and mixed consistency (meds + thin liquids administered by RN at beginning of session). Dependent on RN and SLP to sequencing taking medication whole with thins, poor awareness if medications in oral cavity (attempted to swallow while pills still in medicine cup). Mastication of soft solid prolonged but with functional rotary pattern - most likely prolonged d/t missing dentition and reduced desire for solid PO. Oral phase WFL for all other trials. No overt s/s associated with aspiration exhibited with any consistency trialed - no cough or throat clear, no wet voice. Pt not able to follow directions for 3 oz test. Recommend trial advancement to dysphagia soft and bite sized + thin liquids - if difficulty with solids emerges, change back to full liquids. Goal met - continue with advanced diet.     Goal 2: Patient/caregiver will demonstrate understanding of swallowing concerns/recommendations. 9/5- attempted to educate pt to the purpose of the visit and possibility for diet upgrade, but pt not receptive to teaching. con't goal   9/6: Attempted to educate to rationale for session, concerns for dysphagia / aspiration s/p ICH, and need for assessment to advance diet. Pt unable to demonstrate comprehension 2/2 aphasia and perseverative on \"I don't want anything right now\" when attempting to discuss diet advancement. D/C goal - not indicated at this time given pt's language deficits. Patient/Family/Caregiver Education:  As above    Compensatory Strategies:  Upright for all PO intake and 30\" after meals       Plan:  Dysphagia f/u 1-3x for LOS  Diet recommendations:  Recommend trial advancement to dysphagia soft and bite sized + thin liquids - if difficulty with solids emerges, change back to full liquids and notify SLP    DC recommendation: most likely will not require f/u for dysphagia; recommend ongoing ST for aphasia  Treatment: 10 min  D/W nursing, Mervin   Needs met prior to leaving room, call button in reach. Maurilio Matos MA CCC-SLP; [phone]  Speech-Language Pathologist  Pager # [phone]  Phone # [phone]  Office # [phone]    If patient is discharged prior to next session, this note will serve as discharge summary.

## 2021-09-06 NOTE — PROGRESS NOTES
Clinical Pharmacy Progress Note     All IVs in Normal Saline - Management by Pharmacy    Consult Date(s): 9/4/21  Consulting Provider(s): Dr. Roldan Park     Neurologic Injury (Intraparenchymal brain hemorrhage/SDH) - All IVs in Normal Saline-- It has been 48 hours since this consult has been started, Pharmacy will not sign off. Please call with any questions.   Bruce Han, PharmD  PGY-1 Pharmacy Resident  [id number]  9/6/2021 7:08 AM

## 2021-09-06 NOTE — PROGRESS NOTES
Patient transferred from ICU to room 5526. Patient alert, able to follow commands, but is oriented only to self and . Otherwise disoriented. Answers questions with nonsensical sentences. NIH of 5 for confusion and speech difficulty. Patient incontinent of urine. Patient oriented to room, call light, and fall precautions. 4 Eyes Skin Assessment completed with RN x2. Video monitoring on in patient room for seizure precautions and safety.

## 2021-09-06 NOTE — PROGRESS NOTES
/4/ Eyes Admission Assessment     TRANSFER FROM ICU 9/6/21    I agree as the admission nurse that 2 RN's have performed a thorough Head to Toe Skin Assessment on the patient. ALL assessment sites listed below have been assessed on admission. Areas assessed by both nurses:   [x]   Head, Face, and Ears   [x]   Shoulders, Back, and Chest  [x]   Arms, Elbows, and Hands   [x]   Coccyx, Sacrum, and Ischium  [x]   Legs, Feet, and Heels        Does the Patient have Skin Breakdown?     Scattered bruising  Bruising to forearms  Scattered abrasions        Mitch Prevention initiated:  NA   Wound Care Orders initiated:  NA      WOC nurse consulted for Pressure Injury (Stage 3,4, Unstageable, DTI, NWPT, and Complex wounds) or Mitch score 18 or lower:  NA      Nurse 1 eSignature: Electronically signed by Olive Le RN on 9/6/21 at 4:15 PM EDT    **SHARE this note so that the co-signing nurse is able to place an eSignature**    Nurse 2 eSignature: Electronically signed by Surekha Ellison RN on 9/6/21 at 4:18 PM EDT

## 2021-09-06 NOTE — PLAN OF CARE
Problem: Falls - Risk of:  Goal: Will remain free from falls  Description: Will remain free from falls  9/6/2021 1015 by Toni Elena RN  Outcome: Ongoing  9/6/2021 0523 by Addie Funez RN  Outcome: Ongoing  Note: Patient is a high fall risk with a rice fall score of 85. Bed in lowest position with wheels locked. Patient instructed on use of call light for assistance. Continuing to frequently round on patient to offer assistance with pain, positioning, toileting etc.  Will continue to monitor. Goal: Absence of physical injury  Description: Absence of physical injury  Outcome: Ongoing     Problem: Pain:  Goal: Pain level will decrease  Description: Pain level will decrease  Outcome: Ongoing  Goal: Control of acute pain  Description: Control of acute pain  9/6/2021 1015 by Toni Elena RN  Outcome: Ongoing  9/6/2021 0523 by Addie Funez RN  Outcome: Ongoing  Goal: Control of chronic pain  Description: Control of chronic pain  Outcome: Ongoing     Problem: Skin Integrity:  Goal: Will show no infection signs and symptoms  Description: Will show no infection signs and symptoms  Outcome: Ongoing  Goal: Absence of new skin breakdown  Description: Absence of new skin breakdown  9/6/2021 1015 by Toni Elena RN  Outcome: Ongoing  9/6/2021 0523 by Addie Funez RN  Outcome: Ongoing  Note: No new signs of skin breakdown assessed during shift. Patient encouraged to reposition self in bed. Patient continues to be checked and changed as needed. Will continue to monitor.       Problem: HEMODYNAMIC STATUS  Goal: Patient has stable vital signs and fluid balance  9/6/2021 1015 by Toni Elena RN  Outcome: Ongoing  9/6/2021 0523 by Addie Funez RN  Outcome: Ongoing     Problem: ACTIVITY INTOLERANCE/IMPAIRED MOBILITY  Goal: Mobility/activity is maintained at optimum level for patient  9/6/2021 1015 by Toni Elena RN  Outcome: Ongoing  9/6/2021 0523 by Addie Funez RN  Outcome: Ongoing Problem: COMMUNICATION IMPAIRMENT  Goal: Ability to express needs and understand communication  9/6/2021 1015 by Pham Robertson RN  Outcome: Ongoing  9/6/2021 0523 by Celi Euceda RN  Outcome: Ongoing

## 2021-09-07 ENCOUNTER — APPOINTMENT (OUTPATIENT)
Dept: VASCULAR LAB | Age: 61
DRG: 930 | End: 2021-09-07
Payer: MEDICAID

## 2021-09-07 ENCOUNTER — APPOINTMENT (OUTPATIENT)
Dept: ULTRASOUND IMAGING | Age: 61
DRG: 930 | End: 2021-09-07
Payer: MEDICAID

## 2021-09-07 LAB
AMMONIA: 43 UMOL/L (ref 16–60)
BACTERIA: ABNORMAL /HPF
BILIRUBIN URINE: ABNORMAL
BLOOD, URINE: ABNORMAL
CLARITY: CLEAR
COLOR: ABNORMAL
EPITHELIAL CELLS, UA: ABNORMAL /HPF (ref 0–5)
GLUCOSE URINE: ABNORMAL MG/DL
HCT VFR BLD CALC: 35.3 % (ref 40.5–52.5)
HEMOGLOBIN: 12.2 G/DL (ref 13.5–17.5)
KETONES, URINE: ABNORMAL MG/DL
LEUKOCYTE ESTERASE, URINE: ABNORMAL
MCH RBC QN AUTO: 36.8 PG (ref 26–34)
MCHC RBC AUTO-ENTMCNC: 34.7 G/DL (ref 31–36)
MCV RBC AUTO: 106.2 FL (ref 80–100)
MICROSCOPIC EXAMINATION: YES
MUCUS: ABNORMAL /LPF
NITRITE, URINE: ABNORMAL
PDW BLD-RTO: 15 % (ref 12.4–15.4)
PH UA: ABNORMAL (ref 5–8)
PLATELET # BLD: 125 K/UL (ref 135–450)
PMV BLD AUTO: 8.3 FL (ref 5–10.5)
PROTEIN UA: ABNORMAL MG/DL
RBC # BLD: 3.32 M/UL (ref 4.2–5.9)
RBC UA: ABNORMAL /HPF (ref 0–4)
SPECIFIC GRAVITY UA: 1.02 (ref 1–1.03)
TSH REFLEX: 5.16 UIU/ML (ref 0.27–4.2)
URINE REFLEX TO CULTURE: ABNORMAL
URINE TYPE: ABNORMAL
UROBILINOGEN, URINE: ABNORMAL E.U./DL
VITAMIN B-12: >2000 PG/ML (ref 211–911)
WBC # BLD: 9 K/UL (ref 4–11)
WBC UA: ABNORMAL /HPF (ref 0–5)

## 2021-09-07 PROCEDURE — 51798 US URINE CAPACITY MEASURE: CPT

## 2021-09-07 PROCEDURE — 82140 ASSAY OF AMMONIA: CPT

## 2021-09-07 PROCEDURE — 2580000003 HC RX 258: Performed by: STUDENT IN AN ORGANIZED HEALTH CARE EDUCATION/TRAINING PROGRAM

## 2021-09-07 PROCEDURE — 84443 ASSAY THYROID STIM HORMONE: CPT

## 2021-09-07 PROCEDURE — 6370000000 HC RX 637 (ALT 250 FOR IP): Performed by: INTERNAL MEDICINE

## 2021-09-07 PROCEDURE — 82607 VITAMIN B-12: CPT

## 2021-09-07 PROCEDURE — 6370000000 HC RX 637 (ALT 250 FOR IP): Performed by: STUDENT IN AN ORGANIZED HEALTH CARE EDUCATION/TRAINING PROGRAM

## 2021-09-07 PROCEDURE — 6370000000 HC RX 637 (ALT 250 FOR IP): Performed by: NURSE PRACTITIONER

## 2021-09-07 PROCEDURE — 6360000002 HC RX W HCPCS: Performed by: INTERNAL MEDICINE

## 2021-09-07 PROCEDURE — 36415 COLL VENOUS BLD VENIPUNCTURE: CPT

## 2021-09-07 PROCEDURE — 85027 COMPLETE CBC AUTOMATED: CPT

## 2021-09-07 PROCEDURE — 6360000002 HC RX W HCPCS: Performed by: STUDENT IN AN ORGANIZED HEALTH CARE EDUCATION/TRAINING PROGRAM

## 2021-09-07 PROCEDURE — 76705 ECHO EXAM OF ABDOMEN: CPT

## 2021-09-07 PROCEDURE — 93970 EXTREMITY STUDY: CPT

## 2021-09-07 PROCEDURE — 84439 ASSAY OF FREE THYROXINE: CPT

## 2021-09-07 PROCEDURE — 92526 ORAL FUNCTION THERAPY: CPT

## 2021-09-07 PROCEDURE — 2060000000 HC ICU INTERMEDIATE R&B

## 2021-09-07 PROCEDURE — 81001 URINALYSIS AUTO W/SCOPE: CPT

## 2021-09-07 PROCEDURE — 2500000003 HC RX 250 WO HCPCS: Performed by: INTERNAL MEDICINE

## 2021-09-07 RX ORDER — QUETIAPINE FUMARATE 25 MG/1
25 TABLET, FILM COATED ORAL 2 TIMES DAILY
Status: DISCONTINUED | OUTPATIENT
Start: 2021-09-07 | End: 2021-09-07

## 2021-09-07 RX ORDER — OLANZAPINE 10 MG/1
2.5 INJECTION, POWDER, LYOPHILIZED, FOR SOLUTION INTRAMUSCULAR EVERY 8 HOURS PRN
Status: DISCONTINUED | OUTPATIENT
Start: 2021-09-07 | End: 2021-09-10 | Stop reason: HOSPADM

## 2021-09-07 RX ORDER — LORAZEPAM 2 MG/ML
0.25 INJECTION INTRAMUSCULAR EVERY 4 HOURS PRN
Status: DISCONTINUED | OUTPATIENT
Start: 2021-09-07 | End: 2021-09-10 | Stop reason: HOSPADM

## 2021-09-07 RX ORDER — UREA 10 %
LOTION (ML) TOPICAL
Qty: 90 TABLET | Refills: 3 | Status: SHIPPED | OUTPATIENT
Start: 2021-09-07 | End: 2022-08-01 | Stop reason: SDUPTHER

## 2021-09-07 RX ORDER — MECOBALAMIN 5000 MCG
5 TABLET,DISINTEGRATING ORAL NIGHTLY
Status: DISCONTINUED | OUTPATIENT
Start: 2021-09-07 | End: 2021-09-10 | Stop reason: HOSPADM

## 2021-09-07 RX ORDER — QUETIAPINE FUMARATE 25 MG/1
25 TABLET, FILM COATED ORAL ONCE
Status: COMPLETED | OUTPATIENT
Start: 2021-09-07 | End: 2021-09-07

## 2021-09-07 RX ADMIN — LISINOPRIL 2.5 MG: 2.5 TABLET ORAL at 12:28

## 2021-09-07 RX ADMIN — SODIUM CHLORIDE 500 MG: 900 INJECTION, SOLUTION INTRAVENOUS at 19:04

## 2021-09-07 RX ADMIN — MAGNESIUM OXIDE TAB 400 MG (240 MG ELEMENTAL MG) 200 MG: 400 (240 MG) TAB at 12:28

## 2021-09-07 RX ADMIN — Medication 10 ML: at 12:30

## 2021-09-07 RX ADMIN — LORAZEPAM 0.25 MG: 2 INJECTION INTRAMUSCULAR; INTRAVENOUS at 20:14

## 2021-09-07 RX ADMIN — THIAMINE HYDROCHLORIDE 100 MG: 100 INJECTION, SOLUTION INTRAMUSCULAR; INTRAVENOUS at 14:18

## 2021-09-07 RX ADMIN — CARVEDILOL 3.12 MG: 3.12 TABLET, FILM COATED ORAL at 12:29

## 2021-09-07 RX ADMIN — SODIUM CHLORIDE 25 ML: 9 INJECTION, SOLUTION INTRAVENOUS at 06:59

## 2021-09-07 RX ADMIN — SODIUM CHLORIDE 500 MG: 900 INJECTION, SOLUTION INTRAVENOUS at 07:00

## 2021-09-07 RX ADMIN — FOLIC ACID 1 MG: 1 TABLET ORAL at 12:28

## 2021-09-07 RX ADMIN — CARVEDILOL 3.12 MG: 3.12 TABLET, FILM COATED ORAL at 20:53

## 2021-09-07 RX ADMIN — QUETIAPINE FUMARATE 25 MG: 25 TABLET ORAL at 15:52

## 2021-09-07 RX ADMIN — OLANZAPINE 2.5 MG: 10 INJECTION, POWDER, FOR SOLUTION INTRAMUSCULAR at 18:14

## 2021-09-07 RX ADMIN — VITAM B12 100 MCG: 100 TAB at 20:53

## 2021-09-07 RX ADMIN — Medication 5 MG: at 20:53

## 2021-09-07 RX ADMIN — ATORVASTATIN CALCIUM 40 MG: 40 TABLET, FILM COATED ORAL at 12:28

## 2021-09-07 RX ADMIN — Medication 10 ML: at 12:29

## 2021-09-07 NOTE — TELEPHONE ENCOUNTER
Requested Prescriptions     Pending Prescriptions Disp Refills    vitamin B-12 (CYANOCOBALAMIN) 100 MCG tablet [Pharmacy Med Name: VITAMIN B-12 100MCG TABLETS] 90 tablet 3     Sig: TAKE 1 TABLET BY MOUTH NIGHTLY                  Last Office Visit: 4/15/2021     Next Office Visit: 9/9/2021

## 2021-09-07 NOTE — PROGRESS NOTES
Physician Progress Note      PATIENT:               Jean Serrano  CSN #:                  [id number]  :                       1960  ADMIT DATE:       9/3/2021 9:39 PM  [address] [place] DATE:  RESPONDING  PROVIDER #:        Coco Shay MD          QUERY TEXT:    Patient admitted with [address], with noted chronic diarrhea and noted to have   Sodium: 133- 128- 128. If possible, please document in progress notes and   discharge summary if you are evaluating and/or treating any of the following: The medical record reflects the following:  Risk Factors: Chronic diarrhea, ICH/ SDH/SAH  Clinical Indicators: LA: 5.4- 4.8. Per H&P \"#Chronic diarrhea, rule out   chronic pancreatitis and enzyme deficiency\"  Treatment: Daily lab monitoring  Options provided:  -- Hyponatremia  -- Hyponatremia not clinically significant  -- Other - I will add my own diagnosis  -- Disagree - Not applicable / Not valid  -- Disagree - Clinically unable to determine / Unknown  -- Refer to Clinical Documentation Reviewer    PROVIDER RESPONSE TEXT:    Hyponatremia is not clinically significant. Query created by:  Daniella Barba on 2021 7:51 AM      Electronically signed by:  Coco Shay MD 2021 3:36 PM

## 2021-09-07 NOTE — PROGRESS NOTES
Physician Progress Note      PATIENT:               Case Belcher  CSN #:                  [id number]  :                       1960  ADMIT DATE:       9/3/2021 9:39 PM  [facility] DATE:  RESPONDING  PROVIDER #:        Amy Mehta          QUERY TEXT:    Pt admitted with SAH/ SDH and has CHF documented. If possible, please document   in progress notes and discharge summary further specificity regarding the   type and acuity of CHF:    The medical record reflects the following:  Risk Factors: CAD/ MI hx  Clinical Indicators: Per H&P \"CHF- Last Echo: EF 50-55%. There is hypokinesis   of the basal-mid inferior and inferolateral wall. continue home meds when   able to PO\". Per Cardiology office visit Oct 2020: HFrEF: 35-40%. ECHO 2021: EF: 50-55%, hypokinesis of the basal-mid inferior and inferolateral   wall; ECHO 2020: EF: 35-40%, Global left ventricular hypokinesis . Treatment: Scheduled home dose of Coreg/ Lisinopril when able to take P.O. Options provided:  -- Chronic Systolic CHF/HFrEF  -- Chronic Diastolic CHF/HFpEF  -- Chronic Systolic and Diastolic CHF  -- Other - I will add my own diagnosis  -- Disagree - Not applicable / Not valid  -- Disagree - Clinically unable to determine / Unknown  -- Refer to Clinical Documentation Reviewer    PROVIDER RESPONSE TEXT:    Chronic systolic CHF/HFrEF    Query created by: Ludmila Calderón on 2021 7:12 AM      QUERY TEXT:    Patient admitted with ICH, noted to have Lactic Acid: 5.4- 4.8. If possible,   please document in progress notes and discharge summary if you are evaluating   and/or treating any of the following: The medical record reflects the following:  Risk Factors: Chronic diarrhea, ICH/ SDH/SAH  Clinical Indicators: LA: 5.4- 4.8.  Per H&P \"#Chronic diarrhea, rule out   chronic pancreatitis and enzyme deficiency\"  Treatment: LA monitoring, IVF @ 50  Options provided:  -- Lactic acidosis  -- Lactic acidosis not clinically significant  -- Other - I will add my own diagnosis  -- Disagree - Not applicable / Not valid  -- Disagree - Clinically unable to determine / Unknown  -- Refer to Clinical Documentation Reviewer    PROVIDER RESPONSE TEXT:    This patient has lactic acidosis. Query created by:  Nathalia Will on 9/4/2021 7:49 AM      Electronically signed by:  Catarino Le 9/7/2021 11:48 AM

## 2021-09-07 NOTE — PROGRESS NOTES
Physical Therapy/Occupational Therapy    Orders received and chart reviewed. Pt still with LE dopplers pending. Pt currently off floor. Will f/u later this date.   Serenity Cao, PT [id number]  Theresa Pereira, MOT, OTR/L

## 2021-09-07 NOTE — PROGRESS NOTES
Speech Language Pathology  Facility/Department: [facility] [place] [facility]  Speech/Language/Cognition Daily Treatment Note    NAME: Dana Payne  : 1960  MRN: [id number]    Patient Diagnosis(es):   Patient Active Problem List    Diagnosis Date Noted    Intracranial hemorrhage ([facility] 2021    Electrolyte abnormality 2021    Alcohol abuse with withdrawal ([facility] 01/10/2021    Hypokalemia 2020    Acute deep vein thrombosis of left lower extremity ([facility] 2020    Abnormal angiogram 2020    Mural thrombus of cardiac apex     Coronary artery disease due to lipid rich plaque      Allergies: No Known Allergies      Chart reviewed. Pain: none indicated by any means    Medical diagnosis: ICH  Treatment diagnosis: mixed receptive / expressive aphasia    Subjective: Pt in bed, alert, confused, initially participatory but ended therapy session early d/t decreased participation and pt signalling pt wanting to leave. Treatment:  Pt seen to address the following goals:  Goal 1: Patient will follow 2 step directions with 80% accuracy given min cues. : Mod cues required to follow 1 step directions. Continue goal  : not targeted    Goal 2: Patient will complete graded naming tasks with 90% accuracy given min cues. : <25% accuracy for naming items in concrete basic category - expression marked by neologisms and jargon without awareness. Continue goal, consider modifying criterion given severity of deficits exhibited this date. : max cues, 20% accuracy confrontation naming, automatic naming. Goal 3: Patient will complete repetition tasks with 80% accuracy given min cues. : 0% accuracy for repeating target words for naming - responded \"yeah\" to SLP and no initiation to repeat. Continue goal.  : 0% accuracy    Goal 4: Patient will improve reading comprehension at the sentence level with min cues. : Goal not targeted.   : goal not targeted    Goal 5: Patient will tolerate ongoing cognitive-linguistic assessment. 9/6: Suspected R inattention x 1 when pt knocked cup over on table with RUE. Stated \"that wasn't me. \" Dependent to identify errors made, indicated insight x 1 during session otherwise absent error awareness. Continue goal.  9/8: goal not targeted    Education:  Pt with questionable comprehension (2/2 aphasia), when educated to purpose of visit, therapy rationale. Assessment: Ongoing fluent aphasia with severely impaired comprehension and verbal expression marked by neologisms and jargon without awareness. Agitated with poor insight into deficits limiting meaningful participation and stimulability in tx. Prognosis for improvement guarded. Plan:  Continue speech/language therapy to address above goals, 2-4 x/week x LOS  DC recommendations: ongoing ST indicated    D/W nursing, Claudine Iqbal  Needs met prior to leaving room, call button in reach. Treatment time: 5 minutes, no tx charge    Gigi Kelley., [phone]  Speech-Language Pathologist  Pager: [phone]    If patient is discharged prior to next session, this note will serve as discharge summary.

## 2021-09-07 NOTE — PROGRESS NOTES
Progress Note  Admit Date: 9/3/2021           CC: F/U for Recurrent falls with Intracranial hemorrhage        59 y.o. male with Hx of alcohol abuse, CAD s/p LAD stent /2020 on plavix, History of mural thrombus secondary to anterior apical MI on eliquis, CHF (EF 50-55%), HTN, and DVT 5/20/20  Who presented to the ED 9/3/2021 with recurrent falls with head injury. Reportedly has been increasingly unbalanced and has had recurrent falls, including at least two episodes approximately 1 week ago. In the last episode he states he hit his head. His sister states that these falls episodes are related also with diarrhea and dehydration days before the fall. Appears to have been admitted 8/22-8/23 with N/V/diarrhea and falls as well as multiple electrolyte abnormalities. He developed difficulty findings words and some confusion. His sister noticed the his confusion and difficulty expressing words when she got joseph    Patient denied exertional chest pain, dyspnea, palpitations, syncope, or prodromal symptoms related to the fall. He also patient denied headache diplopia, dysphasia, or unilateral disturbance of motor or sensory function. No loss of balance or vertigo.      Patient also complained of abdominal pain in his RUQ.  and bilateral edema, worse  in his right leg. In the ED, he had satisfactory vitals. Labwork showed macrocytosis (B12, Folate WNL 1/2021) and elevated liver enzymes mostly AST, ALP and mild increase in total Bilirubin. CT scan showed multifocal acute intraparenchymal hemorrhage in the left frontoparietal lobe, small acute subarachnoid hemorrhage and bifrontal sulci, and a small subdural hematoma along the left temporal convexity and left anterior tentorium; and small right frontotemporal scalp hematoma.       Jeanine Greulich was ordered and patient was admitted to the ICU.       Interval History:   More confused, impulsive today  Room moved, so he can have a sitter  He tells me he does not want to talk to me  He is awake , but oriented to self only  Denies HA, N/V  No chest pain  No dyspnea      Review of Systems - Negative except  As in HPI. Scheduled Medications: Reviewed      PHYSICAL EXAM:  BP (!) 142/88   Pulse 68   Temp 97.3 °F (36.3 °C) (Oral)   Resp 16   Wt 174 lb 9.7 oz (79.2 kg)   SpO2 95%   BMI 25.78 kg/m²   No results for input(s): POCGLU in the last 72 hours. Intake/Output Summary (Last 24 hours) at 9/7/2021 1048  Last data filed at 9/6/2021 2300  Gross per 24 hour   Intake 120 ml   Output 150 ml   Net -30 ml       General appearance: alert, appears stated age and cooperative  Head: Normocephalic, without obvious abnormality, atraumatic  Neck: no adenopathy, no carotid bruit, no JVD, supple, symmetrical, trachea midline and thyroid not enlarged, symmetric, no tenderness/mass/nodules  Lungs: clear to auscultation bilaterally  Heart: regular rate and rhythm, S1, S2 normal, no murmur, click, rub or gallop  Abdomen: soft, non-tender; bowel sounds normal; no masses,  no organomegaly  Extremities: extremities normal, atraumatic, no cyanosis. No edema  Pulses: 2+ and symmetric  Skin: Abrasion present on the right side of the head in temporal region   Neurologic: Impulsive, Alert. Oriented x 1. No gross new focal deficits, although not obeying all commands (\"I dont need you\")    LABS:  Recent Labs     09/05/21  0515 09/06/21  0614 09/07/21  0602   WBC 7.8 9.6 9.0   HGB 11.6* 12.8* 12.2*   HCT 33.6* 38.1* 35.3*   * 108* 125*                                                                    Recent Labs     09/05/21  0516   *   K 3.8   CL 96*   CO2 24   BUN 8   CREATININE <0.5*   GLUCOSE 90     No results for input(s): AST, ALT, ALB, BILITOT, ALKPHOS in the last 72 hours. No results for input(s): TROPONINI in the last 72 hours. No results for input(s): BNP in the last 72 hours.   Recent Labs     09/05/21  0516   CHOL 69   HDL 39*     Recent Labs     09/06/21  0614   INR 1.59* Assessment & Plan:    59 y.o. male with Hx of alcohol abuse, CAD s/p LAD stent /2020 on plavix, History of mural thrombus secondary to anterior apical MI on eliquis, CHF (EF 50-55%), HTN, and DVT 5/20/20 who presented to the ED 9/3/2021 with recurrent falls with head injury. Traumatic ICH secondary to falls, complicated by anticoagulation, alcoholism   Multifocal acute IPH in the left frontoparietal lobe  Acute SAH in bifrontal sulci   Small SDH along the left temporal convexity and the left anterior tentorium:   Right frontotemporal scalp hematoma  - Sec to recurrent falls, alcoholism  - S/p KCentra to reverse AC  - Stability CTH: stable  - Seen by NSGY:  no acute sx intervention anticipated   - Neurochecks Q4H if changes contact NSGY  - Keep HOB >30 degrees   - MRI Brain w wo r/o brain mass: pending   - Maintain SBP <160; If PRN med insufficient, then may start Nicardipine infusion  - Keep Plt >100k & INR <1.4  - Hold all anticoagulation & antiplatelet for 2 weeks, and until cleared by neurosurgery  - Seizure prophylaxis: Keppra 1000 mg loading dose then 500 mg BID for 2 weeks   - DVT Prophylaxis: SCD's      Recurrent Falls: likely multifactorial etiology  - Multiple falls-1 week ago with head injury during the last fall  - Falls likely sec to alcoholism, hx of balance issues, with tremors  - Also recent hospital stay; admitted 8/22-8/23 with N/V/diarrhea and falls as well as multiple electrolyte abnormalities.    - At this time, does not appear other etiology, but must continue surveillance: Update B12, TSH levels, Telemetry  - NH3: WNL  - Continue mgt for acute issues that may precipitate or increase fall risk  - PT/OT eval      Chronic alcohol abuse  Alcoholic hepatitis with early Cirrhosis  Macrocytosis sec to alcohol  Lactic acidosis sec to alcohol  - Last drink reportedly was abd 5 days PTA   - Received Vit K, diogo with ICH for elevated INR after KCentra, likely related to chronic liver disease  - Continue thiamine, MVI   - Monitor for s/s of withdrawal  - Social works consult  - O/p F/u with hepatology for alcoholic liver disease  - ETOH cessation counseling  - MSW eval       #Chronic anticoagulation  - Documentation suggests for prior DVT; although also mention of History of mural thrombus secondary to anterior apical MI on eliquis. Last echo 1/2021 does not show thrombus. - Patient with bilateral leg edema, more accentuated in the right lower extremity where she also endorses mild cramping pain. Dopplers: No DVT. Edema appears resolved.    - Received Vit K, diogo with ICH for elevated INR after KCentra, likely related to chronic liver disease  - Hold all anticoagulation & antiplatelet for 2 weeks due to [address]      #CAD with LAD stent  Chronic CHF (EF 50-55%)  - Appears stable, appears euvolemic at this time  - EKG normal sinus rhythm, nonspecific ST and T waves changes. - Hold antiplatelet for 2 weeks, and until cleared by neurosurgery  - Also need to review choice of antiplatelets   - Continue home meds when able to PO      #Essential hypertension  - BP at goal  -Continue home meds  -Labetalol PRN SBP<160        Abdominal pain   Alcoholic Cirrhosis  - Abdominal pain. Appears was felt to be in his RUQ on admission, but on my eval, appears more Right lumber and RLQ  - Apart from tenderness, abdominal exam benign  - CT abd/pelvis without contrast on admit: revealed mild acites w chronic hepatocellular disease likely sec to ETOH.   - UA: shows some bacteria and leuc esterase, but not reflex cultured as WBC was < 10  - RUQ ultrasound ordered: follow report.  - NH3 WNL  - Repeat UA diogo with some mental status changes  - Serial abd exam      #Chronic diarrhea  - Last bowel movement 2 days back   - Stool studies pending   - Review any previous endoscopies.   - Re-eval if diarrhea recurs.         The patient and / or the family were informed of the results of any tests, a time was given to answer questions, a plan was proposed and they agreed with plan. Full Code    Disposition:  Transferred from ICU 9/8/2021  PT/OT eval  MRI pending  Possible discharge if nil new, and ok with NSGY in about 48 hrs. May need placement.        Tracy Rm MD

## 2021-09-07 NOTE — PROGRESS NOTES
Speech Language Pathology  Facility/Department: [facility] Orth[facility]  Dysphagia Daily Treatment & Discharge Note    NAME: Sarah Childs  : 1960  MRN: [id number]    Patient Diagnosis(es):   Patient Active Problem List    Diagnosis Date Noted    Intracranial hemorrhage ([facility] 2021    Electrolyte abnormality 2021    Alcohol abuse with withdrawal ([facility] 01/10/2021    Hypokalemia 2020    Acute deep vein thrombosis of left lower extremity ([facility] 2020    Abnormal angiogram 2020    Mural thrombus of cardiac apex     Coronary artery disease due to lipid rich plaque      Allergies: No Known Allergies    Recent Chest Xray: (2021)      Impression       No acute pulmonary disease.            Recent Head CT (2021): Impression:   Stable acute parenchymal hemorrhage left frontoparietal lobe. 2. Acute bilateral subarachnoid hemorrhage stable. 3. Subdural hematoma stable. 4. Acute intradural extra medullary hemorrhage surrounding the upper cervical cord stable. 4. Parenchymal volume loss and chronic small vessel ischemic changes in the white matter. 5. Remote infarcts as above. Chart reviewed. Medical Diagnosis: ICH  Treatment Diagnosis: dysphagia     BSE Impression 21  Needs further assessment; limited PO trials (pt declined all solid PO except some puree). Assessed tolerance thins via tsp/straw/cup, puree, whole meds (administeredy RN). Pt with positive oral acceptance, adequate oral prep, positive laryngeal swallow movement, no overt s/s aspiration/penetration. Pt did not complete full 3 oz swallow screen (did not follow direction, endorsed fullness), however did complete sequential swallow of thins w/o issue (with RN also reporting pt passed her screen earlier). Recommend full liquid diet, as tolerated.     MBS results - not indicated this date; suspect pt would not participate     Pain: none indicated by any means    Current Diet : dysphagia soft and bite sized + thin liquids    Treatment:  Pt seen bedside to address the following goals:  Goal 1: Patient will tolerate least restrictive diet without overt signs of aspiration or associated decline in respiratory status. 9/5- RN reported no concerns re:swallowing. Pt alert, but not very receptive to treatment. Pt reluctantly took 1 trial with ice chip, one sip of water by straw and one bite of a cracker. Mastication with ice chip and cracker were prolonged. Pt with limited dentition. Pt c/o feeling as if he was going to vomit after trial with cracker. Emesis basin provided. Pt displayed no s/s aspiration with any consistency. Attempted to have conversation with pt re: possible diet upgrade, preferences, but pt not receptive, stating he hasn't eaten much, and con't to complain of abdominal pain. Recommend con't full liquid diet. con't goal  9/6: Pt continued with poor participation in dysphagia tx, requiring MAX encouragement to take PO. Analyzed pt with bites of soft solid sandwich, mechanical soft gelatin, puree, thin liquids via straw, and mixed consistency (meds + thin liquids administered by RN at beginning of session). Dependent on RN and SLP to sequencing taking medication whole with thins, poor awareness if medications in oral cavity (attempted to swallow while pills still in medicine cup). Mastication of soft solid prolonged but with functional rotary pattern - most likely prolonged d/t missing dentition and reduced desire for solid PO. Oral phase WFL for all other trials. No overt s/s associated with aspiration exhibited with any consistency trialed - no cough or throat clear, no wet voice. Pt not able to follow directions for 3 oz test. Recommend trial advancement to dysphagia soft and bite sized + thin liquids - if difficulty with solids emerges, change back to full liquids. Goal met - continue with advanced diet. 9/7: Patient awake, alert, resting in bed, on room air.  Pt

## 2021-09-07 NOTE — PROGRESS NOTES
NEUROSURGERY PROGRESS NOTE    9/7/2021 10:41 AM                               Hung Barragan                      LOS: 3 days               Subjective:  Patient visibly agitated this morning. Moved closer to nurses station d/t multiple attempts to get out of bed without staff. He is slightly aphasic but cooperative with most of his exam. No acute events noted over the weekend. Physical Exam:  Patient seen and examined    Vitals:    09/07/21 0715   BP: (!) 142/88   Pulse: 68   Resp: 16   Temp: 97.3 °F (36.3 °C)   SpO2: 95%     GCS:  4 - Opens eyes on own  4 - Seems confused, disoriented  6 - Follows simple motor commands  General: Ill appearing. Alert /agitated. No acute distress. HENT: atraumatic, neck supple  Eyes: fundoscopic exam not attempted  Pulmonary: unlabored respiratory effort  Cardiovascular:  Warm well perfused. No peripheral edema  Gastrointestinal: abdomen soft, NT, ND    Neurological:  Mental Status: Awake, alert, oriented to self and place only. Attention: short attention span--agitated  Language:  Expressive aphasia. No dysarthria. Sensation: Intact to all extremities to light touch  Coordination: Intact    Cranial Nerves:  II: Full vision in left eye; right eye with double vision in left upper/lower quadrants and no vision in right upper/lower quadrants  III, IV, VI: PERRL, 2 mm bilaterally, EOMI, no nystagmus noted  V: Facial sensation intact bilaterally to touch  VII: Face symmetric  VIII: Hearing intact bilaterally to spoken voice  IX: Palate movement equal bilaterally  XI: Shoulder shrug equal bilaterally  XII: Tongue midline    Musculoskeletal:   Gait: Not tested   Assist devices: None   Tone: normal  Motor strength: Right wrist pain and mild weakness at baseline   Right  Left    Right  Left    Deltoid  5 5  Hip Flex  5 5   Biceps  5 5  Knee Extensors  5 5   Triceps  5 5  Knee Flexors  5 5   Wrist Ext  4 5  Ankle Dorsiflex. 5 5   Wrist Flex  4 5  Ankle Plantarflex.   5 5 Handgrip  5 5  Ext Rogelio Longus  5 5   Thumb Ext  5 5         Radiological Findings:  CT HEAD WO CONTRAST  Exam: 9/4/2021  1. Stable acute parenchymal hemorrhage left frontoparietal lobe. 2. Acute bilateral subarachnoid hemorrhage stable. 3. Subdural hematoma stable. 4. Acute intradural extra medullary hemorrhage surrounding the upper cervical cord stable. 4. Parenchymal volume loss and chronic small vessel ischemic changes in the white matter. 5. Remote infarcts as above. Labs:  Recent Labs     09/07/21  0602   WBC 9.0   HGB 12.2*   HCT 35.3*   *       Recent Labs     09/05/21  0516   *   K 3.8   CL 96*   CO2 24   BUN 8   CREATININE <0.5*   GLUCOSE 90   CALCIUM 7.8*       Recent Labs     09/06/21  0614   PROTIME 18.3*   INR 1.59*       Patient Active Problem List    Diagnosis Date Noted    Intrac[facility] hemorrhage ([facility] 09/04/2021    Electrolyte abnormality 08/22/2021    Alcohol abuse with withdrawal ([facility] 01/10/2021    Hypokalemia 07/25/2020    Acute deep vein thrombosis of left lower extremity ([facility]) 05/18/2020    Abnormal angiogram 05/12/2020    Mural thrombus of cardiac apex     Coronary artery disease due to lipid rich plaque        Assessment:  Patient is a 59 y.o. male w/ aphasia and confusion d/t traumatic ICH complicated by anticoagulation. Plan:  1. Neurologic exams frequency: Q4H  2. For change in exam MUST contact neurosurgery team along with critical care or primary team  3. ICH:   - MRI Brain-ordered  - Maintain SBP <160; PRN Labetalol  - Hold all anticoagulation & antiplatelet for 2 weeks  - Keep Plt >100k & INR <1.4  - Keppra x2 weeks for seizure prevention  - Repeat CT head for worsening neurologic status  4. Cerebral edema:  - Keep HOB >30 degrees   - NO dextrose in IVF's or in IV drips  - NO Decadron  - If central venous access is needed please use subclavian vs femoral - No IJ as this can decrease venous return and worsen cerebral edema  5.  DVT Prophylaxis: SCD's  6. Mobility: PT/OT as tolerates  7. Diet: Advance as tolerates  8. Will follow inpatient. Please call with any questions or decline in neurological status    DISPO: Remain inpatient from neurosurgery standpoint. Dispo timing to be determined by primary team once patient is medically stable for discharge. Patient was seen and examined with Dr. Geovanna Jordan who agrees with above assessment and plan.      Electronically signed by: ROBINSON Delcid, 9/7/2021 10:41 AM  [phone]

## 2021-09-07 NOTE — CARE COORDINATION
Case Management Daily Note                    Date: 9/7/2021     Patient Name: Kenna Dukes    Date of Admission: 9/3/2021  9:39 PM  YOB: 1960    Length of Stay: 3  GMLOS: 2 (AMLOS)         Patient Admission Status: Inpatient  Diagnosis:Intracranial hemorrhage ([facility] [I62.9]  Intraparenchymal hemorrhage of brain ([facility] [I61.9]     ________________________________________________________________________________________  Discharge Plan: To Be Determined DUE TO: Pending work up and progress. Likely placement     Insurance: Payor: [facility] PL / Plan: [address] / Product Type: *No Product type* /   Is pre-cert/notification needed: N/A     Tentative discharge date: 9/9    Current barriers: Cognition     Referrals completed: Not Applicable    Resources/ information provided: Not indicated at this time   ________________________________________________________________________________________  PT AM-PAC:   / 24 per last evaluation on: Pending     OT AM-PAC:   / 24 per last evaluation on: Pending     DME Needs for discharge: defer  ________________________________________________________________________________________  Notes/Plan of Care:   SW rounded on this date. SW unable to meet with patient at bedside due to cognition and this date. Patient unable to work with therapy today. Patient has history of EtOH abuse per chart review. Re-admit. SW following and will work to complex assessment as able. Carrol Bauer and/or his family were provided with choice of provider; he and/or his family are in agreement with the discharge plan at this time.     Care Transition Patient: Yes    Unknown LUBNA Paris  The [address] [place] [address]   Case Management Department  Ph: [phone]

## 2021-09-08 ENCOUNTER — APPOINTMENT (OUTPATIENT)
Dept: MRI IMAGING | Age: 61
DRG: 930 | End: 2021-09-08
Payer: MEDICAID

## 2021-09-08 LAB
ANION GAP SERPL CALCULATED.3IONS-SCNC: 14 MMOL/L (ref 3–16)
BUN BLDV-MCNC: 14 MG/DL (ref 7–20)
CALCIUM SERPL-MCNC: 8.2 MG/DL (ref 8.3–10.6)
CHLORIDE BLD-SCNC: 100 MMOL/L (ref 99–110)
CO2: 23 MMOL/L (ref 21–32)
CREAT SERPL-MCNC: 0.6 MG/DL (ref 0.8–1.3)
GFR AFRICAN AMERICAN: >60
GFR NON-AFRICAN AMERICAN: >60
GLUCOSE BLD-MCNC: 77 MG/DL (ref 70–99)
HCT VFR BLD CALC: 32.3 % (ref 40.5–52.5)
HEMOGLOBIN: 11.1 G/DL (ref 13.5–17.5)
MCH RBC QN AUTO: 36.8 PG (ref 26–34)
MCHC RBC AUTO-ENTMCNC: 34.4 G/DL (ref 31–36)
MCV RBC AUTO: 106.9 FL (ref 80–100)
PDW BLD-RTO: 14.9 % (ref 12.4–15.4)
PLATELET # BLD: 121 K/UL (ref 135–450)
PMV BLD AUTO: 8.3 FL (ref 5–10.5)
POTASSIUM SERPL-SCNC: 3.7 MMOL/L (ref 3.5–5.1)
RBC # BLD: 3.03 M/UL (ref 4.2–5.9)
SODIUM BLD-SCNC: 137 MMOL/L (ref 136–145)
T4 FREE: 1.4 NG/DL (ref 0.9–1.8)
WBC # BLD: 8.5 K/UL (ref 4–11)

## 2021-09-08 PROCEDURE — A9576 INJ PROHANCE MULTIPACK: HCPCS | Performed by: NURSE PRACTITIONER

## 2021-09-08 PROCEDURE — 80048 BASIC METABOLIC PNL TOTAL CA: CPT

## 2021-09-08 PROCEDURE — 97530 THERAPEUTIC ACTIVITIES: CPT

## 2021-09-08 PROCEDURE — 2580000003 HC RX 258: Performed by: STUDENT IN AN ORGANIZED HEALTH CARE EDUCATION/TRAINING PROGRAM

## 2021-09-08 PROCEDURE — 6360000004 HC RX CONTRAST MEDICATION: Performed by: NURSE PRACTITIONER

## 2021-09-08 PROCEDURE — 97166 OT EVAL MOD COMPLEX 45 MIN: CPT

## 2021-09-08 PROCEDURE — 6370000000 HC RX 637 (ALT 250 FOR IP): Performed by: INTERNAL MEDICINE

## 2021-09-08 PROCEDURE — 36415 COLL VENOUS BLD VENIPUNCTURE: CPT

## 2021-09-08 PROCEDURE — 97535 SELF CARE MNGMENT TRAINING: CPT

## 2021-09-08 PROCEDURE — 6360000002 HC RX W HCPCS: Performed by: STUDENT IN AN ORGANIZED HEALTH CARE EDUCATION/TRAINING PROGRAM

## 2021-09-08 PROCEDURE — 2060000000 HC ICU INTERMEDIATE R&B

## 2021-09-08 PROCEDURE — 6370000000 HC RX 637 (ALT 250 FOR IP): Performed by: STUDENT IN AN ORGANIZED HEALTH CARE EDUCATION/TRAINING PROGRAM

## 2021-09-08 PROCEDURE — 70553 MRI BRAIN STEM W/O & W/DYE: CPT

## 2021-09-08 PROCEDURE — 85027 COMPLETE CBC AUTOMATED: CPT

## 2021-09-08 PROCEDURE — 6360000002 HC RX W HCPCS: Performed by: INTERNAL MEDICINE

## 2021-09-08 PROCEDURE — 51798 US URINE CAPACITY MEASURE: CPT

## 2021-09-08 PROCEDURE — 97162 PT EVAL MOD COMPLEX 30 MIN: CPT

## 2021-09-08 RX ORDER — LEVETIRACETAM 500 MG/1
500 TABLET ORAL 2 TIMES DAILY
Status: DISCONTINUED | OUTPATIENT
Start: 2021-09-08 | End: 2021-09-10 | Stop reason: HOSPADM

## 2021-09-08 RX ADMIN — SODIUM CHLORIDE 500 MG: 900 INJECTION, SOLUTION INTRAVENOUS at 08:29

## 2021-09-08 RX ADMIN — CARVEDILOL 3.12 MG: 3.12 TABLET, FILM COATED ORAL at 10:02

## 2021-09-08 RX ADMIN — FOLIC ACID 1 MG: 1 TABLET ORAL at 10:02

## 2021-09-08 RX ADMIN — LORAZEPAM 0.25 MG: 2 INJECTION INTRAMUSCULAR; INTRAVENOUS at 17:14

## 2021-09-08 RX ADMIN — Medication 10 ML: at 19:55

## 2021-09-08 RX ADMIN — MAGNESIUM OXIDE TAB 400 MG (240 MG ELEMENTAL MG) 200 MG: 400 (240 MG) TAB at 10:03

## 2021-09-08 RX ADMIN — LISINOPRIL 2.5 MG: 2.5 TABLET ORAL at 10:04

## 2021-09-08 RX ADMIN — VITAM B12 100 MCG: 100 TAB at 19:54

## 2021-09-08 RX ADMIN — SODIUM CHLORIDE 25 ML: 9 INJECTION, SOLUTION INTRAVENOUS at 11:04

## 2021-09-08 RX ADMIN — ATORVASTATIN CALCIUM 40 MG: 40 TABLET, FILM COATED ORAL at 10:02

## 2021-09-08 RX ADMIN — THIAMINE HYDROCHLORIDE 100 MG: 100 INJECTION, SOLUTION INTRAMUSCULAR; INTRAVENOUS at 11:05

## 2021-09-08 RX ADMIN — GADOTERIDOL 17 ML: 279.3 INJECTION, SOLUTION INTRAVENOUS at 18:12

## 2021-09-08 RX ADMIN — CARVEDILOL 3.12 MG: 3.12 TABLET, FILM COATED ORAL at 19:54

## 2021-09-08 RX ADMIN — Medication 5 MG: at 19:54

## 2021-09-08 RX ADMIN — LEVETIRACETAM 500 MG: 500 TABLET ORAL at 19:54

## 2021-09-08 ASSESSMENT — PAIN SCALES - GENERAL
PAINLEVEL_OUTOF10: 0

## 2021-09-08 NOTE — PROGRESS NOTES
Physical Therapy    Facility/Department: [facility] 5T ORTHO/NEURO  Initial Assessment & Treatment     NAME: Elida Marcano  : 1960  MRN: [id number]    Date of Service: 2021    Discharge Recommendations:  Elida Marcano scored a 10/24 on the AM-PAC short mobility form. Current research shows that an AM-PAC score of 17 or less is typically not associated with a discharge to the patient's home setting. Based on the patient's AM-PAC score and their current functional mobility deficits, it is recommended that the patient have 5-7 sessions per week of Physical Therapy at d/c to increase the patient's independence. At this time, this patient demonstrates the endurance, and/or tolerance for 3 hours of therapy each day, with a treatment frequency of 5-7x/wk. Please see assessment section for further patient specific details. If patient discharges prior to next session this note will serve as a discharge summary. Please see below for the latest assessment towards goals. PT Equipment Recommendations  Equipment Needed:  (defer to next level of care)    Assessment   Body structures, Functions, Activity limitations: Decreased functional mobility ; Decreased balance;Decreased strength;Decreased vision/visual deficit; Decreased safe awareness;Decreased cognition;Decreased coordination  Assessment: Anthony tolerated therapy evaluation today with no adverse events; intermittent irritable behavior however remains participatory with encouragement. Unknown prior level of function however assuming he was I & ambulatory, per chart review he resided with his sister. He is requiring two person assist to complete basic functional txfs & demo's impaired motor control/coordination, rigidity & dec' command following & confusion.  He is unsafe to return home at this time & will benefit from continued skilled PT services during his hospital stay & upon medical clearnace to address these deficits & to maximize his functional independenc.e  Treatment Diagnosis: impaired mobility  Prognosis: Good  Decision Making: Medium Complexity  PT Education: General Safety;PT Role;Transfer Training;Functional Mobility Training  Patient Education: Pt will benefit from continued edu  Barriers to Learning: cognition  REQUIRES PT FOLLOW UP: Yes  Activity Tolerance  Activity Tolerance: Patient limited by cognitive status       Patient Diagnosis(es): The encounter diagnosis was Intraparenchymal hemorrhage of brain ([facility] has a past medical history of Alcohol abuse, CAD (coronary artery disease), CHF (congestive heart failure) ([facility] Essential tremor, HTN (hypertension), Hx of blood clots, Lower extremity cellulitis, and MI (mitral incompetence). has a past surgical history that includes Percutaneous Transluminal Coronary Angio and Upper gastrointestinal endoscopy (N/A, 7/28/2020). Restrictions  Position Activity Restriction  Other position/activity restrictions: up with assist, seizure px, fall px  Vision/Hearing  Vision: Impaired  Vision Exceptions: Visual field cut (R; pt reports his vision is not good, denies wearing glasses)  Hearing: Within functional limits     Subjective  General  Chart Reviewed: Yes  Patient assessed for rehabilitation services?: Yes  Additional Pertinent Hx: 58 y/o M presents s/p multiple falls at home. Imagining reveals: Multifocal acute intraparenchymal hemorrhage in L frontoparietal lobe, acute SAH in bifrontal sulci, and small SDH left temporal convexity and left anterior tentorium. Repeat head CT:Stable.  PMH: alcohol abuse, CAD s/p LAD stent /2020 on plavix, History of mural thrombus secondary to anterior apical MI on eliquis, CHF (EF 50-55%), HTN, and DVT 5/20/20  Response To Previous Treatment: Not applicable  Family / Caregiver Present: No  Referring Practitioner: Neelam Hernandez MD  Referral Date : 09/04/21  Diagnosis: ICH  Follows Commands: Impaired  Other (Comment): dec'd command following (simple 1 step) with +processing time  General Comment  Comments: Pt resting in bed upon PT arrival.  Subjective  Subjective: Pt agreeable to PT evaluation  Pain Screening  Patient Currently in Pain: Denies  Vital Signs  Patient Currently in Pain: Denies       Orientation  Orientation  Overall Orientation Status: Impaired  Orientation Level: Oriented to person (questionable orientation to place/hospital.)  Social/Functional History  Social/Functional History  Lives With: Other (comment) (sister)  Additional Comments: Unable to obtain PLOF from pt. Cognition   Cognition  Overall Cognitive Status: Exceptions  Arousal/Alertness: Delayed responses to stimuli  Following Commands: Follows one step commands with increased time; Follows one step commands with repetition  Insights: Not aware of deficits  Initiation: Requires cues for some  Sequencing: Requires cues for some    Objective     Observation/Palpation  Posture: Fair    AROM RLE (degrees)  RLE AROM: WFL  AROM LLE (degrees)  LLE AROM : WFL  Strength RLE  Comment: Pt presents with global weakness with strength assessed to be at lesat 3+/5 as observed through functional mobility. Strength LLE  Comment: Pt presents with global weakness with strength assessed to be at lesat 3+/5 as observed through functional mobility. Tone RLE  RLE Tone:  (+rigidity)  Tone LLE  LLE Tone:  (+rigidity)  Motor Control  Gross Motor?: Exceptions (dec'd motor control & coordination.)  Sensation  Overall Sensation Status:  (difficult to obtain formal assessment 2/2 impaired cognition)  Bed mobility  Supine to Sit: Moderate assistance (with HOB elevated & +bed railing use. VCs, TCs for initiation.)  Transfers  Sit to Stand: Moderate Assistance;2 Person Assistance (up to RW. VCs, TCs for initiation.  Initial retropulsion noted requiring assist for anterior wt shifting.)  Stand to sit: Moderate Assistance;2 Person Assistance (VCs, TCs for safety & positioning)  Bed to Chair: Moderate assistance;2 Person Assistance (via a step pivot txf with RW. Dec'd initiation & coordination of task requiring inc'd TCs & simple VCs for sequencing. Effortful for pt to complete with  rigid pattern noted.)  Ambulation  Ambulation?: No (deferred 2/2 impaired balance & safety concerns.)     Balance  Posture: Fair  Sitting - Static:  (CGA at EOB)  Sitting - Dynamic:  (CGA at EOB)  Standing - Static:  (ModAx2 with RW)  Standing - Dynamic:  (ModAx2 with RW)        Plan   Plan  Times per week: 5-7  Current Treatment Recommendations: Strengthening, Neuromuscular Re-education, Safety Education & Training, Balance Training, Patient/Caregiver Education & Training, Functional Mobility Training, Equipment Evaluation, Education, & procurement, Transfer Training, Gait Training, Stair training  Safety Devices  Type of devices:  All fall risk precautions in place, Left in chair, Call light within reach, Chair alarm in place, Nurse notified  Restraints  Initially in place: No                                               AM-PAC Score  AM-PAC Inpatient Mobility Raw Score : 10 (09/08/21 1239)  AM-PAC Inpatient T-Scale Score : 32.29 (09/08/21 1239)  Mobility Inpatient CMS 0-100% Score: 76.75 (09/08/21 1239)  Mobility Inpatient CMS G-Code Modifier : CL (09/08/21 1239)          Goals  Short term goals  Time Frame for Short term goals: DC  Short term goal 1: Pt will complete bed mobiltiy with S  Short term goal 2: Pt will complete sit<>stand & bed<>chair txfs with S  Short term goal 3: Pt will tolerate ambulation assessment  Patient Goals   Patient goals : none stated       Therapy Time   Individual Concurrent Group Co-treatment   Time In 1058         Time Out 1136         Minutes 38         Timed Code Treatment Minutes: 26206 RisonTawanna Tao PT

## 2021-09-08 NOTE — PROGRESS NOTES
Occupational Therapy   Occupational Therapy Initial Assessment/Tx Note  Date: 2021   Patient Name: Paz Parry  MRN: [id number]     : 1960    Date of Service: 2021     Assessment: Baseline unknown; however, pt from home with sister likely ambulatory given frequent falls. Currently, pt demonstrates significant mobility and ADLs impairments due to impaired cognition, perception, coordination, and balance. Pt was cooperative with occasional irritability during eval, frequently declined participation in tasks. Pt will need continued inpt OT/PT/SLP at d/c - will continue to assess for most appropriate disposition. Discharge Recommendations: Paz Parry scored a 10/24 on the AM-PAC ADL Inpatient form. Current research shows that an AM-PAC score of 17 or less is typically not associated with a discharge to the patient's home setting. Based on the patient's AM-PAC score and their current ADL deficits, it is recommended that the patient have 5-7 sessions per week of Occupational Therapy at d/c to increase the patient's independence. At this time, this patient demonstrates the endurance, and/or tolerance for 3 hours of therapy each day, with a treatment frequency of 5-7x/wk. Please see assessment section for further patient specific details. OT Equipment Recommendations  Equipment Needed: No    Assessment   Performance deficits / Impairments: Decreased functional mobility ; Decreased ADL status; Decreased strength;Decreased safe awareness;Decreased cognition;Decreased endurance;Decreased balance;Decreased vision/visual deficit; Decreased high-level IADLs;Decreased fine motor control;Decreased coordination  Treatment Diagnosis: Decreased activity tolerance, impaired ADLs and mobility  Decision Making: Medium Complexity  REQUIRES OT FOLLOW UP: Yes  Activity Tolerance  Activity Tolerance: Patient Tolerated treatment well;Treatment limited secondary to decreased cognition  Activity Tolerance: mildly irritable/agitated by redirectable  Safety Devices  Safety Devices in place: Yes  Type of devices: Call light within reach; Chair alarm in place;Nurse notified; Left in chair (sitter present; RN and sitter aware of assist level - Ax2 with gait belt)        Treatment Diagnosis: Decreased activity tolerance, impaired ADLs and mobility      Restrictions  Position Activity Restriction  Other position/activity restrictions: up with assist, seizure px, fall px    Subjective   General  Chart Reviewed: Yes  Patient assessed for rehabilitation services?: Yes  Additional Pertinent Hx: 59 y.o. male with Hx of alcohol abuse, CAD s/p LAD stent /2020 on plavix, History of mural thrombus secondary to anterior apical MI on eliquis, CHF (EF 50-55%), HTN, and DVT 5/20/20  Who presented to the ED 9/3/2021 with Traumatic ICHs secondary to falls. +CIWA. Frequently agitated, requiring restraints and sedation. Family / Caregiver Present: No  Referring Practitioner: July Abarca MD  Diagnosis: intracranial hemorrhage  Subjective  Subjective: Pt in bed on entry. Cooperative overall with brief periods of irritability when asked questions. \"I said I'm fine. \"  General Comment  Comments: Pt had a brief episode of lower abdominal pain when leaning back into chair, unable to describe, appeared comfortable once settled in chair    Social/Functional History  Social/Functional History  Lives With: Other (comment) (sister)  Additional Comments: Unable to obtain PLOF from pt. Objective   Vision: Impaired  Vision Exceptions: Visual field cut (R; pt reports his vision is not good, denies wearing glasses)  Hearing: Within functional limits    Orientation  Overall Orientation Status: Impaired  Orientation Level: Oriented to person;Disoriented to place; Disoriented to time;Disoriented to situation (after being re-oriented 3x, pt able to state he was in hospital)     Balance  Sitting Balance: Stand by assistance (EOB x 5 min)  Standing Balance: Moderate assistance (x1-2; posterior leaning)  Standing Balance  Time: 45 sec  Activity: stance, transfer bed to chair  Toilet Transfers  Equipment Used: Standard bedside commode (simulated)  Toilet Transfer: 2 Person assistance; Moderate assistance  ADL  Feeding: Maximum assistance; Beverage management;Setup;Verbal cueing (limited by dysmetria, began tipping cup before reaching mouth, pouring water on chest)  Grooming: Maximum assistance;Verbal cueing;Setup (oral hygiene and combing hair - limited by dysmetria, impaired attention and sequencing; washed face partially with less assist)  Coordination  Movements Are Fluid And Coordinated: No  Coordination and Movement description: Decreased accuracy; Decreased speed     Bed mobility  Supine to Sit: Moderate assistance (max cues; HOB raised)  Transfers  Stand Step Transfers: Moderate assistance;2 Person assistance  Sit to stand: Moderate assistance;2 Person assistance  Stand to sit: Moderate assistance;2 Person assistance  Transfer Comments: posterior leaning, assist to place R hand on walker, wide ALEA outside bounds of walker, began sitting before reaching chair, not receptive to cueing  Vision - Basic Assessment  Visual Field Cut: Right  Cognition  Overall Cognitive Status: Exceptions  Arousal/Alertness: Delayed responses to stimuli  Following Commands: Follows one step commands with increased time; Follows one step commands with repetition  Attention Span: Attends with cues to redirect; Difficulty attending to directions; Difficulty dividing attention  Memory: Decreased recall of biographical Information;Decreased recall of precautions;Decreased recall of recent events;Decreased short term memory  Safety Judgement: Decreased awareness of need for assistance;Decreased awareness of need for safety  Problem Solving: Assistance required to generate solutions;Assistance required to implement solutions;Assistance required to identify errors made;Assistance required to correct

## 2021-09-08 NOTE — CARE COORDINATION
Case Management Daily Note                    Date: 9/8/2021     Patient Name: Rand Valadez    Date of Admission: 9/3/2021  9:39 PM  YOB: 1960    Length of Stay: 4  GMLOS: 2 (AMLOS)         Patient Admission Status: Inpatient  Diagnosis:Intracranial hemorrhage ([facility] [I62.9]  Intraparenchymal hemorrhage of brain ([facility] [I61.9]     ________________________________________________________________________________________  Discharge Plan: To Be Determined      Insurance: Payor: [facility] PL / Plan: [facility] / Product Type: *No Product type* /   Is pre-cert/notification needed: N/A     Tentative discharge date: 9/10    Current barriers: Cognition     Referrals completed: Not Applicable    Resources/ information provided: Not indicated at this time   ________________________________________________________________________________________  PT AM-PAC: 10 / 24 per last evaluation on: Pending     OT AM-PAC: 10 / 24 per last evaluation on: Pending     DME Needs for discharge: defer  ________________________________________________________________________________________  Notes/Plan of Care:   SW rounded on this date. CARLY unable to meet with patient at bedside due to continued inability to complete assessment. SW will follow up again tomorrow. Patient has history of EtOH abuse per chart review. Re-admit. SW following and will work to complete assessment as able. Gigi Neri and/or his family were provided with choice of provider; he and/or his family are in agreement with the discharge plan at this time.     Care Transition Patient: Yes    LUBNA Bolden  The [address] [place] [address]   Case Management Department  Ph: [phone]

## 2021-09-08 NOTE — PROGRESS NOTES
NEUROSURGERY PROGRESS NOTE    9/8/2021 1:30 PM                               Turner Lessen                      LOS: 4 days               Subjective: Patient sitting up in bed upon entering the room. Restraints in place d/t patient trying to remove lines and getting out of bed overnight. Patient more calm this afternoon. Physical Exam:  Patient seen and examined    Vitals:    09/08/21 1111   BP: 106/68   Pulse: 88   Resp: 15   Temp: 98.3 °F (36.8 °C)   SpO2: 95%     GCS:  4 - Opens eyes on own  4 - Seems confused, disoriented  6 - Follows simple motor commands  General: Ill appearing. Alert /agitated. No acute distress. HENT: atraumatic, neck supple  Eyes: fundoscopic exam not attempted  Pulmonary: unlabored respiratory effort  Cardiovascular:  Warm well perfused. No peripheral edema  Gastrointestinal: abdomen soft, NT, ND    Neurological:  Mental Status: Awake, alert, oriented to self and place only. Attention: short attention span--agitated  Language:  Expressive aphasia. No dysarthria. Sensation: Intact to all extremities to light touch  Coordination: Intact    Cranial Nerves:  II: Full vision in left eye; right eye with double vision in left upper/lower quadrants and no vision in right upper/lower quadrants  III, IV, VI: PERRL, 2 mm bilaterally, EOMI, no nystagmus noted  V: Facial sensation intact bilaterally to touch  VII: Face symmetric  VIII: Hearing intact bilaterally to spoken voice  IX: Palate movement equal bilaterally  XI: Shoulder shrug equal bilaterally  XII: Tongue midline    Musculoskeletal:   Gait: Not tested   Assist devices: None   Tone: normal  Motor strength: Right wrist pain and mild weakness at baseline   Right  Left    Right  Left    Deltoid  5 5  Hip Flex  5 5   Biceps  5 5  Knee Extensors  5 5   Triceps  5 5  Knee Flexors  5 5   Wrist Ext  4 5  Ankle Dorsiflex. 5 5   Wrist Flex  4 5  Ankle Plantarflex.   5 5   Handgrip  5 5  Ext Rogelio Longus  5 5   Thumb Ext  5 5 Radiological Findings:  CT HEAD WO CONTRAST  Exam: 9/4/2021  1. Stable acute parenchymal hemorrhage left frontoparietal lobe. 2. Acute bilateral subarachnoid hemorrhage stable. 3. Subdural hematoma stable. 4. Acute intradural extra medullary hemorrhage surrounding the upper cervical cord stable. 4. Parenchymal volume loss and chronic small vessel ischemic changes in the white matter. 5. Remote infarcts as above. Labs:  Recent Labs     09/08/21  0546   WBC 8.5   HGB 11.1*   HCT 32.3*   *       Recent Labs     09/08/21  0546      K 3.7      CO2 23   BUN 14   CREATININE 0.6*   GLUCOSE 77   CALCIUM 8.2*       Recent Labs     09/06/21  0614   PROTIME 18.3*   INR 1.59*       Patient Active Problem List    Diagnosis Date Noted    Intracranial hemorrhage ([facility] 09/04/2021    Electrolyte abnormality 08/22/2021    Alcohol abuse with withdrawal ([facility] 01/10/2021    Hypokalemia 07/25/2020    Acute deep vein thrombosis of left lower extremity (HCC) 05/18/2020    Abnormal angiogram 05/12/2020    Mural thrombus of cardiac apex     Coronary artery disease due to lipid rich plaque        Assessment:  Patient is a 59 y.o. male w/ aphasia and confusion d/t traumatic ICH complicated by anticoagulation. Plan:  1. Neurologic exams frequency: Q4H  2. For change in exam MUST contact neurosurgery team along with critical care or primary team  3. ICH:  - MRI Brain to r/o underlying lesion as cause of hemorrhages  - Maintain SBP <160; PRN Labetalol  - Hold all anticoagulation & antiplatelet for 2 weeks  - Keep Plt >100k & INR <1.4  - Keppra x2 weeks for seizure prevention  - Repeat CT head for worsening neurologic status  4. Cerebral edema:  - Keep HOB >30 degrees   - NO dextrose in IVF's or in IV drips  - NO Decadron  - If central venous access is needed please use subclavian vs femoral - No IJ as this can decrease venous return and worsen cerebral edema  5. Mobility: PT/OT as tolerates  6.  Diet: Advance as tolerates  7. DVT Prophylaxis: SCD's & OK to start SQ Heparin, if medically indicated  8. Will follow inpatient. Please call with any questions or decline in neurological status    DISPO: Remain inpatient from neurosurgery standpoint. Dispo timing to be determined by primary team once patient is medically stable for discharge. Patient was seen and examined with Dr. Apoorva Leon who agrees with above assessment and plan.      Electronically signed by: Merlinda Pott, APRN - CNP, APRN-CNP, 9/8/2021 1:30 PM  [phone]

## 2021-09-08 NOTE — PROGRESS NOTES
Pt has had 2 BMs this shift. Both have been dark brown/green and loose. PVR revealed less 47 mL in bladder. PT has not been eating frequently this shift. Calm and cooperaitve, frequently verbalizes wanting to leave and being tired of staff. PT is not verbally attacking staff.

## 2021-09-08 NOTE — PROGRESS NOTES
Progress Note    Admit Date: 9/3/2021  Diet: ADULT DIET; Dysphagia - Soft and Bite Sized; 3 carb choices (45 gm/meal); Low Sodium (2 gm)    Interval history:     Pt seen at bedside this AM. Follows some commands but still appears confused. He is alert and oriented to place and person, but not time and situation. Speech is difficult to understand - word salad and mumbling towards end of sentence. Pt denies with verbal \"no\" and head nod for fever, chills, H/A, CP, SOB, Abd pain, dysuria    Subjective:  59 y. o. male with Hx of alcohol abuse, CAD s/p LAD stent /2020 on plavix, History of mural thrombus secondary to anterior apical MI on eliquis, CHF (EF 50-55%), HTN, and DVT 5/20/20  Who presented to the ED 9/3/2021 with recurrent falls with head injury.     Reportedly has been increasingly unbalanced and has had recurrent falls, including at least two episodes approximately 1 week ago. In the last episode he states he hit his head. His sister states that these falls episodes are related also with diarrhea and dehydration days before the fall. Appears to have been admitted 8/22-8/23 with N/V/diarrhea and falls as well as multiple electrolyte abnormalities.      He developed difficulty findings words and some confusion. His sister noticed the his confusion and difficulty expressing words when she got joseph     Patient denied exertional chest pain, dyspnea, palpitations, syncope, or prodromal symptoms related to the fall. He also patient denied headache diplopia, dysphasia, or unilateral disturbance of motor or sensory function. No loss of balance or vertigo.      Patient also complained of abdominal pain in his RUQ.  and bilateral edema, worse  in his right leg.       In the ED, he had satisfactory vitals. Labwork showed macrocytosis (B12, Folate WNL 1/2021) and elevated liver enzymes mostly AST, ALP and mild increase in total Bilirubin.    CT scan showed multifocal acute intraparenchymal hemorrhage in the left frontoparietal lobe, small acute subarachnoid hemorrhage and bifrontal sulci, and a small subdural hematoma along the left temporal convexity and left anterior tentorium; and small right frontotemporal scalp hematoma.       K-centra was ordered and patient was admitted to the ICU.       Medications:     Scheduled Meds:   levETIRAcetam  500 mg Oral BID    melatonin  5 mg Oral Nightly    thiamine (VITAMIN B1) IVPB  100 mg IntraVENous Q24H    sodium chloride flush  5-40 mL IntraVENous 2 times per day    sodium chloride flush  5-40 mL IntraVENous 2 times per day    atorvastatin  40 mg Oral Daily    folic acid  1 mg Oral Daily    vitamin B-12  100 mcg Oral Nightly    carvedilol  3.125 mg Oral BID    magnesium oxide  200 mg Oral Daily    lisinopril  2.5 mg Oral Daily     Continuous Infusions:   sodium chloride Stopped (09/08/21 0943)    sodium chloride 25 mL (09/08/21 1104)     PRN Meds:OLANZapine, LORazepam, sodium chloride flush, sodium chloride, sodium chloride flush, sodium chloride, acetaminophen, labetalol    Objective:   Vitals:   T-max:  Patient Vitals for the past 8 hrs:   BP Temp Temp src Pulse Resp SpO2   09/08/21 1457 110/68 98.8 °F (37.1 °C) Oral 79 16 96 %   09/08/21 1111 106/68 98.3 °F (36.8 °C) Oral 88 15 95 %       Intake/Output Summary (Last 24 hours) at 9/8/2021 1458  Last data filed at 9/8/2021 1423  Gross per 24 hour   Intake 1298.19 ml   Output 120 ml   Net 1178.19 ml       Physical Exam  Constitutional:       General: He is not in acute distress. Appearance: Normal appearance. He is not ill-appearing. HENT:      Head: Normocephalic. Comments: Left temporal scalp wound     Mouth/Throat:      Pharynx: Oropharynx is clear. Eyes:      General: No scleral icterus. Pupils: Pupils are equal, round, and reactive to light. Comments: Pt not responding to commands for EOM exam   Cardiovascular:      Rate and Rhythm: Normal rate and regular rhythm.       Heart sounds: Normal heart sounds. No murmur heard. No friction rub. No gallop. Pulmonary:      Effort: Pulmonary effort is normal. No respiratory distress. Breath sounds: No wheezing or rhonchi. Abdominal:      General: Bowel sounds are normal.      Tenderness: There is no abdominal tenderness. Musculoskeletal:      Cervical back: No tenderness. Right lower leg: No edema. Left lower leg: No edema. Skin:     General: Skin is warm and dry. Neurological:      Mental Status: He is alert. He is disoriented. Psychiatric:      Comments: Pt disoriented           LABS:    CBC:   Recent Labs     09/06/21  0614 09/07/21  0602 09/08/21  0546   WBC 9.6 9.0 8.5   HGB 12.8* 12.2* 11.1*   HCT 38.1* 35.3* 32.3*   * 125* 121*   .3* 106.2* 106.9*     Renal:    Recent Labs     09/08/21  0546      K 3.7      CO2 23   BUN 14   CREATININE 0.6*   GLUCOSE 77   CALCIUM 8.2*   ANIONGAP 14     Hepatic: No results for input(s): AST, ALT, BILITOT, BILIDIR, PROT, LABALBU, ALKPHOS in the last 72 hours. Troponin: No results for input(s): TROPONINI in the last 72 hours. BNP: No results for input(s): BNP in the last 72 hours. Lipids: No results for input(s): CHOL, HDL in the last 72 hours. Invalid input(s): LDLCALCU, TRIGLYCERIDE  ABGs:  No results for input(s): PHART, AAU3TEF, PO2ART, RKR5QBC, BEART, THGBART, E0YGBGKT, VFS3IHM in the last 72 hours. INR:   Recent Labs     09/06/21  0614   INR 1.59*     Lactate: No results for input(s): LACTATE in the last 72 hours. Cultures:  -----------------------------------------------------------------  RAD:   US ABDOMEN LIMITED   Final Result      1. Heterogeneous echogenicity consistent with diffuse hepatocellular disease. 2.  Gallbladder polyp versus tumefactive sludge unchanged. 3.  Small ascites. VL Extremity Venous Bilateral   Final Result      CT ABDOMEN PELVIS WO CONTRAST Additional Contrast? None   Final Result      1. Mild ascites.  Chronic with the followin. Multifocal acute ICH in left frontoparietal lobes  2. Small acute SAH  3. Small SDH with left temporal convexity  4.  Small right frontotemporal scalp hematoma  - s/p K-Centra in ED and AC/AP discontinued   - f/u MRI - r/o underlying source of bleed (mass?)  - BP stable on meds - Carvedilol/Lisinopril  - Keppra for seizure Px - switched to ORAL tablets  - Quetiapine for agitation; additional PRN meds for agitation in place  - No intervention at this time per NSGY  - q4hr neuro checks, keep SBP<160, Plt >100k and INR <1.4  - Poor PT/OT scores - 10/24 both  - ARU eval      Chronic Alcohol Abuse  Alcoholic Hepatitis with Cirrhosis  Macrocytic anemia 2/2 Alcohol abuse  ALT 54, , , Bili 1.2, Plt 120k  Likely contributing to PT/INR elevation - pt also takes Eliquis  CT A/P w/o contrast shows mild ascites w/ chronic hepatocellular disease  U/S shows gallbladder polyp with sludge - no signs of infx - liver is echogenic with diffuse disease  - cont Thiamine, Folate, B12 - rally bag  - monitor for s/s of withdrawal - last drink supposedly 5 days PTA  - social work consult  - ARU eval      CAD with LAD stent on Plavix  Previous MI with Hx of mural thrombus on Eliquis  Chronic CHF - last EF 50-55% (1/10/21)  Pt currently stable - ECG shows NSR  - Plavix d/c'ed given ICH      Chronic Diarrhea, unclear etiology  possibly 2/2 to alcohol abuse    Code Status: Full Code  FEN: Dysphagia Diet  PPX: SCDs       DISPOSITION:   Weaned off restraint; hopefully can remain off with use of meds to control behavior  MRI pending  PT/OT  ARU felicity Duff, MS-4 (Scribe)  21  2:58 Irma Osullivan MD.

## 2021-09-08 NOTE — PROGRESS NOTES
PT is out of restraints at this time. He has been calm and cooperative the majority of the shift, refusing to eat and drink when offered. He continues to be weak and uncoordinated, he is alert and oriented to person and place. MRI to be attempted after 3p.

## 2021-09-08 NOTE — PLAN OF CARE
Falls - Risk of:  Goal: Will remain free from falls  Outcome: Ongoing     Skin Integrity:  Goal: Absence of new skin breakdown  Outcome: Ongoing     HEMODYNAMIC STATUS  Goal: Patient has stable vital signs and fluid balance  Outcome: Ongoing

## 2021-09-08 NOTE — PROGRESS NOTES
Pt pulling at lines , trying to get out of bed , continually ,unable to reorient , continue to toilet pt and off er intake , dr huff ,

## 2021-09-08 NOTE — PROGRESS NOTES
Patient currently resting in bed with eyes closed. Restraints in place d/t patient trying to remove lines and getting out of bed (see restraint flow sheet). Patient is high fall risk. On room air and in no distress. Bed alarm on and fall precautions taken. Nurse will continue to monitor/reassess. Call light within reach.

## 2021-09-09 LAB
ANION GAP SERPL CALCULATED.3IONS-SCNC: 16 MMOL/L (ref 3–16)
BUN BLDV-MCNC: 12 MG/DL (ref 7–20)
CALCIUM SERPL-MCNC: 8.1 MG/DL (ref 8.3–10.6)
CHLORIDE BLD-SCNC: 100 MMOL/L (ref 99–110)
CO2: 22 MMOL/L (ref 21–32)
CREAT SERPL-MCNC: 0.5 MG/DL (ref 0.8–1.3)
GFR AFRICAN AMERICAN: >60
GFR NON-AFRICAN AMERICAN: >60
GLUCOSE BLD-MCNC: 81 MG/DL (ref 70–99)
HCT VFR BLD CALC: 31.2 % (ref 40.5–52.5)
HEMOGLOBIN: 10.7 G/DL (ref 13.5–17.5)
INR BLD: 1.15 (ref 0.88–1.12)
MCH RBC QN AUTO: 36.9 PG (ref 26–34)
MCHC RBC AUTO-ENTMCNC: 34.4 G/DL (ref 31–36)
MCV RBC AUTO: 107.4 FL (ref 80–100)
PDW BLD-RTO: 15.3 % (ref 12.4–15.4)
PLATELET # BLD: 109 K/UL (ref 135–450)
PMV BLD AUTO: 7.8 FL (ref 5–10.5)
POTASSIUM SERPL-SCNC: 3.3 MMOL/L (ref 3.5–5.1)
PROTHROMBIN TIME: 13.1 SEC (ref 9.9–12.7)
RBC # BLD: 2.9 M/UL (ref 4.2–5.9)
SODIUM BLD-SCNC: 138 MMOL/L (ref 136–145)
WBC # BLD: 7.3 K/UL (ref 4–11)

## 2021-09-09 PROCEDURE — 85027 COMPLETE CBC AUTOMATED: CPT

## 2021-09-09 PROCEDURE — 97530 THERAPEUTIC ACTIVITIES: CPT

## 2021-09-09 PROCEDURE — 97110 THERAPEUTIC EXERCISES: CPT

## 2021-09-09 PROCEDURE — 80048 BASIC METABOLIC PNL TOTAL CA: CPT

## 2021-09-09 PROCEDURE — 92507 TX SP LANG VOICE COMM INDIV: CPT

## 2021-09-09 PROCEDURE — 85610 PROTHROMBIN TIME: CPT

## 2021-09-09 PROCEDURE — 6370000000 HC RX 637 (ALT 250 FOR IP): Performed by: INTERNAL MEDICINE

## 2021-09-09 PROCEDURE — 97535 SELF CARE MNGMENT TRAINING: CPT

## 2021-09-09 PROCEDURE — 2580000003 HC RX 258: Performed by: STUDENT IN AN ORGANIZED HEALTH CARE EDUCATION/TRAINING PROGRAM

## 2021-09-09 PROCEDURE — 6360000002 HC RX W HCPCS: Performed by: INTERNAL MEDICINE

## 2021-09-09 PROCEDURE — 2060000000 HC ICU INTERMEDIATE R&B

## 2021-09-09 PROCEDURE — 6370000000 HC RX 637 (ALT 250 FOR IP): Performed by: STUDENT IN AN ORGANIZED HEALTH CARE EDUCATION/TRAINING PROGRAM

## 2021-09-09 PROCEDURE — 6360000002 HC RX W HCPCS: Performed by: STUDENT IN AN ORGANIZED HEALTH CARE EDUCATION/TRAINING PROGRAM

## 2021-09-09 PROCEDURE — 36415 COLL VENOUS BLD VENIPUNCTURE: CPT

## 2021-09-09 RX ORDER — METRONIDAZOLE 500 MG/1
500 TABLET ORAL EVERY 8 HOURS SCHEDULED
Status: DISCONTINUED | OUTPATIENT
Start: 2021-09-09 | End: 2021-09-10 | Stop reason: HOSPADM

## 2021-09-09 RX ORDER — BISMUTH SUBSALICYLATE 262 MG/1
524 TABLET, CHEWABLE ORAL
Qty: 112 TABLET | Refills: 0
Start: 2021-09-09 | End: 2021-09-23

## 2021-09-09 RX ORDER — PANTOPRAZOLE SODIUM 40 MG/1
40 TABLET, DELAYED RELEASE ORAL
Status: DISCONTINUED | OUTPATIENT
Start: 2021-09-09 | End: 2021-09-10 | Stop reason: HOSPADM

## 2021-09-09 RX ORDER — METRONIDAZOLE 500 MG/1
500 TABLET ORAL EVERY 8 HOURS SCHEDULED
Qty: 42 TABLET | Refills: 0 | Status: SHIPPED | OUTPATIENT
Start: 2021-09-09 | End: 2021-09-23

## 2021-09-09 RX ORDER — TETRACYCLINE HYDROCHLORIDE 500 MG/1
500 CAPSULE ORAL 4 TIMES DAILY
Qty: 56 CAPSULE | Refills: 0 | Status: SHIPPED | OUTPATIENT
Start: 2021-09-09 | End: 2021-09-23

## 2021-09-09 RX ORDER — PANTOPRAZOLE SODIUM 40 MG/1
TABLET, DELAYED RELEASE ORAL
Qty: 30 TABLET | Refills: 3
Start: 2021-09-09 | End: 2022-08-01

## 2021-09-09 RX ORDER — BISMUTH SUBSALICYLATE 262 MG/1
524 TABLET, CHEWABLE ORAL
Status: DISCONTINUED | OUTPATIENT
Start: 2021-09-09 | End: 2021-09-10 | Stop reason: HOSPADM

## 2021-09-09 RX ORDER — TETRACYCLINE HYDROCHLORIDE 250 MG/1
500 CAPSULE ORAL 4 TIMES DAILY
Status: DISCONTINUED | OUTPATIENT
Start: 2021-09-09 | End: 2021-09-10 | Stop reason: HOSPADM

## 2021-09-09 RX ORDER — LEVETIRACETAM 500 MG/1
500 TABLET ORAL 2 TIMES DAILY
Qty: 24 TABLET | Refills: 0
Start: 2021-09-09 | End: 2022-08-01

## 2021-09-09 RX ORDER — MECOBALAMIN 5000 MCG
5 TABLET,DISINTEGRATING ORAL NIGHTLY
Qty: 30 TABLET | Refills: 0
Start: 2021-09-09

## 2021-09-09 RX ADMIN — MAGNESIUM OXIDE TAB 400 MG (240 MG ELEMENTAL MG) 200 MG: 400 (240 MG) TAB at 10:31

## 2021-09-09 RX ADMIN — ATORVASTATIN CALCIUM 40 MG: 40 TABLET, FILM COATED ORAL at 10:31

## 2021-09-09 RX ADMIN — BISMUTH SUBSALICYLATE 524 MG: 262 TABLET, CHEWABLE ORAL at 21:07

## 2021-09-09 RX ADMIN — LISINOPRIL 2.5 MG: 2.5 TABLET ORAL at 11:35

## 2021-09-09 RX ADMIN — METRONIDAZOLE 500 MG: 500 TABLET ORAL at 21:06

## 2021-09-09 RX ADMIN — PANTOPRAZOLE SODIUM 40 MG: 40 TABLET, DELAYED RELEASE ORAL at 15:17

## 2021-09-09 RX ADMIN — METRONIDAZOLE 500 MG: 500 TABLET ORAL at 15:16

## 2021-09-09 RX ADMIN — TETRACYCLINE HYDROCHLORIDE 500 MG: 250 CAPSULE ORAL at 17:09

## 2021-09-09 RX ADMIN — FOLIC ACID 1 MG: 1 TABLET ORAL at 10:31

## 2021-09-09 RX ADMIN — SODIUM CHLORIDE 25 ML: 9 INJECTION, SOLUTION INTRAVENOUS at 11:38

## 2021-09-09 RX ADMIN — Medication 5 MG: at 21:06

## 2021-09-09 RX ADMIN — TETRACYCLINE HYDROCHLORIDE 500 MG: 250 CAPSULE ORAL at 21:06

## 2021-09-09 RX ADMIN — POTASSIUM BICARBONATE 40 MEQ: 782 TABLET, EFFERVESCENT ORAL at 10:30

## 2021-09-09 RX ADMIN — THIAMINE HYDROCHLORIDE 100 MG: 100 INJECTION, SOLUTION INTRAMUSCULAR; INTRAVENOUS at 11:39

## 2021-09-09 RX ADMIN — CARVEDILOL 3.12 MG: 3.12 TABLET, FILM COATED ORAL at 10:33

## 2021-09-09 RX ADMIN — LEVETIRACETAM 500 MG: 500 TABLET ORAL at 21:07

## 2021-09-09 RX ADMIN — LEVETIRACETAM 500 MG: 500 TABLET ORAL at 10:31

## 2021-09-09 RX ADMIN — LORAZEPAM 0.25 MG: 2 INJECTION INTRAMUSCULAR; INTRAVENOUS at 00:06

## 2021-09-09 RX ADMIN — BISMUTH SUBSALICYLATE 524 MG: 262 TABLET, CHEWABLE ORAL at 15:16

## 2021-09-09 RX ADMIN — Medication 10 ML: at 21:06

## 2021-09-09 RX ADMIN — CARVEDILOL 3.12 MG: 3.12 TABLET, FILM COATED ORAL at 21:07

## 2021-09-09 RX ADMIN — Medication 10 ML: at 10:33

## 2021-09-09 RX ADMIN — VITAM B12 100 MCG: 100 TAB at 21:07

## 2021-09-09 RX ADMIN — Medication 10 ML: at 10:31

## 2021-09-09 ASSESSMENT — PAIN SCALES - GENERAL
PAINLEVEL_OUTOF10: 0
PAINLEVEL_OUTOF10: 0

## 2021-09-09 NOTE — PROGRESS NOTES
NEUROSURGERY PROGRESS NOTE    9/9/2021 10:57 AM                               Nicole Nguyen                      LOS: 5 days               Subjective: Patient sitting up in bed upon entering the room. No acute events overnight. Patient more lethargic today but did receive Ativan and Melatonin last night. Physical Exam:  Patient seen and examined    Vitals:    09/09/21 1030   BP: 117/78   Pulse: 83   Resp: 16   Temp: 98.9 °F (37.2 °C)   SpO2: 96%     GCS:  4 - Opens eyes on own  4 - Seems confused, disoriented  6 - Follows simple motor commands  General: Ill appearing. Alert /agitated. No acute distress. HENT: atraumatic, neck supple  Eyes: fundoscopic exam not attempted  Pulmonary: unlabored respiratory effort  Cardiovascular:  Warm well perfused. No peripheral edema  Gastrointestinal: abdomen soft, NT, ND    Neurological:  Mental Status: Awake, alert, oriented to self and place only. Attention: short attention span--agitated  Language:  Expressive aphasia. No dysarthria. Sensation: Intact to all extremities to light touch  Coordination: Intact    Cranial Nerves:  II: Full vision in left eye; right eye with double vision in left upper/lower quadrants and no vision in right upper/lower quadrants  III, IV, VI: PERRL, 2 mm bilaterally, EOMI, no nystagmus noted  V: Facial sensation intact bilaterally to touch  VII: Face symmetric  VIII: Hearing intact bilaterally to spoken voice  IX: Palate movement equal bilaterally  XI: Shoulder shrug equal bilaterally  XII: Tongue midline    Musculoskeletal:   Gait: Not tested   Assist devices: None   Tone: normal  Motor strength: Right wrist pain and mild weakness at baseline   Right  Left    Right  Left    Deltoid  5 5  Hip Flex  5 5   Biceps  5 5  Knee Extensors  5 5   Triceps  5 5  Knee Flexors  5 5   Wrist Ext  4 5  Ankle Dorsiflex. 5 5   Wrist Flex  4 5  Ankle Plantarflex.   5 5   Handgrip  5 5  Ext Rogelio Longus  5 5   Thumb Ext  5 5         Radiological Findings:  CT HEAD WO CONTRAST  Exam: 9/4/2021  1. Stable acute parenchymal hemorrhage left frontoparietal lobe. 2. Acute bilateral subarachnoid hemorrhage stable. 3. Subdural hematoma stable. 4. Acute intradural extra medullary hemorrhage surrounding the upper cervical cord stable. 4. Parenchymal volume loss and chronic small vessel ischemic changes in the white matter. 5. Remote infarcts as above. MRI BRAIN W WO CONTRAST  Result Date: 9/8/2021  No significant change in the clustered intraparenchymal hematomas in the left frontal parietal region measuring up to 3 cm in conglomeration with associated vasogenic edema without significant mass effect or midline shift. Stable associated small subdural hematoma over the left cerebral convexity with a small amount of subarachnoid hemorrhage. Chronic small vessel ischemic changes with remote posterior left frontal parietal infarct. Labs:  Recent Labs     09/09/21  0601   WBC 7.3   HGB 10.7*   HCT 31.2*   *       Recent Labs     09/09/21  0601      K 3.3*      CO2 22   BUN 12   CREATININE 0.5*   GLUCOSE 81   CALCIUM 8.1*       No results for input(s): PROTIME, INR, APTT in the last 72 hours. Patient Active Problem List    Diagnosis Date Noted    Intracranial hemorrhage ([facility] [address] 09/04/2021    Electrolyte abnormality 08/22/2021    Alcohol abuse with withdrawal ([facility] [address] 01/10/2021    Hypokalemia 07/25/2020    Acute deep vein thrombosis of left lower extremity (HCC) 05/18/2020    Abnormal angiogram 05/12/2020    Mural thrombus of cardiac apex     Coronary artery disease due to lipid rich plaque        Assessment:  Patient is a 59 y.o. male w/ aphasia and confusion d/t traumatic ICH complicated by anticoagulation. Plan:  1. Neurologic exams frequency: Q4H  2. For change in exam MUST contact neurosurgery team along with critical care or primary team  3.  ICH:  - MRI Brain revealed only stable hemorrhages, no underlying mass or lesion  - Maintain SBP <160; PRN Labetalol  - Hold all anticoagulation & antiplatelet for 2 weeks  - Keep Plt >100k & INR <1.4  - Keppra x2 weeks for seizure prevention  - Repeat CT head for worsening neurologic status  4. Cerebral edema:  - Keep HOB >30 degrees   - NO dextrose in IVF's or in IV drips  - NO Decadron  - If central venous access is needed please use subclavian vs femoral - No IJ as this can decrease venous return and worsen cerebral edema  5. Mobility: PT/OT as tolerates  6. Diet: Advance as tolerates  7. DVT Prophylaxis: SCD's & OK to start SQ Heparin, if medically indicated  8. Will follow peripherally while inpatient. Please call with any questions or decline in neurological status    DISPO: Dispo timing to be determined by primary team once patient is medically stable for discharge. Patient was seen and examined with Dr. Renan Khoury who agrees with above assessment and plan.      Electronically signed by: MERVIN Yin CNP, APRN-CNP, 9/9/2021 10:57 AM  [phone]

## 2021-09-09 NOTE — PROGRESS NOTES
Physical Therapy  Daily Treatment Note    Discharge Recommendations: Sagar De León scored a 10/24 on the AM-PAC short mobility form. Current research shows that an AM-PAC score of 17 or less is typically not associated with a discharge to the patient's home setting. Based on the patient's AM-PAC score and their current functional mobility deficits, it is recommended that the patient have 3-5 sessions per week of Physical Therapy at d/c to increase the patient's independence. Please see assessment section for further patient specific details. Assessment:  Pt with good participation this session. He needed ~ Mod assist x 1 for transfers. Not safe for ambulation with one person assist at this time--pt easily distracted, decreased safety awareness, impulsive. Pt often needing cues repeated. Pt is currently functioning below baseline for mobility. Would benefit from continued IP PT prior to going home. Plan is for SNF. Equipment Needs: Defer to next level of care    Chart Reviewed: Yes     Other position/activity restrictions: up with assist, seizure px, fall px   Additional Pertinent Hx: 58 y/o M presents s/p multiple falls at home. Imagining reveals: Multifocal acute intraparenchymal hemorrhage in L frontoparietal lobe, acute SAH in bifrontal sulci, and small SDH left temporal convexity and left anterior tentorium. Repeat head CT:Stable. PMH: alcohol abuse, CAD s/p LAD stent /2020 on plavix, History of mural thrombus secondary to anterior apical MI on eliquis, CHF (EF 50-55%), HTN, and DVT 5/20/20      Diagnosis: ICH   Treatment Diagnosis: impaired mobility    Subjective: Pt in bed initially. \"I'm tired of this. I need to get out of here. \"   Pt oriented to self. States he knows he's in the hospital. Unable to state which one. \"I'm gonna get out of here tonight and go to my office. \"    Pain: c/o soreness B thighs during exercises.      Objective:    Bed mobility  Supine to sit: Mod assist x 1, HOB up partially  Sit to Supine: Min assist x 1, HOB up partially  Other: Needing mod assist to position in bed (once lying down). Transfers  Sit to stand: Mod assist x 1 from bed without assistive device; Min assist x 1 from chair to [facility]; Mod assist x 1 from commode to [facility]; [address] Major assist x 1 from [facility] seat (twice)  Stand to sit: Min to Mod assist x 1 (into chair, onto bed, onto commode)--decreased eccentric control noted  Bed > chair: Mod assist x 1 via stand pivot    Balance  Sat EOB ~ 8 minutes with SBA  Static stance at [facility] with Min assist x 1  Static stance without device Mod assist x 1  Sat on commode with SBA  Sat in [facility] for transfers with CGA    Exercises  15-20 reps B LE seated LAQ, hip flexion, heel raises, toe raises, hip abd/add  Other: Pt needing frequent cues/demonstration to stay on task, improve quality/ROM of exercises. Pt often perseverating on previous exercise. Other  Pt asking to use toilet during session. Stool very loose. Incontinent of some prior to getting to commode. RN aware. Patient Education  Role of PT. Questionable understanding due to decreased cognition. Safety Devices  Pt left with needs in reach. In bed with bed alarm on. RN updated.      AM-PAC score  AM-PAC Inpatient Mobility Raw Score : 10  AM-PAC Inpatient T-Scale Score : 32.29  Mobility Inpatient CMS 0-100% Score: 76.75  Mobility Inpatient CMS G-Code Modifier : CL    Goals: (as determined and assessed by primary PT)  Time Frame for Short term goals: DC  Short term goal 1: Pt will complete bed mobiltiy with S   Short term goal 2: Pt will complete sit<>stand & bed<>chair txfs with S   Short term goal 3: Pt will tolerate ambulation assessment      Plan:  Times per week: 5-7;    Current Treatment Recommendations: Strengthening, Neuromuscular Re-education, Safety Education & Training, Balance Training, Patient/Caregiver Education & Training, Functional Mobility Training, Equipment Evaluation, Education, & procurement, Transfer Training, Gait Training, Stair training    Therapy Time    Individual  Concurrent  Group  Co-treatment    Time In  1314            Time Out  1354            Minutes  40              Timed Code Treatment Minutes: 40  Total Treatment Minutes: 40    Will continue per plan of care. If patient is discharged prior to next treatment, this note will serve as the discharge summary.     Gilbert Valles, [place] #[id number]

## 2021-09-09 NOTE — PROGRESS NOTES
Progress Note    Admit Date: 9/3/2021  Diet: ADULT DIET; Dysphagia - Soft and Bite Sized; 3 carb choices (45 gm/meal); Low Sodium (2 gm)    Interval history:     Pt seen at bedside this AM, he is AO to person and place, but not time or situation. He did not require restraints overnight. He reports being tired and dozes off during conversation. He still appears confused but follows some commands. Speech is difficult to understand - ,imbles towards end of sentence. Endorses poor appetite and being tired. Denies H/A, CP, SOB, abd pain, extremity weakness. Pt had 2 bowel movements this shift - loose, dark brown/green  No recorded UOP - however PVR shows less than 47 mL retained urine  Renal panel WNL - Cr 0.5 and BUN 12    MRI shows no change in intracranial bleeds, no evidence of underlying mass or lesion    Subjective:  59 y. o. male with Hx of alcohol abuse, CAD s/p LAD stent /2020 on plavix, History of mural thrombus secondary to anterior apical MI on eliquis, CHF (EF 50-55%), HTN, and DVT 5/20/20  Who presented to the ED 9/3/2021 with recurrent falls with head injury.     Reportedly has been increasingly unbalanced and has had recurrent falls, including at least two episodes approximately 1 week ago. In the last episode he states he hit his head. His sister states that these falls episodes are related also with diarrhea and dehydration days before the fall. Appears to have been admitted 8/22-8/23 with N/V/diarrhea and falls as well as multiple electrolyte abnormalities.      He developed difficulty findings words and some confusion. His sister noticed the his confusion and difficulty expressing words when she got joseph     Patient denied exertional chest pain, dyspnea, palpitations, syncope, or prodromal symptoms related to the fall. He also patient denied headache diplopia, dysphasia, or unilateral disturbance of motor or sensory function.  No loss of balance or vertigo.      Patient also complained of abdominal pain in his RUQ.  and bilateral edema, worse  in his right leg.       In the ED, he had satisfactory vitals. Labwork showed macrocytosis (B12, Folate WNL 1/2021) and elevated liver enzymes mostly AST, ALP and mild increase in total Bilirubin. CT scan showed multifocal acute intraparenchymal hemorrhage in the left frontoparietal lobe, small acute subarachnoid hemorrhage and bifrontal sulci, and a small subdural hematoma along the left temporal convexity and left anterior tentorium; and small right frontotemporal scalp hematoma.       K-centra was ordered and patient was admitted to the ICU.       Medications:     Scheduled Meds:   potassium bicarb-citric acid  40 mEq Oral Once    levETIRAcetam  500 mg Oral BID    melatonin  5 mg Oral Nightly    thiamine (VITAMIN B1) IVPB  100 mg IntraVENous Q24H    sodium chloride flush  5-40 mL IntraVENous 2 times per day    sodium chloride flush  5-40 mL IntraVENous 2 times per day    atorvastatin  40 mg Oral Daily    folic acid  1 mg Oral Daily    vitamin B-12  100 mcg Oral Nightly    carvedilol  3.125 mg Oral BID    magnesium oxide  200 mg Oral Daily    lisinopril  2.5 mg Oral Daily     Continuous Infusions:   sodium chloride Stopped (09/08/21 0943)    sodium chloride 25 mL (09/08/21 1104)     PRN Meds:OLANZapine, LORazepam, sodium chloride flush, sodium chloride, sodium chloride flush, sodium chloride, acetaminophen, labetalol    Objective:   Vitals:   T-max:  Patient Vitals for the past 8 hrs:   BP Temp Temp src Pulse Resp SpO2   09/09/21 0722 135/82 98.4 °F (36.9 °C) Oral 97 16 97 %   09/09/21 0255 120/66 98.4 °F (36.9 °C) Oral 93 16 97 %       Intake/Output Summary (Last 24 hours) at 9/9/2021 0910  Last data filed at 9/8/2021 2153  Gross per 24 hour   Intake 1258.19 ml   Output --   Net 1258.19 ml       Physical Exam  Constitutional:       General: He is not in acute distress. Appearance: Normal appearance. He is not ill-appearing.    HENT:      Head: Normocephalic. Comments: Left temporal scalp wound     Mouth/Throat:      Pharynx: Oropharynx is clear. Eyes:      General: No scleral icterus. Pupils: Pupils are equal, round, and reactive to light. Comments: Pt not responding to commands for EOM exam   Cardiovascular:      Rate and Rhythm: Normal rate and regular rhythm. Heart sounds: Normal heart sounds. No murmur heard. No friction rub. No gallop. Pulmonary:      Effort: Pulmonary effort is normal. No respiratory distress. Breath sounds: No wheezing or rhonchi. Abdominal:      General: Bowel sounds are normal.      Tenderness: There is no abdominal tenderness. Musculoskeletal:      Cervical back: No tenderness. Right lower leg: No edema. Left lower leg: No edema. Skin:     General: Skin is warm and dry. Neurological:      Mental Status: He is alert. He is disoriented. Psychiatric:      Comments: Pt disoriented       LABS:    CBC:   Recent Labs     09/07/21  0602 09/08/21  0546 09/09/21  0601   WBC 9.0 8.5 7.3   HGB 12.2* 11.1* 10.7*   HCT 35.3* 32.3* 31.2*   * 121* 109*   .2* 106.9* 107.4*     Renal:    Recent Labs     09/08/21  0546 09/09/21  0601    138   K 3.7 3.3*    100   CO2 23 22   BUN 14 12   CREATININE 0.6* 0.5*   GLUCOSE 77 81   CALCIUM 8.2* 8.1*   ANIONGAP 14 16     Hepatic: No results for input(s): AST, ALT, BILITOT, BILIDIR, PROT, LABALBU, ALKPHOS in the last 72 hours. Troponin: No results for input(s): TROPONINI in the last 72 hours. BNP: No results for input(s): BNP in the last 72 hours. Lipids: No results for input(s): CHOL, HDL in the last 72 hours. Invalid input(s): LDLCALCU, TRIGLYCERIDE  ABGs:  No results for input(s): PHART, VGO4PYL, PO2ART, QIJ5GON, BEART, THGBART, D5YVMVGU, COE6WVN in the last 72 hours. INR:   No results for input(s): INR in the last 72 hours.   Lactate: No results for input(s): LACTATE in the last 72 hours.  Cultures:  -----------------------------------------------------------------  RAD:   MRI BRAIN W WO CONTRAST   Final Result      No significant change in the clustered intraparenchymal hematomas in the left frontal parietal region measuring up to 3 cm in conglomeration with associated vasogenic edema without significant mass effect or midline shift. Stable associated small subdural hematoma over the left cerebral convexity with a small amount of subarachnoid hemorrhage. Chronic small vessel ischemic changes with remote posterior left frontal parietal infarct. US ABDOMEN LIMITED   Final Result      1. Heterogeneous echogenicity consistent with diffuse hepatocellular disease. 2.  Gallbladder polyp versus tumefactive sludge unchanged. 3.  Small ascites. VL Extremity Venous Bilateral   Final Result      CT ABDOMEN PELVIS WO CONTRAST Additional Contrast? None   Final Result      1. Mild ascites. Chronic hepatocellular disease. 2.  Small bilateral pleural effusions. XR ABDOMEN (KUB) (SINGLE AP VIEW)   Final Result   Impression:   1. Nonobstructive small bowel gas pattern. CT head without contrast   Final Result   Impression:   Stable acute parenchymal hemorrhage left frontoparietal lobe. 2. Acute bilateral subarachnoid hemorrhage stable. 3. Subdural hematoma stable. 4. Acute intradural extra medullary hemorrhage surrounding the upper cervical cord stable. 4. Parenchymal volume loss and chronic small vessel ischemic changes in the white matter. 5. Remote infarcts as above. CT HEAD WO CONTRAST   Final Result      1. Multifocal acute intraparenchymal hemorrhage in the left frontoparietal lobe. Small volume acute subarachnoid hemorrhage in bifrontal sulci, and small subdural hematoma along the left temporal convexity and left anterior tentorium. 2.  Small right frontotemporal scalp hematoma. Discussed with Dr. Wai Adrian at 1116 hours.       XR CHEST (2 VW)   Final Result      No acute pulmonary disease. Assessment/Plan:    59 y.o. M with PMH of Alcohol abuse, CAD s/p stent on Plavix, Hx of Ant. Apical MI with mural thrombus on Eliquis, CHF, HTN, DVT (2020). He p/w with recurrent fall and head injury (last episode 1wk ago) with ICH, SAH, SDH on CT as well as aphasia and confusion on exam. Admitted for further management. He is currently afebrile and HDS. Aphasia and Confusion likely 2/2 Intracranial hemorrhage 2/2 traumatic fall  SAH and SDH 2/2 traumatic fall, sec to recurrent falls  Recurrent falls  Chronic Anticoagulation - Plavix/Eliquis  Hx of Alcohol abuse, recurrent falls, recent admissions for diarrhea/dehydration and electrolyte abnormality (-). Pt p/w with aphasia and confusion - sister describes that pt had difficulty expressing words and was confused when she got home. CT head with the followin. Multifocal acute ICH in left frontoparietal lobes  2. Small acute SAH  3. Small SDH with left temporal convexity  4. Small right frontotemporal scalp hematoma  MRI with the followin. no significant change in left frontoparietal intraparenchymal hematos - no evidence of significant midline shift or mass effect or lesion  2. Stable SDH over left convexity and small SAH  3.  Chronic small vessel ischemic changes w/ remote posterior left frontoparietal infarct.  - s/p K-Centra in ED and AC/AP discontinued   - BP stable on meds - Carvedilol/Lisinopril  - Keppra for seizure Px - switched to oral tablets  - Quetiapine for agitation; additional PRN meds for agitation in place  - No intervention at this time per NSGY - Repeat head CT if clinical worsening  - q4hr neuro checks, keep SBP<160, Plt >100k and INR <1.4  - PT/OT following - poor scores 10/24 both   - ARU eval      Chronic Alcohol Abuse  Alcoholic Hepatitis with Cirrhosis  Macrocytic anemia 2/2 Alcohol abuse  Thrombocytopenia  ALT 54, , , Bili 1.2, Plt

## 2021-09-09 NOTE — PROGRESS NOTES
Neurosurgery Progress Note    Patient seen and examined on 09/08/21. No acute events overnight. Neurologically stable on exam. Significantly more calm today.       A/P: 60 yo man with left parietal ICH, possibly traumatic in setting of anticoagulation.    -Neuro stable  -HOB elevated  -Frequent neuro checks  -MRI pending  -Maintain SBP < 160 mm Hg  -Maintain INR < 1.4, Platelets > 713K  -Hold anticoagulation x 2 weeks minimum  -PT/OT  -Keppra for seizure ppx  -Will follow      Ron Rankin MD, PhD  [address], [address], [address] [phone] (c), [phone] (o)

## 2021-09-09 NOTE — PROGRESS NOTES
Patient A&Ox3, disoriented to situation. VSS. Neuro checks at baseline, pt able to follow most commands, able to move all extremities. Patient denies pain or nausea, reports decreased appetite, will encourage pt to increase intake. Patient anticipating discharge to SNF once precert obtained. Will continue to monitor.

## 2021-09-09 NOTE — PLAN OF CARE
Pt assessment complete and medications passed; pt alert to self and place; disoriented to time and situation, has multiple episodes of confusion, easily redirected. Denies any pain. VSS with pt on room air. Pt had one brown BM this shift. 0.25mg IV ativan given once for agitation. Call light within reach and bed alarm on.                                                                                                      Problem: Falls - Risk of:  Goal: Will remain free from falls  Description: Will remain free from falls  Outcome: Ongoing  Goal: Absence of physical injury  Description: Absence of physical injury  Outcome: Ongoing     Problem: Pain:  Goal: Pain level will decrease  Description: Pain level will decrease  Outcome: Ongoing  Goal: Control of acute pain  Description: Control of acute pain  Outcome: Ongoing  Goal: Control of chronic pain  Description: Control of chronic pain  Outcome: Ongoing     Problem: Skin Integrity:  Goal: Will show no infection signs and symptoms  Description: Will show no infection signs and symptoms  Outcome: Ongoing  Goal: Absence of new skin breakdown  Description: Absence of new skin breakdown  Outcome: Ongoing     Problem: HEMODYNAMIC STATUS  Goal: Patient has stable vital signs and fluid balance  Outcome: Ongoing     Problem: ACTIVITY INTOLERANCE/IMPAIRED MOBILITY  Goal: Mobility/activity is maintained at optimum level for patient  Outcome: Ongoing     Problem: COMMUNICATION IMPAIRMENT  Goal: Ability to express needs and understand communication  Outcome: Ongoing     Problem: Confusion - Acute:  Goal: Absence of continued neurological deterioration signs and symptoms  Description: Absence of continued neurological deterioration signs and symptoms  Outcome: Ongoing  Goal: Mental status will be restored to baseline  Description: Mental status will be restored to baseline  Outcome: Ongoing     Problem: Discharge Planning:  Goal: Ability to perform activities of daily living will improve  Description: Ability to perform activities of daily living will improve  Outcome: Ongoing  Goal: Participates in care planning  Description: Participates in care planning  Outcome: Ongoing     Problem: Injury - Risk of, Physical Injury:  Goal: Will remain free from falls  Description: Will remain free from falls  Outcome: Ongoing  Goal: Absence of physical injury  Description: Absence of physical injury  Outcome: Ongoing     Problem: Mood - Altered:  Goal: Mood stable  Description: Mood stable  Outcome: Ongoing  Goal: Absence of abusive behavior  Description: Absence of abusive behavior  Outcome: Ongoing  Goal: Verbalizations of feeling emotionally comfortable while being cared for will increase  Description: Verbalizations of feeling emotionally comfortable while being cared for will increase  Outcome: Ongoing     Problem: Psychomotor Activity - Altered:  Goal: Absence of psychomotor disturbance signs and symptoms  Description: Absence of psychomotor disturbance signs and symptoms  Outcome: Ongoing     Problem: Sensory Perception - Impaired:  Goal: Demonstrations of improved sensory functioning will increase  Description: Demonstrations of improved sensory functioning will increase  Outcome: Ongoing  Goal: Decrease in sensory misperception frequency  Description: Decrease in sensory misperception frequency  Outcome: Ongoing  Goal: Able to refrain from responding to false sensory perceptions  Description: Able to refrain from responding to false sensory perceptions  Outcome: Ongoing  Goal: Demonstrates accurate environmental perceptions  Description: Demonstrates accurate environmental perceptions  Outcome: Ongoing  Goal: Able to distinguish between reality-based and nonreality-based thinking  Description: Able to distinguish between reality-based and nonreality-based thinking  Outcome: Ongoing  Goal: Able to interrupt nonreality-based thinking  Description: Able to interrupt nonreality-based

## 2021-09-09 NOTE — PLAN OF CARE
Problem: Falls - Risk of:  Goal: Will remain free from falls  Description: Will remain free from falls  9/9/2021 1205 by Nani Fine RN  Outcome: Ongoing     Problem: Pain:  Goal: Pain level will decrease  Description: Pain level will decrease  9/9/2021 1205 by Nani Fine RN  Outcome: Ongoing     Problem: Skin Integrity:  Goal: Will show no infection signs and symptoms  Description: Will show no infection signs and symptoms  9/9/2021 1205 by Nani Fine RN  Outcome: Ongoing       Problem: HEMODYNAMIC STATUS  Goal: Patient has stable vital signs and fluid balance  9/9/2021 1205 by Nani Fine RN  Outcome: Ongoing     Problem: ACTIVITY INTOLERANCE/IMPAIRED MOBILITY  Goal: Mobility/activity is maintained at optimum level for patient  9/9/2021 1205 by Nani Fine RN  Outcome: Ongoing

## 2021-09-09 NOTE — CONSULTS
Socioeconomic History    Marital status: Single     Spouse name: None    Number of children: 0    Years of education: None    Highest education level: None   Occupational History    None   Tobacco Use    Smoking status: Former Smoker     Packs/day: 0.50     Years: 40.00     Pack years: 20.00     Types: Cigarettes    Smokeless tobacco: Never Used   Vaping Use    Vaping Use: Never used   Substance and Sexual Activity    Alcohol use: Yes     Alcohol/week: 2.0 standard drinks     Types: 2 Cans of beer per week     Comment: previously was drinkning 5 cans per day    Drug use: Never    Sexual activity: Not Currently   Other Topics Concern    None   Social History Narrative    None     Social Determinants of Health     Financial Resource Strain:     Difficulty of Paying Living Expenses:    Food Insecurity:     Worried About Running Out of Food in the Last Year:     Ran Out of Food in the Last Year:    Transportation Needs:     Lack of Transportation (Medical):      Lack of Transportation (Non-Medical):    Physical Activity:     Days of Exercise per Week:     Minutes of Exercise per Session:    Stress:     Feeling of Stress :    Social Connections:     Frequency of Communication with Friends and Family:     Frequency of Social Gatherings with Friends and Family:     Attends Taoist Services:     Active Member of Clubs or Organizations:     Attends Club or Organization Meetings:     Marital Status:    Intimate Partner Violence:     Fear of Current or Ex-Partner:     Emotionally Abused:     Physically Abused:     Sexually Abused:            REVIEW OF SYSTEMS:   CONSTITUTIONAL: negative for fevers, chills, diaphoresis, appetite change, night sweats, + alcohol abuse  EYES: negative for blurred vision, eye discharge, visual disturbance and icterus  HEENT: negative for hearing loss, tinnitus, ear drainage, sinus pressure, nasal congestion, epistaxis and snoring  RESPIRATORY: Negative for hemoptysis, cough, sputum production  CARDIOVASCULAR: negative for chest pain, palpitations, exertional chest pressure/discomfort, syncope, +CAD, s/p LAD stent 2020, CHF, mural thrombus  GASTROINTESTINAL: negative for nausea, vomiting, diarrhea, blood in stool, abdominal pain, constipation  GENITOURINARY: negative for frequency, dysuria, urinary incontinence, decreased urine volume, and hematuria  HEMATOLOGIC/LYMPHATIC: negative for easy bruising, bleeding and lymphadenopathy + DVT  ALLERGIC/IMMUNOLOGIC: negative for recurrent infections, angioedema, anaphylaxis and drug reactions  ENDOCRINE: negative for weight changes and diabetic symptoms including polyuria, polydipsia and polyphagia  MUSCULOSKELETAL: negative for pain, joint swelling, decreased range of motion  NEUROLOGICAL: negative for headaches, slurred speech, unilateral weakness  PSYCHIATRIC/BEHAVIORAL: negative for hallucinations, behavioral problems, confusion and agitation. + alcohol abuse    Physical Examination:  Vitals:   Patient Vitals for the past 24 hrs:   BP Temp Temp src Pulse Resp SpO2   09/09/21 1030 117/78 98.9 °F (37.2 °C) Axillary 83 16 96 %   09/09/21 0722 135/82 98.4 °F (36.9 °C) Oral 97 16 97 %   09/09/21 0255 120/66 98.4 °F (36.9 °C) Oral 93 16 97 %   09/08/21 2225 137/83 99 °F (37.2 °C) Oral 85 16 98 %   09/08/21 1920 (!) 107/55 98.5 °F (36.9 °C) Oral 74 16 98 %   09/08/21 1457 110/68 98.8 °F (37.1 °C) Oral 79 16 96 %     Const: Alert. WDWN. No distress  Eyes: Conjunctiva noninjected, no icterus noted; pupils equal, round, and reactive to light. HENT: Atraumatic, normocephalic; Oral mucosa moist  Neck: Trachea midline, neck supple. No thyromegaly noted. CV: Regular rate and rhythm, no murmur rub or gallop noted  Resp: Lungs clear to auscultation bilaterally, no rales wheezes or ronchi, no retractions. Respirations unlabored. GI: Soft, nontender, nondistended. Normal bowel sounds. No palpable masses.    Skin: Normal temperature and turgor. No rashes or breakdown noted. Ext: No significant edema appreciated. No varicosities. MSK: No joint tenderness, erythema, warmth noted. AROM intact. Neuro: Alert, oriented, appropriate. No cranial nerve deficits appreciated. Sensation intact to light touch. Motor examination reveals normal strength in all four limbs diffusely. No abnormalities with finger/nose or heel/shin noted. Reflexes normal and symmetric. Psych: Stable mood, normal judgement, normal affect     Lab Results   Component Value Date    WBC 7.3 09/09/2021    HGB 10.7 (L) 09/09/2021    HCT 31.2 (L) 09/09/2021    .4 (H) 09/09/2021     (L) 09/09/2021     Lab Results   Component Value Date    INR 1.59 (H) 09/06/2021    INR 1.84 (H) 09/03/2021    INR 2.13 (H) 08/22/2021    PROTIME 18.3 (H) 09/06/2021    PROTIME 21.3 (H) 09/03/2021    PROTIME 24.9 (H) 08/22/2021     Lab Results   Component Value Date    CREATININE 0.5 (L) 09/09/2021    BUN 12 09/09/2021     09/09/2021    K 3.3 (L) 09/09/2021     09/09/2021    CO2 22 09/09/2021     Lab Results   Component Value Date    ALT 54 (H) 09/03/2021     (H) 09/03/2021    ALKPHOS 240 (H) 09/03/2021    BILITOT 1.2 (H) 09/03/2021       Most recent echocardiogram revealed:  Limited echo. Definity contrast administered. Left ventricular cavity size is normal. There is mild concentric left   ventricular hypertrophy. Overall left ventricular systolic function appears   borderline normal with an estimated ejection fraction of 50-55%. There is   hypokinesis of the basal-mid inferior and inferolateral wall. No evidence of   left ventricular mass or thrombus noted.          Most recent EKG revealed:  Normal sinus rhythm, LVH via aVL >11, left axis deviation    Most recent imaging studies revealed:    MRI BRAIN W WO CONTRAST   Final Result      No significant change in the clustered intraparenchymal hematomas in the left frontal parietal region measuring up to 3 cm in along the left temporal convexity and the left anterior tentorium:   Right frontotemporal scalp hematoma  Chronic Anticoagulation - Plavix/Eliquis     2. Chronic Alcohol Abuse  Alcoholic Hepatitis with Cirrhosis  Macrocytic anemia 2/2 Alcohol abuse    3. Chronic Diarrhea, unclear etiology    4. Subclinical Hypothyroidism    5. Essential hypertension    6. CAD with LAD stent,Chronic CHF     Impairments- Decreased functional mobility, Decreased ADLs    Recommendations:    Patient with new functional deficits and ongoing medical complexity. Demonstrates ability to tolerate 3 hours therapy/day. Patient is potentially a good candidate for acute inpatient rehab when medically appropriate and also patient will need to be sitter free for 24 hours. We will reassess to see if patient is a good candidate for acute rehab. Patient has [employer], and we would need to get approval from his insurance company for rehab unit treatment. Will follow, and see how he progresses. Thank you for this consult. Please contact me with any questions or concerns.      Ada Oneal MD  PM&R  9/9/2021  11:30 AM

## 2021-09-09 NOTE — CARE COORDINATION
Case Management Assessment           Initial Evaluation                Date / Time of Evaluation: 9/9/2021 1:01 PM                 Assessment Completed by: LUBNA Garcia    Patient Name: Elida Marcano     YOB: 1960  Diagnosis: Intracranial hemorrhage ([address] [I62.9]  Intraparenchymal hemorrhage of brain [facility]) [I61.9]     Date / Time: 9/3/2021  9:39 PM    Patient Admission Status: Inpatient    If patient is discharged prior to next notation, then this note serves as note for discharge by case management. Current PCP: Zara Portillo MD  Clinic Patient: No    Chart Reviewed: Yes  Patient/ Family Interviewed: Yes    Initial assessment completed at bedside with: Patient     Hospitalization in the last 30 days: Yes    Emergency Contacts:  Extended Emergency Contact Information  Primary Emergency Contact: [facility] Phone: [phone]  Mobile Phone: [phone]  Relation: Brother/Sister    Advance Directives:   Code Status: Full [address]: No    Financial:  Payor: [address] [place] [address] / Plan: [address] [place] [address] / Product Type: *No Product type* /     Pre-cert required for SNF: Yes    Pharmacy:    [facility] [address] [place] [id number]  Phone: [phone] Fax: [phone]      Potential assistance Purchasing Medications: Potential Assistance Purchasing Medications: No  Does Patient want to participate in local refill/ meds to beds program?: No    Meds To Beds General Rules:  1. Can ONLY be done Monday- Friday between 8:30am-5pm  2. Prescription(s) must be in pharmacy by 3pm to be filled same day  3. Copy of patient's insurance/ prescription drug card and patient face sheet must be sent along with the prescription(s)  4.  Cost of Rx cannot be added to hospital bill. If financial assistance is needed, please contact unit  or ;  or  CANNOT provide pharmacy voucher for patients co-pays  5. Patients can then  the prescription on their way out of the hospital at discharge, or pharmacy can deliver to the bedside if staff is available. (payment due at time of pick-up or delivery - cash, check, or card accepted)     Able to afford home medications/ co-pay costs: Yes    ADLS:  Support Systems: Family Members    PT AM-PAC: 10 /24  OT AM-PAC: 10 /24    Housing:  Home Environment: From home with sitster  Steps:     Plans to RETURN to current housing: No  Barrier(s) to RETURNING to current housing: unable to ambulate safely without assist     [address]:  Currently ACTIVE with [address]: No    Durable Medical Equipment:  DME Provider: none   Equipment: none reported     Home Oxygen and Respiratory Equipment:  Has HOME OXYGEN prior to admission: No    DISCHARGE PLAN:  Disposition: SNF (referrals placed)     Transportation PLAN for discharge: EMS transportation     Factors facilitating achievement of predicted outcomes: Cooperative    Barriers to discharge: Limited family support, Impulsivity and Limited insight into deficits    Additional Case Management Notes:   SW met with patient at bedside. Patient from home and reportedly staying with his sister. Patient admitted for an intracranial hemorrhage. Patient in need of rehab at discharge. Patient is agreeable to referrals being placed. Agreeable to referrals near his home where his sister lives. SW provided SNF lists. Referrals placed [address], [address], [address] [facility] for starters. SW to follow.      The Plan for Transition of Care is related to the following treatment goals Intracranial hemorrhage (HCC) [I62.9]  Intraparenchymal hemorrhage of brain ([facility] [I61.9]      The Patient and/or patient representative  was provided with a choice of provider and agrees with the discharge plan Yes    Freedom of choice list was provided with basic dialogue that supports the patient's individualized plan of care/goals and shares the quality data associated with the providers.  Yes    Care Transition patient: Yes    LUBNA Fitch  [facility]   Case Management Department  Ph: [phone]

## 2021-09-09 NOTE — PROGRESS NOTES
Speech Language Pathology  Facility/Department: [address] Stroke Ortho  Speech/Language/Cognition Daily Treatment Note    NAME: Johnny Plascencia  : 1960  MRN: [id number]    Patient Diagnosis(es):   Patient Active Problem List    Diagnosis Date Noted    Intracranial hemorrhage ([facility] 2021    Electrolyte abnormality 2021    Alcohol abuse with withdrawal ([facility] 01/10/2021    Hypokalemia 2020    Acute deep vein thrombosis of left lower extremity ([facility] 2020    Abnormal angiogram 2020    Mural thrombus of cardiac apex     Coronary artery disease due to lipid rich plaque      Allergies: No Known Allergies      Chart reviewed. Pain: denied  Medical diagnosis: ICH  Treatment diagnosis: mixed receptive / expressive aphasia    Subjective: Pt seen resting in bed, on room air, perseverating about his missing keys. Treatment:  Pt seen to address the following goals:  Goal 1: Patient will follow 2 step directions with 80% accuracy given min cues. : Mod cues required to follow 1 step directions. Continue goal  : not targeted  : patient distractible, and impulsive, requiring cues for attention    Goal 2: Patient will complete graded naming tasks with 90% accuracy given min cues. : <25% accuracy for naming items in concrete basic category - expression marked by neologisms and jargon without awareness. Continue goal, consider modifying criterion given severity of deficits exhibited this date. : max cues, 20% accuracy confrontation naming, automatic naming. : pt required min-mod cues for abstract naming. Goal 3: Patient will complete repetition tasks with 80% accuracy given min cues. : 0% accuracy for repeating target words for naming - responded \"yeah\" to SLP and no initiation to repeat. Continue goal.  : 0% accuracy  : not directly targeted    Goal 4: Patient will improve reading comprehension at the sentence level with min cues.   : Goal not targeted. 9/8: goal not targeted  9/9: goal not directly targeted    Goal 5: Patient will tolerate ongoing cognitive-linguistic assessment. 9/6: Suspected R inattention x 1 when pt knocked cup over on table with RUE. Stated \"that wasn't me. \" Dependent to identify errors made, indicated insight x 1 during session otherwise absent error awareness. Continue goal.  9/8: goal not targeted  9/9: pt demonstrated orientation to self, day of the week, month, year, city, and approximate time of day. However misidentified situation/location (stated we are at the [employer]). Education:    Assessment: Progressing; receptive comprehension and fluency improving. Continues to present with suspected cognitive issues, but also appears to be clearing somewhat. Plan:  Continue speech/language therapy to address above goals, 2-4 x/week x LOS  DC recommendations: ongoing ST indicated    D/W nursing, Aletta Person  Needs met prior to leaving room, call button in reach. Treatment time: 5 minutes, no tx charge    Gigi [address] [phone]  Speech-Language Pathologist  Pager: [phone]    If patient is discharged prior to next session, this note will serve as discharge summary.

## 2021-09-09 NOTE — PROGRESS NOTES
Occupational Therapy  Facility/Department: [facility] 5T ORTHO/NEURO  Daily Treatment Note  NAME: Dary Gilliam  : 1960  MRN: [id number]    Date of Service: 2021     Assessment: Pt easily irritated with all tasks requiring increased time for completion. Pt completing bed mobility with Mod A for supine to sit. ADL grooming and dressing tasks completed EOB with difficulty understanding tasks and briefly grabbing therapist wrist due to frustation. Pt would benefit from inpt OT for maximizing functional independence and safety. Continue OT per POC. Discharge Recommendations:  Dary Gilliam scored a 10/24 on the AM-PAC ADL Inpatient form. Current research shows that an AM-PAC score of 17 or less is typically not associated with a discharge to the patient's home setting. Based on the patient's AM-PAC score and their current ADL deficits, it is recommended that the patient have 3-5 sessions per week of Occupational Therapy at d/c to increase the patient's independence. Please see assessment section for further patient specific details. If patient discharges prior to next session this note will serve as a discharge summary. Please see below for the latest assessment towards goals. Treatment Diagnosis: Decreased activity tolerance, impaired ADLs and mobility  OT Education: OT Role;Plan of Care  Patient Education: Pt will need reinforcement  Activity Tolerance  Activity Tolerance: Treatment limited secondary to decreased cognition;Treatment limited secondary to agitation  Activity Tolerance: Pt irritated with all tasks         Patient Diagnosis(es): The encounter diagnosis was Intraparenchymal hemorrhage of brain ([address] has a past medical history of Alcohol abuse, CAD (coronary artery disease), CHF (congestive heart failure) ([address] Essential tremor, HTN (hypertension), Hx of blood clots, Lower extremity cellulitis, and MI (mitral incompetence).    has a past surgical history that includes Percutaneous Transluminal Coronary Angio and Upper gastrointestinal endoscopy (N/A, 2020). Restrictions  Position Activity Restriction  Other position/activity restrictions: up with assist, seizure px, fall px       Subjective   General  Chart Reviewed: Yes  Patient assessed for rehabilitation services?: Yes  Additional Pertinent Hx: 59 y.o. male with Hx of alcohol abuse, CAD s/p LAD  on plavix, History of mural thrombus secondary to anterior apical MI on eliquis, CHF (EF 50-55%), HTN, and DVT 20  Who presented to the ED 9/3/2021 with Traumatic ICHs secondary to falls. +CIWA. Frequently agitated, requiring restraints and sedation. Family / Caregiver Present: No  Referring Practitioner: Sami Jeronimo MD  Diagnosis: intracranial hemorrhage  Subjective  Subjective: pt supine in bed requiring encouragment for all tasks. Pt easily frustrated requiring increased time for completion of tasks. General Comment  Comments: Pt breifly grabing therapist wrist during sesssion due to frustration. Vital Signs  Patient Currently in Pain: Denies     Orientation  Orientation  Overall Orientation Status: Impaired     Objective    ADL  Grooming: Verbal cueing; Increased time to complete;Minimal assistance (washing face and oral care seated on EOB with increased time and intermittent frustration with completion of task.)  UE Bathing: Minimal assistance  UE Dressing: Minimal assistance (donning gown seated EOB)  LE Dressing:  (declined)        Balance  Sitting Balance: Stand by assistance (Pt with posterior lean)  Standing Balance:  (stance unsafe to attempt this session)  Bed mobility  Rolling to Left: Maximum assistance (adjustment of bedding)  Rolling to Right: Maximum assistance  Supine to Sit: Moderate assistance (increased time for sequencing task)  Sit to Supine: Moderate assistance  Scootin Person assistance (scooting up supine)  Comment: pt requiring increased time for bed mobility easily confused. Pt grabbing sheet and repeatedly getting tangled.             Plan  If pt discharges prior to next treatment, this note will serve as discharge summary  Plan  Times per week: 5-7x  Times per day: Daily  Current Treatment Recommendations: Balance Training, Functional Mobility Training, Endurance Training, Self-Care / ADL, Safety Education & Training, Equipment Evaluation, Education, & procurement, Patient/Caregiver Education & Training, Cognitive Reorientation, Strengthening, Neuromuscular Re-education, Cognitive/Perceptual Training                           AM-PAC Score        AM-PAC Inpatient Daily Activity Raw Score: 10 (09/09/21 0917)  AM-PAC Inpatient ADL T-Scale Score : 27.31 (09/09/21 0917)  ADL Inpatient CMS 0-100% Score: 74.7 (09/09/21 0917)  ADL Inpatient CMS G-Code Modifier : CL (09/09/21 0917)    Goals (as determined and assessed by primary OT)  Short term goals  Time Frame for Short term goals: by D/C  Short term goal 1: Static stance 2 min with CGA - Not met  Short term goal 2: Transfer to chair or BSC with min assist - Not met  Short term goal 3: Wash face, comb hair, brush teeth mod I, set up - Not met       Therapy Time   Individual Concurrent Group Co-treatment   Time In 0840         Time Out 6201         Minutes 38         Timed Code Treatment Minutes: CAMILO Chavira 46

## 2021-09-09 NOTE — DISCHARGE INSTR - COC
Continuity of Care Form    Patient Name: Katarina Vences   :  1960  MRN:  [id number]    Admit date:  9/3/2021  Discharge date:  9/10/2021     Code Status Order: Full Code   Advance Directives:      Admitting Physician:  Omkar Reyez MD  PCP: Andrez Bashir MD    Discharging Nurse: [facility] Unit/Room#: 6396/7567-44  Discharging Unit Phone Number: ***    Emergency Contact:   Extended Emergency Contact Information  Primary Emergency Contact: Jocelin Brian  Home Phone: [phone]  Mobile Phone: [phone]  Relation: Brother/Sister    Past Surgical History:  Past Surgical History:   Procedure Laterality Date    PTCA      UPPER GASTROINTESTINAL ENDOSCOPY N/A 2020    EGD BIOPSY performed by Cuong Jonas MD at [facility] ENDOSCOPY       Immunization History:   Immunization History   Administered Date(s) Administered    Influenza, Quadv, IM, PF (6 mo and older Fluzone, Flulaval, Fluarix, and 3 yrs and older Afluria) 10/09/2020       Active Problems:  Patient Active Problem List   Diagnosis Code    Mural 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    Coronary artery disease due to lipid rich plaque I25.10, I25.83    Abnormal angiogram R93.89    Acute deep vein thrombosis of left lower extremity ([facility] I82.402    Hypokalemia E87.6    Alcohol abuse with withdrawal ([facility] F10.139    Electrolyte abnormality E87.8    Intracranial hemorrhage ([facility] I62.9       Isolation/Infection:   Isolation            No Isolation          Patient Infection Status       Infection Onset Added Last Indicated Last Indicated By Review Planned Expiration Resolved Resolved By    None active    Resolved    C-diff Rule Out 21 Clostridium Difficile Toxin/Antigen (Ordered)   21 Shannan Rice RN    From prior admission    C-diff Rule Out 20 Clostridium difficile toxin/antigen (Ordered)   20 Rule-Out Test Resulted            Nurse Assessment:  Last Equipment (for information only, NOT a DME order):  walker  Other Treatments: ***    Patient's personal belongings (please select all that are sent with patient):  {[facility] DME Belongings:[id number]:::0}    RN SIGNATURE:  Electronically signed by Leyda Garza RN on 9/10/21 at 2:00 PM EDT    CASE MANAGEMENT/SOCIAL WORK SECTION    Inpatient Status Date: 9/4/2021     Readmission Risk Assessment Score:  Readmission Risk              Risk of Unplanned Readmission:  26           Discharging to Facility/ [address], [facility], [address]  Report: [phone]   Fax: [phone]    / signature: Electronically signed by LUBNA Barbour on 9/10/21 at 12:24 PM EDT    PHYSICIAN SECTION    Prognosis: Fair    Condition at Discharge: Stable    Rehab Potential (if transferring to Rehab): Fair    Recommended Labs or Other Treatments After Discharge:     CBC in 3-5 days    Repeat H Pylori testing after completion of treatment    Follow up with Neurosurgery (Dr. Shelley Rao) in 2 weeks with repeat head CT prior to resuming home anticoagulants/APT      Physician Certification: I certify the above information and transfer of Jesus Pathak  is necessary for the continuing treatment of the diagnosis listed and that he requires [facility] for greater 30 days.      Update Admission H&P: No change in H&P    PHYSICIAN SIGNATURE:  Electronically signed by Crista Cooper MD on 9/10/21 at 11:32 AM EDT

## 2021-09-10 VITALS
HEART RATE: 85 BPM | OXYGEN SATURATION: 98 % | DIASTOLIC BLOOD PRESSURE: 78 MMHG | RESPIRATION RATE: 16 BRPM | TEMPERATURE: 98.4 F | WEIGHT: 174.6 LBS | SYSTOLIC BLOOD PRESSURE: 127 MMHG | BODY MASS INDEX: 25.78 KG/M2

## 2021-09-10 LAB
ANION GAP SERPL CALCULATED.3IONS-SCNC: 17 MMOL/L (ref 3–16)
BUN BLDV-MCNC: 12 MG/DL (ref 7–20)
CALCIUM SERPL-MCNC: 8.3 MG/DL (ref 8.3–10.6)
CHLORIDE BLD-SCNC: 100 MMOL/L (ref 99–110)
CO2: 21 MMOL/L (ref 21–32)
CREAT SERPL-MCNC: 0.6 MG/DL (ref 0.8–1.3)
GFR AFRICAN AMERICAN: >60
GFR NON-AFRICAN AMERICAN: >60
GLUCOSE BLD-MCNC: 81 MG/DL (ref 70–99)
HCT VFR BLD CALC: 34.8 % (ref 40.5–52.5)
HEMOGLOBIN: 11.8 G/DL (ref 13.5–17.5)
MCH RBC QN AUTO: 36.7 PG (ref 26–34)
MCHC RBC AUTO-ENTMCNC: 34.1 G/DL (ref 31–36)
MCV RBC AUTO: 107.7 FL (ref 80–100)
PDW BLD-RTO: 15.2 % (ref 12.4–15.4)
PLATELET # BLD: 133 K/UL (ref 135–450)
PMV BLD AUTO: 7.7 FL (ref 5–10.5)
POTASSIUM SERPL-SCNC: 3.7 MMOL/L (ref 3.5–5.1)
RBC # BLD: 3.23 M/UL (ref 4.2–5.9)
SODIUM BLD-SCNC: 138 MMOL/L (ref 136–145)
WBC # BLD: 8.2 K/UL (ref 4–11)

## 2021-09-10 PROCEDURE — 6370000000 HC RX 637 (ALT 250 FOR IP): Performed by: INTERNAL MEDICINE

## 2021-09-10 PROCEDURE — 85027 COMPLETE CBC AUTOMATED: CPT

## 2021-09-10 PROCEDURE — 36415 COLL VENOUS BLD VENIPUNCTURE: CPT

## 2021-09-10 PROCEDURE — 97530 THERAPEUTIC ACTIVITIES: CPT

## 2021-09-10 PROCEDURE — 6370000000 HC RX 637 (ALT 250 FOR IP): Performed by: STUDENT IN AN ORGANIZED HEALTH CARE EDUCATION/TRAINING PROGRAM

## 2021-09-10 PROCEDURE — 2580000003 HC RX 258: Performed by: STUDENT IN AN ORGANIZED HEALTH CARE EDUCATION/TRAINING PROGRAM

## 2021-09-10 PROCEDURE — 6360000002 HC RX W HCPCS: Performed by: STUDENT IN AN ORGANIZED HEALTH CARE EDUCATION/TRAINING PROGRAM

## 2021-09-10 PROCEDURE — 97535 SELF CARE MNGMENT TRAINING: CPT

## 2021-09-10 PROCEDURE — 97110 THERAPEUTIC EXERCISES: CPT

## 2021-09-10 PROCEDURE — 80048 BASIC METABOLIC PNL TOTAL CA: CPT

## 2021-09-10 RX ADMIN — METRONIDAZOLE 500 MG: 500 TABLET ORAL at 14:32

## 2021-09-10 RX ADMIN — THIAMINE HYDROCHLORIDE 100 MG: 100 INJECTION, SOLUTION INTRAMUSCULAR; INTRAVENOUS at 11:05

## 2021-09-10 RX ADMIN — LISINOPRIL 2.5 MG: 2.5 TABLET ORAL at 08:32

## 2021-09-10 RX ADMIN — TETRACYCLINE HYDROCHLORIDE 500 MG: 250 CAPSULE ORAL at 08:32

## 2021-09-10 RX ADMIN — METRONIDAZOLE 500 MG: 500 TABLET ORAL at 06:39

## 2021-09-10 RX ADMIN — LEVETIRACETAM 500 MG: 500 TABLET ORAL at 08:32

## 2021-09-10 RX ADMIN — MAGNESIUM OXIDE TAB 400 MG (240 MG ELEMENTAL MG) 200 MG: 400 (240 MG) TAB at 06:38

## 2021-09-10 RX ADMIN — FOLIC ACID 1 MG: 1 TABLET ORAL at 08:31

## 2021-09-10 RX ADMIN — Medication 10 ML: at 08:34

## 2021-09-10 RX ADMIN — BISMUTH SUBSALICYLATE 524 MG: 262 TABLET, CHEWABLE ORAL at 08:33

## 2021-09-10 RX ADMIN — CARVEDILOL 3.12 MG: 3.12 TABLET, FILM COATED ORAL at 08:31

## 2021-09-10 RX ADMIN — ATORVASTATIN CALCIUM 40 MG: 40 TABLET, FILM COATED ORAL at 08:32

## 2021-09-10 RX ADMIN — PANTOPRAZOLE SODIUM 40 MG: 40 TABLET, DELAYED RELEASE ORAL at 06:39

## 2021-09-10 RX ADMIN — TETRACYCLINE HYDROCHLORIDE 500 MG: 250 CAPSULE ORAL at 13:32

## 2021-09-10 ASSESSMENT — PAIN SCALES - GENERAL: PAINLEVEL_OUTOF10: 0

## 2021-09-10 NOTE — PROGRESS NOTES
cognition  REQUIRES PT FOLLOW UP: Yes  Activity Tolerance  Activity Tolerance: Patient limited by cognitive status     Patient Diagnosis(es): The encounter diagnosis was Intraparenchymal hemorrhage of brain ([facility] has a past medical history of Alcohol abuse, CAD (coronary artery disease), CHF (congestive heart failure) ([facility] Essential tremor, HTN (hypertension), Hx of blood clots, Lower extremity cellulitis, and MI (mitral incompetence). has a past surgical history that includes Percutaneous Transluminal Coronary Angio and Upper gastrointestinal endoscopy (N/A, 7/28/2020). Restrictions  Position Activity Restriction  Other position/activity restrictions: up with assist, seizure px, fall px  Subjective   General  Chart Reviewed: Yes  Additional Pertinent Hx: 60 y/o M presents s/p multiple falls at home. Imagining reveals: Multifocal acute intraparenchymal hemorrhage in L frontoparietal lobe, acute SAH in bifrontal sulci, and small SDH left temporal convexity and left anterior tentorium. Repeat head CT:Stable. PMH: alcohol abuse, CAD s/p LAD stent /2020 on plavix, History of mural thrombus secondary to anterior apical MI on eliquis, CHF (EF 50-55%), HTN, and DVT 5/20/20  Response To Previous Treatment: Not applicable  Family / Caregiver Present: No  Referring Practitioner: Daniel Leblanc MD  Subjective  Subjective: \"I am having pain in my legs. \"  General Comment  Comments: Pt resting in bed upon PT arrival.  Pain Screening  Patient Currently in Pain: Denies  Vital Signs  Patient Currently in Pain: Denies       Orientation     Cognition   Cognition  Overall Cognitive Status: Exceptions  Arousal/Alertness: Delayed responses to stimuli  Following Commands: Follows one step commands with increased time; Follows one step commands with repetition  Attention Span: Attends with cues to redirect; Difficulty attending to directions; Difficulty dividing attention  Memory: Decreased recall of biographical Information;Decreased recall of precautions;Decreased recall of recent events;Decreased short term memory  Safety Judgement: Decreased awareness of need for assistance;Decreased awareness of need for safety  Problem Solving: Assistance required to generate solutions;Assistance required to implement solutions;Assistance required to identify errors made;Assistance required to correct errors made;Decreased awareness of errors  Insights: Not aware of deficits  Initiation: Requires cues for some  Sequencing: Requires cues for some  Objective   Bed mobility  Supine to Sit: Minimal assistance (HOB slightly elevated)  Sit to Supine: Stand by assistance  Scooting: Minimal assistance;Dependent/Total (scooting to EOB; dependent to scoot up in bed)  Transfers  Sit to Stand: Moderate Assistance;Contact guard assistance (mod A from bed, from recliner x 2, CGA from stedy x 3)  Stand to sit: Moderate Assistance (for eccentric control)  Bed to Chair: Moderate assistance (pivot recliner>bed)  Stand Pivot Transfers: Moderate Assistance  Comment: use of RW with pivot transfer; standing to stedy for stand from bed and one from chair  Ambulation  Ambulation?: No (pt with difficulty command following and fatigued quickly with mobility work. Could trial ambulation next session with two people)        Exercises  Comments: standing marches with RW x 15 duy and seated marches and LAQ x 12 duy; pt requiring multiple cues to start different tasks         Comment: pt voided during session and washed his hands at the sink with stedy.  Pt requiring assist with all toileting tasks    Goals  Short term goals  Time Frame for Short term goals: DC  Short term goal 1: Pt will complete bed mobiltiy with S  Short term goal 2: Pt will complete sit<>stand & bed<>chair txfs with S  Short term goal 3: Pt will tolerate ambulation assessment  Patient Goals   Patient goals : none stated    Plan    Plan  Times per week: 5-7  Current Treatment Recommendations: Strengthening, Neuromuscular Re-education, Safety Education & Training, Balance Training, Patient/Caregiver Education & Training, Functional Mobility Training, Equipment Evaluation, Education, & procurement, Transfer Training, Gait Training, Stair training  Safety Devices  Type of devices:  All fall risk precautions in place, Call light within reach, Nurse notified, Left in bed, Bed alarm in place (per RN request as he was going to be going down for a test)  Restraints  Initially in place: No     Therapy Time   Individual Concurrent Group Co-treatment   Time In 1310         Time Out 1358         Minutes 48         Timed Code Treatment Minutes: 48 Minutes    Timed Code Treatment Minutes:  48 Minutes    Total Treatment Minutes:  48 minutes      Leatha Sanches, PT

## 2021-09-10 NOTE — DISCHARGE SUMMARY
INTERNAL MEDICINE DEPARTMENT AT [address] [place] [address]  DISCHARGE SUMMARY    Patient ID: Jesi Britt                                             Discharge Date: 9/10/2021   Patient's PCP: Checo Samuel MD                                          Discharge Physician: Viv Smith MD  Admit Date: 9/3/2021   Admitting Physician: Sandeep Macias MD    PROBLEMS DURING HOSPITALIZATION:  Present on Admission:   Intracranial hemorrhage ([address]      DISCHARGE DIAGNOSES:  Intracranial Hemorrhage  Subarachnoid Hemorrhage  Subdural Hemorrhage  Chronic Anticoagulation - Plavix/Eliquis  Chronic Alcohol Abuse  Alcoholic Hepatitis; Early Cirrhosis  Macrocytic Anemia  Thrombocytopenia  Acute Anemia  CAD with LAD stent  Chronic CHF  Chronic Diarrhea  Subclinical Hypothyroidism      HPI:  59 y. o. 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     Reportedly has been increasingly unbalanced and has had recurrent falls, including at least two episodes approximately 1 week ago. In the last episode he states he hit his head. His sister states that these falls episodes are related also with diarrhea and dehydration days before the fall. Appears to have been admitted 8/22-8/23 with N/V/diarrhea and falls as well as multiple electrolyte abnormalities.      He developed difficulty findings words and some confusion. His sister noticed the his confusion and difficulty expressing words when she got joseph     Patient denied exertional chest pain, dyspnea, palpitations, syncope, or prodromal symptoms related to the fall. He also patient denied headache diplopia, dysphasia, or unilateral disturbance of motor or sensory function.  No loss of balance or vertigo.      Patient also complained of abdominal pain in his RUQ.  and bilateral edema, worse  in his right leg.       In the ED, he had satisfactory vitals. Labwork showed macrocytosis (B12, Folate WNL 1/2021) and elevated liver enzymes mostly AST, ALP and mild increase in total Bilirubin. CT scan showed multifocal acute intraparenchymal hemorrhage in the left frontoparietal lobe, small acute subarachnoid hemorrhage and bifrontal sulci, and a small subdural hematoma along the left temporal convexity and left anterior tentorium; and small right frontotemporal scalp hematoma.       K-centra was ordered and patient was admitted to the ICU.            The following issues were addressed during hospitalization:    Aphasia and Confusion likely 2/2 Intracranial hemorrhage 2/2 traumatic fall  SAH and SDH 2/2 traumatic fall and Hx of recurrent falls  Chronic Anticoagulation - Plavix/Eliquis  Patient has frequent ED visits given history of alcohol abuse and recurrent falls. Labs on admission did not show acute changes to electrolytes unlike previous visits. PT/INR elevated as expected, did show decrease throughout stay. Patient was Aphasic with appreciable confusion on admission with diagnostic CT scan and repeat MRI showing ICH, SAH, SDH and scalp hematoma, likely indicating insults of acute and chronic varieties. K-centra and Vitamin K was given in the ED given history of AC/AP due to comorbid conditions. Patient was seen by Ari Velez and surgical intervention was deemed not necessary, thus patient was managed medically with withholding AC/AP, Keppra for seizure Px,  monitoring for clinical deterioration, monitoring lab values for exacerbating conditions. Patient will be placed in ARU given history of comorbid conditions and current presentation. He will be prescribed take Keppra 500mg BID and melatonin 5mg as well as his quadruple therapy for 14 days: Bismuth subsalicylate 2 tabs QID, Metronidazole 500mg TID, Tetracycline 500mg QID, Protonix BID.  Eliquis and Plavix will be discontinued upon discharge for now, f/u with NSGY Dr. Omi Reyes 1 week with CBC blood work completed beforehand. Chronic Alcohol Abuse  Alcoholic Hepatitis with Cirrhosis  Macrocytic anemia 2/2 Alcohol abuse  Thrombocytopenia  Chronic Diarrhea, unclear etiology - likely related to alcohol abuse  Patient managed with supplemental thiamine, folate, and B12. Electrolytes stable on admission. Also monitored for signs/symptoms of withdrawal. Patient exhibited mild agitation requiring restraints likely contributed by altered sensorium due to brain bleed. Managed with Quetiapine and PRN Lorazepam/Olanzapine. Patient did require restraints overnight for 1 night. Acute anemia possibly 2/2 Hx of H. Pylori with unclear evidence of Tx  Patient presented with stable Hgb with moderate reduction during stay. Chart shows history of gastric ulcerations and duodenitis with positive H. Pylor test and no clear evidence of treatment. Patient begin on quadruple therapy with Metronidazole, Tetracycline, Protonix and Bismuth. Triple therapy with clarithromycin avoided given Hx of QTC prolongation per current ECG. Patient will take oral meds for 10-14 days with Protonix to be continued thereafter. CAD with LAD stent on Plavix  Previous MI with Hx of mural thrombus on Eliquis  Chronic CHF - last EF 50-55% (1/10/21)  Plavix and Eliquis withheld on admission. Given bleeding and recurrent falls. Both will be discontinued upon discharge. Comorbid cardiac conditions not exacerbated this admission, home Carvedilol and Atorvastatin given. Subclinical Hypothyroidism  Not exacerbated this admission. No intervention performed. Physical Exam:  /82   Pulse 88   Temp 98.2 °F (36.8 °C) (Oral)   Resp 16   Wt 174 lb 9.7 oz (79.2 kg)   SpO2 99%   BMI 25.78 kg/m²      Constitutional:       General: He is not in acute distress. Appearance: Normal appearance. He is ill-appearing. He is not acute distress. HENT:      Head: Normocephalic.       Comments: Left temporal scalp wound     Mouth/Throat:      Pharynx: Oropharynx is clear. Eyes:      General: No scleral icterus. Extraocular Movements: Extraocular movements intact. Pupils: Pupils are equal, round, and reactive to light. Comments: Patient loses focus during EOM exam, but able to complete. Cardiovascular:      Rate and Rhythm: Normal rate and regular rhythm. Heart sounds: Normal heart sounds. No murmur heard. No friction rub. No gallop. Pulmonary:      Effort: Pulmonary effort is normal. No respiratory distress. Breath sounds: No wheezing or rhonchi. Abdominal:      General: Bowel sounds are normal.      Tenderness: There is no abdominal tenderness. Musculoskeletal:      Cervical back: No tenderness. Right lower leg: No edema. Left lower leg: No edema. Skin:     General: Skin is warm and dry. Neurological:      Mental Status: He is alert. He is oriented to person and place, but disoriented to time and situation  Psychiatric:      Comments: Pt disoriented     Consults: Neurosurgery, PM&R  Significant Diagnostic Studies:  CT scan head, MRI head  Treatments: Conservative  Disposition: SNF  Discharged Condition: Stable  Follow Up: Primary Care Physician in one week    DISCHARGE MEDICATION:     Medication List      START taking these medications    bismuth subsalicylate 201 MG chewable tablet  Commonly known as: PEPTO BISMOL  Take 2 tablets by mouth 4 times daily (before meals and nightly) for 56 doses     levETIRAcetam 500 MG tablet  Commonly known as: KEPPRA  Take 1 tablet by mouth 2 times daily for 12 days     melatonin 5 MG Tbdp disintegrating tablet  Take 1 tablet by mouth nightly     metroNIDAZOLE 500 MG tablet  Commonly known as: FLAGYL  Take 1 tablet by mouth every 8 hours for 42 doses     pantoprazole 40 MG tablet  Commonly known as: PROTONIX  Twice daily for 2 weeks, then once daily thereafter.      tetracycline 500 MG capsule  Commonly known as: ACHROMYCIN;SUMYCIN  Take 1 capsule by mouth 4 times daily for 56 doses        CONTINUE taking these medications    atorvastatin 40 MG tablet  Commonly known as: LIPITOR  TAKE 1 TABLET BY MOUTH EVERY NIGHT     B1 100 MG Tabs  TAKE 1 TABLET BY MOUTH DAILY     carvedilol 3.125 MG tablet  Commonly known as: COREG  TAKE 1 TABLET BY MOUTH TWICE DAILY     folic acid 1 MG tablet  Commonly known as: FOLVITE  Take 1 tablet by mouth daily     hydrocortisone 1 % cream     lisinopril 2.5 MG tablet  Commonly known as: PRINIVIL;ZESTRIL  TAKE 1 TABLET BY MOUTH EVERY NIGHT     magnesium 200 MG Tabs tablet     vitamin B-12 100 MCG tablet  Commonly known as: CYANOCOBALAMIN  TAKE 1 TABLET BY MOUTH NIGHTLY        STOP taking these medications    clopidogrel 75 MG tablet  Commonly known as: PLAVIX     Eliquis 5 MG Tabs tablet  Generic drug: apixaban           Where to Get Your Medications      These medications were sent to [address] [place] [address], [address] [place] [id number]    Phone: [phone]   · vitamin B-12 100 MCG tablet     You can get these medications from any pharmacy    Bring a paper prescription for each of these medications  · metroNIDAZOLE 500 MG tablet  · tetracycline 500 MG capsule     Information about where to get these medications is not yet available    Ask your nurse or doctor about these medications  · bismuth subsalicylate 261 MG chewable tablet  · levETIRAcetam 500 MG tablet  · melatonin 5 MG Tbdp disintegrating tablet  · pantoprazole 40 MG tablet       Activity: activity as tolerated  Diet: regular diet, Dysphagia  Wound Care: as directed    Time Spent on discharge is more than 45 minutes    Signed:  Nichole Crouch MS-4 (Scribe)  9/10/2021      Crista Cooper MD

## 2021-09-10 NOTE — CARE COORDINATION
Case Management            Discharge Note                    Date / Time of Note: 9/10/2021 12:30 PM                  Discharge Note Completed by: LUBNA Silva    Patient Name: Carmen Vazquez   YOB: 1960  Diagnosis: Intracranial hemorrhage ([facility] [I62.9]  Intraparenchymal hemorrhage of brain [facility]) [I61.9]   Date / Time: 9/3/2021  9:39 PM    Current PCP: Nikita Koenig MD  Clinic patient: No    Hospitalization in the last 30 days: No    Advance Directives:  Code Status: Full Code  [place] DNR form completed and on chart: Not Indicated    Financial:  Payor: Velalan Fleet / Plan: Velta Fleet / Product Type: *No Product type* /      Pharmacy:    [facility] [address], [address] [place] [address] [phone] Austin Booker [phone]  [address]ly [facility] [address]  Phone: [phone] Fax: [phone]      Assistance purchasing medications?: Potential Assistance Purchasing Medications: No  Assistance provided by Case Management: None at this time    Does patient want to participate in local refill/ meds to beds program?: No    Meds To Beds General Rules:  1. Can ONLY be done Monday- Friday between 8:30am-5pm  2. Prescription(s) must be in pharmacy by 3pm to be filled same day  3. Copy of patient's insurance/ prescription drug card and patient face sheet must be sent along with the prescription(s)  4. Cost of Rx cannot be added to hospital bill. If financial assistance is needed, please contact unit  or ;  or  CANNOT provide pharmacy voucher for patients co-pays  5.  Patients can then  the prescription on their way out of the hospital at discharge, or pharmacy can deliver to the bedside if staff is available. (payment due at time of pick-up or delivery - cash, check, or card accepted)     Able to afford home medications/ co-pay costs: Yes    ADLS:  Current PT AM-PAC Score: 10 /24  Current OT AM-PAC Score: 10 /24      Discharge Disposition: [facility] (SNF):   [facility]   [facility], [place], [address]  Report: [phone]   Fax: [phone]    LOC at discharge: Skilled  CINDY Completed: Yes    Notification completed in HENS/PAS?:  Yes : CM has completed HENS online through secure website for SNF admission at [facility]. Document ID #: [id number]    IMM Completed:   No    Transportation:  Transportation Plan for discharge: EMS transportation   Mode of Transport: Ambulance stretcher - BLS    Reason for medical transport: Other: Confusion, high fall risk, 2 person assist post [address]  Name of [address] [place] [address], [address]: [address]  Phone: [phone]  Transport Time: 3:30 pm     Transportation form completed: Yes    Referrals made at [facility] for outpatient continued care:  Not Applicable    Additional CM Notes:   SW met with patient at bedside this AM. Patient is medically ready for discharge. SW dicussed accepting facility in [place]. It was only accept facility out of referrals sent. CARLY called and LVM with patient's sister with update and provided kellen back to discuss further. Sister had indicated patient could not return directly home in his current condition. Orders faxed. The Plan for Transition of Care is related to the following treatment goals Intracranial hemorrhage ([address] [I62.9]  Intraparenchymal hemorrhage of brain ([address] [I61.9]      The Patient and/or patient representative was provided with a choice of provider and agrees with the discharge plan Yes    Freedom of choice list was provided with basic dialogue that supports the patient's individualized plan of care/goals and shares the quality data associated with the providers.  Yes    Care Transitions patient: No    Bao Us, MSW  [facility] - [address]   Case Management Department  Ph: [phone]

## 2021-09-10 NOTE — PLAN OF CARE
Problem: Falls - Risk of:  Goal: Will remain free from falls  Description: Will remain free from falls  9/10/2021 1153 by Amaris Christopher RN  Outcome: Ongoing     Problem: Skin Integrity:  Goal: Will show no infection signs and symptoms  Description: Will show no infection signs and symptoms  9/10/2021 1153 by Amaris Christopher RN  Outcome: Ongoing     Problem: HEMODYNAMIC STATUS  Goal: Patient has stable vital signs and fluid balance  9/10/2021 1153 by Amaris Christopher RN  Outcome: Ongoing     Problem: ACTIVITY INTOLERANCE/IMPAIRED MOBILITY  Goal: Mobility/activity is maintained at optimum level for patient  9/10/2021 1153 by Amaris Christopher RN  Outcome: Ongoing     Problem: Injury - Risk of, Physical Injury:  Goal: Will remain free from falls  Description: Will remain free from falls  9/10/2021 1153 by Amaris Christopher RN  Outcome: Ongoing

## 2021-09-10 NOTE — PLAN OF CARE
Pt assessment complete and medications passed; pt alert to self and place; disoriented to time and situation, has multiple episodes of confusion. VSS with pt on room air. Denies any pain. Bed alarm on and call light within reach.      Problem: Falls - Risk of:  Goal: Will remain free from falls  Description: Will remain free from falls  Outcome: Ongoing  Goal: Absence of physical injury  Description: Absence of physical injury  Outcome: Ongoing     Problem: Pain:  Goal: Pain level will decrease  Description: Pain level will decrease  Outcome: Ongoing  Goal: Control of acute pain  Description: Control of acute pain  Outcome: Ongoing  Goal: Control of chronic pain  Description: Control of chronic pain  Outcome: Ongoing     Problem: Skin Integrity:  Goal: Will show no infection signs and symptoms  Description: Will show no infection signs and symptoms  Outcome: Ongoing  Goal: Absence of new skin breakdown  Description: Absence of new skin breakdown  Outcome: Ongoing     Problem: HEMODYNAMIC STATUS  Goal: Patient has stable vital signs and fluid balance  Outcome: Ongoing     Problem: ACTIVITY INTOLERANCE/IMPAIRED MOBILITY  Goal: Mobility/activity is maintained at optimum level for patient  Outcome: Ongoing     Problem: COMMUNICATION IMPAIRMENT  Goal: Ability to express needs and understand communication  Outcome: Ongoing     Problem: Non-Violent Restraints  Goal: Removal from restraints as soon as assessed to be safe  Outcome: Ongoing  Goal: No harm/injury to patient while restraints in use  Outcome: Ongoing  Goal: Patient's dignity will be maintained  Outcome: Ongoing     Problem: Confusion - Acute:  Goal: Absence of continued neurological deterioration signs and symptoms  Description: Absence of continued neurological deterioration signs and symptoms  Outcome: Ongoing  Goal: Mental status will be restored to baseline  Description: Mental status will be restored to baseline  Outcome: Ongoing     Problem: Discharge Planning:  Goal: Ability to perform activities of daily living will improve  Description: Ability to perform activities of daily living will improve  Outcome: Ongoing  Goal: Participates in care planning  Description: Participates in care planning  Outcome: Ongoing     Problem: Injury - Risk of, Physical Injury:  Goal: Will remain free from falls  Description: Will remain free from falls  Outcome: Ongoing  Goal: Absence of physical injury  Description: Absence of physical injury  Outcome: Ongoing     Problem: Mood - Altered:  Goal: Mood stable  Description: Mood stable  Outcome: Ongoing  Goal: Absence of abusive behavior  Description: Absence of abusive behavior  Outcome: Ongoing  Goal: Verbalizations of feeling emotionally comfortable while being cared for will increase  Description: Verbalizations of feeling emotionally comfortable while being cared for will increase  Outcome: Ongoing     Problem: Psychomotor Activity - Altered:  Goal: Absence of psychomotor disturbance signs and symptoms  Description: Absence of psychomotor disturbance signs and symptoms  Outcome: Ongoing     Problem: Sensory Perception - Impaired:  Goal: Demonstrations of improved sensory functioning will increase  Description: Demonstrations of improved sensory functioning will increase  Outcome: Ongoing  Goal: Decrease in sensory misperception frequency  Description: Decrease in sensory misperception frequency  Outcome: Ongoing  Goal: Able to refrain from responding to false sensory perceptions  Description: Able to refrain from responding to false sensory perceptions  Outcome: Ongoing  Goal: Demonstrates accurate environmental perceptions  Description: Demonstrates accurate environmental perceptions  Outcome: Ongoing  Goal: Able to distinguish between reality-based and nonreality-based thinking  Description: Able to distinguish between reality-based and nonreality-based thinking  Outcome: Ongoing  Goal: Able to interrupt nonreality-based thinking  Description: Able to interrupt nonreality-based thinking  Outcome: Ongoing     Problem: Sleep Pattern Disturbance:  Goal: Appears well-rested  Description: Appears well-rested  Outcome: Ongoing

## 2021-09-10 NOTE — PROGRESS NOTES
Department of [facility] [place] Progress Note  9/10/2021  3:04 PM    Patient Name:   Adamaris Orr  YOB: 1960    Diagnosis:  multifocal acute intraparenchymal hemorrhage       Subjective: Rehab consult follow-up. Patient seen today. 78-year-old male with past medical history of alcohol abuse, CAD, CHF, essential tremor, DVT who prior to presentation had a baseline unsteady gait reported that he had a fall about a week ago with head trauma. CT head showed multifocal acute intraparenchymal hemorrhage in the left frontoparietal lobe, small acute subarachnoid hemorrhage, and a small subdural hematoma along the left temporal convexity and left anterior tentorium. Neurosurgery was consulted. MRI Brain revealed only stable hemorrhages, no underlying mass or lesion. Patient started therapies, but needs min to max assist for transfers, gait, and ADLs. Patient has been in restraints due to confusion and agitation. We could not accept patient on rehab unit with his agitation and restraints.   Patient is now set up to go to SNF today for continued care and therapy.     Prior Level of Function:    /78   Pulse 85   Temp 98.4 °F (36.9 °C) (Oral)   Resp 16   Wt 174 lb 9.7 oz (79.2 kg)   SpO2 98%   BMI 25.78 kg/m²     Last 24 hour lab  Recent Results (from the past 24 hour(s))   Protime-INR    Collection Time: 09/09/21  6:04 PM   Result Value Ref Range    Protime 13.1 (H) 9.9 - 12.7 sec    INR 1.15 (H) 0.88 - 1.12   CBC    Collection Time: 09/10/21  8:02 AM   Result Value Ref Range    WBC 8.2 4.0 - 11.0 K/uL    RBC 3.23 (L) 4.20 - 5.90 M/uL    Hemoglobin 11.8 (L) 13.5 - 17.5 g/dL    Hematocrit 34.8 (L) 40.5 - 52.5 %    .7 (H) 80.0 - 100.0 fL    MCH 36.7 (H) 26.0 - 34.0 pg    MCHC 34.1 31.0 - 36.0 g/dL    RDW 15.2 12.4 - 15.4 %    Platelets 377 (L) 414 - 450 K/uL    MPV 7.7 5.0 - 10.5 fL   Basic metabolic panel    Collection Time: 09/10/21  8:02 AM   Result Value Ref Range    Sodium 138 136 - 145 mmol/L    Potassium 3.7 3.5 - 5.1 mmol/L    Chloride 100 99 - 110 mmol/L    CO2 21 21 - 32 mmol/L    Anion Gap 17 (H) 3 - 16    Glucose 81 70 - 99 mg/dL    BUN 12 7 - 20 mg/dL    CREATININE 0.6 (L) 0.8 - 1.3 mg/dL    GFR Non-African American >60 >60    GFR African American >60 >60    Calcium 8.3 8.3 - 10.6 mg/dL         Plan: Patient discharging to SNF today.       Dr. Gooden Null

## 2021-09-10 NOTE — PROGRESS NOTES
Occupational Therapy  Facility/Department: [facility] 5T ORTHO/NEURO  Daily Treatment Note  NAME: Karol James  : 1960  MRN: [id number]    Date of Service: 9/10/2021    Discharge Recommendations:  Karol James scored a  on the AM-PAC ADL Inpatient form. Current research shows that an AM-PAC score of 17 or less is typically not associated with a discharge to the patient's home setting. Based on the patient's AM-PAC score and their current ADL deficits, it is recommended that the patient have 3-5 sessions per week of Occupational Therapy at d/c to increase the patient's independence. Please see assessment section for further patient specific details. If patient discharges prior to next session this note will serve as a discharge summary. Please see below for the latest assessment towards goals. OT Equipment Recommendations  Equipment Needed: No    Assessment   Assessment: Pt quickly fluctuating between agitation and cooperative requiring heavy VCs for sequencing and understanding tasks. Stand step transfer completed with Mod A and RW from chair to EOB. To and from bathrom dependent upon Roya Pérez. Pt would benefit from inpt OT for maximizing functional indepdence. Continue OT per POC. Treatment Diagnosis: Decreased activity tolerance, impaired ADLs and mobility  OT Education: OT Role;Plan of Care;Transfer Training  Patient Education: Pt will need reinforcement  REQUIRES OT FOLLOW UP: Yes  Activity Tolerance  Activity Tolerance: Patient Tolerated treatment well;Treatment limited secondary to decreased cognition;Treatment limited secondary to agitation  Activity Tolerance: Pt quickly fluctuating between frustrated/agitation and cooperative         Patient Diagnosis(es): The encounter diagnosis was Intraparenchymal hemorrhage of brain ([facility] [address]       has a past medical history of Alcohol abuse, CAD (coronary artery disease), CHF (congestive heart failure) ([facility] [address] Essential tremor, HTN (hypertension), Hx of blood clots, Lower extremity cellulitis, and MI (mitral incompetence). has a past surgical history that includes Percutaneous Transluminal Coronary Angio and Upper gastrointestinal endoscopy (N/A, 7/28/2020). Restrictions  Position Activity Restriction  Other position/activity restrictions: up with assist, seizure px, fall px     Subjective   General  Chart Reviewed: Yes  Patient assessed for rehabilitation services?: Yes  Additional Pertinent Hx: 59 y.o. male with Hx of alcohol abuse, CAD s/p LAD stent /2020 on plavix, History of mural thrombus secondary to anterior apical MI on eliquis, CHF (EF 50-55%), HTN, and DVT 5/20/20  Who presented to the ED 9/3/2021 with Traumatic ICHs secondary to falls. +CIWA. Frequently agitated, requiring restraints and sedation. Response to previous treatment: Patient with no complaints from previous session  Family / Caregiver Present: No  Referring Practitioner: Kayla Rios MD  Diagnosis: intracranial hemorrhage  Subjective  Subjective: Pt supine in bed upon entry. Pt easily agitated and confused at times. General Comment  Comments: Pt breifly grabing therapist wrist during sesssion due to frustration. Vital Signs  Patient Currently in Pain: Denies     Orientation  Orientation  Overall Orientation Status: Impaired     Objective    ADL  Grooming: Verbal cueing; Increased time to complete;Contact guard assistance (washing hands in stance at sink in [place] steady)  LE Dressing: Maximum assistance (donning briefs)        Balance  Sitting Balance: Stand by assistance  Standing Balance: Contact guard assistance (in stance in [place] steady and to RW.  Pt becomming confused and agitated in [place] stedy attempting to step out of [place] stedy in stance requiring heavy VCs for understanding task)  Standing Balance  Time: 1 min and 45 sec and 30 sec  Activity: stance in [employer] and stance to [employer]  Functional Mobility  Functional - Mobility Device: Rolling Walker (a few steps from recliner chair to bed. To and from bathroom dependent upon noelle galan)  Assist Level: Moderate assistance (with RW and assist x2 for Talisha galan)  Toilet Transfers  Toilet - Technique:  Kathleen galan with assist x2)  Equipment Used: Standard toilet  Bed mobility  Supine to Sit: Minimal assistance  Sit to Supine: Stand by assistance  Scooting: Minimal assistance (scooting to EOB)  Transfers  Sit to stand:  Moderate assistance (from recliner x2, toilet , and CGA from Talisha galan seat)  Stand to sit: Minimal assistance            Type of ROM/Therapeutic Exercise  Comment: 3 reps chair push ups with limited understanding despite VCs                    Plan  If pt discharges prior to next treatment, this note will serve as discharge summary  Plan  Times per week: 5-7x  Times per day: Daily  Current Treatment Recommendations: Balance Training, Functional Mobility Training, Endurance Training, Self-Care / ADL, Safety Education & Training, Equipment Evaluation, Education, & procurement, Patient/Caregiver Education & Training, Cognitive Reorientation, Strengthening, Neuromuscular Re-education, Cognitive/Perceptual Training  G-Code     OutComes Score                                                  AM-PAC Score        AM-[facility] Inpatient Daily Activity Raw Score: 12 (09/10/21 1409)  AM-PAC Inpatient ADL T-Scale Score : 30.6 (09/10/21 1409)  ADL Inpatient CMS 0-100% Score: 66.57 (09/10/21 1409)  ADL Inpatient CMS G-Code Modifier : CL (09/10/21 1409)    Goals (as determined and assessed by primary OT)  Short term goals  Time Frame for Short term goals: by D/C  Short term goal 1: Static stance 2 min with CGA - Not met  Short term goal 2: Transfer to chair or BSC with min assist - Not met  Short term goal 3: Wash face, comb hair, brush teeth mod I, set up - Not met       Therapy Time   Individual Concurrent Group Co-treatment   Time In 1319         Time Out 1357         Minutes 38         Timed Code Treatment Minutes: 38 [address] [place] [address],  Derian Leal

## 2021-09-10 NOTE — PROGRESS NOTES
Patient A&O to person and place, disoriented to situation and time. VSS. Neuro checks improving, pt following commands better, NIH scale 3. Patient denies pain or nausea, tolerating diet well. Patient anticipating discharge to SNF once precert obtained. Will continue to monitor.

## 2021-09-10 NOTE — PROGRESS NOTES
Progress Note    Admit Date: 9/3/2021  Diet: ADULT DIET; Dysphagia - Soft and Bite Sized; 3 carb choices (45 gm/meal); Low Sodium (2 gm)    Interval history:     Pt seen this AM and is AO to person and place but not time or situation. he is less lethargic than yesterday and shows improvement in following commands. Endorses minimal tenderness to B/L anterior thighs, inconsistently tender when palpated repeatedly    No UOP overnight - pt encouraged to drink  Brief liquid stool      Subjective:  59 y. o. male with Hx of alcohol abuse, CAD s/p LAD stent /2020 on plavix, History of mural thrombus secondary to anterior apical MI on eliquis, CHF (EF 50-55%), HTN, and DVT 5/20/20  Who presented to the ED 9/3/2021 with recurrent falls with head injury.     Reportedly has been increasingly unbalanced and has had recurrent falls, including at least two episodes approximately 1 week ago. In the last episode he states he hit his head. His sister states that these falls episodes are related also with diarrhea and dehydration days before the fall. Appears to have been admitted 8/22-8/23 with N/V/diarrhea and falls as well as multiple electrolyte abnormalities.      He developed difficulty findings words and some confusion. His sister noticed the his confusion and difficulty expressing words when she got joseph     Patient denied exertional chest pain, dyspnea, palpitations, syncope, or prodromal symptoms related to the fall. He also patient denied headache diplopia, dysphasia, or unilateral disturbance of motor or sensory function. No loss of balance or vertigo.      Patient also complained of abdominal pain in his RUQ.  and bilateral edema, worse  in his right leg.       In the ED, he had satisfactory vitals. Labwork showed macrocytosis (B12, Folate WNL 1/2021) and elevated liver enzymes mostly AST, ALP and mild increase in total Bilirubin.    CT scan showed multifocal acute intraparenchymal hemorrhage in the left frontoparietal lobe, small acute subarachnoid hemorrhage and bifrontal sulci, and a small subdural hematoma along the left temporal convexity and left anterior tentorium; and small right frontotemporal scalp hematoma.       K-centra was ordered and patient was admitted to the ICU.       Medications:     Scheduled Meds:   pantoprazole  40 mg Oral BID AC    bismuth subsalicylate  553 mg Oral 4x Daily AC & HS    tetracycline  500 mg Oral 4x Daily    metroNIDAZOLE  500 mg Oral 3 times per day    levETIRAcetam  500 mg Oral BID    melatonin  5 mg Oral Nightly    thiamine (VITAMIN B1) IVPB  100 mg IntraVENous Q24H    sodium chloride flush  5-40 mL IntraVENous 2 times per day    sodium chloride flush  5-40 mL IntraVENous 2 times per day    atorvastatin  40 mg Oral Daily    folic acid  1 mg Oral Daily    vitamin B-12  100 mcg Oral Nightly    carvedilol  3.125 mg Oral BID    magnesium oxide  200 mg Oral Daily    lisinopril  2.5 mg Oral Daily     Continuous Infusions:   sodium chloride 25 mL (09/09/21 1138)    sodium chloride 25 mL (09/08/21 1104)     PRN Meds:OLANZapine, LORazepam, sodium chloride flush, sodium chloride, sodium chloride flush, sodium chloride, acetaminophen, labetalol    Objective:   Vitals:   T-max:  Patient Vitals for the past 8 hrs:   BP Temp Temp src Pulse Resp SpO2   09/10/21 0649 132/82 98.2 °F (36.8 °C) Oral 88 16 99 %   09/10/21 0217 117/74 97.8 °F (36.6 °C) Oral 77 16 95 %       Intake/Output Summary (Last 24 hours) at 9/10/2021 8982  Last data filed at 9/9/2021 1030  Gross per 24 hour   Intake 120 ml   Output    Net 120 ml       Physical Exam  Constitutional:       General: He is not in acute distress. Appearance: Normal appearance. He is not ill-appearing. HENT:      Head: Normocephalic. Comments: Left temporal scalp wound     Mouth/Throat:      Pharynx: Oropharynx is clear. Eyes:      General: No scleral icterus. Extraocular Movements: Extraocular movements intact.       Pupils: up to 3 cm in conglomeration with associated vasogenic edema without significant mass effect or midline shift. Stable associated small subdural hematoma over the left cerebral convexity with a small amount of subarachnoid hemorrhage. Chronic small vessel ischemic changes with remote posterior left frontal parietal infarct. US ABDOMEN LIMITED   Final Result      1. Heterogeneous echogenicity consistent with diffuse hepatocellular disease. 2.  Gallbladder polyp versus tumefactive sludge unchanged. 3.  Small ascites. VL Extremity Venous Bilateral   Final Result      CT ABDOMEN PELVIS WO CONTRAST Additional Contrast? None   Final Result      1. Mild ascites. Chronic hepatocellular disease. 2.  Small bilateral pleural effusions. XR ABDOMEN (KUB) (SINGLE AP VIEW)   Final Result   Impression:   1. Nonobstructive small bowel gas pattern. CT head without contrast   Final Result   Impression:   Stable acute parenchymal hemorrhage left frontoparietal lobe. 2. Acute bilateral subarachnoid hemorrhage stable. 3. Subdural hematoma stable. 4. Acute intradural extra medullary hemorrhage surrounding the upper cervical cord stable. 4. Parenchymal volume loss and chronic small vessel ischemic changes in the white matter. 5. Remote infarcts as above. CT HEAD WO CONTRAST   Final Result      1. Multifocal acute intraparenchymal hemorrhage in the left frontoparietal lobe. Small volume acute subarachnoid hemorrhage in bifrontal sulci, and small subdural hematoma along the left temporal convexity and left anterior tentorium. 2.  Small right frontotemporal scalp hematoma. Discussed with Dr. Jeffery Griffith at 1116 hours. XR CHEST (2 VW)   Final Result      No acute pulmonary disease. Assessment/Plan:    59 y.o. M with PMH of Alcohol abuse, CAD s/p stent on Plavix, Hx of Ant. Apical MI with mural thrombus on Eliquis, CHF, HTN, DVT (05/2020).  He p/w with diffuse disease  - cont Thiamine, Folate, MVI  - monitored for s/s of withdrawal - last drink supposedly 5 days PTA: No evidence of withdrawal  - social work following and pending precert for Placement, as above    CAD with LAD stent on Plavix  Previous MI with Hx of mural thrombus on Eliquis  Chronic CHF - last EF 50-55% (1/10/21)  Pt currently stable - ECG shows NSR  - Plavix d/c'ed given ICH      Chronic Diarrhea, unclear etiology  Possibly 2/2 to alcohol abuse      Subclinical Hypothyroidism  TSH slightly elevated - 5.16  Free T4 WNL at 1.4      Hypokalemia  Replaced  - Monitor      Acute anemia  - Has had some drop in his hgb c/f admit - 13.1 to 10.7  - ? Some dark stool \"dark brown and green\"  - On chart review EGD 7/2020: showed small gastric ulcerations and duodenitis. H.pylori was positive; celiac disease testing was negative: Unclear if was treated for H pylori  - Repeat INR stable - now at 1.15 nearly ULM  - Stool for H pylori antigen - stool not collected overnight  - Will start Tx for H.pylori given not previously treated - Quad Tx Day #2  - Will use quadruple therapy, as trying to avoid Clarithromycin in view of prolonged QTC on last EKG. - Will treat for 10-14 days, and therafter likely will benefit from daily PPI. - Repeat hgb; monitor    Code Status: Full Code  FEN: Dysphagia Diet  PPX: SCDs      DISPOSITION:   Weaned off restraints - remained calm and cooperative overnight, with meds. Continue supportive measures   PT/OT eval noted  DC planning: Pt agreeable to ARU - Referrals placed - awaiting precert  Discharge with placement.        Mana Gentile, MS-4 (Scribe)  09/10/21  8:32 AM    Jacque Yusuf MD.

## 2021-10-28 ENCOUNTER — OFFICE VISIT (OUTPATIENT)
Dept: CARDIOLOGY CLINIC | Age: 61
End: 2021-10-28
Payer: MEDICAID

## 2021-10-28 VITALS
WEIGHT: 168.6 LBS | HEIGHT: 69 IN | SYSTOLIC BLOOD PRESSURE: 100 MMHG | DIASTOLIC BLOOD PRESSURE: 60 MMHG | OXYGEN SATURATION: 96 % | BODY MASS INDEX: 24.97 KG/M2 | HEART RATE: 88 BPM

## 2021-10-28 DIAGNOSIS — R06.02 SOB (SHORTNESS OF BREATH): ICD-10-CM

## 2021-10-28 DIAGNOSIS — I50.30 DIASTOLIC CONGESTIVE HEART FAILURE, UNSPECIFIED HF CHRONICITY (HCC): Primary | ICD-10-CM

## 2021-10-28 PROCEDURE — G8427 DOCREV CUR MEDS BY ELIG CLIN: HCPCS | Performed by: NURSE PRACTITIONER

## 2021-10-28 PROCEDURE — 3017F COLORECTAL CA SCREEN DOC REV: CPT | Performed by: NURSE PRACTITIONER

## 2021-10-28 PROCEDURE — G8420 CALC BMI NORM PARAMETERS: HCPCS | Performed by: NURSE PRACTITIONER

## 2021-10-28 PROCEDURE — G8484 FLU IMMUNIZE NO ADMIN: HCPCS | Performed by: NURSE PRACTITIONER

## 2021-10-28 PROCEDURE — 99214 OFFICE O/P EST MOD 30 MIN: CPT | Performed by: NURSE PRACTITIONER

## 2021-10-28 PROCEDURE — 1036F TOBACCO NON-USER: CPT | Performed by: NURSE PRACTITIONER

## 2021-10-28 RX ORDER — MELATONIN
1000 DAILY
Qty: 90 TABLET | Refills: 1 | Status: SHIPPED | OUTPATIENT
Start: 2021-10-28 | End: 2022-08-01 | Stop reason: SDUPTHER

## 2021-10-28 NOTE — PROGRESS NOTES
[address]     Outpatient Follow Up Note      Interval history:  No c/o cp/SOB/edema/PND or JC  / down 6# / VSS  states was in hosp / he had dehydration and fall with subarachnoid hemorrhage / to rehab / going to assisted living   Had CT head showed multifocal acute intraparenchymal hemorrhage in the left frontoparietal lobe, small acute subarachnoid hemorrhage, and a small subdural hematoma along the left temporal convexity and left anterior tentorium. Neurosurgery was consulted and since patient is on anticoagulation, reversal was done. MRI Brain revealed only stable hemorrhages, no underlying mass or lesion  complaint with meds   Plans to assisted living tomorrow   Discussed nutrition / sodium intake / fluid intake   Recommend activity as tolerated   d/t hx DVT/ CVA / mural thrombus / Plavix & eliquis / stopped due to fall / bleed   1/2021No evidence of left ventricular mass or thrombus noted per TTE   9/7/21 Normal venous duplex study of the bilateral lower extremities. There is no evidence of deep or superficial venous thrombosis.     CHIEF COMPLAINT / HPI:  Follow Up Frankie Limon is 59 y.o. male who presents today with a history of  CAD/ stent / Mural thrombus / off coumadin on eliquis/ no c/o bleeding or falls      Cont eliquis plavix no asa   CAD with complete LAD occlusion treated with stenting on 5/12/2020  Mural thrombus secondary to anterior apical myocardial infarction  Hx DVT left lower leg / bilateral cellulitis lower legs   Hx Alcoholism  Anemia   HTN   tremor Dearbelinda Sinclair  Low mag start mg OTC supplement    Vit D 14.6 / start  Vit D supplement   Fall  9/2021 Traumatic ICH secondary to falls, complicated by anticoagulation, alcoholism   Multifocal acute IPH in the left frontoparietal lobe  Acute SAH in bifrontal sulci   Small SDH along the left temporal convexity and the left anterior tentorium:   Right frontotemporal scalp hematoma  Chronic Anticoagulation - Plavix/Eliquis       TTE 1/10/2021  Limited echo. Definity contrast administered. Left ventricular cavity size is normal. There is mild concentric left   ventricular hypertrophy. Overall left ventricular systolic function appears   borderline normal with an estimated ejection fraction of 50-55%. There is   hypokinesis of the basal-mid inferior and inferolateral wall. No evidence of   left ventricular mass or thrombus noted. Hx CVA 1/2021 MRI brain   No acute infarction, evidence of intracranial hemorrhage or mass lesion. Evidence of mild chronic small vessel white matter disease. Small remote cortical infarcts in the right frontoparietal and left parietal regions.        [facility] 5/12/20 PTCA stent to the LAD proximally with a drug-eluting stent Ezra PTCA to the diagonal branch  echo  6/20 EF WAS 35%  echo 7/20 EF 35-40%     5/12/20 us legs Acute deep vein thrombosis involving the left Popliteal vein, Gastrocnemius   veins and superficial vein thrombosis of the left small saphneous vein.       8/7/20 us lower ext   Calleen Hartland is an aging acute deep venous thrombosis involving the right common     femoral vein, subacute deep venous thrombosis involving the right popliteal     vein, and aging acute superficial venous thrombosis in the saphenofemoral     junction.     There is evidence of a chronic thrombotic process noted in the right     posterior tibial vein.     There is deep venous reflux in the right common femoral vein and superficial     venous reflux in the right saphenofemoral junction.     There is a subacute deep venous thrombosis involving the left deep femoral     vein, femoral vein, popliteal vein, and some of the gastrocnemius veins.    Calleen Hartland is an aging acute deep venous thrombosis in some of the left     gastrocnemius veins.     There is evidence of a chronic thrombotic process noted in the left great     saphenous vein at the distal thigh, posterior tibial vein, peroneal vein,     and soleal vein.    Calleen Hartland is evidence of deep venous reflux in the left femoral vein        Last EKG Echo:Stress Test:if any any past Angiograms: last labs; reviewed with pt. / the results are utilized to determine my plan of care. 20-29 minutes with pt including reviewing tests/meds/plan of care       Past Medical History:   Diagnosis Date    Alcohol abuse     CAD (coronary artery disease)     with complete LAD occlusion    CHF (congestive heart failure) (HCC)     LVEF    Essential tremor     HTN (hypertension)     Hx of blood clots 05/2021    Lower extremity cellulitis     MI (mitral incompetence)      Social History:    Social History     Tobacco Use   Smoking Status Former Smoker    Packs/day: 0.50    Years: 40.00    Pack years: 20.00    Types: Cigarettes   Smokeless Tobacco Never Used     Current Medications:  Current Outpatient Medications   Medication Sig Dispense Refill    melatonin 5 MG TBDP disintegrating tablet Take 1 tablet by mouth nightly 30 tablet 0    pantoprazole (PROTONIX) 40 MG tablet Twice daily for 2 weeks, then once daily thereafter. 30 tablet 3    vitamin B-12 (CYANOCOBALAMIN) 100 MCG tablet TAKE 1 TABLET BY MOUTH NIGHTLY 90 tablet 3    lisinopril (PRINIVIL;ZESTRIL) 2.5 MG tablet TAKE 1 TABLET BY MOUTH EVERY NIGHT 30 tablet 3    atorvastatin (LIPITOR) 40 MG tablet TAKE 1 TABLET BY MOUTH EVERY NIGHT 30 tablet 3    hydrocortisone 1 % cream Apply topically 2 times daily      Thiamine Mononitrate (B1) 100 MG TABS TAKE 1 TABLET BY MOUTH DAILY 90 tablet 3    magnesium 200 MG TABS tablet Take 200 mg by mouth daily      carvedilol (COREG) 3.125 MG tablet TAKE 1 TABLET BY MOUTH TWICE DAILY 388 tablet 3    folic acid (FOLVITE) 1 MG tablet Take 1 tablet by mouth daily 90 tablet 3    levETIRAcetam (KEPPRA) 500 MG tablet Take 1 tablet by mouth 2 times daily for 12 days 24 tablet 0     No current facility-administered medications for this visit.      REVIEW OF SYSTEMS:    CONSTITUTIONAL: No major weight gain or loss, night sweats, fever, fatigue, or weakness. HEENT: No new vision difficulties or ringing in the ears. RESPIRATORY: No new SOB, PND, orthopnea or cough. CARDIOVASCULAR: See HPI  GI: No N/V/D, constipation, or abdominal pain. No black/tarry stools  : No urinary urgency, incontinence, or hematuria. SKIN: No cyanosis or skin lesions. MUSCULOSKELETAL: No new muscle or joint pain. NEUROLOGICAL: No syncope or TIA-like symptoms. PSYCHIATRIC: No anxiety, pain, insomnia or depression        Objective:   PHYSICAL EXAM:        Vitals:    10/28/21 1008   BP: 100/60   Site: Left Upper Arm   Position: Sitting   Cuff Size: Medium Adult   Pulse: 88   SpO2: 96%   Weight: 168 lb 9.6 oz (76.5 kg)   Height: 5' 9\" (1.753 m)      VITALS:  /60 (Site: Left Upper Arm, Position: Sitting, Cuff Size: Medium Adult)   Pulse 88   Ht 5' 9\" (1.753 m)   Wt 168 lb 9.6 oz (76.5 kg)   SpO2 96%   BMI 24.90 kg/m²   CONSTITUTIONAL: Cooperative, no apparent distress, and appears well nourished / developed  NEUROLOGIC:  Awake and orientated to person, place, and time. PSYCH: Calm affect. SKIN: Warm and dry. HEENT: Sclera non-icteric, normocephalic, neck supple. RESPIRATORY:  No increased work of breathing and clear to auscultation, no crackles or wheezing. CARDIOVASCULAR:  Regular rate and rhythm without murmur. Normal S1 and S2. No gallops or rubs. Normal PMI. No elevation of JVP. Normal carotid pulses with no bruits. GI:  Normal bowel sounds. Non-distended. Non-tender to palpation  EXT: No edema. No calf tenderness. Pulses are present bilaterally.     Wt Readings from Last 3 Encounters:   10/28/21 168 lb 9.6 oz (76.5 kg)   09/05/21 174 lb 9.7 oz (79.2 kg)   08/22/21 175 lb (79.4 kg)      Pulse Readings from Last 3 Encounters:   10/28/21 88   09/10/21 85   08/23/21 79     BP Readings from Last 3 Encounters:   10/28/21 100/60   09/10/21 127/78   08/23/21 (!) 91/52        Lab Results   Component Value Date    ALT 54 (H) 09/03/2021    AST 225 (H) 09/03/2021    ALKPHOS 240 (H) 09/03/2021    BILITOT 1.2 (H) 09/03/2021     Lab Results   Component Value Date    CREATININE 0.6 (L) 09/10/2021    BUN 12 09/10/2021     09/10/2021    K 3.7 09/10/2021     09/10/2021    CO2 21 09/10/2021     Lab Results   Component Value Date    TRIG 51 09/05/2021    TRIG 371 (H) 10/03/2020    TRIG 109 05/21/2020     Lab Results   Component Value Date    HDL 39 (L) 09/05/2021    HDL 48 09/02/2021    HDL 56 10/03/2020     Lab Results   Component Value Date    LDLCALC 20 09/05/2021    [address] 13 09/02/2021    LDLCALC see below 10/03/2020     Lab Results   Component Value Date    LABVLDL 10 09/05/2021    LABVLDL 20 09/02/2021    LABVLDL see below 10/03/2020      No results found for: BNP  Radiology Review:  Pertinent images / reports were reviewed as a part of this visit and reveals the following:    Assessment:     Fall  9/2021 Traumatic ICH secondary to falls, complicated by anticoagulation, alcoholism   Multifocal acute IPH in the left frontoparietal lobe  Acute SAH in bifrontal sulci   Small SDH along the left temporal convexity and the left anterior tentorium:   Right frontotemporal scalp hematoma  Chronic Anticoagulation - Plavix/Eliquis /both stopped due to falls /alcoholism     CAD/ stent / Mural thrombus / off coumadin on eliquis/ no c/o bleeding or falls      Cont eliquis plavix no asa   CAD with complete LAD occlusion treated with stenting on 5/12/2020    [facility] 5/12/20 PTCA stent to the LAD proximally with a drug-eluting stent Ezra PTCA to the diagonal branch  echo  6/20 EF WAS 35%  echo 7/20 EF 35-40%     Hx Mural thrombus secondary to anterior apical myocardial infarction  On eliquis     TTE 1/10/2021  Limited echo. Definity contrast administered. Left ventricular cavity size is normal. There is mild concentric left   ventricular hypertrophy.  Overall left ventricular systolic function appears   borderline normal with an estimated ejection fraction of 50-55%. There is   hypokinesis of the basal-mid inferior and inferolateral wall. No evidence of   left ventricular mass or thrombus noted. Hx DVT left lower leg / bilateral cellulitis lower legs     5/12/20 us legs Acute deep vein thrombosis involving the left Popliteal vein, Gastrocnemius   veins and superficial vein thrombosis of the left small saphneous vein.      9/7/21 Normal venous duplex study of the bilateral lower extremities. There is no evidence of deep or superficial venous thrombosis.       Hx Alcoholism  States drinking less     tremor /stable     Low mag   start mg OTC supplement      Vit D 14.6    started  Vit D supplement   recheck     Remote CVA     Hx CVA 1/2021 MRI brain   No acute infarction, evidence of intracranial hemorrhage or mass lesion. Evidence of mild chronic small vessel white matter disease. Small remote cortical infarcts in the right frontoparietal and left parietal regions. Plan:   states was in hosp / he had dehydration and fall with subarachnoid hemorrhage / to rehab / going to assisted living   1/2021No evidence of left ventricular mass or thrombus noted per TTE   9/7/21 Normal venous duplex study of the bilateral lower extremities. There is no evidence of deep or superficial venous thrombosis. Plavix and Eliquis withheld on admission. Given bleeding and recurrent falls. Both will be discontinued upon discharge  Fu in 3 months blood work  TTE PTV   Overall the patient is stable from CV standpoint    Thank you for allowing us to participate in the care of Olena King.     Hi JEFFRIES [facility] [address]     Documentation of today's visit sent to PCP

## 2022-04-27 ENCOUNTER — HOSPITAL ENCOUNTER (OUTPATIENT)
Dept: CT IMAGING | Age: 62
Discharge: HOME | End: 2022-04-27
Payer: MEDICAID

## 2022-04-27 DIAGNOSIS — I62.00 SUBDURAL HEMORRHAGE (HCC): ICD-10-CM

## 2022-04-27 PROCEDURE — 70450 CT HEAD/BRAIN W/O DYE: CPT

## 2022-04-28 ENCOUNTER — OFFICE VISIT (OUTPATIENT)
Dept: CARDIOLOGY CLINIC | Age: 62
End: 2022-04-28
Payer: MEDICAID

## 2022-04-28 VITALS
OXYGEN SATURATION: 97 % | SYSTOLIC BLOOD PRESSURE: 118 MMHG | WEIGHT: 200 LBS | HEART RATE: 70 BPM | BODY MASS INDEX: 29.53 KG/M2 | DIASTOLIC BLOOD PRESSURE: 70 MMHG

## 2022-04-28 DIAGNOSIS — I50.30 DIASTOLIC CONGESTIVE HEART FAILURE, UNSPECIFIED HF CHRONICITY (HCC): ICD-10-CM

## 2022-04-28 DIAGNOSIS — I25.10 CORONARY ARTERY DISEASE DUE TO LIPID RICH PLAQUE: ICD-10-CM

## 2022-04-28 DIAGNOSIS — I25.83 CORONARY ARTERY DISEASE DUE TO LIPID RICH PLAQUE: ICD-10-CM

## 2022-04-28 DIAGNOSIS — I25.83 CORONARY ARTERY DISEASE DUE TO LIPID RICH PLAQUE: Primary | ICD-10-CM

## 2022-04-28 DIAGNOSIS — I25.10 CORONARY ARTERY DISEASE DUE TO LIPID RICH PLAQUE: Primary | ICD-10-CM

## 2022-04-28 LAB
HCT VFR BLD CALC: 45.6 % (ref 40.5–52.5)
HEMOGLOBIN: 15.5 G/DL (ref 13.5–17.5)
MCH RBC QN AUTO: 32.1 PG (ref 26–34)
MCHC RBC AUTO-ENTMCNC: 33.9 G/DL (ref 31–36)
MCV RBC AUTO: 94.6 FL (ref 80–100)
PDW BLD-RTO: 13.7 % (ref 12.4–15.4)
PLATELET # BLD: 129 K/UL (ref 135–450)
PMV BLD AUTO: 9 FL (ref 5–10.5)
RBC # BLD: 4.82 M/UL (ref 4.2–5.9)
WBC # BLD: 7.9 K/UL (ref 4–11)

## 2022-04-28 PROCEDURE — G8419 CALC BMI OUT NRM PARAM NOF/U: HCPCS | Performed by: NURSE PRACTITIONER

## 2022-04-28 PROCEDURE — 3017F COLORECTAL CA SCREEN DOC REV: CPT | Performed by: NURSE PRACTITIONER

## 2022-04-28 PROCEDURE — 99214 OFFICE O/P EST MOD 30 MIN: CPT | Performed by: NURSE PRACTITIONER

## 2022-04-28 PROCEDURE — 1036F TOBACCO NON-USER: CPT | Performed by: NURSE PRACTITIONER

## 2022-04-28 PROCEDURE — G8427 DOCREV CUR MEDS BY ELIG CLIN: HCPCS | Performed by: NURSE PRACTITIONER

## 2022-04-28 NOTE — PATIENT INSTRUCTIONS
Wants to see dermatology  for a rash   Wants to see ENT for ringing his ears   Fu in 3 months with blood work with Dr Buckley Shall

## 2022-04-28 NOTE — PROGRESS NOTES
[facility]     [facility]    Mr Nick Rainey is here with hx dehydration and fall with subarachnoid hemorrhage 9/2021   in assisted living      [address] [facility] / HPI:  Follow Up Hung Barragan is 59 y.o. male who presents today with a history of  CAD/ stent / hx Mural thrombus [facility] Brain    Has been off AP and AC   CAD with complete LAD occlusion treated with stenting on 5/12/2020  Mural thrombus secondary to anterior apical myocardial infarction / resolved on last TTE    Hx DVT left lower leg / bilateral cellulitis lower legs   Hx Alcoholism  Anemia   HTN   tremor Remy Abo  Low mag start mg OTC supplement    Vit D 14.6 / start  Vit D supplement   Fall  9/2021 Traumatic ICH secondary to falls, complicated by anticoagulation, alcoholism   Multifocal acute IPH in the left frontoparietal lobe  Acute SAH in bifrontal sulci   Small SDH along the left temporal convexity and the left anterior tentorium:   Right frontotemporal scalp hematoma  Was on Chronic Anticoagulation - Plavix/Eliquis   hx DVT/ CVA / mural thrombus / Plavix & eliquis / stopped due to fall / bleed   DVT neg and neg for mural thrombus on last testing    Hx falls and SDH /holding AC/AP     today VSS no c/o voiced of cp or SOB / wt is elevated  no c/o edema / \"eating more\"  Wants to see dermatology  for a rash   Wants to see ENT for ringing his ears   Fu in 3 months     1/2021 TTE No evidence of left ventricular mass or thrombus noted per TTE     9/7/21 Normal venous duplex study of the bilateral lower extremities. There is no evidence of deep or superficial venous thrombosis. 9/21 CT head showed multifocal acute intraparenchymal hemorrhage in the left frontoparietal lobe, small acute subarachnoid hemorrhage, and a small subdural hematoma along the left temporal convexity and left anterior tentorium. Neurosurgery was consulted and since patient is on anticoagulation, reversal was done.  MRI Brain revealed only stable hemorrhages, no underlying mass or lesion    TTE 1/10/2021  Limited echo. Definity contrast administered. Left ventricular cavity size is normal. There is mild concentric left   ventricular hypertrophy. Overall left ventricular systolic function appears   borderline normal with an estimated ejection fraction of 50-55%. There is   hypokinesis of the basal-mid inferior and inferolateral wall. No evidence of   left ventricular mass or thrombus noted. Hx CVA 1/2021 MRI brain   No acute infarction, evidence of intracranial hemorrhage or mass lesion. Evidence of mild chronic small vessel white matter disease. Small remote cortical infarcts in the right frontoparietal and left parietal regions.        [facility] 5/12/20 PTCA stent to the LAD proximally with a drug-eluting stent Ezra PTCA to the diagonal branch  echo  6/20 EF WAS 35%  echo 7/20 EF 35-40%     5/12/20 us legs Acute deep vein thrombosis involving the left Popliteal vein, Gastrocnemius   veins and superficial vein thrombosis of the left small saphneous vein.       8/7/20 us lower ext   Noemi Mimes is an aging acute deep venous thrombosis involving the right common     femoral vein, subacute deep venous thrombosis involving the right popliteal     vein, and aging acute superficial venous thrombosis in the saphenofemoral     junction.     There is evidence of a chronic thrombotic process noted in the right     posterior tibial vein.     There is deep venous reflux in the right common femoral vein and superficial     venous reflux in the right saphenofemoral junction.     There is a subacute deep venous thrombosis involving the left deep femoral     vein, femoral vein, popliteal vein, and some of the gastrocnemius veins.    Noemi Mimes is an aging acute deep venous thrombosis in some of the left     gastrocnemius veins.     There is evidence of a chronic thrombotic process noted in the left great     saphenous vein at the distal thigh, posterior tibial vein, peroneal vein,  and soleal vein.    Amedeo Nails is evidence of deep venous reflux in the left femoral vein        Last EKG Echo:Stress Test:if any any past Angiograms: last labs; reviewed with pt. / the results are utilized to determine my plan of care. 20-29 minutes with pt including reviewing tests/meds/plan of care       Past Medical History:   Diagnosis Date    Alcohol abuse     CAD (coronary artery disease)     with complete LAD occlusion    CHF (congestive heart failure) (HCC)     LVEF    Essential tremor     HTN (hypertension)     Hx of blood clots 05/2021    Lower extremity cellulitis     MI (mitral incompetence)      Social History:    Social History     Tobacco Use   Smoking Status Former Smoker    Packs/day: 0.50    Years: 40.00    Pack years: 20.00    Types: Cigarettes   Smokeless Tobacco Never Used     Current Medications:  Current Outpatient Medications   Medication Sig Dispense Refill    vitamin D3 (CHOLECALCIFEROL) 25 MCG (1000 UT) TABS tablet Take 1 tablet by mouth daily 90 tablet 1    pantoprazole (PROTONIX) 40 MG tablet Twice daily for 2 weeks, then once daily thereafter.  30 tablet 3    vitamin B-12 (CYANOCOBALAMIN) 100 MCG tablet TAKE 1 TABLET BY MOUTH NIGHTLY 90 tablet 3    lisinopril (PRINIVIL;ZESTRIL) 2.5 MG tablet TAKE 1 TABLET BY MOUTH EVERY NIGHT 30 tablet 3    atorvastatin (LIPITOR) 40 MG tablet TAKE 1 TABLET BY MOUTH EVERY NIGHT 30 tablet 3    hydrocortisone 1 % cream Apply topically 2 times daily      Thiamine Mononitrate (B1) 100 MG TABS TAKE 1 TABLET BY MOUTH DAILY 90 tablet 3    magnesium 200 MG TABS tablet Take 200 mg by mouth daily      carvedilol (COREG) 3.125 MG tablet TAKE 1 TABLET BY MOUTH TWICE DAILY 331 tablet 3    folic acid (FOLVITE) 1 MG tablet Take 1 tablet by mouth daily 90 tablet 3    levETIRAcetam (KEPPRA) 500 MG tablet Take 1 tablet by mouth 2 times daily for 12 days 24 tablet 0    melatonin 5 MG TBDP disintegrating tablet Take 1 tablet by mouth nightly (Patient not taking: Reported on 4/28/2022) 30 tablet 0     No current facility-administered medications for this visit. REVIEW OF SYSTEMS:    CONSTITUTIONAL: No major weight gain or loss, night sweats, fever, fatigue, or weakness. HEENT: No new vision difficulties or ringing in the ears. RESPIRATORY: No new SOB, PND, orthopnea or cough. CARDIOVASCULAR: See HPI  GI: No N/V/D, constipation, or abdominal pain. No black/tarry stools  : No urinary urgency, incontinence, or hematuria. SKIN: No cyanosis or skin lesions. MUSCULOSKELETAL: No new muscle or joint pain. NEUROLOGICAL: No syncope or TIA-like symptoms. PSYCHIATRIC: No anxiety, pain, insomnia or depression        Vitals:    04/28/22 1128   BP: 118/70   Pulse: 70   SpO2: 97%   Weight: 200 lb (90.7 kg)      VITALS:  /70   Pulse 70   Wt 200 lb (90.7 kg)   SpO2 97%   BMI 29.53 kg/m²   CONSTITUTIONAL: Cooperative, no apparent distress, and appears well nourished / developed  NEUROLOGIC:  Awake and orientated to person, place, and time. PSYCH: Calm affect. SKIN: Warm and dry. HEENT: Sclera non-icteric, normocephalic, neck supple. RESPIRATORY:  No increased work of breathing and clear to auscultation, no crackles or wheezing. CARDIOVASCULAR:  Regular rate and rhythm without murmur. Normal S1 and S2. No gallops or rubs. Normal PMI. No elevation of JVP. Normal carotid pulses with no bruits. GI:  Normal bowel sounds. Non-distended. Non-tender to palpation  EXT: No edema. No calf tenderness. Pulses are present bilaterally.     Wt Readings from Last 3 Encounters:   04/28/22 200 lb (90.7 kg)   10/28/21 168 lb 9.6 oz (76.5 kg)   09/05/21 174 lb 9.7 oz (79.2 kg)      Pulse Readings from Last 3 Encounters:   04/28/22 70   10/28/21 88   09/10/21 85     BP Readings from Last 3 Encounters:   04/28/22 118/70   10/28/21 100/60   09/10/21 127/78        Lab Results   Component Value Date    ALT 54 (H) 09/03/2021     (H) 09/03/2021    ALKPHOS 240 (H) 09/03/2021    BILITOT 1.2 (H) 09/03/2021     Lab Results   Component Value Date    CREATININE 0.6 (L) 09/10/2021    BUN 12 09/10/2021     09/10/2021    K 3.7 09/10/2021     09/10/2021    CO2 21 09/10/2021     Lab Results   Component Value Date    TRIG 51 09/05/2021    TRIG 371 (H) 10/03/2020    TRIG 109 05/21/2020     Lab Results   Component Value Date    HDL 39 (L) 09/05/2021    HDL 48 09/02/2021    HDL 56 10/03/2020     Lab Results   Component Value Date    LDLCALC 20 09/05/2021 [address] 13 09/02/2021 [address] see below 10/03/2020     Lab Results   Component Value Date    LABVLDL 10 09/05/2021    LABVLDL 20 09/02/2021    LABVLDL see below 10/03/2020     Radiology Review:  Pertinent images / reports were reviewed as a part of this visit and reveals the following:    Assessment:     Hx Fall  9/2021 Traumatic ICH secondary to falls, complicated by anticoagulation, alcoholism   Multifocal acute IPH in the left frontoparietal lobe  Acute SAH in bifrontal sulci   Small SDH along the left temporal convexity and the left anterior tentorium:   Right frontotemporal scalp hematoma  Chronic Anticoagulation - Plavix/Eliquis /both stopped due to falls /alcoholism     CAD/ stent  CAD with complete LAD occlusion treated with stenting on 5/12/2020    [facility] 5/12/20 PTCA stent to the LAD proximally with a drug-eluting stent [place] PTCA to the diagonal branch  echo  6/20 EF WAS 35%  echo 7/20 EF 35-40%     Hx Mural thrombus secondary to anterior apical myocardial infarction  On eliquis     TTE 1/10/2021  Limited echo. Definity contrast administered. Left ventricular cavity size is normal. There is mild concentric left   ventricular hypertrophy. Overall left ventricular systolic function appears   borderline normal with an estimated ejection fraction of 50-55%. There is   hypokinesis of the basal-mid inferior and inferolateral wall. No evidence of   left ventricular mass or thrombus noted.     Hx DVT left lower leg / bilateral cellulitis lower legs     5/12/20 us legs Acute deep vein thrombosis involving the left Popliteal vein, Gastrocnemius   veins and superficial vein thrombosis of the left small saphneous vein.      9/7/21 Normal venous duplex study of the bilateral lower extremities. There is no evidence of deep or superficial venous thrombosis.       Hx Alcoholism  States drinking less     tremor /stable     Low mag   start mg OTC supplement      Vit D 14.6    started  Vit D supplement   recheck     Remote CVA     Hx CVA 1/2021 MRI brain   No acute infarction, evidence of intracranial hemorrhage or mass lesion. Evidence of mild chronic small vessel white matter disease. Small remote cortical infarcts in the right frontoparietal and left parietal regions. Plan:   hx DVT/ CVA / EF wnl & mural thrombus / Plavix & eliquis / stopped due to fall / bleed   DVT neg and neg for mural thrombus on last testing    Hx falls and SDH / holding AC/AP   VSS no c/o voiced of cp or SOB   Wants to see dermatology  for a rash   Wants to see ENT for ringing his ears   Fu in 3 months with blood work with Dr Jennifer Galvin       Thank you for allowing us to participate in the care of Dary Gilliam.     Laura JEFFRIES [facility] [address]     Documentation of today's visit sent to PCP

## 2022-04-29 LAB
A/G RATIO: 1.4 (ref 1.1–2.2)
ALBUMIN SERPL-MCNC: 4.2 G/DL (ref 3.4–5)
ALP BLD-CCNC: 105 U/L (ref 40–129)
ALT SERPL-CCNC: 22 U/L (ref 10–40)
ANION GAP SERPL CALCULATED.3IONS-SCNC: 15 MMOL/L (ref 3–16)
AST SERPL-CCNC: 21 U/L (ref 15–37)
BILIRUB SERPL-MCNC: 0.6 MG/DL (ref 0–1)
BILIRUBIN DIRECT: <0.2 MG/DL (ref 0–0.3)
BILIRUBIN, INDIRECT: NORMAL MG/DL (ref 0–1)
BUN BLDV-MCNC: 16 MG/DL (ref 7–20)
CALCIUM SERPL-MCNC: 9.1 MG/DL (ref 8.3–10.6)
CHLORIDE BLD-SCNC: 102 MMOL/L (ref 99–110)
CHOLESTEROL, TOTAL: 145 MG/DL (ref 0–199)
CO2: 23 MMOL/L (ref 21–32)
CREAT SERPL-MCNC: 0.7 MG/DL (ref 0.8–1.3)
ESTIMATED AVERAGE GLUCOSE: 108.3 MG/DL
FERRITIN: 107.9 NG/ML (ref 30–400)
GFR AFRICAN AMERICAN: >60
GFR NON-AFRICAN AMERICAN: >60
GLUCOSE BLD-MCNC: 88 MG/DL (ref 70–99)
HBA1C MFR BLD: 5.4 %
HDLC SERPL-MCNC: 52 MG/DL (ref 40–60)
LDL CHOLESTEROL CALCULATED: 75 MG/DL
MAGNESIUM: 1.9 MG/DL (ref 1.8–2.4)
POTASSIUM SERPL-SCNC: 4.4 MMOL/L (ref 3.5–5.1)
PRO-BNP: 427 PG/ML (ref 0–124)
SODIUM BLD-SCNC: 140 MMOL/L (ref 136–145)
TOTAL PROTEIN: 7.1 G/DL (ref 6.4–8.2)
TRIGL SERPL-MCNC: 89 MG/DL (ref 0–150)
TSH REFLEX FT4: 2.71 UIU/ML (ref 0.27–4.2)
VITAMIN D 25-HYDROXY: 29 NG/ML
VLDLC SERPL CALC-MCNC: 18 MG/DL

## 2022-05-20 ENCOUNTER — PROCEDURE VISIT (OUTPATIENT)
Dept: AUDIOLOGY | Age: 62
End: 2022-05-20
Payer: MEDICAID

## 2022-05-20 ENCOUNTER — OFFICE VISIT (OUTPATIENT)
Dept: ENT CLINIC | Age: 62
End: 2022-05-20
Payer: MEDICAID

## 2022-05-20 VITALS
SYSTOLIC BLOOD PRESSURE: 136 MMHG | DIASTOLIC BLOOD PRESSURE: 84 MMHG | HEART RATE: 59 BPM | BODY MASS INDEX: 29.53 KG/M2 | HEIGHT: 69 IN

## 2022-05-20 DIAGNOSIS — H93.13 TINNITUS, BILATERAL: ICD-10-CM

## 2022-05-20 DIAGNOSIS — H93.13 TINNITUS, BILATERAL: Primary | ICD-10-CM

## 2022-05-20 DIAGNOSIS — H90.3 SENSORINEURAL HEARING LOSS, BILATERAL: Primary | ICD-10-CM

## 2022-05-20 PROCEDURE — 92567 TYMPANOMETRY: CPT | Performed by: AUDIOLOGIST

## 2022-05-20 PROCEDURE — G8419 CALC BMI OUT NRM PARAM NOF/U: HCPCS | Performed by: OTOLARYNGOLOGY

## 2022-05-20 PROCEDURE — 92557 COMPREHENSIVE HEARING TEST: CPT | Performed by: AUDIOLOGIST

## 2022-05-20 PROCEDURE — G8427 DOCREV CUR MEDS BY ELIG CLIN: HCPCS | Performed by: OTOLARYNGOLOGY

## 2022-05-20 PROCEDURE — 99203 OFFICE O/P NEW LOW 30 MIN: CPT | Performed by: OTOLARYNGOLOGY

## 2022-05-20 NOTE — Clinical Note
Dr. Antoinette Fuller,    Please see note from this patient's audiogram.  Please let me know if there is anything further you need.       Gail Field [id number] [place]  Audiologist

## 2022-05-20 NOTE — PROGRESS NOTES
Katarina Vences   1960, 59 y.o. male   [id number]       Referring Provider: Denise Schumacher MD  Referral Type: In an order in [address] [place] [address]    Reason for Visit: Evaluation of suspected change in hearing, tinnitus, or balance. ADULT AUDIOLOGIC EVALUATION      Katarina Vences is a 59 y.o. male seen today, 5/20/2022, for an initial audiologic evaluation. AUDIOLOGIC AND OTHER PERTINENT MEDICAL HISTORY:        Katarina Vences noted tinnitus bilaterally, present for about 5-6 months, not very bothersome but has been noticeable, constant; no concerns for hearing loss or difference between his ears; he did fall backwards on stairs a couple years ago and hit his head, was in hospital and assisted care facility. Katarina Vences denied otalgia, aural fullness, otorrhea, dizziness, imbalance, history of occupational/recreational noise exposure, history of ear surgery and family history of hearing loss. IMPRESSIONS:       Today's results are consistent with bilateral sensorineural hearing loss with normal middle ear function and excellent word recognition for conversational speech bilaterally. Hearing loss is significant enough to result in difficulty understanding speech in at least some listening environments. Discussed tinnitus management strategies and good communication strategies, and future considerations for amplification. Follow medical recommendations from Dr. Sudheer Diaz. ASSESSMENT AND FINDINGS:       Otoscopy revealed: Clear ear canals bilaterally      RIGHT EAR:  Hearing Sensitivity: Within normal limits through 500 Hz sloping to moderate sensorineural hearing loss notch at 3000 Hz rising to mild sensorineural hearing loss. Speech Recognition Threshold: 25 dBHL  Word Recognition: Excellent (96%), based on NU-6 25-word list at 55 dBHL using recorded speech stimuli. Tympanometry: Normal peak pressure and compliance, Type A tympanogram, consistent with normal middle ear function.       LEFT EAR:  Hearing Sensitivity: Within normal limits through 500 Hz sloping to moderate sensorineural hearing loss with notch 2748-6100 Hz. Speech Recognition Threshold: 25 dBHL  Word Recognition: Excellent (100%), based on NU-6 25-word list at 50 dBHL using recorded speech stimuli. Tympanometry: Normal peak pressure and compliance, Type A tympanogram, consistent with normal middle ear function. Reliability: Good  Transducer: Inserts    See scanned audiogram dated 5/20/2022 for results. PATIENT EDUCATION:       The following items were discussed with the patient:   - Good Communication Strategies  - Hearing Loss and Hearing Aids  - Tinnitus Management Strategies    - Noise-Induced Hearing Loss and use of Hearing Protection Devices (HPDs)     Educational information was shared in the After Visit Summary. RECOMMENDATIONS:                                                                                                                                                                                                                                                                      The following items are recommended based on patient report and results from today's appointment:  - Continue medical follow-up with Caryn Hays MD.  - Retest hearing as medically indicated and/or sooner if a change in hearing is noted. - Briefly discussed future considerations for amplification. If desired, schedule a Hearing Aid Evaluation (HAE) appointment to discuss hearing aid options. - Utilize \"Good Communication Strategies\" as discussed to assist in speech understanding with communication partners. - Maintain a sound enriched environment to assist in the management of tinnitus symptoms.  - Use hearing protection devices (HPDs), such as protective ear muffs and ear plugs, when exposed to dangerous sound levels.          [facility] [employer],

## 2022-05-20 NOTE — PATIENT INSTRUCTIONS
Tinnitus: Overview and Management Strategies          Many people have some ringing sounds in their ears once in a while. You may hear a roar, a hiss, a tinkle, or a buzz. The sound usually lasts only a few minutes. If it goes on all the time, you may have tinnitus. Tinnitus is usually caused by long-term exposure to loud noise. This damages the nerves in the inner ear. It can occur with all types of hearing loss. It may be a symptom of almost any ear problem. Tinnitus may be caused by a buildup of earwax. Or, it may be caused by ear infections or certain medicines (especially antibiotics or large amounts of aspirin). You can also hear noises in your ears because of an injury to the ears, drinking too much alcohol or caffeine, or a medical condition. Other conditions may also contribute to tinnitus, including: head and neck trauma, temporomandibular joint disorder (TMJ), sinus pressure and barometric trauma, traumatic brain injury, metabolic disorders, autoimmune disorders, stress, and high blood pressure. You may need tests to evaluate your hearing and to find causes of long-lasting tinnitus. Your doctor may suggest one or more treatments to help you cope with the tinnitus. You can also do things at home to help reduce symptoms. Follow-up care is a key part of your treatment and safety. Be sure to make and go to all appointments, and call your doctor if you are having problems. It's also a good idea to know your test results and keep a list of the medicines you take. How can you care for yourself at home? · Limit or cut out alcohol, caffeine, and sodium. They can make your symptoms worse. · Do not smoke or use other tobacco products. Nicotine reduces blood flow to the ear and makes tinnitus worse. If you need help quitting, talk to your doctor about stop-smoking programs and medicines. These can increase your chances of quitting for good.   · Talk to your doctor about whether to stop taking aspirin and similar products such as ibuprofen or naproxen. · Get exercise often. It can help improve blood flow to the ear. Ways to manage/cope with tinnitus  Some tinnitus may last a long time. To manage your tinnitus, try to:  · Avoid noises that you think caused your tinnitus. If you can't avoid loud noises, wear earplugs or earmuffs. · Ignore the sound by paying attention to other things. Keeping your brain busy with other tasks or background noise can help your brain not focus on the tinnitus. · Try to not give the tinnitus an emotional reaction. Do your best to ignore the sound and not let it bother you. Relax using biofeedback, meditation, or yoga. Feeling stressed and being tired can make tinnitus worse. · Play music or white noise to help you sleep. Background noise may cover up the noise that you hear in your ears. You can buy a tabletop machine or a device that sits under your pillow to play soothing sounds, like ocean waves. · Smart phones have free apps, such as Whist, Relax Melodies, ReSound Relief, and White Noise Lite. These apps have different types of sounds/noise, some of which you can blend together to find sounds that are most soothing to you. · Hearing aid technology, especially when there is some hearing loss, may help reduce tinnitus symptoms by giving your brain better access to the sounds it is missing. There are some hearing aids with built-in noise generator programs, which may help when amplification alone is not enough. Additional resources may be found through the [employer]inn[employer] at www.acosta.org    When should you call for help? Call 911 anytime you think you may need emergency care. For example, call if:    · You have symptoms of a stroke. These may include:  ? Sudden numbness, tingling, weakness, or loss of movement in your face, arm, or leg, especially on only one side of your body. ? Sudden vision changes. ? Sudden trouble speaking.   ? Sudden confusion or trouble understanding simple statements. ? Sudden problems with walking or balance. ? A sudden, severe headache that is different from past headaches. Call your doctor now or seek immediate medical care if:    · You develop other symptoms. These may include hearing loss (or worse hearing loss), balance problems, dizziness, nausea, or vomiting. Watch closely for changes in your health, and be sure to contact your doctor if:    · Your tinnitus moves from both ears to one ear. · Your hearing loss gets worse within 1 day after an ear injury. · Your tinnitus or hearing loss does not get better within 1 week after an ear injury. · Your tinnitus bothers you enough that you want to take medicines to help you cope with it. If you notice changes in your tinnitus and/or your hearing, it is recommended that you have your hearing tested by your audiologist and to follow-up with your physician that manages your hearing loss (such as your ENT or Primary Care doctor). Good Communication Strategies    Communication can be challenging for anyone, but can be especially difficult for those with some degree of hearing loss. While we may not be able to control every factor that may lead to difficulty with communication, there are Good Communication Strategies that we can all use in our day-to-day lives. Communication takes both parties working together for it to be successful. Tips as a Listener:   1. Control your environment. It is important to limit the amount of background noise in the room when possible. You should also consider having a good light source in the room to best see the other person. 2. Ask for clarification. Instead of saying \"What?\", you can use parts of what you heard to make a new question. For example, if you heard the word \"Thursday\" but not the rest of the week, you may ask \"What was that about Thursday? \" or \"What did you want to do Thursday? \".   This shows the person talking that you are listening and will help them better explain what they are saying. 3. Be an advocate for yourself. If you are hearing but not understanding, tell the other person \"I can hear you, but I need you to slow down when you speak. \"  Or if someone is facing the other direction, say \"I cannot hear you when you are not looking at me when we talk. \"       Tips as a Talker:   - Sit or stand 3 to 6 feet away to maximize audibility         -- It is unrealistic to believe someone else will fully hear your message if you are speaking from across the room or in a different room in the house   - Stay at eye level to help with visual cues   - Make sure you have the persons attention before speaking   - Use facial expressions and gestures to accentuate your message   - Raise your voice slightly (do not scream)   - Speak slowly and distinctly   - Use short, simple sentences   - Rephrase your words if the person is having a hard time understanding you    - To avoid distortion, dont speak directly into a persons ear      Some additional items that may be helpful:   - Remain patient - this is important for both parties   - Write down items that still cannot be heard/understood. You may write with pen/paper or consider typing/texting on a cell phone or smart device. - If background noise is unavoidable, try to keep yourself in a good position in the room. By sitting at a pedro on the side of the restaurant (preferably a corner), it will be easier to communicate than if you were sitting at a table in the middle with background noise surrounding you. Keep yourself positioned away from music speakers or heavy foot traffic.   - If you have difficulty with the television, consider these options:      -- Use closed-captioning, which is a setting you can turn on that displays the spoken words in a written form on the screen. There may be a slight delay, but this can help fill in missing information.   This can be especially helpful when watching programs with accented speech. -- Consider use of a sound bar or speakers that come from the front of the TV. With modern flat screen TVs, many of them have speakers that come out of the back of the device, which makes sound bounce off the wall behind it, then go into the room. Sound bars can allow the sound to go straight in your direction and can improve sound quality. -- Consider ear level devices to help improve the volume and/or sound quality of the program.  There are devices that work like headphones that you can adjust the volume for your ears while others can have the volume at a more comfortable level, such as \"TV Ears\". Most hearing aids have devices that allow them to connect directly to the TV and improve sound quality. Hearing Loss: Care Instructions  Your Care Instructions      Hearing loss is a sudden or slow decrease in how well you hear. It can range from mild to profound. Permanent hearing loss can occur with aging, and it can happen when you are exposed long-term to loud noise. Examples include listening to loud music, riding motorcycles, or being around other loud machines. Hearing loss can affect your work and home life. It can make you feel lonely or depressed. You may feel that you have lost your independence. But hearing aids and other devices can help you hear better and feel connected to others. Follow-up care is a key part of your treatment and safety. Be sure to make and go to all appointments, and call your doctor if you are having problems. It's also a good idea to know your test results and keep a list of the medicines you take. How can you care for yourself at home? · Avoid loud noises whenever possible. This helps keep your hearing from getting worse. Always wear hearing protection around loud noises. · If appropriate, wear hearing aid(s) as directed.   It is recommended that hearing aids are worn during all waking hours to keep your brain active and give it access to the sounds it is missing. · If you are beginning your process with hearing aid(s), schedule a \"Hearing Aid Evaluation\" with an audiologist to discuss your lifestyle, features of hearing aid technology, and styles of hearing aids available. It is recommended that you contact your insurance company to determine if you have a hearing aid benefit, as this may dictate who you can see for these services. · Have hearing tests as your doctor suggests. They can show whether your hearing has changed. Your hearing aid may need to be adjusted. · Use other assistive devices as needed. These may include:  ? Telephone amplifiers and hearing aids that can connect to a television, stereo, radio, or microphone. ? Devices that use lights or vibrations. These alert you to the doorbell, a ringing telephone, or a baby monitor. ? Television closed-captioning. This shows the words at the bottom of the screen. Most new TVs can do this. ? TTY (text telephone). This lets you type messages back and forth on the telephone instead of talking or listening. These devices are also called TDD. When messages are typed on the keyboard, they are sent over the phone line to a receiving TTY. The message is shown on a monitor. · Use pagers, fax machines, text, and email if it is hard for you to communicate by telephone. · Try to learn a listening technique called speech-reading. It is not lip-reading. You pay attention to people's gestures, expressions, posture, and tone of voice. These clues can help you understand what a person is saying. Face the person you are talking to, and have him or her face you. Make sure the lighting is good. You need to see the other person's face clearly. · Think about counseling if you need help to adjust to your hearing loss. When should you call for help?   Watch closely for changes in your health, and be sure to contact your doctor if:    · You think your hearing is getting worse. · You have new symptoms, such as dizziness or nausea. Noise-Induced Hearing Loss  What it is, and what you can do to prevent it    Exposure to loud sounds, in an occupational setting or recreational, can cause permanent hearing loss. Sound is measured in decibels (dB). Noise-induced hearing loss is the ONLY type of preventable hearing loss. Hearing loss related to noise exposure can occur at any age. There are small sensory cells, called inner and outer hair cells, within the inner ear (cochlea). These cells process the loudness (intensity) and pitch (frequency) of sound and send the signal to the brain via our auditory nerve (vestibulocochlear nerve, cranial nerve VIII). When these cells are damaged, they can result in permanent hearing loss and/or tinnitus. The hair cells responsible for high frequency sounds, like birds chirping, are most likely to be damaged due to loud sounds. The high frequency sounds are also very important for our clarity and understanding of speech. OCCUPATIONAL NOISE EXPOSURE RECREATIONAL NOISE EXPOSURE   Some jobs may have exposure to loud sounds in the workplace. These jobs may include but are not limited to:  [employer]   Welding   Landscaping   Hairdressing/hairstyling   [employer]    ... And more! Many activities outside of work may cause permanent hearing loss. These activities may include but are not limited to:  Lawnmowers, leaf blowers  [employer] (such as pigs squealing)   Chainsaws and other power tools  [employer] musical instruments and/or singing   Listening to music too loudly - at concerts, through stereo, through ear buds or headphones   Attending sporting events   Attending fireworks shows or using fireworks at home  [employer] of firearms   . .. And more!        REDUCE OR PROTECT YOUR EARS FROM NOISE EXPOSURE    To do your best to avoid noise-induced hearing loss, here are some tips:   Limit exposure to loud sounds. 85 dB (decibels) is safe for 8 hours. As sounds are louder, the length of time the sound is safe lessens. These numbers are cumulative across a 24-hour period. (NIOSH and CDC, 2002)  o 85 dB is safe for 8 hours  o 88 dB is safe for 4 hours  o 91 dB is safe for 2 hours  o 94 dB is safe for 1 hour  o 97 dB is safe for 30 minutes  o 100 dB is safe for 15 minutes  o 103 dB is safe for 7.5 minutes  o 106 dB is safe for 3.75 minutes  o 109 dB is safe for LESS THAN 2 minutes  o 112 dB is safe for LESS THAN 1 minute  o 115 dB is safe for ~ 30 seconds  o 130 dB can cause IMMEDIATE hearing loss   If you are unsure if a sound is too loud, consider checking the sound level with a \"sound level meter\". There are apps on smart devices, such as \"Decibel X\", that can measure the loudness of the sound. They are not as accurate as expensive equipment used by scientists, but it will give you a guesstimate of how loud the sound is, and if it may be damaging to your hearing.  If you cannot avoid loud sounds, here are ways to reduce your exposure:  o 1. Wear hearing protection  - Ear plugs and protective ear muffs can be used to reduce the intensity of the sound. The higher the NRR (noise reduction rating), the better reduction of the intensity of the sound   o 2. Turn the volume down  - When listening to music, turn the volume down, especially when wearing ear buds or headphones. A good rule of thumb is to not go beyond the middle setting on your device. If you can't hear someone talking to you from arm's length away, your music may be at a level that it can cause damage. If someone else can hear your music from 3 feet away, it may also be at a level that it can cause damage.   o 3. Walk away from the sound  - If you do not have the ability to wear hearing protection or turn down the volume of the sound, you should do your best to move away from the source of the sound. - Sound decreases in intensity as we move further from the source. The sound will decrease by 6 dB for every doubling of distance from the sound source. TYPES OF HEARING PROTECTION    The most common types of hearing protection are protective ear muffs and ear plugs. Protective ear muffs are commonly found at home improvement or sporting good stores, they can be worn time and time again and are great if you need to take your hearing protection off frequently. Ear plugs are often made of foam or soft silicone. The foam ones are designed for one-time use, while silicone ear plugs may be used multiple times. There are also \"filtered\" ear plugs that help provide even attenuation of the sound across all frequencies. These are great for listening to music or going to concerts, and allow for better understanding of speech in louder environments. They can be purchased at music stores or online retailers (search \"Ety Plugs\" or \"filtered ear plugs\"), or custom earmolds can be made with an audiologist.    There are \"custom\" hearing protection devices that you can further discuss with your audiologist based on your specific needs, if desired. Exposure to these sounds may cause permanent damage to your hearing.   If you suspect your hearing has changed, it is recommended that you have your hearing tested by your audiologist.

## 2022-05-20 NOTE — PROGRESS NOTES
CHIEF COMPLAINT: Tinnitus    HISTORY OF PRESENT ILLNESS:  59 y.o. male referred by Nurse Lg Leary who presents with tinnitus bilateral, high pitch, non pulsatile, 5-6 months duration. Insidious onset. Stable in ingensity. Hearing is subjectively ggod. No ear fullness. No history of nois exposure. Negative family history of hearing loss.     PAST MEDICAL HISTORY:   Social History     Tobacco Use   Smoking Status Former Smoker    Packs/day: 0.50    Years: 40.00    Pack years: 20.00    Types: Cigarettes   Smokeless Tobacco Never Used                                                    Social History     Substance and Sexual Activity   Alcohol Use Yes    Alcohol/week: 2.0 standard drinks    Types: 2 Cans of beer per week    Comment: previously was drinkning 5 cans per day                                                    Current Outpatient Medications:     vitamin D3 (CHOLECALCIFEROL) 25 MCG (1000 UT) TABS tablet, Take 1 tablet by mouth daily, Disp: 90 tablet, Rfl: 1    melatonin 5 MG TBDP disintegrating tablet, Take 1 tablet by mouth nightly, Disp: 30 tablet, Rfl: 0    pantoprazole (PROTONIX) 40 MG tablet, Twice daily for 2 weeks, then once daily thereafter., Disp: 30 tablet, Rfl: 3    vitamin B-12 (CYANOCOBALAMIN) 100 MCG tablet, TAKE 1 TABLET BY MOUTH NIGHTLY, Disp: 90 tablet, Rfl: 3    lisinopril (PRINIVIL;ZESTRIL) 2.5 MG tablet, TAKE 1 TABLET BY MOUTH EVERY NIGHT, Disp: 30 tablet, Rfl: 3    atorvastatin (LIPITOR) 40 MG tablet, TAKE 1 TABLET BY MOUTH EVERY NIGHT, Disp: 30 tablet, Rfl: 3    hydrocortisone 1 % cream, Apply topically 2 times daily, Disp: , Rfl:     Thiamine Mononitrate (B1) 100 MG TABS, TAKE 1 TABLET BY MOUTH DAILY, Disp: 90 tablet, Rfl: 3    magnesium 200 MG TABS tablet, Take 200 mg by mouth daily, Disp: , Rfl:     carvedilol (COREG) 3.125 MG tablet, TAKE 1 TABLET BY MOUTH TWICE DAILY, Disp: 180 tablet, Rfl: 3    folic acid (FOLVITE) 1 MG tablet, Take 1 tablet by mouth daily, Disp: 90 tablet, Rfl: 3    levETIRAcetam (KEPPRA) 500 MG tablet, Take 1 tablet by mouth 2 times daily for 12 days, Disp: 24 tablet, Rfl: 0                                                 Past Medical History:   Diagnosis Date    Alcohol abuse     CAD (coronary artery disease)     with complete LAD occlusion    CHF (congestive heart failure) (HCC)     LVEF    Essential tremor     HTN (hypertension)     Hx of blood clots 05/2021    Lower extremity cellulitis     MI (mitral incompetence)                                                     Past Surgical History:   Procedure Laterality Date    PTCA      UPPER GASTROINTESTINAL ENDOSCOPY N/A 7/28/2020    EGD BIOPSY performed by Avril Centeno MD at [address]: Family history reviewed. Except as noted in history of present illness, there is no pertinent family history      REVIEW OF SYSTEMS:  All pertinent positive and negative review of systems included in HPI. Otherwise, all systems are reviewed and negative. PHYSICAL EXAMINATION:   GENERAL: wdwn- no acute distress  RESPIRATORY:  No stridor or respiratory distress  COMMUNICATION :  Normal voice  MENTAL STATUS:  Mood and affect normal, oriented X 3  HEAD AND FACE:  No abnormalities of the skin of face or head  EXTERNAL EARS AND NOSE:  Normal pinnae bilateral  FACIAL MUSCLES:  All branches of facial nerve intact  EXTRAOCULAR MUSCLES: Intact with full range of motion  FACE PALPATION:  No tenderness over sinuses. Zygomatic arches and orbital rims intact  OTOSCOPY:  Normal external auditory canals, tympanic membranes, and middle ear spaces  TUNING FORKS: Rinne ++ Springer midline at 512 Hz  INTRANASAL:  Septum midline, turbinates normal, meati clear. LIPS, TEETH, GINGIVA: Poor dentition. PHARYNX:  Normal  NECK:  No masses.   LYMPHATIC:  No cervical adenopathy  SALIVARY GLANDS:  No swelling or masses in the parotid or submandibular salivary glands  THYROID:  No goiter or thyroid masses. AUDIOGRAM & TYMPANOGRAM ORDERED AND REVIEWED:   IMPRESSION: Tinnitus secondary to symmetrical high-frequency hearing loss. PLAN: Patient advised about the relationship between tinnitus and hearing loss. Recommend use of melatonin 3 mg at night. Discussed amplification. FOLLOW-UP: As needed.

## 2022-08-01 ENCOUNTER — OFFICE VISIT (OUTPATIENT)
Dept: CARDIOLOGY CLINIC | Age: 62
End: 2022-08-01
Payer: MEDICAID

## 2022-08-01 VITALS
DIASTOLIC BLOOD PRESSURE: 70 MMHG | WEIGHT: 191 LBS | HEART RATE: 62 BPM | BODY MASS INDEX: 28.29 KG/M2 | HEIGHT: 69 IN | SYSTOLIC BLOOD PRESSURE: 104 MMHG

## 2022-08-01 DIAGNOSIS — I25.83 CORONARY ARTERY DISEASE DUE TO LIPID RICH PLAQUE: ICD-10-CM

## 2022-08-01 DIAGNOSIS — I25.10 CORONARY ARTERY DISEASE INVOLVING NATIVE CORONARY ARTERY OF NATIVE HEART WITHOUT ANGINA PECTORIS: Primary | ICD-10-CM

## 2022-08-01 DIAGNOSIS — I25.10 CORONARY ARTERY DISEASE DUE TO LIPID RICH PLAQUE: ICD-10-CM

## 2022-08-01 PROCEDURE — 99214 OFFICE O/P EST MOD 30 MIN: CPT | Performed by: INTERNAL MEDICINE

## 2022-08-01 RX ORDER — UREA 10 %
100 LOTION (ML) TOPICAL DAILY
Qty: 90 TABLET | Refills: 3 | Status: SHIPPED | OUTPATIENT
Start: 2022-08-01

## 2022-08-01 RX ORDER — CARVEDILOL 3.12 MG/1
3.12 TABLET ORAL 2 TIMES DAILY
Qty: 180 TABLET | Refills: 3 | Status: SHIPPED | OUTPATIENT
Start: 2022-08-01

## 2022-08-01 RX ORDER — MELATONIN
1000 DAILY
Qty: 90 TABLET | Refills: 3 | Status: SHIPPED | OUTPATIENT
Start: 2022-08-01

## 2022-08-01 RX ORDER — CALCIUM CARBONATE/VITAMIN D3 600 MG-10
100 TABLET ORAL DAILY
Qty: 90 TABLET | Refills: 3 | Status: SHIPPED | OUTPATIENT
Start: 2022-08-01

## 2022-08-01 RX ORDER — ATORVASTATIN CALCIUM 40 MG/1
40 TABLET, FILM COATED ORAL DAILY
Qty: 90 TABLET | Refills: 3 | Status: SHIPPED | OUTPATIENT
Start: 2022-08-01

## 2022-08-01 RX ORDER — ASPIRIN 81 MG/1
81 TABLET ORAL DAILY
COMMUNITY

## 2022-08-01 RX ORDER — MAGNESIUM 200 MG
400 TABLET ORAL DAILY
Qty: 90 TABLET | Refills: 3 | Status: SHIPPED | OUTPATIENT
Start: 2022-08-01

## 2022-08-01 RX ORDER — FOLIC ACID 1 MG/1
1 TABLET ORAL DAILY
Qty: 90 TABLET | Refills: 3 | Status: SHIPPED | OUTPATIENT
Start: 2022-08-01

## 2022-08-01 NOTE — PROGRESS NOTES
[address]   Dr Melina Faulkner. Shai SEGOVIA, [address] [facility]    Outpatient Follow Up Note    8/1/2022,10:33 AM  Subjective:   CHIEF COMPLAINT / HPI:  Follow Up secondary to hypertension and status post CABG     Darya Hobbs is 59 y.o. male who presents today for a routine follow up. At this time he is doing well. He is here with his sister who is retired from the [employer] and is doing well. Denies any chest pains. Blood pressure 104/70 the lungs are clear the heart is regular without gallops or rubs and there is no peripheral edema. Apparently he had a fall and hit his head and had a intracranial bleed about a year ago and all of his anticoagulations was discontinued. His sister whom he lives with is here and states that he had problem with alcohol consumption. Was not an alcoholic stupor when he fell. Apparently does not drink at all currently and is actively moving about. I do think given his history of coronary disease and bypass surgery that he needs to be on a at least a baby aspirin. We will start him on a baby aspirin 81 mg today. Return to see us in about 6 months. Corona virus no infection   Vaccine  x 3 Pfizer     No smoking            CAD with stent 5/15/20  Hypertension  Hyperlipidemia  LDL 72 on Lipitor   Brain bleed /CVA 9/21 secondary to ETOH fall.  Now not consuming /    Past Medical History:    Past Medical History:   Diagnosis Date    Alcohol abuse     CAD (coronary artery disease)     with complete LAD occlusion    CHF (congestive heart failure) (HCC)     LVEF    Essential tremor     HTN (hypertension)     Hx of blood clots 05/2021    Lower extremity cellulitis     MI (mitral incompetence)      Past Surgical History  Past Surgical History:   Procedure Laterality Date    PTCA      UPPER GASTROINTESTINAL ENDOSCOPY N/A 7/28/2020    EGD BIOPSY performed by Jonathon Wallace MD at [facility] ENDOSCOPY     Social History:       Social History     Tobacco Use   Smoking Status Former    Packs/day: 0.50    Years: 40.00    Pack years: 20.00    Types: Cigarettes   Smokeless Tobacco Never     Current Medications:  Prior to Visit Medications    Medication Sig Taking? Authorizing Provider   vitamin D3 (CHOLECALCIFEROL) 25 MCG (1000 UT) TABS tablet Take 1 tablet by mouth daily Yes MERVIN Celaya CNP   melatonin 5 MG TBDP disintegrating tablet Take 1 tablet by mouth nightly Yes Blair Finley MD   vitamin B-12 (CYANOCOBALAMIN) 100 MCG tablet TAKE 1 TABLET BY MOUTH NIGHTLY Yes MERVIN Celaya CNP   atorvastatin (LIPITOR) 40 MG tablet TAKE 1 TABLET BY MOUTH EVERY NIGHT Yes MERVIN Celaya CNP   hydrocortisone 1 % cream Apply topically 2 times daily Yes Historical Provider, MD   Thiamine Mononitrate (B1) 100 MG TABS TAKE 1 TABLET BY MOUTH DAILY Yes MERVIN Celaya CNP   magnesium 200 MG TABS tablet Take 400 mg by mouth in the morning.  Yes Historical Provider, MD   carvedilol (COREG) 3.125 MG tablet TAKE 1 TABLET BY MOUTH TWICE DAILY Yes MERVIN Celaya CNP   folic acid (FOLVITE) 1 MG tablet Take 1 tablet by mouth daily Yes MERVIN Celaya CNP   thiamine 100 MG tablet Take 1 tablet by mouth daily  MERVIN Celaya CNP     Family History  Family History   Problem Relation Age of Onset    Stroke Mother     Heart Disease Father        Current Medications  Current Outpatient Medications   Medication Sig Dispense Refill    vitamin D3 (CHOLECALCIFEROL) 25 MCG (1000 UT) TABS tablet Take 1 tablet by mouth daily 90 tablet 1    melatonin 5 MG TBDP disintegrating tablet Take 1 tablet by mouth nightly 30 tablet 0    vitamin B-12 (CYANOCOBALAMIN) 100 MCG tablet TAKE 1 TABLET BY MOUTH NIGHTLY 90 tablet 3    atorvastatin (LIPITOR) 40 MG tablet TAKE 1 TABLET BY MOUTH EVERY NIGHT 30 tablet 3    hydrocortisone 1 % cream Apply topically 2 times daily      Thiamine Mononitrate (B1) 100 MG TABS TAKE 1 TABLET BY MOUTH DAILY 90 tablet 3    magnesium 200 MG TABS tablet Take 400 mg by mouth in the morning. carvedilol (COREG) 3.125 MG tablet TAKE 1 TABLET BY MOUTH TWICE DAILY 720 tablet 3    folic acid (FOLVITE) 1 MG tablet Take 1 tablet by mouth daily 90 tablet 3     No current facility-administered medications for this visit. REVIEW OF SYSTEMS:    CONSTITUTIONAL: No major weight gain or loss, fatigue, weakness, night sweats or fever. HEENT: No new vision difficulties or ringing in the ears. RESPIRATORY: No new SOB, PND, orthopnea or cough. CARDIOVASCULAR: See HPI  GI: No nausea, vomiting, diarrhea, constipation, abdominal pain or changes in bowel habits. : No urinary frequency, urgency, incontinence hematuria or dysuria. SKIN: No cyanosis or skin lesions. MUSCULOSKELETAL: No new muscle or joint pain. NEUROLOGICAL: No syncope or TIA-like symptoms. PSYCHIATRIC: No anxiety, pain, insomnia or depression    Objective:   PHYSICAL EXAM:        VITALS:    Wt Readings from Last 3 Encounters:   08/01/22 191 lb (86.6 kg)   04/28/22 200 lb (90.7 kg)   10/28/21 168 lb 9.6 oz (76.5 kg)     BP Readings from Last 3 Encounters:   08/01/22 104/70   05/20/22 136/84   04/28/22 118/70     Pulse Readings from Last 3 Encounters:   08/01/22 62   05/20/22 59   04/28/22 70       CONSTITUTIONAL: Cooperative, no apparent distress, and appears well nourished / developed  NEUROLOGIC:  Awake and orientated to person, place and time. PSYCH: Calm affect. SKIN: Warm and dry. HEENT: Sclera non-icteric, normocephalic, neck supple, no elevation of JVP, normal carotid pulses with no bruits and thyroid normal size. LUNGS:  No increased work of breathing and clear to auscultation, no crackles or wheezing  CARDIOVASCULAR:  Regular rate and rhythm with no murmurs, gallops, rubs, or abnormal heart sounds, normal PMI. The apical impulses not displaced  Heart tones are crisp and normal  Cervical veins are not engorged  The carotid upstroke is normal in amplitude and contour without delay or bruit  JVP is not elevated  ABDOMEN:  Normal bowel sounds, non-distended and non-tender to palpation  EXT: No edema, no calf tenderness. Pulses are present bilaterally. DATA:    Lab Results   Component Value Date    ALT 22 04/28/2022    AST 21 04/28/2022    ALKPHOS 105 04/28/2022    BILITOT 0.6 04/28/2022     Lab Results   Component Value Date    CREATININE 0.7 (L) 04/28/2022    BUN 16 04/28/2022     04/28/2022    K 4.4 04/28/2022     04/28/2022    CO2 23 04/28/2022     No results found for: TSH, Q6QKFRZ, Z0QUPOH, THYROIDAB  Lab Results   Component Value Date    WBC 7.9 04/28/2022    HGB 15.5 04/28/2022    HCT 45.6 04/28/2022    MCV 94.6 04/28/2022     (L) 04/28/2022     No components found for: CHLPL  Lab Results   Component Value Date    TRIG 89 04/28/2022    TRIG 51 09/05/2021    TRIG 371 (H) 10/03/2020     Lab Results   Component Value Date    HDL 52 04/28/2022    HDL 39 (L) 09/05/2021    HDL 48 09/02/2021     Lab Results   Component Value Date    LDLCALC 75 04/28/2022    LDLCALC 20 09/05/2021    [address] 09/02/2021     Lab Results   Component Value Date    LABVLDL 18 04/28/2022    LABVLDL 10 09/05/2021    LABVLDL 20 09/02/2021     Radiology Review:  Pertinent images / reports were reviewed as a part of this visit and reveals the following:    Echo:    Stress Test / Angiogram:  Left ventricular pressure not done 5/15/20   Aortic pressure 110 mmHg     Coronary anatomy:  The left main coronary artery is normal.  But short     Left anterior descending artery complete 100% occluded proximally. There is some right to left collateral flow. There is a diagonal branch with stenosis. Circumflex artery mid vessel disease probably 45 to 50% stenosis     The right coronary artery is a dominant vessel and normal.     Left ventriculogram was not performed. We relied on the echocardiograph which showed left ventricular mural thrombus and apical akinetic segment.         PCI  We used an XB 3.5 guide catheter. We were able to place a run-through guidewire down the LAD across the completely occluded lesion. We predilated with a 2.5 balloon. Subsequently and after opening and identifying our course of vascular flow we were then able to provide stents. We placed a 2.75 x 18 mm Dickinson drug-eluting stent into the proximal LAD and subsequently placed a 3.25 x 12 mm stent into the more proximal area of the LAD just outside the left main. We had great flow. We did place the guidewire down the diagonal branch which seem to have some compromise. We used a 2.5 balloon to dilate the diagonal branch and our findings came out to be very good. This was through the sidewall of the stent struts. We did not perform an LV gram.  We did use heparin 5000 units. We use ReoPro bolus and drip. Impression:  The LAD was completely occluded with some left to left and some right to left collateral flow. We did perform an intervention. ECG:  This patient was educated using the patient point room wall mount device. Absence from smokers and smoking and diet and exercising are important. Assessment:   CAD post bypass graft surgery. Apparently had a traumatic fall and had a brain bleed about a year ago. While in some kind of an alcoholic stool poor. Apparently does not drink at all at this time and no more episodes of falling. Will start him on aspirin 81 mg.    Plan:   Aspirin 81 mg. Return in 6 months. Please call if we can assist further [phone]. Alison Johnson.  Shai SEGOVIA, [facility]      This note was likely completed using voice recognition technology and may contain unintended errors

## 2022-08-02 ENCOUNTER — OFFICE VISIT (OUTPATIENT)
Dept: INTERNAL MEDICINE CLINIC | Age: 62
End: 2022-08-02
Payer: MEDICAID

## 2022-08-02 VITALS
HEIGHT: 69 IN | WEIGHT: 191.4 LBS | BODY MASS INDEX: 28.35 KG/M2 | TEMPERATURE: 96.8 F | OXYGEN SATURATION: 97 % | DIASTOLIC BLOOD PRESSURE: 72 MMHG | SYSTOLIC BLOOD PRESSURE: 115 MMHG | HEART RATE: 53 BPM

## 2022-08-02 DIAGNOSIS — I25.83 CORONARY ARTERY DISEASE DUE TO LIPID RICH PLAQUE: ICD-10-CM

## 2022-08-02 DIAGNOSIS — F10.139 ALCOHOL ABUSE WITH WITHDRAWAL (HCC): ICD-10-CM

## 2022-08-02 DIAGNOSIS — I62.9 INTRACRANIAL HEMORRHAGE (HCC): ICD-10-CM

## 2022-08-02 DIAGNOSIS — I25.10 CORONARY ARTERY DISEASE DUE TO LIPID RICH PLAQUE: ICD-10-CM

## 2022-08-02 DIAGNOSIS — L08.9 SKIN PUSTULE: Primary | ICD-10-CM

## 2022-08-02 PROCEDURE — 99213 OFFICE O/P EST LOW 20 MIN: CPT

## 2022-08-02 ASSESSMENT — ENCOUNTER SYMPTOMS
BACK PAIN: 0
CONSTIPATION: 0
ABDOMINAL PAIN: 0
ABDOMINAL DISTENTION: 0

## 2022-08-02 NOTE — PATIENT INSTRUCTIONS
Please use antibacterial soap for your skin lesions on your abdomen and back. Please wash all of your clothes in warm water. Please do not intake any alcoholic beverages. Please continue to take your medications as prescribed. Please follow-up with us in 3 months. If you have any serious changes in your health please visit the emergency room.

## 2022-08-02 NOTE — PROGRESS NOTES
2022    Albertina Hammans (:  1960) is a 59 y.o. male w/PMH CAD (s/p stent , w/ischemic reduction of EF, repeat echo  EF 50-55%), DVT (), HTN (lisinopril d/c by cardiology), here to establish primary care. Patient is accompanied by his sister who is his care taker. He had a notable hospitalization in  after a fall and was found to have ICH, SAH, SDH, and subsequently went to rehabilitation and was living at a facility. His sister reports that he has been living at the facility since she was working and could not take care of him. Last Friday, the patient returned to living w/his sister after her recent retiring from work. Patient reports seeing neurosurgery and reports being cleared by them on his last visit. Patient regularly visits podiatry + cardiology outpatient. Patient has what he describes as little pimples occurring at his abdomen and lower back. The pimples began about six months ago and never happened in the past. Patient reports the pimples are not itchy or burning w/no puritis at night. Patient reports using hydrocortisone cream from podiatrist for his legs in addition to lotion on the area w/some improvement. Social History:  Home:  -Patient lives with his sister, he is currently unemployed. Patient is umarried and has no children. Drugs:  -Cigarette smoking 3-4 cigarettes a day from teenage years to adulthood, 1 year quitting, and continued until  (approximate timeline not given). Patient reports that he quit.    -not currently sexually active  -No known allergies    Family Hx:  Father- : HA, hx alcohol use dx  Mother - : natural causes,hx heart disease,   Brothers: DM2, rheumatoid arthritis, Heart disease, alcoholism  Sisters: TBI w/memory loss, DM2, neuropathy  Maternal aunt- pancreatic cancer  Maternal cousin: breast cancer (female)  Paternal uncle: alcohol brain disease  Surgical Hx:  -cataracts surgery right eye  -stent placement 2020          Patient Active Problem List   Diagnosis    Mural thrombus of cardiac apex    Coronary artery disease due to lipid rich plaque    Abnormal angiogram    Acute deep vein thrombosis of left lower extremity (HCC)    Hypokalemia    Alcohol abuse with withdrawal (HCC)    Electrolyte abnormality    Intracranial hemorrhage (HCC)    Diastolic congestive heart failure, unspecified HF chronicity ([facility] [address]    Skin pustule     Past Surgical History:   Procedure Laterality Date    PTCA      UPPER GASTROINTESTINAL ENDOSCOPY N/A 7/28/2020    EGD BIOPSY performed by Orquidea Mendoza MD at [address] ENDOSCOPY       Review of Systems   Constitutional:  Negative for chills and diaphoresis. Cardiovascular:  Negative for chest pain. Gastrointestinal:  Negative for abdominal distention, abdominal pain and constipation. Genitourinary:  Negative for dysuria, frequency and urgency. Musculoskeletal:  Negative for back pain, gait problem and myalgias. Neurological:  Negative for dizziness, tremors, seizures, syncope, facial asymmetry and weakness. Psychiatric/Behavioral:  Negative for confusion. Prior to Visit Medications    Medication Sig Taking? Authorizing Provider   aspirin 81 MG EC tablet Take 81 mg by mouth in the morning. Yes Historical Provider, MD   atorvastatin (LIPITOR) 40 MG tablet Take 1 tablet by mouth in the morning. Yes Ines Ayala MD   carvedilol (COREG) 3.125 MG tablet Take 1 tablet by mouth in the morning and 1 tablet before bedtime. Yes Ines Ayala MD   folic acid (FOLVITE) 1 MG tablet Take 1 tablet by mouth in the morning. Yes Ines Ayala MD   magnesium 200 MG TABS tablet Take 2 tablets by mouth in the morning. Yes Ines Ayala MD   vitamin B-12 (CYANOCOBALAMIN) 100 MCG tablet Take 1 tablet by mouth in the morning. Yes Ines Ayala MD   vitamin D3 (CHOLECALCIFEROL) 25 MCG (1000 UT) TABS tablet Take 1 tablet by mouth in the morning.  Yes Ines Ayala MD   Thiamine Mononitrate (B1) 100 MG TABS Take 100 mg by mouth daily Yes Cristy Batista MD   melatonin 5 MG TBDP disintegrating tablet Take 1 tablet by mouth nightly Yes Naida Desouza MD   hydrocortisone 1 % cream Apply topically 2 times daily Yes Historical Provider, MD   thiamine 100 MG tablet Take 1 tablet by mouth daily  Jennifer Meneses, APRN - CNP        No Known Allergies    Past Medical History:   Diagnosis Date    Alcohol abuse     CAD (coronary artery disease)     with complete LAD occlusion    CHF (congestive heart failure) (HCC)     LVEF    Essential tremor     HTN (hypertension)     Hx of blood clots 05/2021    Lower extremity cellulitis     MI (mitral incompetence)              Vitals:    08/02/22 1322   BP: 115/72   Site: Right Upper Arm   Position: Sitting   Cuff Size: Medium Adult   Pulse: 53   Temp: 96.8 °F (36 °C)   TempSrc: Temporal   SpO2: 97%   Weight: 191 lb 6.4 oz (86.8 kg)   Height: 5' 9\" (1.753 m)     Estimated body mass index is 28.26 kg/m² as calculated from the following:    Height as of this encounter: 5' 9\" (1.753 m). Weight as of this encounter: 191 lb 6.4 oz (86.8 kg). Physical Exam  Cardiovascular:      Rate and Rhythm: Normal rate and regular rhythm. Pulmonary:      Effort: Pulmonary effort is normal.      Breath sounds: No wheezing, rhonchi or rales. Abdominal:      General: Bowel sounds are normal. There is no distension. Palpations: Abdomen is soft. Tenderness: There is no abdominal tenderness. There is no guarding. Musculoskeletal:      Right lower leg: No edema. Left lower leg: No edema. Comments: Venous stasis dermatitis (erythematous)     Skin:     General: Skin is warm and dry. Neurological:      Mental Status: He is alert and oriented to person, place, and time.       Coordination: Coordination normal.      Gait: Gait normal.         Lab Results   Component Value Date/Time    CHOL 145 04/28/2022 01:05 PM    CHOL 69 09/05/2021 05:16 AM    CHOL 84 10/03/2020 08:56 AM    CHOLFAST 81 09/02/2021 07:26 AM    TRIG 89 04/28/2022 01:05 PM    TRIG 51 09/05/2021 05:16 AM    TRIG 371 10/03/2020 08:56 AM    TRIGLYCFAST 100 09/02/2021 07:26 AM    HDL 52 04/28/2022 01:05 PM    HDL 39 09/05/2021 05:16 AM    HDL 48 09/02/2021 07:26 AM    LDLCALC 75 04/28/2022 01:05 PM    LDLCALC 20 09/05/2021 05:16 AM    LDLCALC 13 09/02/2021 07:26 AM    GLUCOSE 88 04/28/2022 01:05 PM    LABA1C 5.4 04/28/2022 01:05 PM    LABA1C 4.9 09/05/2021 05:15 AM       The 10-year ASCVD risk score (Onofre Lopez et al., 2013) is: 6.9%    Values used to calculate the score:      Age: 59 years      Sex: Male      Is Non- : No      Diabetic: No      Tobacco smoker: No      Systolic Blood Pressure: 875 mmHg      Is BP treated: No      HDL Cholesterol: 52 mg/dL      Total Cholesterol: 145 mg/dL    Immunization History   Administered Date(s) Administered    Influenza, Quadv, IM, PF (6 mo and older Fluzone, Flulaval, Fluarix, and 3 yrs and older Afluria) 10/09/2020       Health Maintenance   Topic Date Due    COVID-19 Vaccine (1) Never done    Pneumococcal 0-64 years Vaccine (1 - PCV) Never done    Depression Screen  Never done    DTaP/Tdap/Td vaccine (1 - Tdap) Never done    Prostate Specific Antigen (PSA) Screening or Monitoring  Never done    Shingles vaccine (1 of 2) Never done    Low dose CT lung screening  Never done    Colorectal Cancer Screen  07/27/2021    Flu vaccine (1) 09/01/2022    Lipids  04/28/2023    Hepatitis C screen  Completed    HIV screen  Completed    Hepatitis A vaccine  Aged Out    Hepatitis B vaccine  Aged Out    Hib vaccine  Aged Out    Meningococcal (ACWY) vaccine  Aged Out       Assessment & Plan    ASSESSMENT and PLAN ;      Diagnosis Orders   1. Skin pustule  -patient presented w/non-pruritic skin pustules of various stages of healing after recent stay at living facility.   -Patient to use antibacterial soap and to wash his clothes in warm water.  If problem persists patient may follow-up for futher workup. 2. Alcohol abuse with withdrawal ([facility] [address]  -Patient reports no alcohol intake after recent hospitalization w/falls and ICH, SAH, SDH.  -nothing done       3. Coronary artery disease due to lipid rich plaque   S/p stent placement -Patient reports compliance w/medication regimen w/his sister's guidance.   -Lab results have been reviewed. -echocardiography reviewed w/improvement of EF to 55-60% after revascularization after ischemia from coronary arterial disease.  -continue w/f/u w/monitoring of patient's lifestyle and f/u labs and progress. 4. Intracranial hemorrhage ([facility] [address]  -Patient w/hospitalization in 2021 after fall w/ICH,SAH,SDH.  -Patient f/u w/neurosurgery w/sign off after recent CT imaging review.  -Patient presented at this visit w/out concerning findings. -patient living at home w/sister who is his primary caretaker.  -no changes made. All above medical conditions have been reviewed and assessed     Plan;    Patient Instructions   Please use antibacterial soap for your skin lesions on your abdomen and back. Please wash all of your clothes in warm water. Please do not intake any alcoholic beverages. Please continue to take your medications as prescribed. Please follow-up with us in 3 months. If you have any serious changes in your health please visit the emergency room. New Prescriptions    No medications on file       No orders of the defined types were placed in this encounter. Return in about 3 months (around 11/2/2022) for follow-up visit. No results found for this visit on 08/02/22.      Dispo: Pt has been staffed with Dr. Keisha Swain  _______________  Acie Gottron, MD, 8/2/2022 3:59 PM   PGY-1

## 2022-09-01 ENCOUNTER — HOSPITAL ENCOUNTER (INPATIENT)
Age: 62
LOS: 35 days | Discharge: NURSING, REHAB OR LONG-TERM CARE | DRG: 130 | End: 2022-10-06
Attending: EMERGENCY MEDICINE | Admitting: INTERNAL MEDICINE
Payer: MEDICAID

## 2022-09-01 ENCOUNTER — APPOINTMENT (OUTPATIENT)
Dept: CT IMAGING | Age: 62
DRG: 130 | End: 2022-09-01
Payer: MEDICAID

## 2022-09-01 ENCOUNTER — APPOINTMENT (OUTPATIENT)
Dept: GENERAL RADIOLOGY | Age: 62
DRG: 130 | End: 2022-09-01
Payer: MEDICAID

## 2022-09-01 DIAGNOSIS — J96.00 ACUTE RESPIRATORY FAILURE, UNSPECIFIED WHETHER WITH HYPOXIA OR HYPERCAPNIA (HCC): Primary | ICD-10-CM

## 2022-09-01 DIAGNOSIS — R41.82 ALTERED MENTAL STATUS, UNSPECIFIED ALTERED MENTAL STATUS TYPE: ICD-10-CM

## 2022-09-01 PROBLEM — R56.9 SEIZURE (HCC): Status: ACTIVE | Noted: 2022-09-01

## 2022-09-01 LAB
ABO/RH: NORMAL
ALBUMIN SERPL-MCNC: 4.6 G/DL (ref 3.4–5)
ALP BLD-CCNC: 139 U/L (ref 40–129)
ALT SERPL-CCNC: 28 U/L (ref 10–40)
AMMONIA: 36 UMOL/L (ref 16–60)
AMORPHOUS: ABNORMAL /HPF
AMPHETAMINE SCREEN, URINE: NORMAL
ANION GAP SERPL CALCULATED.3IONS-SCNC: 14 MMOL/L (ref 3–16)
ANTIBODY SCREEN: NORMAL
APTT: 24.4 SEC (ref 23–34.3)
AST SERPL-CCNC: 42 U/L (ref 15–37)
BACTERIA: ABNORMAL /HPF
BARBITURATE SCREEN URINE: NORMAL
BASE EXCESS ARTERIAL: -2.8 MMOL/L (ref -3–3)
BASE EXCESS VENOUS: -2.8 MMOL/L (ref -2–3)
BASOPHILS ABSOLUTE: 0.1 K/UL (ref 0–0.2)
BASOPHILS RELATIVE PERCENT: 0.3 %
BENZODIAZEPINE SCREEN, URINE: NORMAL
BILIRUB SERPL-MCNC: 1.3 MG/DL (ref 0–1)
BILIRUBIN DIRECT: 0.4 MG/DL (ref 0–0.3)
BILIRUBIN URINE: ABNORMAL
BILIRUBIN, INDIRECT: 0.9 MG/DL (ref 0–1)
BLOOD, URINE: ABNORMAL
BUN BLDV-MCNC: 9 MG/DL (ref 7–20)
CALCIUM SERPL-MCNC: 9 MG/DL (ref 8.3–10.6)
CANNABINOID SCREEN URINE: NORMAL
CARBOXYHEMOGLOBIN ARTERIAL: 1.1 % (ref 0–1.5)
CARBOXYHEMOGLOBIN: 0.8 % (ref 0–1.5)
CHLORIDE BLD-SCNC: 104 MMOL/L (ref 99–110)
CLARITY: ABNORMAL
CO2: 23 MMOL/L (ref 21–32)
COARSE CASTS, UA: ABNORMAL /LPF (ref 0–2)
COCAINE METABOLITE SCREEN URINE: NORMAL
COLOR: YELLOW
CREAT SERPL-MCNC: 1 MG/DL (ref 0.8–1.3)
EKG ATRIAL RATE: 141 BPM
EKG DIAGNOSIS: NORMAL
EKG P-R INTERVAL: 120 MS
EKG Q-T INTERVAL: 326 MS
EKG QRS DURATION: 86 MS
EKG QTC CALCULATION (BAZETT): 499 MS
EKG R AXIS: -33 DEGREES
EKG T AXIS: 96 DEGREES
EKG VENTRICULAR RATE: 141 BPM
EOSINOPHILS ABSOLUTE: 0 K/UL (ref 0–0.6)
EOSINOPHILS RELATIVE PERCENT: 0 %
EPITHELIAL CELLS, UA: ABNORMAL /HPF (ref 0–5)
ETHANOL: NORMAL MG/DL (ref 0–0.08)
FENTANYL SCREEN, URINE: NORMAL
GFR AFRICAN AMERICAN: >60
GFR NON-AFRICAN AMERICAN: >60
GLUCOSE BLD-MCNC: 149 MG/DL (ref 70–99)
GLUCOSE BLD-MCNC: 157 MG/DL (ref 70–99)
GLUCOSE URINE: NEGATIVE MG/DL
HCO3 ARTERIAL: 25 MMOL/L (ref 21–29)
HCO3 VENOUS: 25.5 MMOL/L (ref 24–28)
HCT VFR BLD CALC: 48.7 % (ref 40.5–52.5)
HEMOGLOBIN, ART, EXTENDED: 15.9 G/DL
HEMOGLOBIN, VEN, REDUCED: 5.3 %
HEMOGLOBIN: 16.5 G/DL (ref 13.5–17.5)
HYALINE CASTS: ABNORMAL /LPF (ref 0–2)
INR BLD: 1.2 (ref 0.87–1.14)
KETONES, URINE: NEGATIVE MG/DL
LACTIC ACID: 1.6 MMOL/L (ref 0.4–2)
LEUKOCYTE ESTERASE, URINE: NEGATIVE
LIPASE: 16 U/L (ref 13–60)
LYMPHOCYTES ABSOLUTE: 1.4 K/UL (ref 1–5.1)
LYMPHOCYTES RELATIVE PERCENT: 6.5 %
Lab: NORMAL
MCH RBC QN AUTO: 31.7 PG (ref 26–34)
MCHC RBC AUTO-ENTMCNC: 33.9 G/DL (ref 31–36)
MCV RBC AUTO: 93.5 FL (ref 80–100)
METHADONE SCREEN, URINE: NORMAL
METHEMOGLOBIN ARTERIAL: 0.2 % (ref 0–1.4)
METHEMOGLOBIN VENOUS: 0 % (ref 0–1.5)
MICROSCOPIC EXAMINATION: YES
MONOCYTES ABSOLUTE: 1.2 K/UL (ref 0–1.3)
MONOCYTES RELATIVE PERCENT: 5.5 %
NEUTROPHILS ABSOLUTE: 19 K/UL (ref 1.7–7.7)
NEUTROPHILS RELATIVE PERCENT: 87.7 %
NITRITE, URINE: NEGATIVE
O2 SAT, ARTERIAL: 64 % (ref 93–100)
O2 SAT, VEN: 95 %
OPIATE SCREEN URINE: NORMAL
OXYCODONE URINE: NORMAL
PCO2 ARTERIAL: 54.9 MMHG (ref 35–45)
PCO2, VEN: 56.4 MMHG (ref 41–51)
PDW BLD-RTO: 13.5 % (ref 12.4–15.4)
PERFORMED ON: ABNORMAL
PH ARTERIAL: 7.27 (ref 7.35–7.45)
PH UA: 5.5
PH UA: 5.5 (ref 5–8)
PH VENOUS: 7.26 (ref 7.35–7.45)
PHENCYCLIDINE SCREEN URINE: NORMAL
PLATELET # BLD: 177 K/UL (ref 135–450)
PMV BLD AUTO: 9.3 FL (ref 5–10.5)
PO2 ARTERIAL: 63.5 MMHG (ref 75–108)
PO2, VEN: 83.2 MMHG (ref 25–40)
POTASSIUM REFLEX MAGNESIUM: 3.7 MMOL/L (ref 3.5–5.1)
PROTEIN UA: 100 MG/DL
PROTHROMBIN TIME: 15.1 SEC (ref 11.7–14.5)
RBC # BLD: 5.2 M/UL (ref 4.2–5.9)
RBC UA: ABNORMAL /HPF (ref 0–4)
SODIUM BLD-SCNC: 141 MMOL/L (ref 136–145)
SPECIFIC GRAVITY UA: >=1.03 (ref 1–1.03)
TCO2 ARTERIAL: 27 MMOL/L
TCO2 CALC VENOUS: 27 MMOL/L
TOTAL PROTEIN: 7.8 G/DL (ref 6.4–8.2)
TROPONIN: 0.15 NG/ML
URINE TYPE: ABNORMAL
UROBILINOGEN, URINE: 0.2 E.U./DL
WBC # BLD: 21.7 K/UL (ref 4–11)
WBC UA: ABNORMAL /HPF (ref 0–5)

## 2022-09-01 PROCEDURE — 6360000002 HC RX W HCPCS: Performed by: EMERGENCY MEDICINE

## 2022-09-01 PROCEDURE — 85610 PROTHROMBIN TIME: CPT

## 2022-09-01 PROCEDURE — 94002 VENT MGMT INPAT INIT DAY: CPT

## 2022-09-01 PROCEDURE — 99285 EMERGENCY DEPT VISIT HI MDM: CPT

## 2022-09-01 PROCEDURE — 85025 COMPLETE CBC W/AUTO DIFF WBC: CPT

## 2022-09-01 PROCEDURE — 2500000003 HC RX 250 WO HCPCS: Performed by: PEDIATRICS

## 2022-09-01 PROCEDURE — 74018 RADEX ABDOMEN 1 VIEW: CPT

## 2022-09-01 PROCEDURE — 71045 X-RAY EXAM CHEST 1 VIEW: CPT

## 2022-09-01 PROCEDURE — 2580000003 HC RX 258: Performed by: EMERGENCY MEDICINE

## 2022-09-01 PROCEDURE — 71250 CT THORAX DX C-: CPT

## 2022-09-01 PROCEDURE — 96365 THER/PROPH/DIAG IV INF INIT: CPT

## 2022-09-01 PROCEDURE — 2000000000 HC ICU R&B

## 2022-09-01 PROCEDURE — 81001 URINALYSIS AUTO W/SCOPE: CPT

## 2022-09-01 PROCEDURE — 93005 ELECTROCARDIOGRAM TRACING: CPT | Performed by: STUDENT IN AN ORGANIZED HEALTH CARE EDUCATION/TRAINING PROGRAM

## 2022-09-01 PROCEDURE — 2500000003 HC RX 250 WO HCPCS: Performed by: STUDENT IN AN ORGANIZED HEALTH CARE EDUCATION/TRAINING PROGRAM

## 2022-09-01 PROCEDURE — 82140 ASSAY OF AMMONIA: CPT

## 2022-09-01 PROCEDURE — 80307 DRUG TEST PRSMV CHEM ANLYZR: CPT

## 2022-09-01 PROCEDURE — 0BH18EZ INSERTION OF ENDOTRACHEAL AIRWAY INTO TRACHEA, VIA NATURAL OR ARTIFICIAL OPENING ENDOSCOPIC: ICD-10-PCS | Performed by: EMERGENCY MEDICINE

## 2022-09-01 PROCEDURE — 85730 THROMBOPLASTIN TIME PARTIAL: CPT

## 2022-09-01 PROCEDURE — 70450 CT HEAD/BRAIN W/O DYE: CPT

## 2022-09-01 PROCEDURE — 2700000000 HC OXYGEN THERAPY PER DAY

## 2022-09-01 PROCEDURE — 2580000003 HC RX 258

## 2022-09-01 PROCEDURE — 5A1955Z RESPIRATORY VENTILATION, GREATER THAN 96 CONSECUTIVE HOURS: ICD-10-PCS | Performed by: EMERGENCY MEDICINE

## 2022-09-01 PROCEDURE — 2580000003 HC RX 258: Performed by: STUDENT IN AN ORGANIZED HEALTH CARE EDUCATION/TRAINING PROGRAM

## 2022-09-01 PROCEDURE — 86850 RBC ANTIBODY SCREEN: CPT

## 2022-09-01 PROCEDURE — 84484 ASSAY OF TROPONIN QUANT: CPT

## 2022-09-01 PROCEDURE — 80048 BASIC METABOLIC PNL TOTAL CA: CPT

## 2022-09-01 PROCEDURE — 6360000004 HC RX CONTRAST MEDICATION: Performed by: EMERGENCY MEDICINE

## 2022-09-01 PROCEDURE — 83690 ASSAY OF LIPASE: CPT

## 2022-09-01 PROCEDURE — 2500000003 HC RX 250 WO HCPCS: Performed by: EMERGENCY MEDICINE

## 2022-09-01 PROCEDURE — 6360000002 HC RX W HCPCS: Performed by: PEDIATRICS

## 2022-09-01 PROCEDURE — 6360000002 HC RX W HCPCS

## 2022-09-01 PROCEDURE — 36415 COLL VENOUS BLD VENIPUNCTURE: CPT

## 2022-09-01 PROCEDURE — 82803 BLOOD GASES ANY COMBINATION: CPT

## 2022-09-01 PROCEDURE — 74176 CT ABD & PELVIS W/O CONTRAST: CPT

## 2022-09-01 PROCEDURE — 86900 BLOOD TYPING SEROLOGIC ABO: CPT

## 2022-09-01 PROCEDURE — 86901 BLOOD TYPING SEROLOGIC RH(D): CPT

## 2022-09-01 PROCEDURE — 83605 ASSAY OF LACTIC ACID: CPT

## 2022-09-01 PROCEDURE — 94761 N-INVAS EAR/PLS OXIMETRY MLT: CPT

## 2022-09-01 PROCEDURE — 82077 ASSAY SPEC XCP UR&BREATH IA: CPT

## 2022-09-01 PROCEDURE — 96360 HYDRATION IV INFUSION INIT: CPT

## 2022-09-01 PROCEDURE — 80076 HEPATIC FUNCTION PANEL: CPT

## 2022-09-01 PROCEDURE — 70496 CT ANGIOGRAPHY HEAD: CPT

## 2022-09-01 RX ORDER — ACETAMINOPHEN 325 MG/1
650 TABLET ORAL EVERY 6 HOURS PRN
Status: DISCONTINUED | OUTPATIENT
Start: 2022-09-01 | End: 2022-10-06 | Stop reason: HOSPADM

## 2022-09-01 RX ORDER — SODIUM CHLORIDE 9 MG/ML
INJECTION, SOLUTION INTRAVENOUS PRN
Status: DISCONTINUED | OUTPATIENT
Start: 2022-09-01 | End: 2022-10-06 | Stop reason: HOSPADM

## 2022-09-01 RX ORDER — FENTANYL CITRATE 50 UG/ML
INJECTION, SOLUTION INTRAMUSCULAR; INTRAVENOUS
Status: COMPLETED
Start: 2022-09-01 | End: 2022-09-01

## 2022-09-01 RX ORDER — PROPOFOL 10 MG/ML
5-50 INJECTION, EMULSION INTRAVENOUS CONTINUOUS
Status: DISCONTINUED | OUTPATIENT
Start: 2022-09-01 | End: 2022-09-01 | Stop reason: SDUPTHER

## 2022-09-01 RX ORDER — ACETAMINOPHEN 650 MG/1
650 SUPPOSITORY RECTAL EVERY 6 HOURS PRN
Status: DISCONTINUED | OUTPATIENT
Start: 2022-09-01 | End: 2022-10-06 | Stop reason: HOSPADM

## 2022-09-01 RX ORDER — SUCCINYLCHOLINE CHLORIDE 20 MG/ML
INJECTION INTRAMUSCULAR; INTRAVENOUS
Status: DISPENSED
Start: 2022-09-01 | End: 2022-09-02

## 2022-09-01 RX ORDER — ETOMIDATE 2 MG/ML
INJECTION INTRAVENOUS DAILY PRN
Status: COMPLETED | OUTPATIENT
Start: 2022-09-01 | End: 2022-09-01

## 2022-09-01 RX ORDER — ETOMIDATE 2 MG/ML
INJECTION INTRAVENOUS
Status: DISPENSED
Start: 2022-09-01 | End: 2022-09-02

## 2022-09-01 RX ORDER — PROPOFOL 10 MG/ML
INJECTION, EMULSION INTRAVENOUS
Status: COMPLETED
Start: 2022-09-01 | End: 2022-09-01

## 2022-09-01 RX ORDER — MIDAZOLAM HYDROCHLORIDE 1 MG/ML
INJECTION INTRAMUSCULAR; INTRAVENOUS DAILY PRN
Status: COMPLETED | OUTPATIENT
Start: 2022-09-01 | End: 2022-09-01

## 2022-09-01 RX ORDER — ONDANSETRON 2 MG/ML
4 INJECTION INTRAMUSCULAR; INTRAVENOUS EVERY 6 HOURS PRN
Status: DISCONTINUED | OUTPATIENT
Start: 2022-09-01 | End: 2022-09-20

## 2022-09-01 RX ORDER — HEPARIN SODIUM 10000 [USP'U]/100ML
5-30 INJECTION, SOLUTION INTRAVENOUS CONTINUOUS
Status: DISCONTINUED | OUTPATIENT
Start: 2022-09-01 | End: 2022-09-02

## 2022-09-01 RX ORDER — SUCCINYLCHOLINE CHLORIDE 20 MG/ML
INJECTION INTRAMUSCULAR; INTRAVENOUS DAILY PRN
Status: COMPLETED | OUTPATIENT
Start: 2022-09-01 | End: 2022-09-01

## 2022-09-01 RX ORDER — PROPOFOL 10 MG/ML
5-50 INJECTION, EMULSION INTRAVENOUS CONTINUOUS
Status: DISCONTINUED | OUTPATIENT
Start: 2022-09-01 | End: 2022-09-10

## 2022-09-01 RX ORDER — 0.9 % SODIUM CHLORIDE 0.9 %
1000 INTRAVENOUS SOLUTION INTRAVENOUS ONCE
Status: COMPLETED | OUTPATIENT
Start: 2022-09-01 | End: 2022-09-02

## 2022-09-01 RX ORDER — HEPARIN SODIUM 1000 [USP'U]/ML
4000 INJECTION, SOLUTION INTRAVENOUS; SUBCUTANEOUS PRN
Status: DISCONTINUED | OUTPATIENT
Start: 2022-09-01 | End: 2022-09-02 | Stop reason: ALTCHOICE

## 2022-09-01 RX ORDER — HEPARIN SODIUM 1000 [USP'U]/ML
2000 INJECTION, SOLUTION INTRAVENOUS; SUBCUTANEOUS PRN
Status: DISCONTINUED | OUTPATIENT
Start: 2022-09-01 | End: 2022-09-02 | Stop reason: ALTCHOICE

## 2022-09-01 RX ORDER — SODIUM CHLORIDE 0.9 % (FLUSH) 0.9 %
5-40 SYRINGE (ML) INJECTION EVERY 12 HOURS SCHEDULED
Status: DISCONTINUED | OUTPATIENT
Start: 2022-09-01 | End: 2022-10-06 | Stop reason: HOSPADM

## 2022-09-01 RX ORDER — ONDANSETRON 4 MG/1
4 TABLET, ORALLY DISINTEGRATING ORAL EVERY 8 HOURS PRN
Status: DISCONTINUED | OUTPATIENT
Start: 2022-09-01 | End: 2022-09-20

## 2022-09-01 RX ORDER — HEPARIN SODIUM 1000 [USP'U]/ML
4000 INJECTION, SOLUTION INTRAVENOUS; SUBCUTANEOUS ONCE
Status: COMPLETED | OUTPATIENT
Start: 2022-09-01 | End: 2022-09-01

## 2022-09-01 RX ORDER — SODIUM CHLORIDE, SODIUM LACTATE, POTASSIUM CHLORIDE, CALCIUM CHLORIDE 600; 310; 30; 20 MG/100ML; MG/100ML; MG/100ML; MG/100ML
1000 INJECTION, SOLUTION INTRAVENOUS ONCE
Status: COMPLETED | OUTPATIENT
Start: 2022-09-01 | End: 2022-09-01

## 2022-09-01 RX ORDER — 0.9 % SODIUM CHLORIDE 0.9 %
500 INTRAVENOUS SOLUTION INTRAVENOUS ONCE
Status: COMPLETED | OUTPATIENT
Start: 2022-09-01 | End: 2022-09-01

## 2022-09-01 RX ORDER — MIDAZOLAM HYDROCHLORIDE 1 MG/ML
INJECTION INTRAMUSCULAR; INTRAVENOUS
Status: DISCONTINUED
Start: 2022-09-01 | End: 2022-09-01 | Stop reason: WASHOUT

## 2022-09-01 RX ORDER — POLYETHYLENE GLYCOL 3350 17 G/17G
17 POWDER, FOR SOLUTION ORAL DAILY PRN
Status: DISCONTINUED | OUTPATIENT
Start: 2022-09-01 | End: 2022-10-06 | Stop reason: HOSPADM

## 2022-09-01 RX ORDER — SODIUM CHLORIDE 0.9 % (FLUSH) 0.9 %
5-40 SYRINGE (ML) INJECTION PRN
Status: DISCONTINUED | OUTPATIENT
Start: 2022-09-01 | End: 2022-10-06 | Stop reason: HOSPADM

## 2022-09-01 RX ORDER — PROPOFOL 10 MG/ML
5-50 INJECTION, EMULSION INTRAVENOUS ONCE
Status: COMPLETED | OUTPATIENT
Start: 2022-09-01 | End: 2022-09-01

## 2022-09-01 RX ORDER — FENTANYL CITRATE-0.9 % NACL/PF 10 MCG/ML
25-200 PLASTIC BAG, INJECTION (ML) INTRAVENOUS CONTINUOUS
Status: DISCONTINUED | OUTPATIENT
Start: 2022-09-01 | End: 2022-09-05

## 2022-09-01 RX ORDER — THIAMINE HYDROCHLORIDE 100 MG/ML
100 INJECTION, SOLUTION INTRAMUSCULAR; INTRAVENOUS DAILY
Status: DISCONTINUED | OUTPATIENT
Start: 2022-09-02 | End: 2022-09-17

## 2022-09-01 RX ORDER — THIAMINE HYDROCHLORIDE 100 MG/ML
100 INJECTION, SOLUTION INTRAMUSCULAR; INTRAVENOUS DAILY
Status: DISCONTINUED | OUTPATIENT
Start: 2022-09-02 | End: 2022-09-01 | Stop reason: SDUPTHER

## 2022-09-01 RX ORDER — ENOXAPARIN SODIUM 100 MG/ML
40 INJECTION SUBCUTANEOUS DAILY
Status: DISCONTINUED | OUTPATIENT
Start: 2022-09-02 | End: 2022-09-02 | Stop reason: ALTCHOICE

## 2022-09-01 RX ORDER — FENTANYL CITRATE 50 UG/ML
50 INJECTION, SOLUTION INTRAMUSCULAR; INTRAVENOUS ONCE
Status: COMPLETED | OUTPATIENT
Start: 2022-09-01 | End: 2022-09-01

## 2022-09-01 RX ADMIN — SODIUM CHLORIDE, PRESERVATIVE FREE 10 ML: 5 INJECTION INTRAVENOUS at 21:53

## 2022-09-01 RX ADMIN — PROPOFOL 30 MCG/KG/MIN: 10 INJECTION, EMULSION INTRAVENOUS at 17:28

## 2022-09-01 RX ADMIN — SODIUM CHLORIDE, POTASSIUM CHLORIDE, SODIUM LACTATE AND CALCIUM CHLORIDE 1000 ML: 600; 310; 30; 20 INJECTION, SOLUTION INTRAVENOUS at 17:45

## 2022-09-01 RX ADMIN — SODIUM CHLORIDE 1000 ML: 9 INJECTION, SOLUTION INTRAVENOUS at 22:55

## 2022-09-01 RX ADMIN — IOPAMIDOL 75 ML: 755 INJECTION, SOLUTION INTRAVENOUS at 17:23

## 2022-09-01 RX ADMIN — ETOMIDATE 20 MG: 2 INJECTION, SOLUTION INTRAVENOUS at 16:46

## 2022-09-01 RX ADMIN — SODIUM CHLORIDE 1000 MG: 9 INJECTION, SOLUTION INTRAVENOUS at 20:55

## 2022-09-01 RX ADMIN — FENTANYL CITRATE 50 MCG: 50 INJECTION, SOLUTION INTRAMUSCULAR; INTRAVENOUS at 17:40

## 2022-09-01 RX ADMIN — PROPOFOL 35 MCG/KG/MIN: 10 INJECTION, EMULSION INTRAVENOUS at 18:05

## 2022-09-01 RX ADMIN — MEROPENEM 1000 MG: 1 INJECTION, POWDER, FOR SOLUTION INTRAVENOUS at 22:57

## 2022-09-01 RX ADMIN — PROPOFOL 30 MCG/KG/MIN: 10 INJECTION, EMULSION INTRAVENOUS at 21:30

## 2022-09-01 RX ADMIN — NOREPINEPHRINE BITARTRATE 2 MCG/MIN: 1 SOLUTION INTRAVENOUS at 23:45

## 2022-09-01 RX ADMIN — Medication 125 MCG/HR: at 23:48

## 2022-09-01 RX ADMIN — Medication 100 MCG/HR: at 19:21

## 2022-09-01 RX ADMIN — Medication 25 MCG/HR: at 17:38

## 2022-09-01 RX ADMIN — CEFEPIME 2000 MG: 2 INJECTION, POWDER, FOR SOLUTION INTRAVENOUS at 17:55

## 2022-09-01 RX ADMIN — HEPARIN SODIUM 4000 UNITS: 1000 INJECTION INTRAVENOUS; SUBCUTANEOUS at 23:35

## 2022-09-01 RX ADMIN — HEPARIN SODIUM AND DEXTROSE 11 UNITS/KG/HR: 10000; 5 INJECTION INTRAVENOUS at 23:31

## 2022-09-01 RX ADMIN — SODIUM CHLORIDE 500 ML: 9 INJECTION, SOLUTION INTRAVENOUS at 19:58

## 2022-09-01 RX ADMIN — SUCCINYLCHOLINE CHLORIDE 120 MG: 20 INJECTION, SOLUTION INTRAMUSCULAR; INTRAVENOUS; PARENTERAL at 16:47

## 2022-09-01 RX ADMIN — VANCOMYCIN HYDROCHLORIDE 1750 MG: 10 INJECTION, POWDER, LYOPHILIZED, FOR SOLUTION INTRAVENOUS at 18:45

## 2022-09-01 RX ADMIN — PROPOFOL 40 MCG/KG/MIN: 10 INJECTION, EMULSION INTRAVENOUS at 19:14

## 2022-09-01 RX ADMIN — MIDAZOLAM HYDROCHLORIDE 2 MG: 2 INJECTION, SOLUTION INTRAMUSCULAR; INTRAVENOUS at 16:34

## 2022-09-01 ASSESSMENT — ENCOUNTER SYMPTOMS
SORE THROAT: 0
APNEA: 0
EYE DISCHARGE: 0
SINUS PRESSURE: 0
EYE ITCHING: 0
RHINORRHEA: 0
SINUS PAIN: 0
COUGH: 0

## 2022-09-01 ASSESSMENT — PULMONARY FUNCTION TESTS
PIF_VALUE: 22.7
PIF_VALUE: 20
PIF_VALUE: 21
PIF_VALUE: 38
PIF_VALUE: 21

## 2022-09-01 NOTE — PROCEDURES
Intubation    Date/Time: 9/1/2022 5:47 PM  Performed by: Bienvenido Stokes MD  Authorized by: Ayden Braga MD     Consent:     Consent obtained:  Emergent situation  Universal protocol:     Patient identity confirmed:   Anonymous protocol, patient vented/unresponsive  Pre-procedure details:     Indication: failure to protect airway      Patient status:  Altered mental status    Look externally: no concerns      Mouth opening - incisor distance:  3 or more finger widths    Hyoid-mental distance: 3 or more finger widths      Hyoid-thyroid distance: 2 or more finger widths      Obstruction: none      Neck mobility: normal      Pharmacologic strategy: RSI      Induction agents:  Etomidate    Paralytics:  Succinylcholine  Procedure details:     Preoxygenation:  Bag valve mask    CPR in progress: no      Intubation method:  Oral    Intubation technique: video assisted      Laryngoscope blade:  Hypercurved    Bougie used: no      Grade view: IV      Tube size (mm):  8.0    Tube type:  Cuffed    Number of attempts:  1    Tube visualized through cords: yes    Placement assessment:     ETT at teeth/gumline (cm):  24    Tube secured with:  ETT pires    Breath sounds:  Equal    Placement verification: chest rise, equal breath sounds and waveform ETCO2    Post-procedure details:     Procedure completion:  Tolerated well, no immediate complications

## 2022-09-01 NOTE — H&P
ICU HISTORY AND PHYSICAL       Hospital Day:   ICU Day:                                                          Code:Prior  Admit Date: 9/1/2022  PCP: Deirdre Hager MD                                  CC: Syncope    HISTORY OF PRESENT ILLNESS:   The patient is a 77-year-old man with past medical history significant for chronic alcoholism, ICH, CAD, DVT who presented today to the ED with an episode of syncope. The history was mainly obtained by the sister since patient was intubated. He was last known well at 9 AM but the sister. According to the sister, she had just returned home from work which she had found him half lying down on the couch covered in the stool while his car outside was running outside with occasion. She denies any complaints of recent fever, chills, chest pain cough, shortness of breath, lightheadedness, palpitations, nausea, vomiting, abdominal pain, diarrhea, fatigue, visual disturbance, changes in urination frequency or burning pain well urination or headaches by the patient in the preceding days to this incident. She does reinstate that her brother is a chronic alcoholic and had just gotten out of rehab. He has been sober since however she was not aware if he had recently had any alcoholic drink. He had also informed that he had recent episode of intracerebral hemorrhage on 9/4/2021. In the ED, there was a concern for possibility of a stroke however he was not considered a thrombolytic candidate because of her previous ICH and long time since his last known well. According to the ED staff his physical exam was more pertinent for nonfocal delirium. It was also significant for nystagmus and rightward gaze not fixed. He was given 2 mg of Versed and it had worsened his oxygen saturations that had dropped to 88% on a nonrebreather and therefore he was intubated.   CT of head was done  PAST HISTORY:     Past Medical History:   Diagnosis Date    Alcohol abuse     CAD (coronary artery disease)     with complete LAD occlusion    CHF (congestive heart failure) (HCC)     LVEF    Essential tremor     HTN (hypertension)     Hx of blood clots 05/2021    Lower extremity cellulitis     MI (mitral incompetence)        Past Surgical History:   Procedure Laterality Date    PTCA      UPPER GASTROINTESTINAL ENDOSCOPY N/A 7/28/2020    EGD BIOPSY performed by Lucie Phelps MD at [facility]:   The patient lives at home with seizure    Alcohol: Chronic alcoholic  Illicit drugs: no use  Tobacco: not aware    Family History:  Family History   Problem Relation Age of Onset    Stroke Mother     Heart Disease Father        MEDICATIONS:     No current facility-administered medications on file prior to encounter. Current Outpatient Medications on File Prior to Encounter   Medication Sig Dispense Refill    aspirin 81 MG EC tablet Take 81 mg by mouth in the morning. atorvastatin (LIPITOR) 40 MG tablet Take 1 tablet by mouth in the morning. 90 tablet 3    carvedilol (COREG) 3.125 MG tablet Take 1 tablet by mouth in the morning and 1 tablet before bedtime. 459 tablet 3    folic acid (FOLVITE) 1 MG tablet Take 1 tablet by mouth in the morning. 90 tablet 3    magnesium 200 MG TABS tablet Take 2 tablets by mouth in the morning. 90 tablet 3    vitamin B-12 (CYANOCOBALAMIN) 100 MCG tablet Take 1 tablet by mouth in the morning. 90 tablet 3    vitamin D3 (CHOLECALCIFEROL) 25 MCG (1000 UT) TABS tablet Take 1 tablet by mouth in the morning.  90 tablet 3    Thiamine Mononitrate (B1) 100 MG TABS Take 100 mg by mouth daily 90 tablet 3    melatonin 5 MG TBDP disintegrating tablet Take 1 tablet by mouth nightly 30 tablet 0    hydrocortisone 1 % cream Apply topically 2 times daily      [DISCONTINUED] thiamine 100 MG tablet Take 1 tablet by mouth daily 90 tablet 3         Scheduled Meds:   vancomycin  1,750 mg IntraVENous Once      Continuous Infusions:   fentaNYL 75 mcg/hr (09/01/22 5706)     PRN Meds:    Allergies: No Known Allergies    REVIEW OF SYSTEMS:       History obtained from sibling    Review of Systems   Constitutional:  Negative for activity change, chills, diaphoresis, fatigue and fever. HENT:  Negative for ear pain, nosebleeds, rhinorrhea, sinus pressure, sinus pain, sneezing and sore throat. Eyes:  Negative for discharge, itching and visual disturbance. Respiratory:  Negative for apnea and cough. PHYSICAL EXAM:       Vitals: /85   Pulse (!) 112   Temp 97 °F (36.1 °C) (Bladder)   Resp 15   Wt 191 lb (86.6 kg)   SpO2 99%   BMI 28.21 kg/m²     I/O:    Intake/Output Summary (Last 24 hours) at 9/1/2022 1854  Last data filed at 9/1/2022 1839  Gross per 24 hour   Intake 1054.91 ml   Output --   Net 1054.91 ml     I/O this shift:  In: 1054.9 [I.V.:1000; IV Piggyback:54.9]  Out: -   No intake/output data recorded. Physical Examination:     Physical Exam  Constitutional:       Comments: Pt is sedated   HENT:      Head: Normocephalic and atraumatic. Nose: Nose normal.      Mouth/Throat:      Mouth: Mucous membranes are dry. Eyes:      Comments: Rt estevez gaze nystagmus   Cardiovascular:      Rate and Rhythm: Normal rate and regular rhythm. Pulses: Normal pulses. Heart sounds: Normal heart sounds. Pulmonary:      Effort: Pulmonary effort is normal.      Breath sounds: Normal breath sounds. Musculoskeletal:      Cervical back: Normal range of motion. Neurological:      Mental Status: He is disoriented. Comments: Pt opens eye on command, not following command   Psychiatric:         Mood and Affect: Mood normal.         Access:                             -Peripheral Access Day#:5   Miles Day#:1   ETT Day#:1  Vent Settings: Vent Mode: AC/PRVC Resp Rate (Set): 12 bmp/ / /FiO2 : 100 %    No results for input(s): PHART, OAS1GFA, PO2ART in the last 72 hours.         DATA:       Labs:  CBC:   Recent Labs     09/01/22  1709   WBC 21.7*   HGB 16.5   HCT 48.7   PLT 177       BMP:   Recent Labs     09/01/22  1709      K 3.7      CO2 23   BUN 9   CREATININE 1.0   GLUCOSE 157*     LFT's:   Recent Labs     09/01/22 1709   AST 42*   ALT 28   BILITOT 1.3*   ALKPHOS 139*     Troponin:   Recent Labs     09/01/22 1709   TROPONINI 0.15*     BNP:No results for input(s): BNP in the last 72 hours. ABGs: No results for input(s): PHART, PNP7RHK, PO2ART in the last 72 hours. INR: No results for input(s): INR in the last 72 hours. U/A:  Recent Labs     09/01/22 1709   COLORU Yellow   PHUR 5.5  5.5   WBCUA 0-2   RBCUA 0-2   BACTERIA Rare*   CLARITYU TURBID*   SPECGRAV >=1.030   LEUKOCYTESUR Negative   UROBILINOGEN 0.2   BILIRUBINUR SMALL*   BLOODU MODERATE*   GLUCOSEU Negative   AMORPHOUS 2+       CTA HEAD NECK W CONTRAST   Final Result      CTA Head:   No acute CTA findings. No hemodynamically significant stenosis or focal aneurysm. No arteriovenous confirmation. CTA Neck:   No acute CTA findings. No hemodynamically significant stenosis or focal aneurysm. CT HEAD WO CONTRAST   Final Result      1. No findings for acute intracranial abnormality. 2.  Age-related atrophy with patchy periventricular white matter changes bilaterally consistent with chronic small vessel ischemia. 3.  Stable low attenuation left frontoparietal lobe consistent with previous insult from known prior intraparenchymal hematoma. Stable right frontoparietal cortical infarct. These findings were called to the emergency room physician Dr. Mellissa Guzman on 9/1/2022 at 5:30 p.m. XR CHEST PORTABLE   Final Result   1. No findings for acute cardiopulmonary disease. 2.  ET tube in good position in the mid trachea. CT ABDOMEN PELVIS WO CONTRAST Additional Contrast? None    (Results Pending)       EKG: Wnl  Echo:Not recent  Micro: awaiting    ASSESSMENT AND PLAN:   Rell Batres is a 57 y.o. male, who was found unresponsive incontinent of stool.     Acute hypercapnic respiratory failure secondary to possible aspiration vs 2/2 medication  VBG PH:7.264, PCO2: 56.4, 83.2  Vent settings:AC/PVR, Rate Set: 12, Rate measured: 13, PIP:22.7, PEEP:8+  -Patient intubated with fentanyl and propofol  -obtain ABG, repeat ABG ordered in an hour  -Will give broad spectrum antibiotics    Suspected seizure  LOC, fecal incontinence, ICH hx, CT: Stable low attenuation left frontoparietal lobe consistent with previous insult from known prior intraparenchymal hematoma  -Patient is currently sedated with propofol  -UDS ordered negative  -monitor CK  -seizure precautions  -EEG ordered    Possible sepsis 2/2 unknown source  -WBC: 21 on admission  -UTI: Negative leuko positive for moderate blood  -Lactic acid: trending down  -blood cultures x2  -2 L IVF administered  -received vancomycin/cefepime in ED  -Giving broad spectrum antibiotics Vancomycin and Merrem    Tropinemia r/o ACS  -Trending trop 0.15 will continue trending EKG:wnl, Echo EF: 55%, Hx of CAD with mural thrombosis, CXR: currently significant for mild pulmonary vascular congestion  -low dose heparin for possible MI    CAD  -will start on Aspirin, statin, coreg with core pack and on telemetry    Alcohol use disorder  -thiamine 100 mg daily  -monitor for withdrawal  -Ethanol level ordered    Isolated proteinuria  -UA: Positive for protein    Code Status: Full  FEN: NPO  PPX:  SCD  DISPO: ICU    This patient has been staffed and discussed with Joaquim Rainey MD.   -----------------------------  Jessica Obrien MD, PGY-1  9/1/2022  6:54 PM

## 2022-09-01 NOTE — ED NOTES
Report called to ICU nurse Jorge Queen. Room still being cleaned.  Care for patient will be taken over by Shukri King while waiting for room cleaning     Diandra Parrish RN  09/01/22 1956

## 2022-09-01 NOTE — ED PROVIDER NOTES
[address] [facility] [place]          ATTENDING PHYSICIAN NOTE       Date of evaluation: 9/1/2022    Chief Complaint     Altered Mental Status (unresponsive)      History of Present Illness     Kenneth Palma is a 57 y.o. male who presents to the emergency department with altered mental status. The patient has a history of alcoholism, previous intracranial hemorrhage. He currently lives at home with his significant other and has been living at home with her for several months after getting out of assisted living after completing rehabilitation from his prior intracranial hemorrhage. The significant other notes that she saw the patient normally at 9 AM that had a normal conversation with him. Upon returning to the house this afternoon at 4 PM, she found his car in the driveway with the keys in the car and the car running. The patient was inside the house half on the couch and half off of it he had soiled himself and he was unresponsive, making gurgling noises. She called 911. EMS noted that the patient was also given having sonorous and gurgling type respirations with sats that were in the 80s they placed the patient on a nonrebreather and transported him to the hospital.  There is never a report of any seizure-like activity    Review of Systems     As documented in the HPI, otherwise all other systems were reviewed and were negative. Past Medical, Surgical, Family, and Social History     He has a past medical history of Alcohol abuse, CAD (coronary artery disease), CHF (congestive heart failure) ([address] Essential tremor, HTN (hypertension), Hx of blood clots, Lower extremity cellulitis, and MI (mitral incompetence). He has a past surgical history that includes Percutaneous Transluminal Coronary Angio and Upper gastrointestinal endoscopy (N/A, 7/28/2020). His family history includes Heart Disease in his father; Stroke in his mother.   He reports that he quit smoking about 20 months ago. His smoking use included cigarettes. He has a 20.00 pack-year smoking history. He has never used smokeless tobacco. He reports current alcohol use of about 2.0 standard drinks per week. He reports that he does not use drugs. Medications     Previous Medications    ASPIRIN 81 MG EC TABLET    Take 81 mg by mouth in the morning. ATORVASTATIN (LIPITOR) 40 MG TABLET    Take 1 tablet by mouth in the morning. CARVEDILOL (COREG) 3.125 MG TABLET    Take 1 tablet by mouth in the morning and 1 tablet before bedtime. FOLIC ACID (FOLVITE) 1 MG TABLET    Take 1 tablet by mouth in the morning. HYDROCORTISONE 1 % CREAM    Apply topically 2 times daily    MAGNESIUM 200 MG TABS TABLET    Take 2 tablets by mouth in the morning. MELATONIN 5 MG TBDP DISINTEGRATING TABLET    Take 1 tablet by mouth nightly    THIAMINE MONONITRATE (B1) 100 MG TABS    Take 100 mg by mouth daily    VITAMIN B-12 (CYANOCOBALAMIN) 100 MCG TABLET    Take 1 tablet by mouth in the morning. VITAMIN D3 (CHOLECALCIFEROL) 25 MCG (1000 UT) TABS TABLET    Take 1 tablet by mouth in the morning. Allergies     He has No Known Allergies. Physical Exam     INITIAL VITALS: BP: (!) 165/119, Temp: 97 °F (36.1 °C), Heart Rate: (!) 143, Resp: 21, SpO2: 93 %   General: 58-year-old male lying on EMS stretcher with some sonorous loud respirations  HEENT:  head is atraumatic, pupils equal round and reactive to light, sclera are injected bilaterally, the patient looks more often to the right but does not have a fixed gaze preference he has occasional roving eye movements with some nystagmus with looking to the right  Neck: supple, no lymphadenopathy, trachea is midline  Chest: Good chest rise bilaterally coarse breath sounds heard throughout all lung fields  Cardiovascular: Tachycardic rate with a regular rhythm, 2+ radial pulses bilaterally, capillary refill 2 seconds  Abdominal: Soft, mildly distended, no rebound or guarding.   No masses appreciated  Skin: Warm, dry well perfused, no rashes  Musculoskeletal: no obvious deformities, no external signs of trauma  Neurologic: The patient will grunt in response to painful stimuli and will weakly localize, has equal tone in all 4 extremities, equal response to painful stimuli in all 4 extremities, has no verbal response, no gross facial asymmetry    Diagnostic Results     EKG   Sinus tachycardia with a ventricular rate of 141 patient has a QTC of 499 ms ID interval 120 ms QRS duration 86 ms. RADIOLOGY:  CTA HEAD NECK W CONTRAST   Final Result      CTA Head:   No acute CTA findings. No hemodynamically significant stenosis or focal aneurysm. No arteriovenous confirmation. CTA Neck:   No acute CTA findings. No hemodynamically significant stenosis or focal aneurysm. CT HEAD WO CONTRAST   Final Result      1. No findings for acute intracranial abnormality. 2.  Age-related atrophy with patchy periventricular white matter changes bilaterally consistent with chronic small vessel ischemia. 3.  Stable low attenuation left frontoparietal lobe consistent with previous insult from known prior intraparenchymal hematoma. Stable right frontoparietal cortical infarct. These findings were called to the emergency room physician Dr. Nani Sue on 9/1/2022 at 5:30 p.m. XR CHEST PORTABLE   Final Result   1. No findings for acute cardiopulmonary disease. 2.  ET tube in good position in the mid trachea.       CT ABDOMEN PELVIS WO CONTRAST Additional Contrast? None    (Results Pending)       LABS:   Results for orders placed or performed during the hospital encounter of 77/53/97   Basic Metabolic Panel w/ Reflex to MG   Result Value Ref Range    Sodium 141 136 - 145 mmol/L    Potassium reflex Magnesium 3.7 3.5 - 5.1 mmol/L    Chloride 104 99 - 110 mmol/L    CO2 23 21 - 32 mmol/L    Anion Gap 14 3 - 16    Glucose 157 (H) 70 - 99 mg/dL    BUN 9 7 - 20 mg/dL    Creatinine 1.0 0.8 - 1.3 mg/dL GFR Non-African American >60 >60    GFR African American >60 >60    Calcium 9.0 8.3 - 10.6 mg/dL   CBC with Auto Differential   Result Value Ref Range    WBC 21.7 (H) 4.0 - 11.0 K/uL    RBC 5.20 4. 20 - 5.90 M/uL    Hemoglobin 16.5 13.5 - 17.5 g/dL    Hematocrit 48.7 40.5 - 52.5 %    MCV 93.5 80.0 - 100.0 fL    MCH 31.7 26.0 - 34.0 pg    MCHC 33.9 31.0 - 36.0 g/dL    RDW 13.5 12.4 - 15.4 %    Platelets 126 103 - 185 K/uL    MPV 9.3 5.0 - 10.5 fL    Neutrophils % 87.7 %    Lymphocytes % 6.5 %    Monocytes % 5.5 %    Eosinophils % 0.0 %    Basophils % 0.3 %    Neutrophils Absolute 19.0 (H) 1.7 - 7.7 K/uL    Lymphocytes Absolute 1.4 1.0 - 5.1 K/uL    Monocytes Absolute 1.2 0.0 - 1.3 K/uL    Eosinophils Absolute 0.0 0.0 - 0.6 K/uL    Basophils Absolute 0.1 0.0 - 0.2 K/uL   Blood gas, venous (Lab)   Result Value Ref Range    pH, Arnoldo 7.264 (L) 7.350 - 7.450    pCO2, Arnoldo 56.4 (H) 41.0 - 51.0 mmHg    pO2, Arnoldo 83.2 (H) 25.0 - 40.0 mmHg    HCO3, Venous 25.5 24.0 - 28.0 mmol/L    Base Excess, Arnoldo -2.8 (L) -2.0 - 3.0 mmol/L    O2 Sat, Arnoldo 95 Not established %    Carboxyhemoglobin 0.8 0.0 - 1.5 %    MetHgb, Arnoldo 0.0 0.0 - 1.5 %    TC02 (Calc), Arnoldo 27 mmol/L    Hemoglobin, Arnoldo, Reduced 5.30 %   Lactic Acid   Result Value Ref Range    Lactic Acid 1.6 0.4 - 2.0 mmol/L   Urinalysis with Microscopic   Result Value Ref Range    Color, UA Yellow Straw/Yellow    Clarity, UA TURBID (A) Clear    Glucose, Ur Negative Negative mg/dL    Bilirubin Urine SMALL (A) Negative    Ketones, Urine Negative Negative mg/dL    Specific Gravity, UA >=1.030 1.005 - 1.030    Blood, Urine MODERATE (A) Negative    pH, UA 5.5 5.0 - 8.0    Protein,  (A) Negative mg/dL    Urobilinogen, Urine 0.2 <2.0 E.U./dL    Nitrite, Urine Negative Negative    Leukocyte Esterase, Urine Negative Negative    Microscopic Examination YES     Urine Type Voided     Hyaline Casts, UA 0-2 0 - 2 /LPF    Coarse Casts, UA 0-2 0 - 2 /LPF    WBC, UA 0-2 0 - 5 /HPF    RBC, UA 0-2 0 - 4 /HPF    Epithelial Cells, UA 0-1 0 - 5 /HPF    Bacteria, UA Rare (A) None Seen /HPF    Amorphous, UA 2+ /HPF   Urine Drug Screen   Result Value Ref Range    Amphetamine Screen, Urine Neg Negative <1000ng/mL    Barbiturate Screen, Ur Neg Negative <200 ng/mL    Benzodiazepine Screen, Urine Neg Negative <200 ng/mL    Cannabinoid Scrn, Ur Neg Negative <50 ng/mL    Cocaine Metabolite Screen, Urine Neg Negative <300 ng/mL    Opiate Scrn, Ur Neg Negative <300 ng/mL    PCP Screen, Urine Neg Negative <25 ng/mL    Methadone Screen, Urine Neg Negative <300 ng/mL    Oxycodone Urine Neg Negative <100 ng/ml    FENTANYL SCREEN, URINE Neg Negative <50 ng/mL    pH, UA 5.5     Drug Screen Comment: see below    Troponin   Result Value Ref Range    Troponin 0.15 (H) <0.01 ng/mL   Hepatic Function Panel   Result Value Ref Range    Total Protein 7.8 6.4 - 8.2 g/dL    Albumin 4.6 3.4 - 5.0 g/dL    Alkaline Phosphatase 139 (H) 40 - 129 U/L    ALT 28 10 - 40 U/L    AST 42 (H) 15 - 37 U/L    Total Bilirubin 1.3 (H) 0.0 - 1.0 mg/dL    Bilirubin, Direct 0.4 (H) 0.0 - 0.3 mg/dL    Bilirubin, Indirect 0.9 0.0 - 1.0 mg/dL   Lipase   Result Value Ref Range    Lipase 16.0 13.0 - 60.0 U/L   POCT Glucose   Result Value Ref Range    POC Glucose 149 (H) 70 - 99 mg/dl    Performed on ACCU-CHEK    EKG 12 Lead   Result Value Ref Range    Ventricular Rate 141 BPM    Atrial Rate 141 BPM    P-R Interval 120 ms    QRS Duration 86 ms    Q-T Interval 326 ms    QTc Calculation (Bazett) 499 ms    R Axis -33 degrees    T Axis 96 degrees    Diagnosis       EKG performed in ER and to be interpreted by ER physician. Confirmed by MD, ER ([id number]),  Sugar Bach ([phone]) on 9/1/2022 5:34:26 PM       ED BEDSIDE ULTRASOUND:  No results found. RECENT VITALS:  BP: (!) 165/119, Temp: 97 °F (36.1 °C), Heart Rate: (!) 143, Resp: 21, SpO2: 92 %       ED Course     Nursing Notes, Past Medical Hx, Past Surgical Hx, Social Hx, Allergies, and Family Hx were reviewed.     The patient was given the following medications:  Orders Placed This Encounter   Medications    DISCONTD: midazolam (VERSED) 2 MG/2ML injection     Ketty Josemer: cabinet override    etomidate (AMIDATE) 2 MG/ML injection     Ketty Josemer: cabinet override    succinylcholine (ANECTINE) 20 MG/ML injection     Ketty Josemer: cabinet override    midazolam (VERSED) injection    etomidate (AMIDATE) injection    propofol 1000 MG/100ML injection     Lilliana Latif: cabinet override    succinylcholine (ANECTINE) injection    fentaNYL (SUBLIMAZE) 100 MCG/2ML injection     Lilliana Latif: cabinet override    propofol injection     Order Specific Question:   Titrate Infusion? Answer:   Yes     Order Specific Question:   Initial Infusion Dose: Answer:   20 mcg/kg/min     Order Specific Question:   Goal of Therapy:     Answer:   RASS of -1 to 0     Order Specific Question:   Contact Provider if:     Answer:   New onset HR less than 50 bpm     Order Specific Question:   Contact Provider if:     Answer:   New onset SBP less than 90 mmHg     Order Specific Question:   Contact Provider if:     Answer:   Patient is receiving maximum dose and is not achieving the goal of therapy     Order Specific Question:   Contact Provider if:     Answer:   Triglycerides greater than 500 mg/dL    fentaNYL (SUBLIMAZE) 1,000 mcg in sodium chloride 0.9% 100 mL infusion     Order Specific Question:   Titrate Infusion? Answer:   Yes     Order Specific Question:   Initial Infusion Dose: Answer:   48 mcg/hr     Order Specific Question:   Goal of Therapy is:      Answer:   RASS of -1 to 1     Order Specific Question:   Contact Provider if:     Answer:   Patient is receiving the maximum dose and is not achieving the goal of therapy    vancomycin (VANCOCIN) 1,750 mg in dextrose 5 % 500 mL IVPB     Order Specific Question:   Antimicrobial Indications     Answer:   Bloodstream Infection    cefepime (MAXIPIME) 2000 mg IVPB minibag Order Specific Question:   Antimicrobial Indications     Answer:   Bloodstream Infection    lactated ringers infusion 1,000 mL    iopamidol (ISOVUE-370) 76 % injection 75 mL    fentaNYL (SUBLIMAZE) injection 50 mcg       CONSULTS:  IP CONSULT TO CRITICAL CARE  IP CONSULT TO HOSPITALIST    MEDICAL DECISION MAKING / ASSESSMENT / Phuong Fragoso is a 57 y.o. male who presented to the emergency department with a sudden alteration of mental status last seen normal at 9 AM this morning. I gathered collateral history from family, EMS, and have reviewed the patient's medical records. Initially because of this presentation there was some concern for the possibility of a stroke the patient is not a thrombolytic candidate because he has had a previous intracranial hemorrhage. The patient's presentation did not appear to be consistent with a stroke either as he appeared to have more of a nonfocal delirium or depressed mental status, though he did have some nystagmus and a slight preference of rightward gaze that was not fixed. On arrival given concern for the possibility of seizure with this rightward gaze and nystagmus the patient was given 2 mg of Versed. This did not dramatically improve the patient's mental status and it was felt that the patient was likely not protecting his airway, additionally he was also having oxygen saturations of 88% on a nonrebreather therefore the decision was made to intubate the patient. Prior to intubation the patient was preoxygenated using bag-valve-mask ventilation. Patient was able to be intubated on first attempt without any immediate complication. ET tube position was confirmed using auscultation, end-tidal CO2 and by chest x-ray. I was present for the entirety of the intubation as performed by the resident. The patient was then taken to CT scanner where he had a CT head CT angio of the head and neck.   The CT of the head showed no intracranial hemorrhage or mass-effect patient's labs have also resulted showing he has a significant leukocytosis raising concern for possible sepsis though the sudden depression of his mental status would not entirely fit with this picture. The patient is being given broad-spectrum antibiotics as well as IV fluids as he is notably significantly tachycardic. As the patient has been on sedative medications on propofol and fentanyl his tachycardia has improved. The patient's chest x-ray showed no acute cardiopulmonary disease. His urine drug screen was negative. Urinalysis was turbid but had negative leukocyte Estrace negative nitrite. LFTs had some mild elevation of bilirubin no significant out analysis troponin was elevated 0.15 lipase was normal.   The patient could have had a seizure with a prolonged postictal phase or could have had a nonconvulsive status epilepticus type picture on presentation. At this point time the exact etiology the patient's altered mental status is not entirely clear. I am speaking with the hospitalist and intensivist regarding admission to the ICU. Critical Care:  Due to the immediate potential for life-threatening deterioration due to acute respiratory failure, acute neurologic compromise, I spent 60 minutes providing critical care. This time excludes time spent performing procedures but includes time spent on direct patient care, history retrieval, review of the chart, and discussions with patient, family, and consultant(s). Clinical Impression     1. Acute respiratory failure, unspecified whether with hypoxia or hypercapnia ([address]    2. Altered mental status, unspecified altered mental status type        Disposition     PATIENT REFERRED TO:  No follow-up provider specified.     DISCHARGE MEDICATIONS:  New Prescriptions    No medications on file       DISPOSITION Decision To Admit 09/01/2022 06:08:09 PM           Jazzy To MD  09/01/22 1342

## 2022-09-02 PROBLEM — J96.00 ACUTE RESPIRATORY FAILURE (HCC): Status: ACTIVE | Noted: 2022-09-02

## 2022-09-02 LAB
ALBUMIN SERPL-MCNC: 3.3 G/DL (ref 3.4–5)
ANION GAP SERPL CALCULATED.3IONS-SCNC: 12 MMOL/L (ref 3–16)
ANTI-XA UNFRAC HEPARIN: 0.18 IU/ML (ref 0.3–0.7)
APTT: 70.5 SEC (ref 23–34.3)
BASE EXCESS ARTERIAL: -1.4 MMOL/L (ref -3–3)
BASOPHILS ABSOLUTE: 0.1 K/UL (ref 0–0.2)
BASOPHILS RELATIVE PERCENT: 0.4 %
BUN BLDV-MCNC: 8 MG/DL (ref 7–20)
CALCIUM SERPL-MCNC: 7.5 MG/DL (ref 8.3–10.6)
CARBOXYHEMOGLOBIN ARTERIAL: 1 % (ref 0–1.5)
CHLORIDE BLD-SCNC: 104 MMOL/L (ref 99–110)
CO2: 22 MMOL/L (ref 21–32)
CREAT SERPL-MCNC: 0.7 MG/DL (ref 0.8–1.3)
EKG ATRIAL RATE: 62 BPM
EKG DIAGNOSIS: NORMAL
EKG P AXIS: 29 DEGREES
EKG P-R INTERVAL: 178 MS
EKG Q-T INTERVAL: 476 MS
EKG QRS DURATION: 94 MS
EKG QTC CALCULATION (BAZETT): 483 MS
EKG R AXIS: -19 DEGREES
EKG T AXIS: 36 DEGREES
EKG VENTRICULAR RATE: 62 BPM
EOSINOPHILS ABSOLUTE: 0 K/UL (ref 0–0.6)
EOSINOPHILS RELATIVE PERCENT: 0.1 %
GFR AFRICAN AMERICAN: >60
GFR NON-AFRICAN AMERICAN: >60
GLUCOSE BLD-MCNC: 125 MG/DL (ref 70–99)
HCO3 ARTERIAL: 24 MMOL/L (ref 21–29)
HCT VFR BLD CALC: 40.4 % (ref 40.5–52.5)
HEMOGLOBIN, ART, EXTENDED: 14.2 G/DL
HEMOGLOBIN: 13.5 G/DL (ref 13.5–17.5)
INR BLD: 1.27 (ref 0.87–1.14)
LYMPHOCYTES ABSOLUTE: 2.5 K/UL (ref 1–5.1)
LYMPHOCYTES RELATIVE PERCENT: 14.4 %
MAGNESIUM: 1.6 MG/DL (ref 1.8–2.4)
MCH RBC QN AUTO: 31.2 PG (ref 26–34)
MCHC RBC AUTO-ENTMCNC: 33.5 G/DL (ref 31–36)
MCV RBC AUTO: 93.3 FL (ref 80–100)
METHEMOGLOBIN ARTERIAL: 0.4 % (ref 0–1.4)
MONOCYTES ABSOLUTE: 1.2 K/UL (ref 0–1.3)
MONOCYTES RELATIVE PERCENT: 7 %
NEUTROPHILS ABSOLUTE: 13.5 K/UL (ref 1.7–7.7)
NEUTROPHILS RELATIVE PERCENT: 78.1 %
O2 SAT, ARTERIAL: 100 % (ref 93–100)
PCO2 ARTERIAL: 40.7 MMHG (ref 35–45)
PDW BLD-RTO: 13.5 % (ref 12.4–15.4)
PH ARTERIAL: 7.37 (ref 7.35–7.45)
PHOSPHORUS: 2 MG/DL (ref 2.5–4.9)
PLATELET # BLD: 111 K/UL (ref 135–450)
PMV BLD AUTO: 9.3 FL (ref 5–10.5)
PO2 ARTERIAL: 145 MMHG (ref 75–108)
POTASSIUM SERPL-SCNC: 3.4 MMOL/L (ref 3.5–5.1)
PROCALCITONIN: 0.3 NG/ML (ref 0–0.15)
PROTHROMBIN TIME: 15.9 SEC (ref 11.7–14.5)
RBC # BLD: 4.34 M/UL (ref 4.2–5.9)
SODIUM BLD-SCNC: 138 MMOL/L (ref 136–145)
TCO2 ARTERIAL: 25 MMOL/L
TOTAL CK: 336 U/L (ref 39–308)
TROPONIN: 0.25 NG/ML
WBC # BLD: 17.3 K/UL (ref 4–11)

## 2022-09-02 PROCEDURE — 85610 PROTHROMBIN TIME: CPT

## 2022-09-02 PROCEDURE — 6360000002 HC RX W HCPCS: Performed by: NURSE PRACTITIONER

## 2022-09-02 PROCEDURE — 95813 EEG EXTND MNTR 61-119 MIN: CPT

## 2022-09-02 PROCEDURE — 2580000003 HC RX 258: Performed by: NURSE PRACTITIONER

## 2022-09-02 PROCEDURE — 85025 COMPLETE CBC W/AUTO DIFF WBC: CPT

## 2022-09-02 PROCEDURE — 84145 PROCALCITONIN (PCT): CPT

## 2022-09-02 PROCEDURE — 2580000003 HC RX 258: Performed by: STUDENT IN AN ORGANIZED HEALTH CARE EDUCATION/TRAINING PROGRAM

## 2022-09-02 PROCEDURE — 85520 HEPARIN ASSAY: CPT

## 2022-09-02 PROCEDURE — 94003 VENT MGMT INPAT SUBQ DAY: CPT

## 2022-09-02 PROCEDURE — 93005 ELECTROCARDIOGRAM TRACING: CPT

## 2022-09-02 PROCEDURE — 83735 ASSAY OF MAGNESIUM: CPT

## 2022-09-02 PROCEDURE — 36415 COLL VENOUS BLD VENIPUNCTURE: CPT

## 2022-09-02 PROCEDURE — 51798 US URINE CAPACITY MEASURE: CPT

## 2022-09-02 PROCEDURE — 84484 ASSAY OF TROPONIN QUANT: CPT

## 2022-09-02 PROCEDURE — 87040 BLOOD CULTURE FOR BACTERIA: CPT

## 2022-09-02 PROCEDURE — 94761 N-INVAS EAR/PLS OXIMETRY MLT: CPT

## 2022-09-02 PROCEDURE — 93010 ELECTROCARDIOGRAM REPORT: CPT | Performed by: INTERNAL MEDICINE

## 2022-09-02 PROCEDURE — 99291 CRITICAL CARE FIRST HOUR: CPT | Performed by: INTERNAL MEDICINE

## 2022-09-02 PROCEDURE — 51702 INSERT TEMP BLADDER CATH: CPT

## 2022-09-02 PROCEDURE — 99255 IP/OBS CONSLTJ NEW/EST HI 80: CPT | Performed by: INTERNAL MEDICINE

## 2022-09-02 PROCEDURE — 2700000000 HC OXYGEN THERAPY PER DAY

## 2022-09-02 PROCEDURE — 6360000002 HC RX W HCPCS

## 2022-09-02 PROCEDURE — 2000000000 HC ICU R&B

## 2022-09-02 PROCEDURE — 2500000003 HC RX 250 WO HCPCS: Performed by: EMERGENCY MEDICINE

## 2022-09-02 PROCEDURE — 82550 ASSAY OF CK (CPK): CPT

## 2022-09-02 PROCEDURE — 2580000003 HC RX 258: Performed by: EMERGENCY MEDICINE

## 2022-09-02 PROCEDURE — 2580000003 HC RX 258

## 2022-09-02 PROCEDURE — 6360000002 HC RX W HCPCS: Performed by: STUDENT IN AN ORGANIZED HEALTH CARE EDUCATION/TRAINING PROGRAM

## 2022-09-02 PROCEDURE — 62270 DX LMBR SPI PNXR: CPT

## 2022-09-02 PROCEDURE — 85730 THROMBOPLASTIN TIME PARTIAL: CPT

## 2022-09-02 PROCEDURE — 82803 BLOOD GASES ANY COMBINATION: CPT

## 2022-09-02 PROCEDURE — 80069 RENAL FUNCTION PANEL: CPT

## 2022-09-02 PROCEDURE — 6360000002 HC RX W HCPCS: Performed by: EMERGENCY MEDICINE

## 2022-09-02 RX ORDER — LIDOCAINE HYDROCHLORIDE 10 MG/ML
5 INJECTION, SOLUTION EPIDURAL; INFILTRATION; INTRACAUDAL; PERINEURAL ONCE
Status: DISCONTINUED | OUTPATIENT
Start: 2022-09-02 | End: 2022-10-06 | Stop reason: HOSPADM

## 2022-09-02 RX ORDER — ENOXAPARIN SODIUM 100 MG/ML
40 INJECTION SUBCUTANEOUS DAILY
Status: DISCONTINUED | OUTPATIENT
Start: 2022-09-03 | End: 2022-10-06 | Stop reason: HOSPADM

## 2022-09-02 RX ORDER — POTASSIUM CHLORIDE 7.45 MG/ML
10 INJECTION INTRAVENOUS
Status: COMPLETED | OUTPATIENT
Start: 2022-09-02 | End: 2022-09-02

## 2022-09-02 RX ORDER — MAGNESIUM SULFATE IN WATER 40 MG/ML
2000 INJECTION, SOLUTION INTRAVENOUS ONCE
Status: COMPLETED | OUTPATIENT
Start: 2022-09-02 | End: 2022-09-02

## 2022-09-02 RX ORDER — 0.9 % SODIUM CHLORIDE 0.9 %
500 INTRAVENOUS SOLUTION INTRAVENOUS ONCE
Status: COMPLETED | OUTPATIENT
Start: 2022-09-02 | End: 2022-09-02

## 2022-09-02 RX ADMIN — VANCOMYCIN HYDROCHLORIDE 1000 MG: 10 INJECTION, POWDER, LYOPHILIZED, FOR SOLUTION INTRAVENOUS at 05:45

## 2022-09-02 RX ADMIN — POTASSIUM CHLORIDE 10 MEQ: 7.46 INJECTION, SOLUTION INTRAVENOUS at 10:25

## 2022-09-02 RX ADMIN — SODIUM CHLORIDE 500 ML: 900 INJECTION, SOLUTION INTRAVENOUS at 15:00

## 2022-09-02 RX ADMIN — SODIUM CHLORIDE, PRESERVATIVE FREE 10 ML: 5 INJECTION INTRAVENOUS at 21:04

## 2022-09-02 RX ADMIN — PROPOFOL 80 MCG/KG/MIN: 10 INJECTION, EMULSION INTRAVENOUS at 14:59

## 2022-09-02 RX ADMIN — PROPOFOL 40 MCG/KG/MIN: 10 INJECTION, EMULSION INTRAVENOUS at 02:14

## 2022-09-02 RX ADMIN — POTASSIUM CHLORIDE 10 MEQ: 7.46 INJECTION, SOLUTION INTRAVENOUS at 09:23

## 2022-09-02 RX ADMIN — ACYCLOVIR SODIUM 800 MG: 50 INJECTION, SOLUTION INTRAVENOUS at 19:24

## 2022-09-02 RX ADMIN — Medication 150 MCG/HR: at 21:43

## 2022-09-02 RX ADMIN — MAGNESIUM SULFATE HEPTAHYDRATE 2000 MG: 40 INJECTION, SOLUTION INTRAVENOUS at 05:39

## 2022-09-02 RX ADMIN — CEFTRIAXONE 2000 MG: 2 INJECTION, POWDER, FOR SOLUTION INTRAMUSCULAR; INTRAVENOUS at 23:58

## 2022-09-02 RX ADMIN — PROPOFOL 50 MCG/KG/MIN: 10 INJECTION, EMULSION INTRAVENOUS at 17:37

## 2022-09-02 RX ADMIN — VANCOMYCIN HYDROCHLORIDE 1250 MG: 10 INJECTION, POWDER, LYOPHILIZED, FOR SOLUTION INTRAVENOUS at 14:04

## 2022-09-02 RX ADMIN — ACYCLOVIR SODIUM 800 MG: 50 INJECTION, SOLUTION INTRAVENOUS at 11:29

## 2022-09-02 RX ADMIN — PROPOFOL 45 MCG/KG/MIN: 10 INJECTION, EMULSION INTRAVENOUS at 11:10

## 2022-09-02 RX ADMIN — THIAMINE HYDROCHLORIDE 100 MG: 100 INJECTION, SOLUTION INTRAMUSCULAR; INTRAVENOUS at 08:15

## 2022-09-02 RX ADMIN — Medication 200 MCG/HR: at 14:58

## 2022-09-02 RX ADMIN — SODIUM CHLORIDE 1000 MG: 9 INJECTION, SOLUTION INTRAVENOUS at 14:08

## 2022-09-02 RX ADMIN — MEROPENEM 1000 MG: 1 INJECTION, POWDER, FOR SOLUTION INTRAVENOUS at 06:49

## 2022-09-02 RX ADMIN — AMPICILLIN SODIUM 1000 MG: 1 INJECTION, POWDER, FOR SOLUTION INTRAMUSCULAR; INTRAVENOUS at 12:42

## 2022-09-02 RX ADMIN — PROPOFOL 50 MCG/KG/MIN: 10 INJECTION, EMULSION INTRAVENOUS at 21:39

## 2022-09-02 RX ADMIN — POTASSIUM CHLORIDE 10 MEQ: 7.46 INJECTION, SOLUTION INTRAVENOUS at 05:57

## 2022-09-02 RX ADMIN — PROPOFOL 50 MCG/KG/MIN: 10 INJECTION, EMULSION INTRAVENOUS at 06:44

## 2022-09-02 RX ADMIN — POTASSIUM CHLORIDE 10 MEQ: 7.46 INJECTION, SOLUTION INTRAVENOUS at 06:58

## 2022-09-02 RX ADMIN — CEFTRIAXONE 2000 MG: 2 INJECTION, POWDER, FOR SOLUTION INTRAMUSCULAR; INTRAVENOUS at 11:14

## 2022-09-02 RX ADMIN — SODIUM CHLORIDE, PRESERVATIVE FREE 10 ML: 5 INJECTION INTRAVENOUS at 08:25

## 2022-09-02 RX ADMIN — POTASSIUM CHLORIDE 10 MEQ: 7.46 INJECTION, SOLUTION INTRAVENOUS at 08:11

## 2022-09-02 RX ADMIN — AMPICILLIN SODIUM 1000 MG: 1 INJECTION, POWDER, FOR SOLUTION INTRAMUSCULAR; INTRAVENOUS at 20:27

## 2022-09-02 RX ADMIN — Medication 150 MCG/HR: at 07:20

## 2022-09-02 RX ADMIN — AMPICILLIN SODIUM 1000 MG: 1 INJECTION, POWDER, FOR SOLUTION INTRAMUSCULAR; INTRAVENOUS at 17:37

## 2022-09-02 RX ADMIN — HEPARIN SODIUM 2000 UNITS: 1000 INJECTION INTRAVENOUS; SUBCUTANEOUS at 06:57

## 2022-09-02 ASSESSMENT — PULMONARY FUNCTION TESTS
PIF_VALUE: 19
PIF_VALUE: 21
PIF_VALUE: 19
PIF_VALUE: 24
PIF_VALUE: 21
PIF_VALUE: 20
PIF_VALUE: 17
PIF_VALUE: 20
PIF_VALUE: 23
PIF_VALUE: 18
PIF_VALUE: 20
PIF_VALUE: 20
PIF_VALUE: 19
PIF_VALUE: 20
PIF_VALUE: 19
PIF_VALUE: 17
PIF_VALUE: 19
PIF_VALUE: 22
PIF_VALUE: 20
PIF_VALUE: 22
PIF_VALUE: 20
PIF_VALUE: 18
PIF_VALUE: 23
PIF_VALUE: 21
PIF_VALUE: 20
PIF_VALUE: 23
PIF_VALUE: 20
PIF_VALUE: 21
PIF_VALUE: 18
PIF_VALUE: 25
PIF_VALUE: 25
PIF_VALUE: 19
PIF_VALUE: 19
PIF_VALUE: 23
PIF_VALUE: 21
PIF_VALUE: 19
PIF_VALUE: 21
PIF_VALUE: 21
PIF_VALUE: 20
PIF_VALUE: 23
PIF_VALUE: 18
PIF_VALUE: 21
PIF_VALUE: 23
PIF_VALUE: 23
PIF_VALUE: 21
PIF_VALUE: 20
PIF_VALUE: 21
PIF_VALUE: 20
PIF_VALUE: 19
PIF_VALUE: 18
PIF_VALUE: 22
PIF_VALUE: 23
PIF_VALUE: 26
PIF_VALUE: 19
PIF_VALUE: 24
PIF_VALUE: 23
PIF_VALUE: 20
PIF_VALUE: 22
PIF_VALUE: 18
PIF_VALUE: 21
PIF_VALUE: 19
PIF_VALUE: 20
PIF_VALUE: 19
PIF_VALUE: 21
PIF_VALUE: 20
PIF_VALUE: 20
PIF_VALUE: 21
PIF_VALUE: 19
PIF_VALUE: 18
PIF_VALUE: 19
PIF_VALUE: 20
PIF_VALUE: 19
PIF_VALUE: 24
PIF_VALUE: 19
PIF_VALUE: 23
PIF_VALUE: 19
PIF_VALUE: 19
PIF_VALUE: 21
PIF_VALUE: 17
PIF_VALUE: 22
PIF_VALUE: 21
PIF_VALUE: 20
PIF_VALUE: 23
PIF_VALUE: 20
PIF_VALUE: 18
PIF_VALUE: 21

## 2022-09-02 NOTE — PLAN OF CARE
Problem: Safety - Medical Restraint  Goal: Remains free of injury from restraints (Restraint for Interference with Medical Device)  Description: INTERVENTIONS:  1. Determine that other, less restrictive measures have been tried or would not be effective before applying the restraint  2. Evaluate the patient's condition at the time of restraint application  3. Inform patient/family regarding the reason for restraint  4.  Q2H: Monitor safety, psychosocial status, comfort, nutrition and hydration  Outcome: Progressing  Flowsheets  Taken 9/2/2022 0000 by Margarito Nagy RN  Remains free of injury from restraints (restraint for interference with medical device):   Determine that other, less restrictive measures have been tried or would not be effective before applying the restraint   Evaluate the patient's condition at the time of restraint application   Inform patient/family regarding the reason for restraint   Every 2 hours: Monitor safety, psychosocial status, comfort, nutrition and hydration  Taken 9/1/2022 2200 by Margarito Nagy RN  Remains free of injury from restraints (restraint for interference with medical device):   Determine that other, less restrictive measures have been tried or would not be effective before applying the restraint   Evaluate the patient's condition at the time of restraint application   Inform patient/family regarding the reason for restraint   Every 2 hours: Monitor safety, psychosocial status, comfort, nutrition and hydration  Taken 9/1/2022 1850 by Rupa Gastelum RN  Remains free of injury from restraints (restraint for interference with medical device): Evaluate the patient's condition at the time of restraint application     Problem: Discharge Planning  Goal: Discharge to home or other facility with appropriate resources  Outcome: Progressing  Flowsheets  Taken 9/2/2022 0001  Discharge to home or other facility with appropriate resources:   Identify barriers to discharge with patient and caregiver   Refer to discharge planning if patient needs post-hospital services based on physician order or complex needs related to functional status, cognitive ability or social support system  Taken 9/1/2022 2200  Discharge to home or other facility with appropriate resources:   Identify barriers to discharge with patient and caregiver   Refer to discharge planning if patient needs post-hospital services based on physician order or complex needs related to functional status, cognitive ability or social support system     Problem: Pain  Goal: Verbalizes/displays adequate comfort level or baseline comfort level  Outcome: Progressing     Problem: Safety - Adult  Goal: Free from fall injury  Outcome: Progressing     Problem: ABCDS Injury Assessment  Goal: Absence of physical injury  Outcome: Progressing     Problem: Skin/Tissue Integrity  Goal: Absence of new skin breakdown  Description: 1. Monitor for areas of redness and/or skin breakdown  2. Assess vascular access sites hourly  3. Every 4-6 hours minimum:  Change oxygen saturation probe site  4. Every 4-6 hours:  If on nasal continuous positive airway pressure, respiratory therapy assess nares and determine need for appliance change or resting period.   Outcome: Progressing

## 2022-09-02 NOTE — CONSULTS
HISTORY:  Family history non-contributory  Family History   Problem Relation Age of Onset    Stroke Mother     Heart Disease Father      Social History     Tobacco Use    Smoking status: Former     Packs/day: 0.50     Years: 40.00     Pack years: 20.00     Types: Cigarettes     Quit date:      Years since quittin.6    Smokeless tobacco: Never   Vaping Use    Vaping Use: Never used   Substance Use Topics    Alcohol use: Yes     Alcohol/week: 2.0 standard drinks     Types: 2 Cans of beer per week     Comment: Family unaware if drinking, JR with patient    Drug use: Never          Allergies & Outpatient Medications   ALLERGIES:  No Known Allergies  HOME MEDICATIONS:  Current Discharge Medication List        CONTINUE these medications which have NOT CHANGED    Details   aspirin 81 MG EC tablet Take 81 mg by mouth in the morning. atorvastatin (LIPITOR) 40 MG tablet Take 1 tablet by mouth in the morning. Qty: 90 tablet, Refills: 3      carvedilol (COREG) 3.125 MG tablet Take 1 tablet by mouth in the morning and 1 tablet before bedtime. Qty: 180 tablet, Refills: 3      folic acid (FOLVITE) 1 MG tablet Take 1 tablet by mouth in the morning. Qty: 90 tablet, Refills: 3      magnesium 200 MG TABS tablet Take 2 tablets by mouth in the morning. Qty: 90 tablet, Refills: 3      vitamin B-12 (CYANOCOBALAMIN) 100 MCG tablet Take 1 tablet by mouth in the morning. Qty: 90 tablet, Refills: 3      vitamin D3 (CHOLECALCIFEROL) 25 MCG (1000 UT) TABS tablet Take 1 tablet by mouth in the morning.   Qty: 90 tablet, Refills: 3      Thiamine Mononitrate (B1) 100 MG TABS Take 100 mg by mouth daily  Qty: 90 tablet, Refills: 3      melatonin 5 MG TBDP disintegrating tablet Take 1 tablet by mouth nightly  Qty: 30 tablet, Refills: 0      hydrocortisone 1 % cream Apply topically 2 times daily           STOP taking these medications       thiamine 100 MG tablet Comments:   Reason for Stopping:                 Physical Exam negatives are addressed in Impression & Recommendations below.      LABS   Metabolic Panel Recent Labs     09/01/22 1709 09/01/22 1830 09/02/22 0410     --  138   K 3.7  --  3.4*     --  104   CO2 23  --  22   BUN 9  --  8   CREATININE 1.0  --  0.7*   GLUCOSE 157*  --  125*   CALCIUM 9.0  --  7.5*   LABALBU 4.6  --  3.3*   PHOS  --   --  2.0*   MG  --   --  1.60*   ALKPHOS 139*  --   --    ALT 28  --   --    AST 42*  --   --    AMMONIA  --  36  --       CBC / Coags Recent Labs     09/01/22 1709 09/01/22 1830 09/02/22 0410 09/02/22  0608   WBC 21.7*  --  17.3*  --    RBC 5.20  --  4.34  --    HGB 16.5  --  13.5  --    HCT 48.7  --  40.4*  --      --  111*  --    INR  --  1.20*  --  1.27*      Other Lab Results   Component Value Date/Time    LABA1C 5.4 04/28/2022 01:05 PM    LDLCALC 75 04/28/2022 01:05 PM    TRIG 89 04/28/2022 01:05 PM    [id number] >2000 09/07/2021 01:53 PM    FOLATE 11.23 01/11/2021 05:30 AM     Lab Results   Component Value Date/Time    LACTA 1.6 09/01/2022 05:10 PM          CURRENT SCHEDULED MEDICATIONS   Inpatient Medications     potassium chloride, 10 mEq, IntraVENous, Q1H    sodium chloride flush, 5-40 mL, IntraVENous, 2 times per day    thiamine, 100 mg, IntraVENous, Daily    [COMPLETED] meropenem, 1,000 mg, IntraVENous, Once **FOLLOWED BY** meropenem, 1,000 mg, IntraVENous, Q8H    vancomycin, 1,000 mg, IntraVENous, Q12H   Infusions    fentaNYL 150 mcg/hr (09/02/22 0720)    sodium chloride      propofol 40 mcg/kg/min (09/02/22 0651)    heparin (PORCINE) Infusion 13 Units/kg/hr (09/02/22 0652)    norepinephrine 4 mcg/min (09/02/22 0645)      Antibiotics   Recent Abx Admin                     meropenem (MERREM) 1,000 mg in sodium chloride 0.9 % 100 mL IVPB (mini-bag) (mg) 1,000 mg New Bag 09/02/22 0649    vancomycin (VANCOCIN) 1,000 mg in dextrose 5 % 250 mL IVPB (mg) 1,000 mg New Bag 09/02/22 0545    meropenem (MERREM) 1,000 mg in sodium chloride 0.9 % 100 mL IVPB (mini-bag) (mg) 1,000 mg New Bag 09/01/22 2257    vancomycin (VANCOCIN) 1,750 mg in dextrose 5 % 500 mL IVPB (mg) 1,750 mg New Bag 09/01/22 1845    cefepime (MAXIPIME) 2000 mg IVPB minibag (mg) 2,000 mg New Bag 09/01/22 1755                       IMPRESSION & RECOMMENDATIONS     IMPRESSION:  Patient is a 58 y/o M who presented with acute encephalopathy of unclear etiology. Had some eye deviation and nystagmus concerning for possible seizure activity. Still has not returned to baseline. Loaded on keppra in ED. Has intermittent twitching of L thumb otherwise no obvious signs of seizure activity. RECOMMENDATIONS:  cvEEG today   MRI of brain w/ & w/o when able  Lumbar puncture - cell count, glucose, protein, meningitis panel  Keppra 1gm BID  Thanks for consult. We will follow.        MERVIN Magaña - CNP   Neurology & Neurocritical Care   Neurology Line: [phone]  [employer]: [facility] Neurology & Neuro Critical [facility] NPs  9/2/2022 9:56 AM

## 2022-09-02 NOTE — PROGRESS NOTES
CONTINUOUS EEG    Name:  [facility] Record Number:  [id number]  Age: 57 y.o. Gender: male  : 1960  Today's Date:  2022  Room:  [address] [place] [address]  Vital Signs   BP (!) 89/54   Pulse 84   Temp 99.1 °F (37.3 °C) (Bladder)   Resp 19   Ht 5' 10\" (1.778 m)   Wt 201 lb 1 oz (91.2 kg)   SpO2 99%   BMI 28.85 kg/m²           Continuous EEG Testing Start Time:  12:15 PM    Continuous EEG Testing End Time:       Comments[facility] 31 recalled at 12:34 will continue to contact the coordinator. Impedence on all leads are within normal limits. Plan of Care: Monitoring will begin once [facility] [place] has been contacted.     Electronically signed by Piotr Carney on 2022 at 12:36 PM

## 2022-09-02 NOTE — PROGRESS NOTES
Clinical Pharmacy Progress Note    Vancomycin - Management by Pharmacy    Consult Date(s): 9/1/22  Consulting Provider(s): Dr. De Jesus Favor / Plan    Sepsis - r/o CNS infection - Vancomycin  Concurrent Antimicrobials:   Meropenem - stopped today  Ceftriaxone - day #1  Ampicillin - day #1  Acyclovir - day #1  Day of Vanc Therapy / Ordered Duration: #2  Current Dosing Method: Bayesian-Guided AUC Dosing  Therapeutic Goal: 400-600 mg/L*hr  Current Dose / Frequency: 1000mg IV q12h  Plan / Rationale:   Discussed with Dr Ayanna Landry - will change indication from aspPNA to empiric CNS infection. Will remove duration of tx. Renal function improved today, as Scr 0.7 today from 1 yesterday. Given CNS concern and renal fxn improvement, will increase regimen empirically to 1250mg IV q12h. Predicted AUC on new regimen is ~ 516 mg/L*hr and ssTrough of 15.3 mg/L. Plan to obtain a random level 9/3 AM to check kinetics. Will DC order for MRSA nasal PCR given lack of respiratory concern at this time. Will continue to monitor clinical condition and make adjustments to regimen as appropriate. Thank you for consulting Pharmacy! Yonathan Clement PharmD., BCPS   9/2/2022 8:56 AM  Wireless: [phone]       Interval update:  Starting cEEG monitoring this AM.  Concern is no longer for aspiration - now concerned for CNS given persistent AMS. Switching abx to reflect CNS concerns. Remains off AEDs after a single dose of levetiracetam .  Changing heparin gtt for NSTEMI to plain VTE ppx. Subjective/Objective: Mr. Sharmin Yeboah is a 57 y.o. male with PMHx significant for chronic CAD, HTN, CHF, blood clot hx, essential tremor, EtOH abuse with recent rehab discharge, and prior ICH (Sept 2021 per notes). Found down at home by family, incontinent of stool. Intubated in the ED for for respiratory failure. Concern for sepsis as well as seizures, specifically for aspiration given seizures.   Antibiotics and cEEG monitoring initiated. Pharmacy has been consulted to dose vancomycin. Ht Readings from Last 1 Encounters:   09/01/22 5' 10\" (1.778 m)     Wt Readings from Last 1 Encounters:   09/02/22 201 lb 1 oz (91.2 kg)       Current & Prior Antimicrobial Regimen(s):   (9/1)   (9/1-9/2)  Ceftriaxone (9/2-current)  Ampicillin (9/2-current)  Acyclovir (9/2-current)  Vancomycin - pharmacy to dose   1750mg IV x1 load (9/1)  1000mg IV q12h (9/1-current)    Level(s) / Doses:    Date Time Dose Level / Type of Level Interpretation   9/3 0600 1250mg IV q12h Random - ordered           Note: Serum levels collected for AUC-based dosing may be high if collected in close proximity to the dose administered. This is not necessarily indicative of toxicity. Cultures & Sensitivities:    Date Site Micro Susceptibility / Result   9/1 Blood x2 Ordered            Labs / Ancillary Data:    Estimated Creatinine Clearance: 121 mL/min (A) (based on SCr of 0.7 mg/dL (L)).     Recent Labs     09/01/22  1709 09/02/22  0410   CREATININE 1.0 0.7*   BUN 9 8   WBC 21.7* 17.3*         Additional Lab Values / Findings of Note:    Procalcitonin:   Recent Labs     09/02/22  0410   PROCAL 0.30*

## 2022-09-02 NOTE — PROGRESS NOTES
Patient awake, agitated in bed. Opens eyes, moving all extremities purposefully though not following commands. Pulling against restraints towards ETT, kicking legs. Requiring increased sedation back to previous rates. Hypotensive with sedation despite recent 1L NS bolus. MD's at bedside, order for levophed. Heparin gtt verified with ICU team and started per order.

## 2022-09-02 NOTE — PROGRESS NOTES
Clinical Pharmacy Progress Note    Vancomycin - Management by Pharmacy    Consult Date(s): 9/1/22  Consulting Provider(s): Dr. Dozier Ser / Plan    Aspiration Pneumonia- Vancomycin  Concurrent Antimicrobials: Merrem  Day of Vanc Therapy / Ordered Duration: 7 days   Current Dosing Method: Bayesian-Guided AUC Dosing  Therapeutic Goal: 400-600 mg/L*hr  Current Dose / Frequency: 1000 mg every 12 hours  Plan / Rationale: Estimated AUC  525mg/L.hr and trough  of 17.1mg/L. Will continue to monitor clinical condition and make adjustments to regimen as appropriate. Thank you for consulting Pharmacy! Lorri Mars, PharmD  Main Pharmacy: [id number]        Subjective/Objective: Mr. Racquel Leonardo is a 57 y.o. male admitted for seizures. Pharmacy has been consulted to dose vancomycin. Ht Readings from Last 1 Encounters:   08/02/22 5' 9\" (1.753 m)     Wt Readings from Last 1 Encounters:   09/01/22 191 lb (86.6 kg)       Current & Prior Antimicrobial Regimen(s):  Merrem    Level(s) / Doses:    Date Time Dose Level / Type of Level Interpretation                 Note: Serum levels collected for AUC-based dosing may be high if collected in close proximity to the dose administered. This is not necessarily indicative of toxicity. Cultures & Sensitivities:    Date Site Micro Susceptibility / Result                 Labs / Ancillary Data:    Estimated Creatinine Clearance: 81 mL/min (based on SCr of 1 mg/dL). Recent Labs     09/01/22  1709   CREATININE 1.0   BUN 9   WBC 21.7*       Additional Lab Values / Findings of Note:    Procalcitonin: No results for input(s): PROCAL in the last 72 hours.

## 2022-09-02 NOTE — PROGRESS NOTES
HOSPITAL MEDICINE  - PROGRESS NOTE    Admit Date: 9/1/2022         Interval History:62yoM,wit hx of ETOH abuse,ICH  Presented with mental status changes  ]-assoc gurgling type respirations with sats that were in the 80s  Intubated in the ED for encephalopathy, resp failure. In the ED,noted tElevated WBC and tachycardia in the ED.   +Elevated troponin     Last admitted about a year ago for ICH related to frequent falls  Was seen recently by PCP fw/non-pruritic skin pustules of various stages of healing after recent stay at living facility.        undergoingbprocedures and not examined  EEG ongoing    Diet: Diet NPO      Data:   Scheduled Meds: Reviewed  Continuous Infusions:   fentaNYL 150 mcg/hr (09/02/22 0720)    sodium chloride      propofol 45 mcg/kg/min (09/02/22 1110)    norepinephrine 4 mcg/min (09/02/22 0645)       Intake/Output Summary (Last 24 hours) at 9/2/2022 1328  Last data filed at 9/2/2022 1100  Gross per 24 hour   Intake 3224.86 ml   Output 741 ml   Net 2483.86 ml     CBC:   Recent Labs     09/02/22  0410   WBC 17.3*   HGB 13.5   *     BMP:  Recent Labs     09/02/22  0410      K 3.4*      CO2 22   BUN 8   CREATININE 0.7*   GLUCOSE 125*     ABGs:   Lab Results   Component Value Date/Time    PHART 7.374 09/02/2022 03:41 AM    PO2ART 145.0 09/02/2022 03:41 AM    LMM9UCZ 40.7 09/02/2022 03:41 AM     INR:   Recent Labs     09/01/22  1830 09/02/22  0608   INR 1.20* 1.27*         Objective:   Vitals: BP (!) 89/54   Pulse 91   Temp 98.8 °F (37.1 °C) (Bladder)   Resp 19   Ht 5' 10\" (1.778 m)   Wt 201 lb 1 oz (91.2 kg)   SpO2 97%   BMI 28.85 kg/m²   General appearance:  appears stated age and cooperative    HEENT:   Neck:   Lungs:   Heart:   Abdomen:   Extremities:   Lymphatic:   Neurologic      Assessment & Plan:          Acute metabolic encephalopathy,possible seizures  Acute hypoxic hypercapnic Respiratory Failure  SIRS, probable sepsis  NSTEMI  Alcoholic Cirrhosis  CAD with LAD stent on Plavix  Previous MI with Hx of mural thrombus on Eliquis  Chronic CHF - last EF 50-55% (1/10/21)  Hx of traumatic intracranial hemorrhage sec to fall: SAH and SDH 2/2 traumatic fall and Hx of recurrent falls  Chronic Anticoagulation - Plavix/Eliquis  Chronic Alcohol Abuse        PLAN  cEEG   A  BSA  Hep gtt  Echo      Neurology consult  Cardiology consult  Critical care consult            Evert Bonds MD

## 2022-09-02 NOTE — CONSULTS
CARDIOLOGY  CONSULTATION         Reason for Consultation/Chief Complaint: Syncope, troponinemia       History of Present Illness:  Timothy Juarez is a 57 y.o. patient w/ hx CAD, DVT, ICH, etoh abuse who was found down at home and incontinent of stool by sister. She denies pt with any complaints that day. Pt recently out of rehab; has not resumed drinking to sisters knowledge. In ED pt with rightward gaze/nystagmus and given 2mg Versed with sats dropping to 88% on NRB, so pt was intubated. CT head and neck were unremarkable. Labs showed WBC 21.7 and Trp 0.15. Started on Vanc/Merrem, levo and admitted to ICU. Trp uptrending today to 0.25 w/ . Pt currently intubated and remains on pressors. Nods in response to some questions. Denies chest pain. Echo Jan 2021 showed EF 50-55% with hypokinesis of basal-mid inferior and inferolateral wall. Past Medical History:   has a past medical history of Alcohol abuse, CAD (coronary artery disease), CHF (congestive heart failure) ([address] Essential tremor, HTN (hypertension), Hx of blood clots, Hyperlipidemia, ICH (intracerebral hemorrhage) ([address] Lower extremity cellulitis, and MI (mitral incompetence). Surgical History:   has a past surgical history that includes Percutaneous Transluminal Coronary Angio and Upper gastrointestinal endoscopy (N/A, 7/28/2020). Social History:   reports that he quit smoking about 20 months ago. His smoking use included cigarettes. He has a 20.00 pack-year smoking history. He has never used smokeless tobacco. He reports current alcohol use of about 2.0 standard drinks per week. He reports that he does not use drugs. Family History:  No evidence for sudden cardiac death or premature CAD    Home Medications:  Were reviewed and are listed in nursing record. and/or listed below  Prior to Admission medications    Medication Sig Start Date End Date Taking?  Authorizing Provider   aspirin 81 MG EC tablet Take 81 mg by mouth in the morning. Historical Provider, MD   atorvastatin (LIPITOR) 40 MG tablet Take 1 tablet by mouth in the morning. 8/1/22   Kelley Raza MD   carvedilol (COREG) 3.125 MG tablet Take 1 tablet by mouth in the morning and 1 tablet before bedtime. 8/1/22   Kelley Raza MD   folic acid (FOLVITE) 1 MG tablet Take 1 tablet by mouth in the morning. 8/1/22   Kelley Raza MD   magnesium 200 MG TABS tablet Take 2 tablets by mouth in the morning. 8/1/22   Kelley Raza MD   vitamin B-12 (CYANOCOBALAMIN) 100 MCG tablet Take 1 tablet by mouth in the morning. 8/1/22   Kelley Raza MD   vitamin D3 (CHOLECALCIFEROL) 25 MCG (1000 UT) TABS tablet Take 1 tablet by mouth in the morning. 8/1/22   Kelley Raza MD   Thiamine Mononitrate (B1) 100 MG TABS Take 100 mg by mouth daily 8/1/22   Kelley Raza MD   melatonin 5 MG TBDP disintegrating tablet Take 1 tablet by mouth nightly 9/9/21   Kierra Jacques MD   hydrocortisone 1 % cream Apply topically 2 times daily    Historical Provider, MD   thiamine 100 MG tablet Take 1 tablet by mouth daily 8/27/20 6/16/21  Claudette Ege, [address] Medications:   cefTRIAXone (ROCEPHIN) IV  2,000 mg IntraVENous Q12H    ampicillin IV  1,000 mg IntraVENous 6 times per day    acyclovir  10 mg/kg (Adjusted) IntraVENous Q8H    sodium chloride flush  5-40 mL IntraVENous 2 times per day    thiamine  100 mg IntraVENous Daily    vancomycin  1,000 mg IntraVENous Q12H     sodium chloride flush, sodium chloride, ondansetron **OR** ondansetron, polyethylene glycol, acetaminophen **OR** acetaminophen, heparin (porcine), heparin (porcine)   fentaNYL 150 mcg/hr (09/02/22 0720)    sodium chloride      propofol 40 mcg/kg/min (09/02/22 0651)    heparin (PORCINE) Infusion 13 Units/kg/hr (09/02/22 8095)    norepinephrine 4 mcg/min (09/02/22 0645)       Allergies:  Patient has no known allergies.      Review of Systems:   Review of Systems   Unable to perform ROS: Intubated Physical Examination:    Vitals:    09/02/22 0915   BP: 111/69   Pulse: 85   Resp: 16   Temp:    SpO2: 99%    Weight: 201 lb 1 oz (91.2 kg)       Physical Exam  Vitals and nursing note reviewed. Constitutional:       General: He is not in acute distress. Appearance: Normal appearance. He is normal weight. He is not ill-appearing or diaphoretic. HENT:      Head: Atraumatic. Cardiovascular:      Rate and Rhythm: Normal rate and regular rhythm. Pulses: Normal pulses. Heart sounds: Normal heart sounds. No murmur heard. No friction rub. No gallop. Pulmonary:      Breath sounds: No wheezing, rhonchi or rales. Comments: Intubated  Abdominal:      General: Abdomen is flat. There is no distension. Palpations: Abdomen is soft. Musculoskeletal:         General: No swelling. Right lower leg: No edema. Left lower leg: No edema. Skin:     General: Skin is warm and dry. Coloration: Skin is not pale. Neurological:      Comments: Nods occasionally to questions.           Labs  CBC:   Lab Results   Component Value Date/Time    WBC 17.3 09/02/2022 04:10 AM    RBC 4.34 09/02/2022 04:10 AM    HGB 13.5 09/02/2022 04:10 AM    HCT 40.4 09/02/2022 04:10 AM    MCV 93.3 09/02/2022 04:10 AM    RDW 13.5 09/02/2022 04:10 AM     09/02/2022 04:10 AM     CMP:    Lab Results   Component Value Date/Time     09/02/2022 04:10 AM    K 3.4 09/02/2022 04:10 AM    K 3.7 09/01/2022 05:09 PM     09/02/2022 04:10 AM    CO2 22 09/02/2022 04:10 AM    BUN 8 09/02/2022 04:10 AM    CREATININE 0.7 09/02/2022 04:10 AM    GFRAA >60 09/02/2022 04:10 AM    AGRATIO 1.4 04/28/2022 01:05 PM    LABGLOM >60 09/02/2022 04:10 AM    GLUCOSE 125 09/02/2022 04:10 AM    PROT 7.8 09/01/2022 05:09 PM    CALCIUM 7.5 09/02/2022 04:10 AM    BILITOT 1.3 09/01/2022 05:09 PM    ALKPHOS 139 09/01/2022 05:09 PM    AST 42 09/01/2022 05:09 PM    ALT 28 09/01/2022 05:09 PM     PT/INR:  No results found for:

## 2022-09-02 NOTE — CONSULTS
ICU Consult Note    Admit Date: 9/1/2022  Day: 2  Vent Day: None  IV Access:Peripheral  IV Fluids:None  Vasopressors:None                Antibiotics: None  Diet: Diet NPO    CC: Collapse    Interval history: Overnight the pt had gotten hypotensive was given IV fluids and started on Levophed. LDA: Peripheral IV left forearm, left anticubital, Rt forearm, Rt hand, OG left mouth, Urinary catheter, ETT    Vent: AC/PVR, Rate set: 14+ Rate measured: 14+ Vt set: 575, PIP:19, PEEP:5+    I/O net: +1790    HPI: The patient is a 77-year-old man with past medical history significant for chronic alcoholism, ICH, CAD, DVT who presented today to the ED with an episode of syncope. The history was mainly obtained by the sister since patient was intubated. He was last known well at 9 AM but the sister. According to the sister, she had just returned home from work which she had found him half lying down on the couch covered in the stool while his car outside was running outside with occasion. She denies any complaints of recent fever, chills, chest pain cough, shortness of breath, lightheadedness, palpitations, nausea, vomiting, abdominal pain, diarrhea, fatigue, visual disturbance, changes in urination frequency or burning pain well urination or headaches by the patient in the preceding days to this incident. She does reinstate that her brother is a chronic alcoholic and had just gotten out of rehab. He has been sober since however she was not aware if he had recently had any alcoholic drink. He had also informed that he had recent episode of intracerebral hemorrhage on 9/4/2021. In the ED, there was a concern for possibility of a stroke however he was not considered a thrombolytic candidate because of her previous ICH and long time since his last known well. According to the ED staff his physical exam was more pertinent for nonfocal delirium. It was also significant for nystagmus and rightward gaze not fixed.   He was given 2 mg of Versed and it had worsened his oxygen saturations that had dropped to 88% on a nonrebreather and therefore he was intubated. CT Abdomen, chest: Moderate dependent atelectasis bilaterally. No acute abnormality on noncontrast CT of the abdomen and pelvis. Cholelithiasis. CTA Head: No acute CTA findings. No hemodynamically significant stenosis or focal aneurysm. No arteriovenous confirmation. CTA Neck: No acute CTA findings. No hemodynamically significant stenosis or focal aneurysm.     Past Medical History[]Expand by Default        Past Medical History:   Diagnosis Date    Alcohol abuse      CAD (coronary artery disease)       with complete LAD occlusion    CHF (congestive heart failure) (HCC)       LVEF    Essential tremor      HTN (hypertension)      Hx of blood clots 05/2021    Lower extremity cellulitis      MI (mitral incompetence)              Past Surgical History         Past Surgical History:   Procedure Laterality Date    PTCA        UPPER GASTROINTESTINAL ENDOSCOPY N/A 7/28/2020     EGD BIOPSY performed by Malika Bryan MD at [facility]:   The patient lives at home with seizure     Alcohol: Chronic alcoholic  Illicit drugs: no use  Tobacco: not aware     Family History:  Family History[]Expand by Default         Family History   Problem Relation Age of Onset    Stroke Mother      Heart Disease Father           Medications:     Scheduled Meds:   magnesium sulfate  2,000 mg IntraVENous Once    potassium chloride  10 mEq IntraVENous Q1H    sodium chloride flush  5-40 mL IntraVENous 2 times per day    enoxaparin  40 mg SubCUTAneous Daily    thiamine  100 mg IntraVENous Daily    meropenem  1,000 mg IntraVENous Q8H    vancomycin  1,000 mg IntraVENous Q12H     Continuous Infusions:   fentaNYL 150 mcg/hr (09/02/22 6612)    sodium chloride      propofol 50 mcg/kg/min (09/02/22 3722)    heparin (PORCINE) Infusion 11 Units/kg/hr (09/02/22 8004)    norepinephrine 4 mcg/min (09/02/22 0333)     PRN Meds:sodium chloride flush, sodium chloride, ondansetron **OR** ondansetron, polyethylene glycol, acetaminophen **OR** acetaminophen, heparin (porcine), heparin (porcine)    Objective:   Vitals:   T-max:  Patient Vitals for the past 8 hrs:   BP Temp Temp src Pulse Resp SpO2 Weight   09/02/22 0600 104/67 99.7 °F (37.6 °C) Bladder 77 15 97 % 201 lb 1 oz (91.2 kg)   09/02/22 0545 108/68 -- -- 92 15 98 % --   09/02/22 0530 97/64 -- -- 85 15 96 % --   09/02/22 0515 110/68 -- -- 95 18 98 % --   09/02/22 0500 99/61 -- -- 92 15 97 % --   09/02/22 0445 108/70 -- -- 96 18 99 % --   09/02/22 0430 99/64 -- -- 97 15 97 % --   09/02/22 0415 112/72 -- -- (!) 102 15 99 % --   09/02/22 0400 110/74 -- -- (!) 106 15 99 % --   09/02/22 0345 113/71 -- -- (!) 112 15 94 % --   09/02/22 0330 116/82 -- -- (!) 112 20 100 % --   09/02/22 0315 107/73 -- -- 83 14 99 % --   09/02/22 0300 106/78 -- -- (!) 105 14 99 % --   09/02/22 0245 94/65 -- -- 81 14 98 % --   09/02/22 0230 93/64 -- -- 75 14 98 % --   09/02/22 0215 86/64 -- -- 79 14 97 % --   09/02/22 0200 87/60 -- -- 75 14 98 % --   09/02/22 0145 (!) 82/57 99.5 °F (37.5 °C) -- 72 14 97 % --   09/02/22 0131 -- -- -- 78 14 93 % --   09/02/22 0130 -- -- -- 78 14 96 % --   09/02/22 0115 89/62 -- -- 79 14 96 % --   09/02/22 0100 91/65 99.5 °F (37.5 °C) -- 86 14 94 % --   09/02/22 0045 95/66 -- -- 87 14 93 % --   09/02/22 0030 94/66 -- -- 87 (!) 9 93 % --   09/02/22 0015 102/68 -- -- 96 (!) 7 95 % --   09/02/22 0000 94/63 -- -- 95 (!) 7 93 % --   09/01/22 2345 106/85 -- -- (!) 109 15 96 % --   09/01/22 2330 (!) 89/56 99.5 °F (37.5 °C) Bladder 94 13 96 % --   09/01/22 2315 98/64 -- -- (!) 103 13 96 % --   09/01/22 2300 89/62 99.7 °F (37.6 °C) -- 93 15 99 % --   09/01/22 2245 (!) 85/58 -- -- 90 14 97 % --   09/01/22 2230 (!) 81/57 -- -- 87 (!) 8 98 % --   09/01/22 2215 (!) 83/55 -- -- 89 (!) 8 98 % --       Intake/Output Summary (Last 24 hours) at 9/2/2022 8252  Last data filed at 9/2/2022 0553  Gross per 24 hour   Intake 2806.77 ml   Output 595 ml   Net 2211.77 ml       Review of Systems   Reason unable to perform ROS: Unable to obtain due to pt being intubated. Physical Exam  Constitutional:       Appearance: He is normal weight. HENT:      Head: Normocephalic and atraumatic. Right Ear: Tympanic membrane normal.      Nose: Nose normal.      Mouth/Throat:      Mouth: Mucous membranes are dry. Cardiovascular:      Rate and Rhythm: Normal rate and regular rhythm. Pulses: Normal pulses. Heart sounds: Normal heart sounds. Pulmonary:      Effort: Pulmonary effort is normal.      Breath sounds: No wheezing or rales. Abdominal:      General: Bowel sounds are normal. There is distension. Palpations: Abdomen is soft. Tenderness: There is no abdominal tenderness. There is no guarding. Musculoskeletal:         General: Normal range of motion. Neurological:      General: No focal deficit present. Mental Status: He is disoriented. Comments: Opens eyes spontaneously, sedated, not following commands   Psychiatric:         Mood and Affect: Mood normal.         LABS:    CBC:   Recent Labs     09/01/22 1709 09/02/22 0410   WBC 21.7* 17.3*   HGB 16.5 13.5   HCT 48.7 40.4*    111*   MCV 93.5 93.3     Renal:    Recent Labs     09/01/22 1709 09/02/22 0410    138   K 3.7 3.4*    104   CO2 23 22   BUN 9 8   CREATININE 1.0 0.7*   GLUCOSE 157* 125*   CALCIUM 9.0 7.5*   MG  --  1.60*   PHOS  --  2.0*   ANIONGAP 14 12     Hepatic:   Recent Labs     09/01/22 1709 09/02/22 0410   AST 42*  --    ALT 28  --    BILITOT 1.3*  --    BILIDIR 0.4*  --    PROT 7.8  --    LABALBU 4.6 3.3*   ALKPHOS 139*  --      Troponin:   Recent Labs     09/01/22 1709 09/02/22 0410   TROPONINI 0.15* 0.25*     BNP: No results for input(s): BNP in the last 72 hours. Lipids: No results for input(s): CHOL, HDL in the last 72 hours.     Invalid input(s): LDLCALCU, TRIGLYCERIDE  ABGs:    Recent Labs     09/01/22  1944 09/02/22  0341   PHART 7.270* 7.374   WUG3VNT 54.9* 40.7   PO2ART 63.5* 145.0*   ESB3SXJ 25 24   BEART -2.8 -1.4   E6HMDYEZ 64* 100   FFX9NEQ 27 25       INR:   Recent Labs     09/01/22  1830   INR 1.20*     Lactate: No results for input(s): LACTATE in the last 72 hours. Cultures:  -----------------------------------------------------------------  RAD:   XR ABDOMEN (KUB) (SINGLE AP VIEW)   Final Result   Findings/Impression:    The tip of the enteric tube terminates in the distal body of the stomach. CT CHEST WO CONTRAST   Final Result      CHEST:      1. Moderate dependent atelectasis bilaterally. ABDOMEN/PELVIS:      1.  No acute abnormality on noncontrast CT of the abdomen and pelvis. 2.  Cholelithiasis. CT ABDOMEN PELVIS WO CONTRAST Additional Contrast? None   Final Result      CHEST:      1. Moderate dependent atelectasis bilaterally. ABDOMEN/PELVIS:      1.  No acute abnormality on noncontrast CT of the abdomen and pelvis. 2.  Cholelithiasis. CTA HEAD NECK W CONTRAST   Final Result      CTA Head:   No acute CTA findings. No hemodynamically significant stenosis or focal aneurysm. No arteriovenous confirmation. CTA Neck:   No acute CTA findings. No hemodynamically significant stenosis or focal aneurysm. CT HEAD WO CONTRAST   Final Result      1. No findings for acute intracranial abnormality. 2.  Age-related atrophy with patchy periventricular white matter changes bilaterally consistent with chronic small vessel ischemia. 3.  Stable low attenuation left frontoparietal lobe consistent with previous insult from known prior intraparenchymal hematoma. Stable right frontoparietal cortical infarct. These findings were called to the emergency room physician Dr. Angie Sol on 9/1/2022 at 5:30 p.m. XR CHEST PORTABLE   Final Result   1. No findings for acute cardiopulmonary disease.       2.  ET tube in good position in the mid trachea. Assessment/Plan:   Acute Metabolic Encephalopathy 2/2 SIRS vs seizure  On presentation AMS, elevated WBC: 21.7, Tachypneic , Tachycardic, Hypotensive, hx of ICH, SAH and SDH 2/2 to fall, hx of chronic alcohol abuse, CT head:Stable low attenuation left frontoparietal lobe consistent with previous insult from known prior intraparenchymal hematoma. Stable right frontoparietal cortical infarct. CT Abdomen, chest: Moderate dependent atelectasis bilaterally. No acute abnormality on noncontrast CT of the abdomen and pelvis. Cholelithiasis. CTA Head: No acute CTA findings. No hemodynamically significant stenosis or focal aneurysm. No arteriovenous confirmation. CTA Neck: No acute CTA findings.   No hemodynamically significant stenosis or focal aneurysm.  -On levophed  -On broad spectrum antibiotics Vancomycin and Merem  -Trending Procal  -blood culture ordered  -Trending Lactic acid  -EEG ordered  -Given loading dose of Keppra  -Consulted Neurology    Acute hypercapnic respiratory failure secondary to possible aspiration vs 2/2 medication  On presentation: VBG PH:7.264, PCO2: 56.4, 83.2  Currently: PH: 7.37, PCO2:40.7, PO2:145  Vent settings:AC/PVR, Rate Set: 12, Rate measured: 13, PIP:22.7, PEEP:8+  -Patient intubated with fentanyl and propofol  -obtain ABG, repeat ABG ordered in an hour  -Will give broad spectrum antibiotics    Tropinemia r/o ACS  -Trending trop 0.25 will continue trending EKG:wnl, Echo EF: 50-55%, Hx of CAD with mural thrombosis, CXR: currently significant for mild pulmonary vascular congestion  -low dose heparin for possible MI  -EKG stat ordered    CAD with LAD stent  -will start on Aspirin, statin, coreg with core pack and on telemetry    Alcohol use disorder  -thiamine 100 mg daily  -monitor for withdrawal  -Ethanol level ordered     Isolated proteinuria  -UA: Positive for protein    Hx of Cirrhosis  -Ammonia:36 wnl    Code Status:Full Code  FEN: NPO  PPX:SCD, lovenox   DISPO: ICU    Sally Byrne MD, PGY-1  09/02/22  6:10 AM    This patient has been staffed and discussed with Amber Farias MD.      Patient was seen, examined and discussed with Dr. Ayanna Landry. I agree with the history of present illness, past medical/surgical histories, family history, social history, medication list and allergies as listed. The review of systems is as noted above. My physical exam confirms the findings listed. Chart was reviewed including labs, CXR, CT scan, EKG and medical records confirm the findings noted     Acute respiratory failure on mechanical vent support. , RR 14, FiO2 30, PEEP 5. ABG 7.37/40/145  Suspected seizure   Leukocytosis - trending down. no clear focus of infection. CT chest, abdomen and pelvis reviewed. Mildly elevated troponin. EKG with septal Q waves and T inv laterally   Hypotension requiring levophed: 2-4 mcg/min. This is thought to be due to sedation   Hx of ICH  Hx of alcohol use     Continue vanc. Start ceftriaxone and ampicillin   Start acyclovir   D/c merrem  cvEEG  Discussed with neurology. Plan for LP  F/u on cultures  Hold heparin   Get MRI. He is not moving the right side. Pt has a high probability of imminent or life-threatening deterioration requiring close monitoring, and highly complex decision-making and/or interventions of high intensity to assess, manipulate, and support his critical organ systems to prevent a likely inevitable decline which could occur if left untreated. A total critical care time 45 minutes was used. This includes but not limited to examining patient, collaborating with other physicians, monitoring vital signs, telemetry, continuous pulse oximetry, and clinical response to IV medications, documentation time, review and interpretation of laboratory and radiological data, review of nursing notes and old record review.  This time excludes any time that may have been spent performing procedures for life threatening organ failure.

## 2022-09-02 NOTE — CONSULTS
[facility]    Cardiology Consult/H&P  Consulting Cardiologist Alfredo Cochran MD , [facility] [place]  Referring Provider:  Chao Davalos MD    9/2/2022, 5:24 PM    Chief Complaint   Patient presents with    Altered Mental Status     unresponsive      Primary Cardiologist:  Asked by Joaquim Rainey MD/Padmini Garcia MD to evaluate and assess this patient's elevated troponin levels. History of Present Illness:   Racquel Leonardo is a 57 y.o. male is being seen in the ICU. He did have a history of intracranial hemorrhage traumatic in September 2021 and did have a CVA and acute encephalopathy. He is admitted at this time after being found down with gurgling and unresponsive and did have recurrence of his true cranial hemorrhage. Unclear if he had an additional brain trauma. At any rate his troponin levels were elevated. We asked to see him evaluate for potential interventions. As I see the patient at this time his blood pressure is stable at 108/76 pulse of 88 sinus rhythm and he is completely unresponsive. He does have some spontaneous motor movements but is not particularly purposeful. CAD with stent to the LAD and 5/12/2020  Hypertension  Hyperlipidemia  Intracranial hemorrhage    Past Medical History:   has a past medical history of Alcohol abuse, CAD (coronary artery disease), CHF (congestive heart failure) ([address] Essential tremor, HTN (hypertension), Hx of blood clots, Hyperlipidemia, ICH (intracerebral hemorrhage) ([address] Lower extremity cellulitis, and MI (mitral incompetence). Surgical History:   has a past surgical history that includes Percutaneous Transluminal Coronary Angio and Upper gastrointestinal endoscopy (N/A, 7/28/2020). Social History:   reports that he quit smoking about 20 months ago. His smoking use included cigarettes. He has a 20.00 pack-year smoking history.  He has never used smokeless tobacco. He reports current alcohol use of about 2.0 standard drinks per week. He reports that he does not use drugs. Family History:  family history includes Heart Disease in his father; Stroke in his mother. Home Medications:  Prior to Admission medications    Medication Sig Start Date End Date Taking? Authorizing Provider   aspirin 81 MG EC tablet Take 81 mg by mouth in the morning. Historical Provider, MD   atorvastatin (LIPITOR) 40 MG tablet Take 1 tablet by mouth in the morning. 8/1/22   Kelley Raza MD   carvedilol (COREG) 3.125 MG tablet Take 1 tablet by mouth in the morning and 1 tablet before bedtime. 8/1/22   Kelley Raza MD   folic acid (FOLVITE) 1 MG tablet Take 1 tablet by mouth in the morning. 8/1/22   Kelley Raza MD   magnesium 200 MG TABS tablet Take 2 tablets by mouth in the morning. 8/1/22   Kelley Raza MD   vitamin B-12 (CYANOCOBALAMIN) 100 MCG tablet Take 1 tablet by mouth in the morning. 8/1/22   Kelley Raza MD   vitamin D3 (CHOLECALCIFEROL) 25 MCG (1000 UT) TABS tablet Take 1 tablet by mouth in the morning.  8/1/22   Kelley Raza MD   Thiamine Mononitrate (B1) 100 MG TABS Take 100 mg by mouth daily 8/1/22   Kelley Raza MD   melatonin 5 MG TBDP disintegrating tablet Take 1 tablet by mouth nightly 9/9/21   Kierra Jacques MD   hydrocortisone 1 % cream Apply topically 2 times daily    Historical Provider, MD   thiamine 100 MG tablet Take 1 tablet by mouth daily 8/27/20 6/16/21  Claudette Ege, APRN - CNP        Current Medications:  Current Facility-Administered Medications   Medication Dose Route Frequency Provider Last Rate Last Admin    cefTRIAXone (ROCEPHIN) 2,000 mg in dextrose 5 % 50 mL IVPB mini-bag  2,000 mg IntraVENous Q12H Elbert Coombs MD   Stopped at 09/02/22 1144    ampicillin 1000 mg ivpb mini bag  1,000 mg IntraVENous 6 times per day Ricki Mendieta MD   Stopped at 09/02/22 1312    acyclovir (ZOVIRAX) 800 mg in dextrose 5 % 250 mL IVPB  10 mg/kg (Adjusted) IntraVENous Q8H Elbert Waggoner, MD   Stopped at 09/02/22 1242    vancomycin (VANCOCIN) 1,250 mg in dextrose 5 % 250 mL IVPB  1,250 mg IntraVENous Q12H Rupal Gutierrez MD   Stopped at 09/02/22 1534    [START ON 9/3/2022] enoxaparin (LOVENOX) injection 40 mg  40 mg SubCUTAneous Daily Elbert Waggoner MD        levETIRAcetam (KEPPRA) 1,000 mg in sodium chloride 0.9 % 100 mL IVPB  1,000 mg IntraVENous Q12H MERVIN Parker - CNP   Stopped at 09/02/22 1423    lidocaine PF 1 % injection 5 mL  5 mL IntraDERmal Once Elbert Waggoner MD        fentaNYL (SUBLIMAZE) 1,000 mcg in sodium chloride 0.9% 100 mL infusion   mcg/hr IntraVENous Continuous Keeley Kirk MD 20 mL/hr at 09/02/22 1458 200 mcg/hr at 09/02/22 1458    sodium chloride flush 0.9 % injection 5-40 mL  5-40 mL IntraVENous 2 times per day Amol Alcazar MD   10 mL at 09/02/22 0825    sodium chloride flush 0.9 % injection 5-40 mL  5-40 mL IntraVENous PRN Elbert Waggoner MD        0.9 % sodium chloride infusion   IntraVENous PRN Elbert Waggoner MD        ondansetron (ZOFRAN-ODT) disintegrating tablet 4 mg  4 mg Oral Q8H PRN Elbert Waggoner MD        Or    ondansetron (ZOFRAN) injection 4 mg  4 mg IntraVENous Q6H PRN Elbert Waggoner MD        polyethylene glycol (GLYCOLAX) packet 17 g  17 g Oral Daily PRN Elbert Peralta MD        acetaminophen (TYLENOL) tablet 650 mg  650 mg Oral Q6H PRN Elbert Waggoner MD        Or    acetaminophen (TYLENOL) suppository 650 mg  650 mg Rectal Q6H PRN Elbert Waggoner MD        thiamine (B-1) injection 100 mg  100 mg IntraVENous Daily Elbert Waggoner MD   100 mg at 09/02/22 0815    propofol injection  5-50 mcg/kg/min IntraVENous Continuous Elbert Waggoner MD 41.6 mL/hr at 09/02/22 1459 80 mcg/kg/min at 09/02/22 1459    norepinephrine (LEVOPHED) 16 mg in dextrose 5 % 250 mL infusion  1-100 mcg/min IntraVENous Continuous Nolan Childs MD 3.8 mL/hr at 09/02/22 0645 4 mcg/min at 09/02/22 0645       Allergies:  Patient has no known allergies. Review of Systems:   Constitutional: there has been no unanticipated weight loss. Eyes: No visual changes or diplopia. No scleral icterus. ENT: No Headaches, hearing loss or vertigo. No mouth sores or sore throat. Cardiovascular: Reviewed in HPI   Pulmonary:  no cough or sputum production. Gastrointestinal: No abdominal pain, appetite loss, blood in stools. No change in bowel or bladder habits. Genitourinary: No dysuria, trouble voiding, or hematuria. Musculoskeletal:  No gait disturbance, weakness or joint complaints. Integumentary: No rash or pruritis. Endocrine: No malaise, fatigue or temperature intolerance. Hematologic/Lymphatic: No abnormal bruising or bleeding, blood clots or swollen lymph nodes. Allergic/Immunologic: No nasal congestion or hives. All other ROS are reviewed and are unremarkable. Physical Examination:    Vitals:    09/02/22 1215 09/02/22 1235 09/02/22 1550 09/02/22 1600   BP:       Pulse:  91 82    Resp:  19 20    Temp: 98.8 °F (37.1 °C)   98.2 °F (36.8 °C)   TempSrc: Bladder   Bladder   SpO2:  97% 99%    Weight:       Height:            EXAMl and General Appearance:  Healthy. And alert . HEENT: eyes and ears intact. No nasal masses  THYROID: not enlarged  LUNGS:  Clear to auscultation and percussion  HEART and VASCULAR:  The apical impulses not displaced  Heart tones are crisp and normal  Cervical veins are not engorged  The carotid upstroke is normal in amplitude and contour without delay or bruit  Peripheral pulses are symmetrical and full  There is no clubbing, cyanosis of the extremities. No peripheral edema  Femoral Arteries: 2+ and equal  Pedal Pulses:2+ and equal   ABDOMEN[de-identified]  No masses or tenderness  Liver/Spleen: No Abnormalities Noted  NEUROLOGICAL:  . Moves all extremities to command. Cranial nerves 2-12 are in tact.   PSYCHIATRIC: alert and lucid  and oriented and appropriate  SKIN: No lesions or rashes  LYMPH NODES: none enlarged            CBC with Differential:    Lab Results   Component Value Date/Time    WBC 17.3 09/02/2022 04:10 AM    RBC 4.34 09/02/2022 04:10 AM    HGB 13.5 09/02/2022 04:10 AM    HCT 40.4 09/02/2022 04:10 AM     09/02/2022 04:10 AM    MCV 93.3 09/02/2022 04:10 AM    MCH 31.2 09/02/2022 04:10 AM    MCHC 33.5 09/02/2022 04:10 AM    RDW 13.5 09/02/2022 04:10 AM    LYMPHOPCT 14.4 09/02/2022 04:10 AM    MONOPCT 7.0 09/02/2022 04:10 AM    BASOPCT 0.4 09/02/2022 04:10 AM    MONOSABS 1.2 09/02/2022 04:10 AM    LYMPHSABS 2.5 09/02/2022 04:10 AM    EOSABS 0.0 09/02/2022 04:10 AM    BASOSABS 0.1 09/02/2022 04:10 AM     BMP:    Lab Results   Component Value Date/Time     09/02/2022 04:10 AM    K 3.4 09/02/2022 04:10 AM    K 3.7 09/01/2022 05:09 PM     09/02/2022 04:10 AM    CO2 22 09/02/2022 04:10 AM    BUN 8 09/02/2022 04:10 AM    LABALBU 3.3 09/02/2022 04:10 AM    CREATININE 0.7 09/02/2022 04:10 AM    CALCIUM 7.5 09/02/2022 04:10 AM    GFRAA >60 09/02/2022 04:10 AM    LABGLOM >60 09/02/2022 04:10 AM     Uric Acid:  No components found for: URIC  PT/INR:    Lab Results   Component Value Date/Time    PROTIME 15.9 09/02/2022 06:08 AM    INR 1.27 09/02/2022 06:08 AM     Last 3 Troponin:    Lab Results   Component Value Date/Time    TROPONINI 0.25 09/02/2022 04:10 AM    TROPONINI 0.15 09/01/2022 05:09 PM    TROPONINI <0.01 09/03/2021 10:33 PM     FLP:    Lab Results   Component Value Date/Time    TRIG 89 04/28/2022 01:05 PM    HDL 52 04/28/2022 01:05 PM    LDLCALC 75 04/28/2022 01:05 PM    LDLDIRECT 18 10/03/2020 08:56 AM    LABVLDL 18 04/28/2022 01:05 PM       EKG: EKG on 9/2/2022 sinus rhythm at 62/min possible old lateral infarct. [facility]   Limited echo. Definity contrast administered. Left ventricular cavity size is normal. There is mild concentric left   ventricular hypertrophy. Overall left ventricular systolic function appears   borderline normal with an estimated ejection fraction of 50-55%.  There is   hypokinesis of the basal-mid inferior and inferolateral wall. No evidence of   left ventricular mass or thrombus noted. Assessment/ Plan     Patient Active Problem List    Diagnosis Date Noted    Seizure ([facility] 09/01/2022    Skin pustule 51/52/7260    Diastolic congestive heart failure, unspecified HF chronicity ([facility] 04/28/2022    [facility] 09/04/2021    Electrolyte abnormality 08/22/2021    Alcohol abuse with withdrawal ([facility] 01/10/2021    Hypokalemia 07/25/2020    Acute deep vein thrombosis of left lower extremity (HCC) 05/18/2020    Abnormal angiogram 05/12/2020    Mural thrombus of cardiac apex     Coronary artery disease due to lipid rich plaque    This patient has acute recurrent intracranial hemorrhage. His troponin level did go up at 0.15 and 0.25. He has known CAD and previous coronary stent and 2020. Unable to get any history from this patient at this time. His hemodynamics seem stable. I do not see a need for acute cardiac intervention at this time I think we need conservative cardiac management for this patient. We will follow him acutely. We will probably trend the enzymes and repeat the EKG at some point. Thanks for allowing me the opportunity  to participate in the evaluation and care of your patients.  Please call if we can assist further [phone]    This chart was likely completed using voice recognition technology and may contain unintended grammatical , phraseology,and/or punctuation errors  Veronica Kemp MD , [facility] [place]  9/2/20225:24 PM

## 2022-09-02 NOTE — PROGRESS NOTES
Bladder scanned patient after having minimal output today, residents aware. 575ml detected on bladder scan. Miles removed,  tip found to have mucous that was not letting it drain properly. Patient immediately put out 600ml when new Miles placed.

## 2022-09-02 NOTE — PLAN OF CARE
Problem: Safety - Medical Restraint  Goal: Remains free of injury from restraints (Restraint for Interference with Medical Device)  Description: INTERVENTIONS:  1. Determine that other, less restrictive measures have been tried or would not be effective before applying the restraint  2. Evaluate the patient's condition at the time of restraint application  3. Inform patient/family regarding the reason for restraint  4. Q2H: Monitor safety, psychosocial status, comfort, nutrition and hydration  Outcome: Progressing  Flowsheets  Taken 9/2/2022 1000  Remains free of injury from restraints (restraint for interference with medical device): Every 2 hours: Monitor safety, psychosocial status, comfort, nutrition and hydration  Taken 9/2/2022 0800  Remains free of injury from restraints (restraint for interference with medical device): Every 2 hours: Monitor safety, psychosocial status, comfort, nutrition and hydration     Problem: Pain  Goal: Verbalizes/displays adequate comfort level or baseline comfort level  Outcome: Progressing     Problem: Safety - Adult  Goal: Free from fall injury  Outcome: Progressing     Problem: ABCDS Injury Assessment  Goal: Absence of physical injury  Recent Flowsheet Documentation  Taken 9/2/2022 0800 by Carlos Lopes RN  Absence of Physical Injury: Implement safety measures based on patient assessment     Problem: Skin/Tissue Integrity  Goal: Absence of new skin breakdown  Description: 1. Monitor for areas of redness and/or skin breakdown  2. Assess vascular access sites hourly  3. Every 4-6 hours minimum:  Change oxygen saturation probe site  4. Every 4-6 hours:  If on nasal continuous positive airway pressure, respiratory therapy assess nares and determine need for appliance change or resting period.   Outcome: Progressing

## 2022-09-02 NOTE — PROGRESS NOTES
Patient was switched to pressure control ventilation due to peak pressures escalating so high. Patient seems to be tolerating switch a lot better.

## 2022-09-02 NOTE — PROGRESS NOTES
Pharmacy Note - Extended Infusion Beta-Lactam Adjustment    Meropenem ordered for treatment of aspiration PNA. Per [facility] Extended Infusion Beta-Lactam Janay Bennette will be changed to 1000 mg every 8 hours. Estimated Creatinine Clearance: Estimated Creatinine Clearance: 81 mL/min (based on SCr of 1 mg/dL). BMI: Body mass index is 28.21 kg/m². Rationale for Adjustment: Agent demonstrates time-dependent effect on bacterial eradication. Extended-infusion dosing strategy aims to enhance microbiologic and clinical efficacy. Pharmacy will continue to monitor cultures and sensitivities (where available) and adjust dose as necessary. Please call with any questions.   Caryl Back, PharmD  Main Pharmacy: [id number]

## 2022-09-02 NOTE — PROGRESS NOTES
Report received from ED. Patient taken for CT CAP with RT and admitted to ICU 4525. CHG bath given on arrival. Scattered rash noted. Pupils with sluggish reaction, pt responding to pain only, withdrawals x4 extremities. Weaning fentanyl and propofol. No seizure activity to note. Pt hypotensive with SBP 70's despite sedation wean, ICU team notified and order for 1L NS bolus. Miles cath intact and draining clear ok urine. SCD's applied. Mepilex border applied to sacrum and heels. Pt's sister, Iveth Truong called and admission questionnaire completed. 18fr OGT placed, X-ray confirmation pending. Will cont to monitor.

## 2022-09-03 LAB
ALBUMIN SERPL-MCNC: 1.9 G/DL (ref 3.4–5)
ALBUMIN SERPL-MCNC: 3.1 G/DL (ref 3.4–5)
ANION GAP SERPL CALCULATED.3IONS-SCNC: 10 MMOL/L (ref 3–16)
ANION GAP SERPL CALCULATED.3IONS-SCNC: 9 MMOL/L (ref 3–16)
BASOPHILS ABSOLUTE: 0.1 K/UL (ref 0–0.2)
BASOPHILS RELATIVE PERCENT: 1 %
BUN BLDV-MCNC: 3 MG/DL (ref 7–20)
BUN BLDV-MCNC: 3 MG/DL (ref 7–20)
CALCIUM SERPL-MCNC: 6.9 MG/DL (ref 8.3–10.6)
CALCIUM SERPL-MCNC: 7.9 MG/DL (ref 8.3–10.6)
CHLORIDE BLD-SCNC: 101 MMOL/L (ref 99–110)
CHLORIDE BLD-SCNC: 106 MMOL/L (ref 99–110)
CO2: 20 MMOL/L (ref 21–32)
CO2: 24 MMOL/L (ref 21–32)
CREAT SERPL-MCNC: 0.5 MG/DL (ref 0.8–1.3)
CREAT SERPL-MCNC: 0.6 MG/DL (ref 0.8–1.3)
EOSINOPHILS ABSOLUTE: 0.2 K/UL (ref 0–0.6)
EOSINOPHILS RELATIVE PERCENT: 1.3 %
GFR AFRICAN AMERICAN: >60
GFR AFRICAN AMERICAN: >60
GFR NON-AFRICAN AMERICAN: >60
GFR NON-AFRICAN AMERICAN: >60
GLUCOSE BLD-MCNC: 124 MG/DL (ref 70–99)
GLUCOSE BLD-MCNC: 131 MG/DL (ref 70–99)
HCT VFR BLD CALC: 35.2 % (ref 40.5–52.5)
HEMOGLOBIN: 13 G/DL (ref 13.5–17.5)
LYMPHOCYTES ABSOLUTE: 2.1 K/UL (ref 1–5.1)
LYMPHOCYTES RELATIVE PERCENT: 17.7 %
MAGNESIUM: 1.7 MG/DL (ref 1.8–2.4)
MAGNESIUM: 2.1 MG/DL (ref 1.8–2.4)
MCH RBC QN AUTO: 34.3 PG (ref 26–34)
MCHC RBC AUTO-ENTMCNC: 37 G/DL (ref 31–36)
MCV RBC AUTO: 92.8 FL (ref 80–100)
MONOCYTES ABSOLUTE: 0.7 K/UL (ref 0–1.3)
MONOCYTES RELATIVE PERCENT: 6.1 %
NEUTROPHILS ABSOLUTE: 8.9 K/UL (ref 1.7–7.7)
NEUTROPHILS RELATIVE PERCENT: 73.9 %
PDW BLD-RTO: 13.4 % (ref 12.4–15.4)
PHOSPHORUS: 1.8 MG/DL (ref 2.5–4.9)
PHOSPHORUS: 2 MG/DL (ref 2.5–4.9)
PLATELET # BLD: 117 K/UL (ref 135–450)
PMV BLD AUTO: 10 FL (ref 5–10.5)
POTASSIUM SERPL-SCNC: 3.2 MMOL/L (ref 3.5–5.1)
POTASSIUM SERPL-SCNC: 3.7 MMOL/L (ref 3.5–5.1)
PROCALCITONIN: 0.27 NG/ML (ref 0–0.15)
RBC # BLD: 3.8 M/UL (ref 4.2–5.9)
SODIUM BLD-SCNC: 131 MMOL/L (ref 136–145)
SODIUM BLD-SCNC: 139 MMOL/L (ref 136–145)
TROPONIN: 0.12 NG/ML
VANCOMYCIN RANDOM: 14.3 UG/ML
VANCOMYCIN RANDOM: 25.3 UG/ML
WBC # BLD: 12 K/UL (ref 4–11)

## 2022-09-03 PROCEDURE — 85025 COMPLETE CBC W/AUTO DIFF WBC: CPT

## 2022-09-03 PROCEDURE — 36415 COLL VENOUS BLD VENIPUNCTURE: CPT

## 2022-09-03 PROCEDURE — 2580000003 HC RX 258

## 2022-09-03 PROCEDURE — 2500000003 HC RX 250 WO HCPCS

## 2022-09-03 PROCEDURE — 2580000003 HC RX 258: Performed by: NURSE PRACTITIONER

## 2022-09-03 PROCEDURE — 83735 ASSAY OF MAGNESIUM: CPT

## 2022-09-03 PROCEDURE — 2580000003 HC RX 258: Performed by: STUDENT IN AN ORGANIZED HEALTH CARE EDUCATION/TRAINING PROGRAM

## 2022-09-03 PROCEDURE — 6360000002 HC RX W HCPCS: Performed by: STUDENT IN AN ORGANIZED HEALTH CARE EDUCATION/TRAINING PROGRAM

## 2022-09-03 PROCEDURE — 2500000003 HC RX 250 WO HCPCS: Performed by: STUDENT IN AN ORGANIZED HEALTH CARE EDUCATION/TRAINING PROGRAM

## 2022-09-03 PROCEDURE — 6360000002 HC RX W HCPCS: Performed by: NURSE PRACTITIONER

## 2022-09-03 PROCEDURE — 2500000003 HC RX 250 WO HCPCS: Performed by: EMERGENCY MEDICINE

## 2022-09-03 PROCEDURE — 6360000002 HC RX W HCPCS: Performed by: EMERGENCY MEDICINE

## 2022-09-03 PROCEDURE — 95813 EEG EXTND MNTR 61-119 MIN: CPT

## 2022-09-03 PROCEDURE — 2000000000 HC ICU R&B

## 2022-09-03 PROCEDURE — 84145 PROCALCITONIN (PCT): CPT

## 2022-09-03 PROCEDURE — A4216 STERILE WATER/SALINE, 10 ML: HCPCS | Performed by: STUDENT IN AN ORGANIZED HEALTH CARE EDUCATION/TRAINING PROGRAM

## 2022-09-03 PROCEDURE — 94003 VENT MGMT INPAT SUBQ DAY: CPT

## 2022-09-03 PROCEDURE — 2700000000 HC OXYGEN THERAPY PER DAY

## 2022-09-03 PROCEDURE — APPNB30 APP NON BILLABLE TIME 0-30 MINS: Performed by: NURSE PRACTITIONER

## 2022-09-03 PROCEDURE — 80202 ASSAY OF VANCOMYCIN: CPT

## 2022-09-03 PROCEDURE — 2580000003 HC RX 258: Performed by: EMERGENCY MEDICINE

## 2022-09-03 PROCEDURE — 6360000002 HC RX W HCPCS

## 2022-09-03 PROCEDURE — 94761 N-INVAS EAR/PLS OXIMETRY MLT: CPT

## 2022-09-03 PROCEDURE — 99291 CRITICAL CARE FIRST HOUR: CPT | Performed by: INTERNAL MEDICINE

## 2022-09-03 PROCEDURE — 84484 ASSAY OF TROPONIN QUANT: CPT

## 2022-09-03 PROCEDURE — 80069 RENAL FUNCTION PANEL: CPT

## 2022-09-03 RX ORDER — LANOLIN ALCOHOL/MO/W.PET/CERES
200 CREAM (GRAM) TOPICAL DAILY
Status: DISCONTINUED | OUTPATIENT
Start: 2022-09-03 | End: 2022-09-03

## 2022-09-03 RX ORDER — MAGNESIUM SULFATE IN WATER 40 MG/ML
2000 INJECTION, SOLUTION INTRAVENOUS ONCE
Status: COMPLETED | OUTPATIENT
Start: 2022-09-03 | End: 2022-09-03

## 2022-09-03 RX ORDER — POTASSIUM CHLORIDE 7.45 MG/ML
10 INJECTION INTRAVENOUS
Status: COMPLETED | OUTPATIENT
Start: 2022-09-03 | End: 2022-09-03

## 2022-09-03 RX ADMIN — PROPOFOL 30 MCG/KG/MIN: 10 INJECTION, EMULSION INTRAVENOUS at 18:35

## 2022-09-03 RX ADMIN — VANCOMYCIN HYDROCHLORIDE 1250 MG: 10 INJECTION, POWDER, LYOPHILIZED, FOR SOLUTION INTRAVENOUS at 03:10

## 2022-09-03 RX ADMIN — AMPICILLIN SODIUM 1000 MG: 1 INJECTION, POWDER, FOR SOLUTION INTRAMUSCULAR; INTRAVENOUS at 06:26

## 2022-09-03 RX ADMIN — AMPICILLIN SODIUM 1000 MG: 1 INJECTION, POWDER, FOR SOLUTION INTRAMUSCULAR; INTRAVENOUS at 00:38

## 2022-09-03 RX ADMIN — Medication 125 MCG/HR: at 15:47

## 2022-09-03 RX ADMIN — THIAMINE HYDROCHLORIDE 100 MG: 100 INJECTION, SOLUTION INTRAMUSCULAR; INTRAVENOUS at 10:11

## 2022-09-03 RX ADMIN — ENOXAPARIN SODIUM 40 MG: 100 INJECTION SUBCUTANEOUS at 10:11

## 2022-09-03 RX ADMIN — PROPOFOL 50 MCG/KG/MIN: 10 INJECTION, EMULSION INTRAVENOUS at 11:51

## 2022-09-03 RX ADMIN — MAGNESIUM SULFATE HEPTAHYDRATE 2000 MG: 40 INJECTION, SOLUTION INTRAVENOUS at 09:04

## 2022-09-03 RX ADMIN — PROPOFOL 40 MCG/KG/MIN: 10 INJECTION, EMULSION INTRAVENOUS at 19:09

## 2022-09-03 RX ADMIN — POTASSIUM CHLORIDE 10 MEQ: 7.46 INJECTION, SOLUTION INTRAVENOUS at 09:13

## 2022-09-03 RX ADMIN — POTASSIUM CHLORIDE 10 MEQ: 7.46 INJECTION, SOLUTION INTRAVENOUS at 10:20

## 2022-09-03 RX ADMIN — SODIUM CHLORIDE, PRESERVATIVE FREE 20 MG: 5 INJECTION INTRAVENOUS at 20:07

## 2022-09-03 RX ADMIN — POTASSIUM PHOSPHATE, MONOBASIC AND POTASSIUM PHOSPHATE, DIBASIC 20 MMOL: 224; 236 INJECTION, SOLUTION, CONCENTRATE INTRAVENOUS at 10:28

## 2022-09-03 RX ADMIN — VANCOMYCIN HYDROCHLORIDE 1000 MG: 10 INJECTION, POWDER, LYOPHILIZED, FOR SOLUTION INTRAVENOUS at 14:23

## 2022-09-03 RX ADMIN — AMPICILLIN SODIUM 1000 MG: 1 INJECTION, POWDER, FOR SOLUTION INTRAMUSCULAR; INTRAVENOUS at 23:55

## 2022-09-03 RX ADMIN — PROPOFOL 50 MCG/KG/MIN: 10 INJECTION, EMULSION INTRAVENOUS at 20:05

## 2022-09-03 RX ADMIN — Medication 150 MCG/HR: at 12:28

## 2022-09-03 RX ADMIN — PROPOFOL 40 MCG/KG/MIN: 10 INJECTION, EMULSION INTRAVENOUS at 18:07

## 2022-09-03 RX ADMIN — AMPICILLIN SODIUM 1000 MG: 1 INJECTION, POWDER, FOR SOLUTION INTRAMUSCULAR; INTRAVENOUS at 11:27

## 2022-09-03 RX ADMIN — AMPICILLIN SODIUM 1000 MG: 1 INJECTION, POWDER, FOR SOLUTION INTRAMUSCULAR; INTRAVENOUS at 16:36

## 2022-09-03 RX ADMIN — POTASSIUM CHLORIDE 10 MEQ: 7.46 INJECTION, SOLUTION INTRAVENOUS at 11:22

## 2022-09-03 RX ADMIN — NOREPINEPHRINE BITARTRATE 2 MCG/MIN: 1 SOLUTION INTRAVENOUS at 18:39

## 2022-09-03 RX ADMIN — ACYCLOVIR SODIUM 800 MG: 50 INJECTION, SOLUTION INTRAVENOUS at 04:57

## 2022-09-03 RX ADMIN — PROPOFOL 50 MCG/KG/MIN: 10 INJECTION, EMULSION INTRAVENOUS at 04:56

## 2022-09-03 RX ADMIN — SODIUM CHLORIDE, PRESERVATIVE FREE 10 ML: 5 INJECTION INTRAVENOUS at 09:00

## 2022-09-03 RX ADMIN — PROPOFOL 35 MCG/KG/MIN: 10 INJECTION, EMULSION INTRAVENOUS at 18:43

## 2022-09-03 RX ADMIN — Medication 150 MCG/HR: at 06:24

## 2022-09-03 RX ADMIN — VANCOMYCIN HYDROCHLORIDE 1000 MG: 10 INJECTION, POWDER, LYOPHILIZED, FOR SOLUTION INTRAVENOUS at 20:30

## 2022-09-03 RX ADMIN — PROPOFOL 45 MCG/KG/MIN: 10 INJECTION, EMULSION INTRAVENOUS at 17:48

## 2022-09-03 RX ADMIN — POTASSIUM CHLORIDE 10 MEQ: 7.46 INJECTION, SOLUTION INTRAVENOUS at 13:56

## 2022-09-03 RX ADMIN — NOREPINEPHRINE BITARTRATE 4 MCG/MIN: 1 SOLUTION INTRAVENOUS at 15:16

## 2022-09-03 RX ADMIN — PROPOFOL 50 MCG/KG/MIN: 10 INJECTION, EMULSION INTRAVENOUS at 08:40

## 2022-09-03 RX ADMIN — AMPICILLIN SODIUM 1000 MG: 1 INJECTION, POWDER, FOR SOLUTION INTRAMUSCULAR; INTRAVENOUS at 19:50

## 2022-09-03 RX ADMIN — LEVETIRACETAM 1500 MG: 100 INJECTION, SOLUTION, CONCENTRATE INTRAVENOUS at 13:43

## 2022-09-03 RX ADMIN — PROPOFOL 50 MCG/KG/MIN: 10 INJECTION, EMULSION INTRAVENOUS at 15:45

## 2022-09-03 RX ADMIN — CEFTRIAXONE 2000 MG: 2 INJECTION, POWDER, FOR SOLUTION INTRAMUSCULAR; INTRAVENOUS at 23:04

## 2022-09-03 RX ADMIN — PROPOFOL 35 MCG/KG/MIN: 10 INJECTION, EMULSION INTRAVENOUS at 18:10

## 2022-09-03 RX ADMIN — PROPOFOL 50 MCG/KG/MIN: 10 INJECTION, EMULSION INTRAVENOUS at 23:05

## 2022-09-03 RX ADMIN — ACYCLOVIR SODIUM 800 MG: 50 INJECTION, SOLUTION INTRAVENOUS at 12:37

## 2022-09-03 RX ADMIN — POTASSIUM CHLORIDE 10 MEQ: 7.46 INJECTION, SOLUTION INTRAVENOUS at 12:31

## 2022-09-03 RX ADMIN — PROPOFOL 50 MCG/KG/MIN: 10 INJECTION, EMULSION INTRAVENOUS at 02:05

## 2022-09-03 RX ADMIN — ACYCLOVIR SODIUM 800 MG: 50 INJECTION, SOLUTION INTRAVENOUS at 19:17

## 2022-09-03 RX ADMIN — SODIUM CHLORIDE, PRESERVATIVE FREE 20 MG: 5 INJECTION INTRAVENOUS at 12:07

## 2022-09-03 RX ADMIN — SODIUM CHLORIDE, PRESERVATIVE FREE 10 ML: 5 INJECTION INTRAVENOUS at 20:24

## 2022-09-03 RX ADMIN — Medication 125 MCG/HR: at 19:54

## 2022-09-03 RX ADMIN — CEFTRIAXONE 2000 MG: 2 INJECTION, POWDER, FOR SOLUTION INTRAMUSCULAR; INTRAVENOUS at 11:53

## 2022-09-03 RX ADMIN — SODIUM CHLORIDE 1000 MG: 9 INJECTION, SOLUTION INTRAVENOUS at 02:07

## 2022-09-03 ASSESSMENT — PULMONARY FUNCTION TESTS
PIF_VALUE: 26
PIF_VALUE: 25
PIF_VALUE: 24
PIF_VALUE: 27
PIF_VALUE: 28
PIF_VALUE: 27
PIF_VALUE: 25
PIF_VALUE: 28
PIF_VALUE: 25
PIF_VALUE: 24
PIF_VALUE: 25
PIF_VALUE: 26
PIF_VALUE: 25
PIF_VALUE: 34
PIF_VALUE: 25
PIF_VALUE: 24
PIF_VALUE: 27
PIF_VALUE: 35
PIF_VALUE: 26
PIF_VALUE: 27
PIF_VALUE: 25
PIF_VALUE: 27
PIF_VALUE: 28
PIF_VALUE: 27
PIF_VALUE: 37
PIF_VALUE: 25
PIF_VALUE: 26
PIF_VALUE: 23
PIF_VALUE: 25
PIF_VALUE: 25
PIF_VALUE: 27
PIF_VALUE: 29
PIF_VALUE: 25
PIF_VALUE: 26
PIF_VALUE: 24
PIF_VALUE: 25
PIF_VALUE: 26
PIF_VALUE: 24
PIF_VALUE: 25
PIF_VALUE: 28
PIF_VALUE: 24
PIF_VALUE: 24
PIF_VALUE: 26
PIF_VALUE: 26
PIF_VALUE: 25
PIF_VALUE: 25
PIF_VALUE: 26
PIF_VALUE: 25
PIF_VALUE: 25
PIF_VALUE: 28
PIF_VALUE: 22
PIF_VALUE: 27
PIF_VALUE: 27
PIF_VALUE: 24

## 2022-09-03 NOTE — PROGRESS NOTES
Comprehensive Nutrition Assessment    Type and Reason for Visit:  Initial (vent support/enteral nutrition initiation)    Nutrition Recommendations/Plan:   Vital 1.2 AF start at 15 ml/hr increase slowly by 10 ml every 8 hours since electrolytes depleted to goal rate of 45 ml/hr (based on 20 hours figuring in interruptions in care) provides 1080 calories, 68 grams of protein, 730 ml free water. Proteinex Protein supplement bid providing an additional 208 calories and 52 grams of protein. Water flush 30 ml every hours to start with, adjust as needed  Will monitor TF intake and tolerance, blood sugar trends, and fluid and electrolyte balances     Malnutrition Assessment:  Malnutrition Status: At risk for malnutrition (Comment) (09/03/22 1250)    Context:     Nutrition Assessment:    Patient admitted with seizures; neuro following for continuous EEG monitoring. On vent support sedated with Fentanyl and Propofol (at 50 mcg providing 730 calories from fat. Levo at 2 mcg at this time. RD will provide recommendations for most appropriate tube feeding to prevent overfeeding and hyperglycemia. Nutrition Related Findings:    Na 131, K 3.2 on 9/3/22 Wound Type:  (scattered abrasions)       Current Nutrition Intake & Therapies:    Average Meal Intake: NPO  Average Supplements Intake: NPO  Diet NPO  ADULT TUBE FEEDING; Nasogastric; Standard with Fiber; Continuous; 10; Yes; 10; Q 6 hours; 55; 30; Q 4 hours  Current Tube Feeding (TF) Orders:  Goal TF & Flush Orders Provides: Vital 1.2 AF start at 15 ml/hr increase by 10 ml every 8 hours as tolerated to goal rate of 45 ml/hr (based on 20 hours figuring in interruptions in care) provides 1080 calories, 68 grams of protein, 730 ml free water. Proteinex Protein supplement bid providing an additional 208 calories and 52 grams of protein.   Water flush 30 ml every hours to start with, adjust as needed    Anthropometric Measures:  Height: 5' 10\" (177.8 cm)  Ideal Body Weight (IBW): 166 lbs (75 kg)       Current Body Weight: 202 lb 11 oz (91.9 kg),   IBW. Weight Source: Bed Scale  Current BMI (kg/m2): 29.1                          BMI Categories: Overweight (BMI 25.0-29. 9)    Estimated Daily Nutrient Needs:  Energy Requirements Based On: Kcal/kg  Weight Used for Energy Requirements: Admission  Energy (kcal/day): 2518-5198 (20-22 kcal/91.9 kg)  Weight Used for Protein Requirements: Admission  Protein (g/day): 110-138 (1.2-1.5 g/91.9 kg)  Method Used for Fluid Requirements: 1 ml/kcal  Fluid (ml/day):      Nutrition Diagnosis:   Increased nutrient needs related to impaired respiratory function, increase demand for energy/nutrients, lack or limited access to food as evidenced by NPO or clear liquid status due to medical condition, nutrition support - enteral nutrition    Nutrition Interventions:   Food and/or Nutrient Delivery: Start Tube Feeding  Nutrition Education/Counseling: Education not appropriate  Coordination of Nutrition Care: Continue to monitor while inpatient       Goals:     Goals: Initiate nutrition support, Tolerate nutrition support at goal rate       Nutrition Monitoring and Evaluation:      Food/Nutrient Intake Outcomes: Enteral Nutrition Intake/Tolerance  Physical Signs/Symptoms Outcomes: Biochemical Data, Nutrition Focused Physical Findings (respiratory function)    Discharge Planning:     Too soon to determine     JUAN, [address] [facility], [address] [place] [address],   Contact: [phone]

## 2022-09-03 NOTE — H&P
ICU H&P Note    Admit Date: 9/1/2022  Day: 2  Vent Day: 2  IV Access: Peripheral  IV Fluids: None  Vasopressors: levophed                Antibiotics: Ceftriaxone, vancomycin, ampicilin  Diet: Diet NPO    CC: Collapse    Interval history: NO acute events overnight. LDA: Peripheral IV left forearm, left anticubital, Rt forearm, Rt hand, OG left mouth, Urinary catheter, ETT    Vent: AC/PVR, Rate set: 14+ Rate measured: 14+ Vt set: 575, PIP:19, PEEP:5+  ABG 7.37/40/145    I/O net: +1.0 L    HPI: The patient is a 78-year-old man with past medical history significant for chronic alcoholism, ICH, CAD, DVT who presented today to the ED with an episode of syncope. The history was mainly obtained by the sister since patient was intubated. He was last known well at 9 AM but the sister. According to the sister, she had just returned home from work which she had found him half lying down on the couch covered in the stool while his car outside was running outside with occasion. She denies any complaints of recent fever, chills, chest pain cough, shortness of breath, lightheadedness, palpitations, nausea, vomiting, abdominal pain, diarrhea, fatigue, visual disturbance, changes in urination frequency or burning pain well urination or headaches by the patient in the preceding days to this incident. She does reinstate that her brother is a chronic alcoholic and had just gotten out of rehab. He has been sober since however she was not aware if he had recently had any alcoholic drink. He had also informed that he had recent episode of intracerebral hemorrhage on 9/4/2021. In the ED, there was a concern for possibility of a stroke however he was not considered a thrombolytic candidate because of her previous ICH and long time since his last known well. According to the ED staff his physical exam was more pertinent for nonfocal delirium. It was also significant for nystagmus and rightward gaze not fixed.   He was given 2 mg of Versed and it had worsened his oxygen saturations that had dropped to 88% on a nonrebreather and therefore he was intubated. CT Abdomen, chest: Moderate dependent atelectasis bilaterally. No acute abnormality on noncontrast CT of the abdomen and pelvis. Cholelithiasis. CTA Head: No acute CTA findings. No hemodynamically significant stenosis or focal aneurysm. No arteriovenous confirmation. CTA Neck: No acute CTA findings. No hemodynamically significant stenosis or focal aneurysm.     Past Medical History[]Expand by Default        Past Medical History:   Diagnosis Date    Alcohol abuse      CAD (coronary artery disease)       with complete LAD occlusion    CHF (congestive heart failure) (HCC)       LVEF    Essential tremor      HTN (hypertension)      Hx of blood clots 05/2021    Lower extremity cellulitis      MI (mitral incompetence)              Past Surgical History         Past Surgical History:   Procedure Laterality Date    PTCA        UPPER GASTROINTESTINAL ENDOSCOPY N/A 7/28/2020     EGD BIOPSY performed by Michelle Allen MD at [facility]:   The patient lives at home with seizure     Alcohol: Chronic alcoholic  Illicit drugs: no use  Tobacco: not aware     Family History:  Family History[]Expand by Default         Family History   Problem Relation Age of Onset    Stroke Mother      Heart Disease Father           Medications:     Scheduled Meds:   magnesium sulfate  2,000 mg IntraVENous Once    potassium chloride  10 mEq IntraVENous Q1H    potassium phosphate IVPB  20 mmol IntraVENous Once    cefTRIAXone (ROCEPHIN) IV  2,000 mg IntraVENous Q12H    ampicillin IV  1,000 mg IntraVENous 6 times per day    acyclovir  10 mg/kg (Adjusted) IntraVENous Q8H    vancomycin  1,250 mg IntraVENous Q12H    enoxaparin  40 mg SubCUTAneous Daily    levETIRAcetam  1,000 mg IntraVENous Q12H    lidocaine PF  5 mL IntraDERmal Once    sodium chloride flush  5-40 mL IntraVENous 2 times per day thiamine  100 mg IntraVENous Daily     Continuous Infusions:   fentaNYL 150 mcg/hr (09/03/22 0624)    sodium chloride      propofol 50 mcg/kg/min (09/03/22 0840)    norepinephrine 4 mcg/min (09/03/22 0848)     PRN Meds:sodium chloride flush, sodium chloride, ondansetron **OR** ondansetron, polyethylene glycol, acetaminophen **OR** acetaminophen    Objective:   Vitals:   T-max:  Patient Vitals for the past 8 hrs:   BP Temp Temp src Pulse Resp SpO2 Weight   09/03/22 0849 -- -- -- 82 18 100 % --   09/03/22 0840 -- -- -- 63 16 100 % --   09/03/22 0600 (!) 99/53 -- -- 61 14 98 % 202 lb 11.2 oz (91.9 kg)   09/03/22 0405 -- -- -- 55 14 98 % --   09/03/22 0400 (!) 94/54 98.2 °F (36.8 °C) Bladder 57 14 98 % --   09/03/22 0300 (!) 90/53 -- -- 56 14 98 % --   09/03/22 0200 (!) 92/51 -- -- 61 14 98 % --         Intake/Output Summary (Last 24 hours) at 9/3/2022 0910  Last data filed at 9/3/2022 0600  Gross per 24 hour   Intake 2912.07 ml   Output 1850 ml   Net 1062.07 ml         Review of Systems   Reason unable to perform ROS: Unable to obtain due to pt being intubated. Physical Exam  Constitutional:       Appearance: He is normal weight. HENT:      Head: Normocephalic and atraumatic. Right Ear: Tympanic membrane normal.      Nose: Nose normal.      Mouth/Throat:      Mouth: Mucous membranes are dry. Cardiovascular:      Rate and Rhythm: Normal rate and regular rhythm. Pulses: Normal pulses. Heart sounds: Normal heart sounds. Pulmonary:      Effort: Pulmonary effort is normal.      Breath sounds: No wheezing or rales. Abdominal:      General: Bowel sounds are normal. There is distension. Palpations: Abdomen is soft. Tenderness: There is no abdominal tenderness. There is no guarding. Musculoskeletal:         General: Normal range of motion. Neurological:      General: No focal deficit present. Mental Status: He is disoriented.       Comments: Opens eyes spontaneously, sedated, not following commands   Psychiatric:         Mood and Affect: Mood normal.         LABS:    CBC:   Recent Labs     09/01/22 1709 09/02/22 0410 09/03/22 0455   WBC 21.7* 17.3* 12.0*   HGB 16.5 13.5 13.0*   HCT 48.7 40.4* 35.2*    111* 117*   MCV 93.5 93.3 92.8       Renal:    Recent Labs     09/01/22 1709 09/02/22 0410 09/03/22 0455    138 131*   K 3.7 3.4* 3.2*    104 101   CO2 23 22 20*   BUN 9 8 3*   CREATININE 1.0 0.7* 0.5*   GLUCOSE 157* 125* 131*   CALCIUM 9.0 7.5* 6.9*   MG  --  1.60* 1.70*   PHOS  --  2.0* 1.8*   ANIONGAP 14 12 10       Hepatic:   Recent Labs     09/01/22 1709 09/02/22 0410 09/03/22 0455   AST 42*  --   --    ALT 28  --   --    BILITOT 1.3*  --   --    BILIDIR 0.4*  --   --    PROT 7.8  --   --    LABALBU 4.6 3.3* 1.9*   ALKPHOS 139*  --   --        Troponin:   Recent Labs     09/01/22 1709 09/02/22 0410 09/03/22 0455   TROPONINI 0.15* 0.25* 0.12*       BNP: No results for input(s): BNP in the last 72 hours. Lipids: No results for input(s): CHOL, HDL in the last 72 hours. Invalid input(s): LDLCALCU, TRIGLYCERIDE  ABGs:    Recent Labs     09/01/22  1944 09/02/22  0341   PHART 7.270* 7.374   QFP9HXQ 54.9* 40.7   PO2ART 63.5* 145.0*   HRQ2PLN 25 24   BEART -2.8 -1.4   F2RLUGZW 64* 100   OXT9WII 27 25         INR:   Recent Labs     09/01/22  1830 09/02/22  0608   INR 1.20* 1.27*       Lactate: No results for input(s): LACTATE in the last 72 hours. Cultures:  -----------------------------------------------------------------  RAD:   XR ABDOMEN (KUB) (SINGLE AP VIEW)   Final Result   Findings/Impression:    The tip of the enteric tube terminates in the distal body of the stomach. CT CHEST WO CONTRAST   Final Result      CHEST:      1. Moderate dependent atelectasis bilaterally. ABDOMEN/PELVIS:      1.  No acute abnormality on noncontrast CT of the abdomen and pelvis. 2.  Cholelithiasis.          CT ABDOMEN PELVIS WO CONTRAST Additional Contrast? None Final Result      CHEST:      1. Moderate dependent atelectasis bilaterally. ABDOMEN/PELVIS:      1.  No acute abnormality on noncontrast CT of the abdomen and pelvis. 2.  Cholelithiasis. CTA HEAD NECK W CONTRAST   Final Result      CTA Head:   No acute CTA findings. No hemodynamically significant stenosis or focal aneurysm. No arteriovenous confirmation. CTA Neck:   No acute CTA findings. No hemodynamically significant stenosis or focal aneurysm. CT HEAD WO CONTRAST   Final Result      1. No findings for acute intracranial abnormality. 2.  Age-related atrophy with patchy periventricular white matter changes bilaterally consistent with chronic small vessel ischemia. 3.  Stable low attenuation left frontoparietal lobe consistent with previous insult from known prior intraparenchymal hematoma. Stable right frontoparietal cortical infarct. These findings were called to the emergency room physician Dr. Cali Clark on 9/1/2022 at 5:30 p.m. XR CHEST PORTABLE   Final Result   1. No findings for acute cardiopulmonary disease. 2.  ET tube in good position in the mid trachea. MRI BRAIN WO CONTRAST    (Results Pending)         Assessment/Plan:   Acute Metabolic Encephalopathy 2/2 SIRS vs seizure  On presentation AMS, elevated WBC: 21.7, Tachypneic , Tachycardic, Hypotensive, hx of ICH, SAH and SDH 2/2 to fall, hx of chronic alcohol abuse, CT head:Stable low attenuation left frontoparietal lobe consistent with previous insult from known prior intraparenchymal hematoma. Stable right frontoparietal cortical infarct. CT Abdomen, chest: Moderate dependent atelectasis bilaterally. No acute abnormality on noncontrast CT of the abdomen and pelvis. Cholelithiasis. CTA Head: No acute CTA findings. No hemodynamically significant stenosis or focal aneurysm. No arteriovenous confirmation. CTA Neck: No acute CTA findings.   No hemodynamically significant stenosis or focal aneurysm.  -On levophed  -On broad spectrum antibiotics Vancomycin and ceftriaxone  -Trending Procal (0.27)  -blood culture ordered: NGTD  -Trending Lactic acid  -EEG ordered  -Given loading dose of Keppra  -Consulted Neurology:   -Lumbar puncture ordered    Acute hypercapnic respiratory failure secondary to possible aspiration vs 2/2 medication  On presentation: VBG PH:7.264, PCO2: 56.4, 83.2  Currently: PH: 7.37, PCO2:40.7, PO2:145  Vent settings:AC/PVR, Rate Set: 12, Rate measured: 13, PIP:22.7, PEEP:5+  -Patient intubated with fentanyl and propofol  -On broad spectrum antibiotics    Tropinemia r/o ACS  -Trending trop 0.25 > 0.1 will continue trending   -EKG:wnl, Echo EF: 50-55%, Hx of CAD with mural thrombosis, CXR: currently significant for mild pulmonary vascular congestion  -low dose heparin for possible MI  -EKG stat ordered  -Cardio consulted and following: no intervention at this time.     CAD with LAD stent  -will start on Aspirin, statin, coreg with core pack and on telemetry  -Cardio on board    Alcohol use disorder  -thiamine 100 mg daily  -monitor for withdrawal  -Ethanol level: none detected     Isolated proteinuria  -UA: Positive for protein    Hx of Cirrhosis  -Ammonia:36 wnl    Code Status:Full Code  FEN: NPO  PPX:SCD, lovenox   DISPO: ICU    Jackelin Benjamin MD, PGY-1  09/03/22  9:10 AM    This patient has been staffed and discussed with Dhruv Mast MD.

## 2022-09-03 NOTE — PROGRESS NOTES
CONTINUOUS EEG    Name:  [facility] Record Number:  [id number]  Age: 57 y.o. Gender: male  : 1960  Today's Date:  9/3/2022  Room:  [facility]7672-53  Vital Signs   /60   Pulse 51   Temp 98.8 °F (37.1 °C) (Oral)   Resp 15   Ht 5' 10\" (1.778 m)   Wt 202 lb 11.2 oz (91.9 kg)   SpO2 100%   BMI 29.08 kg/m²       Patient currently on continuous EEG monitoring. All EEG leads are currently in place with no current issues. Verified [employer] connection via team viewer. Checked in with patient RN for current plan of care. Comments: Continue monitoring. All leads within 10K limit.     Electronically signed by Sathya Zepeda on 9/3/2022 at 1:19 PM

## 2022-09-03 NOTE — PROGRESS NOTES
Hospitalist Progress Note      PCP: Troy Ervin MD    Date of Admission: 9/1/2022        Subjective:     Patient remains intubated and sedated this morning. .  No seizure activity noted. .  No fevers. .  Remains on low-dose Levophed which is being weaned      Medications:  Reviewed    Infusion Medications    fentaNYL 150 mcg/hr (09/03/22 0624)    sodium chloride      propofol 50 mcg/kg/min (09/03/22 0840)    norepinephrine 4 mcg/min (09/03/22 0848)     Scheduled Medications    magnesium sulfate  2,000 mg IntraVENous Once    potassium chloride  10 mEq IntraVENous Q1H    potassium phosphate IVPB  20 mmol IntraVENous Once    cefTRIAXone (ROCEPHIN) IV  2,000 mg IntraVENous Q12H    ampicillin IV  1,000 mg IntraVENous 6 times per day    acyclovir  10 mg/kg (Adjusted) IntraVENous Q8H    vancomycin  1,250 mg IntraVENous Q12H    enoxaparin  40 mg SubCUTAneous Daily    levETIRAcetam  1,000 mg IntraVENous Q12H    lidocaine PF  5 mL IntraDERmal Once    sodium chloride flush  5-40 mL IntraVENous 2 times per day    thiamine  100 mg IntraVENous Daily     PRN Meds: sodium chloride flush, sodium chloride, ondansetron **OR** ondansetron, polyethylene glycol, acetaminophen **OR** acetaminophen      Intake/Output Summary (Last 24 hours) at 9/3/2022 0908  Last data filed at 9/3/2022 0600  Gross per 24 hour   Intake 2912.07 ml   Output 1850 ml   Net 1062.07 ml       Physical Exam Performed:    BP (!) 99/53   Pulse 82   Temp 98.2 °F (36.8 °C) (Bladder)   Resp 18   Ht 5' 10\" (1.778 m)   Wt 202 lb 11.2 oz (91.9 kg)   SpO2 100%   BMI 29.08 kg/m²     General appearance: No apparent distress, intubated and sedated  HEENT: Pupils equal, round, and reactive to light. Conjunctivae/corneas clear. Neck: Supple, with full range of motion. No jugular venous distention. Trachea midline. Respiratory:  Normal respiratory effort. Clear to auscultation, bilaterally without Rales/Wheezes/Rhonchi.   Cardiovascular: Regular rate and rhythm with normal S1/S2 without murmurs, rubs or gallops. Abdomen: Soft, non-tender, non-distended with normal bowel sounds. Musculoskeletal: No clubbing, cyanosis or edema bilaterally. Full range of motion without deformity. Skin: Skin color, texture, turgor normal.  No rashes or lesions. Neurologic: No obvious focal deficits, currently sedated    Capillary Refill: Brisk, 3 seconds, normal   Peripheral Pulses: +2 palpable, equal bilaterally       Labs:   Recent Labs     09/01/22 1709 09/02/22 0410 09/03/22  0455   WBC 21.7* 17.3* 12.0*   HGB 16.5 13.5 13.0*   HCT 48.7 40.4* 35.2*    111* 117*     Recent Labs     09/01/22 1709 09/02/22 0410 09/03/22  0455    138 131*   K 3.7 3.4* 3.2*    104 101   CO2 23 22 20*   BUN 9 8 3*   CREATININE 1.0 0.7* 0.5*   CALCIUM 9.0 7.5* 6.9*   PHOS  --  2.0* 1.8*     Recent Labs     09/01/22 1709   AST 42*   ALT 28   BILIDIR 0.4*   BILITOT 1.3*   ALKPHOS 139*     Recent Labs     09/01/22  1830 09/02/22  0608   INR 1.20* 1.27*     Recent Labs     09/01/22 1709 09/02/22 0410 09/03/22  0455   CKTOTAL  --  336*  --    TROPONINI 0.15* 0.25* 0.12*       Urinalysis:      Lab Results   Component Value Date/Time    NITRU Negative 09/01/2022 05:09 PM    WBCUA 0-2 09/01/2022 05:09 PM    BACTERIA Rare 09/01/2022 05:09 PM    RBCUA 0-2 09/01/2022 05:09 PM    BLOODU MODERATE 09/01/2022 05:09 PM    SPECGRAV >=1.030 09/01/2022 05:09 PM    GLUCOSEU Negative 09/01/2022 05:09 PM       Radiology:  XR ABDOMEN (KUB) (SINGLE AP VIEW)   Final Result   Findings/Impression:    The tip of the enteric tube terminates in the distal body of the stomach. CT CHEST WO CONTRAST   Final Result      CHEST:      1. Moderate dependent atelectasis bilaterally. ABDOMEN/PELVIS:      1.  No acute abnormality on noncontrast CT of the abdomen and pelvis. 2.  Cholelithiasis. CT ABDOMEN PELVIS WO CONTRAST Additional Contrast? None   Final Result      CHEST:      1.   Moderate dependent atelectasis bilaterally. ABDOMEN/PELVIS:      1.  No acute abnormality on noncontrast CT of the abdomen and pelvis. 2.  Cholelithiasis. CTA HEAD NECK W CONTRAST   Final Result      CTA Head:   No acute CTA findings. No hemodynamically significant stenosis or focal aneurysm. No arteriovenous confirmation. CTA Neck:   No acute CTA findings. No hemodynamically significant stenosis or focal aneurysm. CT HEAD WO CONTRAST   Final Result      1. No findings for acute intracranial abnormality. 2.  Age-related atrophy with patchy periventricular white matter changes bilaterally consistent with chronic small vessel ischemia. 3.  Stable low attenuation left frontoparietal lobe consistent with previous insult from known prior intraparenchymal hematoma. Stable right frontoparietal cortical infarct. These findings were called to the emergency room physician Dr. José Dos Santos on 9/1/2022 at 5:30 p.m. XR CHEST PORTABLE   Final Result   1. No findings for acute cardiopulmonary disease. 2.  ET tube in good position in the mid trachea. MRI BRAIN WO CONTRAST    (Results Pending)           Assessment/Plan:      -Acute encephalopathy, unclear cause  -Suspected seizure. Milagro Paskenta   EEG with no seizure activity noted  -Acute respiratory failure with hypoxia and hypercapnia requiring intubation/mechanical ventilation  -SIRS/possible sepsis  -Alcoholic Cirrhosis  -CAD with LAD stent -was  on Plavix  -Previous MI with Hx of mural thrombus -was on Eliquis  -Chronic CHF - last EF 50-55% (1/10/21)  -recent  traumatic intracranial hemorrhage sec to fall: SAH and SDH 2/2 traumatic fall and Hx of recurrent falls  -Chronic Anticoagulation -was on Plavix/Eliquis  -Chronic Alcohol Abuse-no signs of withdrawal-currently sedated on propofol and fentanyl    Plan  MRI brain once able  cEEG ,AED per neurology-patient is on IV Keppra  Empiric antimicrobials for suspected CNS infection--on ceftriaxone, ampicillin, vancomycin, acyclovir--management per critical care and neurology  Wean levo  LP per IR/neurology    DVT Prophylaxis:   Diet: Diet NPO  Code Status: Full Code  PT/OT Eval Status:         Alisha Blake MD

## 2022-09-03 NOTE — PROGRESS NOTES
Clinical Pharmacy Progress Note    Vancomycin - Management by Pharmacy    Consult Date(s): 9/1/22  Consulting Provider(s): Dr. Blank Eagle / Plan    Sepsis - r/o CNS infection - Vancomycin  Concurrent Antimicrobials:   Ceftriaxone - day #2  Ampicillin - day #2  Acyclovir - day #2  Day of Vanc Therapy / Ordered Duration: #3  Current Dosing Method: Bayesian-Guided AUC Dosing  Therapeutic Goal: 400-600 mg/L*hr  Current Dose / Frequency: 1250 mg IV q12h  Plan / Rationale:   Renal function improved today, as Scr 0.5 mg/dL today from 0.7 mg/dL yesterday. Random level this AM drawn ~6 hours after last dose was 14.3 mg/L. Predicted AUC on current dose of 1250 mg IV q12hr is low at 392 mg/L*hr. Will increase dose to 1000 mg IV q8hr. This predicts an AUC of 469 mg/L*hr and a sstrough of 14.8 mg/L. Plan to get a level tomorrow at 1000 after 3rd dose of current regimen. Will continue to monitor clinical condition and make adjustments to regimen as appropriate. Thank you for consulting Pharmacy! Denise Urbina, PharmD  Main Pharmacy: [id number]  9/3/2022 12:52 PM      Interval update:  No seizure on EEG, plan for LP in IR, Plan for MRI. WBC down trending to 12 today from 17.3 yesterday. Afebrile. Remains intubated and sedated on 2 mcg NorEpi for pressure support. Subjective/Objective: Mr. Fidel Joseph is a 57 y.o. male with PMHx significant for chronic CAD, HTN, CHF, blood clot hx, essential tremor, EtOH abuse with recent rehab discharge, and prior ICH (Sept 2021 per notes). Found down at home by family, incontinent of stool. Intubated in the ED for for respiratory failure. Concern for sepsis as well as seizures, specifically for aspiration given seizures. Antibiotics and cEEG monitoring initiated. Pharmacy has been consulted to dose vancomycin.     Ht Readings from Last 1 Encounters:   09/01/22 5' 10\" (1.778 m)     Wt Readings from Last 1 Encounters:   09/03/22 202 lb 11.2 oz (91.9 kg) Current & Prior Antimicrobial Regimen(s):   (9/1)   (9/1-9/2)  Ceftriaxone (9/2-current)  Ampicillin (9/2-current)  Acyclovir (9/2-current)  Vancomycin - pharmacy to dose   1750mg IV x1 load (9/1)  1000mg IV q12h (9/1-current)    Level(s) / Doses:    Date Time Dose Level / Type of Level Interpretation   9/3 0956 1250mg IV q12h Random - 14.3 mg/L Drawn ~6 hours after last dose   Predicted AUC low at 392 mg/L*hr. Increasing dose to 1000 mg q8hr as this predicts an AUC of 469 mg/L*hr and sstrough of 14.8 mg/L. Note: Serum levels collected for AUC-based dosing may be high if collected in close proximity to the dose administered. This is not necessarily indicative of toxicity. Cultures & Sensitivities:    Date Site Micro Susceptibility / Result   9/1 Blood x2 NGTD    9/1 CSF Sent      Labs / Ancillary Data:    Estimated Creatinine Clearance: 170 mL/min (A) (based on SCr of 0.5 mg/dL (L)).     Recent Labs     09/01/22  1709 09/02/22  0410 09/03/22  0455   CREATININE 1.0 0.7* 0.5*   BUN 9 8 3*   WBC 21.7* 17.3* 12.0*         Additional Lab Values / Findings of Note:    Procalcitonin:   Recent Labs     09/02/22  0410 09/03/22  0455   PROCAL 0.30* 0.27*

## 2022-09-03 NOTE — PROGRESS NOTES
Patient remained sedated overnight. Levophed for pressure support. Moderate white thick secretions from ETT. No other signs or symptoms of distress.

## 2022-09-03 NOTE — PLAN OF CARE
Problem: Safety - Medical Restraint  Goal: Remains free of injury from restraints (Restraint for Interference with Medical Device)  Description: INTERVENTIONS:  1. Determine that other, less restrictive measures have been tried or would not be effective before applying the restraint  2. Evaluate the patient's condition at the time of restraint application  3. Inform patient/family regarding the reason for restraint  4. Q2H: Monitor safety, psychosocial status, comfort, nutrition and hydration  Flowsheets  Taken 9/3/2022 5948  Remains free of injury from restraints (restraint for interference with medical device):   Every 2 hours: Monitor safety, psychosocial status, comfort, nutrition and hydration   Evaluate the patient's condition at the time of restraint application   Determine that other, less restrictive measures have been tried or would not be effective before applying the restraint   Inform patient/family regarding the reason for restraint    Problem: ABCDS Injury Assessment  Goal: Absence of physical injury  Recent Flowsheet Documentation  Taken 9/3/2022 1200 by Tarah Gonzales RN  Absence of Physical Injury: Implement safety measures based on patient assessment. Turning to sides and protection to bony prominences.

## 2022-09-03 NOTE — PROGRESS NOTES
ICU Progress Note    Admit Date: 9/1/2022  Day: 2  Vent Day: 2  IV Access: Peripheral  IV Fluids: None  Vasopressors: levophed                Antibiotics: Ceftriaxone, vancomycin, ampicilin  Diet: Diet NPO     CC: Collapse     Interval history: NO acute events overnight. LDA: Peripheral IV left forearm, left anticubital, Rt forearm, Rt hand, OG left mouth, Urinary catheter, ETT     Vent: AC/PVR, Rate set: 14+ Rate measured: 14+ Vt set: 575, PIP:19, PEEP:5+  ABG 7.37/40/145     I/O net: +1.0 L     HPI: The patient is a 56-year-old man with past medical history significant for chronic alcoholism, ICH, CAD, DVT who presented today to the ED with an episode of syncope. The history was mainly obtained by the sister since patient was intubated. He was last known well at 9 AM but the sister. According to the sister, she had just returned home from work which she had found him half lying down on the couch covered in the stool while his car outside was running outside with occasion. She denies any complaints of recent fever, chills, chest pain cough, shortness of breath, lightheadedness, palpitations, nausea, vomiting, abdominal pain, diarrhea, fatigue, visual disturbance, changes in urination frequency or burning pain well urination or headaches by the patient in the preceding days to this incident. She does reinstate that her brother is a chronic alcoholic and had just gotten out of rehab. He has been sober since however she was not aware if he had recently had any alcoholic drink. He had also informed that he had recent episode of intracerebral hemorrhage on 9/4/2021. In the ED, there was a concern for possibility of a stroke however he was not considered a thrombolytic candidate because of her previous ICH and long time since his last known well. According to the ED staff his physical exam was more pertinent for nonfocal delirium. It was also significant for nystagmus and rightward gaze not fixed.   He was given 2 mg of Versed and it had worsened his oxygen saturations that had dropped to 88% on a nonrebreather and therefore he was intubated. CT Abdomen, chest: Moderate dependent atelectasis bilaterally. No acute abnormality on noncontrast CT of the abdomen and pelvis. Cholelithiasis. CTA Head: No acute CTA findings. No hemodynamically significant stenosis or focal aneurysm. No arteriovenous confirmation. CTA Neck: No acute CTA findings. No hemodynamically significant stenosis or focal aneurysm.     Medications:     Scheduled Meds:   magnesium sulfate  2,000 mg IntraVENous Once    potassium chloride  10 mEq IntraVENous Q1H    potassium phosphate IVPB  20 mmol IntraVENous Once    cefTRIAXone (ROCEPHIN) IV  2,000 mg IntraVENous Q12H    ampicillin IV  1,000 mg IntraVENous 6 times per day    acyclovir  10 mg/kg (Adjusted) IntraVENous Q8H    vancomycin  1,250 mg IntraVENous Q12H    enoxaparin  40 mg SubCUTAneous Daily    levETIRAcetam  1,000 mg IntraVENous Q12H    lidocaine PF  5 mL IntraDERmal Once    sodium chloride flush  5-40 mL IntraVENous 2 times per day    thiamine  100 mg IntraVENous Daily     Continuous Infusions:   fentaNYL 150 mcg/hr (09/03/22 0624)    sodium chloride      propofol 50 mcg/kg/min (09/03/22 0840)    norepinephrine 4 mcg/min (09/03/22 0848)     PRN Meds:sodium chloride flush, sodium chloride, ondansetron **OR** ondansetron, polyethylene glycol, acetaminophen **OR** acetaminophen    Objective:   Vitals:   T-max:  Patient Vitals for the past 8 hrs:   BP Temp Temp src Pulse Resp SpO2 Weight   09/03/22 0849 -- -- -- 82 18 100 % --   09/03/22 0840 -- -- -- 63 16 100 % --   09/03/22 0600 (!) 99/53 -- -- 61 14 98 % 202 lb 11.2 oz (91.9 kg)   09/03/22 0405 -- -- -- 55 14 98 % --   09/03/22 0400 (!) 94/54 98.2 °F (36.8 °C) Bladder 57 14 98 % --   09/03/22 0300 (!) 90/53 -- -- 56 14 98 % --   09/03/22 0200 (!) 92/51 -- -- 61 14 98 % --       Intake/Output Summary (Last 24 hours) at 9/3/2022 0941  Last data filed at 9/3/2022 0600  Gross per 24 hour   Intake 2912.07 ml   Output 1850 ml   Net 1062.07 ml       Review of Systems: Intubated    Physical Exam  Constitutional:       Appearance: Normal appearance. He is normal weight. HENT:      Head: Normocephalic and atraumatic. Nose:      Comments: intubated     Mouth/Throat:      Mouth: Mucous membranes are dry. Comments: Intubated and sedated    Eyes:      Comments: Intubated and sedated   Cardiovascular:      Rate and Rhythm: Normal rate and regular rhythm. Pulses: Normal pulses. Pulmonary:      Comments: Intubated and sedated    Abdominal:      General: Bowel sounds are normal. There is no distension. Palpations: Abdomen is soft. Musculoskeletal:      Cervical back: Neck supple. Neurological:      Comments: Intubated and sedated     Psychiatric:      Comments: Intubated and sedated           LABS:    CBC:   Recent Labs     09/01/22 1709 09/02/22 0410 09/03/22 0455   WBC 21.7* 17.3* 12.0*   HGB 16.5 13.5 13.0*   HCT 48.7 40.4* 35.2*    111* 117*   MCV 93.5 93.3 92.8     Renal:    Recent Labs     09/01/22 1709 09/02/22 0410 09/03/22 0455    138 131*   K 3.7 3.4* 3.2*    104 101   CO2 23 22 20*   BUN 9 8 3*   CREATININE 1.0 0.7* 0.5*   GLUCOSE 157* 125* 131*   CALCIUM 9.0 7.5* 6.9*   MG  --  1.60* 1.70*   PHOS  --  2.0* 1.8*   ANIONGAP 14 12 10     Hepatic:   Recent Labs     09/01/22 1709 09/02/22 0410 09/03/22 0455   AST 42*  --   --    ALT 28  --   --    BILITOT 1.3*  --   --    BILIDIR 0.4*  --   --    PROT 7.8  --   --    LABALBU 4.6 3.3* 1.9*   ALKPHOS 139*  --   --      Troponin:   Recent Labs     09/01/22 1709 09/02/22 0410 09/03/22 0455   TROPONINI 0.15* 0.25* 0.12*     BNP: No results for input(s): BNP in the last 72 hours. Lipids: No results for input(s): CHOL, HDL in the last 72 hours.     Invalid input(s): LDLCALCU, TRIGLYCERIDE  ABGs:    Recent Labs     09/01/22 1944 09/02/22  0341   PHART 7.270* 7.374   YCB1CCN 54.9* 40.7   PO2ART 63.5* 145.0*   XMC5WQP 25 24   BEART -2.8 -1.4   [id number] 64* 100   HJJ3NSA 27 25       INR:   Recent Labs     09/01/22  1830 09/02/22  0608   INR 1.20* 1.27*     Lactate: No results for input(s): LACTATE in the last 72 hours. Cultures:  -----------------------------------------------------------------  RAD:   XR ABDOMEN (KUB) (SINGLE AP VIEW)   Final Result   Findings/Impression:    The tip of the enteric tube terminates in the distal body of the stomach. CT CHEST WO CONTRAST   Final Result      CHEST:      1. Moderate dependent atelectasis bilaterally. ABDOMEN/PELVIS:      1.  No acute abnormality on noncontrast CT of the abdomen and pelvis. 2.  Cholelithiasis. CT ABDOMEN PELVIS WO CONTRAST Additional Contrast? None   Final Result      CHEST:      1. Moderate dependent atelectasis bilaterally. ABDOMEN/PELVIS:      1.  No acute abnormality on noncontrast CT of the abdomen and pelvis. 2.  Cholelithiasis. CTA HEAD NECK W CONTRAST   Final Result      CTA Head:   No acute CTA findings. No hemodynamically significant stenosis or focal aneurysm. No arteriovenous confirmation. CTA Neck:   No acute CTA findings. No hemodynamically significant stenosis or focal aneurysm. CT HEAD WO CONTRAST   Final Result      1. No findings for acute intracranial abnormality. 2.  Age-related atrophy with patchy periventricular white matter changes bilaterally consistent with chronic small vessel ischemia. 3.  Stable low attenuation left frontoparietal lobe consistent with previous insult from known prior intraparenchymal hematoma. Stable right frontoparietal cortical infarct. These findings were called to the emergency room physician Dr. Logan Valle on 9/1/2022 at 5:30 p.m. XR CHEST PORTABLE   Final Result   1. No findings for acute cardiopulmonary disease. 2.  ET tube in good position in the mid trachea.       MRI BRAIN WO CONTRAST    (Results Pending)         Assessment/Plan:   Acute Metabolic Encephalopathy 2/2 SIRS vs seizure  On presentation AMS, elevated WBC: 21.7, Tachypneic , Tachycardic, Hypotensive, hx of ICH, SAH and SDH 2/2 to fall, hx of chronic alcohol abuse, CT head:Stable low attenuation left frontoparietal lobe consistent with previous insult from known prior intraparenchymal hematoma. Stable right frontoparietal cortical infarct. CT Abdomen, chest: Moderate dependent atelectasis bilaterally. No acute abnormality on noncontrast CT of the abdomen and pelvis. Cholelithiasis. CTA Head: No acute CTA findings. No hemodynamically significant stenosis or focal aneurysm. No arteriovenous confirmation. CTA Neck: No acute CTA findings. No hemodynamically significant stenosis or focal aneurysm.  -On levophed  -On broad spectrum antibiotics Vancomycin and ceftriaxone  -Trending Procal (0.27)  -blood culture ordered: NGTD  -Trending Lactic acid  -EEG ordered  -Given loading dose of Keppra  -Consulted Neurology:   -Lumbar puncture ordered     Acute hypercapnic respiratory failure secondary to possible aspiration vs 2/2 medication  On presentation: VBG PH:7.264, PCO2: 56.4, 83.2  Currently: PH: 7.37, PCO2:40.7, PO2:145  Vent settings:AC/PVR, Rate Set: 12, Rate measured: 13, PIP:22.7, PEEP:5+  -Patient intubated with fentanyl and propofol  -On broad spectrum antibiotics     Tropinemia r/o ACS  -Trending trop 0.25 > 0.1 will continue trending   -EKG:wnl, Echo EF: 50-55%, Hx of CAD with mural thrombosis, CXR: currently significant for mild pulmonary vascular congestion  -low dose heparin for possible MI  -EKG stat ordered  -Cardio consulted and following: no intervention at this time.      CAD with LAD stent  -will start on Aspirin, statin, coreg with core pack and on telemetry  -Cardio on board     Alcohol use disorder  -thiamine 100 mg daily  -monitor for withdrawal  -Ethanol level: none detected     Isolated proteinuria  -UA:

## 2022-09-03 NOTE — PROGRESS NOTES
Patient still on continuous EEG. Neuro increased Keppra to 1500 mg. Vancomycin trough level taken today-25. 3. Vancomycin dose decreased. For Vancomycin trough level tomorrow. Electrolytes replacement given. (Magnessium, KCL and Potassium phosphate. RAAS goal for sedation -1 to 0. Sedation adjusted accordingly. OG feeding started at 1pm with vital AF.

## 2022-09-03 NOTE — PROGRESS NOTES
NEUROLOGY / NEUROCRITICAL CARE PROGRESS NOTE       Patient Name: Fidel Joseph YOB: 1960   Sex: Male Age: 57 yrs     CC / Reason for Consult: AMS    Interval Hx / Changes over last 24 hours:   No seizures, frequent sharps  On propofol 50/hr, fentanyl 100/hr  No nursing concerns  No acute events overnight    ROS: unobtainable sedation    HISTORY   Admission HPI:      Fidel Joseph is a 57 y.o. y/o male with hx of ETOH abuse, CAD/MI, CHF, HTN, HLD, prior traumatic ICH 9/2021 who presents with acute encephalopathy of unknown etiology. He was reportedly at home after a long recovery from his [address]. He lives with significant other who saw him last on 9/1/22 at 9am in his normal state of health. She later returned around 4pm and found patient's car in driving way running, patient was inside home, half on couch unresponsive w/ gurgling respirations w/ low O2 sats. EMS was called. He was [facility] to [facility] intubated on arrival to ED d/t continued hypoxia. Unclear etiology of symptoms, ETOH / drug screen negative. He did have some elevations in troponin. Lactic acid was mildly elevated and patient had R eye deviations with nystagmus that prompted concern for seizures. He was given keppra in the ED. Initial head CT was unremarkable, CTA of head / neck was unremarkable. He was admitted to ICU for further evaluation.      PMH Past Medical History:   Diagnosis Date    Alcohol abuse     CAD (coronary artery disease)     with complete LAD occlusion    CHF (congestive heart failure) (HCC)     LVEF    Essential tremor     HTN (hypertension)     Hx of blood clots 05/2021    Hyperlipidemia     ICH (intracerebral hemorrhage) (HCC)     Lower extremity cellulitis     MI (mitral incompetence)       Allergies No Known Allergies   Diet Diet NPO  ADULT TUBE FEEDING; Nasogastric; Standard with Fiber; Continuous; 10; Yes; 10; Q 6 hours; 55; 30; Q 4 hours   Isolation No active isolations     CURRENT SCHEDULED MEDICATIONS   Inpatient Medications     potassium chloride, 10 mEq, IntraVENous, Q1H    potassium phosphate IVPB, 20 mmol, IntraVENous, Once    famotidine (PEPCID) injection, 20 mg, IntraVENous, BID    levETIRAcetam, 1,500 mg, IntraVENous, Q12H    cefTRIAXone (ROCEPHIN) IV, 2,000 mg, IntraVENous, Q12H    ampicillin IV, 1,000 mg, IntraVENous, 6 times per day    acyclovir, 10 mg/kg (Adjusted), IntraVENous, Q8H    vancomycin, 1,250 mg, IntraVENous, Q12H    enoxaparin, 40 mg, SubCUTAneous, Daily    lidocaine PF, 5 mL, IntraDERmal, Once    sodium chloride flush, 5-40 mL, IntraVENous, 2 times per day    thiamine, 100 mg, IntraVENous, Daily   Infusions    fentaNYL 150 mcg/hr (09/03/22 1952)    sodium chloride      propofol 50 mcg/kg/min (09/03/22 1151)    norepinephrine 2 mcg/min (09/03/22 1022)      Antibiotics   Recent Abx Admin                     cefTRIAXone (ROCEPHIN) 2,000 mg in dextrose 5 % 50 mL IVPB mini-bag (mg) 2,000 mg New Bag 09/03/22 1153     2,000 mg New Bag 09/02/22 2358    ampicillin 1000 mg ivpb mini bag (mg) 1,000 mg New Bag 09/03/22 1127     1,000 mg New Bag  0626     1,000 mg New Bag  0038     1,000 mg New Bag 09/02/22 2027     1,000 mg New Bag  1737     1,000 mg New Bag  1242    acyclovir (ZOVIRAX) 800 mg in dextrose 5 % 250 mL IVPB (mg) 800 mg New Bag 09/03/22 0457     800 mg New Bag 09/02/22 1924    vancomycin (VANCOCIN) 1,250 mg in dextrose 5 % 250 mL IVPB (mg) 1,250 mg New Bag 09/03/22 0310     1,250 mg New Bag 09/02/22 1404                      LABS   Metabolic Panel Recent Labs     09/01/22  1709 09/01/22  1830 09/02/22  0410 09/03/22  0455     --  138 131*   K 3.7  --  3.4* 3.2*     --  104 101   CO2 23  --  22 20*   BUN 9  --  8 3*   CREATININE 1.0  --  0.7* 0.5*   GLUCOSE 157*  --  125* 131*   CALCIUM 9.0  --  7.5* 6.9*   LABALBU 4.6  --  3.3* 1.9*   PHOS  --   --  2.0* 1.8*   MG  --   --  1.60* 1.70*   ALKPHOS 139*  --   --   --    ALT 28  --   --   --    AST 42*  --   --   -- AMMONIA  --  36  --   --       CBC / Coags Recent Labs     22  1709 22  1830 22  0410 22  0608 22  0455   WBC 21.7*  --  17.3*  --  12.0*   RBC 5.20  --  4.34  --  3.80*   HGB 16.5  --  13.5  --  13.0*   HCT 48.7  --  40.4*  --  35.2*     --  111*  --  117*   INR  --  1.20*  --  1.27*  --       Other   Recent Labs     22  1710   LACTA 1.6        DIAGNOSTICS   IMAGES:  Images personally reviewed and agree w/ radiology interpretation. Head CT w/o Contrast:  1. No findings for acute intracranial abnormality. 2.  Age-related atrophy with patchy periventricular white matter changes bilaterally consistent with chronic small vessel ischemia. 3.  Stable low attenuation left frontoparietal lobe consistent with previous insult from known prior intraparenchymal hematoma. Stable right frontoparietal cortical infarct. CTA of Head / Neck w/ Contrast:  CTA Head:   No acute CTA findings. No hemodynamically significant stenosis or focal aneurysm. No arteriovenous confirmation. CTA Neck:   No acute CTA findings. No hemodynamically significant stenosis or focal aneurysm. [id number]  22:00pm to 10:00am on 2022  SEIZURES:  None  INTERICTAL:  Frequent generalized transient discharges  PUSHBUTTONS:  None  BACKGROUND:  Abundant 7-7.5 Hz generalized theta slowing of the background with frequent superimposed delta waves. EKG:  regular     2022 12:15pm - 22:00pm   SEIZURES:  None  INTERICTAL:  Frequent generalized transient discharges  PUSHBUTTONS:  None  BACKGROUND:  Abundant 7-7.5 Hz generalized theta slowing of the background with frequent superimposed delta waves.     EKG:  regular     CLINICAL INTERPRETATION:  This was an abnormal tracing for age and state due to a mild  generalized slow wave abnormality indicating diffuse cerebral dysfunction which can be of multiple causes, including structural, or vascular abnormalities, toxic/metabolic conditions, hydrocephalus, or postictal conditions. Generalized discharges indicate a diffuse cortical hyperexcitability that may be associated with seizures. No definite seizures were seen during this recording. Clinical correlation is advised. PHYSICAL EXAMINATION     PHYSICAL EXAM:  Vitals:    09/03/22 1130 09/03/22 1145 09/03/22 1200 09/03/22 1215   BP: (!) 95/56 (!) 89/55 (!) 102/57 103/60   Pulse: 55 54 (!) 49 50   Resp: 14 14 14 14   Temp:   98.8 °F (37.1 °C)    TempSrc:   Oral    SpO2: 100% 100% 100% 100%   Weight:       Height:             Constitutional    Vital signs: BP, HR, and RR reviewed   General depressed mental status, no distress, well-nourished  Eyes: fundoscopic exam difficult given inability to cooperate  Cardiovascular: pulses symmetric in all 4 extremities. No peripheral edema. Psychiatric: limited exam given encephalopathy but not agitated and no signs of hallucinations  Neurologic  Mental status: limited exam given encephalopathy  orientation unable to assess given comatose state  Attention poor  Language limited exam given encephalopathy  Comprehension not following any commands  CN2: Visual Fields: no blink to either side with threat  CN 3,4,6:  extraocular absent. Pupils small. Minimal reactivity   CN7:face symmetric but exam limited by ET tube  CN8: hearing limited exam given encephalopathy  Strength: 0/5 all four limbs but limited by sedation   Sensory: no response to pain in all four limbs but limited by sedation . Cerebellar/coordination:limited exam given encephalopathy  Tone: normal in all 4 extremities  Gait: limited exam given encephalopathy and intubated with ventilator. ASSESSMENT & RECOMMENDATIONS   Assessment:  Mr. Cyrus Rose is a 56 y/o man who presented with acute encephalopathy, found down at home. Recent ICH makes episode concerning for seizure. Hypoxic on admission. LEV increased 9/3 given frequent sharps.      Plan:  - MRI brain w/wo chris ordered 9/2 11:00  - LP to be done in IR. Currently being covered for empiric CNS infection  - Increase LEV to 1500 mg BID  - Continue cEEG for now. - Continue empiric antimicrobials for CNS infection    MERVIN Armando - [facility]   Neurology & Neurocritical Care   Neurology Line: [phone]  [employer]: [facility] NPs  9/3/2022 12:27 PM    I spent 25 minutes in the care of this patient. Over 50% of that time was in face-to-face counseling regarding disease process, diagnostic testing, preventative measures, and answering patient and family questions.

## 2022-09-03 NOTE — CONSULTS
ICU Consult Note    Admit Date: 9/1/2022  Day: 2  Vent Day: 2  IV Access: Peripheral  IV Fluids: None  Vasopressors: levophed                Antibiotics: Ceftriaxone, vancomycin, ampicilin  Diet: Diet NPO    CC: Collapse    Interval history: NO acute events overnight. LDA: Peripheral IV left forearm, left anticubital, Rt forearm, Rt hand, OG left mouth, Urinary catheter, ETT    Vent: AC/PVR, Rate set: 14+ Rate measured: 14+ Vt set: 575, PIP:19, PEEP:5+  ABG 7.37/40/145    I/O net: +1.0 L    HPI: The patient is a 26-year-old man with past medical history significant for chronic alcoholism, ICH, CAD, DVT who presented today to the ED with an episode of syncope. The history was mainly obtained by the sister since patient was intubated. He was last known well at 9 AM but the sister. According to the sister, she had just returned home from work which she had found him half lying down on the couch covered in the stool while his car outside was running outside with occasion. She denies any complaints of recent fever, chills, chest pain cough, shortness of breath, lightheadedness, palpitations, nausea, vomiting, abdominal pain, diarrhea, fatigue, visual disturbance, changes in urination frequency or burning pain well urination or headaches by the patient in the preceding days to this incident. She does reinstate that her brother is a chronic alcoholic and had just gotten out of rehab. He has been sober since however she was not aware if he had recently had any alcoholic drink. He had also informed that he had recent episode of intracerebral hemorrhage on 9/4/2021. In the ED, there was a concern for possibility of a stroke however he was not considered a thrombolytic candidate because of her previous ICH and long time since his last known well. According to the ED staff his physical exam was more pertinent for nonfocal delirium. It was also significant for nystagmus and rightward gaze not fixed.   He was given 2 mg of Versed and it had worsened his oxygen saturations that had dropped to 88% on a nonrebreather and therefore he was intubated. CT Abdomen, chest: Moderate dependent atelectasis bilaterally. No acute abnormality on noncontrast CT of the abdomen and pelvis. Cholelithiasis. CTA Head: No acute CTA findings. No hemodynamically significant stenosis or focal aneurysm. No arteriovenous confirmation. CTA Neck: No acute CTA findings. No hemodynamically significant stenosis or focal aneurysm.     Past Medical History[]Expand by Default        Past Medical History:   Diagnosis Date    Alcohol abuse      CAD (coronary artery disease)       with complete LAD occlusion    CHF (congestive heart failure) (HCC)       LVEF    Essential tremor      HTN (hypertension)      Hx of blood clots 05/2021    Lower extremity cellulitis      MI (mitral incompetence)              Past Surgical History         Past Surgical History:   Procedure Laterality Date    PTCA        UPPER GASTROINTESTINAL ENDOSCOPY N/A 7/28/2020     EGD BIOPSY performed by Gaby Cagle MD at [facility]:   The patient lives at home with seizure     Alcohol: Chronic alcoholic  Illicit drugs: no use  Tobacco: not aware     Family History:  Family History[]Expand by Default         Family History   Problem Relation Age of Onset    Stroke Mother      Heart Disease Father           Medications:     Scheduled Meds:   magnesium sulfate  2,000 mg IntraVENous Once    potassium chloride  10 mEq IntraVENous Q1H    potassium phosphate IVPB  20 mmol IntraVENous Once    cefTRIAXone (ROCEPHIN) IV  2,000 mg IntraVENous Q12H    ampicillin IV  1,000 mg IntraVENous 6 times per day    acyclovir  10 mg/kg (Adjusted) IntraVENous Q8H    vancomycin  1,250 mg IntraVENous Q12H    enoxaparin  40 mg SubCUTAneous Daily    levETIRAcetam  1,000 mg IntraVENous Q12H    lidocaine PF  5 mL IntraDERmal Once    sodium chloride flush  5-40 mL IntraVENous 2 times per day thiamine  100 mg IntraVENous Daily     Continuous Infusions:   fentaNYL 150 mcg/hr (09/03/22 0624)    sodium chloride      propofol 50 mcg/kg/min (09/03/22 0840)    norepinephrine 4 mcg/min (09/03/22 0848)     PRN Meds:sodium chloride flush, sodium chloride, ondansetron **OR** ondansetron, polyethylene glycol, acetaminophen **OR** acetaminophen    Objective:   Vitals:   T-max:  Patient Vitals for the past 8 hrs:   BP Temp Temp src Pulse Resp SpO2 Weight   09/03/22 0849 -- -- -- 82 18 100 % --   09/03/22 0840 -- -- -- 63 16 100 % --   09/03/22 0600 (!) 99/53 -- -- 61 14 98 % 202 lb 11.2 oz (91.9 kg)   09/03/22 0405 -- -- -- 55 14 98 % --   09/03/22 0400 (!) 94/54 98.2 °F (36.8 °C) Bladder 57 14 98 % --   09/03/22 0300 (!) 90/53 -- -- 56 14 98 % --   09/03/22 0200 (!) 92/51 -- -- 61 14 98 % --         Intake/Output Summary (Last 24 hours) at 9/3/2022 0910  Last data filed at 9/3/2022 0600  Gross per 24 hour   Intake 2912.07 ml   Output 1850 ml   Net 1062.07 ml         Review of Systems   Reason unable to perform ROS: Unable to obtain due to pt being intubated. Physical Exam  Constitutional:       Appearance: He is normal weight. HENT:      Head: Normocephalic and atraumatic. Right Ear: Tympanic membrane normal.      Nose: Nose normal.      Mouth/Throat:      Mouth: Mucous membranes are dry. Cardiovascular:      Rate and Rhythm: Normal rate and regular rhythm. Pulses: Normal pulses. Heart sounds: Normal heart sounds. Pulmonary:      Effort: Pulmonary effort is normal.      Breath sounds: No wheezing or rales. Abdominal:      General: Bowel sounds are normal. There is distension. Palpations: Abdomen is soft. Tenderness: There is no abdominal tenderness. There is no guarding. Musculoskeletal:         General: Normal range of motion. Neurological:      General: No focal deficit present. Mental Status: He is disoriented.       Comments: Opens eyes spontaneously, sedated, not following commands   Psychiatric:         Mood and Affect: Mood normal.         LABS:    CBC:   Recent Labs     09/01/22 1709 09/02/22 0410 09/03/22 0455   WBC 21.7* 17.3* 12.0*   HGB 16.5 13.5 13.0*   HCT 48.7 40.4* 35.2*    111* 117*   MCV 93.5 93.3 92.8       Renal:    Recent Labs     09/01/22 1709 09/02/22 0410 09/03/22 0455    138 131*   K 3.7 3.4* 3.2*    104 101   CO2 23 22 20*   BUN 9 8 3*   CREATININE 1.0 0.7* 0.5*   GLUCOSE 157* 125* 131*   CALCIUM 9.0 7.5* 6.9*   MG  --  1.60* 1.70*   PHOS  --  2.0* 1.8*   ANIONGAP 14 12 10       Hepatic:   Recent Labs     09/01/22 1709 09/02/22 0410 09/03/22 0455   AST 42*  --   --    ALT 28  --   --    BILITOT 1.3*  --   --    BILIDIR 0.4*  --   --    PROT 7.8  --   --    LABALBU 4.6 3.3* 1.9*   ALKPHOS 139*  --   --        Troponin:   Recent Labs     09/01/22 1709 09/02/22 0410 09/03/22 0455   TROPONINI 0.15* 0.25* 0.12*       BNP: No results for input(s): BNP in the last 72 hours. Lipids: No results for input(s): CHOL, HDL in the last 72 hours. Invalid input(s): LDLCALCU, TRIGLYCERIDE  ABGs:    Recent Labs     09/01/22  1944 09/02/22  0341   PHART 7.270* 7.374   IID9SMG 54.9* 40.7   PO2ART 63.5* 145.0*   EEK1PMS 25 24   BEART -2.8 -1.4   [id number] 64* 100   LQK5ISG 27 25         INR:   Recent Labs     09/01/22  1830 09/02/22  0608   INR 1.20* 1.27*       Lactate: No results for input(s): LACTATE in the last 72 hours. Cultures:  -----------------------------------------------------------------  RAD:   XR ABDOMEN (KUB) (SINGLE AP VIEW)   Final Result   Findings/Impression:    The tip of the enteric tube terminates in the distal body of the stomach. CT CHEST WO CONTRAST   Final Result      CHEST:      1. Moderate dependent atelectasis bilaterally. ABDOMEN/PELVIS:      1.  No acute abnormality on noncontrast CT of the abdomen and pelvis. 2.  Cholelithiasis.          CT ABDOMEN PELVIS WO CONTRAST Additional Contrast? None Final Result      CHEST:      1. Moderate dependent atelectasis bilaterally. ABDOMEN/PELVIS:      1.  No acute abnormality on noncontrast CT of the abdomen and pelvis. 2.  Cholelithiasis. CTA HEAD NECK W CONTRAST   Final Result      CTA Head:   No acute CTA findings. No hemodynamically significant stenosis or focal aneurysm. No arteriovenous confirmation. CTA Neck:   No acute CTA findings. No hemodynamically significant stenosis or focal aneurysm. CT HEAD WO CONTRAST   Final Result      1. No findings for acute intracranial abnormality. 2.  Age-related atrophy with patchy periventricular white matter changes bilaterally consistent with chronic small vessel ischemia. 3.  Stable low attenuation left frontoparietal lobe consistent with previous insult from known prior intraparenchymal hematoma. Stable right frontoparietal cortical infarct. These findings were called to the emergency room physician Dr. Amaya Kong on 9/1/2022 at 5:30 p.m. XR CHEST PORTABLE   Final Result   1. No findings for acute cardiopulmonary disease. 2.  ET tube in good position in the mid trachea. MRI BRAIN WO CONTRAST    (Results Pending)         Assessment/Plan:   Acute Metabolic Encephalopathy 2/2 SIRS vs seizure  On presentation AMS, elevated WBC: 21.7, Tachypneic , Tachycardic, Hypotensive, hx of ICH, SAH and SDH 2/2 to fall, hx of chronic alcohol abuse, CT head:Stable low attenuation left frontoparietal lobe consistent with previous insult from known prior intraparenchymal hematoma. Stable right frontoparietal cortical infarct. CT Abdomen, chest: Moderate dependent atelectasis bilaterally. No acute abnormality on noncontrast CT of the abdomen and pelvis. Cholelithiasis. CTA Head: No acute CTA findings. No hemodynamically significant stenosis or focal aneurysm. No arteriovenous confirmation. CTA Neck: No acute CTA findings.   No hemodynamically significant stenosis or focal aneurysm.  -On levophed  -On broad spectrum antibiotics Vancomycin and ceftriaxone  -Trending Procal (0.27)  -blood culture ordered: NGTD  -Trending Lactic acid  -EEG ordered  -Given loading dose of Keppra  -Consulted Neurology:   -Lumbar puncture ordered    Acute hypercapnic respiratory failure secondary to possible aspiration vs 2/2 medication  On presentation: VBG PH:7.264, PCO2: 56.4, 83.2  Currently: PH: 7.37, PCO2:40.7, PO2:145  Vent settings:AC/PVR, Rate Set: 12, Rate measured: 13, PIP:22.7, PEEP:5+  -Patient intubated with fentanyl and propofol  -On broad spectrum antibiotics    Tropinemia r/o ACS  -Trending trop 0.25 > 0.1 will continue trending   -EKG:wnl, Echo EF: 50-55%, Hx of CAD with mural thrombosis, CXR: currently significant for mild pulmonary vascular congestion  -low dose heparin for possible MI  -EKG stat ordered  -Cardio consulted and following: no intervention at this time.     CAD with LAD stent  -will start on Aspirin, statin, coreg with core pack and on telemetry  -Cardio on board    Alcohol use disorder  -thiamine 100 mg daily  -monitor for withdrawal  -Ethanol level: none detected     Isolated proteinuria  -UA: Positive for protein    Hx of Cirrhosis  -Ammonia:36 wnl    Code Status:Full Code  FEN: NPO  PPX:SCD, lovenox   DISPO: ICU    Ritika Singh MD, PGY-1  09/03/22  9:10 AM    This patient has been staffed and discussed with Eleanor Hood MD.

## 2022-09-04 ENCOUNTER — APPOINTMENT (OUTPATIENT)
Dept: MRI IMAGING | Age: 62
DRG: 130 | End: 2022-09-04
Payer: MEDICAID

## 2022-09-04 LAB
ALBUMIN SERPL-MCNC: 2.9 G/DL (ref 3.4–5)
ANION GAP SERPL CALCULATED.3IONS-SCNC: 8 MMOL/L (ref 3–16)
BASOPHILS ABSOLUTE: 0 K/UL (ref 0–0.2)
BASOPHILS RELATIVE PERCENT: 0 %
BUN BLDV-MCNC: 3 MG/DL (ref 7–20)
CALCIUM SERPL-MCNC: 7.8 MG/DL (ref 8.3–10.6)
CHLORIDE BLD-SCNC: 102 MMOL/L (ref 99–110)
CO2: 23 MMOL/L (ref 21–32)
CREAT SERPL-MCNC: 0.6 MG/DL (ref 0.8–1.3)
EOSINOPHILS ABSOLUTE: 0.2 K/UL (ref 0–0.6)
EOSINOPHILS RELATIVE PERCENT: 3 %
GFR AFRICAN AMERICAN: >60
GFR NON-AFRICAN AMERICAN: >60
GLUCOSE BLD-MCNC: 126 MG/DL (ref 70–99)
HCT VFR BLD CALC: 40.1 % (ref 40.5–52.5)
HEMOGLOBIN: 13.3 G/DL (ref 13.5–17.5)
LYMPHOCYTES ABSOLUTE: 2.1 K/UL (ref 1–5.1)
LYMPHOCYTES RELATIVE PERCENT: 26 %
MAGNESIUM: 1.8 MG/DL (ref 1.8–2.4)
MCH RBC QN AUTO: 31.6 PG (ref 26–34)
MCHC RBC AUTO-ENTMCNC: 33.2 G/DL (ref 31–36)
MCV RBC AUTO: 95.4 FL (ref 80–100)
MONOCYTES ABSOLUTE: 0.6 K/UL (ref 0–1.3)
MONOCYTES RELATIVE PERCENT: 8 %
NEUTROPHILS ABSOLUTE: 5 K/UL (ref 1.7–7.7)
NEUTROPHILS RELATIVE PERCENT: 63 %
PDW BLD-RTO: 13.4 % (ref 12.4–15.4)
PHOSPHORUS: 2.5 MG/DL (ref 2.5–4.9)
PLATELET # BLD: 83 K/UL (ref 135–450)
PLATELET SLIDE REVIEW: ADEQUATE
PMV BLD AUTO: 9.2 FL (ref 5–10.5)
POTASSIUM SERPL-SCNC: 3.8 MMOL/L (ref 3.5–5.1)
PROCALCITONIN: 0.19 NG/ML (ref 0–0.15)
RBC # BLD: 4.21 M/UL (ref 4.2–5.9)
SLIDE REVIEW: ABNORMAL
SODIUM BLD-SCNC: 133 MMOL/L (ref 136–145)
TROPONIN: 0.13 NG/ML
VANCOMYCIN RANDOM: 19.5 UG/ML
WBC # BLD: 8 K/UL (ref 4–11)

## 2022-09-04 PROCEDURE — 2700000000 HC OXYGEN THERAPY PER DAY

## 2022-09-04 PROCEDURE — 6360000002 HC RX W HCPCS: Performed by: STUDENT IN AN ORGANIZED HEALTH CARE EDUCATION/TRAINING PROGRAM

## 2022-09-04 PROCEDURE — 85025 COMPLETE CBC W/AUTO DIFF WBC: CPT

## 2022-09-04 PROCEDURE — 2500000003 HC RX 250 WO HCPCS: Performed by: STUDENT IN AN ORGANIZED HEALTH CARE EDUCATION/TRAINING PROGRAM

## 2022-09-04 PROCEDURE — 6360000002 HC RX W HCPCS

## 2022-09-04 PROCEDURE — 94003 VENT MGMT INPAT SUBQ DAY: CPT

## 2022-09-04 PROCEDURE — 2580000003 HC RX 258

## 2022-09-04 PROCEDURE — 84145 PROCALCITONIN (PCT): CPT

## 2022-09-04 PROCEDURE — A4216 STERILE WATER/SALINE, 10 ML: HCPCS | Performed by: STUDENT IN AN ORGANIZED HEALTH CARE EDUCATION/TRAINING PROGRAM

## 2022-09-04 PROCEDURE — 2580000003 HC RX 258: Performed by: STUDENT IN AN ORGANIZED HEALTH CARE EDUCATION/TRAINING PROGRAM

## 2022-09-04 PROCEDURE — 94761 N-INVAS EAR/PLS OXIMETRY MLT: CPT

## 2022-09-04 PROCEDURE — 2000000000 HC ICU R&B

## 2022-09-04 PROCEDURE — 6360000002 HC RX W HCPCS: Performed by: NURSE PRACTITIONER

## 2022-09-04 PROCEDURE — 2500000003 HC RX 250 WO HCPCS

## 2022-09-04 PROCEDURE — 99291 CRITICAL CARE FIRST HOUR: CPT | Performed by: INTERNAL MEDICINE

## 2022-09-04 PROCEDURE — 95813 EEG EXTND MNTR 61-119 MIN: CPT

## 2022-09-04 PROCEDURE — 80202 ASSAY OF VANCOMYCIN: CPT

## 2022-09-04 PROCEDURE — 2500000003 HC RX 250 WO HCPCS: Performed by: EMERGENCY MEDICINE

## 2022-09-04 PROCEDURE — 83735 ASSAY OF MAGNESIUM: CPT

## 2022-09-04 PROCEDURE — 84484 ASSAY OF TROPONIN QUANT: CPT

## 2022-09-04 PROCEDURE — 80069 RENAL FUNCTION PANEL: CPT

## 2022-09-04 PROCEDURE — 2580000003 HC RX 258: Performed by: NURSE PRACTITIONER

## 2022-09-04 PROCEDURE — 6360000002 HC RX W HCPCS: Performed by: EMERGENCY MEDICINE

## 2022-09-04 PROCEDURE — 2580000003 HC RX 258: Performed by: EMERGENCY MEDICINE

## 2022-09-04 PROCEDURE — 99291 CRITICAL CARE FIRST HOUR: CPT | Performed by: NURSE PRACTITIONER

## 2022-09-04 RX ORDER — MAGNESIUM SULFATE IN WATER 40 MG/ML
2000 INJECTION, SOLUTION INTRAVENOUS ONCE
Status: COMPLETED | OUTPATIENT
Start: 2022-09-04 | End: 2022-09-04

## 2022-09-04 RX ADMIN — THIAMINE HYDROCHLORIDE 100 MG: 100 INJECTION, SOLUTION INTRAMUSCULAR; INTRAVENOUS at 09:17

## 2022-09-04 RX ADMIN — AMPICILLIN SODIUM 1000 MG: 1 INJECTION, POWDER, FOR SOLUTION INTRAMUSCULAR; INTRAVENOUS at 06:45

## 2022-09-04 RX ADMIN — LEVETIRACETAM 1500 MG: 100 INJECTION, SOLUTION, CONCENTRATE INTRAVENOUS at 14:01

## 2022-09-04 RX ADMIN — CEFTRIAXONE 2000 MG: 2 INJECTION, POWDER, FOR SOLUTION INTRAMUSCULAR; INTRAVENOUS at 11:40

## 2022-09-04 RX ADMIN — AMPICILLIN SODIUM 1000 MG: 1 INJECTION, POWDER, FOR SOLUTION INTRAMUSCULAR; INTRAVENOUS at 18:45

## 2022-09-04 RX ADMIN — PROPOFOL 40 MCG/KG/MIN: 10 INJECTION, EMULSION INTRAVENOUS at 11:46

## 2022-09-04 RX ADMIN — CEFTRIAXONE 2000 MG: 2 INJECTION, POWDER, FOR SOLUTION INTRAMUSCULAR; INTRAVENOUS at 23:09

## 2022-09-04 RX ADMIN — SODIUM CHLORIDE, PRESERVATIVE FREE 20 MG: 5 INJECTION INTRAVENOUS at 20:30

## 2022-09-04 RX ADMIN — Medication 125 MCG/HR: at 04:34

## 2022-09-04 RX ADMIN — LEVETIRACETAM 1500 MG: 100 INJECTION, SOLUTION, CONCENTRATE INTRAVENOUS at 01:14

## 2022-09-04 RX ADMIN — ACYCLOVIR SODIUM 800 MG: 50 INJECTION, SOLUTION INTRAVENOUS at 19:23

## 2022-09-04 RX ADMIN — ACYCLOVIR SODIUM 800 MG: 50 INJECTION, SOLUTION INTRAVENOUS at 02:49

## 2022-09-04 RX ADMIN — ACYCLOVIR SODIUM 800 MG: 50 INJECTION, SOLUTION INTRAVENOUS at 10:36

## 2022-09-04 RX ADMIN — SODIUM CHLORIDE, PRESERVATIVE FREE 10 ML: 5 INJECTION INTRAVENOUS at 20:31

## 2022-09-04 RX ADMIN — AMPICILLIN SODIUM 1000 MG: 1 INJECTION, POWDER, FOR SOLUTION INTRAMUSCULAR; INTRAVENOUS at 11:00

## 2022-09-04 RX ADMIN — ENOXAPARIN SODIUM 40 MG: 100 INJECTION SUBCUTANEOUS at 09:18

## 2022-09-04 RX ADMIN — AMPICILLIN SODIUM 1000 MG: 1 INJECTION, POWDER, FOR SOLUTION INTRAMUSCULAR; INTRAVENOUS at 23:51

## 2022-09-04 RX ADMIN — AMPICILLIN SODIUM 1000 MG: 1 INJECTION, POWDER, FOR SOLUTION INTRAMUSCULAR; INTRAVENOUS at 04:05

## 2022-09-04 RX ADMIN — MAGNESIUM SULFATE HEPTAHYDRATE 2000 MG: 40 INJECTION, SOLUTION INTRAVENOUS at 06:20

## 2022-09-04 RX ADMIN — AMPICILLIN SODIUM 1000 MG: 1 INJECTION, POWDER, FOR SOLUTION INTRAMUSCULAR; INTRAVENOUS at 15:09

## 2022-09-04 RX ADMIN — PROPOFOL 40 MCG/KG/MIN: 10 INJECTION, EMULSION INTRAVENOUS at 06:37

## 2022-09-04 RX ADMIN — SODIUM CHLORIDE, PRESERVATIVE FREE 20 MG: 5 INJECTION INTRAVENOUS at 09:19

## 2022-09-04 RX ADMIN — PROPOFOL 50 MCG/KG/MIN: 10 INJECTION, EMULSION INTRAVENOUS at 02:48

## 2022-09-04 RX ADMIN — VANCOMYCIN HYDROCHLORIDE 1000 MG: 10 INJECTION, POWDER, LYOPHILIZED, FOR SOLUTION INTRAVENOUS at 20:35

## 2022-09-04 RX ADMIN — PROPOFOL 50 MCG/KG/MIN: 10 INJECTION, EMULSION INTRAVENOUS at 22:56

## 2022-09-04 RX ADMIN — VANCOMYCIN HYDROCHLORIDE 1000 MG: 10 INJECTION, POWDER, LYOPHILIZED, FOR SOLUTION INTRAVENOUS at 13:26

## 2022-09-04 RX ADMIN — VANCOMYCIN HYDROCHLORIDE 1000 MG: 10 INJECTION, POWDER, LYOPHILIZED, FOR SOLUTION INTRAVENOUS at 04:43

## 2022-09-04 RX ADMIN — Medication 150 MCG/HR: at 21:29

## 2022-09-04 RX ADMIN — POTASSIUM PHOSPHATE, MONOBASIC AND POTASSIUM PHOSPHATE, DIBASIC 15 MMOL: 224; 236 INJECTION, SOLUTION, CONCENTRATE INTRAVENOUS at 06:54

## 2022-09-04 RX ADMIN — PROPOFOL 40 MCG/KG/MIN: 10 INJECTION, EMULSION INTRAVENOUS at 19:03

## 2022-09-04 RX ADMIN — Medication 125 MCG/HR: at 12:41

## 2022-09-04 RX ADMIN — NOREPINEPHRINE BITARTRATE 2 MCG/MIN: 1 SOLUTION INTRAVENOUS at 14:18

## 2022-09-04 ASSESSMENT — PULMONARY FUNCTION TESTS
PIF_VALUE: 20
PIF_VALUE: 29
PIF_VALUE: 26
PIF_VALUE: 21
PIF_VALUE: 28
PIF_VALUE: 28
PIF_VALUE: 27
PIF_VALUE: 23
PIF_VALUE: 29
PIF_VALUE: 23
PIF_VALUE: 31
PIF_VALUE: 28
PIF_VALUE: 26
PIF_VALUE: 27
PIF_VALUE: 29
PIF_VALUE: 36
PIF_VALUE: 22
PIF_VALUE: 29
PIF_VALUE: 21
PIF_VALUE: 20
PIF_VALUE: 29
PIF_VALUE: 20
PIF_VALUE: 29
PIF_VALUE: 30
PIF_VALUE: 30
PIF_VALUE: 22
PIF_VALUE: 28
PIF_VALUE: 22
PIF_VALUE: 21
PIF_VALUE: 31
PIF_VALUE: 30
PIF_VALUE: 27
PIF_VALUE: 30
PIF_VALUE: 26
PIF_VALUE: 23
PIF_VALUE: 28
PIF_VALUE: 28
PIF_VALUE: 20
PIF_VALUE: 29

## 2022-09-04 NOTE — PROGRESS NOTES
NEUROLOGY / NEUROCRITICAL CARE PROGRESS NOTE       Patient Name: Keanu Han YOB: 1960   Sex: Male Age: 57 yrs     CC / Reason for Consult: AMS    Interval Hx / Changes over last 24 hours:   Levophed started overnight for hypotension  Bigeminy noted overnight  Restlessness noted - moving all four limbs spontaneously and non-purposefully; Propofol increased  No seizure activity overnight    ROS: unobtainable sedation    HISTORY   Admission HPI:      Keanu Han is a 57 y.o. y/o male with hx of ETOH abuse, CAD/MI, CHF, HTN, HLD, prior traumatic ICH 9/2021 who presents with acute encephalopathy of unknown etiology. He was reportedly at home after a long recovery from his [address]. He lives with significant other who saw him last on 9/1/22 at 9am in his normal state of health. She later returned around 4pm and found patient's car in driving way running, patient was inside home, half on couch unresponsive w/ gurgling respirations w/ low O2 sats. EMS was called. He was taken to [facility], intubated on arrival to ED d/t continued hypoxia. Unclear etiology of symptoms, ETOH / drug screen negative. He did have some elevations in troponin. Lactic acid was mildly elevated and patient had R eye deviations with nystagmus that prompted concern for seizures. He was given keppra in the ED. Initial head CT was unremarkable, CTA of head / neck was unremarkable. He was admitted to ICU for further evaluation.      PMH Past Medical History:   Diagnosis Date    Alcohol abuse     CAD (coronary artery disease)     with complete LAD occlusion    CHF (congestive heart failure) ([facility])     LVEF    Essential tremor     HTN (hypertension)     Hx of blood clots 05/2021    Hyperlipidemia     ICH (intracerebral hemorrhage) ([facility])     Lower extremity cellulitis     MI (mitral incompetence)       Allergies No Known Allergies   Diet Diet NPO  ADULT TUBE FEEDING; Nasogastric; Peptide Based; Continuous; 10; Yes; 10; Q 8 hours; 45; 30; Q 4 hours; Protein; twice per day administer via NG, flush 30 ml before and after administration   Isolation No active isolations     CURRENT SCHEDULED MEDICATIONS   Inpatient Medications     famotidine (PEPCID) injection, 20 mg, IntraVENous, BID    levETIRAcetam, 1,500 mg, IntraVENous, Q12H    vancomycin, 1,000 mg, IntraVENous, Q8H    cefTRIAXone (ROCEPHIN) IV, 2,000 mg, IntraVENous, Q12H    ampicillin IV, 1,000 mg, IntraVENous, 6 times per day    acyclovir, 10 mg/kg (Adjusted), IntraVENous, Q8H    enoxaparin, 40 mg, SubCUTAneous, Daily    lidocaine PF, 5 mL, IntraDERmal, Once    sodium chloride flush, 5-40 mL, IntraVENous, 2 times per day    thiamine, 100 mg, IntraVENous, Daily   Infusions    fentaNYL 125 mcg/hr (09/04/22 0434)    sodium chloride      propofol 40 mcg/kg/min (09/04/22 0637)    norepinephrine 1 mcg/min (09/04/22 0419)      Antibiotics   Recent Abx Admin                     ampicillin 1000 mg ivpb mini bag (mg) 1,000 mg New Bag 09/04/22 1100     1,000 mg New Bag  0645     1,000 mg New Bag  0405     1,000 mg New Bag 09/03/22 2355     1,000 mg New Bag  1950     1,000 mg New Bag  1636     1,000 mg New Bag  1127    acyclovir (ZOVIRAX) 800 mg in dextrose 5 % 250 mL IVPB (mg) 800 mg New Bag 09/04/22 1036     800 mg New Bag  0249     800 mg New Bag 09/03/22 1917     800 mg New Bag  1237    vancomycin (VANCOCIN) 1,000 mg in dextrose 5 % 250 mL IVPB (mg) 1,000 mg New Bag 09/04/22 0443     1,000 mg New Bag 09/03/22 2030     1,000 mg New Bag  1423    cefTRIAXone (ROCEPHIN) 2,000 mg in dextrose 5 % 50 mL IVPB mini-bag (mg) 2,000 mg New Bag 09/03/22 2304     2,000 mg New Bag  1153                      LABS   Metabolic Panel Recent Labs     09/01/22  1709 09/01/22  1830 09/02/22  0410 09/03/22  0455 09/03/22  2054 09/04/22  0359     --    < > 131* 139 133*   K 3.7  --    < > 3.2* 3.7 3.8     --    < > 101 106 102   CO2 23  --    < > 20* 24 23   BUN 9  --    < > 3* 3* 3*   CREATININE 1.0 --    < > 0.5* 0.6* 0.6*   GLUCOSE 157*  --    < > 131* 124* 126*   CALCIUM 9.0  --    < > 6.9* 7.9* 7.8*   LABALBU 4.6  --    < > 1.9* 3.1* 2.9*   PHOS  --   --    < > 1.8* 2.0* 2.5   MG  --   --    < > 1.70* 2.10 1.80   ALKPHOS 139*  --   --   --   --   --    ALT 28  --   --   --   --   --    AST 42*  --   --   --   --   --    AMMONIA  --  36  --   --   --   --     < > = values in this interval not displayed. CBC / Coags Recent Labs     22  1830 22  0410 22  0608 22  0455 22  0359   WBC  --  17.3*  --  12.0* 8.0   RBC  --  4.34  --  3.80* 4.21   HGB  --  13.5  --  13.0* 13.3*   HCT  --  40.4*  --  35.2* 40.1*   PLT  --  111*  --  117* 83*   INR 1.20*  --  1.27*  --   --         Other   Recent Labs     22  1710   LACTA 1.6          DIAGNOSTICS   IMAGES:  Images personally reviewed and agree w/ radiology interpretation. Head CT w/o Contrast:  1. No findings for acute intracranial abnormality. 2.  Age-related atrophy with patchy periventricular white matter changes bilaterally consistent with chronic small vessel ischemia. 3.  Stable low attenuation left frontoparietal lobe consistent with previous insult from known prior intraparenchymal hematoma. Stable right frontoparietal cortical infarct. CTA of Head / Neck w/ Contrast:  CTA Head:   No acute CTA findings. No hemodynamically significant stenosis or focal aneurysm. No arteriovenous confirmation. CTA Neck:   No acute CTA findings. No hemodynamically significant stenosis or focal aneurysm. CVEE2022  22:00pm to 09:00am on 2022  SEIZURES:  None  INTERICTAL:  Frequent generalized transient discharges  PUSHBUTTONS:  None  BACKGROUND:  The background consists of diffuse low amplitude delta slowing with frequent 1 second bursts of mixed frontal  theta frequencies.     EKG:  regular     2022  10:00am to 22:00pm   SEIZURES:  None  INTERICTAL:  Frequent generalized transient discharges  PUSHBUTTON  S:  None  BACKGROUND:  Abundant 7-7.5 Hz generalized theta slowing of the background with frequent superimposed delta waves. AS the record progresses the background transitions to a diffuse low amplitude delta slowing with frequent 1 second bursts of mixed frontal  theta frequencies. EKG:  regular     CLINICAL INTERPRETATION:  This was an abnormal tracing for age and state due to a mild to moderate generalized slow wave abnormality indicating diffuse cerebral dysfunction which can be of multiple causes, including structural, or vascular abnormalities, toxic/metabolic conditions, hydrocephalus, or postictal conditions. Generalized discharges indicate a diffuse cortical hyperexcitability that may be associated with seizures. No definite seizures were seen during this recording. Clinical correlation is advised. 09/02/2022  22:00pm to 10:00am on 09/03/2022  SEIZURES:  None  INTERICTAL:  Frequent generalized transient discharges  PUSHBUTTONS:  None  BACKGROUND:  Abundant 7-7.5 Hz generalized theta slowing of the background with frequent superimposed delta waves. EKG:  regular     09/02/2022 12:15pm - 22:00pm   SEIZURES:  None  INTERICTAL:  Frequent generalized transient discharges  PUSHBUTTONS:  None  BACKGROUND:  Abundant 7-7.5 Hz generalized theta slowing of the background with frequent superimposed delta waves. EKG:  regular     CLINICAL INTERPRETATION:  This was an abnormal tracing for age and state due to a mild  generalized slow wave abnormality indicating diffuse cerebral dysfunction which can be of multiple causes, including structural, or vascular abnormalities, toxic/metabolic conditions, hydrocephalus, or postictal conditions. Generalized discharges indicate a diffuse cortical hyperexcitability that may be associated with seizures. No definite seizures were seen during this recording. Clinical correlation is advised.          PHYSICAL EXAMINATION     PHYSICAL EXAM:  Vitals:    09/04/22 1015 09/04/22 1030 09/04/22 1045 09/04/22 1100   BP: (!) 98/50 102/65 100/65 (!) 91/49   Pulse: 56 59 56    Resp: 14 14 14    Temp:       TempSrc:       SpO2: 100% 100% 100%    Weight:       Height:             Constitutional    Vital signs: BP, HR, and RR reviewed   General depressed mental status, no distress, well-nourished  Eyes: fundoscopic exam difficult given inability to cooperate  Cardiovascular: pulses symmetric in all 4 extremities. No peripheral edema. Psychiatric: limited exam given encephalopathy but not agitated and no signs of hallucinations  Neurologic  Mental status: limited exam given encephalopathy  orientation unable to assess given comatose state  Attention poor  Language limited exam given encephalopathy  Comprehension not following any commands  CN2: Visual Fields: no blink to either side with threat  CN 3,4,6:  extraocular absent. Pupils small. Minimal reactivity   CN7:face symmetric but exam limited by ET tube  CN8: hearing limited exam given encephalopathy  Strength: 0/5 all four limbs but limited by sedation   Sensory: no response to pain in all four limbs but limited by sedation . Cerebellar/coordination:limited exam given encephalopathy  Tone: normal in all 4 extremities  Gait: limited exam given encephalopathy and intubated with ventilator. ASSESSMENT & RECOMMENDATIONS   Assessment:  Mr. Anne Nugent is a 58 y/o man who presented with acute encephalopathy, found down at home. Recent ICH makes episode concerning for seizure. Hypoxic on admission. LEV increased 9/3 given frequent sharps. Exam blunted by sedation. Still needs MRI. Plan:  - MRI brain w/wo chris ordered 9/2 11:00 - followed up w/ nursing today regarding getting this  - LP to be done in IR. Currently being covered for empiric CNS infection  - Continue LEV to 1500 mg BID  - Continue cEEG for now.   - Continue empiric antimicrobials for CNS infection  - Begin weaning propofol today after MERVIN Servin - CNP   Neurology & Neurocritical Care   Neurology Line: [phone]  [employer]: [facility] NPs  9/4/2022 11:20 AM    Critical Care:  Due to the immediate potential for life-threatening deterioration due to neurological failure, I spent 30 minutes providing critical care. This time excludes time spent performing procedures but includes time spent on direct patient care, history retrieval, review of the chart, and discussions with patient, family, and consultant(s).

## 2022-09-04 NOTE — PROCEDURES
Continuous EEG monitoring record    Patient name: Jarek Holley    START: 09/03/2022 @ 10:00am   END: 09/04/2022 @ 09:00am       Electroencephalographer: Hu Beltran MD PhD      CLINICAL DETAILS:  This EEG was performed on this 57 y. o.yo male admitted for Altered mental Status and concer for subclinical seizures      TECHNICAL DETAILS:  Continuous video-EEG monitoring was performed with 27 surface scalp electrodes placed according to the International 10-20 electrode placement system, using a 32-channel [employer] headbox. All EEG and video information was acquired digitally, including the use of automated spike and seizure detection software to detect epileptiform activity. An event button was also available to be depressed during clinical events. 09/03/2022  22:00pm to 09:00am on 09/04/2022  SEIZURES:  None  INTERICTAL:  Frequent generalized transient discharges  PUSHBUTTONS:  None  BACKGROUND:  The background consists of diffuse low amplitude delta slowing with frequent 1 second bursts of mixed frontal  theta frequencies. EKG:  regular    09/03/2022  10:00am to 22:00pm   SEIZURES:  None  INTERICTAL:  Frequent generalized transient discharges  PUSHBUTTONS:  None  BACKGROUND:  Abundant 7-7.5 Hz generalized theta slowing of the background with frequent superimposed delta waves. AS the record progresses the background transitions to a diffuse low amplitude delta slowing with frequent 1 second bursts of mixed frontal  theta frequencies. EKG:  regular    CLINICAL INTERPRETATION:  This was an abnormal tracing for age and state due to a mild to moderate generalized slow wave abnormality indicating diffuse cerebral dysfunction which can be of multiple causes, including structural, or vascular abnormalities, toxic/metabolic conditions, hydrocephalus, or postictal conditions. Generalized discharges indicate a diffuse cortical hyperexcitability that may be associated with seizures.   No definite seizures were seen during this recording. Clinical correlation is advised.         César Alfredo MD PhD

## 2022-09-04 NOTE — PROGRESS NOTES
Spoke with  in regards to the MRI for this pt. Informed her that the pt had a 25 beat run of goActch, okay to hold on MRI tonight. Will continue to monitor.

## 2022-09-04 NOTE — PLAN OF CARE
Problem: Safety - Medical Restraint  Goal: Remains free of injury from restraints (Restraint for Interference with Medical Device)  9/4/2022 0204 by Yesenia Lopes RN  Outcome: Progressing  Flowsheets (Taken 9/4/2022 0200)  Remains free of injury from restraints (restraint for interference with medical device):   Determine that other, less restrictive measures have been tried or would not be effective before applying the restraint   Evaluate the patient's condition at the time of restraint application   Inform patient/family regarding the reason for restraint   Every 2 hours: Monitor safety, psychosocial status, comfort, nutrition and hydration  9/4/2022 0201 by Yesenia Lopes RN  Outcome: Progressing  Flowsheets (Taken 9/4/2022 0200)  Remains free of injury from restraints (restraint for interference with medical device):   Determine that other, less restrictive measures have been tried or would not be effective before applying the restraint   Evaluate the patient's condition at the time of restraint application   Inform patient/family regarding the reason for restraint   Every 2 hours: Monitor safety, psychosocial status, comfort, nutrition and hydration    Flowsheets  Taken 9/3/2022 1738  Remains free of injury from restraints (restraint for interference with medical device):   Every 2 hours: Monitor safety, psychosocial status, comfort, nutrition and hydration   Evaluate the patient's condition at the time of restraint application   Determine that other, less restrictive measures have been tried or would not be effective before applying the restraint   Inform patient/family regarding the reason for restraint  Taken 9/3/2022 0800  Remains free of injury from restraints (restraint for interference with medical device): Evaluate the patient's condition at the time of restraint application     Problem: Pain  Goal: Verbalizes/displays adequate comfort level or baseline comfort level  9/4/2022 0204 by Adarsh Link Nahed Valladares RN  Outcome: Progressing  Flowsheets (Taken 9/4/2022 0201)  Verbalizes/displays adequate comfort level or baseline comfort level:   Administer analgesics based on type and severity of pain and evaluate response   Consider cultural and social influences on pain and pain management   Assess pain using appropriate pain scale   Implement non-pharmacological measures as appropriate and evaluate response   Notify Licensed Independent Practitioner if interventions unsuccessful or patient reports new pain  9/4/2022 0201 by Vernon Fields RN  Outcome: Progressing  Flowsheets (Taken 9/4/2022 0201)  Verbalizes/displays adequate comfort level or baseline comfort level:   Administer analgesics based on type and severity of pain and evaluate response   Consider cultural and social influences on pain and pain management   Assess pain using appropriate pain scale   Implement non-pharmacological measures as appropriate and evaluate response   Notify Licensed Independent Practitioner if interventions unsuccessful or patient reports new pain     Problem: Safety - Adult  Goal: Free from fall injury  9/4/2022 0204 by Vernon Fields RN  Outcome: Progressing  Flowsheets (Taken 9/4/2022 0201)  Free From Fall Injury:   Instruct family/caregiver on patient safety   Based on caregiver fall risk screen, instruct family/caregiver to ask for assistance with transferring infant if caregiver noted to have fall risk factors  9/4/2022 0201 by Vernon Fields RN  Outcome: Progressing  Flowsheets (Taken 9/4/2022 0201)  Free From Fall Injury:   Instruct family/caregiver on patient safety   Based on caregiver fall risk screen, instruct family/caregiver to ask for assistance with transferring infant if caregiver noted to have fall risk factors     Problem: ABCDS Injury Assessment  Goal: Absence of physical injury  9/4/2022 0204 by Vernon Fields RN  Outcome: Progressing  Flowsheets (Taken 9/4/2022 0007)  Absence of Physical Injury: Implement safety measures based on patient assessment  9/4/2022 0201 by Milo Bryant RN  Outcome: Progressing  Flowsheets (Taken 9/4/2022 0007)  Absence of Physical Injury: Implement safety measures based on patient assessment     Problem: Skin/Tissue Integrity  Goal: Absence of new skin breakdown  9/4/2022 0204 by Milo Bryant RN  Outcome: Progressing  Note: Pt at risk for skin breakdown. See Mitch score. Pt remains on bedrest. Unable to reposition self in bed. Heels elevated off bed. Sacral heart mepilex intact to protect,  site inspected and intact underneath. Will continue to turn and reposition patient every two hours and as needed. Will continue to keep patient clean and dry, applying skin care cream as needed. Pillows used for repositioning q2hs. Will continue to monitor and assess for skin breakdown. 9/4/2022 0201 by Milo Bryant RN  Outcome: Progressing  Note: Pt at risk for skin breakdown. See Mitch score. Pt remains on bedrest. Unable to reposition self in bed. Heels elevated off bed. Sacral heart mepilex intact to protect,  site inspected and intact underneath. Will continue to turn and reposition patient every two hours and as needed. Will continue to keep patient clean and dry, applying skin care cream as needed. Pillows used for repositioning q2hs. Will continue to monitor and assess for skin breakdown.            Problem: Nutrition Deficit:  Goal: Optimize nutritional status  9/4/2022 0204 by Milo Bryant RN  Outcome: Progressing  Flowsheets (Taken 9/4/2022 0204)  Nutrient intake appropriate for improving, restoring, or maintaining nutritional needs:   Assess nutritional status and recommend course of action   Monitor oral intake, labs, and treatment plans   Recommend, monitor, and adjust tube feedings and TPN/PPN based on assessed needs   Provide specific nutrition education to patient or family as appropriate   Order, calculate, and assess calorie counts as needed  9/4/2022 0201 by Lidya Hicks, RN  Outcome: Progressing  Flowsheets (Taken 9/4/2022 0201)  Nutrient intake appropriate for improving, restoring, or maintaining nutritional needs:   Assess nutritional status and recommend course of action   Recommend appropriate diets, oral nutritional supplements, and vitamin/mineral supplements   Recommend, monitor, and adjust tube feedings and TPN/PPN based on assessed needs   Monitor oral intake, labs, and treatment plans   Provide specific nutrition education to patient or family as appropriate

## 2022-09-04 NOTE — PROGRESS NOTES
Hospitalist Progress Note      PCP: Aicha Rosas MD    Date of Admission: 9/1/2022    Chief complaint: Seizures    Hospital course    Patient remains intubated and sedated this morning. .  No seizure activity noted. .  No fevers. .  Remains on low-dose Levophed which is being weaned. Still on low-dose Levophed overnight. No new issues    Subjective  Not possible to obtain review of systems as the patient is unresponsive      Medications:  Reviewed    Infusion Medications    fentaNYL 125 mcg/hr (09/04/22 1241)    sodium chloride      propofol 40 mcg/kg/min (09/04/22 1146)    norepinephrine 2 mcg/min (09/04/22 1400)     Scheduled Medications    famotidine (PEPCID) injection  20 mg IntraVENous BID    levETIRAcetam  1,500 mg IntraVENous Q12H    vancomycin  1,000 mg IntraVENous Q8H    cefTRIAXone (ROCEPHIN) IV  2,000 mg IntraVENous Q12H    ampicillin IV  1,000 mg IntraVENous 6 times per day    acyclovir  10 mg/kg (Adjusted) IntraVENous Q8H    enoxaparin  40 mg SubCUTAneous Daily    lidocaine PF  5 mL IntraDERmal Once    sodium chloride flush  5-40 mL IntraVENous 2 times per day    thiamine  100 mg IntraVENous Daily     PRN Meds: sodium chloride flush, sodium chloride, ondansetron **OR** ondansetron, polyethylene glycol, acetaminophen **OR** acetaminophen      Intake/Output Summary (Last 24 hours) at 9/4/2022 1410  Last data filed at 9/4/2022 1330  Gross per 24 hour   Intake 3369.68 ml   Output 2110 ml   Net 1259.68 ml         Physical Exam Performed:    /61   Pulse 52   Temp 98.4 °F (36.9 °C) (Oral)   Resp 15   Ht 5' 10\" (1.778 m)   Wt 202 lb 11.2 oz (91.9 kg)   SpO2 100%   BMI 29.08 kg/m²     General appearance: No apparent distress, intubated and sedated  HEENT: Pupils equal, round, and reactive to light. Conjunctivae/corneas clear. Neck: Supple, with full range of motion. No jugular venous distention. Trachea midline. Respiratory:  Normal respiratory effort.  Clear to auscultation, bilaterally without Rales/Wheezes/Rhonchi. Cardiovascular: Regular rate and rhythm with normal S1/S2 without murmurs, rubs or gallops. Abdomen: Soft, non-tender, non-distended with normal bowel sounds. Musculoskeletal: No clubbing, cyanosis or edema bilaterally. Full range of motion without deformity. Skin: Skin color, texture, turgor normal.  No rashes or lesions. Neurologic: No obvious focal deficits, currently sedated    Capillary Refill: Brisk, 3 seconds, normal   Peripheral Pulses: +2 palpable, equal bilaterally       Labs:   Recent Labs     09/02/22 0410 09/03/22 0455 09/04/22  0359   WBC 17.3* 12.0* 8.0   HGB 13.5 13.0* 13.3*   HCT 40.4* 35.2* 40.1*   * 117* 83*       Recent Labs     09/03/22 0455 09/03/22 2054 09/04/22  0359   * 139 133*   K 3.2* 3.7 3.8    106 102   CO2 20* 24 23   BUN 3* 3* 3*   CREATININE 0.5* 0.6* 0.6*   CALCIUM 6.9* 7.9* 7.8*   PHOS 1.8* 2.0* 2.5       Recent Labs     09/01/22  1709   AST 42*   ALT 28   BILIDIR 0.4*   BILITOT 1.3*   ALKPHOS 139*       Recent Labs     09/01/22  1830 09/02/22  0608   INR 1.20* 1.27*       Recent Labs     09/02/22  0410 09/03/22 0455 09/04/22  0359   CKTOTAL 336*  --   --    TROPONINI 0.25* 0.12* 0.13*         Urinalysis:      Lab Results   Component Value Date/Time    NITRU Negative 09/01/2022 05:09 PM    WBCUA 0-2 09/01/2022 05:09 PM    BACTERIA Rare 09/01/2022 05:09 PM    RBCUA 0-2 09/01/2022 05:09 PM    BLOODU MODERATE 09/01/2022 05:09 PM    SPECGRAV >=1.030 09/01/2022 05:09 PM    GLUCOSEU Negative 09/01/2022 05:09 PM       Radiology:  XR ABDOMEN (KUB) (SINGLE AP VIEW)   Final Result   Findings/Impression:    The tip of the enteric tube terminates in the distal body of the stomach. CT CHEST WO CONTRAST   Final Result      CHEST:      1. Moderate dependent atelectasis bilaterally. ABDOMEN/PELVIS:      1.  No acute abnormality on noncontrast CT of the abdomen and pelvis. 2.  Cholelithiasis.          CT ABDOMEN PELVIS WO CONTRAST Additional Contrast? None   Final Result      CHEST:      1. Moderate dependent atelectasis bilaterally. ABDOMEN/PELVIS:      1.  No acute abnormality on noncontrast CT of the abdomen and pelvis. 2.  Cholelithiasis. CTA HEAD NECK W CONTRAST   Final Result      CTA Head:   No acute CTA findings. No hemodynamically significant stenosis or focal aneurysm. No arteriovenous confirmation. CTA Neck:   No acute CTA findings. No hemodynamically significant stenosis or focal aneurysm. CT HEAD WO CONTRAST   Final Result      1. No findings for acute intracranial abnormality. 2.  Age-related atrophy with patchy periventricular white matter changes bilaterally consistent with chronic small vessel ischemia. 3.  Stable low attenuation left frontoparietal lobe consistent with previous insult from known prior intraparenchymal hematoma. Stable right frontoparietal cortical infarct. These findings were called to the emergency room physician Dr. Audrey Dillard on 9/1/2022 at 5:30 p.m. XR CHEST PORTABLE   Final Result   1. No findings for acute cardiopulmonary disease. 2.  ET tube in good position in the mid trachea. MRI BRAIN WO CONTRAST    (Results Pending)           Assessment/Plan:      Acute encephalopathy, most likely secondary to seizures  Suspected seizure. Annelle Castles EEG with no seizure activity noted. Neurologist follow-up    Seizure disorder  EEG still shows some generalized bursts, but less frequent. Keppra dose adjusted by neurology. Acute hypoxic respiratory failure  Continue mechanical ventilation and respiratory support pending improvement in mental status    Elevated troponin  Conservative management according to cardiology. Alcohol abuse  No evidence of withdrawal.  Continue thiamine. SIRS with unclear focus of infection  On empiric antibiotic treatment.     DVT Prophylaxis:   Diet: Diet NPO  ADULT TUBE FEEDING; Nasogastric; Peptide Based; Continuous; 10; Yes; 10; Q 8 hours; 45; 30; Q 4 hours; Protein; twice per day administer via NG, flush 30 ml before and after administration  Code Status: Full Code  PT/OT Eval Status:         Halie Almaguer MD

## 2022-09-04 NOTE — PROGRESS NOTES
Patient cathleen in [facility], Dr. Amy Perez made aware at bedside, ordered renal panel to check electrolytes.

## 2022-09-04 NOTE — PLAN OF CARE
Problem: Discharge Planning  Goal: Discharge to home or other facility with appropriate resources  Outcome: Not Progressing    Discharge to home or other facility with appropriate resources:   Identify barriers to discharge with patient and caregiver   Refer to discharge planning if patient needs post-hospital services based on physician order or complex needs related to functional status, cognitive ability or social support system     Problem: Chronic Conditions and Co-morbidities  Goal: Patient's chronic conditions and co-morbidity symptoms are monitored and maintained or improved  Outcome: Not Progressing  Flowsheets (Taken 9/4/2022 0201)  Care Plan - Patient's Chronic Conditions and Co-Morbidity Symptoms are Monitored and Maintained or Improved:   Monitor and assess patient's chronic conditions and comorbid symptoms for stability, deterioration, or improvement   Collaborate with multidisciplinary team to address chronic and comorbid conditions and prevent exacerbation or deterioration   Update acute care plan with appropriate goals if chronic or comorbid symptoms are exacerbated and prevent overall improvement and discharge

## 2022-09-04 NOTE — PROGRESS NOTES
[facility] [address] [facility]

## 2022-09-04 NOTE — PROGRESS NOTES
Patient is with RAAS of (-1). Levophed was titrated to minimum dose; BP dropped to SBP 87mmhg thus titrated accordingly to acceptable MAP. 5pm Levophed was titrated to off.   MRI informed but currently unable to cater patient as they have plenty of scheduled patients until 7pm.

## 2022-09-04 NOTE — PROGRESS NOTES
Clinical Pharmacy Progress Note    Vancomycin - Management by Pharmacy    Consult Date(s): 9/1/22  Consulting Provider(s): Dr. Chadwick Meraz / Plan    Sepsis - r/o CNS infection - Vancomycin  Concurrent Antimicrobials:   Ceftriaxone - day #3  Ampicillin - day #3  Acyclovir - day #3  Day of Vanc Therapy / Ordered Duration: #4  Current Dosing Method: Bayesian-Guided AUC Dosing  Therapeutic Goal: 400-600 mg/L*hr  Current Dose / Frequency: 1000 mg IV q8hr  Plan / Rationale:   Renal function stable with an Scr of 0.6 mg/dL today. Dose was increased to 1000mg IV q8hr yesterday. Random level today of 19.5 mg/L collected ~6 hr after 3rd dose of new regimen. Current dose predicts an AUC of 500 mg/L*hr and sstrough of 16.1 mg/L. Will continue current dose for now and check a level in ~48 hours or sooner if clinically needed. Will continue to monitor clinical condition and make adjustments to regimen as appropriate. Thank you for consulting Pharmacy! [facility]  Main Pharmacy: [id number]  9/4/2022 11:21 AM      Interval update:  Lumbar puncture pending, No acute overnight events. No seizures on cEEG. Lupe Cecelia increased last night. Slightly hypotensive, WBC WNL at 8 today. Subjective/Objective: Mr. Yunior Burger is a 57 y.o. male with PMHx significant for chronic CAD, HTN, CHF, blood clot hx, essential tremor, EtOH abuse with recent rehab discharge, and prior ICH (Sept 2021 per notes). Found down at home by family, incontinent of stool. Intubated in the ED for for respiratory failure. Concern for sepsis as well as seizures, specifically for aspiration given seizures. Antibiotics and cEEG monitoring initiated. Pharmacy has been consulted to dose vancomycin.     Ht Readings from Last 1 Encounters:   09/01/22 5' 10\" (1.778 m)     Wt Readings from Last 1 Encounters:   09/03/22 202 lb 11.2 oz (91.9 kg)       Current & Prior Antimicrobial Regimen(s):   (9/1)   (9/1-9/2)  Ceftriaxone (9/2-current)  Ampicillin (9/2-current)  Acyclovir (9/2-current)  Vancomycin - pharmacy to dose   1750mg IV x1 load (9/1)  1250mg IV q12h (9/1-9/3)  1000mg IV q8h (9/3 - Current)    Level(s) / Doses:    Date Time Dose Level / Type of Level Interpretation   9/3 0956 1250mg IV q12h Random - 14.3 mg/L Drawn ~6 hours after last dose   Predicted AUC low at 392 mg/L*hr. Increasing dose to 1000 mg q8hr as this predicts an AUC of 469 mg/L*hr and sstrough of 14.8 mg/L.   9/4 1041 1000mg IV q8hr Random = 19.5 mg/L Drawn ~6 hours after 3rd dose of current regimen. Predicted AUC of 500 mg/L*hr and trough of 16.1 mg/L. Will continue current dose   Note: Serum levels collected for AUC-based dosing may be high if collected in close proximity to the dose administered. This is not necessarily indicative of toxicity. Cultures & Sensitivities:    Date Site Micro Susceptibility / Result   9/1 Blood x2 NGTD    9/1 CSF Sent      Labs / Ancillary Data:    Estimated Creatinine Clearance: 142 mL/min (A) (based on SCr of 0.6 mg/dL (L)).     Recent Labs     09/02/22 0410 09/03/22 0455 09/03/22 2054 09/04/22  0359   CREATININE 0.7* 0.5* 0.6* 0.6*   BUN 8 3* 3* 3*   WBC 17.3* 12.0*  --  8.0         Additional Lab Values / Findings of Note:    Procalcitonin:   Recent Labs     09/02/22 0410 09/03/22 0455 09/04/22  0359   PROCAL 0.30* 0.27* 0.19*

## 2022-09-04 NOTE — PROGRESS NOTES
Patient getting restless, eyes open but hayward not hold focus, moving all extremities spontaneously but non purposeful, RASS +1-+2 , 's, Pupils equal and reactive. Propofol increased to 50mcg/kg/min. Fenatnyl drip remains at 125 mcg/hr, CPOT 2.

## 2022-09-04 NOTE — PROGRESS NOTES
ICU Progress Note    Admit Date: 9/1/2022  Day: 3  Vent Day: 3  IV Access: Peripheral  IV Fluids: None  Vasopressors: levophed                Antibiotics: Ceftriaxone, vancomycin, ampicilin  Diet: Diet NPO     CC: Collapse     Interval history: No acute events overnight. Pt. Tolerating OG tube and feeds well. No definite seizure activity was seen on cEEG overnight. LDA: Peripheral IV left forearm, left anticubital, Rt forearm, Rt hand, OG left mouth, Urinary catheter, ETT     Vent: AC/PVR, Rate set: 14+ Rate measured: 14+ Vt set: 575, PIP:19, PEEP:5+  ABG 7.37/40/145     I/O net: +1.8 L     HPI: The patient is a 64-year-old man with past medical history significant for chronic alcoholism, ICH, CAD, DVT who presented today to the ED with an episode of syncope. The history was mainly obtained by the sister since patient was intubated. He was last known well at 9 AM but the sister. According to the sister, she had just returned home from work which she had found him half lying down on the couch covered in the stool while his car outside was running outside with occasion. She denies any complaints of recent fever, chills, chest pain cough, shortness of breath, lightheadedness, palpitations, nausea, vomiting, abdominal pain, diarrhea, fatigue, visual disturbance, changes in urination frequency or burning pain well urination or headaches by the patient in the preceding days to this incident. She does reinstate that her brother is a chronic alcoholic and had just gotten out of rehab. He has been sober since however she was not aware if he had recently had any alcoholic drink. He had also informed that he had recent episode of intracerebral hemorrhage on 9/4/2021. In the ED, there was a concern for possibility of a stroke however he was not considered a thrombolytic candidate because of her previous ICH and long time since his last known well.   According to the ED staff his physical exam was more pertinent for nonfocal delirium. It was also significant for nystagmus and rightward gaze not fixed. He was given 2 mg of Versed and it had worsened his oxygen saturations that had dropped to 88% on a nonrebreather and therefore he was intubated. CT Abdomen, chest: Moderate dependent atelectasis bilaterally. No acute abnormality on noncontrast CT of the abdomen and pelvis. Cholelithiasis. CTA Head: No acute CTA findings. No hemodynamically significant stenosis or focal aneurysm. No arteriovenous confirmation. CTA Neck: No acute CTA findings. No hemodynamically significant stenosis or focal aneurysm.     Medications:     Scheduled Meds:   magnesium sulfate  2,000 mg IntraVENous Once    potassium phosphate IVPB  15 mmol IntraVENous Once    famotidine (PEPCID) injection  20 mg IntraVENous BID    levETIRAcetam  1,500 mg IntraVENous Q12H    vancomycin  1,000 mg IntraVENous Q8H    cefTRIAXone (ROCEPHIN) IV  2,000 mg IntraVENous Q12H    ampicillin IV  1,000 mg IntraVENous 6 times per day    acyclovir  10 mg/kg (Adjusted) IntraVENous Q8H    enoxaparin  40 mg SubCUTAneous Daily    lidocaine PF  5 mL IntraDERmal Once    sodium chloride flush  5-40 mL IntraVENous 2 times per day    thiamine  100 mg IntraVENous Daily     Continuous Infusions:   fentaNYL 125 mcg/hr (09/04/22 0434)    sodium chloride      propofol 40 mcg/kg/min (09/04/22 0637)    norepinephrine 1 mcg/min (09/04/22 0419)     PRN Meds:sodium chloride flush, sodium chloride, ondansetron **OR** ondansetron, polyethylene glycol, acetaminophen **OR** acetaminophen    Objective:   Vitals:   T-max:  Patient Vitals for the past 8 hrs:   BP Temp Temp src Pulse Resp SpO2   09/04/22 0430 98/60 -- -- 90 19 95 %   09/04/22 0424 -- -- -- 78 -- --   09/04/22 0415 132/75 97.7 °F (36.5 °C) Oral 84 18 --   09/04/22 0400 116/63 -- -- 72 16 --   09/04/22 0345 -- -- -- 67 15 --   09/04/22 0330 -- -- -- 59 19 --   09/04/22 0315 106/60 -- -- 54 14 --   09/04/22 0300 105/62 -- -- 53 14 --   09/04/22 0245 108/64 -- -- 52 14 --   09/04/22 0230 123/67 -- -- 56 16 --   09/04/22 0215 108/60 -- -- 57 14 --   09/04/22 0200 106/61 -- -- 56 14 --   09/04/22 0145 109/62 -- -- 56 14 --   09/04/22 0130 111/64 -- -- 54 14 --   09/04/22 0115 112/62 -- -- 55 19 --   09/04/22 0100 111/62 -- -- 55 14 --   09/04/22 0045 (!) 102/59 -- -- 56 14 --   09/04/22 0030 105/61 -- -- 51 14 --   09/04/22 0015 105/62 -- -- 53 14 --   09/04/22 0000 106/63 98.3 °F (36.8 °C) Oral 53 14 100 %   09/03/22 2345 105/62 -- -- 52 14 --   09/03/22 2330 103/61 -- -- 53 14 --         Intake/Output Summary (Last 24 hours) at 9/4/2022 0724  Last data filed at 9/4/2022 0606  Gross per 24 hour   Intake 4120.37 ml   Output 2310 ml   Net 1810.37 ml         Review of Systems: Intubated    Physical Exam  Constitutional:       Appearance: Normal appearance. He is normal weight. HENT:      Head: Normocephalic and atraumatic. Nose:      Comments: intubated     Mouth/Throat:      Mouth: Mucous membranes are dry. Comments: Intubated and sedated    Eyes:      Comments: Intubated and sedated   Cardiovascular:      Rate and Rhythm: Normal rate and regular rhythm. Pulses: Normal pulses. Pulmonary:      Comments: Intubated and sedated    Abdominal:      General: Bowel sounds are normal. There is no distension. Palpations: Abdomen is soft. Musculoskeletal:      Cervical back: Neck supple.    Neurological:      Comments: Intubated and sedated     Psychiatric:      Comments: Intubated and sedated           LABS:    CBC:   Recent Labs     09/02/22  0410 09/03/22  0455 09/04/22  0359   WBC 17.3* 12.0* 8.0   HGB 13.5 13.0* 13.3*   HCT 40.4* 35.2* 40.1*   * 117* 83*   MCV 93.3 92.8 95.4       Renal:    Recent Labs     09/03/22  0455 09/03/22 2054 09/04/22  0359   * 139 133*   K 3.2* 3.7 3.8    106 102   CO2 20* 24 23   BUN 3* 3* 3*   CREATININE 0.5* 0.6* 0.6*   GLUCOSE 131* 124* 126*   CALCIUM 6.9* 7.9* 7.8*   MG 1.70* 2.10 1.80 PHOS 1.8* 2.0* 2.5   ANIONGAP 10 9 8       Hepatic:   Recent Labs     09/01/22  1709 09/02/22 0410 09/03/22 0455 09/03/22 2054 09/04/22 0359   AST 42*  --   --   --   --    ALT 28  --   --   --   --    BILITOT 1.3*  --   --   --   --    BILIDIR 0.4*  --   --   --   --    PROT 7.8  --   --   --   --    LABALBU 4.6   < > 1.9* 3.1* 2.9*   ALKPHOS 139*  --   --   --   --     < > = values in this interval not displayed. Troponin:   Recent Labs     09/02/22 0410 09/03/22 0455 09/04/22 0359   TROPONINI 0.25* 0.12* 0.13*       BNP: No results for input(s): BNP in the last 72 hours. Lipids: No results for input(s): CHOL, HDL in the last 72 hours. Invalid input(s): LDLCALCU, TRIGLYCERIDE  ABGs:    Recent Labs     09/01/22  1944 09/02/22  0341   PHART 7.270* 7.374   EJE5HNT 54.9* 40.7   PO2ART 63.5* 145.0*   [id number] 25 24   BEART -2.8 -1.4   [id number] 64* 100   NHC0ZJR 27 25         INR:   Recent Labs     09/01/22  1830 09/02/22  0608   INR 1.20* 1.27*       Lactate: No results for input(s): LACTATE in the last 72 hours. Cultures:  -----------------------------------------------------------------  RAD:   XR ABDOMEN (KUB) (SINGLE AP VIEW)   Final Result   Findings/Impression:    The tip of the enteric tube terminates in the distal body of the stomach. CT CHEST WO CONTRAST   Final Result      CHEST:      1. Moderate dependent atelectasis bilaterally. ABDOMEN/PELVIS:      1.  No acute abnormality on noncontrast CT of the abdomen and pelvis. 2.  Cholelithiasis. CT ABDOMEN PELVIS WO CONTRAST Additional Contrast? None   Final Result      CHEST:      1. Moderate dependent atelectasis bilaterally. ABDOMEN/PELVIS:      1.  No acute abnormality on noncontrast CT of the abdomen and pelvis. 2.  Cholelithiasis. CTA HEAD NECK W CONTRAST   Final Result      CTA Head:   No acute CTA findings. No hemodynamically significant stenosis or focal aneurysm. No arteriovenous confirmation. CTA Neck:   No acute CTA findings. No hemodynamically significant stenosis or focal aneurysm. CT HEAD WO CONTRAST   Final Result      1. No findings for acute intracranial abnormality. 2.  Age-related atrophy with patchy periventricular white matter changes bilaterally consistent with chronic small vessel ischemia. 3.  Stable low attenuation left frontoparietal lobe consistent with previous insult from known prior intraparenchymal hematoma. Stable right frontoparietal cortical infarct. These findings were called to the emergency room physician Dr. Jose Bolton on 9/1/2022 at 5:30 p.m. XR CHEST PORTABLE   Final Result   1. No findings for acute cardiopulmonary disease. 2.  ET tube in good position in the mid trachea. MRI BRAIN WO CONTRAST    (Results Pending)         Assessment/Plan:   Acute Metabolic Encephalopathy 2/2 SIRS vs seizure  On presentation AMS, elevated WBC: 21.7, Tachypneic , Tachycardic, Hypotensive, hx of ICH, SAH and SDH 2/2 to fall, hx of chronic alcohol abuse, CT head:Stable low attenuation left frontoparietal lobe consistent with previous insult from known prior intraparenchymal hematoma. Stable right frontoparietal cortical infarct. CT Abdomen, chest: Moderate dependent atelectasis bilaterally. No acute abnormality on noncontrast CT of the abdomen and pelvis. Cholelithiasis. CTA Head: No acute CTA findings. No hemodynamically significant stenosis or focal aneurysm. No arteriovenous confirmation. CTA Neck: No acute CTA findings.   No hemodynamically significant stenosis or focal aneurysm.  -On levophed  -On broad spectrum antibiotics Vancomycin and ceftriaxone  -Procal  trending down 0.27 -> 0.19  -blood culture ordered: NGTD  -Trending Lactic acid  -cEEG currently on.  -On Keppra 1,500 bid  -Consulted Neurology: appreciate recs  -Lumbar puncture ordered- not completed yet     Acute hypercapnic respiratory failure secondary to possible aspiration vs 2/2 medication  On presentation: VBG PH:7.264, PCO2: 56.4, 83.2  Currently: PH: 7.37, PCO2:40.7, PO2:145  Vent settings:AC/PVR, Rate Set: 14, Rate measured: 15, PIP:27, PEEP:5+  -Patient intubated with fentanyl and propofol  -On broad spectrum antibiotics for empiric CNS infection coverage. Tropinemia r/o ACS  -Trending trop 0.25 > 0.12 > 0.13 will continue trending   -EKG: wnl, Echo EF: 50-55%, Hx of CAD with mural thrombosis, CXR: currently significant for mild pulmonary vascular congestion  -Cardio consulted and following: no intervention at this time. CAD with LAD stent  -will start on Aspirin, statin, coreg with core pack and on telemetry  -Cardio on board     Alcohol use disorder  -thiamine 100 mg daily  -monitor for withdrawal  -Ethanol level: none detected     Isolated proteinuria  -UA: Positive for protein     Hx of Cirrhosis  -Ammonia:36 wnl     Code Status:Full Code  FEN: NPO  PPX:SCD, lovenox   DISPO: ICU    Tasha Elena MD, PGY-1  09/04/22  7:24 AM    This patient has been staffed and discussed with Laron Bauer MD.    Patient was seen, examined and discussed with Dr. Amos Osman [facility]. I agree with the interval history. My physical exam confirms the findings listed below  Chart was reviewed including labs, CXR, CT scan and medical records confirm the findings noted     Acute respiratory failure on mechanical vent support. , RR 14, FiO2 30, PEEP 5. EEG with generalized discharges that may be associated with seizures  Leukocytosis - resolved   Mildly elevated troponin. Likely due to demand ischemia. EKG with septal Q waves and T inv laterally   Hypotension requiring levophed: down to 1mcg/min.   This is thought to be due to sedation   Hx of ICH  Hx of alcohol use   TF    LP by IR  MRI is pending   TF  Continue current ABX  Continue vent support   Lovenox/famotidine   Plan for sedation holiday after MRI     Pt has a high probability of imminent or life-threatening deterioration requiring close monitoring, and highly complex decision-making and/or interventions of high intensity to assess, manipulate, and support his critical organ systems to prevent a likely inevitable decline which could occur if left untreated. A total critical care time 35 minutes was used. This includes but not limited to examining patient, collaborating with other physicians, monitoring vital signs, telemetry, continuous pulse oximetry, and clinical response to IV medications, documentation time, review and interpretation of laboratory and radiological data, review of nursing notes and old record review.  This time excludes any time that may have been spent performing procedures for life threatening organ failure

## 2022-09-05 LAB
ALBUMIN SERPL-MCNC: 2.4 G/DL (ref 3.4–5)
ANION GAP SERPL CALCULATED.3IONS-SCNC: 10 MMOL/L (ref 3–16)
BASE EXCESS ARTERIAL: 1.6 MMOL/L (ref -3–3)
BASOPHILS ABSOLUTE: 0 K/UL (ref 0–0.2)
BASOPHILS RELATIVE PERCENT: 0.5 %
BUN BLDV-MCNC: 5 MG/DL (ref 7–20)
CALCIUM SERPL-MCNC: 7.4 MG/DL (ref 8.3–10.6)
CARBOXYHEMOGLOBIN ARTERIAL: 0.7 % (ref 0–1.5)
CHLORIDE BLD-SCNC: 108 MMOL/L (ref 99–110)
CO2: 22 MMOL/L (ref 21–32)
CREAT SERPL-MCNC: <0.5 MG/DL (ref 0.8–1.3)
EOSINOPHILS ABSOLUTE: 0.2 K/UL (ref 0–0.6)
EOSINOPHILS RELATIVE PERCENT: 3.1 %
GFR AFRICAN AMERICAN: >60
GFR NON-AFRICAN AMERICAN: >60
GLUCOSE BLD-MCNC: 99 MG/DL (ref 70–99)
HCO3 ARTERIAL: 26 MMOL/L (ref 21–29)
HCT VFR BLD CALC: 39 % (ref 40.5–52.5)
HEMOGLOBIN, ART, EXTENDED: 12.5 G/DL
HEMOGLOBIN: 13.1 G/DL (ref 13.5–17.5)
LYMPHOCYTES ABSOLUTE: 1.3 K/UL (ref 1–5.1)
LYMPHOCYTES RELATIVE PERCENT: 25.6 %
MAGNESIUM: 1.7 MG/DL (ref 1.8–2.4)
MCH RBC QN AUTO: 31.9 PG (ref 26–34)
MCHC RBC AUTO-ENTMCNC: 33.6 G/DL (ref 31–36)
MCV RBC AUTO: 94.9 FL (ref 80–100)
METHEMOGLOBIN ARTERIAL: 0 % (ref 0–1.4)
MONOCYTES ABSOLUTE: 0.5 K/UL (ref 0–1.3)
MONOCYTES RELATIVE PERCENT: 10.6 %
NEUTROPHILS ABSOLUTE: 3 K/UL (ref 1.7–7.7)
NEUTROPHILS RELATIVE PERCENT: 60.2 %
O2 SAT, ARTERIAL: 96 % (ref 93–100)
PCO2 ARTERIAL: 39 MMHG (ref 35–45)
PDW BLD-RTO: 13.6 % (ref 12.4–15.4)
PH ARTERIAL: 7.43 (ref 7.35–7.45)
PHOSPHORUS: 2.9 MG/DL (ref 2.5–4.9)
PLATELET # BLD: 85 K/UL (ref 135–450)
PMV BLD AUTO: 9.4 FL (ref 5–10.5)
PO2 ARTERIAL: 79.1 MMHG (ref 75–108)
POTASSIUM SERPL-SCNC: 3.9 MMOL/L (ref 3.5–5.1)
RBC # BLD: 4.11 M/UL (ref 4.2–5.9)
SODIUM BLD-SCNC: 140 MMOL/L (ref 136–145)
TCO2 ARTERIAL: 27 MMOL/L
WBC # BLD: 5.1 K/UL (ref 4–11)

## 2022-09-05 PROCEDURE — 2580000003 HC RX 258: Performed by: STUDENT IN AN ORGANIZED HEALTH CARE EDUCATION/TRAINING PROGRAM

## 2022-09-05 PROCEDURE — 6360000002 HC RX W HCPCS: Performed by: NURSE PRACTITIONER

## 2022-09-05 PROCEDURE — A4216 STERILE WATER/SALINE, 10 ML: HCPCS | Performed by: STUDENT IN AN ORGANIZED HEALTH CARE EDUCATION/TRAINING PROGRAM

## 2022-09-05 PROCEDURE — 6360000002 HC RX W HCPCS

## 2022-09-05 PROCEDURE — 2700000000 HC OXYGEN THERAPY PER DAY

## 2022-09-05 PROCEDURE — 6360000002 HC RX W HCPCS: Performed by: STUDENT IN AN ORGANIZED HEALTH CARE EDUCATION/TRAINING PROGRAM

## 2022-09-05 PROCEDURE — 82803 BLOOD GASES ANY COMBINATION: CPT

## 2022-09-05 PROCEDURE — 99291 CRITICAL CARE FIRST HOUR: CPT | Performed by: INTERNAL MEDICINE

## 2022-09-05 PROCEDURE — 83735 ASSAY OF MAGNESIUM: CPT

## 2022-09-05 PROCEDURE — 2500000003 HC RX 250 WO HCPCS: Performed by: STUDENT IN AN ORGANIZED HEALTH CARE EDUCATION/TRAINING PROGRAM

## 2022-09-05 PROCEDURE — 2000000000 HC ICU R&B

## 2022-09-05 PROCEDURE — 2580000003 HC RX 258

## 2022-09-05 PROCEDURE — 94003 VENT MGMT INPAT SUBQ DAY: CPT

## 2022-09-05 PROCEDURE — 2500000003 HC RX 250 WO HCPCS: Performed by: EMERGENCY MEDICINE

## 2022-09-05 PROCEDURE — 94761 N-INVAS EAR/PLS OXIMETRY MLT: CPT

## 2022-09-05 PROCEDURE — 6360000002 HC RX W HCPCS: Performed by: INTERNAL MEDICINE

## 2022-09-05 PROCEDURE — 2580000003 HC RX 258: Performed by: INTERNAL MEDICINE

## 2022-09-05 PROCEDURE — 99291 CRITICAL CARE FIRST HOUR: CPT | Performed by: NURSE PRACTITIONER

## 2022-09-05 PROCEDURE — 80069 RENAL FUNCTION PANEL: CPT

## 2022-09-05 PROCEDURE — 2580000003 HC RX 258: Performed by: NURSE PRACTITIONER

## 2022-09-05 PROCEDURE — 36415 COLL VENOUS BLD VENIPUNCTURE: CPT

## 2022-09-05 PROCEDURE — 85025 COMPLETE CBC W/AUTO DIFF WBC: CPT

## 2022-09-05 PROCEDURE — 95813 EEG EXTND MNTR 61-119 MIN: CPT

## 2022-09-05 RX ORDER — FENTANYL CITRATE-0.9 % NACL/PF 10 MCG/ML
25-300 PLASTIC BAG, INJECTION (ML) INTRAVENOUS CONTINUOUS
Status: DISCONTINUED | OUTPATIENT
Start: 2022-09-05 | End: 2022-09-10

## 2022-09-05 RX ORDER — MAGNESIUM SULFATE IN WATER 40 MG/ML
4000 INJECTION, SOLUTION INTRAVENOUS ONCE
Status: COMPLETED | OUTPATIENT
Start: 2022-09-05 | End: 2022-09-05

## 2022-09-05 RX ADMIN — ENOXAPARIN SODIUM 40 MG: 100 INJECTION SUBCUTANEOUS at 07:57

## 2022-09-05 RX ADMIN — ACYCLOVIR SODIUM 800 MG: 50 INJECTION, SOLUTION INTRAVENOUS at 19:28

## 2022-09-05 RX ADMIN — PROPOFOL 30 MCG/KG/MIN: 10 INJECTION, EMULSION INTRAVENOUS at 16:05

## 2022-09-05 RX ADMIN — SODIUM CHLORIDE, PRESERVATIVE FREE 20 MG: 5 INJECTION INTRAVENOUS at 21:11

## 2022-09-05 RX ADMIN — THIAMINE HYDROCHLORIDE 100 MG: 100 INJECTION, SOLUTION INTRAMUSCULAR; INTRAVENOUS at 07:57

## 2022-09-05 RX ADMIN — DEXMEDETOMIDINE 0.2 MCG/KG/HR: 100 INJECTION, SOLUTION INTRAVENOUS at 15:34

## 2022-09-05 RX ADMIN — DEXMEDETOMIDINE 0.4 MCG/KG/HR: 100 INJECTION, SOLUTION INTRAVENOUS at 23:33

## 2022-09-05 RX ADMIN — VANCOMYCIN HYDROCHLORIDE 1000 MG: 10 INJECTION, POWDER, LYOPHILIZED, FOR SOLUTION INTRAVENOUS at 06:08

## 2022-09-05 RX ADMIN — AMPICILLIN SODIUM 1000 MG: 1 INJECTION, POWDER, FOR SOLUTION INTRAMUSCULAR; INTRAVENOUS at 11:26

## 2022-09-05 RX ADMIN — AMPICILLIN SODIUM 1000 MG: 1 INJECTION, POWDER, FOR SOLUTION INTRAMUSCULAR; INTRAVENOUS at 07:16

## 2022-09-05 RX ADMIN — VANCOMYCIN HYDROCHLORIDE 1000 MG: 10 INJECTION, POWDER, LYOPHILIZED, FOR SOLUTION INTRAVENOUS at 14:20

## 2022-09-05 RX ADMIN — Medication 100 MCG/HR: at 13:40

## 2022-09-05 RX ADMIN — Medication 125 MCG/HR: at 03:27

## 2022-09-05 RX ADMIN — AMPICILLIN SODIUM 1000 MG: 1 INJECTION, POWDER, FOR SOLUTION INTRAMUSCULAR; INTRAVENOUS at 19:43

## 2022-09-05 RX ADMIN — SODIUM CHLORIDE, PRESERVATIVE FREE 10 ML: 5 INJECTION INTRAVENOUS at 08:14

## 2022-09-05 RX ADMIN — ACYCLOVIR SODIUM 800 MG: 50 INJECTION, SOLUTION INTRAVENOUS at 11:34

## 2022-09-05 RX ADMIN — VANCOMYCIN HYDROCHLORIDE 1000 MG: 10 INJECTION, POWDER, LYOPHILIZED, FOR SOLUTION INTRAVENOUS at 21:04

## 2022-09-05 RX ADMIN — ACYCLOVIR SODIUM 800 MG: 50 INJECTION, SOLUTION INTRAVENOUS at 03:24

## 2022-09-05 RX ADMIN — MAGNESIUM SULFATE HEPTAHYDRATE 4000 MG: 40 INJECTION, SOLUTION INTRAVENOUS at 07:55

## 2022-09-05 RX ADMIN — CEFTRIAXONE 2000 MG: 2 INJECTION, POWDER, FOR SOLUTION INTRAMUSCULAR; INTRAVENOUS at 12:30

## 2022-09-05 RX ADMIN — PROPOFOL 45 MCG/KG/MIN: 10 INJECTION, EMULSION INTRAVENOUS at 06:29

## 2022-09-05 RX ADMIN — LEVETIRACETAM 1500 MG: 100 INJECTION, SOLUTION, CONCENTRATE INTRAVENOUS at 14:23

## 2022-09-05 RX ADMIN — CEFTRIAXONE 2000 MG: 2 INJECTION, POWDER, FOR SOLUTION INTRAMUSCULAR; INTRAVENOUS at 22:53

## 2022-09-05 RX ADMIN — AMPICILLIN SODIUM 1000 MG: 1 INJECTION, POWDER, FOR SOLUTION INTRAMUSCULAR; INTRAVENOUS at 04:41

## 2022-09-05 RX ADMIN — PROPOFOL 45 MCG/KG/MIN: 10 INJECTION, EMULSION INTRAVENOUS at 01:53

## 2022-09-05 RX ADMIN — AMPICILLIN SODIUM 1000 MG: 1 INJECTION, POWDER, FOR SOLUTION INTRAMUSCULAR; INTRAVENOUS at 23:02

## 2022-09-05 RX ADMIN — LEVETIRACETAM 1500 MG: 100 INJECTION, SOLUTION, CONCENTRATE INTRAVENOUS at 01:48

## 2022-09-05 RX ADMIN — AMPICILLIN SODIUM 1000 MG: 1 INJECTION, POWDER, FOR SOLUTION INTRAMUSCULAR; INTRAVENOUS at 15:39

## 2022-09-05 RX ADMIN — SODIUM CHLORIDE, PRESERVATIVE FREE 20 MG: 5 INJECTION INTRAVENOUS at 07:57

## 2022-09-05 ASSESSMENT — PULMONARY FUNCTION TESTS
PIF_VALUE: 20
PIF_VALUE: 20
PIF_VALUE: 22
PIF_VALUE: 18
PIF_VALUE: 18
PIF_VALUE: 20
PIF_VALUE: 22
PIF_VALUE: 15
PIF_VALUE: 18
PIF_VALUE: 19
PIF_VALUE: 21
PIF_VALUE: 17
PIF_VALUE: 20
PIF_VALUE: 18
PIF_VALUE: 19
PIF_VALUE: 20
PIF_VALUE: 21
PIF_VALUE: 17
PIF_VALUE: 21
PIF_VALUE: 20
PIF_VALUE: 19
PIF_VALUE: 18
PIF_VALUE: 17
PIF_VALUE: 17
PIF_VALUE: 19
PIF_VALUE: 47
PIF_VALUE: 19
PIF_VALUE: 19
PIF_VALUE: 20
PIF_VALUE: 19
PIF_VALUE: 17
PIF_VALUE: 18
PIF_VALUE: 18
PIF_VALUE: 17

## 2022-09-05 NOTE — PROGRESS NOTES
Propofol and Fentanyl reduced by half this AM for SAT at 0830. Within 15 min pt opened eyes and moved extremities spontaneously and resisted against bilateral wrist restraints, but did not follow commands. +1 pitting edema noted in bilateral upper extremities this AM. Attempted to establish more IV access with Dora Albright RN, was unsuccessful.

## 2022-09-05 NOTE — PLAN OF CARE
Problem: Chronic Conditions and Co-morbidities  Goal: Patient's chronic conditions and co-morbidity symptoms are monitored and maintained or improved  Outcome: Not Progressing       Problem: Safety - Medical Restraint  Goal: Remains free of injury from restraints (Restraint for Interference with Medical Device)  9/4/2022 2357 by Emilia Elmore RN  Outcome: Progressing    Remains free of injury from restraints (restraint for interference with medical device):   Evaluate the patient's condition at the time of restraint application   Every 2 hours: Monitor safety, psychosocial status, comfort, nutrition and hydration   Inform patient/family regarding the reason for restraint    Outcome: Progressing  Flowsheets  Taken 9/4/2022 1727  Remains free of injury from restraints (restraint for interference with medical device):   Evaluate the patient's condition at the time of restraint application   Every 2 hours: Monitor safety, psychosocial status, comfort, nutrition and hydration   Inform patient/family regarding the reason for restraint  Taken 9/4/2022 1000  Remains free of injury from restraints (restraint for interference with medical device): Evaluate the patient's condition at the time of restraint application     Problem: Pain  Goal: Verbalizes/displays adequate comfort level or baseline comfort level  Outcome: Progressing  Flowsheets (Taken 9/4/2022 1945)  Verbalizes/displays adequate comfort level or baseline comfort level:   Assess pain using appropriate pain scale   Administer analgesics based on type and severity of pain and evaluate response   Implement non-pharmacological measures as appropriate and evaluate response   Consider cultural and social influences on pain and pain management   Notify Licensed Independent Practitioner if interventions unsuccessful or patient reports new pain     Problem: Safety - Adult  Goal: Free from fall injury  Outcome: Progressing  Flowsheets (Taken 9/4/2022 0201)  Free From Fall Injury:   Instruct family/caregiver on patient safety   Based on caregiver fall risk screen, instruct family/caregiver to ask for assistance with transferring infant if caregiver noted to have fall risk factors     Problem: ABCDS Injury Assessment  Goal: Absence of physical injury  9/4/2022 3147 by Lisa Hager RN    Outcome: Progressing  Flowsheets (Taken 9/4/2022 1400)  Absence of Physical Injury: Implement safety measures based on patient assessment     Problem: Skin/Tissue Integrity  Goal: Absence of new skin breakdown  Outcome: Progressing  Note: Pt at risk for skin breakdown. See Mitch score. Pt remains on bedrest. Unable to reposition self in bed. Heels elevated off bed. Sacral heart mepilex intact to protect,  site inspected and intact underneath. Will continue to turn and reposition patient every two hours and as needed. Will continue to keep patient clean and dry, applying skin care cream as needed. Pillows used for repositioning q2hs. Will continue to monitor and assess for skin breakdown.        Problem: Nutrition Deficit:  Goal: Optimize nutritional status  Outcome: Progressing  Flowsheets (Taken 9/4/2022 0204)  Nutrient intake appropriate for improving, restoring, or maintaining nutritional needs:   Assess nutritional status and recommend course of action   Monitor oral intake, labs, and treatment plans   Recommend, monitor, and adjust tube feedings and TPN/PPN based on assessed needs   Provide specific nutrition education to patient or family as appropriate   Order, calculate, and assess calorie counts as needed

## 2022-09-05 NOTE — PROGRESS NOTES
Hospitalist Progress Note      PCP: Leonidas Meigs, MD    Date of Admission: 9/1/2022    Chief Complaint: Seizures    Hospital Course:      Subjective: Patient is on ventilator and sedated nurse at the bedside plan to start on Precedex drip and decrease propofol. Medications:  Reviewed    Infusion Medications    fentaNYL 100 mcg/hr (09/05/22 1737)    dexmedetomidine (PRECEDEX) IV infusion 0.4 mcg/kg/hr (09/05/22 1737)    sodium chloride      propofol 20 mcg/kg/min (09/05/22 1737)    norepinephrine Stopped (09/05/22 1017)     Scheduled Medications    famotidine (PEPCID) injection  20 mg IntraVENous BID    levETIRAcetam  1,500 mg IntraVENous Q12H    vancomycin  1,000 mg IntraVENous Q8H    cefTRIAXone (ROCEPHIN) IV  2,000 mg IntraVENous Q12H    ampicillin IV  1,000 mg IntraVENous 6 times per day    acyclovir  10 mg/kg (Adjusted) IntraVENous Q8H    enoxaparin  40 mg SubCUTAneous Daily    lidocaine PF  5 mL IntraDERmal Once    sodium chloride flush  5-40 mL IntraVENous 2 times per day    thiamine  100 mg IntraVENous Daily     PRN Meds: sodium chloride flush, sodium chloride, ondansetron **OR** ondansetron, polyethylene glycol, acetaminophen **OR** acetaminophen      Intake/Output Summary (Last 24 hours) at 9/5/2022 1842  Last data filed at 9/5/2022 1737  Gross per 24 hour   Intake 4329.48 ml   Output 2170 ml   Net 2159.48 ml       Physical Exam Performed:    /65   Pulse 72   Temp 97.5 °F (36.4 °C) (Axillary)   Resp 14   Ht 5' 10\" (1.778 m)   Wt 205 lb 0.4 oz (93 kg)   SpO2 97%   BMI 29.42 kg/m²     General appearance: No apparent distress, appears stated age and on ventilator sedated  HEENT: Pupils equal, round, and reactive to light. Conjunctivae/corneas clear. Neck: Supple, with full range of motion. No jugular venous distention. Trachea midline. Respiratory:  Normal respiratory effort. Clear to auscultation, bilaterally without Rales/Wheezes/Rhonchi.   Cardiovascular: Regular rate and rhythm with normal S1/S2 without murmurs, rubs or gallops. Abdomen: Soft, non-tender, non-distended with normal bowel sounds. Musculoskeletal: No clubbing, cyanosis or edema bilaterally. Full range of motion without deformity. Skin: Skin color, texture, turgor normal.  No rashes or lesions. Neurologic: On ventilator sedated. Psychiatric: On ventilator sedated  Capillary Refill: Brisk, 3 seconds, normal   Peripheral Pulses: +2 palpable, equal bilaterally       Labs:   Recent Labs     09/03/22 0455 09/04/22 0359 09/05/22  0605   WBC 12.0* 8.0 5.1   HGB 13.0* 13.3* 13.1*   HCT 35.2* 40.1* 39.0*   * 83* 85*     Recent Labs     09/03/22 2054 09/04/22 0359 09/05/22  0541    133* 140   K 3.7 3.8 3.9    102 108   CO2 24 23 22   BUN 3* 3* 5*   CREATININE 0.6* 0.6* <0.5*   CALCIUM 7.9* 7.8* 7.4*   PHOS 2.0* 2.5 2.9     No results for input(s): AST, ALT, BILIDIR, BILITOT, ALKPHOS in the last 72 hours. No results for input(s): INR in the last 72 hours. Recent Labs     09/03/22 0455 09/04/22 0359   TROPONINI 0.12* 0.13*       Urinalysis:      Lab Results   Component Value Date/Time    NITRU Negative 09/01/2022 05:09 PM    WBCUA 0-2 09/01/2022 05:09 PM    BACTERIA Rare 09/01/2022 05:09 PM    RBCUA 0-2 09/01/2022 05:09 PM    BLOODU MODERATE 09/01/2022 05:09 PM    SPECGRAV >=1.030 09/01/2022 05:09 PM    GLUCOSEU Negative 09/01/2022 05:09 PM       Radiology:  XR ABDOMEN (KUB) (SINGLE AP VIEW)   Final Result   Findings/Impression:    The tip of the enteric tube terminates in the distal body of the stomach. CT CHEST WO CONTRAST   Final Result      CHEST:      1. Moderate dependent atelectasis bilaterally. ABDOMEN/PELVIS:      1.  No acute abnormality on noncontrast CT of the abdomen and pelvis. 2.  Cholelithiasis. CT ABDOMEN PELVIS WO CONTRAST Additional Contrast? None   Final Result      CHEST:      1. Moderate dependent atelectasis bilaterally.       ABDOMEN/PELVIS:      1.  No acute abnormality on noncontrast CT of the abdomen and pelvis. 2.  Cholelithiasis. CTA HEAD NECK W CONTRAST   Final Result      CTA Head:   No acute CTA findings. No hemodynamically significant stenosis or focal aneurysm. No arteriovenous confirmation. CTA Neck:   No acute CTA findings. No hemodynamically significant stenosis or focal aneurysm. CT HEAD WO CONTRAST   Final Result      1. No findings for acute intracranial abnormality. 2.  Age-related atrophy with patchy periventricular white matter changes bilaterally consistent with chronic small vessel ischemia. 3.  Stable low attenuation left frontoparietal lobe consistent with previous insult from known prior intraparenchymal hematoma. Stable right frontoparietal cortical infarct. These findings were called to the emergency room physician Dr. Bernardo Vasques on 9/1/2022 at 5:30 p.m. XR CHEST PORTABLE   Final Result   1. No findings for acute cardiopulmonary disease. 2.  ET tube in good position in the mid trachea. MRI BRAIN WO CONTRAST    (Results Pending)           Assessment/Plan:    Active Hospital Problems    Diagnosis     Acute respiratory failure ([address] [J96.00]      Priority: Medium    Seizure ([address] [R56.9]      Priority: Medium     Admitted with acute encephalopathy most likely secondary to seizure intubated vent management per pulmonary critical care neurology consulted and following is status post EEG with no seizure activity noted on Keppra. Acute respiratory failure with hypoxia on ventilator vent management per pulmonary critical care. Elevated troponin cardiology consulted and following recommending conservative management. History of alcohol abuse continue thiamine. Nutrition continue with tube feeding. SIRS unclear focus of infection on empiric antibiotic and acyclovir.     DVT Prophylaxis: Lovenox subcu  Diet: Diet NPO  ADULT TUBE FEEDING; Nasogastric; Peptide Based; Continuous; 10; Yes; 10; Q 8 hours; 45; 30; Q 4 hours; Protein; twice per day administer via NG, flush 30 ml before and after administration  Code Status: Full Code  PT/OT Eval Status:     Giorgi Carr MD

## 2022-09-05 NOTE — PROCEDURES
Continuous EEG monitoring record    Patient name: Niki Waters    START: 09/04/2022 @ 09:00am   END: 09/05/2022 @ 07:30am       Electroencephalographer: Chris Oliva MD PhD      CLINICAL DETAILS:  This EEG was performed on this 57 y. o.yo male admitted for Altered mental Status and concer for subclinical seizures      TECHNICAL DETAILS:  Continuous video-EEG monitoring was performed with 27 surface scalp electrodes placed according to the International 10-20 electrode placement system, using a 32-channel Tapastreet headbox. All EEG and video information was acquired digitally, including the use of automated spike and seizure detection software to detect epileptiform activity. An event button was also available to be depressed during clinical events. 09/05/2022  00:00am to 07:30am    SEIZURES:  None  INTERICTAL:  Frequent generalized transient discharges  PUSHBUTTONS:  None  BACKGROUND:  The background consists of diffuse low amplitude delta slowing with frequent 1 second bursts of mixed frontal  theta frequencies. EKG:  Regular    09/04/2022  09:00am to 23:59pm   SEIZURES:  None  INTERICTAL:  Frequent generalized transient discharges  PUSHBUTTONS:  None  BACKGROUND:  The background consists of diffuse low amplitude delta slowing with frequent 1 second bursts of mixed frontal  theta frequencies. EKG:  regular    CLINICAL INTERPRETATION:  This was an abnormal tracing for age and state due to a mild to moderate generalized slow wave abnormality indicating diffuse cerebral dysfunction which can be of multiple causes, including structural, or vascular abnormalities, toxic/metabolic conditions, hydrocephalus, or postictal conditions. Generalized discharges indicate a diffuse cortical hyperexcitability that may be associated with seizures. No definite seizures were seen during this recording. Clinical correlation is advised.         Chris Oliva MD PhD

## 2022-09-05 NOTE — PROGRESS NOTES
NEUROLOGY / NEUROCRITICAL CARE PROGRESS NOTE       Patient Name: Leatha Garcia YOB: 1960   Sex: Male Age: 57 yrs     CC / Reason for Consult: AMS    Interval Hx / Changes over last 24 hours:   MRI attempted overnight but went into vtach  Propofol @30 today - exam improving with less propofol. No seizures overnight    ROS: unobtainable sedation    HISTORY   Admission HPI:      Leatha Garcia is a 57 y.o. y/o male with hx of ETOH abuse, CAD/MI, CHF, HTN, HLD, prior traumatic ICH 9/2021 who presents with acute encephalopathy of unknown etiology. He was reportedly at home after a long recovery from his [address]. He lives with significant other who saw him last on 9/1/22 at 9am in his normal state of health. She later returned around 4pm and found patient's car in driving way running, patient was inside home, half on couch unresponsive w/ gurgling respirations w/ low O2 sats. EMS was called. He was taken to [facility] intubated on arrival to ED d/t continued hypoxia. Unclear etiology of symptoms, ETOH / drug screen negative. He did have some elevations in troponin. Lactic acid was mildly elevated and patient had R eye deviations with nystagmus that prompted concern for seizures. He was given keppra in the ED. Initial head CT was unremarkable, CTA of head / neck was unremarkable. He was admitted to ICU for further evaluation.      PMH Past Medical History:   Diagnosis Date    Alcohol abuse     CAD (coronary artery disease)     with complete LAD occlusion    CHF (congestive heart failure) (HCC)     LVEF    Essential tremor     HTN (hypertension)     Hx of blood clots 05/2021    Hyperlipidemia     ICH (intracerebral hemorrhage) (HCC)     Lower extremity cellulitis     MI (mitral incompetence)       Allergies No Known Allergies   Diet Diet NPO  ADULT TUBE FEEDING; Nasogastric; Peptide Based; Continuous; 10; Yes; 10; Q 8 hours; 45; 30; Q 4 hours; Protein; twice per day administer via NG, flush 30 ml before and after administration   Isolation No active isolations     CURRENT SCHEDULED MEDICATIONS   Inpatient Medications     famotidine (PEPCID) injection, 20 mg, IntraVENous, BID    levETIRAcetam, 1,500 mg, IntraVENous, Q12H    vancomycin, 1,000 mg, IntraVENous, Q8H    cefTRIAXone (ROCEPHIN) IV, 2,000 mg, IntraVENous, Q12H    ampicillin IV, 1,000 mg, IntraVENous, 6 times per day    acyclovir, 10 mg/kg (Adjusted), IntraVENous, Q8H    enoxaparin, 40 mg, SubCUTAneous, Daily    lidocaine PF, 5 mL, IntraDERmal, Once    sodium chloride flush, 5-40 mL, IntraVENous, 2 times per day    thiamine, 100 mg, IntraVENous, Daily   Infusions    fentaNYL 75 mcg/hr (09/05/22 1051)    sodium chloride      propofol 25 mcg/kg/min (09/05/22 1049)    norepinephrine Stopped (09/05/22 1017)      Antibiotics   Recent Abx Admin                     acyclovir (ZOVIRAX) 800 mg in dextrose 5 % 250 mL IVPB (mg) 800 mg New Bag 09/05/22 1134     800 mg New Bag  0324     800 mg New Bag 09/04/22 1923    ampicillin 1000 mg ivpb mini bag (mg) 1,000 mg New Bag 09/05/22 1126     1,000 mg New Bag  0716     1,000 mg New Bag  0441     1,000 mg New Bag 09/04/22 2351     1,000 mg New Bag  1845     1,000 mg New Bag  1509    vancomycin (VANCOCIN) 1,000 mg in dextrose 5 % 250 mL IVPB (mg) 1,000 mg New Bag 09/05/22 0608     1,000 mg New Bag 09/04/22 2035     1,000 mg New Bag  1326    cefTRIAXone (ROCEPHIN) 2,000 mg in dextrose 5 % 50 mL IVPB mini-bag (mg) 2,000 mg New Bag 09/04/22 2309                      LABS   Metabolic Panel Recent Labs     09/03/22  2054 09/04/22  0359 09/05/22  0541    133* 140   K 3.7 3.8 3.9    102 108   CO2 24 23 22   BUN 3* 3* 5*   CREATININE 0.6* 0.6* <0.5*   GLUCOSE 124* 126* 99   CALCIUM 7.9* 7.8* 7.4*   LABALBU 3.1* 2.9* 2.4*   PHOS 2.0* 2.5 2.9   MG 2.10 1.80 1.70*        CBC / Coags Recent Labs     09/03/22  0455 09/04/22  0359 09/05/22  0605   WBC 12.0* 8.0 5.1   RBC 3.80* 4.21 4.11*   HGB 13.0* 13.3* 13.1*   HCT 35.2* 40.1* 39.0*   * 83* 85*              DIAGNOSTICS   IMAGES:  Images personally reviewed and agree w/ radiology interpretation. Head CT w/o Contrast:  1. No findings for acute intracranial abnormality. 2.  Age-related atrophy with patchy periventricular white matter changes bilaterally consistent with chronic small vessel ischemia. 3.  Stable low attenuation left frontoparietal lobe consistent with previous insult from known prior intraparenchymal hematoma. Stable right frontoparietal cortical infarct. CTA of Head / Neck w/ Contrast:  CTA Head:   No acute CTA findings. No hemodynamically significant stenosis or focal aneurysm. No arteriovenous confirmation. CTA Neck:   No acute CTA findings. No hemodynamically significant stenosis or focal aneurysm. CVEE2022  00:00am to 07:30am    SEIZURES:  None  INTERICTAL:  Frequent generalized transient discharges  PUSHBUTTONS:  None  BACKGROUND:  The background consists of diffuse low amplitude delta slowing with frequent 1 second bursts of mixed frontal  theta frequencies. EKG:  Regular     2022  09:00am to 23:59pm   SEIZURES:  None  INTERICTAL:  Frequent generalized transient discharges  PUSHBUTTONS:  None  BACKGROUND:  The background consists of diffuse low amplitude delta slowing with frequent 1 second bursts of mixed frontal  theta frequencies. EKG:  regular     CLINICAL INTERPRETATION:  This was an abnormal tracing for age and state due to a mild to moderate generalized slow wave abnormality indicating diffuse cerebral dysfunction which can be of multiple causes, including structural, or vascular abnormalities, toxic/metabolic conditions, hydrocephalus, or postictal conditions. Generalized discharges indicate a diffuse cortical hyperexcitability that may be associated with seizures. No definite seizures were seen during this recording. Clinical correlation is advised.        2022  22:00pm to 09:00am on 09/04/2022  SEIZURES:  None  INTERICTAL:  Frequent generalized transient discharges  PUSHBUTTONS:  None  BACKGROUND:  The background consists of diffuse low amplitude delta slowing with frequent 1 second bursts of mixed frontal  theta frequencies. EKG:  regular     09/03/2022  10:00am to 22:00pm   SEIZURES:  None  INTERICTAL:  Frequent generalized transient discharges  PUSHBUTTON  S:  None  BACKGROUND:  Abundant 7-7.5 Hz generalized theta slowing of the background with frequent superimposed delta waves. AS the record progresses the background transitions to a diffuse low amplitude delta slowing with frequent 1 second bursts of mixed frontal  theta frequencies. EKG:  regular     CLINICAL INTERPRETATION:  This was an abnormal tracing for age and state due to a mild to moderate generalized slow wave abnormality indicating diffuse cerebral dysfunction which can be of multiple causes, including structural, or vascular abnormalities, toxic/metabolic conditions, hydrocephalus, or postictal conditions. Generalized discharges indicate a diffuse cortical hyperexcitability that may be associated with seizures. No definite seizures were seen during this recording. Clinical correlation is advised. 09/02/2022  22:00pm to 10:00am on 09/03/2022  SEIZURES:  None  INTERICTAL:  Frequent generalized transient discharges  PUSHBUTTONS:  None  BACKGROUND:  Abundant 7-7.5 Hz generalized theta slowing of the background with frequent superimposed delta waves. EKG:  regular     09/02/2022 12:15pm - 22:00pm   SEIZURES:  None  INTERICTAL:  Frequent generalized transient discharges  PUSHBUTTONS:  None  BACKGROUND:  Abundant 7-7.5 Hz generalized theta slowing of the background with frequent superimposed delta waves.     EKG:  regular     CLINICAL INTERPRETATION:  This was an abnormal tracing for age and state due to a mild  generalized slow wave abnormality indicating diffuse cerebral dysfunction which can be of multiple causes, including structural, or vascular abnormalities, toxic/metabolic conditions, hydrocephalus, or postictal conditions. Generalized discharges indicate a diffuse cortical hyperexcitability that may be associated with seizures. No definite seizures were seen during this recording. Clinical correlation is advised. PHYSICAL EXAMINATION     PHYSICAL EXAM:  Vitals:    09/05/22 1000 09/05/22 1015 09/05/22 1030 09/05/22 1201   BP: 128/62 (!) 145/68 (!) 152/74    Pulse: 73 84 (!) 108 93   Resp: 14 15 18 15   Temp:       TempSrc:       SpO2: 100% 100% 100% 98%   Weight:       Height:             Constitutional On propofol 30 mcg/kg/min   Vital signs: BP, HR, and RR reviewed   General depressed mental status, no distress, well-nourished  Eyes: fundoscopic exam difficult given inability to cooperate  Cardiovascular: pulses symmetric in all 4 extremities. No peripheral edema. Psychiatric: limited exam given encephalopathy but not agitated and no signs of hallucinations  Neurologic  Mental status: limited exam given encephalopathy  orientation unable to assess given comatose state  Attention poor  Language limited exam given encephalopathy  Comprehension not following any commands  CN2: Visual Fields: no blink to either side with threat  CN 3,4,6:  extraocular absent. Pupils small. Minimal reactivity   CN7:face symmetric but exam limited by ET tube  CN8: hearing limited exam given encephalopathy  Strength: moving all four limbs spontaneously and non-purposefully   Sensory: grimaces and withdraws from pain   Cerebellar/coordination:limited exam given encephalopathy  Tone: normal in all 4 extremities  Gait: limited exam given encephalopathy and intubated with ventilator. ASSESSMENT & RECOMMENDATIONS   Assessment:  [facility] is a 56 y/o man who presented with acute encephalopathy, found down at home. Recent ICH makes episode concerning for seizure. Hypoxic on admission.  LEV increased 9/3 given frequent sharps. Exam blunted by sedation. Still needs MRI. Plan:  - MRI when able   - Wean propofol. If sedation is needed, please consider precedex. - LP to be done in IR. Currently being covered for empiric CNS infection  - Continue LEV to 1500 mg BID  - Continue cEEG   - Continue empiric antimicrobials for CNS infection    MERVIN Crespo - CNP   Neurology & Neurocritical Care   Neurology Line: [phone]  [employer]: [facility] Neurology & Neuro Critical Care NPs  9/5/2022 12:19 PM    Critical Care:  Due to the immediate potential for life-threatening deterioration due to neurological failure, I spent 30 minutes providing critical care. This time excludes time spent performing procedures but includes time spent on direct patient care, history retrieval, review of the chart, and discussions with patient, family, and consultant(s).

## 2022-09-05 NOTE — PROGRESS NOTES
Clinical Pharmacy Progress Note    Vancomycin - Management by Pharmacy    Consult Date(s): 9/1/22  Consulting Provider(s): Dr. Orin Whitaker / Plan    Sepsis - r/o CNS infection - Vancomycin  Concurrent Antimicrobials:   Ceftriaxone - day #4  Ampicillin - day #4  Acyclovir - day #4  Day of Vanc Therapy / Ordered Duration: #5  Current Dosing Method: Bayesian-Guided AUC Dosing  Therapeutic Goal: 400-600 mg/L*hr  Current Dose / Frequency: 1000 mg IV q8hr  Plan / Rationale:   Renal function stable with a Scr of 0.5 mg/dL today. Current dose predicts an AUC of 500 mg/L*hr and sstrough of 16.1 mg/L. Will continue current dose for now and check a level tomorrow AM to confirm kinetics. Will continue to monitor clinical condition and make adjustments to regimen as appropriate. Thank you for consulting Pharmacy! Louise Corea, PharmD  PGY-1 Pharmacy Resident  [facility]  [id number]  9/5/2022   8:23 AM    Interval update:  Lumbar puncture pending, No acute overnight events. No seizures on cEEG. Slightly hypotensive, WBC WNL at 5.1 today. Subjective/Objective: Mr. Niki Waters is a 57 y.o. male with PMHx significant for chronic CAD, HTN, CHF, blood clot hx, essential tremor, EtOH abuse with recent rehab discharge, and prior ICH (Sept 2021 per notes). Found down at home by family, incontinent of stool. Intubated in the ED for for respiratory failure. Concern for sepsis as well as seizures, specifically for aspiration given seizures. Antibiotics and cEEG monitoring initiated. Pharmacy has been consulted to dose vancomycin.     Ht Readings from Last 1 Encounters:   09/01/22 5' 10\" (1.778 m)     Wt Readings from Last 1 Encounters:   09/04/22 205 lb 0.4 oz (93 kg)       Current & Prior Antimicrobial Regimen(s):   (9/1)   (9/1-9/2)  Ceftriaxone (9/2-current)  Ampicillin (9/2-current)  Acyclovir (9/2-current)  Vancomycin - pharmacy to dose   1750mg IV x1 load (9/1)  1250mg IV q12h (9/1-9/3)  1000mg IV q8h (9/3 - Current)    Level(s) / Doses:    Date Time Dose Level / Type of Level Interpretation   9/3 0956 1250mg IV q12h Random - 14.3 mg/L Drawn ~6 hours after last dose   Predicted AUC low at 392 mg/L*hr. Increasing dose to 1000 mg q8hr as this predicts an AUC of 469 mg/L*hr and sstrough of 14.8 mg/L.   9/4 1041 1000mg IV q8hr Random = 19.5 mg/L Drawn ~6 hours after 3rd dose of current regimen. Predicted AUC of 500 mg/L*hr and trough of 16.1 mg/L. Will continue current dose   Note: Serum levels collected for AUC-based dosing may be high if collected in close proximity to the dose administered. This is not necessarily indicative of toxicity. Cultures & Sensitivities:    Date Site Micro Susceptibility / Result   9/1 Blood x2 NGTD    9/1 CSF Sent      Labs / Ancillary Data:    Estimated Creatinine Clearance: 171 mL/min (based on SCr of 0.5 mg/dL).     Recent Labs     09/03/22 0455 09/03/22 2054 09/04/22  0359 09/05/22  0541 09/05/22  0605   CREATININE 0.5* 0.6* 0.6* <0.5*  --    BUN 3* 3* 3* 5*  --    WBC 12.0*  --  8.0  --  5.1       Additional Lab Values / Findings of Note:    Procalcitonin:   Recent Labs     09/03/22 0455 09/04/22 0359   PROCAL 0.27* 0.19*

## 2022-09-05 NOTE — PROGRESS NOTES
CONTINUOUS EEG    Name:  [facility] Record Number:  [id number]  Age: 57 y.o. Gender: male  : 1960  Today's Date:  2022  Room:  [facility]-01  Vital Signs   BP (!) 152/74   Pulse (!) 108   Temp 98.3 °F (36.8 °C) (Axillary)   Resp 18   Ht 5' 10\" (1.778 m)   Wt 205 lb 0.4 oz (93 kg)   SpO2 100%   BMI 29.42 kg/m²       Patient currently on continuous EEG monitoring. All EEG leads are currently in place with no current issues. Verified [employer] connection via team viewer. Checked in with patient RN for current plan of care. Comments: Continue monitoring. All leads within 10K limit.     Electronically signed by John Mcclellan on 2022 at 11:34 AM

## 2022-09-05 NOTE — PLAN OF CARE
Problem: Safety - Medical Restraint  Goal: Remains free of injury from restraints (Restraint for Interference with Medical Device)  Description: INTERVENTIONS:  1. Determine that other, less restrictive measures have been tried or would not be effective before applying the restraint  2. Evaluate the patient's condition at the time of restraint application  3. Inform patient/family regarding the reason for restraint  4.  Q2H: Monitor safety, psychosocial status, comfort, nutrition and hydration  Outcome: Progressing  Flowsheets  Taken 9/5/2022 1600  Remains free of injury from restraints (restraint for interference with medical device): Every 2 hours: Monitor safety, psychosocial status, comfort, nutrition and hydration  Taken 9/5/2022 1400  Remains free of injury from restraints (restraint for interference with medical device): Every 2 hours: Monitor safety, psychosocial status, comfort, nutrition and hydration  Taken 9/5/2022 1200  Remains free of injury from restraints (restraint for interference with medical device): Every 2 hours: Monitor safety, psychosocial status, comfort, nutrition and hydration  Taken 9/5/2022 1000  Remains free of injury from restraints (restraint for interference with medical device): Every 2 hours: Monitor safety, psychosocial status, comfort, nutrition and hydration  Taken 9/5/2022 0800  Remains free of injury from restraints (restraint for interference with medical device): Every 2 hours: Monitor safety, psychosocial status, comfort, nutrition and hydration     Problem: Discharge Planning  Goal: Discharge to home or other facility with appropriate resources  Outcome: Progressing     Problem: Pain  Goal: Verbalizes/displays adequate comfort level or baseline comfort level  Outcome: Progressing     Problem: Safety - Adult  Goal: Free from fall injury  Outcome: Progressing  Flowsheets (Taken 9/5/2022 0800)  Free From Fall Injury:   Instruct family/caregiver on patient safety   Based on caregiver fall risk screen, instruct family/caregiver to ask for assistance with transferring infant if caregiver noted to have fall risk factors     Problem: ABCDS Injury Assessment  Goal: Absence of physical injury  Outcome: Progressing  Flowsheets (Taken 9/5/2022 0800)  Absence of Physical Injury: Implement safety measures based on patient assessment     Problem: Skin/Tissue Integrity  Goal: Absence of new skin breakdown  Description: 1. Monitor for areas of redness and/or skin breakdown  2. Assess vascular access sites hourly  3. Every 4-6 hours minimum:  Change oxygen saturation probe site  4. Every 4-6 hours:  If on nasal continuous positive airway pressure, respiratory therapy assess nares and determine need for appliance change or resting period.   Outcome: Progressing     Problem: Chronic Conditions and Co-morbidities  Goal: Patient's chronic conditions and co-morbidity symptoms are monitored and maintained or improved  Outcome: Progressing  Flowsheets (Taken 9/5/2022 0800)  Care Plan - Patient's Chronic Conditions and Co-Morbidity Symptoms are Monitored and Maintained or Improved:   Monitor and assess patient's chronic conditions and comorbid symptoms for stability, deterioration, or improvement   Collaborate with multidisciplinary team to address chronic and comorbid conditions and prevent exacerbation or deterioration   Update acute care plan with appropriate goals if chronic or comorbid symptoms are exacerbated and prevent overall improvement and discharge     Problem: Nutrition Deficit:  Goal: Optimize nutritional status  Outcome: Progressing

## 2022-09-05 NOTE — PROGRESS NOTES
Patient remains on the ventilator, tolerating current vent settings well. Sats mid 9.'s to 100%. Occasional rhonchi, clears with suction. SR on the monitor, occasional PVC's. Remains on Levophed drip 1-2 mcg/min, has attempted multiple times to wean off , but no success, BP drops in the 80's MAP below 65. On Fentanyl drip at 125 mcg/hr and Propofol drip at 45 mcg/kg/min, RASS -1 and CPOT 0. With decreased sedation patient spontaneously opens eyes but with no focus, moves all extremities, spontaneously but non purposeful, shakes head with suction. Afebrile. On continuous EEG, no seizures this shift. Tolerating cont tube feeds with Vital AF 1.2 at 45 mls/hr. Mg ++ 1.7, secure message sent to residents.

## 2022-09-05 NOTE — PROGRESS NOTES
ICU Progress Note    Admit Date: 9/1/2022  Day: 4  Vent Day: None  IV Access:Peripheral  IV Fluids:None  Vasopressors:None                Antibiotics: Ampicillin, Vancomycin, Rocephin  Diet: orogastric; Peptide Based; Continuous; 10; Yes; 10; Q 8 hours; 45; 30; Q 4 hours; Protein; twice per day administer via NG, flush 30 ml before and after administration    CC: Collapse    Interval history:  Run of Perceptive Pixel overnight, RFP were ordered and became hypotensive was restarted on low dose levophed drip. Pt. Tolerating OG tube and feeds well. No definite seizure activity was seen on cEEG overnight. RN tried weaning off Levophed, attempt unsuccessful as patient became hypotensive. Resumed Levophed for now. Vent settings:AC/PVR, Rate Set: 14, Rate measured: 14, PIP:21, PEEP:5+ Vt: 575+    Sedation: Fentanyl: 60 mcg/hr Propofol: 20mcg/hr    RASS:-1     Gtt: Fentanyl, Propofol, Levophed 2 mcg/min    I/O:-550    LDA: Peripheral IV rt hand, rt forearm, Left hand, Orogastric tube, Urinary catheter    HPI: The patient is a 49-year-old man with past medical history significant for chronic alcoholism, ICH, CAD, DVT who presented today to the ED with an episode of syncope. The history was mainly obtained by the sister since patient was intubated. He was last known well at 9 AM but the sister. According to the sister, she had just returned home from work which she had found him half lying down on the couch covered in the stool while his car outside was running outside with occasion. She denies any complaints of recent fever, chills, chest pain cough, shortness of breath, lightheadedness, palpitations, nausea, vomiting, abdominal pain, diarrhea, fatigue, visual disturbance, changes in urination frequency or burning pain well urination or headaches by the patient in the preceding days to this incident. She does reinstate that her brother is a chronic alcoholic and had just gotten out of rehab.   He has been sober since however she was not aware if he had recently had any alcoholic drink. He had also informed that he had recent episode of intracerebral hemorrhage on 9/4/2021. In the ED, there was a concern for possibility of a stroke however he was not considered a thrombolytic candidate because of her previous ICH and long time since his last known well. According to the ED staff his physical exam was more pertinent for nonfocal delirium. It was also significant for nystagmus and rightward gaze not fixed. He was given 2 mg of Versed and it had worsened his oxygen saturations that had dropped to 88% on a nonrebreather and therefore he was intubated. CT Abdomen, chest: Moderate dependent atelectasis bilaterally. No acute abnormality on noncontrast CT of the abdomen and pelvis. Cholelithiasis. CTA Head: No acute CTA findings. No hemodynamically significant stenosis or focal aneurysm. No arteriovenous confirmation. CTA Neck: No acute CTA findings. No hemodynamically significant stenosis or focal aneurysm.        Medications:     Scheduled Meds:   magnesium sulfate  4,000 mg IntraVENous Once    famotidine (PEPCID) injection  20 mg IntraVENous BID    levETIRAcetam  1,500 mg IntraVENous Q12H    vancomycin  1,000 mg IntraVENous Q8H    cefTRIAXone (ROCEPHIN) IV  2,000 mg IntraVENous Q12H    ampicillin IV  1,000 mg IntraVENous 6 times per day    acyclovir  10 mg/kg (Adjusted) IntraVENous Q8H    enoxaparin  40 mg SubCUTAneous Daily    lidocaine PF  5 mL IntraDERmal Once    sodium chloride flush  5-40 mL IntraVENous 2 times per day    thiamine  100 mg IntraVENous Daily     Continuous Infusions:   fentaNYL      sodium chloride      propofol 20 mcg/kg/min (09/05/22 0834)    norepinephrine 2 mcg/min (09/05/22 0834)     PRN Meds:sodium chloride flush, sodium chloride, ondansetron **OR** ondansetron, polyethylene glycol, acetaminophen **OR** acetaminophen    Objective:   Vitals:   T-max:  Patient Vitals for the past 8 hrs:   BP Temp Temp src Pulse Resp SpO2   09/05/22 0838 -- -- -- 61 14 97 %   09/05/22 0800 (!) 89/53 98.3 °F (36.8 °C) Axillary 60 14 96 %   09/05/22 0745 112/62 -- -- 54 14 99 %   09/05/22 0730 107/61 -- -- 56 14 98 %   09/05/22 0715 113/64 -- -- 54 14 98 %   09/05/22 0700 (!) 108/57 -- -- 56 14 98 %   09/05/22 0645 (!) 95/54 -- -- 61 14 --   09/05/22 0630 (!) 88/52 -- -- 62 14 --   09/05/22 0615 (!) 86/51 -- -- 65 14 --   09/05/22 0600 (!) 97/54 -- -- 55 14 --   09/05/22 0545 (!) 96/56 -- -- 64 14 --   09/05/22 0530 (!) 89/52 -- -- 73 14 --   09/05/22 0515 (!) 82/50 -- -- 67 14 --   09/05/22 0500 (!) 100/59 -- -- 70 15 --   09/05/22 0445 112/61 -- -- 74 15 --   09/05/22 0430 130/75 -- -- 88 15 --   09/05/22 0415 (!) 148/78 -- -- 95 18 --   09/05/22 0406 -- -- -- 72 -- --   09/05/22 0400 (!) 141/64 98.1 °F (36.7 °C) Oral 74 16 96 %   09/05/22 0345 (!) 143/56 -- -- 99 18 --   09/05/22 0330 111/63 -- -- 61 21 100 %   09/05/22 0315 117/72 -- -- 67 19 --   09/05/22 0300 (!) 98/59 -- -- 60 14 --   09/05/22 0245 (!) 96/54 -- -- 61 14 --   09/05/22 0235 -- -- -- 63 -- --   09/05/22 0230 (!) 99/55 -- -- 61 14 --   09/05/22 0215 130/71 -- -- 53 14 --   09/05/22 0200 (!) 154/92 -- -- 71 19 100 %   09/05/22 0145 115/64 -- -- 52 14 --       Intake/Output Summary (Last 24 hours) at 9/5/2022 0941  Last data filed at 9/5/2022 0834  Gross per 24 hour   Intake 4421.04 ml   Output 3025 ml   Net 1396.04 ml         Review of Systems: Intubated     Physical Exam  Constitutional:       Appearance: Normal appearance. He is normal weight. HENT:      Head: Normocephalic and atraumatic. Nose:      Comments: intubated     Mouth/Throat:      Mouth: Mucous membranes are dry. Comments: Intubated and sedated     Eyes:      Comments: Intubated and sedated   Cardiovascular:      Rate and Rhythm: Normal rate and regular rhythm. Pulses: Pulses 1+, Capillary Refill 2-3.    Pulmonary:      Comments: Intubated and sedated     Abdominal:      General: Bowel sounds are normal. There is no distension. Palpations: Abdomen is soft. Musculoskeletal:      Cervical back: Neck supple. Neurological:      Comments: On cEEG, Intubated and sedated     Psychiatric:      Comments: Intubated and sedated        LABS:    CBC:   Recent Labs     09/03/22 0455 09/04/22 0359 09/05/22  0605   WBC 12.0* 8.0 5.1   HGB 13.0* 13.3* 13.1*   HCT 35.2* 40.1* 39.0*   * 83* 85*   MCV 92.8 95.4 94.9     Renal:    Recent Labs     09/03/22 2054 09/04/22 0359 09/05/22  0541    133* 140   K 3.7 3.8 3.9    102 108   CO2 24 23 22   BUN 3* 3* 5*   CREATININE 0.6* 0.6* <0.5*   GLUCOSE 124* 126* 99   CALCIUM 7.9* 7.8* 7.4*   MG 2.10 1.80 1.70*   PHOS 2.0* 2.5 2.9   ANIONGAP 9 8 10     Hepatic:   Recent Labs     09/03/22 2054 09/04/22 0359 09/05/22  0541   LABALBU 3.1* 2.9* 2.4*     Troponin:   Recent Labs     09/03/22 0455 09/04/22 0359   TROPONINI 0.12* 0.13*     BNP: No results for input(s): BNP in the last 72 hours. Lipids: No results for input(s): CHOL, HDL in the last 72 hours. Invalid input(s): LDLCALCU, TRIGLYCERIDE  ABGs:    No results for input(s): PHART, KXG3CED, PO2ART, OHE8NND, BEART, THGBART, X1OCPGIS, YEA4QSE in the last 72 hours. INR:   No results for input(s): INR in the last 72 hours. Lactate: No results for input(s): LACTATE in the last 72 hours. Cultures:  -----------------------------------------------------------------  RAD:   XR ABDOMEN (KUB) (SINGLE AP VIEW)   Final Result   Findings/Impression:    The tip of the enteric tube terminates in the distal body of the stomach. CT CHEST WO CONTRAST   Final Result      CHEST:      1. Moderate dependent atelectasis bilaterally. ABDOMEN/PELVIS:      1.  No acute abnormality on noncontrast CT of the abdomen and pelvis. 2.  Cholelithiasis. CT ABDOMEN PELVIS WO CONTRAST Additional Contrast? None   Final Result      CHEST:      1. Moderate dependent atelectasis bilaterally. ABDOMEN/PELVIS:      1.  No acute abnormality on noncontrast CT of the abdomen and pelvis. 2.  Cholelithiasis. CTA HEAD NECK W CONTRAST   Final Result      CTA Head:   No acute CTA findings. No hemodynamically significant stenosis or focal aneurysm. No arteriovenous confirmation. CTA Neck:   No acute CTA findings. No hemodynamically significant stenosis or focal aneurysm. CT HEAD WO CONTRAST   Final Result      1. No findings for acute intracranial abnormality. 2.  Age-related atrophy with patchy periventricular white matter changes bilaterally consistent with chronic small vessel ischemia. 3.  Stable low attenuation left frontoparietal lobe consistent with previous insult from known prior intraparenchymal hematoma. Stable right frontoparietal cortical infarct. These findings were called to the emergency room physician Dr. Logan Valle on 9/1/2022 at 5:30 p.m. XR CHEST PORTABLE   Final Result   1. No findings for acute cardiopulmonary disease. 2.  ET tube in good position in the mid trachea. MRI BRAIN WO CONTRAST    (Results Pending)         Assessment/Plan:   Neuology  Acute Metabolic Encephalopathy 2/2 SIRS vs seizure  On presentation AMS, elevated WBC: 21.7, Tachypneic , Tachycardic, Hypotensive, hx of ICH, SAH and SDH 2/2 to fall, hx of chronic alcohol abuse, CT head:Stable low attenuation left frontoparietal lobe consistent with previous insult from known prior intraparenchymal hematoma. Stable right frontoparietal cortical infarct. CT Abdomen, chest: Moderate dependent atelectasis bilaterally. No acute abnormality on noncontrast CT of the abdomen and pelvis. Cholelithiasis. CTA Head: No acute CTA findings. No hemodynamically significant stenosis or focal aneurysm. No arteriovenous confirmation. CTA Neck: No acute CTA findings.   No hemodynamically significant stenosis or focal aneurysm.  -On low dose levophed  -On broad spectrum antibiotics Vancomycin, ampicillin and ceftriaxone, acyclovir  -Procal  trending down 0.27 -> 0.19  -blood culture ordered: NGTD  -cEEG currently on readings: This was an abnormal tracing for age and state due to a mild to moderate generalized slow wave abnormality indicating diffuse cerebral dysfunction which can be of multiple causes, including structural, or vascular abnormalities, toxic/metabolic conditions, hydrocephalus, or postictal conditions. Generalized discharges indicate a diffuse cortical hyperexcitability that may be associated with seizures .  -On Keppra 1500 bid  -Consulted Neurology: appreciate recs  -Lumbar puncture ordered- not completed yet  -Sedation holiday planned after MRI     Alcohol use disorder  -thiamine 100 mg daily  -monitor for withdrawal  -Ethanol level: none detected    Respiratory  Acute hypercapnic respiratory failure secondary to possible aspiration vs 2/2 medication  On presentation: VBG PH:7.264, PCO2: 56.4, 83.2  Latest: PH: 7.37, PCO2:40.7, PO2:145(09/02/2022)  Vent settings:AC/PVR, Rate Set: 14, Rate measured: 14, PIP:21, PEEP:5+ Vt: 575+  -Patient intubated with fentanyl and propofol  -On broad spectrum antibiotics for empiric CNS infection coverage. CV  Tropinemia r/o ACS 2/2 demand ischemia stop trending  -Trending trop 0.25 > 0.12 > 0.13 will continue trending   -EKG: wnl, Echo EF: 50-55%, Hx of CAD with mural thrombosis, CXR: currently significant for mild pulmonary vascular congestion  -Cardio consulted and following: no intervention at this time.      CAD with LAD stent  -will start on Aspirin, statin, coreg with core pack and on telemetry  -Cardio on board    Thrombocytopenia  Plt: 85k stable  Will continue monitoring    Nephro   Isolated proteinuria  -UA: Positive for protein     GI  Hx of Cirrhosis  -Ammonia:36 wnl     Code Status:Full Code  FEN: NPO  PPX:SCD, lovenox, Pepcid  DISPO: ICU       Medardo Sykes MD, PGY-1  09/05/22  9:41 AM    This patient has been staffed and discussed with Dr. Patricio Bhatti MD     Patient was seen, examined and discussed with Dr. David Marques. I agree with the interval history. My physical exam confirms the findings listed below  Chart was reviewed including labs, CXR, CT scan and medical records confirm the findings noted     Acute respiratory failure on mechanical vent support. , RR 14, FiO2 30, PEEP 5. EEG with generalized discharges that may be associated with seizures  Leukocytosis - resolved   Mildly elevated troponin. Likely due to demand ischemia. EKG with septal Q waves and T inv laterally   Hypotension requiring levophed: levophed is off. Hx of ICH  Hx of alcohol use   TF     Sedation holiday. Change propofol to precedex   MRI is pending   TF  Continue current ABX     Pt has a high probability of imminent or life-threatening deterioration requiring close monitoring, and highly complex decision-making and/or interventions of high intensity to assess, manipulate, and support his critical organ systems to prevent a likely inevitable decline which could occur if left untreated. A total critical care time 35 minutes was used. This includes but not limited to examining patient, collaborating with other physicians, monitoring vital signs, telemetry, continuous pulse oximetry, and clinical response to IV medications, documentation time, review and interpretation of laboratory and radiological data, review of nursing notes and old record review.  This time excludes any time that may have been spent performing procedures for life threatening organ failure

## 2022-09-06 ENCOUNTER — APPOINTMENT (OUTPATIENT)
Dept: MRI IMAGING | Age: 62
DRG: 130 | End: 2022-09-06
Payer: MEDICAID

## 2022-09-06 ENCOUNTER — APPOINTMENT (OUTPATIENT)
Dept: INTERVENTIONAL RADIOLOGY/VASCULAR | Age: 62
DRG: 130 | End: 2022-09-06
Payer: MEDICAID

## 2022-09-06 LAB
ALBUMIN SERPL-MCNC: 2.9 G/DL (ref 3.4–5)
ANION GAP SERPL CALCULATED.3IONS-SCNC: 11 MMOL/L (ref 3–16)
APPEARANCE CSF: ABNORMAL
APPEARANCE CSF: ABNORMAL
APPEARANCE CSF: CLEAR
BASOPHILS ABSOLUTE: 0 K/UL (ref 0–0.2)
BASOPHILS RELATIVE PERCENT: 0.6 %
BLOOD CULTURE, ROUTINE: NORMAL
BUN BLDV-MCNC: 6 MG/DL (ref 7–20)
CALCIUM SERPL-MCNC: 8.3 MG/DL (ref 8.3–10.6)
CHLORIDE BLD-SCNC: 105 MMOL/L (ref 99–110)
CLOT EVALUATION CSF: ABNORMAL
CO2: 23 MMOL/L (ref 21–32)
COLOR CSF: COLORLESS
CREAT SERPL-MCNC: 0.5 MG/DL (ref 0.8–1.3)
CULTURE, BLOOD 2: NORMAL
EOSINOPHILS ABSOLUTE: 0.2 K/UL (ref 0–0.6)
EOSINOPHILS RELATIVE PERCENT: 4.2 %
GFR AFRICAN AMERICAN: >60
GFR NON-AFRICAN AMERICAN: >60
GLUCOSE BLD-MCNC: 138 MG/DL (ref 70–99)
GLUCOSE, CSF: 67 MG/DL (ref 40–80)
HCT VFR BLD CALC: 40.1 % (ref 40.5–52.5)
HEMOGLOBIN: 13.5 G/DL (ref 13.5–17.5)
LYMPHOCYTES ABSOLUTE: 1.3 K/UL (ref 1–5.1)
LYMPHOCYTES RELATIVE PERCENT: 29.1 %
MAGNESIUM: 2 MG/DL (ref 1.8–2.4)
MCH RBC QN AUTO: 31.8 PG (ref 26–34)
MCHC RBC AUTO-ENTMCNC: 33.7 G/DL (ref 31–36)
MCV RBC AUTO: 94.4 FL (ref 80–100)
MONOCYTES ABSOLUTE: 0.4 K/UL (ref 0–1.3)
MONOCYTES RELATIVE PERCENT: 9.6 %
NEUTROPHILS ABSOLUTE: 2.5 K/UL (ref 1.7–7.7)
NEUTROPHILS RELATIVE PERCENT: 56.5 %
NO DIFFERENTIAL CSF: ABNORMAL
PDW BLD-RTO: 13.6 % (ref 12.4–15.4)
PHOSPHORUS: 2.6 MG/DL (ref 2.5–4.9)
PLATELET # BLD: 90 K/UL (ref 135–450)
PMV BLD AUTO: 9.2 FL (ref 5–10.5)
POTASSIUM SERPL-SCNC: 3.4 MMOL/L (ref 3.5–5.1)
PROTEIN CSF: 39 MG/DL (ref 15–45)
RBC # BLD: 4.25 M/UL (ref 4.2–5.9)
RBC CSF: 1 /CUMM
SODIUM BLD-SCNC: 139 MMOL/L (ref 136–145)
TUBE NUMBER CSF: ABNORMAL
WBC # BLD: 4.5 K/UL (ref 4–11)
WBC CSF: 2 /CUMM (ref 0–5)

## 2022-09-06 PROCEDURE — 84157 ASSAY OF PROTEIN OTHER: CPT

## 2022-09-06 PROCEDURE — 99291 CRITICAL CARE FIRST HOUR: CPT | Performed by: INTERNAL MEDICINE

## 2022-09-06 PROCEDURE — 6360000002 HC RX W HCPCS: Performed by: INTERNAL MEDICINE

## 2022-09-06 PROCEDURE — 6360000002 HC RX W HCPCS: Performed by: NURSE PRACTITIONER

## 2022-09-06 PROCEDURE — 2700000000 HC OXYGEN THERAPY PER DAY

## 2022-09-06 PROCEDURE — 6370000000 HC RX 637 (ALT 250 FOR IP): Performed by: STUDENT IN AN ORGANIZED HEALTH CARE EDUCATION/TRAINING PROGRAM

## 2022-09-06 PROCEDURE — 2580000003 HC RX 258: Performed by: STUDENT IN AN ORGANIZED HEALTH CARE EDUCATION/TRAINING PROGRAM

## 2022-09-06 PROCEDURE — 87483 CNS DNA AMP PROBE TYPE 12-25: CPT

## 2022-09-06 PROCEDURE — 86592 SYPHILIS TEST NON-TREP QUAL: CPT

## 2022-09-06 PROCEDURE — A4216 STERILE WATER/SALINE, 10 ML: HCPCS | Performed by: STUDENT IN AN ORGANIZED HEALTH CARE EDUCATION/TRAINING PROGRAM

## 2022-09-06 PROCEDURE — 94761 N-INVAS EAR/PLS OXIMETRY MLT: CPT

## 2022-09-06 PROCEDURE — 2580000003 HC RX 258

## 2022-09-06 PROCEDURE — 93005 ELECTROCARDIOGRAM TRACING: CPT

## 2022-09-06 PROCEDURE — 62328 DX LMBR SPI PNXR W/FLUOR/CT: CPT

## 2022-09-06 PROCEDURE — 87070 CULTURE OTHR SPECIMN AEROBIC: CPT

## 2022-09-06 PROCEDURE — 6360000002 HC RX W HCPCS

## 2022-09-06 PROCEDURE — 009U3ZX DRAINAGE OF SPINAL CANAL, PERCUTANEOUS APPROACH, DIAGNOSTIC: ICD-10-PCS | Performed by: RADIOLOGY

## 2022-09-06 PROCEDURE — 82945 GLUCOSE OTHER FLUID: CPT

## 2022-09-06 PROCEDURE — 95813 EEG EXTND MNTR 61-119 MIN: CPT

## 2022-09-06 PROCEDURE — 94003 VENT MGMT INPAT SUBQ DAY: CPT

## 2022-09-06 PROCEDURE — 87205 SMEAR GRAM STAIN: CPT

## 2022-09-06 PROCEDURE — 70551 MRI BRAIN STEM W/O DYE: CPT

## 2022-09-06 PROCEDURE — 2500000003 HC RX 250 WO HCPCS: Performed by: STUDENT IN AN ORGANIZED HEALTH CARE EDUCATION/TRAINING PROGRAM

## 2022-09-06 PROCEDURE — 6360000002 HC RX W HCPCS: Performed by: STUDENT IN AN ORGANIZED HEALTH CARE EDUCATION/TRAINING PROGRAM

## 2022-09-06 PROCEDURE — 89050 BODY FLUID CELL COUNT: CPT

## 2022-09-06 PROCEDURE — 80069 RENAL FUNCTION PANEL: CPT

## 2022-09-06 PROCEDURE — 2580000003 HC RX 258: Performed by: INTERNAL MEDICINE

## 2022-09-06 PROCEDURE — 83735 ASSAY OF MAGNESIUM: CPT

## 2022-09-06 PROCEDURE — 99291 CRITICAL CARE FIRST HOUR: CPT | Performed by: NURSE PRACTITIONER

## 2022-09-06 PROCEDURE — 2500000003 HC RX 250 WO HCPCS: Performed by: INTERNAL MEDICINE

## 2022-09-06 PROCEDURE — 2000000000 HC ICU R&B

## 2022-09-06 PROCEDURE — 87899 AGENT NOS ASSAY W/OPTIC: CPT

## 2022-09-06 PROCEDURE — C1729 CATH, DRAINAGE: HCPCS

## 2022-09-06 PROCEDURE — 2580000003 HC RX 258: Performed by: NURSE PRACTITIONER

## 2022-09-06 PROCEDURE — 85025 COMPLETE CBC W/AUTO DIFF WBC: CPT

## 2022-09-06 RX ORDER — POTASSIUM CHLORIDE 20 MEQ/1
20 TABLET, EXTENDED RELEASE ORAL ONCE
Status: DISCONTINUED | OUTPATIENT
Start: 2022-09-06 | End: 2022-09-06

## 2022-09-06 RX ADMIN — VANCOMYCIN HYDROCHLORIDE 1000 MG: 10 INJECTION, POWDER, LYOPHILIZED, FOR SOLUTION INTRAVENOUS at 05:36

## 2022-09-06 RX ADMIN — Medication 100 MCG/HR: at 00:06

## 2022-09-06 RX ADMIN — PROPOFOL 10 MCG/KG/MIN: 10 INJECTION, EMULSION INTRAVENOUS at 03:07

## 2022-09-06 RX ADMIN — ACYCLOVIR SODIUM 800 MG: 50 INJECTION, SOLUTION INTRAVENOUS at 02:55

## 2022-09-06 RX ADMIN — DEXMEDETOMIDINE 0.6 MCG/KG/HR: 100 INJECTION, SOLUTION INTRAVENOUS at 20:30

## 2022-09-06 RX ADMIN — AMPICILLIN SODIUM 1000 MG: 1 INJECTION, POWDER, FOR SOLUTION INTRAMUSCULAR; INTRAVENOUS at 03:03

## 2022-09-06 RX ADMIN — SODIUM CHLORIDE, PRESERVATIVE FREE 10 ML: 5 INJECTION INTRAVENOUS at 14:08

## 2022-09-06 RX ADMIN — PROPOFOL 10 MCG/KG/MIN: 10 INJECTION, EMULSION INTRAVENOUS at 23:25

## 2022-09-06 RX ADMIN — AMPICILLIN SODIUM 1000 MG: 1 INJECTION, POWDER, FOR SOLUTION INTRAMUSCULAR; INTRAVENOUS at 11:37

## 2022-09-06 RX ADMIN — CEFTRIAXONE 2000 MG: 2 INJECTION, POWDER, FOR SOLUTION INTRAMUSCULAR; INTRAVENOUS at 23:14

## 2022-09-06 RX ADMIN — VANCOMYCIN HYDROCHLORIDE 1000 MG: 10 INJECTION, POWDER, LYOPHILIZED, FOR SOLUTION INTRAVENOUS at 17:52

## 2022-09-06 RX ADMIN — VANCOMYCIN HYDROCHLORIDE 1000 MG: 10 INJECTION, POWDER, LYOPHILIZED, FOR SOLUTION INTRAVENOUS at 21:29

## 2022-09-06 RX ADMIN — SODIUM CHLORIDE, PRESERVATIVE FREE 20 MG: 5 INJECTION INTRAVENOUS at 20:24

## 2022-09-06 RX ADMIN — ENOXAPARIN SODIUM 40 MG: 100 INJECTION SUBCUTANEOUS at 08:09

## 2022-09-06 RX ADMIN — LEVETIRACETAM 1500 MG: 100 INJECTION, SOLUTION, CONCENTRATE INTRAVENOUS at 02:52

## 2022-09-06 RX ADMIN — ACYCLOVIR SODIUM 800 MG: 50 INJECTION, SOLUTION INTRAVENOUS at 10:53

## 2022-09-06 RX ADMIN — AMPICILLIN SODIUM 1000 MG: 1 INJECTION, POWDER, FOR SOLUTION INTRAMUSCULAR; INTRAVENOUS at 23:18

## 2022-09-06 RX ADMIN — LEVETIRACETAM 1500 MG: 100 INJECTION, SOLUTION, CONCENTRATE INTRAVENOUS at 17:58

## 2022-09-06 RX ADMIN — AMPICILLIN SODIUM 1000 MG: 1 INJECTION, POWDER, FOR SOLUTION INTRAMUSCULAR; INTRAVENOUS at 19:34

## 2022-09-06 RX ADMIN — Medication 150 MCG/HR: at 18:59

## 2022-09-06 RX ADMIN — CEFTRIAXONE 2000 MG: 2 INJECTION, POWDER, FOR SOLUTION INTRAMUSCULAR; INTRAVENOUS at 10:58

## 2022-09-06 RX ADMIN — Medication 125 MCG/HR: at 11:52

## 2022-09-06 RX ADMIN — ACYCLOVIR SODIUM 800 MG: 50 INJECTION, SOLUTION INTRAVENOUS at 19:34

## 2022-09-06 RX ADMIN — SODIUM CHLORIDE, PRESERVATIVE FREE 20 MG: 5 INJECTION INTRAVENOUS at 08:10

## 2022-09-06 RX ADMIN — DEXMEDETOMIDINE 0.7 MCG/KG/HR: 100 INJECTION, SOLUTION INTRAVENOUS at 11:45

## 2022-09-06 RX ADMIN — POTASSIUM BICARBONATE 40 MEQ: 782 TABLET, EFFERVESCENT ORAL at 17:10

## 2022-09-06 RX ADMIN — AMPICILLIN SODIUM 1000 MG: 1 INJECTION, POWDER, FOR SOLUTION INTRAMUSCULAR; INTRAVENOUS at 07:55

## 2022-09-06 RX ADMIN — THIAMINE HYDROCHLORIDE 100 MG: 100 INJECTION, SOLUTION INTRAMUSCULAR; INTRAVENOUS at 08:08

## 2022-09-06 ASSESSMENT — PULMONARY FUNCTION TESTS
PIF_VALUE: 19
PIF_VALUE: 19
PIF_VALUE: 20
PIF_VALUE: 20
PIF_VALUE: 21
PIF_VALUE: 20
PIF_VALUE: 19
PIF_VALUE: 24
PIF_VALUE: 26
PIF_VALUE: 19
PIF_VALUE: 19
PIF_VALUE: 22
PIF_VALUE: 21
PIF_VALUE: 19
PIF_VALUE: 20
PIF_VALUE: 20
PIF_VALUE: 24
PIF_VALUE: 19

## 2022-09-06 NOTE — PROGRESS NOTES
Hospitalist Progress Note      PCP: Idalia Pagan MD    Date of Admission: 9/1/2022    Chief Complaint: Seizures        Subjective: Patient is on ventilator and sedated nurse at the bedside plan to start on Precedex drip and decrease propofol. Medications:  Reviewed    Infusion Medications    fentaNYL 125 mcg/hr (09/06/22 1152)    dexmedetomidine (PRECEDEX) IV infusion 0.7 mcg/kg/hr (09/06/22 1145)    sodium chloride      propofol 20 mcg/kg/min (09/06/22 0643)    norepinephrine Stopped (09/05/22 1017)     Scheduled Medications    potassium bicarb-citric acid  40 mEq Oral Once    famotidine (PEPCID) injection  20 mg IntraVENous BID    levETIRAcetam  1,500 mg IntraVENous Q12H    vancomycin  1,000 mg IntraVENous Q8H    cefTRIAXone (ROCEPHIN) IV  2,000 mg IntraVENous Q12H    ampicillin IV  1,000 mg IntraVENous 6 times per day    acyclovir  10 mg/kg (Adjusted) IntraVENous Q8H    enoxaparin  40 mg SubCUTAneous Daily    lidocaine PF  5 mL IntraDERmal Once    sodium chloride flush  5-40 mL IntraVENous 2 times per day    thiamine  100 mg IntraVENous Daily     PRN Meds: sodium chloride flush, sodium chloride, ondansetron **OR** ondansetron, polyethylene glycol, acetaminophen **OR** acetaminophen      Intake/Output Summary (Last 24 hours) at 9/6/2022 1234  Last data filed at 9/6/2022 0800  Gross per 24 hour   Intake 3935.06 ml   Output 2130 ml   Net 1805.06 ml         Physical Exam Performed:    /63   Pulse 63   Temp 98.7 °F (37.1 °C) (Axillary)   Resp 14   Ht 5' 10\" (1.778 m)   Wt 207 lb 3.7 oz (94 kg)   SpO2 97%   BMI 29.73 kg/m²     General appearance: No apparent distress, appears stated age and on ventilator sedated  HEENT: Pupils equal, round, and reactive to light. Conjunctivae/corneas clear. Neck: Supple, with full range of motion. No jugular venous distention. Trachea midline. Respiratory:  Normal respiratory effort.  Clear to auscultation, bilaterally without Rales/Wheezes/Rhonchi. Cardiovascular: Regular rate and rhythm with normal S1/S2 without murmurs, rubs or gallops. Abdomen: Soft, non-tender, non-distended with normal bowel sounds. Musculoskeletal: No clubbing, cyanosis or edema bilaterally. Full range of motion without deformity. Skin: Skin color, texture, turgor normal.  No rashes or lesions. Neurologic: On ventilator sedated. Psychiatric: On ventilator sedated  Capillary Refill: Brisk, 3 seconds, normal   Peripheral Pulses: +2 palpable, equal bilaterally       Labs:   Recent Labs     09/04/22 0359 09/05/22  0605 09/06/22 0427   WBC 8.0 5.1 4.5   HGB 13.3* 13.1* 13.5   HCT 40.1* 39.0* 40.1*   PLT 83* 85* 90*       Recent Labs     09/04/22 0359 09/05/22  0541 09/06/22 0427   * 140 139   K 3.8 3.9 3.4*    108 105   CO2 23 22 23   BUN 3* 5* 6*   CREATININE 0.6* <0.5* 0.5*   CALCIUM 7.8* 7.4* 8.3   PHOS 2.5 2.9 2.6       No results for input(s): AST, ALT, BILIDIR, BILITOT, ALKPHOS in the last 72 hours. No results for input(s): INR in the last 72 hours. Recent Labs     09/04/22 0359   TROPONINI 0.13*         Urinalysis:      Lab Results   Component Value Date/Time    NITRU Negative 09/01/2022 05:09 PM    WBCUA 0-2 09/01/2022 05:09 PM    BACTERIA Rare 09/01/2022 05:09 PM    RBCUA 0-2 09/01/2022 05:09 PM    BLOODU MODERATE 09/01/2022 05:09 PM    SPECGRAV >=1.030 09/01/2022 05:09 PM    GLUCOSEU Negative 09/01/2022 05:09 PM       Radiology:  XR ABDOMEN (KUB) (SINGLE AP VIEW)   Final Result   Findings/Impression:    The tip of the enteric tube terminates in the distal body of the stomach. CT CHEST WO CONTRAST   Final Result      CHEST:      1. Moderate dependent atelectasis bilaterally. ABDOMEN/PELVIS:      1.  No acute abnormality on noncontrast CT of the abdomen and pelvis. 2.  Cholelithiasis. CT ABDOMEN PELVIS WO CONTRAST Additional Contrast? None   Final Result      CHEST:      1.   Moderate dependent atelectasis bilaterally. ABDOMEN/PELVIS:      1.  No acute abnormality on noncontrast CT of the abdomen and pelvis. 2.  Cholelithiasis. CTA HEAD NECK W CONTRAST   Final Result      CTA Head:   No acute CTA findings. No hemodynamically significant stenosis or focal aneurysm. No arteriovenous confirmation. CTA Neck:   No acute CTA findings. No hemodynamically significant stenosis or focal aneurysm. CT HEAD WO CONTRAST   Final Result      1. No findings for acute intracranial abnormality. 2.  Age-related atrophy with patchy periventricular white matter changes bilaterally consistent with chronic small vessel ischemia. 3.  Stable low attenuation left frontoparietal lobe consistent with previous insult from known prior intraparenchymal hematoma. Stable right frontoparietal cortical infarct. These findings were called to the emergency room physician Dr. Beth Thomson on 9/1/2022 at 5:30 p.m. XR CHEST PORTABLE   Final Result   1. No findings for acute cardiopulmonary disease. 2.  ET tube in good position in the mid trachea. MRI BRAIN WO CONTRAST    (Results Pending)   IR LUMBAR PUNCTURE FOR DIAGNOSIS    (Results Pending)           Assessment/Plan:    Active Hospital Problems    Diagnosis     Acute respiratory failure ([address] [J96.00]      Priority: Medium    Seizure ([address] [R56.9]      Priority: Medium     Admitted with acute encephalopathy most likely secondary to seizure intubated vent management per pulmonary critical care neurology consulted and following is status post EEG with no seizure activity noted on Keppra. Acute respiratory failure with hypoxia on ventilator vent management per pulmonary critical care. Elevated troponin cardiology consulted and following recommending conservative management. History of alcohol abuse continue thiamine. Nutrition continue with tube feeding. SIRS unclear focus of infection on empiric antibiotic and acyclovir.     DVT Prophylaxis: Lovenox subcu  Diet: Diet NPO  ADULT TUBE FEEDING; Nasogastric; Peptide Based; Continuous; 10; Yes; 10; Q 8 hours; 45; 30; Q 4 hours; Protein; twice per day administer via NG, flush 30 ml before and after administration  Code Status: Full Code  PT/OT Eval Status:     Dispo - TBD        Oneida Olmos MD

## 2022-09-06 NOTE — PLAN OF CARE
LUMBAR PUNCTURE- DIAGNOSTIC  Fluoroscopic-guided    INDICATIONS: Altered mental status, possible encephalomyelitis    Procedure performed by Dr. Racheal Tiwari    Risks, alternatives and benefits discussed and informed consent obtained prior to patient's arrival to the interventional suite    Patient placed in the right lateral acute disposition  The patient is currently intubated, respiratory therapist was present. The resected  STERILE PREP: Full maximum sterile field/barrier technique was followed (with cap and mask, gloves and sterile gown, as well as broad field sterile drapes). Local disinfection was performed with broad field application of orange dyed chloraprep solution, which was allowed to dry fully prior to the initiation of the procedure. Maintain sterile field at all times. Medications labeled    Under sterile technique and following 2% lidocaine local anesthetic a 20 gauge spinal needle was introduced at the L2 level. Translaminar puncture was uneventful. Initial  fluid was clear -injury  This is a total of 21 mL of cerebral spinal fluid was obtained  4 tubes the pelvis was submitted for analysis    Flow is markedly rapid and therefore closing pressure was obtained-26 cm of water  Opening pressure was not obtained    Needle was removed uneventfully    COMPLICATIONS: None  Estimated blood loss: None significant  Number of images: 1  Cumulative Fluoroscopy time 0.9 minute     . IMPRESSION:  1. Uneventful diagnostic fluoroscopic guided lumbar puncture as the  2. The flow reversed are risk and therefore a closing pressure was obtained at the end of the procedure, 26 cm of water.

## 2022-09-06 NOTE — PROGRESS NOTES
CONTINUOUS VIDEO EEG MONITORING    DATE OF SERVICE: 9/5/2022 08:00 TO 9/6/2022 08:00    NAME: Martin Lozoya   YOB: 1960  SEX: male  MEDICAL RECORD [id number]    EEG-CCTV study:   The patient is a 57 y.o.y old male monitored due to concern for seizures. EEG-VIDEO MONITORING METHODOLOGY:   Time-locked EEG-video monitoring was performed     Analyses of the monitoring data were performed using the following techniques:   1. Review of the relevant EEG-video data. 2. Review of events detected by the computer system in detail. 3. Review of clinical seizures, with both detailed review of EEG and video and playback using multiple montages. A variety of referential and bipolar montages were used. CLINICAL AND EEG ANALYSIS:  Video EEG recording was reviewed in real time by a technologist, and then reviewed at least twice a day. Results of the monitoring were relayed to the treating team as needed throughout the study, via verbal or written documentation on the patient chart. 9/5/2022-9/6/2022    Seizures - none  Push buttons - none  Background - Continuous, diffuse, low amplitude mixed frequencies. Intermittent frontally predominant sharply contoured delta. Intermittent focal sharp waves in the right posterior temporal.      CLINICAL INTERPRETATION: This is an abnormal vide2o EEG study  1. Generalized background abnormalities indicative of an underlying diffuse encephalopathy of non-specific etiology   2. Intermittent focal epileptiform discharges, right posterior temporal, conferring increased risk of focal onset seizures from this region. 3.  No seizures. No clinical events.

## 2022-09-06 NOTE — PROGRESS NOTES
Clinical Pharmacy Progress Note    Vancomycin - Management by Pharmacy    Consult Date(s): 9/1/22  Consulting Provider(s): Dr. Maybelle Cheadle / Plan    Sepsis - r/o CNS infection - Vancomycin  Concurrent Antimicrobials:   Ceftriaxone - day #5  Ampicillin - day #5  Acyclovir - day #5  Day of Vanc Therapy / Ordered Duration: #6  Current Dosing Method: Bayesian-Guided AUC Dosing  Therapeutic Goal: 400-600 mg/L*hr  Current Dose / Frequency: 1000mg IV q8h  Plan / Rationale:   Renal function stable with Scr of 0.5 mg/dL today. Current regimen predicts an AUC of 500 mg/L*hr and ssTrough of 16.1 mg/L. Random level planned for today @ 0900 to confirm kinetics. Will update this note with plan once level returns. Addendum @ 12:30 -   Level not drawn today. As renal function stable, will continue with same regimen and plan to reattempt level tomorrow. Will continue to monitor clinical condition and make adjustments to regimen as appropriate. Thank you for consulting Pharmacy! Elena MuñizD., BCPS   9/6/2022 7:27 AM  Wireless: [phone]      Interval update:  Remains on cEEG; no seizures seen, but increasing intermittent focal epileptiform discharges R posterior temporal area. Off pressors; afebrile. LP still not done; will attempt today in IR. Subjective/Objective: Mr. Filipe Ortega is a 57 y.o. male with PMHx significant for chronic CAD, HTN, CHF, blood clot hx, essential tremor, EtOH abuse with recent rehab discharge, and prior ICH (Sept 2021 per notes). Found down at home by family, incontinent of stool. Intubated in the ED for for respiratory failure. Concern for sepsis as well as seizures, specifically for aspiration given seizures. Antibiotics and cEEG monitoring initiated. Pharmacy has been consulted to dose vancomycin.     Ht Readings from Last 1 Encounters:   09/01/22 5' 10\" (1.778 m)     Wt Readings from Last 1 Encounters:   09/06/22 207 lb 3.7 oz (94 kg)       Current & Prior Antimicrobial Regimen(s):   (9/1)   (9/1-9/2)  Ceftriaxone (9/2-current)  Ampicillin (9/2-current)  Acyclovir (9/2-current)  Vancomycin - pharmacy to dose   1750mg IV x1 load (9/1)  1250mg IV q12h (9/1-9/3)  1000mg IV q8h (9/3 - Current)    Level(s) / Doses:    Date Time Dose Level / Type of Level Interpretation   9/3 0956 1250mg IV q12h Random - 14.3 mg/L Drawn ~6 hours after last dose   Predicted AUC low at 392 mg/L*hr. Increasing dose to 1000 mg q8hr as this predicts an AUC of 469 mg/L*hr and sstrough of 14.8 mg/L.   9/4 1041 1000mg IV q8hr Random = 19.5 mg/L Drawn ~6 hours after 3rd dose of current regimen. Predicted AUC of 500 mg/L*hr and trough of 16.1 mg/L. Will continue current dose   9/6 0900 1000mg IV q8h Random = Never drawn    9/7 0445 1000mg IV q8h Trough - ordered    Note: Serum levels collected for AUC-based dosing may be high if collected in close proximity to the dose administered. This is not necessarily indicative of toxicity. Cultures & Sensitivities:    Date Site Micro Susceptibility / Result   9/1 Blood x2 NGTD            Labs / Ancillary Data:    Estimated Creatinine Clearance: 172 mL/min (A) (based on SCr of 0.5 mg/dL (L)).     Recent Labs     09/04/22 0359 09/05/22  0541 09/05/22  0605 09/06/22  0427   CREATININE 0.6* <0.5*  --  0.5*   BUN 3* 5*  --  6*   WBC 8.0  --  5.1 4.5         Additional Lab Values / Findings of Note:    Procalcitonin:   Recent Labs     09/04/22 0359   PROCAL 0.19*

## 2022-09-06 NOTE — CARE COORDINATION
Case management is following for discharge planning. The chart was reviewed. Mr. Lucero Mariee remains in the ICU intubated, sedated, and on CvEEG. Will address DC disposition once he is more medically stable.     His  with [facility] [place] [employer] called requesting an update (no name given) [phone]    Electronically signed by MIGNON Mcgill RN-[facility]  Case Management  [phone]

## 2022-09-06 NOTE — PROGRESS NOTES
CONTINUOUS EEG    Name:  [facility] Record Number:  [id number]  Age: 57 y.o. Gender: male  : 1960  Today's Date:  2022  Room:  [address] [place] [address]  Vital Signs   /63   Pulse 63   Temp 98.7 °F (37.1 °C) (Axillary)   Resp 14   Ht 5' 10\" (1.778 m)   Wt 207 lb 3.7 oz (94 kg)   SpO2 97%   BMI 29.73 kg/m²       Patient currently on continuous EEG monitoring. All EEG leads are currently in place with no current issues. Verified [employer] connection via team viewer. Checked in with patient RN for current plan of care. Comments: Continue monitoring. All leads within 10K limit.     Electronically signed by Amanda Schmitt on 2022 at 10:04 AM

## 2022-09-06 NOTE — PROGRESS NOTES
ICU Progress Note    Admit Date: 9/1/2022  Day: 5  Vent Day: 5  IV Access:Peripheral  IV Fluids:None  Vasopressors:None                Antibiotics:  Ampicillin, Vancomycin, Rocephin  Diet: orogastric; Peptide Based; Continuous; 10; Yes; 10; Q 8 hours; 45; 30; Q 4 hours; Protein; twice per day administer via NG, flush 30 ml before and after administration    CC: Collapse    Interval history: The patient had no acute events overnight. He was examined at bedside. Vent settings:AC/PVR, Rate Set: 14, Rate measured: 14, PIP:19, PEEP:5+ Vt: 575+ FiO2: 30     Sedation: Fentanyl: 100 mcg/hr Propofol: 20 mcg/hr Precedex: 0.6mcg/kg/hr     RASS:-2     Gtt: Fentanyl, Propofol,      I/O:+8,356/24 hr     LDA: Peripheral IV rt hand, rt forearm, Left hand, Orogastric tube, Urinary catheter    HPI:  The patient is a 47-year-old man with past medical history significant for chronic alcoholism, ICH, CAD, DVT who presented today to the ED with an episode of syncope. The history was mainly obtained by the sister since patient was intubated. He was last known well at 9 AM but the sister. According to the sister, she had just returned home from work which she had found him half lying down on the couch covered in the stool while his car outside was running outside with occasion. She denies any complaints of recent fever, chills, chest pain cough, shortness of breath, lightheadedness, palpitations, nausea, vomiting, abdominal pain, diarrhea, fatigue, visual disturbance, changes in urination frequency or burning pain well urination or headaches by the patient in the preceding days to this incident. She does reinstate that her brother is a chronic alcoholic and had just gotten out of rehab. He has been sober since however she was not aware if he had recently had any alcoholic drink. He had also informed that he had recent episode of intracerebral hemorrhage on 9/4/2021.       In the ED, there was a concern for possibility of a stroke however he was not considered a thrombolytic candidate because of her previous ICH and long time since his last known well. According to the ED staff his physical exam was more pertinent for nonfocal delirium. It was also significant for nystagmus and rightward gaze not fixed. He was given 2 mg of Versed and it had worsened his oxygen saturations that had dropped to 88% on a nonrebreather and therefore he was intubated. CT Abdomen, chest: Moderate dependent atelectasis bilaterally. No acute abnormality on noncontrast CT of the abdomen and pelvis. Cholelithiasis. CTA Head: No acute CTA findings. No hemodynamically significant stenosis or focal aneurysm. No arteriovenous confirmation. CTA Neck: No acute CTA findings. No hemodynamically significant stenosis or focal aneurysm.     Medications:     Scheduled Meds:   famotidine (PEPCID) injection  20 mg IntraVENous BID    levETIRAcetam  1,500 mg IntraVENous Q12H    vancomycin  1,000 mg IntraVENous Q8H    cefTRIAXone (ROCEPHIN) IV  2,000 mg IntraVENous Q12H    ampicillin IV  1,000 mg IntraVENous 6 times per day    acyclovir  10 mg/kg (Adjusted) IntraVENous Q8H    enoxaparin  40 mg SubCUTAneous Daily    lidocaine PF  5 mL IntraDERmal Once    sodium chloride flush  5-40 mL IntraVENous 2 times per day    thiamine  100 mg IntraVENous Daily     Continuous Infusions:   fentaNYL 100 mcg/hr (09/06/22 0509)    dexmedetomidine (PRECEDEX) IV infusion 0.4 mcg/kg/hr (09/06/22 0522)    sodium chloride      propofol 20 mcg/kg/min (09/06/22 0514)    norepinephrine Stopped (09/05/22 1017)     PRN Meds:sodium chloride flush, sodium chloride, ondansetron **OR** ondansetron, polyethylene glycol, acetaminophen **OR** acetaminophen    Objective:   Vitals:   T-max:  Patient Vitals for the past 8 hrs:   BP Temp Temp src Pulse Resp SpO2 Weight   09/06/22 0542 -- -- -- -- -- -- 207 lb 3.7 oz (94 kg)   09/06/22 0500 (!) 140/71 -- -- 60 14 99 % --   09/06/22 0400 (!) 110/57 98.7 °F (37.1 °C) Axillary 54 14 94 % --   09/06/22 0300 127/73 -- -- (!) 42 14 96 % --   09/06/22 0200 130/73 -- -- (!) 48 14 96 % --   09/06/22 0100 (!) 118/59 -- -- 57 14 96 % --   09/06/22 0000 129/69 97.9 °F (36.6 °C) Axillary (!) 45 14 95 % --   09/05/22 2300 121/65 -- -- 53 14 94 % --   09/05/22 2200 127/70 -- -- 53 14 96 % --       Intake/Output Summary (Last 24 hours) at 9/6/2022 0558  Last data filed at 9/6/2022 0509  Gross per 24 hour   Intake 6461.24 ml   Output 2570 ml   Net 3891.24 ml       Review of Systems: Intubated     Physical Exam  Constitutional:       Appearance: Normal appearance. He is normal weight. HENT:      Head: Normocephalic and atraumatic. Nose:      Comments: intubated     Mouth/Throat:      Mouth: Mucous membranes are dry. Comments: Intubated and sedated     Eyes:      Comments: Intubated and sedated   Cardiovascular:      Rate and Rhythm: Normal rate and regular rhythm. Pulses: Pulses 1+, Capillary Refill 2-3. Pulmonary:      Comments: Intubated and sedated     Abdominal:      General: Bowel sounds are normal. There is no distension. Palpations: Abdomen is soft. Musculoskeletal:      Cervical back: Neck supple. Neurological:      Comments:  On cEEG, Intubated and sedated     Psychiatric:      Comments: Intubated and sedated      LABS:    CBC:   Recent Labs     09/04/22  0359 09/05/22  0605 09/06/22  0427   WBC 8.0 5.1 4.5   HGB 13.3* 13.1* 13.5   HCT 40.1* 39.0* 40.1*   PLT 83* 85* 90*   MCV 95.4 94.9 94.4     Renal:    Recent Labs     09/04/22  0359 09/05/22  0541 09/06/22  0427   * 140 139   K 3.8 3.9 3.4*    108 105   CO2 23 22 23   BUN 3* 5* 6*   CREATININE 0.6* <0.5* 0.5*   GLUCOSE 126* 99 138*   CALCIUM 7.8* 7.4* 8.3   MG 1.80 1.70* 2.00   PHOS 2.5 2.9 2.6   ANIONGAP 8 10 11     Hepatic:   Recent Labs     09/04/22  0359 09/05/22  0541 09/06/22  0427   LABALBU 2.9* 2.4* 2.9*     Troponin:   Recent Labs     09/04/22 0359   TROPONINI 0.13*     BNP: No results for input(s): BNP in the last 72 hours. Lipids: No results for input(s): CHOL, HDL in the last 72 hours. Invalid input(s): LDLCALCU, TRIGLYCERIDE  ABGs:    Recent Labs     09/05/22  1515   PHART 7.432   HZH3UNG 39.0   PO2ART 79.1   AUC6IIP 26   BEART 1.6   [id number] 96   KJX0ZNR 27       INR: No results for input(s): INR in the last 72 hours. Lactate: No results for input(s): LACTATE in the last 72 hours. Cultures:  -----------------------------------------------------------------  RAD:   XR ABDOMEN (KUB) (SINGLE AP VIEW)   Final Result   Findings/Impression:    The tip of the enteric tube terminates in the distal body of the stomach. CT CHEST WO CONTRAST   Final Result      CHEST:      1. Moderate dependent atelectasis bilaterally. ABDOMEN/PELVIS:      1.  No acute abnormality on noncontrast CT of the abdomen and pelvis. 2.  Cholelithiasis. CT ABDOMEN PELVIS WO CONTRAST Additional Contrast? None   Final Result      CHEST:      1. Moderate dependent atelectasis bilaterally. ABDOMEN/PELVIS:      1.  No acute abnormality on noncontrast CT of the abdomen and pelvis. 2.  Cholelithiasis. CTA HEAD NECK W CONTRAST   Final Result      CTA Head:   No acute CTA findings. No hemodynamically significant stenosis or focal aneurysm. No arteriovenous confirmation. CTA Neck:   No acute CTA findings. No hemodynamically significant stenosis or focal aneurysm. CT HEAD WO CONTRAST   Final Result      1. No findings for acute intracranial abnormality. 2.  Age-related atrophy with patchy periventricular white matter changes bilaterally consistent with chronic small vessel ischemia. 3.  Stable low attenuation left frontoparietal lobe consistent with previous insult from known prior intraparenchymal hematoma. Stable right frontoparietal cortical infarct. These findings were called to the emergency room physician Dr. Mellissa Guzman on 9/1/2022 at 5:30 p.m.       XR CHEST PORTABLE   Final Result   1. No findings for acute cardiopulmonary disease. 2.  ET tube in good position in the mid trachea. MRI BRAIN WO CONTRAST    (Results Pending)         Assessment/Plan:   Neuology  Acute Metabolic Encephalopathy 2/2 possible meningitis induced seizure   On presentation AMS, elevated WBC: 21.7, Tachypneic , Tachycardic, Hypotensive, hx of ICH, SAH and SDH 2/2 to fall, hx of chronic alcohol abuse, CT head:Stable low attenuation left frontoparietal lobe consistent with previous insult from known prior intraparenchymal hematoma. Stable right frontoparietal cortical infarct. CT Abdomen, chest: Moderate dependent atelectasis bilaterally. No acute abnormality on noncontrast CT of the abdomen and pelvis. Cholelithiasis. CTA Head: No acute CTA findings. No hemodynamically significant stenosis or focal aneurysm. No arteriovenous confirmation. CTA Neck: No acute CTA findings. No hemodynamically significant stenosis or focal aneurysm. -Off Levophed  -On broad spectrum antibiotics Vancomycin, ampicillin and ceftriaxone, acyclovir  -Procal  trending down 0.27 -> 0.19  -blood culture ordered: No growth  -cEEG currently on readings: This was an abnormal tracing for age and state due to a mild to moderate generalized slow wave abnormality indicating diffuse cerebral dysfunction which can be of multiple causes, including structural, or vascular abnormalities, toxic/metabolic conditions, hydrocephalus, or postictal conditions.  Generalized discharges indicate a diffuse cortical hyperexcitability that may be associated with seizures .  -On Keppra 1500 bid  -Consulted Neurology: appreciate recs  -Lumbar puncture ordered- not completed yet  -Sedation holiday planned after MRI      Alcohol use disorder  -thiamine 100 mg daily  -monitor for withdrawal  -Ethanol level: none detected     Respiratory  Acute hypercapnic respiratory failure secondary to possible aspiration vs 2/2 medication  On presentation: VBG PH:7.264, PCO2: 56.4, 83.2  Latest: PH: 7.432, PCO2: 39, PO2:79 (09/05/2022)  Vent settings:AC/PVR, Rate Set: 14, Rate measured: 14, PIP:19, PEEP:5+ Vt: 575+ FiO2:30  -Patient intubated with fentanyl, precedex and propofol     CV  Tropinemia r/o ACS 2/2 demand ischemia stop trending- Resolved  -Trending trop 0.25 > 0.12 > 0.13 will continue trending   -EKG: wnl, Echo EF: 50-55%, Hx of CAD with mural thrombosis, CXR: currently significant for mild pulmonary vascular congestion  -Cardio consulted and following: no intervention at this time. CAD with LAD stent  -will start on Aspirin, statin, coreg with core pack and on telemetry  -Cardio on board     Thrombocytopenia  Plt: 90k stable  Will continue monitoring     Nephro   Isolated proteinuria  -UA: Positive for protein     GI  Hx of Cirrhosis  -Ammonia:36 wnl(09/1/22)     Code Status:Full Code  FEN: NPO  PPX:SCD, lovenox, Pepcid  DISPO: ICU     Marek Duffy MD, PGY-1  09/06/22  5:58 AM    This patient has been staffed and discussed with Eb Morris MD     Pulm/CC    Patient was seen, examined and discussed with Dr. Desi Curiel. I agree with the interval history. My physical exam confirms the findings listed below  Chart was reviewed including labs, CXR, CT scan and medical records confirm the findings noted    MRI brain  Impression       Faint diffusion restriction in the right hippocampus with associated FLAIR signal abnormality. This is favored to represent sequelae of seizures, however other infectious/inflammatory etiologies are also possible. Mild atrophy and chronic small vessel ischemic change. Areas of encephalomalacia and gliosis in both cerebral hemispheres with remote hemorrhagic staining from prior insults. LP  WBC 2  RBC 1  Glucose 67  Protein 30    Assessment  Acute respiratory failure on mechanical vent support. , RR 14, FiO2 30, PEEP 5.    EEG with generalized discharges that may be associated with seizures  Leukocytosis - resolved   Mildly elevated troponin. Likely due to demand ischemia. EKG with septal Q waves and T inv laterally   Hypotension requiring levophed: Resolved  Hx of ICH  Hx of alcohol use   TF     Plan  Continue propofol, Precedex, and fentanyl  Await CSF cultures and PCR  Continue TF  Continue current ABX and acyclovir  Continue Keppra  Neurology following  Start SBT in a.m. Pt has a high probability of imminent or life-threatening deterioration requiring close monitoring, and highly complex decision-making and/or interventions of high intensity to assess, manipulate, and support his critical organ systems to prevent a likely inevitable decline which could occur if left untreated. A total critical care time 32 minutes was used. This includes but not limited to examining patient, collaborating with other physicians, monitoring vital signs, telemetry, continuous pulse oximetry, and clinical response to IV medications, documentation time, review and interpretation of laboratory and radiological data, review of nursing notes and old record review.  This time excludes any time that may have been spent performing procedures for life threatening organ failure    Reza Weber MD

## 2022-09-06 NOTE — PROGRESS NOTES
NEUROLOGY / NEUROCRITICAL CARE PROGRESS NOTE       Patient Name: Rell Batres YOB: 1960   Sex: Male Age: 57 yrs     CC / Reason for Consult: AMS    Interval Hx / Changes over last 24 hours:   Weaned off levophed last night  Followed some commands for me today with propofol paused  HR in 30s last night  No seizures on cEEG overnight    ROS: unobtainable sedation and intubation    HISTORY   Admission HPI:      Rell Batres is a 57 y.o. y/o male with hx of ETOH abuse, CAD/MI, CHF, HTN, HLD, prior traumatic ICH 9/2021 who presents with acute encephalopathy of unknown etiology. He was reportedly at home after a long recovery from his [address]. He lives with significant other who saw him last on 9/1/22 at 9am in his normal state of health. She later returned around 4pm and found patient's car in driving way running, patient was inside home, half on couch unresponsive w/ gurgling respirations w/ low O2 sats. EMS was called. He was taken to [facility] intubated on arrival to ED d/t continued hypoxia. Unclear etiology of symptoms, ETOH / drug screen negative. He did have some elevations in troponin. Lactic acid was mildly elevated and patient had R eye deviations with nystagmus that prompted concern for seizures. He was given keppra in the ED. Initial head CT was unremarkable, CTA of head / neck was unremarkable. He was admitted to ICU for further evaluation.      PMH Past Medical History:   Diagnosis Date    Alcohol abuse     CAD (coronary artery disease)     with complete LAD occlusion    CHF (congestive heart failure) (HCC)     LVEF    Essential tremor     HTN (hypertension)     Hx of blood clots 05/2021    Hyperlipidemia     ICH (intracerebral hemorrhage) (HCC)     Lower extremity cellulitis     MI (mitral incompetence)       Allergies No Known Allergies   Diet Diet NPO  ADULT TUBE FEEDING; Nasogastric; Peptide Based; Continuous; 10; Yes; 10; Q 8 hours; 45; 30; Q 4 hours; Protein; twice per day administer via NG, flush 30 ml before and after administration   Isolation No active isolations     CURRENT SCHEDULED MEDICATIONS   Inpatient Medications     famotidine (PEPCID) injection, 20 mg, IntraVENous, BID    levETIRAcetam, 1,500 mg, IntraVENous, Q12H    vancomycin, 1,000 mg, IntraVENous, Q8H    cefTRIAXone (ROCEPHIN) IV, 2,000 mg, IntraVENous, Q12H    ampicillin IV, 1,000 mg, IntraVENous, 6 times per day    acyclovir, 10 mg/kg (Adjusted), IntraVENous, Q8H    enoxaparin, 40 mg, SubCUTAneous, Daily    lidocaine PF, 5 mL, IntraDERmal, Once    sodium chloride flush, 5-40 mL, IntraVENous, 2 times per day    thiamine, 100 mg, IntraVENous, Daily   Infusions    fentaNYL 100 mcg/hr (09/06/22 0601)    dexmedetomidine (PRECEDEX) IV infusion 0.6 mcg/kg/hr (09/06/22 0626)    sodium chloride      propofol 20 mcg/kg/min (09/06/22 0643)    norepinephrine Stopped (09/05/22 1017)      Antibiotics   Recent Abx Admin                     ampicillin 1000 mg ivpb mini bag (mg) 1,000 mg New Bag 09/06/22 0755     1,000 mg New Bag  0303     1,000 mg New Bag 09/05/22 2302     1,000 mg New Bag  1943     1,000 mg New Bag  1539     1,000 mg New Bag  1126    vancomycin (VANCOCIN) 1,000 mg in dextrose 5 % 250 mL IVPB (mg) 1,000 mg New Bag 09/06/22 0536     1,000 mg New Bag 09/05/22 2104     1,000 mg New Bag  1420    acyclovir (ZOVIRAX) 800 mg in dextrose 5 % 250 mL IVPB (mg) 800 mg New Bag 09/06/22 0255     800 mg New Bag 09/05/22 1928     800 mg New Bag  1134    cefTRIAXone (ROCEPHIN) 2,000 mg in dextrose 5 % 50 mL IVPB mini-bag (mg) 2,000 mg New Bag 09/05/22 2253     2,000 mg New Bag  1230                      LABS   Metabolic Panel Recent Labs     09/04/22  0359 09/05/22  0541 09/06/22  0427   * 140 139   K 3.8 3.9 3.4*    108 105   CO2 23 22 23   BUN 3* 5* 6*   CREATININE 0.6* <0.5* 0.5*   GLUCOSE 126* 99 138*   CALCIUM 7.8* 7.4* 8.3   LABALBU 2.9* 2.4* 2.9*   PHOS 2.5 2.9 2.6   MG 1.80 1.70* 2.00        CBC / Coags Recent Labs     22  0359 22  0605 22  0427   WBC 8.0 5.1 4.5   RBC 4.21 4.11* 4.25   HGB 13.3* 13.1* 13.5   HCT 40.1* 39.0* 40.1*   PLT 83* 85* 90*              DIAGNOSTICS   IMAGES:  Images personally reviewed and agree w/ radiology interpretation. Head CT w/o Contrast:  1. No findings for acute intracranial abnormality. 2.  Age-related atrophy with patchy periventricular white matter changes bilaterally consistent with chronic small vessel ischemia. 3.  Stable low attenuation left frontoparietal lobe consistent with previous insult from known prior intraparenchymal hematoma. Stable right frontoparietal cortical infarct. CTA of Head / Neck w/ Contrast:  CTA Head:   No acute CTA findings. No hemodynamically significant stenosis or focal aneurysm. No arteriovenous confirmation. CTA Neck:   No acute CTA findings. No hemodynamically significant stenosis or focal aneurysm. CVEE2022-2022  Review 06:45  Seizures - none  Push buttons - none  Background - Continuous, diffuse, low amplitude mixed frequencies. Intermittent frontally predominant sharply contoured delta. Intermittent focal sharp waves in the right posterior temporal.  CLINICAL INTERPRETATION: This is an abnormal video EEG study  1. Generalized background abnormalities indicative of an underlying diffuse encephalopathy of non-specific etiology   2. Intermittent focal epileptiform discharges, right posterior temporal, conferring increased risk of focal onset seizures from this region. 3.  No seizures. No clinical events. 2022  00:00am to 07:30am    SEIZURES:  None  INTERICTAL:  Frequent generalized transient discharges  PUSHBUTTONS:  None  BACKGROUND:  The background consists of diffuse low amplitude delta slowing with frequent 1 second bursts of mixed frontal  theta frequencies.     EKG:  Regular     2022  09:00am to 23:59pm   SEIZURES:  None  INTERICTAL:  Frequent generalized transient discharges  PUSHBUTTONS:  None  BACKGROUND:  The background consists of diffuse low amplitude delta slowing with frequent 1 second bursts of mixed frontal  theta frequencies. EKG:  regular     CLINICAL INTERPRETATION:  This was an abnormal tracing for age and state due to a mild to moderate generalized slow wave abnormality indicating diffuse cerebral dysfunction which can be of multiple causes, including structural, or vascular abnormalities, toxic/metabolic conditions, hydrocephalus, or postictal conditions. Generalized discharges indicate a diffuse cortical hyperexcitability that may be associated with seizures. No definite seizures were seen during this recording. Clinical correlation is advised. 09/03/2022  22:00pm to 09:00am on 09/04/2022  SEIZURES:  None  INTERICTAL:  Frequent generalized transient discharges  PUSHBUTTONS:  None  BACKGROUND:  The background consists of diffuse low amplitude delta slowing with frequent 1 second bursts of mixed frontal  theta frequencies. EKG:  regular     09/03/2022  10:00am to 22:00pm   SEIZURES:  None  INTERICTAL:  Frequent generalized transient discharges  PUSHBUTTON  S:  None  BACKGROUND:  Abundant 7-7.5 Hz generalized theta slowing of the background with frequent superimposed delta waves. AS the record progresses the background transitions to a diffuse low amplitude delta slowing with frequent 1 second bursts of mixed frontal  theta frequencies. EKG:  regular     CLINICAL INTERPRETATION:  This was an abnormal tracing for age and state due to a mild to moderate generalized slow wave abnormality indicating diffuse cerebral dysfunction which can be of multiple causes, including structural, or vascular abnormalities, toxic/metabolic conditions, hydrocephalus, or postictal conditions. Generalized discharges indicate a diffuse cortical hyperexcitability that may be associated with seizures. No definite seizures were seen during this recording. Clinical correlation is advised. 09/02/2022  22:00pm to 10:00am on 09/03/2022  SEIZURES:  None  INTERICTAL:  Frequent generalized transient discharges  PUSHBUTTONS:  None  BACKGROUND:  Abundant 7-7.5 Hz generalized theta slowing of the background with frequent superimposed delta waves. EKG:  regular     09/02/2022 12:15pm - 22:00pm   SEIZURES:  None  INTERICTAL:  Frequent generalized transient discharges  PUSHBUTTONS:  None  BACKGROUND:  Abundant 7-7.5 Hz generalized theta slowing of the background with frequent superimposed delta waves. EKG:  regular     CLINICAL INTERPRETATION:  This was an abnormal tracing for age and state due to a mild  generalized slow wave abnormality indicating diffuse cerebral dysfunction which can be of multiple causes, including structural, or vascular abnormalities, toxic/metabolic conditions, hydrocephalus, or postictal conditions. Generalized discharges indicate a diffuse cortical hyperexcitability that may be associated with seizures. No definite seizures were seen during this recording. Clinical correlation is advised. PHYSICAL EXAMINATION     PHYSICAL EXAM:  Vitals:    09/06/22 0856 09/06/22 0857 09/06/22 0858 09/06/22 0859   BP:       Pulse: 76 68 64 63   Resp: 14 14 14 14   Temp:       TempSrc:       SpO2: 98% 98% 97% 97%   Weight:       Height:             Constitutional    Vital signs: BP, HR, and RR reviewed   General depressed mental status, no distress, well-nourished  Eyes: fundoscopic exam difficult given inability to cooperate  Cardiovascular: pulses symmetric in all 4 extremities. No peripheral edema.   Psychiatric: limited exam given encephalopathy but not agitated and no signs of hallucinations  Neurologic  Mental status: eyes open to voice  orientation unable to assess given comatose state  Attention poor  Language limited exam given encephalopathy  Comprehension follows some simple commands \"stick up your thumb\" \"wiggle your toes\"  CN2: Visual Fields: no blink to either side with threat  CN 3,4,6:  EOMI. Pupils equal round and reactive  CN7:face symmetric but exam limited by ET tube  CN8: hearing limited exam given encephalopathy  Strength: moving all distal limbs four limbs spontaneously and non-purposefully   Sensory: grimaces and withdraws from pain   Cerebellar/coordination:limited exam given encephalopathy  Tone: normal in all 4 extremities  Gait: limited exam given encephalopathy and intubated with ventilator. ASSESSMENT & RECOMMENDATIONS   Assessment:  Mr. Rebeca Nelson is a 56 y/o man who presented with acute encephalopathy, found down at home. Recent ICH makes episode concerning for seizure. Hypoxic on admission. LEV increased 9/3 given frequent sharps. Exam blunted by sedation but improving. Plan:  - MRI and LP ordered. Awaiting studies. D/w nursing.   - Continue to wean propofol   - LP to be done in IR. Currently being covered for empiric CNS infection  - Continue LEV to 1500 mg BID  - Continue cEEG   - Continue empiric antimicrobials for CNS infection    EMRVIN Diehl - CNP   Neurology & Neurocritical Care   Neurology Line: [phone]  [employer]: [facility] NPs  9/6/2022 10:05 AM    Critical Care:  Due to the immediate potential for life-threatening deterioration due to neurological failure, I spent 30 minutes providing critical care. This time excludes time spent performing procedures but includes time spent on direct patient care, history retrieval, review of the chart, and discussions with patient, family, and consultant(s).

## 2022-09-06 NOTE — PLAN OF CARE
Problem: Safety - Medical Restraint  Goal: Remains free of injury from restraints (Restraint for Interference with Medical Device)  Description: INTERVENTIONS:  1. Determine that other, less restrictive measures have been tried or would not be effective before applying the restraint  2. Evaluate the patient's condition at the time of restraint application  3. Inform patient/family regarding the reason for restraint  4.  Q2H: Monitor safety, psychosocial status, comfort, nutrition and hydration  9/6/2022 0619 by Art Eagle RN  Outcome: Progressing  Flowsheets (Taken 9/5/2022 1800 by Elder Khan RN)  Remains free of injury from restraints (restraint for interference with medical device): Every 2 hours: Monitor safety, psychosocial status, comfort, nutrition and hydration  9/5/2022 1641 by Elder Khan RN  Outcome: Progressing  Flowsheets  Taken 9/5/2022 1600  Remains free of injury from restraints (restraint for interference with medical device): Every 2 hours: Monitor safety, psychosocial status, comfort, nutrition and hydration  Taken 9/5/2022 1400  Remains free of injury from restraints (restraint for interference with medical device): Every 2 hours: Monitor safety, psychosocial status, comfort, nutrition and hydration  Taken 9/5/2022 1200  Remains free of injury from restraints (restraint for interference with medical device): Every 2 hours: Monitor safety, psychosocial status, comfort, nutrition and hydration  Taken 9/5/2022 1000  Remains free of injury from restraints (restraint for interference with medical device): Every 2 hours: Monitor safety, psychosocial status, comfort, nutrition and hydration  Taken 9/5/2022 0800  Remains free of injury from restraints (restraint for interference with medical device): Every 2 hours: Monitor safety, psychosocial status, comfort, nutrition and hydration     Problem: Discharge Planning  Goal: Discharge to home or other facility with appropriate resources  9/6/2022 5155 by Amirah Valladares RN  Outcome: Progressing  Flowsheets (Taken 9/5/2022 2000)  Discharge to home or other facility with appropriate resources: Identify barriers to discharge with patient and caregiver  9/5/2022 1641 by Gary Mayorga RN  Outcome: Progressing     Problem: Pain  Goal: Verbalizes/displays adequate comfort level or baseline comfort level  9/6/2022 0619 by Amirah Valladares RN  Outcome: Progressing  9/5/2022 1641 by Gary Mayorga RN  Outcome: Progressing     Problem: Safety - Adult  Goal: Free from fall injury  9/6/2022 0619 by Amirah Valladares RN  Outcome: Progressing  9/5/2022 1641 by Gary Mayorga RN  Outcome: Progressing  Flowsheets (Taken 9/5/2022 0800)  Free From Fall Injury:   Instruct family/caregiver on patient safety   Based on caregiver fall risk screen, instruct family/caregiver to ask for assistance with transferring infant if caregiver noted to have fall risk factors     Problem: ABCDS Injury Assessment  Goal: Absence of physical injury  9/6/2022 0619 by Amirah Valladares RN  Outcome: Progressing  9/5/2022 1641 by Gary Mayorga RN  Outcome: Progressing  Flowsheets (Taken 9/5/2022 0800)  Absence of Physical Injury: Implement safety measures based on patient assessment     Problem: Skin/Tissue Integrity  Goal: Absence of new skin breakdown  Description: 1. Monitor for areas of redness and/or skin breakdown  2. Assess vascular access sites hourly  3. Every 4-6 hours minimum:  Change oxygen saturation probe site  4. Every 4-6 hours:  If on nasal continuous positive airway pressure, respiratory therapy assess nares and determine need for appliance change or resting period.   9/6/2022 0619 by Amirah Valladares RN  Outcome: Progressing  9/5/2022 1641 by Gary Mayorga RN  Outcome: Progressing     Problem: Chronic Conditions and Co-morbidities  Goal: Patient's chronic conditions and co-morbidity symptoms are monitored and maintained or improved  9/6/2022 0619 by Amirah Valladares RN  Outcome: Progressing  Flowsheets (Taken 9/5/2022 2000)  Care Plan - Patient's Chronic Conditions and Co-Morbidity Symptoms are Monitored and Maintained or Improved: Monitor and assess patient's chronic conditions and comorbid symptoms for stability, deterioration, or improvement  9/5/2022 1641 by Leobardo Villalobos RN  Outcome: Progressing  Flowsheets (Taken 9/5/2022 0800)  Care Plan - Patient's Chronic Conditions and Co-Morbidity Symptoms are Monitored and Maintained or Improved:   Monitor and assess patient's chronic conditions and comorbid symptoms for stability, deterioration, or improvement   Collaborate with multidisciplinary team to address chronic and comorbid conditions and prevent exacerbation or deterioration   Update acute care plan with appropriate goals if chronic or comorbid symptoms are exacerbated and prevent overall improvement and discharge     Problem: Nutrition Deficit:  Goal: Optimize nutritional status  9/6/2022 0619 by Wayne Voss RN  Outcome: Progressing  9/5/2022 1641 by Leobardo Villalobos RN  Outcome: Progressing

## 2022-09-06 NOTE — PROGRESS NOTES
CONTINUOUS EEG    Name:  [facility] Record Number:  [id number]  Age: 57 y.o. Gender: male  : 1960  Today's Date:  2022  Room:  [address] [place] [address]-  Vital Signs   /63   Pulse 63   Temp 98.7 °F (37.1 °C) (Axillary)   Resp 14   Ht 5' 10\" (1.778 m)   Wt 207 lb 3.7 oz (94 kg)   SpO2 97%   BMI 29.73 kg/m²       Patient currently on continuous EEG monitoring. All EEG leads are currently in place with no current issues. Verified [employer] connection via team viewer. Checked in with patient RN for current plan of care. Comments: New password given to [employer] via telephone. Continue monitoring. All leads within 10K limit.     Electronically signed by Emeterio Bauer on 2022 at 12:38 PM

## 2022-09-07 LAB
ALBUMIN SERPL-MCNC: 2.8 G/DL (ref 3.4–5)
ANION GAP SERPL CALCULATED.3IONS-SCNC: 9 MMOL/L (ref 3–16)
BASOPHILS ABSOLUTE: 0 K/UL (ref 0–0.2)
BASOPHILS RELATIVE PERCENT: 0.5 %
BUN BLDV-MCNC: 8 MG/DL (ref 7–20)
CALCIUM SERPL-MCNC: 8.3 MG/DL (ref 8.3–10.6)
CHLORIDE BLD-SCNC: 104 MMOL/L (ref 99–110)
CO2: 24 MMOL/L (ref 21–32)
CREAT SERPL-MCNC: <0.5 MG/DL (ref 0.8–1.3)
CRYPTOCOCCAL ANTIGEN CSF: NEGATIVE
EKG ATRIAL RATE: 59 BPM
EKG DIAGNOSIS: NORMAL
EKG P AXIS: 13 DEGREES
EKG P-R INTERVAL: 168 MS
EKG Q-T INTERVAL: 444 MS
EKG QRS DURATION: 86 MS
EKG QTC CALCULATION (BAZETT): 439 MS
EKG R AXIS: -31 DEGREES
EKG T AXIS: 110 DEGREES
EKG VENTRICULAR RATE: 59 BPM
EOSINOPHILS ABSOLUTE: 0.2 K/UL (ref 0–0.6)
EOSINOPHILS RELATIVE PERCENT: 3.2 %
GFR AFRICAN AMERICAN: >60
GFR NON-AFRICAN AMERICAN: >60
GLUCOSE BLD-MCNC: 126 MG/DL (ref 70–99)
HCT VFR BLD CALC: 38.6 % (ref 40.5–52.5)
HEMOGLOBIN: 13.2 G/DL (ref 13.5–17.5)
LYMPHOCYTES ABSOLUTE: 0.8 K/UL (ref 1–5.1)
LYMPHOCYTES RELATIVE PERCENT: 16 %
MAGNESIUM: 1.7 MG/DL (ref 1.8–2.4)
MCH RBC QN AUTO: 31.9 PG (ref 26–34)
MCHC RBC AUTO-ENTMCNC: 34.1 G/DL (ref 31–36)
MCV RBC AUTO: 93.3 FL (ref 80–100)
MENINGITIS ENCEPHALITIS PANEL: NORMAL
MONOCYTES ABSOLUTE: 0.6 K/UL (ref 0–1.3)
MONOCYTES RELATIVE PERCENT: 12 %
NEUTROPHILS ABSOLUTE: 3.5 K/UL (ref 1.7–7.7)
NEUTROPHILS RELATIVE PERCENT: 68.3 %
PDW BLD-RTO: 13.6 % (ref 12.4–15.4)
PHOSPHORUS: 3.1 MG/DL (ref 2.5–4.9)
PLATELET # BLD: 100 K/UL (ref 135–450)
PMV BLD AUTO: 8.6 FL (ref 5–10.5)
POTASSIUM SERPL-SCNC: 4 MMOL/L (ref 3.5–5.1)
RBC # BLD: 4.14 M/UL (ref 4.2–5.9)
REPORT: NORMAL
SODIUM BLD-SCNC: 137 MMOL/L (ref 136–145)
VANCOMYCIN TROUGH: 20.8 UG/ML (ref 10–20)
WBC # BLD: 5.1 K/UL (ref 4–11)

## 2022-09-07 PROCEDURE — 6360000002 HC RX W HCPCS: Performed by: STUDENT IN AN ORGANIZED HEALTH CARE EDUCATION/TRAINING PROGRAM

## 2022-09-07 PROCEDURE — 99291 CRITICAL CARE FIRST HOUR: CPT | Performed by: INTERNAL MEDICINE

## 2022-09-07 PROCEDURE — 83735 ASSAY OF MAGNESIUM: CPT

## 2022-09-07 PROCEDURE — 2580000003 HC RX 258

## 2022-09-07 PROCEDURE — 2580000003 HC RX 258: Performed by: STUDENT IN AN ORGANIZED HEALTH CARE EDUCATION/TRAINING PROGRAM

## 2022-09-07 PROCEDURE — 6360000002 HC RX W HCPCS: Performed by: NURSE PRACTITIONER

## 2022-09-07 PROCEDURE — 2500000003 HC RX 250 WO HCPCS: Performed by: STUDENT IN AN ORGANIZED HEALTH CARE EDUCATION/TRAINING PROGRAM

## 2022-09-07 PROCEDURE — A4216 STERILE WATER/SALINE, 10 ML: HCPCS | Performed by: STUDENT IN AN ORGANIZED HEALTH CARE EDUCATION/TRAINING PROGRAM

## 2022-09-07 PROCEDURE — 80202 ASSAY OF VANCOMYCIN: CPT

## 2022-09-07 PROCEDURE — 2700000000 HC OXYGEN THERAPY PER DAY

## 2022-09-07 PROCEDURE — 6360000002 HC RX W HCPCS

## 2022-09-07 PROCEDURE — 80069 RENAL FUNCTION PANEL: CPT

## 2022-09-07 PROCEDURE — 93010 ELECTROCARDIOGRAM REPORT: CPT | Performed by: INTERNAL MEDICINE

## 2022-09-07 PROCEDURE — 2000000000 HC ICU R&B

## 2022-09-07 PROCEDURE — 2580000003 HC RX 258: Performed by: NURSE PRACTITIONER

## 2022-09-07 PROCEDURE — 94761 N-INVAS EAR/PLS OXIMETRY MLT: CPT

## 2022-09-07 PROCEDURE — 94003 VENT MGMT INPAT SUBQ DAY: CPT

## 2022-09-07 PROCEDURE — 85025 COMPLETE CBC W/AUTO DIFF WBC: CPT

## 2022-09-07 PROCEDURE — 95813 EEG EXTND MNTR 61-119 MIN: CPT

## 2022-09-07 RX ORDER — MAGNESIUM SULFATE IN WATER 40 MG/ML
4000 INJECTION, SOLUTION INTRAVENOUS ONCE
Status: COMPLETED | OUTPATIENT
Start: 2022-09-07 | End: 2022-09-07

## 2022-09-07 RX ADMIN — SODIUM CHLORIDE, PRESERVATIVE FREE 10 ML: 5 INJECTION INTRAVENOUS at 19:39

## 2022-09-07 RX ADMIN — ENOXAPARIN SODIUM 40 MG: 100 INJECTION SUBCUTANEOUS at 09:53

## 2022-09-07 RX ADMIN — DEXMEDETOMIDINE 1.5 MCG/KG/HR: 100 INJECTION, SOLUTION INTRAVENOUS at 23:23

## 2022-09-07 RX ADMIN — MAGNESIUM SULFATE 4000 MG: 4 INJECTION INTRAVENOUS at 06:52

## 2022-09-07 RX ADMIN — Medication 100 MCG/HR: at 15:06

## 2022-09-07 RX ADMIN — SODIUM CHLORIDE, PRESERVATIVE FREE 20 MG: 5 INJECTION INTRAVENOUS at 19:39

## 2022-09-07 RX ADMIN — Medication 150 MCG/HR: at 06:59

## 2022-09-07 RX ADMIN — VANCOMYCIN HYDROCHLORIDE 1000 MG: 10 INJECTION, POWDER, LYOPHILIZED, FOR SOLUTION INTRAVENOUS at 13:16

## 2022-09-07 RX ADMIN — DEXMEDETOMIDINE 0.6 MCG/KG/HR: 100 INJECTION, SOLUTION INTRAVENOUS at 12:06

## 2022-09-07 RX ADMIN — PROPOFOL 15 MCG/KG/MIN: 10 INJECTION, EMULSION INTRAVENOUS at 05:36

## 2022-09-07 RX ADMIN — DEXMEDETOMIDINE 0.7 MCG/KG/HR: 100 INJECTION, SOLUTION INTRAVENOUS at 04:08

## 2022-09-07 RX ADMIN — AMPICILLIN SODIUM 1000 MG: 1 INJECTION, POWDER, FOR SOLUTION INTRAMUSCULAR; INTRAVENOUS at 03:06

## 2022-09-07 RX ADMIN — THIAMINE HYDROCHLORIDE 100 MG: 100 INJECTION, SOLUTION INTRAMUSCULAR; INTRAVENOUS at 09:39

## 2022-09-07 RX ADMIN — LEVETIRACETAM 1500 MG: 100 INJECTION, SOLUTION, CONCENTRATE INTRAVENOUS at 15:05

## 2022-09-07 RX ADMIN — CEFTRIAXONE 2000 MG: 2 INJECTION, POWDER, FOR SOLUTION INTRAMUSCULAR; INTRAVENOUS at 09:53

## 2022-09-07 RX ADMIN — SODIUM CHLORIDE, PRESERVATIVE FREE 20 MG: 5 INJECTION INTRAVENOUS at 09:38

## 2022-09-07 RX ADMIN — AMPICILLIN SODIUM 1000 MG: 1 INJECTION, POWDER, FOR SOLUTION INTRAMUSCULAR; INTRAVENOUS at 09:09

## 2022-09-07 RX ADMIN — DEXMEDETOMIDINE 0.7 MCG/KG/HR: 100 INJECTION, SOLUTION INTRAVENOUS at 18:36

## 2022-09-07 RX ADMIN — ACYCLOVIR SODIUM 800 MG: 50 INJECTION, SOLUTION INTRAVENOUS at 03:04

## 2022-09-07 RX ADMIN — VANCOMYCIN HYDROCHLORIDE 1000 MG: 10 INJECTION, POWDER, LYOPHILIZED, FOR SOLUTION INTRAVENOUS at 05:38

## 2022-09-07 RX ADMIN — Medication 150 MCG/HR: at 00:12

## 2022-09-07 RX ADMIN — ACYCLOVIR SODIUM 800 MG: 50 INJECTION, SOLUTION INTRAVENOUS at 11:03

## 2022-09-07 RX ADMIN — LEVETIRACETAM 1500 MG: 100 INJECTION, SOLUTION, CONCENTRATE INTRAVENOUS at 01:37

## 2022-09-07 RX ADMIN — AMPICILLIN SODIUM 1000 MG: 1 INJECTION, POWDER, FOR SOLUTION INTRAMUSCULAR; INTRAVENOUS at 12:10

## 2022-09-07 ASSESSMENT — PULMONARY FUNCTION TESTS
PIF_VALUE: 21
PIF_VALUE: 21
PIF_VALUE: 19
PIF_VALUE: 21
PIF_VALUE: 19
PIF_VALUE: 20
PIF_VALUE: 20
PIF_VALUE: 23
PIF_VALUE: 29
PIF_VALUE: 21
PIF_VALUE: 20
PIF_VALUE: 19
PIF_VALUE: 35

## 2022-09-07 NOTE — PROGRESS NOTES
Hospitalist Progress Note      PCP: Idalia Pagan MD    Date of Admission: 9/1/2022    Chief Complaint: syncope    Hospital Course:  H& P reviewed       Subjective:     Pt remains intubated and on vent. Unable provide ROS        Medications:  Reviewed    Infusion Medications    fentaNYL 150 mcg/hr (09/07/22 0659)    dexmedetomidine (PRECEDEX) IV infusion 0.7 mcg/kg/hr (09/07/22 0408)    sodium chloride      propofol 15 mcg/kg/min (09/07/22 0536)    norepinephrine Stopped (09/05/22 1017)     Scheduled Medications    famotidine (PEPCID) injection  20 mg IntraVENous BID    levETIRAcetam  1,500 mg IntraVENous Q12H    vancomycin  1,000 mg IntraVENous Q8H    cefTRIAXone (ROCEPHIN) IV  2,000 mg IntraVENous Q12H    ampicillin IV  1,000 mg IntraVENous 6 times per day    acyclovir  10 mg/kg (Adjusted) IntraVENous Q8H    enoxaparin  40 mg SubCUTAneous Daily    lidocaine PF  5 mL IntraDERmal Once    sodium chloride flush  5-40 mL IntraVENous 2 times per day    thiamine  100 mg IntraVENous Daily     PRN Meds: sodium chloride flush, sodium chloride, ondansetron **OR** ondansetron, polyethylene glycol, acetaminophen **OR** acetaminophen      Intake/Output Summary (Last 24 hours) at 9/7/2022 1108  Last data filed at 9/7/2022 0600  Gross per 24 hour   Intake 3779.79 ml   Output 2715 ml   Net 1064.79 ml       Physical Exam Performed:    /85   Pulse 65   Temp (!) 94.6 °F (34.8 °C) (Esophageal)   Resp 13   Ht 5' 10\" (1.778 m)   Wt 207 lb 3.7 oz (94 kg)   SpO2 100%   BMI 29.73 kg/m²     General appearance:  intubated and sedated   HEENT: Pupils equal, round, Conjunctivae/corneas clear. Neck: Supple, with full range of motion. No jugular venous distention. Trachea midline. Respiratory:   coarse vent sounds anteriorly   Cardiovascular: Regular rate and rhythm with normal S1/S2 without murmurs, rubs or gallops. Abdomen: Soft, non-tender, non-distended with normal bowel sounds.   Musculoskeletal: No clubbing, cyanosis or edema bilaterally. Skin: cold,  no overt lesion on exposed skin. Neurologic:  sedated          Labs:   Recent Labs     09/05/22  0605 09/06/22  0427 09/07/22  0418   WBC 5.1 4.5 5.1   HGB 13.1* 13.5 13.2*   HCT 39.0* 40.1* 38.6*   PLT 85* 90* 100*     Recent Labs     09/05/22  0541 09/06/22  0427 09/07/22  0418    139 137   K 3.9 3.4* 4.0    105 104   CO2 22 23 24   BUN 5* 6* 8   CREATININE <0.5* 0.5* <0.5*   CALCIUM 7.4* 8.3 8.3   PHOS 2.9 2.6 3.1     No results for input(s): AST, ALT, BILIDIR, BILITOT, ALKPHOS in the last 72 hours. No results for input(s): INR in the last 72 hours. No results for input(s): Erick Marlin in the last 72 hours. Urinalysis:      Lab Results   Component Value Date/Time    NITRU Negative 09/01/2022 05:09 PM    WBCUA 0-2 09/01/2022 05:09 PM    BACTERIA Rare 09/01/2022 05:09 PM    RBCUA 0-2 09/01/2022 05:09 PM    BLOODU MODERATE 09/01/2022 05:09 PM    SPECGRAV >=1.030 09/01/2022 05:09 PM    GLUCOSEU Negative 09/01/2022 05:09 PM       Radiology:  MRI BRAIN WO CONTRAST   Final Result      Faint diffusion restriction in the right hippocampus with associated FLAIR signal abnormality. This is favored to represent sequelae of seizures, however other infectious/inflammatory etiologies are also possible. Mild atrophy and chronic small vessel ischemic change. Areas of encephalomalacia and gliosis in both cerebral hemispheres with remote hemorrhagic staining from prior insults. IR LUMBAR PUNCTURE FOR DIAGNOSIS   Final Result   . IMPRESSION:   1. Uneventful diagnostic fluoroscopic guided lumbar puncture as the   2. The flow reversed are risk and therefore a closing pressure was obtained at the end of the procedure, 26 cm of water. XR ABDOMEN (KUB) (SINGLE AP VIEW)   Final Result   Findings/Impression:    The tip of the enteric tube terminates in the distal body of the stomach. CT CHEST WO CONTRAST   Final Result      CHEST:      1.   Moderate dependent atelectasis bilaterally. ABDOMEN/PELVIS:      1.  No acute abnormality on noncontrast CT of the abdomen and pelvis. 2.  Cholelithiasis. CT ABDOMEN PELVIS WO CONTRAST Additional Contrast? None   Final Result      CHEST:      1. Moderate dependent atelectasis bilaterally. ABDOMEN/PELVIS:      1.  No acute abnormality on noncontrast CT of the abdomen and pelvis. 2.  Cholelithiasis. CTA HEAD NECK W CONTRAST   Final Result      CTA Head:   No acute CTA findings. No hemodynamically significant stenosis or focal aneurysm. No arteriovenous confirmation. CTA Neck:   No acute CTA findings. No hemodynamically significant stenosis or focal aneurysm. CT HEAD WO CONTRAST   Final Result      1. No findings for acute intracranial abnormality. 2.  Age-related atrophy with patchy periventricular white matter changes bilaterally consistent with chronic small vessel ischemia. 3.  Stable low attenuation left frontoparietal lobe consistent with previous insult from known prior intraparenchymal hematoma. Stable right frontoparietal cortical infarct. These findings were called to the emergency room physician Dr. Bong Vu on 9/1/2022 at 5:30 p.m. XR CHEST PORTABLE   Final Result   1. No findings for acute cardiopulmonary disease. 2.  ET tube in good position in the mid trachea. Assessment/Plan:    Active Hospital Problems    Diagnosis     Acute respiratory failure ([address] [J96.00]      Priority: Medium    Seizure ([address] [R56.9]      Priority: Medium             Acute encephalopathy:  most likely secondary to seizure. Intubated in ER. Vent management per pulmonary/  critical care. Neurology consulted and following. S/p LP. EEG and MRI reviewed. On Keppra. Acute respiratory failure with hypoxia:  [facility]  Vent management per pulmonary critical care      Elevated troponin:  cardiology consulted and following- recommending conservative management. History of alcohol abuse continue thiamine. SIRS:  unclear focus of infection. On empiric antibiotics and acyclovir. Blood cultures with no growth so far. CSF culture pending      Hypomagnesemia: replace     Hypothermia: cause unclear. TSH was abnormal. Shila hugger in place. Abnormal TFT: TSH 5.16, FT4 was normal. Possibly subclinical hypothyroidism from acute illness. Repeat TFT in 4-6 weeks. DVT Prophylaxis: lovenox     Diet: Diet NPO  ADULT TUBE FEEDING; Orogastric; Peptide Based High Protein; Continuous; 10; Yes; 10; Q 4 hours; 75; 30; Q 4 hours  Code Status: Full Code      Dispo - continue in ICU. Hard to estimate length of stay- pending clinical course.                Rambo Dowd MD

## 2022-09-07 NOTE — PROGRESS NOTES
NEUROLOGY / NEUROCRITICAL CARE PROGRESS NOTE       Patient Name: Hayley Whittaker YOB: 1960   Sex: Male Age: 57 yrs     CC / Reason for Consult: AMS    Interval Hx / Changes over last 24 hours:   Temp to 94.5 overnight  No seizures on cEEG overnight, some intermittent focal epileptiform discharges  Quite sedated on my exam, discussed with nursing staff and when sedation is lowered patient becomes quite agitated and will move all of his extremities and intermittently follow commands. ROS: unobtainable sedation and intubation    HISTORY   Admission HPI:      Hayley Whittaker is a 57 y.o. y/o male with hx of ETOH abuse, CAD/MI, CHF, HTN, HLD, prior traumatic ICH 9/2021 who presents with acute encephalopathy of unknown etiology. He was reportedly at home after a long recovery from his [address]. He lives with significant other who saw him last on 9/1/22 at 9am in his normal state of health. She later returned around 4pm and found patient's car in driving way running, patient was inside home, half on couch unresponsive w/ gurgling respirations w/ low O2 sats. EMS was called. He was taken to [facility] intubated on arrival to ED d/t continued hypoxia. Unclear etiology of symptoms, ETOH / drug screen negative. He did have some elevations in troponin. Lactic acid was mildly elevated and patient had R eye deviations with nystagmus that prompted concern for seizures. He was given keppra in the ED. Initial head CT was unremarkable, CTA of head / neck was unremarkable. He was admitted to ICU for further evaluation.      [facility] Past Medical History:   Diagnosis Date    Alcohol abuse     CAD (coronary artery disease)     with complete LAD occlusion    CHF (congestive heart failure) (HCC)     LVEF    Essential tremor     HTN (hypertension)     Hx of blood clots 05/2021    Hyperlipidemia     ICH (intracerebral hemorrhage) (HCC)     Lower extremity cellulitis     MI (mitral incompetence)       Allergies No Known Allergies   Diet Diet NPO  ADULT TUBE FEEDING; Orogastric; Peptide Based High Protein; Continuous; 10; Yes; 10; Q 4 hours; 75; 30; Q 4 hours   Isolation No active isolations     CURRENT SCHEDULED MEDICATIONS   Inpatient Medications     famotidine (PEPCID) injection, 20 mg, IntraVENous, BID    levETIRAcetam, 1,500 mg, IntraVENous, Q12H    vancomycin, 1,000 mg, IntraVENous, Q8H    cefTRIAXone (ROCEPHIN) IV, 2,000 mg, IntraVENous, Q12H    ampicillin IV, 1,000 mg, IntraVENous, 6 times per day    acyclovir, 10 mg/kg (Adjusted), IntraVENous, Q8H    enoxaparin, 40 mg, SubCUTAneous, Daily    lidocaine PF, 5 mL, IntraDERmal, Once    sodium chloride flush, 5-40 mL, IntraVENous, 2 times per day    thiamine, 100 mg, IntraVENous, Daily   Infusions    fentaNYL 150 mcg/hr (09/07/22 0659)    dexmedetomidine (PRECEDEX) IV infusion 0.7 mcg/kg/hr (09/07/22 0408)    sodium chloride      propofol 15 mcg/kg/min (09/07/22 0536)    norepinephrine Stopped (09/05/22 1017)      Antibiotics   Recent Abx Admin                     acyclovir (ZOVIRAX) 800 mg in dextrose 5 % 250 mL IVPB (mg) 800 mg New Bag 09/07/22 1103     800 mg New Bag  0304     800 mg New Bag 09/06/22 1934    cefTRIAXone (ROCEPHIN) 2,000 mg in dextrose 5 % 50 mL IVPB mini-bag (mg) 2,000 mg New Bag 09/07/22 0953     2,000 mg New Bag 09/06/22 2314    ampicillin 1000 mg ivpb mini bag (mg) 1,000 mg New Bag 09/07/22 0909     1,000 mg New Bag  0306     1,000 mg New Bag 09/06/22 2318     1,000 mg New Bag  1934     1,000 mg New Bag  1137    vancomycin (VANCOCIN) 1,000 mg in dextrose 5 % 250 mL IVPB (mg) 1,000 mg New Bag 09/07/22 0538     1,000 mg New Bag 09/06/22 2129     1,000 mg New Bag  1752                      LABS   Metabolic Panel Recent Labs     09/05/22  0541 09/06/22  0427 09/07/22  0418    139 137   K 3.9 3.4* 4.0    105 104   CO2 22 23 24   BUN 5* 6* 8   CREATININE <0.5* 0.5* <0.5*   GLUCOSE 99 138* 126*   CALCIUM 7.4* 8.3 8.3   LABALBU 2.4* 2.9* 2.8* PHOS 2.9 2.6 3.1   MG 1.70* 2.00 1.70*        CBC / Coags Recent Labs     22  0605 22  0427 22  0418   WBC 5.1 4.5 5.1   RBC 4.11* 4.25 4.14*   HGB 13.1* 13.5 13.2*   HCT 39.0* 40.1* 38.6*   PLT 85* 90* 100*        CSF Protein, CSF 15 - 45 mg/dL 39      Glucose, CSF 40 - 80 mg/dL 67      Color, CSF Colorless Colorless     Appearance, CSF Clear Clear  Hazy Abnormal  R    Clot Evaluation CSF  see below     Comment: No Clots Seen   WBC, CSF 0 - 5 /cumm 2     RBC, CSF 0 /cumm 1 Abnormal        CSF Culture No Growth so far. Hold 7 days. P  [address] [facility]   Gram Stain Result No organisms seen       Meningitis Encephalitis Panel Meningitis Encephalitis Panel PCR Result: Not Detected   VDRL: in process  Crypto Ag, CSF Negative Negative         DIAGNOSTICS   IMAGES:  Images personally reviewed and agree w/ radiology interpretation. [id number]     Seizures - none  Push buttons - none  Background - Continuous, diffuse, low amplitude mixed frequencies. Intermittent frontally predominant sharply contoured delta. Intermittent focal sharp waves in the right posterior temporal.        CLINICAL INTERPRETATION: This is an abnormal vide2o EEG study  1. Generalized background abnormalities indicative of an underlying diffuse encephalopathy of non-specific etiology   2. Intermittent focal epileptiform discharges, right posterior temporal, conferring increased risk of focal onset seizures from this region. 3.  No seizures. No clinical events. MRI Brain:  Impression       Faint diffusion restriction in the right hippocampus with associated FLAIR signal abnormality. This is favored to represent sequelae of seizures, however other infectious/inflammatory etiologies are also possible. Mild atrophy and chronic small vessel ischemic change. Areas of encephalomalacia and gliosis in both cerebral hemispheres with remote hemorrhagic staining from prior insults.          Previous Imaging:  Head CT w/o Contrast:  1. No findings for acute intracranial abnormality. 2.  Age-related atrophy with patchy periventricular white matter changes bilaterally consistent with chronic small vessel ischemia. 3.  Stable low attenuation left frontoparietal lobe consistent with previous insult from known prior intraparenchymal hematoma. Stable right frontoparietal cortical infarct. CTA of Head / Neck w/ Contrast:  CTA Head:   No acute CTA findings. No hemodynamically significant stenosis or focal aneurysm. No arteriovenous confirmation. CTA Neck:   No acute CTA findings. No hemodynamically significant stenosis or focal aneurysm. CVEE2022-2022  Review 06:45  Seizures - none  Push buttons - none  Background - Continuous, diffuse, low amplitude mixed frequencies. Intermittent frontally predominant sharply contoured delta. Intermittent focal sharp waves in the right posterior temporal.  CLINICAL INTERPRETATION: This is an abnormal video EEG study  1. Generalized background abnormalities indicative of an underlying diffuse encephalopathy of non-specific etiology   2. Intermittent focal epileptiform discharges, right posterior temporal, conferring increased risk of focal onset seizures from this region. 3.  No seizures. No clinical events. PHYSICAL EXAMINATION     PHYSICAL EXAM:  Vitals:    22 0600 22 0800 22 0908 22 1000   BP: (!) 112/58 121/67  134/85   Pulse: 60 (!) 45 71 65   Resp: 14 14 16 13   Temp:  (!) 94.6 °F (34.8 °C)     TempSrc:  Esophageal     SpO2: 93%  100%    Weight:       Height:             Constitutional    Vital signs: BP, HR, and RR reviewed   General intubated, sedated on ventilator  Cardiovascular: pulses symmetric in all 4 extremities. No peripheral edema.   Psychiatric: limited exam given encephalopathy but not agitated and no signs of hallucinations  Neurologic  Mental status: eyes do not open to voice or pain  orientation unable to assess given sedation  Attention poor  Language limited exam given encephalopathy  Comprehension does not follow commands for me today  CN2: Visual Fields: no blink to either side with threat  CN 3,4,6:  EOMI. Pupils equal round and reactive  CN7:face symmetric but exam limited by ET tube  CN8-12: Limited exam given encephalopathy    Strength: no movement in any extremities  Sensory: no movement to pain  Cerebellar/coordination:limited exam given encephalopathy  Tone: normal in all 4 extremities  Gait: limited exam given encephalopathy and intubated with ventilator. ASSESSMENT & RECOMMENDATIONS   Assessment:  Mr. Greg Nava is a 58 y/o man who presented with acute encephalopathy, found down at home. Recent ICH makes episode concerning for seizure. Hypoxic on admission. LEV increased 9/3 given frequent sharps. Now with focal epis on cvEEG but no seizures. MRI Completed which showed a faint diffusion restriction in the R hippocampus with associated flair signal abnormality. Differential on read included sequela of seizures, infectious or inflammatory etiology. Given LP results, would consider this to either be a sequela of seizures vs subacute stroke given corrrelate on ADC.     Plan:  - Continue cvEEG  - OK to continue to wean propofol   - Continue LEV at 1500 mg BID  - If seizures on cvEEG, give 500 mg of Keppra and increase scheduled dosing to 2000 mg BID  - LP completed, CSF not suspicious for infection, biofire negative, ok to discontinue antimicrobial coverage (discussed with ICU team)  - MRI Brain WO Contrast completed  - Notify Neurology if any change in exam or seizures on cvEEG  - Vent management and SBT per primary team      MERVIN Carlton - CNP   Neurology & Neurocritical Care   Neurology Line: [phone]  [employer]: [facility] NPs  9/7/2022 11:24 AM    Critical Care:  Due to the immediate potential for life-threatening deterioration due to neurological failure, I spent 30 minutes providing critical care. This time excludes time spent performing procedures but includes time spent on direct patient care, history retrieval, review of the chart, and discussions with patient, family, and consultant(s).

## 2022-09-07 NOTE — CONSULTS
Vancomycin has been discontinued. Pharmacy will sign off consult. If medication dosing is resumed, please re-consult pharmacy. Please call with any questions.   Mary Kay AnguloD, BCPS  Main pharmacy: [id number]  9/7/2022 4:12 PM

## 2022-09-07 NOTE — PROGRESS NOTES
Shift handoff completed, patient intubated and sedated to RASS -1, able to move all extremities and intermittently follow commands, voiding per external cath with good urine output, mediations infusing per [facility], bed alarm on, restraints in place for safety and bed in lowest position. Will continue to monitor.

## 2022-09-07 NOTE — PROGRESS NOTES
Comprehensive Nutrition Assessment    Type and Reason for Visit:  Reassess    Nutrition Recommendations/Plan:   Modify current TF order to better meet pt needs, Propofol rate decreased  D/c Proteinex BID, new TF order will meet estimated protein needs  Initiate Vital HP (Peptide high protein formula) at 10 mL/hr and as tolerated, increase by 10 mL/hr q 4 hours until goal of 75 mL/hr is met via OG  Recommend 30 mL H20 flush q 4 hours. Monitor IVF infusion, Na labs and need for adjustments in water flush  Consider prophylactic prokinetic agent (such as reglan, erythromycin) to promote EN tolerance as appropriate   Consider daily micronutrient supplementation: 2000 IU Vitamin D3, MVM, + Probiotic   Monitor TF tolerance (abd distention, bowel habits, N/V, cramping)  Check TG while on diprivan infusion  Monitor nutrition adequacy, pertinent labs, bowel habits, wt changes, and clinical progress     Malnutrition Assessment:  Malnutrition Status: At risk for malnutrition (Comment) (09/03/22 1250)      Nutrition Assessment:    Follow up: Pt remains intubated. Sedated on Precedex and Propofol. Propofol rate decreased, TF order has been reassessed to better meet pt estimated needs. Pt TG are WNL, Phos and K WNL, Mg is low, recommend replacing Mg prior to initiating new TF order. OGT in place, will continue to monitor. Nutrition Related Findings:    OGT. Mg 1.7. . TG 89. Active bowel sounds. +1 pitting RUE/LUE edema.  Wound Type:  (scattered abrasions)       Current Nutrition Intake & Therapies:    Average Meal Intake: NPO  Average Supplements Intake: NPO  Current Tube Feeding (TF) Orders:  Feeding Route: Orogastric  Formula: Peptide Based High Protein  Schedule: Continuous  Additives/Modulars: None  Goal TF & Flush Orders Provides: Vital HP @ 75 mL/hr to provide: 1800 mL TV, 1800 kcal, 157 g/pro, and 1505 mL FW + FW flushes of 30 mL q4    Anthropometric Measures:  Height: 5' 10\" (177.8 cm)  Ideal Body Weight (IBW): 166 lbs (75 kg)       Current Body Weight: 202 lb 11 oz (91.9 kg),   IBW. Weight Source: Bed Scale  Current BMI (kg/m2): 29.1                          BMI Categories: Overweight (BMI 25.0-29. 9)    Estimated Daily Nutrient Needs:  Energy Requirements Based On: Kcal/kg (20-25 kcal/kg admission wt)  Weight Used for Energy Requirements: Admission (Admission wt 86.8 kg)  Energy (kcal/day): 7788-0775  Weight Used for Protein Requirements: Admission (1.2-2.0 g/kg admission wt (86.8kg))  Protein (g/day): 104-174  Method Used for Fluid Requirements: 1 ml/kcal  Fluid (ml/day): or per MD    Nutrition Diagnosis:   Inadequate oral intake related to impaired respiratory function as evidenced by intubation, NPO or clear liquid status due to medical condition    Nutrition Interventions:   Food and/or Nutrient Delivery: Modify Tube Feeding, Continue NPO  Nutrition Education/Counseling: Education not appropriate  Coordination of Nutrition Care: Continue to monitor while inpatient       Goals:  Previous Goal Met: Progressing toward Goal(s)  Goals: Tolerate nutrition support at goal rate, within 2 days       Nutrition Monitoring and Evaluation:   Behavioral-Environmental Outcomes: None Identified  Food/Nutrient Intake Outcomes: Enteral Nutrition Intake/Tolerance  Physical Signs/Symptoms Outcomes: Biochemical Data, Nutrition Focused Physical Findings, Weight, Fluid Status or Edema    Discharge Planning:     Too soon to determine     Mirella Rodríguez, [address] [place] [address],   Contact: [phone]

## 2022-09-07 NOTE — PLAN OF CARE
Problem: Safety - Medical Restraint  Goal: Remains free of injury from restraints (Restraint for Interference with Medical Device)  Description: INTERVENTIONS:  1. Determine that other, less restrictive measures have been tried or would not be effective before applying the restraint  2. Evaluate the patient's condition at the time of restraint application  3. Inform patient/family regarding the reason for restraint  4. Q2H: Monitor safety, psychosocial status, comfort, nutrition and hydration  9/7/2022 0010 by Eleanor Jean RN  Outcome: Progressing     Problem: Discharge Planning  Goal: Discharge to home or other facility with appropriate resources  9/7/2022 0010 by Eleanor Jean RN  Outcome: Progressing     Problem: Pain  Goal: Verbalizes/displays adequate comfort level or baseline comfort level  9/7/2022 0010 by Eleanor Jean RN  Outcome: Progressing     Problem: Safety - Adult  Goal: Free from fall injury  9/7/2022 0010 by Eleanor Jean RN  Outcome: Progressing     Problem: ABCDS Injury Assessment  Goal: Absence of physical injury  9/7/2022 0010 by Eleanor Jean RN  Outcome: Progressing     Problem: Skin/Tissue Integrity  Goal: Absence of new skin breakdown  Description: 1. Monitor for areas of redness and/or skin breakdown  2. Assess vascular access sites hourly  3. Every 4-6 hours minimum:  Change oxygen saturation probe site  4. Every 4-6 hours:  If on nasal continuous positive airway pressure, respiratory therapy assess nares and determine need for appliance change or resting period.   9/7/2022 0010 by Eleanor Jean RN  Outcome: Progressing     Problem: Chronic Conditions and Co-morbidities  Goal: Patient's chronic conditions and co-morbidity symptoms are monitored and maintained or improved  9/6/2022 2011 by Emeterio Palmer RN  Outcome: Progressing  Flowsheets (Taken 9/6/2022 2000 by Eleanor Jean RN)  Care Plan - Patient's Chronic Conditions and Co-Morbidity Symptoms are Monitored and Maintained or Improved:   Monitor and assess patient's chronic conditions and comorbid symptoms for stability, deterioration, or improvement   Collaborate with multidisciplinary team to address chronic and comorbid conditions and prevent exacerbation or deterioration     Problem: Metabolic/Fluid and Electrolytes - Adult  Goal: Electrolytes maintained within normal limits  9/6/2022 2011 by Juan C Munoz RN  Outcome: Progressing  Flowsheets (Taken 9/6/2022 2000 by Dylan Mariee RN)  Electrolytes maintained within normal limits:   Monitor labs and assess patient for signs and symptoms of electrolyte imbalances   Administer electrolyte replacement as ordered   Monitor response to electrolyte replacements, including repeat lab results as appropriate     Problem: Metabolic/Fluid and Electrolytes - Adult  Goal: Hemodynamic stability and optimal renal function maintained  9/6/2022 2011 by Juan C Munoz RN  Outcome: Progressing  Flowsheets (Taken 9/6/2022 2000 by Dylan Mariee RN)  Hemodynamic stability and optimal renal function maintained:   Monitor labs and assess for signs and symptoms of volume excess or deficit   Monitor intake, output and patient weight   Monitor urine specific gravity, serum osmolarity and serum sodium as indicated or ordered   Monitor response to interventions for patient's volume status, including labs, urine output, blood pressure (other measures as available)

## 2022-09-07 NOTE — PLAN OF CARE
Problem: Safety - Medical Restraint  Goal: Remains free of injury from restraints (Restraint for Interference with Medical Device)  Description: INTERVENTIONS:  1. Determine that other, less restrictive measures have been tried or would not be effective before applying the restraint  2. Evaluate the patient's condition at the time of restraint application  3. Inform patient/family regarding the reason for restraint  4.  Q2H: Monitor safety, psychosocial status, comfort, nutrition and hydration  9/6/2022 2011 by Diana Brand RN  Outcome: Progressing  9/6/2022 0619 by Sharyle Salmons, RN  Outcome: Progressing  Flowsheets (Taken 9/5/2022 1800 by Griselda Silvan, RN)  Remains free of injury from restraints (restraint for interference with medical device): Every 2 hours: Monitor safety, psychosocial status, comfort, nutrition and hydration     Problem: Discharge Planning  Goal: Discharge to home or other facility with appropriate resources  9/6/2022 2011 by Diana Brand RN  Outcome: Progressing  9/6/2022 0619 by Sharyle Salmons, RN  Outcome: Progressing  Flowsheets (Taken 9/5/2022 2000)  Discharge to home or other facility with appropriate resources: Identify barriers to discharge with patient and caregiver     Problem: Safety - Adult  Goal: Free from fall injury  9/6/2022 2011 by Diana Brand RN  Outcome: Progressing  Flowsheets (Taken 9/6/2022 1933 by Vasyl Iniguez, RN)  Free From Fall Injury:   Instruct family/caregiver on patient safety   Based on caregiver fall risk screen, instruct family/caregiver to ask for assistance with transferring infant if caregiver noted to have fall risk factors  9/6/2022 0619 by Sharyle Salmons, RN  Outcome: Progressing     Problem: ABCDS Injury Assessment  Goal: Absence of physical injury  9/6/2022 2011 by Diana Brand RN  Outcome: Progressing  Flowsheets (Taken 9/6/2022 1933 by Vasyl Iniguez, [place])  Absence of Physical Injury: Implement safety measures based on patient assessment  9/6/2022 0619 by Art Eagle RN  Outcome: Progressing     Problem: Skin/Tissue Integrity  Goal: Absence of new skin breakdown  Description: 1. Monitor for areas of redness and/or skin breakdown  2. Assess vascular access sites hourly  3. Every 4-6 hours minimum:  Change oxygen saturation probe site  4. Every 4-6 hours:  If on nasal continuous positive airway pressure, respiratory therapy assess nares and determine need for appliance change or resting period.   9/6/2022 2011 by Farrah Anderson RN  Outcome: Progressing  9/6/2022 0619 by Art Eagle RN  Outcome: Progressing     Problem: Chronic Conditions and Co-morbidities  Goal: Patient's chronic conditions and co-morbidity symptoms are monitored and maintained or improved  9/6/2022 2011 by Farrah Anderson RN  Outcome: Progressing  9/6/2022 0619 by Art Eagle RN  Outcome: Progressing  Flowsheets (Taken 9/5/2022 2000)  Care Plan - Patient's Chronic Conditions and Co-Morbidity Symptoms are Monitored and Maintained or Improved: Monitor and assess patient's chronic conditions and comorbid symptoms for stability, deterioration, or improvement     Problem: Nutrition Deficit:  Goal: Optimize nutritional status  9/6/2022 2011 by Farrah Anderson RN  Outcome: Progressing  9/6/2022 0619 by Art Eagle RN  Outcome: Progressing     Problem: Respiratory - Adult  Goal: Achieves optimal ventilation and oxygenation  Outcome: Progressing     Problem: Cardiovascular - Adult  Goal: Maintains optimal cardiac output and hemodynamic stability  Outcome: Progressing  Goal: Absence of cardiac dysrhythmias or at baseline  Outcome: Progressing     Problem: Metabolic/Fluid and Electrolytes - Adult  Goal: Electrolytes maintained within normal limits  Outcome: Progressing  Goal: Hemodynamic stability and optimal renal function maintained  Outcome: Progressing

## 2022-09-07 NOTE — DISCHARGE INSTR - COC
Continuity of Care Form    Patient Name: Galileo Powell   :  1960  MRN:  [id number]    Admit date:  2022  Discharge date:  22    Code Status Order: Full Code   Advance Directives:     Admitting Physician:  Baron Jennifer MD  PCP: Kaylah Modi MD    Discharging Nurse: April, Laura  Unit/Room#: 9256/0974-12  Discharging Unit Phone Number: [phone]    Emergency Contact:   Extended Emergency Contact Information  Primary Emergency Contact: [address] Phone: [phone]  Mobile Phone: [phone]  Relation: Brother/Sister    Past Surgical History:  Past Surgical History:   Procedure Laterality Date    PTCA      UPPER GASTROINTESTINAL ENDOSCOPY N/A 2020    EGD BIOPSY performed by Usama Conte MD at [address] ENDOSCOPY       Immunization History:   Immunization History   Administered Date(s) Administered    Influenza, Paula Sender (age 10 mo+) AND AFLURIA, (age 1 y+), PF, 0.5mL 10/09/2020       Active Problems:  Patient Active Problem List   Diagnosis Code    [place] 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    Coronary artery disease due to lipid rich plaque I25.10, I25.83    Abnormal angiogram R93.89    Acute deep vein thrombosis of left lower extremity ([facility] [address] I82.402    Hypokalemia E87.6    Alcohol abuse with withdrawal ([facility] F10.139    Electrolyte abnormality E87.8    Intracranial hemorrhage ([facility] P60.4    Diastolic congestive heart failure, unspecified HF chronicity ([address] I50.30    Skin pustule L08.9    Seizure ([address] R56.9    Acute respiratory failure ([facility] J96.00       Isolation/Infection:   Isolation            No Isolation          Patient Infection Status       Infection Onset Added Last Indicated Last Indicated By Review Planned Expiration Resolved Resolved By    None active    Resolved    C-diff Rule Out 21 Clostridium Difficile Toxin/Antigen (Ordered)   21 Raul Mccabe RN    From prior admission    C-diff Rule Out rx approved and refilled.  Today the [facility] data was reviewed by myself and there is no evidence of aberrant behavior.       07/25/20 07/25/20 07/25/20 Clostridium difficile toxin/antigen (Ordered)   07/25/20 Rule-Out Test Resulted            Nurse Assessment:  Last Vital Signs: /66   Pulse 67   Temp (!) 95.5 °F (35.3 °C) (Esophageal)   Resp 14   Ht 5' 10\" (1.778 m)   Wt 207 lb 3.7 oz (94 kg)   SpO2 93%   BMI 29.73 kg/m²     Last documented pain score (0-10 scale):    Last Weight:   Wt Readings from Last 1 Encounters:   09/06/22 207 lb 3.7 oz (94 kg)     Mental Status:  disoriented    IV Access:  - None    Nursing Mobility/ADLs:  Walking   Assisted  Transfer  Assisted  Bathing  Assisted  Dressing  Assisted  Toileting  Assisted  Feeding  Independent  Med Admin  Assisted  Med Delivery   crushed    Wound Care Documentation and Therapy:  Wound 05/12/20 Leg Lower; Left excoriation (scratches) linear scattered around in dark cellulitic skin (Active)   Number of days: 848       Wound 05/12/20 Leg Lower;Right excoriation (linear scratches) scattered within dark cellujlitic discolored skin (Active)   Number of days: 848        Elimination:  Continence: Bowel: No  Bladder: No  Urinary Catheter: None   Colostomy/Ileostomy/Ileal Conduit: No       Date of Last BM: 9/28/22    Intake/Output Summary (Last 24 hours) at 9/7/2022 1632  Last data filed at 9/7/2022 0600  Gross per 24 hour   Intake 3779.79 ml   Output 2715 ml   Net 1064.79 ml     I/O last 3 completed shifts: In: 5986.6 [I.V.:1762.1; NG/GT:1900; IV Piggyback:2324.5]  Out: 4000 [Urine:4000]    Safety Concerns:     History of Seizures    Impairments/Disabilities:      None    Nutrition Therapy:  Current Nutrition Therapy:   - Oral Diet:  General    Routes of Feeding: Oral  Liquids: No Restrictions  Daily Fluid Restriction: no  Last Modified Barium Swallow with Video (Video Swallowing Test): not done    Treatments at the Time of Hospital Discharge:   Respiratory Treatments: None  Oxygen Therapy:  is not on home oxygen therapy.   Ventilator:    - No ventilator support    Heart Failure Instructions for Daily Management  Patient was treated for chronic systolic heart failure. he  will require the following:    Please weigh daily on the same scale and approximately the same time of day. Report weight gain of 3 pounds/day or 5 pounds/week to : kaelyn SEGOVIA, Idalia aPgan [phone], and [address] [place] [address] [phone]. Please use hospital discharge weight as baseline reference. Please monitor for signs and symptoms of and report to MD:  Worsening Heart Failure: sudden weight gain, shortness of breath, lower extremity or general edema/swelling, abdominal bloating/swelling, inability to lie flat, intolerance to usual activity, or cough (especially at night). Report these finding even if no increase in weight. Dehydration:  having difficulty or a decrease in urination, dizziness, worsening fatigue, or new onset/worsening of generalized weakness. Please continue a LOW SODIUM diet and LIMIT fluid intake to 48 - 64 ounces ( 1.5 - 2 liters) per day. Call Idalia Pagan [phone], kaelyn SEGOVIA, and [address] [place] [address] [phone] with any questions or concerns. Please continue heart failure education to patient and family/support system. See After Visit Summary for hospital follow up appointment details. Consider spiritual care referral for support and/or completion of advance directives . Consider: having the [facility] MD complete required 7 day follow up, [place] [address] telehealth program if patient agreeable and able to participate, and palliative care consult for ongoing goals of care, end of life, and/or chronic disease management discussions. Patient's primary cardiologist is Dr Perla Pat.          Rehab Therapies: Physical Therapy and Occupational Therapy  Weight Bearing Status/Restrictions: No weight bearing restrictions  Other Medical Equipment (for information only, NOT a DME order):  cane  Other Treatments: 2-5 weeks of physical therapy. Patient's personal belongings (please select all that are sent with patient):  None    RN SIGNATURE:  Electronically signed by Sharyle Maxwell, RN on 9/30/22 at 10:39 AM EDT    CASE MANAGEMENT/SOCIAL WORK SECTION    Inpatient Status Date: ***    Readmission Risk Assessment Score:  Readmission Risk              Risk of Unplanned Readmission:  18           Discharging to [facility]  Address: [address] [place] [address], [place] [address]  Phone: [phone]    / signature: Electronically signed by Pattie Riggs RN on 10/6/22 at 8:51 AM EDT    PHYSICIAN SECTION    Prognosis: Fair    Condition at Discharge: Stable    Rehab Potential (if transferring to Rehab): Fair    Recommended Labs or Other Treatments After Discharge: 2-5 weeks PT/OT    Physician Certification: I certify the above information and transfer of Alverto Chanel  is necessary for the continuing treatment of the diagnosis listed and that he requires [place] for less 30 days.      Update Admission H&P: No change in H&P    PHYSICIAN SIGNATURE:  Electronically signed by Harlan Ryan MD on 9/28/22 at 3:04 PM EDT

## 2022-09-07 NOTE — PROGRESS NOTES
ICU Progress Note    Admit Date: 9/1/2022  Day: 5  Vent Day:5   fentaNYL 150 mcg/hr (09/06/22 1859)    dexmedetomidine (PRECEDEX) IV infusion 0.6 mcg/kg/hr (09/06/22 2030)    sodium chloride      propofol 10 mcg/kg/min (09/06/22 2325)    norepinephrine Stopped (09/05/22 1017)     IV Access:Peripheral  IV Fluids:None  Vasopressors:None                Antibiotics: Ampicillin, Vancomycin, Rocephin  Diet: orogastric; Peptide Based; Continuous; 10; Yes; 10; Q 8 hours; 45; 30; Q 4 hours; Protein; twice per day administer via NG, flush 30 ml before and after administration    CC:  Collapse    Interval history: No acute events overnight. Patient seen and examined at [facility] side. Patient's chart and notes were reviewed. Patient is hemodynamically stable and afebrile. He remains on Enoxaparin sc    Vent settings:AC/PVR, Rate Set: 14, Rate measured: 14, PIP:21, PEEP:5+ Vt: 575+ FiO2: 30     Sedation: Fentanyl: 150 mcg/hr Propofol: 15 mcg/hr Precedex: 0.7mcg/kg/hr     RASS:-1     Gtt: Fentanyl, Propofol,      I/O:+8,245/24 hr     LDA: Peripheral IV rt hand, rt forearm, Left hand, Orogastric tube, Urinary catheter    HPI: The patient is a 64-year-old man with past medical history significant for chronic alcoholism, ICH, CAD, DVT who presented today to the ED with an episode of syncope. The history was mainly obtained by the sister since patient was intubated. He was last known well at 9 AM but the sister. According to the sister, she had just returned home from work which she had found him half lying down on the couch covered in the stool while his car outside was running outside with occasion. She denies any complaints of recent fever, chills, chest pain cough, shortness of breath, lightheadedness, palpitations, nausea, vomiting, abdominal pain, diarrhea, fatigue, visual disturbance, changes in urination frequency or burning pain well urination or headaches by the patient in the preceding days to this incident.   She does reinstate that her brother is a chronic alcoholic and had just gotten out of rehab. He has been sober since however she was not aware if he had recently had any alcoholic drink. He had also informed that he had recent episode of intracerebral hemorrhage on 9/4/2021. In the ED, there was a concern for possibility of a stroke however he was not considered a thrombolytic candidate because of her previous ICH and long time since his last known well. According to the ED staff his physical exam was more pertinent for nonfocal delirium. It was also significant for nystagmus and rightward gaze not fixed. He was given 2 mg of Versed and it had worsened his oxygen saturations that had dropped to 88% on a nonrebreather and therefore he was intubated. CT Abdomen, chest: Moderate dependent atelectasis bilaterally. No acute abnormality on noncontrast CT of the abdomen and pelvis. Cholelithiasis. CTA Head: No acute CTA findings. No hemodynamically significant stenosis or focal aneurysm. No arteriovenous confirmation. CTA Neck: No acute CTA findings. No hemodynamically significant stenosis or focal aneurysm.     Patient was admitted to the ICU for further workup and management of his possible meningitis, on MV    Medications:     Scheduled Meds:   famotidine (PEPCID) injection  20 mg IntraVENous BID    levETIRAcetam  1,500 mg IntraVENous Q12H    vancomycin  1,000 mg IntraVENous Q8H    cefTRIAXone (ROCEPHIN) IV  2,000 mg IntraVENous Q12H    ampicillin IV  1,000 mg IntraVENous 6 times per day    acyclovir  10 mg/kg (Adjusted) IntraVENous Q8H    enoxaparin  40 mg SubCUTAneous Daily    lidocaine PF  5 mL IntraDERmal Once    sodium chloride flush  5-40 mL IntraVENous 2 times per day    thiamine  100 mg IntraVENous Daily     Continuous Infusions:   fentaNYL 150 mcg/hr (09/06/22 1859)    dexmedetomidine (PRECEDEX) IV infusion 0.6 mcg/kg/hr (09/06/22 2030)    sodium chloride      propofol 10 mcg/kg/min (09/06/22 9350) norepinephrine Stopped (09/05/22 1017)     PRN Meds:sodium chloride flush, sodium chloride, ondansetron **OR** ondansetron, polyethylene glycol, acetaminophen **OR** acetaminophen    Objective:   Vitals:   T-max:  Patient Vitals for the past 8 hrs:   BP Temp Temp src Pulse Resp SpO2   09/06/22 2300 129/70 -- -- (!) 45 14 97 %   09/06/22 2200 124/70 -- -- (!) 46 14 97 %   09/06/22 2131 -- -- -- 53 14 97 %   09/06/22 2100 132/68 -- -- 55 14 97 %   09/06/22 2000 132/69 98.3 °F (36.8 °C) Oral 55 14 97 %   09/06/22 1800 -- -- -- 52 14 --   09/06/22 1715 (!) 145/73 -- -- 75 15 --   09/06/22 1700 -- -- -- (!) 104 24 --   09/06/22 1657 -- -- -- 95 22 90 %   09/06/22 1630 -- 98.3 °F (36.8 °C) Oral -- -- --   09/06/22 1614 -- -- -- 58 17 95 %   09/06/22 1600 -- -- -- 50 14 --       Intake/Output Summary (Last 24 hours) at 9/6/2022 2331  Last data filed at 9/6/2022 2200  Gross per 24 hour   Intake 3380.13 ml   Output 2325 ml   Net 1055.13 ml       Review of Systems  A 10 point review of systems was conducted, significant findings as noted in HPI. Physical Exam  Constitutional:       Appearance: Normal appearance. He is normal weight. HENT:      Head: Normocephalic and atraumatic. Nose:      Comments: intubated     Mouth/Throat:      Mouth: Mucous membranes are moist.      Comments: Intubated and sedated     Eyes:      Comments: Intubated and sedated   Cardiovascular:      Rate and Rhythm: Normal rate and regular rhythm. Pulses: Pulses 1+, Capillary Refill 2-3. Pulmonary:      Comments: Intubated and sedated     Abdominal:      General: Bowel sounds are normal. There is no distension. Palpations: Abdomen is soft. Musculoskeletal:      Cervical back: Neck supple. Neurological:      Comments:  On cEEG, Intubated and sedated     Psychiatric:      Comments: Intubated and sedated         LABS:    CBC:   Recent Labs     09/04/22  0359 09/05/22  0605 09/06/22  0427   WBC 8.0 5.1 4.5   HGB 13.3* 13.1* 13.5 HCT 40.1* 39.0* 40.1*   PLT 83* 85* 90*   MCV 95.4 94.9 94.4     Renal:    Recent Labs     09/04/22  0359 09/05/22 0541 09/06/22 0427   * 140 139   K 3.8 3.9 3.4*    108 105   CO2 23 22 23   BUN 3* 5* 6*   CREATININE 0.6* <0.5* 0.5*   GLUCOSE 126* 99 138*   CALCIUM 7.8* 7.4* 8.3   MG 1.80 1.70* 2.00   PHOS 2.5 2.9 2.6   ANIONGAP 8 10 11     Hepatic:   Recent Labs     09/04/22 0359 09/05/22 0541 09/06/22 0427   LABALBU 2.9* 2.4* 2.9*     Troponin:   Recent Labs     09/04/22 0359   TROPONINI 0.13*     BNP: No results for input(s): BNP in the last 72 hours. Lipids: No results for input(s): CHOL, HDL in the last 72 hours. Invalid input(s): LDLCALCU, TRIGLYCERIDE  ABGs:    Recent Labs     09/05/22  1515   PHART 7.432   LZT9CPK 39.0   PO2ART 79.1   PKM8CXF 26   BEART 1.6   [id number] 96   TCS0JUT 27       INR: No results for input(s): INR in the last 72 hours. Lactate: No results for input(s): LACTATE in the last 72 hours. Cultures:  -----------------------------------------------------------------  RAD:   MRI BRAIN WO CONTRAST   Final Result      Faint diffusion restriction in the right hippocampus with associated FLAIR signal abnormality. This is favored to represent sequelae of seizures, however other infectious/inflammatory etiologies are also possible. Mild atrophy and chronic small vessel ischemic change. Areas of encephalomalacia and gliosis in both cerebral hemispheres with remote hemorrhagic staining from prior insults. IR LUMBAR PUNCTURE FOR DIAGNOSIS   Final Result   . IMPRESSION:   1. Uneventful diagnostic fluoroscopic guided lumbar puncture as the   2. The flow reversed are risk and therefore a closing pressure was obtained at the end of the procedure, 26 cm of water. XR ABDOMEN (KUB) (SINGLE AP VIEW)   Final Result   Findings/Impression:    The tip of the enteric tube terminates in the distal body of the stomach.       CT CHEST WO CONTRAST   Final Result CHEST:      1. Moderate dependent atelectasis bilaterally. ABDOMEN/PELVIS:      1.  No acute abnormality on noncontrast CT of the abdomen and pelvis. 2.  Cholelithiasis. CT ABDOMEN PELVIS WO CONTRAST Additional Contrast? None   Final Result      CHEST:      1. Moderate dependent atelectasis bilaterally. ABDOMEN/PELVIS:      1.  No acute abnormality on noncontrast CT of the abdomen and pelvis. 2.  Cholelithiasis. CTA HEAD NECK W CONTRAST   Final Result      CTA Head:   No acute CTA findings. No hemodynamically significant stenosis or focal aneurysm. No arteriovenous confirmation. CTA Neck:   No acute CTA findings. No hemodynamically significant stenosis or focal aneurysm. CT HEAD WO CONTRAST   Final Result      1. No findings for acute intracranial abnormality. 2.  Age-related atrophy with patchy periventricular white matter changes bilaterally consistent with chronic small vessel ischemia. 3.  Stable low attenuation left frontoparietal lobe consistent with previous insult from known prior intraparenchymal hematoma. Stable right frontoparietal cortical infarct. These findings were called to the emergency room physician Dr. Angie Sol on 9/1/2022 at 5:30 p.m. XR CHEST PORTABLE   Final Result   1. No findings for acute cardiopulmonary disease. 2.  ET tube in good position in the mid trachea. Assessment/Plan:   Vin Hollingsworth is a 57 y.o. male, who  has a past medical history of Alcohol abuse, CAD (coronary artery disease), CHF (congestive heart failure) ([address] Essential tremor, HTN (hypertension), Hx of blood clots, Hyperlipidemia, ICH (intracerebral hemorrhage) ([address] Lower extremity cellulitis, and MI (mitral incompetence).     IV:   fentaNYL 150 mcg/hr (09/06/22 1859)    dexmedetomidine (PRECEDEX) IV infusion 0.6 mcg/kg/hr (09/06/22 2030)    sodium chloride      propofol 10 mcg/kg/min (09/06/22 2325)    norepinephrine Stopped (09/05/22 1017)       Neuro/Sedation:  Patient sedated  Acute Metabolic Encephalopathy 2/2 possible meningitis vs encephalitis induced seizure   On presentation AMS, elevated WBC: 21.7, Tachypneic , Tachycardic, Hypotensive, hx of ICH, SAH and SDH 2/2 to fall, hx of chronic alcohol abuse, CT head:Stable low attenuation left frontoparietal lobe consistent with previous insult from known prior intraparenchymal hematoma. Stable right frontoparietal cortical infarct. CT Abdomen, chest: Moderate dependent atelectasis bilaterally. No acute abnormality on noncontrast CT of the abdomen and pelvis. Cholelithiasis. CTA Head: No acute CTA findings. No hemodynamically significant stenosis or focal aneurysm. No arteriovenous confirmation. CTA Neck: No acute CTA findings. No hemodynamically significant stenosis or focal aneurysm. -Off Levophed  -On broad spectrum antibiotics Vancomycin, ampicillin and ceftriaxone, acyclovir  -Procal  trending down 0.27 -> 0.19  -blood culture ordered: No growth  -cEEG currently on readings: Generalized background abnormalities indicative of an underlying diffuse encephalopathy of non-specific etiology. Intermittent focal epileptiform discharges, right posterior temporal, conferring increased risk of focal onset seizures from this region.  -On Keppra 1500 bid  -Consulted Neurology: appreciate recs  -Lumbar puncture ordered wnl, awaiting culture  -MRI:Faint diffusion restriction in the right hippocampus with associated FLAIR signal abnormality.  This is favored to represent sequelae of seizures, however other infectious/inflammatory etiologies are also possible.   -Sedation holiday planned after MRI     Alcohol use disorder  -thiamine 100 mg daily  -monitor for withdrawal  -Ethanol level: none detected    CV:  Hemodynamically stable and afebrile  No pressor requirements    CAD with LAD stent  -will start on Aspirin, statin, coreg with core pack and on telemetry  -Cardio on board    Respiratory: on MV  Vent Settings: Vent Mode: AC/PRVC Resp Rate (Set): 14 bmp/Vt (Set, mL): 575 mL/ /FiO2 : 30 % PEEP: 5  Recent Labs     09/05/22  1515   PHART 7.432   TNO2ECH 39.0   PO2ART 79.1   Acute hypercapnic respiratory failure secondary to possible aspiration vs 2/2 medication  On presentation: VBG PH:7.264, PCO2: 56.4, 83.2  Latest: PH: 7.432, PCO2: 39, PO2:79 (09/05/2022)  Vent settings:AC/PVR, Rate Set: 14, Rate measured: 14, PIP:19, PEEP:5+ Vt: 575+ FiO2:30  -Patient intubated with fentanyl, precedex and propofol  - SBT planned today    Renal:  Urinary catheter  I/Os: +8,245/ 24 hours    Isolated proteinuria  -UA: Positive for protein    GI:  Diet: orogastric; Peptide Based; Continuous; 10; Yes; 10; Q 8 hours; 45; 30; Q 4 hours; Protein; twice per day administer via NG, flush 30 ml before and after administration  Hx of Cirrhosis       -Ammonia:36 wnl(09/1/22)    Hematology: Thrombocytopenia  Plt: 100k stable  Will continue monitoring    Skin  Skin breakdown-Left and rt lower Leg excoriations    Code Status: Full Code  FEN: orogastric; Peptide Based; Continuous; 10; Yes; 10; Q 8 hours; 45; 30; Q 4 hours; Protein; twice per day administer via NG, flush 30 ml before and after administration  PPX: Enoxaparin sc  DISPO: ICU    Chirag Monterroso MD, PGY-1  09/06/22  11:31 PM    This patient will be staffed and discussed with Sasha Kinsey MD.      [facility]     Patient was seen, examined and discussed with Dr. Becca Meredith. I agree with the interval history. My physical exam confirms the findings listed below  Chart was reviewed including labs, CXR, CT scan and medical records confirm the findings noted     MRI brain  Impression       Faint diffusion restriction in the right hippocampus with associated FLAIR signal abnormality. This is favored to represent sequelae of seizures, however other infectious/inflammatory etiologies are also possible. Mild atrophy and chronic small vessel ischemic change.        Areas of encephalomalacia and gliosis in both cerebral hemispheres with remote hemorrhagic staining from prior insults. EEG:  CLINICAL INTERPRETATION: This is an abnormal vide2o EEG study  1. Generalized background abnormalities indicative of an underlying diffuse encephalopathy of non-specific etiology   2. Intermittent focal epileptiform discharges, right posterior temporal, conferring increased risk of focal onset seizures from this region. 3.  No seizures. No clinical events. LP  WBC 2  RBC 1  Glucose 67  Protein 30  Meningitis panel: None detected  CSF culture: Gram stain negative. Culture no growth to date     Assessment  Acute encephalopathy -unclear etiology  Acute respiratory failure on mechanical vent support. , RR 14, FiO2 30, PEEP 5. EEG with generalized discharges that may be associated with seizures  Leukocytosis - resolved   Mildly elevated troponin. Likely due to demand ischemia. EKG with septal Q waves and T inv laterally   Hypotension requiring levophed: Resolved  Hx of ICH  Hx of alcohol use        Plan  Discontinue antibiotics and antiviral  Gradually wean propofol and fentanyl. Can increase Precedex if needed. SBT in a.m. Daily SAT  Continue TF  Continue Keppra  Neurology following    Discussed with neurology     Pt has a high probability of imminent or life-threatening deterioration requiring close monitoring, and highly complex decision-making and/or interventions of high intensity to assess, manipulate, and support his critical organ systems to prevent a likely inevitable decline which could occur if left untreated. A total critical care time 32 minutes was used.  This includes but not limited to examining patient, collaborating with other physicians, monitoring vital signs, telemetry, continuous pulse oximetry, and clinical response to IV medications, documentation time, review and interpretation of laboratory and radiological data, review of nursing notes and old record review.  This time excludes any time that may have been spent performing procedures for life threatening organ failure     Reza Weber MD

## 2022-09-07 NOTE — PROGRESS NOTES
Clinical Pharmacy Progress Note    Vancomycin - Management by Pharmacy    Consult Date(s): 9/1/22  Consulting Provider(s): Dr. Darnell Boo / Plan    Sepsis - r/o CNS infection - Vancomycin  Concurrent Antimicrobials:   Ceftriaxone - day #6  Ampicillin - day #6  Acyclovir - day #6  Day of Vanc Therapy / Ordered Duration: #7  Current Dosing Method: Bayesian-Guided AUC Dosing  Therapeutic Goal: 400-600 mg/L*hr  Current Dose / Frequency: 1000mg IV q8h  Plan / Rationale:   Renal function stable with Scr of 0.5 mg/dL today. Random level this AM of 20.8 mg/L today, drawn ~8 hours after last dose. Prior to this level being drawn pt had 2 doses with in 4 hours of each other. This explains why this level is slightly elevated. Calculated AUC with current dose is 525 mg/L*hr with an estimated sstrough of 17.1 mg/L. Will continue with current dose for now and check a level in ~ 48 hours or as clinically needed. Will continue to monitor clinical condition and make adjustments to regimen as appropriate. Thank you for consulting Pharmacy! Juan Carlos Dotson PharmD., BCPS   9/7/2022 7:28 AM  Wireless: [phone]      Interval update:  LP non reveling. Jeffy Arriaga, MRI planned    Subjective/Objective: Mr. Felisa Jenkins is a 57 y.o. male with PMHx significant for chronic CAD, HTN, CHF, blood clot hx, essential tremor, EtOH abuse with recent rehab discharge, and prior ICH (Sept 2021 per notes). Found down at home by family, incontinent of stool. Intubated in the ED for for respiratory failure. Concern for sepsis as well as seizures, specifically for aspiration given seizures. Antibiotics and cEEG monitoring initiated. Pharmacy has been consulted to dose vancomycin.     Ht Readings from Last 1 Encounters:   09/01/22 5' 10\" (1.778 m)     Wt Readings from Last 1 Encounters:   09/06/22 207 lb 3.7 oz (94 kg)       Current & Prior Antimicrobial Regimen(s):   (9/1)   (9/1-9/2)  Ceftriaxone (9/2-current)  Ampicillin (9/2-current)  Acyclovir (9/2-current)  Vancomycin - pharmacy to dose   1750mg IV x1 load (9/1)  1250mg IV q12h (9/1-9/3)  1000mg IV q8h (9/3 - Current)    Level(s) / Doses:    Date Time Dose Level / Type of Level Interpretation   9/3 0956 1250mg IV q12h Random - 14.3 mg/L Drawn ~6 hours after last dose   Predicted AUC low at 392 mg/L*hr. Increasing dose to 1000 mg q8hr as this predicts an AUC of 469 mg/L*hr and sstrough of 14.8 mg/L.   9/4 1041 1000mg IV q8hr Random = 19.5 mg/L Drawn ~6 hours after 3rd dose of current regimen. Predicted AUC of 500 mg/L*hr and trough of 16.1 mg/L. Will continue current dose   9/6 0900 1000mg IV q8h Random = Never drawn    9/7 0445 1000mg IV q8h Trough - 20.8 Drawn ~ 8 hours after last dose. Prior 2 doses were given with in 4 hours of each other. Predicted AUC of 525 mg/L*hr and sstrough of 17.1 mg/L  Will continue current dose for now. Note: Serum levels collected for AUC-based dosing may be high if collected in close proximity to the dose administered. This is not necessarily indicative of toxicity. Cultures & Sensitivities:    Date Site Micro Susceptibility / Result   9/1 Blood x 2 NGTD    9/2 Blood x 2 NGTD    9/6 CSF Culture No organism Seen    9/6 CSF PCR No detected organisms      Labs / Ancillary Data:    Estimated Creatinine Clearance: 172 mL/min (based on SCr of 0.5 mg/dL). Recent Labs     09/05/22  0541 09/05/22  0605 09/06/22  0427 09/07/22  0418   CREATININE <0.5*  --  0.5* <0.5*   BUN 5*  --  6* 8   WBC  --  5.1 4.5 5.1         Additional Lab Values / Findings of Note:    Procalcitonin:   No results for input(s): PROCAL in the last 72 hours.

## 2022-09-07 NOTE — PROGRESS NOTES
Spoke to Andry Billy over the phone and updated on plan of care. All questions answered at this time.

## 2022-09-07 NOTE — PROGRESS NOTES
CONTINUOUS EEG    Name:  [facility] Record Number:  [id number]  Age: 57 y.o. Gender: male  : 1960  Today's Date:  2022  Room:  [address] [place] [address]-  Vital Signs   /80   Pulse (!) 105   Temp (!) 95.5 °F (35.3 °C) (Esophageal)   Resp 25   Ht 5' 10\" (1.778 m)   Wt 207 lb 3.7 oz (94 kg)   SpO2 93%   BMI 29.73 kg/m²       Patient currently on continuous EEG monitoring. All EEG leads are currently in place with no current issues. Verified [employer] connection via team viewer. Checked in with patient RN for current plan of care. Comments: Continue monitoring. All leads within 10K limit.     Electronically signed by Nelda Ruiz on 2022 at 6:32 PM

## 2022-09-07 NOTE — PROGRESS NOTES
Unable to get temperature on patient this AM through axillary or oral. Esophageal temp prop placed and pt core temperature 94.6. Warm blankets applied. After an hour pt temperature only up to 35.1. Warming blanket applied and will continue to monitor temperature through continuous core temp monitoring.

## 2022-09-07 NOTE — PROGRESS NOTES
CONTINUOUS VIDEO EEG MONITORING    DATE OF SERVICE:  9/6/2022 08:00 TO 9/7/2022 08:00    NAME: Josafat Mckeon   YOB: 1960  SEX: male  MEDICAL RECORD EYOTSM:[id number]    EEG-CCTV study:   The patient is a 57 y.o.y old male monitored due to concern for seizures. EEG-VIDEO MONITORING METHODOLOGY:   Time-locked EEG-video monitoring was performed     Analyses of the monitoring data were performed using the following techniques:   1. Review of the relevant EEG-video data. 2. Review of events detected by the computer system in detail. 3. Review of clinical seizures, with both detailed review of EEG and video and playback using multiple montages. A variety of referential and bipolar montages were used. CLINICAL AND EEG ANALYSIS:  Video EEG recording was reviewed in real time by a technologist, and then reviewed at least twice a day. Results of the monitoring were relayed to the treating team as needed throughout the study, via verbal or written documentation on the patient chart. 9/6/2022-9/7/2022    Seizures - none  Push buttons - none  Background - Continuous, diffuse, low amplitude mixed frequencies. Intermittent frontally predominant sharply contoured delta. Intermittent focal sharp waves in the right posterior temporal.      CLINICAL INTERPRETATION: This is an abnormal vide2o EEG study  1. Generalized background abnormalities indicative of an underlying diffuse encephalopathy of non-specific etiology   2. Intermittent focal epileptiform discharges, right posterior temporal, conferring increased risk of focal onset seizures from this region. 3.  No seizures. No clinical events.

## 2022-09-07 NOTE — PLAN OF CARE
Problem: Safety - Medical Restraint  Goal: Remains free of injury from restraints (Restraint for Interference with Medical Device)  Description: INTERVENTIONS:  1. Determine that other, less restrictive measures have been tried or would not be effective before applying the restraint  2. Evaluate the patient's condition at the time of restraint application  3. Inform patient/family regarding the reason for restraint  4.  Q2H: Monitor safety, psychosocial status, comfort, nutrition and hydration  Outcome: Progressing  Flowsheets (Taken 9/7/2022 0800)  Remains free of injury from restraints (restraint for interference with medical device):   Determine that other, less restrictive measures have been tried or would not be effective before applying the restraint   Evaluate the patient's condition at the time of restraint application   Inform patient/family regarding the reason for restraint   Every 2 hours: Monitor safety, psychosocial status, comfort, nutrition and hydration     Problem: Discharge Planning  Goal: Discharge to home or other facility with appropriate resources  Outcome: Progressing  Flowsheets (Taken 9/7/2022 1200)  Discharge to home or other facility with appropriate resources:   Identify barriers to discharge with patient and caregiver   Arrange for needed discharge resources and transportation as appropriate   Identify discharge learning needs (meds, wound care, etc)     Problem: Pain  Goal: Verbalizes/displays adequate comfort level or baseline comfort level  Outcome: Progressing     Problem: Safety - Adult  Goal: Free from fall injury  Outcome: Progressing  Flowsheets (Taken 9/7/2022 0728)  Free From Fall Injury: Instruct family/caregiver on patient safety     Problem: ABCDS Injury Assessment  Goal: Absence of physical injury  Outcome: Progressing  Flowsheets (Taken 9/7/2022 0728)  Absence of Physical Injury: Implement safety measures based on patient assessment     Problem: Skin/Tissue Integrity  Goal: Absence of new skin breakdown  Description: 1. Monitor for areas of redness and/or skin breakdown  2. Assess vascular access sites hourly  3. Every 4-6 hours minimum:  Change oxygen saturation probe site  4. Every 4-6 hours:  If on nasal continuous positive airway pressure, respiratory therapy assess nares and determine need for appliance change or resting period.   Outcome: Progressing     Problem: Chronic Conditions and Co-morbidities  Goal: Patient's chronic conditions and co-morbidity symptoms are monitored and maintained or improved  Outcome: Progressing  Flowsheets (Taken 9/7/2022 1200)  Care Plan - Patient's Chronic Conditions and Co-Morbidity Symptoms are Monitored and Maintained or Improved:   Monitor and assess patient's chronic conditions and comorbid symptoms for stability, deterioration, or improvement   Collaborate with multidisciplinary team to address chronic and comorbid conditions and prevent exacerbation or deterioration   Update acute care plan with appropriate goals if chronic or comorbid symptoms are exacerbated and prevent overall improvement and discharge     Problem: Nutrition Deficit:  Goal: Optimize nutritional status  Outcome: Progressing  Flowsheets (Taken 9/7/2022 0828 by Jaja Kaur RD)  Nutrient intake appropriate for improving, restoring, or maintaining nutritional needs:   Monitor oral intake, labs, and treatment plans   Recommend, monitor, and adjust tube feedings and TPN/PPN based on assessed needs     Problem: Respiratory - Adult  Goal: Achieves optimal ventilation and oxygenation  Outcome: Progressing  Flowsheets (Taken 9/7/2022 0800)  Achieves optimal ventilation and oxygenation:   Assess for changes in respiratory status   Oxygen supplementation based on oxygen saturation or arterial blood gases   Assess the need for suctioning and aspirate as needed     Problem: Cardiovascular - Adult  Goal: Maintains optimal cardiac output and hemodynamic stability  Outcome: Progressing  Flowsheets (Taken 9/7/2022 0800)  Maintains optimal cardiac output and hemodynamic stability:   Monitor blood pressure and heart rate   Assess for signs of decreased cardiac output   Administer fluid and/or volume expanders as ordered  Goal: Absence of cardiac dysrhythmias or at baseline  Outcome: Progressing  Flowsheets (Taken 9/7/2022 0800)  Absence of cardiac dysrhythmias or at baseline:   Monitor cardiac rate and rhythm   Assess for signs of decreased cardiac output     Problem: Metabolic/Fluid and Electrolytes - Adult  Goal: Electrolytes maintained within normal limits  Outcome: Progressing  Flowsheets (Taken 9/7/2022 1200)  Electrolytes maintained within normal limits:   Monitor labs and assess patient for signs and symptoms of electrolyte imbalances   Administer electrolyte replacement as ordered  Goal: Hemodynamic stability and optimal renal function maintained  Outcome: Progressing  Flowsheets (Taken 9/7/2022 1200)  Hemodynamic stability and optimal renal function maintained:   Monitor labs and assess for signs and symptoms of volume excess or deficit   Monitor intake, output and patient weight   Monitor urine specific gravity, serum osmolarity and serum sodium as indicated or ordered

## 2022-09-08 LAB
ALBUMIN SERPL-MCNC: 3 G/DL (ref 3.4–5)
ANION GAP SERPL CALCULATED.3IONS-SCNC: 10 MMOL/L (ref 3–16)
BASE EXCESS ARTERIAL: 2 (ref -3–3)
BASOPHILS ABSOLUTE: 0 K/UL (ref 0–0.2)
BASOPHILS RELATIVE PERCENT: 0.4 %
BUN BLDV-MCNC: 10 MG/DL (ref 7–20)
CALCIUM SERPL-MCNC: 8.4 MG/DL (ref 8.3–10.6)
CHLORIDE BLD-SCNC: 102 MMOL/L (ref 99–110)
CO2: 23 MMOL/L (ref 21–32)
CREAT SERPL-MCNC: 0.5 MG/DL (ref 0.8–1.3)
EOSINOPHILS ABSOLUTE: 0.1 K/UL (ref 0–0.6)
EOSINOPHILS RELATIVE PERCENT: 1 %
GFR AFRICAN AMERICAN: >60
GFR NON-AFRICAN AMERICAN: >60
GLUCOSE BLD-MCNC: 115 MG/DL (ref 70–99)
GLUCOSE BLD-MCNC: 122 MG/DL (ref 70–99)
HCO3 ARTERIAL: 26.7 MMOL/L (ref 21–29)
HCT VFR BLD CALC: 39.1 % (ref 40.5–52.5)
HEMOGLOBIN: 13.4 G/DL (ref 13.5–17.5)
LYMPHOCYTES ABSOLUTE: 1.1 K/UL (ref 1–5.1)
LYMPHOCYTES RELATIVE PERCENT: 12.5 %
MAGNESIUM: 1.7 MG/DL (ref 1.8–2.4)
MCH RBC QN AUTO: 32.2 PG (ref 26–34)
MCHC RBC AUTO-ENTMCNC: 34.3 G/DL (ref 31–36)
MCV RBC AUTO: 94 FL (ref 80–100)
MONOCYTES ABSOLUTE: 0.9 K/UL (ref 0–1.3)
MONOCYTES RELATIVE PERCENT: 9.7 %
NEUTROPHILS ABSOLUTE: 6.7 K/UL (ref 1.7–7.7)
NEUTROPHILS RELATIVE PERCENT: 76.4 %
O2 SAT, ARTERIAL: 94 % (ref 93–100)
PCO2 ARTERIAL: 40.5 MM HG (ref 35–45)
PDW BLD-RTO: 13.8 % (ref 12.4–15.4)
PERFORMED ON: ABNORMAL
PH ARTERIAL: 7.43 (ref 7.35–7.45)
PHOSPHORUS: 3.6 MG/DL (ref 2.5–4.9)
PLATELET # BLD: 144 K/UL (ref 135–450)
PMV BLD AUTO: 9.3 FL (ref 5–10.5)
PO2 ARTERIAL: 68 MM HG (ref 75–108)
POC SAMPLE TYPE: ABNORMAL
POTASSIUM SERPL-SCNC: 5 MMOL/L (ref 3.5–5.1)
RBC # BLD: 4.16 M/UL (ref 4.2–5.9)
SODIUM BLD-SCNC: 135 MMOL/L (ref 136–145)
TCO2 ARTERIAL: 28 MMOL/L
TRIGL SERPL-MCNC: 125 MG/DL (ref 0–150)
VDRL CSF SCREEN: NON REACTIVE
WBC # BLD: 8.8 K/UL (ref 4–11)

## 2022-09-08 PROCEDURE — 2500000003 HC RX 250 WO HCPCS: Performed by: STUDENT IN AN ORGANIZED HEALTH CARE EDUCATION/TRAINING PROGRAM

## 2022-09-08 PROCEDURE — 2580000003 HC RX 258: Performed by: STUDENT IN AN ORGANIZED HEALTH CARE EDUCATION/TRAINING PROGRAM

## 2022-09-08 PROCEDURE — 83735 ASSAY OF MAGNESIUM: CPT

## 2022-09-08 PROCEDURE — 94003 VENT MGMT INPAT SUBQ DAY: CPT

## 2022-09-08 PROCEDURE — 82947 ASSAY GLUCOSE BLOOD QUANT: CPT

## 2022-09-08 PROCEDURE — 36415 COLL VENOUS BLD VENIPUNCTURE: CPT

## 2022-09-08 PROCEDURE — 2000000000 HC ICU R&B

## 2022-09-08 PROCEDURE — 84478 ASSAY OF TRIGLYCERIDES: CPT

## 2022-09-08 PROCEDURE — 82803 BLOOD GASES ANY COMBINATION: CPT

## 2022-09-08 PROCEDURE — 2700000000 HC OXYGEN THERAPY PER DAY

## 2022-09-08 PROCEDURE — 6360000002 HC RX W HCPCS: Performed by: NURSE PRACTITIONER

## 2022-09-08 PROCEDURE — 6360000002 HC RX W HCPCS

## 2022-09-08 PROCEDURE — 2580000003 HC RX 258: Performed by: NURSE PRACTITIONER

## 2022-09-08 PROCEDURE — 94761 N-INVAS EAR/PLS OXIMETRY MLT: CPT

## 2022-09-08 PROCEDURE — A4216 STERILE WATER/SALINE, 10 ML: HCPCS | Performed by: STUDENT IN AN ORGANIZED HEALTH CARE EDUCATION/TRAINING PROGRAM

## 2022-09-08 PROCEDURE — 6360000002 HC RX W HCPCS: Performed by: STUDENT IN AN ORGANIZED HEALTH CARE EDUCATION/TRAINING PROGRAM

## 2022-09-08 PROCEDURE — 85025 COMPLETE CBC W/AUTO DIFF WBC: CPT

## 2022-09-08 PROCEDURE — 80069 RENAL FUNCTION PANEL: CPT

## 2022-09-08 PROCEDURE — 99291 CRITICAL CARE FIRST HOUR: CPT | Performed by: INTERNAL MEDICINE

## 2022-09-08 PROCEDURE — 95813 EEG EXTND MNTR 61-119 MIN: CPT

## 2022-09-08 RX ORDER — MAGNESIUM SULFATE IN WATER 40 MG/ML
4000 INJECTION, SOLUTION INTRAVENOUS ONCE
Status: COMPLETED | OUTPATIENT
Start: 2022-09-08 | End: 2022-09-08

## 2022-09-08 RX ADMIN — DEXMEDETOMIDINE 1 MCG/KG/HR: 100 INJECTION, SOLUTION INTRAVENOUS at 16:09

## 2022-09-08 RX ADMIN — LEVETIRACETAM 1500 MG: 100 INJECTION, SOLUTION, CONCENTRATE INTRAVENOUS at 13:36

## 2022-09-08 RX ADMIN — Medication 150 MCG/HR: at 00:34

## 2022-09-08 RX ADMIN — LEVETIRACETAM 1500 MG: 100 INJECTION, SOLUTION, CONCENTRATE INTRAVENOUS at 01:32

## 2022-09-08 RX ADMIN — THIAMINE HYDROCHLORIDE 100 MG: 100 INJECTION, SOLUTION INTRAMUSCULAR; INTRAVENOUS at 08:19

## 2022-09-08 RX ADMIN — PROPOFOL 25 MCG/KG/MIN: 10 INJECTION, EMULSION INTRAVENOUS at 00:28

## 2022-09-08 RX ADMIN — ENOXAPARIN SODIUM 40 MG: 100 INJECTION SUBCUTANEOUS at 09:30

## 2022-09-08 RX ADMIN — Medication 50 MCG/HR: at 19:34

## 2022-09-08 RX ADMIN — DEXMEDETOMIDINE 1.5 MCG/KG/HR: 100 INJECTION, SOLUTION INTRAVENOUS at 02:55

## 2022-09-08 RX ADMIN — DEXMEDETOMIDINE 1.2 MCG/KG/HR: 100 INJECTION, SOLUTION INTRAVENOUS at 20:45

## 2022-09-08 RX ADMIN — SODIUM CHLORIDE, PRESERVATIVE FREE 10 ML: 5 INJECTION INTRAVENOUS at 08:19

## 2022-09-08 RX ADMIN — PROPOFOL 50 MCG/KG/MIN: 10 INJECTION, EMULSION INTRAVENOUS at 02:56

## 2022-09-08 RX ADMIN — Medication 175 MCG/HR: at 07:39

## 2022-09-08 RX ADMIN — DEXMEDETOMIDINE 1.5 MCG/KG/HR: 100 INJECTION, SOLUTION INTRAVENOUS at 06:03

## 2022-09-08 RX ADMIN — DEXMEDETOMIDINE 1.5 MCG/KG/HR: 100 INJECTION, SOLUTION INTRAVENOUS at 09:46

## 2022-09-08 RX ADMIN — PROPOFOL 50 MCG/KG/MIN: 10 INJECTION, EMULSION INTRAVENOUS at 06:06

## 2022-09-08 RX ADMIN — SODIUM CHLORIDE, PRESERVATIVE FREE 20 MG: 5 INJECTION INTRAVENOUS at 08:20

## 2022-09-08 RX ADMIN — MAGNESIUM SULFATE HEPTAHYDRATE 4000 MG: 40 INJECTION, SOLUTION INTRAVENOUS at 08:18

## 2022-09-08 RX ADMIN — SODIUM CHLORIDE, PRESERVATIVE FREE 10 ML: 5 INJECTION INTRAVENOUS at 21:16

## 2022-09-08 RX ADMIN — SODIUM CHLORIDE, PRESERVATIVE FREE 20 MG: 5 INJECTION INTRAVENOUS at 21:16

## 2022-09-08 ASSESSMENT — PULMONARY FUNCTION TESTS
PIF_VALUE: 22
PIF_VALUE: 25
PIF_VALUE: 13
PIF_VALUE: 14
PIF_VALUE: 25
PIF_VALUE: 37
PIF_VALUE: 43
PIF_VALUE: 21
PIF_VALUE: 30
PIF_VALUE: 29
PIF_VALUE: 25
PIF_VALUE: 27
PIF_VALUE: 22
PIF_VALUE: 26
PIF_VALUE: 21
PIF_VALUE: 20
PIF_VALUE: 13
PIF_VALUE: 20
PIF_VALUE: 19
PIF_VALUE: 24
PIF_VALUE: 21
PIF_VALUE: 23
PIF_VALUE: 22
PIF_VALUE: 23
PIF_VALUE: 24
PIF_VALUE: 21
PIF_VALUE: 21
PIF_VALUE: 13
PIF_VALUE: 24
PIF_VALUE: 23
PIF_VALUE: 22
PIF_VALUE: 21
PIF_VALUE: 13
PIF_VALUE: 13

## 2022-09-08 ASSESSMENT — PAIN SCALES - GENERAL: PAINLEVEL_OUTOF10: 0

## 2022-09-08 NOTE — PROGRESS NOTES
MECHANICAL VENTILATION WEANING PROTOCOL    PRE-TRIAL PATIENT ASSESSMENT - COMPLETED AT 0950    Ventilatory Assessment:    PARAMETER CRITERIA FOR WEANING   Spontaneous Cough:  Yes    Sputum Characteristics:          SPONTANEOUS COUGH With small to moderate  Amount of secretions   FiO2 : 30 % FIO2 less than or equal to 50%     PEEP less than or equal to 8   Progressive Mobility Protocol  No     ABG:  Lab Results   Component Value Date/Time    PHART 7.432 09/05/2022 03:15 PM    DKG7EFR 39.0 09/05/2022 03:15 PM    PO2ART 79.1 09/05/2022 03:15 PM    Y6WADYQM 96 09/05/2022 03:15 PM    YFV0VLR 26 09/05/2022 03:15 PM    BEART 1.6 09/05/2022 03:15 PM     HGB/WBC:  Lab Results   Component Value Date/Time    HGB 13.4 09/08/2022 03:39 AM    WBC 8.8 09/08/2022 03:39 AM        Vital Signs:    PARAMETER CRITERIA FOR WEANING Meets Criteria   Heart Rate: 56 Within patient's normal limits / stable Yes   Resp: 14 Less than or equal to 30 Yes   BP: (!) 152/83 Within patient's normal limits / minimal pressors (Hemodynamically Stable) Yes   SpO2: 96 % Greater than or equal to 90% Yes   End Tidal CO2: 26 (%) Within patient's normal limits Yes   Temp: 98 °F (36.7 °C) Less than 38. 5oC / 101. 3oF Yes     [x]    Based on this assessment and the [facility] Ventilator Weaning Protocol, this patient  IS being placed on a Spontaneous Breathing Trial (SBT) at this time.  []    Based on this assessment and the [facility] Ventilator Weaning Protocol, this patient  IS NOT being placed on a Spontaneous Breathing Trial (SBT) at this time. []    Patient  IS NOT being placed on a Spontaneous Breathing Trial (SBT) at this time because of factors not previously addressed.   Those factors include      Vital Capacity (VC) = unable    Maximum Inspiratory Force (MIF) =     SBT - Initiated at  0951      Ventilator Settings:  CPAP - 5 cmH2O, PS - 8 cmH2O(if using settings other than CPAP 5/PS 8, please explain reasons for settings here):       1 Minute SBT Respiratory Parameters:   VE: 7.3 L   RR: 12 b/m   VT: 550 mL (average VT = VE/RR)   RSBI: 21 (RR/VT in liters)   ETCO2: 23 cmH2O   SPO2: 94 %   If on sedation, amount and type     [x]   RR is less than 35, RSBI is less than 100, patient's vitals signs are stable, and patient is in no apparent distress; therefore, patient is being left on SBT for up to 1 hour. []   RR is greater than 35, RSBI is greater than 100, VS's are unstable, or patient is in distress; therefore, patient is being placed back on previous settings. SBT - Concluded at  1311. Weaning Parameters/VS's at conclusion of SBT:   VE: 6 L   RR: 14 b/m   VT: 425 mL (average VT = VE/RR)   RSBI: 33 (RR/VT in liters)   ETCO2: 32 cmH2O   SPO2: 94 %    If on sedation, amount and type     [x]   Patient tolerated SBT for full 60 minutes with acceptable weaning parameters and vital signs and showed no signs or distress. []   Patient tolerated SBT for full 60 minutes, but had unacceptable weaning parameters or vital signs, and/or signs of distress. []   Patient was unable to tolerate SBT for 60 minutes and was placed back on previous settings. COMMENTS:  Discussed with Dr. Michelle Lao.  Return to [facility]

## 2022-09-08 NOTE — PLAN OF CARE
Problem: Safety - Medical Restraint  Goal: Remains free of injury from restraints (Restraint for Interference with Medical Device)  Description: INTERVENTIONS:  1. Determine that other, less restrictive measures have been tried or would not be effective before applying the restraint  2. Evaluate the patient's condition at the time of restraint application  3. Inform patient/family regarding the reason for restraint  4. Q2H: Monitor safety, psychosocial status, comfort, nutrition and hydration  Outcome: Progressing  Flowsheets (Taken 9/7/2022 2000)  Remains free of injury from restraints (restraint for interference with medical device): Determine that other, less restrictive measures have been tried or would not be effective before applying the restraint     Problem: Discharge Planning  Goal: Discharge to home or other facility with appropriate resources  Outcome: Progressing  Flowsheets (Taken 9/7/2022 2000)  Discharge to home or other facility with appropriate resources: Identify barriers to discharge with patient and caregiver     Problem: Pain  Goal: Verbalizes/displays adequate comfort level or baseline comfort level  Outcome: Progressing  Flowsheets  Taken 9/8/2022 0400  Verbalizes/displays adequate comfort level or baseline comfort level: Assess pain using appropriate pain scale  Taken 9/8/2022 0000  Verbalizes/displays adequate comfort level or baseline comfort level: Assess pain using appropriate pain scale  Taken 9/7/2022 2000  Verbalizes/displays adequate comfort level or baseline comfort level: Assess pain using appropriate pain scale     Problem: Safety - Adult  Goal: Free from fall injury  Outcome: Progressing     Problem: ABCDS Injury Assessment  Goal: Absence of physical injury  Outcome: Progressing     Problem: Skin/Tissue Integrity  Goal: Absence of new skin breakdown  Description: 1. Monitor for areas of redness and/or skin breakdown  2. Assess vascular access sites hourly  3.   Every 4-6 hours minimum:  Change oxygen saturation probe site  4. Every 4-6 hours:  If on nasal continuous positive airway pressure, respiratory therapy assess nares and determine need for appliance change or resting period.   Outcome: Progressing     Problem: Nutrition Deficit:  Goal: Optimize nutritional status  Outcome: Progressing     Problem: Cardiovascular - Adult  Goal: Maintains optimal cardiac output and hemodynamic stability  Outcome: Progressing     Problem: Respiratory - Adult  Goal: Achieves optimal ventilation and oxygenation  Outcome: Progressing     Problem: Metabolic/Fluid and Electrolytes - Adult  Goal: Electrolytes maintained within normal limits  Outcome: Progressing  Flowsheets (Taken 9/7/2022 2000)  Electrolytes maintained within normal limits: Monitor labs and assess patient for signs and symptoms of electrolyte imbalances

## 2022-09-08 NOTE — CARE COORDINATION
Case management continues to follow for discharge planning. The chart was reviewed. Mr. Glory Kessler remains intubated, sedated, and on CvEEG. He is from home and was independent with self care and functional mobility prior to admission. Will address discharge disposition once he is more medically stable.     Electronically signed by Maryam Medel, MSN RN-[facility]  Case Management  [phone]

## 2022-09-08 NOTE — PROGRESS NOTES
NEUROLOGY / NEUROCRITICAL CARE PROGRESS NOTE       Patient Name: Lydia Lo YOB: 1960   Sex: Male Age: 57 yrs     CC / Reason for Consult: AMS    Interval Hx / Changes over last 24 hours:   Sedation increased significantly overnight, attempting to get out of bed and self extubate so propofol was restarted. Propofol was off at 1400 on 9/7, restarted at midnight. Heavily sedated on my exam but tolerating CPAP well. Returned to examine patient this afternoon when sedation was paused, followed commands in all extremities, mouthed \"hi\", tracked examiner around room. ROS: unobtainable sedation and intubation    HISTORY   Admission HPI:      Ldyia Lo is a 57 y.o. y/o male with hx of ETOH abuse, CAD/MI, CHF, HTN, HLD, prior traumatic ICH 9/2021 who presents with acute encephalopathy of unknown etiology. He was reportedly at home after a long recovery from his [address]. He lives with significant other who saw him last on 9/1/22 at 9am in his normal state of health. She later returned around 4pm and found patient's car in driving way running, patient was inside home, half on couch unresponsive w/ gurgling respirations w/ low O2 sats. EMS was called. He was taken to [facility] intubated on arrival to ED d/t continued hypoxia. Unclear etiology of symptoms, ETOH / drug screen negative. He did have some elevations in troponin. Lactic acid was mildly elevated and patient had R eye deviations with nystagmus that prompted concern for seizures. He was given keppra in the ED. Initial head CT was unremarkable, CTA of head / neck was unremarkable. He was admitted to ICU for further evaluation.      PMH Past Medical History:   Diagnosis Date    Alcohol abuse     CAD (coronary artery disease)     with complete LAD occlusion    CHF (congestive heart failure) (HCC)     LVEF    Essential tremor     HTN (hypertension)     Hx of blood clots 05/2021    Hyperlipidemia     ICH (intracerebral hemorrhage) [facility])     Lower extremity cellulitis     MI (mitral incompetence)       Allergies No Known Allergies   Diet Diet NPO  ADULT TUBE FEEDING; Orogastric; Peptide Based High Protein; Continuous; 10; Yes; 10; Q 4 hours; 75; 30; Q 4 hours   Isolation No active isolations     CURRENT SCHEDULED MEDICATIONS   Inpatient Medications     magnesium sulfate, 4,000 mg, IntraVENous, Once    famotidine (PEPCID) injection, 20 mg, IntraVENous, BID    levETIRAcetam, 1,500 mg, IntraVENous, Q12H    enoxaparin, 40 mg, SubCUTAneous, Daily    lidocaine PF, 5 mL, IntraDERmal, Once    sodium chloride flush, 5-40 mL, IntraVENous, 2 times per day    thiamine, 100 mg, IntraVENous, Daily   Infusions    fentaNYL 150 mcg/hr (09/08/22 0900)    dexmedetomidine (PRECEDEX) IV infusion 1.5 mcg/kg/hr (09/08/22 0946)    sodium chloride      propofol 40 mcg/kg/min (09/08/22 0813)    norepinephrine Stopped (09/05/22 1017)      Antibiotics   Recent Abx Admin                     vancomycin (VANCOCIN) 1,000 mg in dextrose 5 % 250 mL IVPB (mg) 1,000 mg New Bag 09/07/22 1316    ampicillin 1000 mg ivpb mini bag (mg) 1,000 mg New Bag 09/07/22 1210    acyclovir (ZOVIRAX) 800 mg in dextrose 5 % 250 mL IVPB (mg) 800 mg New Bag 09/07/22 1103                      LABS   Metabolic Panel Recent Labs     09/06/22 0427 09/07/22 0418 09/08/22  0339    137 135*   K 3.4* 4.0 5.0    104 102   CO2 23 24 23   BUN 6* 8 10   CREATININE 0.5* <0.5* 0.5*   GLUCOSE 138* 126* 115*   CALCIUM 8.3 8.3 8.4   LABALBU 2.9* 2.8* 3.0*   PHOS 2.6 3.1 3.6   MG 2.00 1.70* 1.70*        CBC / Coags Recent Labs     09/06/22 0427 09/07/22 0418 09/08/22  0339   WBC 4.5 5.1 8.8   RBC 4.25 4.14* 4.16*   HGB 13.5 13.2* 13.4*   HCT 40.1* 38.6* 39.1*   PLT 90* 100* 144        CSF Protein, CSF 15 - 45 mg/dL 39      Glucose, CSF 40 - 80 mg/dL 67      Color, CSF Colorless Colorless     Appearance, CSF Clear Clear  Hazy Abnormal  R    Clot Evaluation CSF  see below     Comment: No Clots Seen WBC, CSF 0 - 5 /cumm 2     RBC, CSF 0 /cumm 1 Abnormal        CSF Culture No Growth so far. Hold 7 days. P  [address] [facility]   Gram Stain Result No organisms seen       Meningitis Encephalitis Panel Meningitis Encephalitis Panel PCR Result: Not Detected   VDRL: in process  Crypto Ag, CSF Negative Negative         DIAGNOSTICS   IMAGES:  Images personally reviewed and agree w/ radiology interpretation. [id number]     Seizures - none  Push buttons - none  Background - Continuous, diffuse, low amplitude mixed frequencies. Intermittent frontally predominant sharply contoured delta. Intermittent focal sharp waves in the right posterior temporal.        CLINICAL INTERPRETATION: This is an abnormal vide2o EEG study  1. Generalized background abnormalities indicative of an underlying diffuse encephalopathy of non-specific etiology   2. Intermittent focal epileptiform discharges, right posterior temporal, conferring increased risk of focal onset seizures from this region. 3.  No seizures. No clinical events. MRI Brain:  Impression       Faint diffusion restriction in the right hippocampus with associated FLAIR signal abnormality. This is favored to represent sequelae of seizures, however other infectious/inflammatory etiologies are also possible. Mild atrophy and chronic small vessel ischemic change. Areas of encephalomalacia and gliosis in both cerebral hemispheres with remote hemorrhagic staining from prior insults. Previous Imaging:  Head CT w/o Contrast:  1. No findings for acute intracranial abnormality. 2.  Age-related atrophy with patchy periventricular white matter changes bilaterally consistent with chronic small vessel ischemia. 3.  Stable low attenuation left frontoparietal lobe consistent with previous insult from known prior intraparenchymal hematoma. Stable right frontoparietal cortical infarct.          CTA of Head / Neck w/ Contrast:  CTA Head: No acute CTA findings. No hemodynamically significant stenosis or focal aneurysm. No arteriovenous confirmation. CTA Neck:   No acute CTA findings. No hemodynamically significant stenosis or focal aneurysm. CVEE2022-2022  Review 06:45  Seizures - none  Push buttons - none  Background - Continuous, diffuse, low amplitude mixed frequencies. Intermittent frontally predominant sharply contoured delta. Intermittent focal sharp waves in the right posterior temporal.  CLINICAL INTERPRETATION: This is an abnormal video EEG study  1. Generalized background abnormalities indicative of an underlying diffuse encephalopathy of non-specific etiology   2. Intermittent focal epileptiform discharges, right posterior temporal, conferring increased risk of focal onset seizures from this region. 3.  No seizures. No clinical events. PHYSICAL EXAMINATION     PHYSICAL EXAM:  Vitals:    22 0800 22 0940 22 0954 22 1000   BP: (!) 152/83   (!) 146/79   Pulse: 55 56 53 58   Resp: 14 14 13 17   Temp: 98 °F (36.7 °C)      TempSrc: Oral      SpO2: 98% 96% 95%    Weight:       Height:           *Exam performed while patient sedated on propofol, precedex, and fentanyl. Constitutional    Vital signs: BP, HR, and RR reviewed   General intubated, sedated on ventilator  Cardiovascular: pulses symmetric in all 4 extremities. No peripheral edema. Psychiatric: limited exam given encephalopathy but not agitated and no signs of hallucinations  Neurologic  Mental status: eyes do not open to voice or pain  orientation unable to assess given sedation  Attention poor  Language limited exam given encephalopathy  Comprehension does not follow commands for me today  CN2: Visual Fields: no blink to either side with threat  CN 3,4,6:  EOMI.  Pupils equal round and reactive  CN7:face symmetric but exam limited by ET tube  CN8-12: Limited exam given encephalopathy    Strength: no movement in any extremities  Sensory: no movement to pain  Cerebellar/coordination:limited exam given encephalopathy  Tone: normal in all 4 extremities  Gait: limited exam given encephalopathy and intubated with ventilator. ASSESSMENT & RECOMMENDATIONS   Assessment:  Mr. Carline Newsome is a 58 y/o man who presented with acute encephalopathy, found down at home. Recent ICH makes episode concerning for seizure. Hypoxic on admission. LEV increased 9/3 given frequent sharps. Now with focal epis on cvEEG but no seizures. MRI Completed which showed a faint diffusion restriction in the R hippocampus with associated flair signal abnormality. Differential on read included sequela of seizures, infectious or inflammatory etiology. Given LP results, would consider this to either be a sequela of seizures vs subacute stroke given corrrelate on ADC. Plan:  - [id number] Debbie Morris to discontinue cvEEG  - Wean sedation as able  - Continue LEV at 1500 mg BID  - LP completed, CSF not suspicious for infection, biofire negative, ok to discontinue antimicrobial coverage (discussed with ICU team)  - MRI Brain WO Contrast completed, area seen in R hippocampus could be subacute stroke  - Restart home ASA and lipitor  - Continue telemetry while inpatient  - Hgb A1c and lipid panel if not recently completed  - Notify Neurology if any change in exam   - Vent management and SBT/extubation per primary team      MERVIN Childers - CNP   Neurology & Neurocritical Care   Neurology Line: [phone]  [facility]: [facility] NPs  9/8/2022 10:28 AM    I spent 25 minutes in the care of this patient. Over 50% of that time was in face-to-face counseling regarding disease process, diagnostic testing, preventative measures, and answering patient and family questions.

## 2022-09-08 NOTE — PROGRESS NOTES
Pt increasingly agitated. Scooting himself out of bed, thrashing head back and forth, attempts to reach for ETT. Maxed on precedex. ICU residents at the bedside. Restarting propofol at this time for patient's safety. Will cont to monitor closely and titrate sedation per orders.

## 2022-09-08 NOTE — PLAN OF CARE
Problem: Discharge Planning  Goal: Discharge to home or other facility with appropriate resources  9/8/2022 1648 by Elinor Rodriguez RN  Outcome: Not Progressing  Flowsheets  Taken 9/8/2022 1648  Discharge to home or other facility with appropriate resources:   Identify barriers to discharge with patient and caregiver   Identify discharge learning needs (meds, wound care, etc)  Taken 9/8/2022 0800  Discharge to home or other facility with appropriate resources: Arrange for needed discharge resources and transportation as appropriate  9/8/2022 0527 by Tana Parrish RN  Outcome: Progressing  [address] (Taken 9/7/2022 2000)  Discharge to home or other facility with appropriate resources: Identify barriers to discharge with patient and caregiver     Problem: Safety - Medical Restraint  Goal: Remains free of injury from restraints (Restraint for Interference with Medical Device)  Description: INTERVENTIONS:  1. Determine that other, less restrictive measures have been tried or would not be effective before applying the restraint  2. Evaluate the patient's condition at the time of restraint application  3. Inform patient/family regarding the reason for restraint  4.  Q2H: Monitor safety, psychosocial status, comfort, nutrition and hydration  9/8/2022 1648 by Elinor Rodriguez RN  Outcome: Progressing  Flowsheets  Taken 9/8/2022 1648  Remains free of injury from restraints (restraint for interference with medical device):   Determine that other, less restrictive measures have been tried or would not be effective before applying the restraint   Evaluate the patient's condition at the time of restraint application   Every 2 hours: Monitor safety, psychosocial status, comfort, nutrition and hydration  Taken 9/8/2022 1600  Remains free of injury from restraints (restraint for interference with medical device): Determine that other, less restrictive measures have been tried or would not be effective before applying the restraint  9/8/2022 1427 by Najma Salgado RN  Outcome: Progressing  Flowsheets  Taken 9/8/2022 1400 by Martina Elliott RN  Remains free of injury from restraints (restraint for interference with medical device): Determine that other, less restrictive measures have been tried or would not be effective before applying the restraint  Taken 9/8/2022 1200 by Martina Elliott RN  Remains free of injury from restraints (restraint for interference with medical device): Determine that other, less restrictive measures have been tried or would not be effective before applying the restraint  Taken 9/8/2022 1000 by Martina Elliott RN  Remains free of injury from restraints (restraint for interference with medical device): Determine that other, less restrictive measures have been tried or would not be effective before applying the restraint  Taken 9/8/2022 0800 by Martina Elliott RN  Remains free of injury from restraints (restraint for interference with medical device):   Determine that other, less restrictive measures have been tried or would not be effective before applying the restraint   Evaluate the patient's condition at the time of restraint application   Every 2 hours: Monitor safety, psychosocial status, comfort, nutrition and hydration  9/8/2022 0527 by Nickolas Arambula RN  Outcome: Progressing  Flowsheets (Taken 9/7/2022 2000)  Remains free of injury from restraints (restraint for interference with medical device): Determine that other, less restrictive measures have been tried or would not be effective before applying the restraint     Problem: Pain  Goal: Verbalizes/displays adequate comfort level or baseline comfort level  9/8/2022 1648 by Martina Elliott RN  Outcome: Progressing  Flowsheets  Taken 9/8/2022 1648  Verbalizes/displays adequate comfort level or baseline comfort level:   Assess pain using appropriate pain scale   Administer analgesics based on type and severity of pain and evaluate response   Notify Licensed Independent Practitioner if interventions unsuccessful or patient reports new pain  Taken 9/8/2022 1600  Verbalizes/displays adequate comfort level or baseline comfort level:   Implement non-pharmacological measures as appropriate and evaluate response   Administer analgesics based on type and severity of pain and evaluate response  9/8/2022 0527 by Cipriano Oppenheim, RN  Outcome: Progressing  Flowsheets  Taken 9/8/2022 0400  Verbalizes/displays adequate comfort level or baseline comfort level: Assess pain using appropriate pain scale  Taken 9/8/2022 0000  Verbalizes/displays adequate comfort level or baseline comfort level: Assess pain using appropriate pain scale  Taken 9/7/2022 2000  Verbalizes/displays adequate comfort level or baseline comfort level: Assess pain using appropriate pain scale     Problem: Safety - Adult  Goal: Free from fall injury  9/8/2022 1648 by Parish Alfredo RN  Outcome: Progressing  Flowsheets (Taken 9/8/2022 1648)  Free From Fall Injury: Based on caregiver fall risk screen, instruct family/caregiver to ask for assistance with transferring infant if caregiver noted to have fall risk factors  9/8/2022 0527 by Cipriano Oppenheim, RN  Outcome: Progressing     Problem: ABCDS Injury Assessment  Goal: Absence of physical injury  9/8/2022 1648 by Parish Alfredo RN  Outcome: Progressing  Flowsheets (Taken 9/8/2022 1648)  Absence of Physical Injury: Implement safety measures based on patient assessment  9/8/2022 0527 by Cipriano Oppenheim, RN  Outcome: Progressing     Problem: Skin/Tissue Integrity  Goal: Absence of new skin breakdown  Description: 1. Monitor for areas of redness and/or skin breakdown  2. Assess vascular access sites hourly  3. Every 4-6 hours minimum:  Change oxygen saturation probe site  4. Every 4-6 hours:  If on nasal continuous positive airway pressure, respiratory therapy assess nares and determine need for appliance change or resting period.   9/8/2022 1648 by Parish Alfredo RN  Outcome: Progressing  Note: Repositioned q2H. Tolerated well.   9/8/2022 0527 by Tana Parrish RN  Outcome: Progressing     Problem: Chronic Conditions and Co-morbidities  Goal: Patient's chronic conditions and co-morbidity symptoms are monitored and maintained or improved  9/8/2022 1648 by Elinor Rodriguez RN  Outcome: Progressing  Flowsheets  Taken 9/8/2022 1648  Care Plan - Patient's Chronic Conditions and Co-Morbidity Symptoms are Monitored and Maintained or Improved:   Monitor and assess patient's chronic conditions and comorbid symptoms for stability, deterioration, or improvement   Update acute care plan with appropriate goals if chronic or comorbid symptoms are exacerbated and prevent overall improvement and discharge   Collaborate with multidisciplinary team to address chronic and comorbid conditions and prevent exacerbation or deterioration  Taken 9/8/2022 0800  Care Plan - Patient's Chronic Conditions and Co-Morbidity Symptoms are Monitored and Maintained or Improved: Monitor and assess patient's chronic conditions and comorbid symptoms for stability, deterioration, or improvement  9/8/2022 0527 by Tana Parrish RN  Outcome: Progressing  [address] (Taken 9/7/2022 2000)  Care Plan - Patient's Chronic Conditions and Co-Morbidity Symptoms are Monitored and Maintained or Improved: Monitor and assess patient's chronic conditions and comorbid symptoms for stability, deterioration, or improvement     Problem: Nutrition Deficit:  Goal: Optimize nutritional status  9/8/2022 1648 by Elinor Rodriguez RN  Outcome: Progressing  Flowsheets (Taken 9/8/2022 1648)  Nutrient intake appropriate for improving, restoring, or maintaining nutritional needs:   Assess nutritional status and recommend course of action   Monitor oral intake, labs, and treatment plans   Recommend appropriate diets, oral nutritional supplements, and vitamin/mineral supplements   Provide specific nutrition education to patient or family as appropriate  9/8/2022 9314 by Marixa Lyn RN  Outcome: Progressing     Problem: Respiratory - Adult  Goal: Achieves optimal ventilation and oxygenation  9/8/2022 1648 by Caroline Madden RN  Outcome: Progressing  Flowsheets  Taken 9/8/2022 1648  Achieves optimal ventilation and oxygenation:   Assess for changes in respiratory status   Assess for changes in mentation and behavior   Position to facilitate oxygenation and minimize respiratory effort   Assess the need for suctioning and aspirate as needed   Assess and instruct to report shortness of breath or any respiratory difficulty  Taken 9/8/2022 0800  Achieves optimal ventilation and oxygenation:   Assess for changes in respiratory status   Assess for changes in mentation and behavior  9/8/2022 0527 by Marixa Lyn RN  Outcome: Progressing     Problem: Cardiovascular - Adult  Goal: Maintains optimal cardiac output and hemodynamic stability  9/8/2022 1648 by Caroline Madden RN  Outcome: Progressing  Flowsheets  Taken 9/8/2022 1648  Maintains optimal cardiac output and hemodynamic stability:   Monitor blood pressure and heart rate   Administer fluid and/or volume expanders as ordered  Taken 9/8/2022 0800  Maintains optimal cardiac output and hemodynamic stability:   Monitor blood pressure and heart rate   Assess for signs of decreased cardiac output  9/8/2022 0527 by Marixa Lyn RN  Outcome: Progressing  Goal: Absence of cardiac dysrhythmias or at baseline  9/8/2022 1648 by Caroline Madden RN  Outcome: Progressing  Flowsheets  Taken 9/8/2022 1648  Absence of cardiac dysrhythmias or at baseline: Monitor cardiac rate and rhythm  Taken 9/8/2022 0800  Absence of cardiac dysrhythmias or at baseline:   Monitor cardiac rate and rhythm   Administer antiarrhythmia medication and electrolyte replacement as ordered  9/8/2022 0527 by Marixa Lyn RN  Outcome: Progressing     Problem: Metabolic/Fluid and Electrolytes - Adult  Goal: Electrolytes maintained within normal limits  9/8/2022 1648 by Alexsander Osman RN  Outcome: Progressing  Flowsheets (Taken 9/8/2022 0800)  Electrolytes maintained within normal limits: Monitor labs and assess patient for signs and symptoms of electrolyte imbalances  9/8/2022 0527 by Oleg Pierce RN  Outcome: Progressing  Flowsheets (Taken 9/7/2022 2000)  Electrolytes maintained within normal limits: Monitor labs and assess patient for signs and symptoms of electrolyte imbalances  Goal: Hemodynamic stability and optimal renal function maintained  9/8/2022 1648 by Alexsander Osman RN  Outcome: Progressing  Flowsheets (Taken 9/8/2022 0800)  Hemodynamic stability and optimal renal function maintained: Monitor labs and assess for signs and symptoms of volume excess or deficit  9/8/2022 0527 by Oleg Pierce RN  Outcome: Progressing  Flowsheets (Taken 9/7/2022 2000)  Hemodynamic stability and optimal renal function maintained: Monitor labs and assess for signs and symptoms of volume excess or deficit

## 2022-09-08 NOTE — PROGRESS NOTES
Hospitalist Progress Note      PCP: Idalia Pagan MD    Date of Admission: 9/1/2022    Chief Complaint: syncope    Hospital Course:  H& P reviewed       Subjective:     Pt remains intubated and on vent. Unable provide ROS        Medications:  Reviewed    Infusion Medications    fentaNYL 50 mcg/hr (09/08/22 1211)    dexmedetomidine (PRECEDEX) IV infusion Stopped (09/08/22 1300)    sodium chloride      propofol Stopped (09/08/22 1130)     Scheduled Medications    famotidine (PEPCID) injection  20 mg IntraVENous BID    levETIRAcetam  1,500 mg IntraVENous Q12H    enoxaparin  40 mg SubCUTAneous Daily    lidocaine PF  5 mL IntraDERmal Once    sodium chloride flush  5-40 mL IntraVENous 2 times per day    thiamine  100 mg IntraVENous Daily     PRN Meds: sodium chloride flush, sodium chloride, ondansetron **OR** ondansetron, polyethylene glycol, acetaminophen **OR** acetaminophen      Intake/Output Summary (Last 24 hours) at 9/8/2022 1432  Last data filed at 9/8/2022 1000  Gross per 24 hour   Intake 4272.06 ml   Output 1905 ml   Net 2367.06 ml         Physical Exam Performed:    /69   Pulse (!) 49   Temp 97.5 °F (36.4 °C) (Oral)   Resp 14   Ht 5' 10\" (1.778 m)   Wt 181 lb (82.1 kg)   SpO2 95%   BMI 25.97 kg/m²     General appearance:  intubated and sedated   HEENT: Pupils equal, round, Conjunctivae/corneas clear. Neck: Supple, with full range of motion. No jugular venous distention. Trachea midline. Respiratory:   coarse vent sounds anteriorly   Cardiovascular: Regular rate and rhythm with normal S1/S2 without murmurs, rubs or gallops. Abdomen: Soft, non-tender, non-distended with normal bowel sounds. Musculoskeletal: No clubbing, cyanosis or edema bilaterally. Skin: cold,  no overt lesion on exposed skin.    Neurologic:  sedated          Labs:   Recent Labs     09/06/22  0427 09/07/22  0418 09/08/22  0339   WBC 4.5 5.1 8.8   HGB 13.5 13.2* 13.4*   HCT 40.1* 38.6* 39.1*   PLT 90* 100* 144       Recent Labs     09/06/22  0427 09/07/22  0418 09/08/22  0339    137 135*   K 3.4* 4.0 5.0    104 102   CO2 23 24 23   BUN 6* 8 10   CREATININE 0.5* <0.5* 0.5*   CALCIUM 8.3 8.3 8.4   PHOS 2.6 3.1 3.6       No results for input(s): AST, ALT, BILIDIR, BILITOT, ALKPHOS in the last 72 hours. No results for input(s): INR in the last 72 hours. No results for input(s): Donzella Rands in the last 72 hours. Urinalysis:      Lab Results   Component Value Date/Time    NITRU Negative 09/01/2022 05:09 PM    WBCUA 0-2 09/01/2022 05:09 PM    BACTERIA Rare 09/01/2022 05:09 PM    RBCUA 0-2 09/01/2022 05:09 PM    BLOODU MODERATE 09/01/2022 05:09 PM    SPECGRAV >=1.030 09/01/2022 05:09 PM    GLUCOSEU Negative 09/01/2022 05:09 PM       Radiology:  MRI BRAIN WO CONTRAST   Final Result      Faint diffusion restriction in the right hippocampus with associated FLAIR signal abnormality. This is favored to represent sequelae of seizures, however other infectious/inflammatory etiologies are also possible. Mild atrophy and chronic small vessel ischemic change. Areas of encephalomalacia and gliosis in both cerebral hemispheres with remote hemorrhagic staining from prior insults. IR LUMBAR PUNCTURE FOR DIAGNOSIS   Final Result   . IMPRESSION:   1. Uneventful diagnostic fluoroscopic guided lumbar puncture as the   2. The flow reversed are risk and therefore a closing pressure was obtained at the end of the procedure, 26 cm of water. XR ABDOMEN (KUB) (SINGLE AP VIEW)   Final Result   Findings/Impression:    The tip of the enteric tube terminates in the distal body of the stomach. CT CHEST WO CONTRAST   Final Result      CHEST:      1. Moderate dependent atelectasis bilaterally. ABDOMEN/PELVIS:      1.  No acute abnormality on noncontrast CT of the abdomen and pelvis. 2.  Cholelithiasis. CT ABDOMEN PELVIS WO CONTRAST Additional Contrast? None   Final Result      CHEST:      1.   Moderate dependent atelectasis bilaterally. ABDOMEN/PELVIS:      1.  No acute abnormality on noncontrast CT of the abdomen and pelvis. 2.  Cholelithiasis. CTA HEAD NECK W CONTRAST   Final Result      CTA Head:   No acute CTA findings. No hemodynamically significant stenosis or focal aneurysm. No arteriovenous confirmation. CTA Neck:   No acute CTA findings. No hemodynamically significant stenosis or focal aneurysm. CT HEAD WO CONTRAST   Final Result      1. No findings for acute intracranial abnormality. 2.  Age-related atrophy with patchy periventricular white matter changes bilaterally consistent with chronic small vessel ischemia. 3.  Stable low attenuation left frontoparietal lobe consistent with previous insult from known prior intraparenchymal hematoma. Stable right frontoparietal cortical infarct. These findings were called to the emergency room physician Dr. Marina Lanza on 9/1/2022 at 5:30 p.m. XR CHEST PORTABLE   Final Result   1. No findings for acute cardiopulmonary disease. 2.  ET tube in good position in the mid trachea. Assessment/Plan:    Active Hospital Problems    Diagnosis     Acute respiratory failure ([address] [J96.00]      Priority: Medium    Seizure ([address] [R56.9]      Priority: Medium             Acute encephalopathy:  most likely secondary to seizure. Intubated in ER. Vent management per pulmonary/  critical care. Neurology consulted and following. S/p LP. EEG and MRI reviewed. On Keppra. Acute respiratory failure with hypoxia:  on mech ventilation. Vent management per pulmonary critical care      Elevated troponin:  cardiology consulted and following- recommending conservative management. History of alcohol abuse continue thiamine. SIRS:  unclear focus of infection. On empiric antibiotics and acyclovir. Blood cultures with no growth so far. CSF culture pending      Hypomagnesemia: replace     Hypothermia: cause unclear.  TSH was abnormal. Shila berter in place. Abnormal TFT: TSH 5.16, FT4 was normal. Possibly subclinical hypothyroidism from acute illness. Repeat TFT in 4-6 weeks. DVT Prophylaxis: lovenox     Diet: Diet NPO  ADULT TUBE FEEDING; Orogastric; Peptide Based High Protein; Continuous; 10; Yes; 10; Q 4 hours; 75; 30; Q 4 hours  Code Status: Full Code      Dispo - continue in ICU. Hard to estimate length of stay- pending clinical course.                Heydi Byrne MD

## 2022-09-08 NOTE — PROGRESS NOTES
ICU Progress Note    Admit Date: 9/1/2022  Day: 6  Vent Day: 6  IV Access: Peripheral  IV Fluids: None  Vasopressors: None                Antibiotics: None  Diet: Diet NPO  ADULT TUBE FEEDING; Orogastric; Peptide Based High Protein; Continuous; 10; Yes; 10; Q 4 hours; 75; 30; Q 4 hours    CC: AMS    Interval history:   No acute events overnight. Nurse reports patient was increasingly agitated, attempting to self-extubate while weaning sedation. Agitation persisted despite returning sedatives to previous levels. HPI:   The patient is a 77-year-old man with past medical history significant for chronic alcoholism, ICH, CAD, DVT who presented today to the ED with an episode of syncope. The history was mainly obtained by the sister since patient was intubated. He was last known well at 9 AM but the sister. According to the sister, she had just returned home from work which she had found him half lying down on the couch covered in the stool while his car outside was running outside with occasion. She denies any complaints of recent fever, chills, chest pain cough, shortness of breath, lightheadedness, palpitations, nausea, vomiting, abdominal pain, diarrhea, fatigue, visual disturbance, changes in urination frequency or burning pain well urination or headaches by the patient in the preceding days to this incident. She does reinstate that her brother is a chronic alcoholic and had just gotten out of rehab. He has been sober since however she was not aware if he had recently had any alcoholic drink. He had also informed that he had recent episode of intracerebral hemorrhage on 9/4/2021. In the ED, there was a concern for possibility of a stroke however he was not considered a thrombolytic candidate because of her previous ICH and long time since his last known well. According to the ED staff his physical exam was more pertinent for nonfocal delirium.   It was also significant for nystagmus and rightward gaze not fixed. He was given 2 mg of Versed and it had worsened his oxygen saturations that had dropped to 88% on a nonrebreather and therefore he was intubated. CT Abdomen, chest: Moderate dependent atelectasis bilaterally. No acute abnormality on noncontrast CT of the abdomen and pelvis. Cholelithiasis. CTA Head: No acute CTA findings. No hemodynamically significant stenosis or focal aneurysm. No arteriovenous confirmation. CTA Neck: No acute CTA findings. No hemodynamically significant stenosis or focal aneurysm. Patient was admitted to the ICU for further workup and management of his possible meningitis, on MV.      ROS:  Patient mechanically ventilated, unable to assess.     Medications:     Scheduled Meds:   famotidine (PEPCID) injection  20 mg IntraVENous BID    levETIRAcetam  1,500 mg IntraVENous Q12H    enoxaparin  40 mg SubCUTAneous Daily    lidocaine PF  5 mL IntraDERmal Once    sodium chloride flush  5-40 mL IntraVENous 2 times per day    thiamine  100 mg IntraVENous Daily     Continuous Infusions:   fentaNYL 150 mcg/hr (09/08/22 0034)    dexmedetomidine (PRECEDEX) IV infusion 1.5 mcg/kg/hr (09/07/22 2323)    sodium chloride      propofol 50 mcg/kg/min (09/08/22 0212)    norepinephrine Stopped (09/05/22 1017)     PRN Meds:sodium chloride flush, sodium chloride, ondansetron **OR** ondansetron, polyethylene glycol, acetaminophen **OR** acetaminophen    Objective:   Vitals:   T-max:  Patient Vitals for the past 8 hrs:   BP Temp Temp src Pulse Resp SpO2   09/08/22 0200 (!) 171/95 -- -- 93 14 95 %   09/08/22 0126 -- -- -- (!) 103 24 97 %   09/08/22 0109 -- -- -- 74 14 95 %   09/08/22 0000 (!) 173/92 99.1 °F (37.3 °C) Esophageal 100 22 96 %   09/07/22 2300 (!) 149/86 -- -- 84 14 95 %   09/07/22 2200 (!) 169/74 -- -- 82 14 95 %   09/07/22 2131 -- -- -- (!) 116 18 97 %   09/07/22 2100 132/82 -- -- (!) 105 21 96 %   09/07/22 2000 (!) 158/101 99.3 °F (37.4 °C) Esophageal (!) 109 22 95 %   09/07/22 1944 -- -- -- 76 16 --   09/07/22 1900 130/72 -- -- 88 17 --       Intake/Output Summary (Last 24 hours) at 9/8/2022 0242  Last data filed at 9/7/2022 2208  Gross per 24 hour   Intake 4328.7 ml   Output 2100 ml   Net 2228.7 ml         Physical Exam:  CONSTITUTIONAL:  awake, alert, cooperative, no apparent distress, and appears stated age  EYES: Pupils equal round and reactive to light. Normal conjunctiva  EARS, NOSE, MOUTH & THROAT: Normal oropharynx. Ears and nose appear normal  NECK:  Supple, symmetrical, trachea midline, no adenopathy, thyroid symmetric, not enlarged and no tenderness, skin normal  LUNGS:  Intubated and sedated. Rhonchi, coarse breath sounds in lungs bilaterally. CARDIOVASCULAR:  normal S1 and S2, no edema and no JVD  ABDOMEN:  normal bowel sounds, non-distended and no masses palpated, and no tenderness to palpation. No hepatospleenomegaly  LYMPHADENOPATHY:  no axillary or supraclavicular adenopathy. No cervical adnenopathy  PSYCHIATRIC: Oriented to person place and time. No obvious depression or anxiety. MUSCULOSKELETAL: No obvious misalignment or effusion of the joints. No clubbing, cyanosis of the digits. SKIN:  normal skin color, texture, turgor and no redness, warmth, or swelling. No palpable nodules    LABS:    CBC:   Recent Labs     09/05/22  0605 09/06/22 0427 09/07/22 0418   WBC 5.1 4.5 5.1   HGB 13.1* 13.5 13.2*   HCT 39.0* 40.1* 38.6*   PLT 85* 90* 100*   MCV 94.9 94.4 93.3     Renal:    Recent Labs     09/05/22 0541 09/06/22 0427 09/07/22 0418    139 137   K 3.9 3.4* 4.0    105 104   CO2 22 23 24   BUN 5* 6* 8   CREATININE <0.5* 0.5* <0.5*   GLUCOSE 99 138* 126*   CALCIUM 7.4* 8.3 8.3   MG 1.70* 2.00 1.70*   PHOS 2.9 2.6 3.1   ANIONGAP 10 11 9     Hepatic:   Recent Labs     09/05/22  0541 09/06/22 0427 09/07/22  0418   LABALBU 2.4* 2.9* 2.8*     Troponin: No results for input(s): TROPONINI in the last 72 hours. BNP: No results for input(s): BNP in the last 72 hours.   Lipids: No results for input(s): CHOL, HDL in the last 72 hours. Invalid input(s): LDLCALCU, TRIGLYCERIDE  ABGs:    Recent Labs     09/05/22  1515   PHART 7.432   RZA0RYR 39.0   PO2ART 79.1   AXQ3CFO 26   BEART 1.6   [id number] 96   WOQ0NVF 27       INR: No results for input(s): INR in the last 72 hours. Lactate: No results for input(s): LACTATE in the last 72 hours. Cultures:  -----------------------------------------------------------------  RAD:   MRI BRAIN WO CONTRAST   Final Result      Faint diffusion restriction in the right hippocampus with associated FLAIR signal abnormality. This is favored to represent sequelae of seizures, however other infectious/inflammatory etiologies are also possible. Mild atrophy and chronic small vessel ischemic change. Areas of encephalomalacia and gliosis in both cerebral hemispheres with remote hemorrhagic staining from prior insults. IR LUMBAR PUNCTURE FOR DIAGNOSIS   Final Result   . IMPRESSION:   1. Uneventful diagnostic fluoroscopic guided lumbar puncture as the   2. The flow reversed are risk and therefore a closing pressure was obtained at the end of the procedure, 26 cm of water. XR ABDOMEN (KUB) (SINGLE AP VIEW)   Final Result   Findings/Impression:    The tip of the enteric tube terminates in the distal body of the stomach. CT CHEST WO CONTRAST   Final Result      CHEST:      1. Moderate dependent atelectasis bilaterally. ABDOMEN/PELVIS:      1.  No acute abnormality on noncontrast CT of the abdomen and pelvis. 2.  Cholelithiasis. CT ABDOMEN PELVIS WO CONTRAST Additional Contrast? None   Final Result      CHEST:      1. Moderate dependent atelectasis bilaterally. ABDOMEN/PELVIS:      1.  No acute abnormality on noncontrast CT of the abdomen and pelvis. 2.  Cholelithiasis. CTA HEAD NECK W CONTRAST   Final Result      CTA Head:   No acute CTA findings. No hemodynamically significant stenosis or focal aneurysm.   No arteriovenous confirmation. CTA Neck:   No acute CTA findings. No hemodynamically significant stenosis or focal aneurysm. CT HEAD WO CONTRAST   Final Result      1. No findings for acute intracranial abnormality. 2.  Age-related atrophy with patchy periventricular white matter changes bilaterally consistent with chronic small vessel ischemia. 3.  Stable low attenuation left frontoparietal lobe consistent with previous insult from known prior intraparenchymal hematoma. Stable right frontoparietal cortical infarct. These findings were called to the emergency room physician Dr. Crystal Umana on 9/1/2022 at 5:30 p.m. XR CHEST PORTABLE   Final Result   1. No findings for acute cardiopulmonary disease. 2.  ET tube in good position in the mid trachea. Assessment/Plan:   Izzy De La Rosa is a 57 y.o. male with a PMH of alcohol abuse, CAD, CHF, essential tremor, HTN,  who was admitted with AMS. Neuro/Sedation:  Patient sedated  #Acute Metabolic Encephalopathy 2/2 possible meningitis vs encephalitis induced seizure   On presentation AMS, elevated WBC: 21.7, Tachypneic , Tachycardic, Hypotensive. Hx of ICH, SAH and SDH 2/2 to fall, hx of chronic alcohol abuse,   CT head: Stable low attenuation left frontoparietal lobe consistent with previous insult from known prior intraparenchymal hematoma. Stable right frontoparietal cortical infarct. CT Abdomen, chest: Moderate dependent atelectasis bilaterally. No acute abnormality on noncontrast CT of the abdomen and pelvis. Cholelithiasis. CTA Head: No acute CTA findings. No hemodynamically significant stenosis or focal aneurysm. No arteriovenous confirmation. CTA Neck: No acute CTA findings. No hemodynamically significant stenosis or focal aneurysm.   -Off Levophed  -On broad spectrum antibiotics Vancomycin, ampicillin and ceftriaxone, acyclovir  -Procal  trending down 0.27 -> 0.19  -blood cultures ordered (9/2): No growth  -cEEG currently on readings: Generalized background abnormalities indicative of an underlying diffuse encephalopathy of non-specific etiology. Intermittent focal epileptiform discharges, right posterior temporal, conferring increased risk of focal onset seizures from this region.  -On Keppra 1500 bid  -Consulted Neurology: appreciate recs  -Lumbar puncture ordered wnl, awaiting culture  -MRI: Faint diffusion restriction in the right hippocampus with associated FLAIR signal abnormality.  This is favored to represent sequelae of seizures, however other infectious/inflammatory etiologies are also possible.   -Sedation holiday planned after MRI      #Alcohol use disorder  -thiamine 100 mg daily  -monitor for withdrawal  -Ethanol level: none detected     CV:  Hemodynamically stable and afebrile  No pressor requirements since 9/6     #CAD with LAD stent  -will start on Aspirin, statin, coreg with corepack and on telemetry  -Cardio on board, recommend conservative cardiac management with     Respiratory:   SpO2 96% on MV  Vent Settings: Vent Mode: AC/PRVC Resp Rate (Set): 14 bmp/Vt (Set, mL): 575 mL/ /FiO2 : 30 % PEEP 5  Recent Labs     09/05/22  1515   PHART 7.432   ZYK3NYQ 39.0   PO2ART 79.1   #Acute hypercapnic respiratory failure secondary to possible aspiration vs 2/2 medication  On presentation: VBG PH:7.264, PCO2: 56.4, 83.2  Latest: PH: 7.432, PCO2: 39, PO2:79 (09/05/2022)  Vent settings:AC/PVR, Rate Set: 14, Rate measured: 14, PIP:19, PEEP:5+ Vt: 575+ FiO2:30  -Patient intubated with fentanyl, precedex and propofol  - SBT planned today     Renal:  Urinary catheter    Intake/Output Summary (Last 24 hours) at 9/8/2022 0735  Last data filed at 9/8/2022 0600  Gross per 24 hour   Intake 3791.06 ml   Output 1475 ml   Net 2316.06 ml     #Isolated proteinuria  -UA: Positive for protein     GI:  Diet: orogastric; Peptide Based; Continuous; 10; Yes; 10; Q 8 hours; 45; 30; Q 4 hours; Protein; twice per day administer via NG, flush 30 ml before and after administration  #Hx of Cirrhosis       -Ammonia:36 wnl(09/1/22)     Hematology:  #Thrombocytopenia  Plt: 144k, improving  Will continue monitoring     Skin  Skin breakdown-Left and rt lower Leg excoriations    Code Status: Full Code  FEN: Orogastric; Peptide-based; Continuous; 10; Yes; 10; Q8 hours; 45; 30; Q4 hours; Protein; Twice per day administer via NG, flush 30 mL before and after administration  PPX: Enoxaparin sc  DISPO: ICU  ELOS: 4 days ICU  Barriers to discharge: Intubated    Selin Joseph MD, PGY-1  09/08/22  2:42 AM    This patient has been staffed and discussed with Dr. Dayanna Lane. Pulm/CC     Patient was seen, examined and discussed with Dr. Drusilla Eisenmenger. I agree with the interval history. My physical exam confirms the findings listed below  Chart was reviewed including labs, CXR, CT scan and medical records confirm the findings noted    This morning the patient was heavily sedated and did not breathe on PSV 8/5. With reduction in propofol and fentanyl patient was able to complete an SBT but does not wake up and has a poor cough     MRI brain  Impression       Faint diffusion restriction in the right hippocampus with associated FLAIR signal abnormality. This is favored to represent sequelae of seizures, however other infectious/inflammatory etiologies are also possible. Mild atrophy and chronic small vessel ischemic change. Areas of encephalomalacia and gliosis in both cerebral hemispheres with remote hemorrhagic staining from prior insults. EEG:  CLINICAL INTERPRETATION: This is an abnormal vide2o EEG study  1. Generalized background abnormalities indicative of an underlying diffuse encephalopathy of non-specific etiology   2. Intermittent focal epileptiform discharges, right posterior temporal, conferring increased risk of focal onset seizures from this region. 3.  No seizures. No clinical events.      LP  WBC 2  RBC 1  Glucose 67  Protein 30  Meningitis panel: None detected  CSF culture: Gram stain negative. Culture no growth to date     Assessment  Acute encephalopathy -unclear etiology  Acute respiratory failure on mechanical vent support. , RR 14, FiO2 30, PEEP 5. EEG with generalized discharges that may be associated with seizures  Leukocytosis - resolved   Mildly elevated troponin. Likely due to demand ischemia. EKG with septal Q waves and T inv laterally   Hypotension requiring levophed: Resolved  Hx of ICH  Hx of alcohol use         Plan  Will not extubate given poor mental status and question ability to protect airway  Continue Precedex and fentanyl predominantly for sedation with propofol used if needed  Daily SAT/SBT  Continue TF  Continue Keppra  Neurology following     Discussed with neurology     Pt has a high probability of imminent or life-threatening deterioration requiring close monitoring, and highly complex decision-making and/or interventions of high intensity to assess, manipulate, and support his critical organ systems to prevent a likely inevitable decline which could occur if left untreated. A total critical care time 32 minutes was used. This includes but not limited to examining patient, collaborating with other physicians, monitoring vital signs, telemetry, continuous pulse oximetry, and clinical response to IV medications, documentation time, review and interpretation of laboratory and radiological data, review of nursing notes and old record review.  This time excludes any time that may have been spent performing procedures for life threatening organ failure     Cr Ramey MD

## 2022-09-08 NOTE — PROGRESS NOTES
CONTINUOUS VIDEO EEG MONITORING    DATE OF SERVICE:  9/7/2022 08:00 TO 9/8/2022 11:50    NAME: Galileo Powell   YOB: 1960  SEX: male  MEDICAL RECORD MNADIZ:[id number]    EEG-CCTV study:   The patient is a 57 y.o.y old male monitored due to concern for seizures. EEG-VIDEO MONITORING METHODOLOGY:   Time-locked EEG-video monitoring was performed     Analyses of the monitoring data were performed using the following techniques:   1. Review of the relevant EEG-video data. 2. Review of events detected by the computer system in detail. 3. Review of clinical seizures, with both detailed review of EEG and video and playback using multiple montages. A variety of referential and bipolar montages were used. CLINICAL AND EEG ANALYSIS:  Video EEG recording was reviewed in real time by a technologist, and then reviewed at least twice a day. Results of the monitoring were relayed to the treating team as needed throughout the study, via verbal or written documentation on the patient chart. 9/7/2022-9/8/2022      Seizures - none  Push buttons - none  Background - Continuous, diffuse, low amplitude mixed frequencies. Intermittent frontally predominant sharply contoured delta. Intermittent focal sharp waves in the right posterior temporal.      CLINICAL INTERPRETATION: This is an abnormal vide2o EEG study  1. Generalized background abnormalities indicative of an underlying diffuse encephalopathy of non-specific etiology   2. Intermittent focal epileptiform discharges, right posterior temporal, conferring increased risk of focal onset seizures from this region. 3.  No seizures. No clinical events.

## 2022-09-08 NOTE — PLAN OF CARE
Problem: Safety - Medical Restraint  Goal: Remains free of injury from restraints (Restraint for Interference with Medical Device)  Description: INTERVENTIONS:  1. Determine that other, less restrictive measures have been tried or would not be effective before applying the restraint  2. Evaluate the patient's condition at the time of restraint application  3. Inform patient/family regarding the reason for restraint  4.  Q2H: Monitor safety, psychosocial status, comfort, nutrition and hydration  9/8/2022 1427 by Lucille Hackett RN  Outcome: Progressing  Flowsheets  Taken 9/8/2022 1400 by Josep Lofton RN  Remains free of injury from restraints (restraint for interference with medical device): Determine that other, less restrictive measures have been tried or would not be effective before applying the restraint

## 2022-09-08 NOTE — PROGRESS NOTES
CONTINUOUS EEG    Name:  [facility] Record Number:  [id number]  Age: 57 y.o. Gender: male  : 1960  Today's Date:  2022  Room:  [address] [place] [address]  Vital Signs   /67   Pulse 52   Temp 97.5 °F (36.4 °C) (Oral)   Resp 15   Ht 5' 10\" (1.778 m)   Wt 181 lb (82.1 kg)   SpO2 93%   BMI 25.97 kg/m²           Continuous EEG Testing Start Time:      Continuous EEG Testing End Time:   11:55 AM/    Comments: Per Cesar Matute, pt's test has completed. Corticort contacted via team viewer. Plan of Care: All leads were removed pt's scalp cleansed.     Electronically signed by John Mcclellan on 2022 at 12:18 PM

## 2022-09-09 ENCOUNTER — APPOINTMENT (OUTPATIENT)
Dept: GENERAL RADIOLOGY | Age: 62
DRG: 130 | End: 2022-09-09
Payer: MEDICAID

## 2022-09-09 LAB
ALBUMIN SERPL-MCNC: 3.4 G/DL (ref 3.4–5)
ANION GAP SERPL CALCULATED.3IONS-SCNC: 12 MMOL/L (ref 3–16)
BASOPHILS ABSOLUTE: 0.1 K/UL (ref 0–0.2)
BASOPHILS RELATIVE PERCENT: 0.8 %
BUN BLDV-MCNC: 11 MG/DL (ref 7–20)
CALCIUM SERPL-MCNC: 8.7 MG/DL (ref 8.3–10.6)
CHLORIDE BLD-SCNC: 101 MMOL/L (ref 99–110)
CO2: 22 MMOL/L (ref 21–32)
CREAT SERPL-MCNC: <0.5 MG/DL (ref 0.8–1.3)
EOSINOPHILS ABSOLUTE: 0.1 K/UL (ref 0–0.6)
EOSINOPHILS RELATIVE PERCENT: 0.8 %
GFR AFRICAN AMERICAN: >60
GFR NON-AFRICAN AMERICAN: >60
GLUCOSE BLD-MCNC: 128 MG/DL (ref 70–99)
HCT VFR BLD CALC: 38.9 % (ref 40.5–52.5)
HEMOGLOBIN: 13.5 G/DL (ref 13.5–17.5)
LYMPHOCYTES ABSOLUTE: 1.4 K/UL (ref 1–5.1)
LYMPHOCYTES RELATIVE PERCENT: 14.8 %
MAGNESIUM: 1.8 MG/DL (ref 1.8–2.4)
MCH RBC QN AUTO: 32.4 PG (ref 26–34)
MCHC RBC AUTO-ENTMCNC: 34.7 G/DL (ref 31–36)
MCV RBC AUTO: 93.3 FL (ref 80–100)
MONOCYTES ABSOLUTE: 1.1 K/UL (ref 0–1.3)
MONOCYTES RELATIVE PERCENT: 11.1 %
NEUTROPHILS ABSOLUTE: 7 K/UL (ref 1.7–7.7)
NEUTROPHILS RELATIVE PERCENT: 72.5 %
PDW BLD-RTO: 13.5 % (ref 12.4–15.4)
PHOSPHORUS: 2.9 MG/DL (ref 2.5–4.9)
PLATELET # BLD: 135 K/UL (ref 135–450)
PMV BLD AUTO: 9.3 FL (ref 5–10.5)
POTASSIUM SERPL-SCNC: 4 MMOL/L (ref 3.5–5.1)
RBC # BLD: 4.17 M/UL (ref 4.2–5.9)
SODIUM BLD-SCNC: 135 MMOL/L (ref 136–145)
WBC # BLD: 9.7 K/UL (ref 4–11)

## 2022-09-09 PROCEDURE — A4216 STERILE WATER/SALINE, 10 ML: HCPCS | Performed by: STUDENT IN AN ORGANIZED HEALTH CARE EDUCATION/TRAINING PROGRAM

## 2022-09-09 PROCEDURE — 6360000002 HC RX W HCPCS: Performed by: STUDENT IN AN ORGANIZED HEALTH CARE EDUCATION/TRAINING PROGRAM

## 2022-09-09 PROCEDURE — 2500000003 HC RX 250 WO HCPCS: Performed by: STUDENT IN AN ORGANIZED HEALTH CARE EDUCATION/TRAINING PROGRAM

## 2022-09-09 PROCEDURE — 36415 COLL VENOUS BLD VENIPUNCTURE: CPT

## 2022-09-09 PROCEDURE — 2580000003 HC RX 258: Performed by: STUDENT IN AN ORGANIZED HEALTH CARE EDUCATION/TRAINING PROGRAM

## 2022-09-09 PROCEDURE — 2000000000 HC ICU R&B

## 2022-09-09 PROCEDURE — 2580000003 HC RX 258: Performed by: NURSE PRACTITIONER

## 2022-09-09 PROCEDURE — 71045 X-RAY EXAM CHEST 1 VIEW: CPT

## 2022-09-09 PROCEDURE — 94761 N-INVAS EAR/PLS OXIMETRY MLT: CPT

## 2022-09-09 PROCEDURE — 99233 SBSQ HOSP IP/OBS HIGH 50: CPT | Performed by: NURSE PRACTITIONER

## 2022-09-09 PROCEDURE — 80069 RENAL FUNCTION PANEL: CPT

## 2022-09-09 PROCEDURE — 99291 CRITICAL CARE FIRST HOUR: CPT | Performed by: INTERNAL MEDICINE

## 2022-09-09 PROCEDURE — 94003 VENT MGMT INPAT SUBQ DAY: CPT

## 2022-09-09 PROCEDURE — 6360000002 HC RX W HCPCS: Performed by: NURSE PRACTITIONER

## 2022-09-09 PROCEDURE — 83735 ASSAY OF MAGNESIUM: CPT

## 2022-09-09 PROCEDURE — 6360000002 HC RX W HCPCS

## 2022-09-09 PROCEDURE — 2700000000 HC OXYGEN THERAPY PER DAY

## 2022-09-09 PROCEDURE — 6360000002 HC RX W HCPCS: Performed by: INTERNAL MEDICINE

## 2022-09-09 PROCEDURE — 85025 COMPLETE CBC W/AUTO DIFF WBC: CPT

## 2022-09-09 RX ORDER — FUROSEMIDE 10 MG/ML
40 INJECTION INTRAMUSCULAR; INTRAVENOUS ONCE
Status: COMPLETED | OUTPATIENT
Start: 2022-09-09 | End: 2022-09-09

## 2022-09-09 RX ADMIN — SODIUM CHLORIDE, PRESERVATIVE FREE 10 ML: 5 INJECTION INTRAVENOUS at 09:37

## 2022-09-09 RX ADMIN — DEXMEDETOMIDINE 1.5 MCG/KG/HR: 100 INJECTION, SOLUTION INTRAVENOUS at 10:22

## 2022-09-09 RX ADMIN — SODIUM CHLORIDE, PRESERVATIVE FREE 20 MG: 5 INJECTION INTRAVENOUS at 09:33

## 2022-09-09 RX ADMIN — ENOXAPARIN SODIUM 40 MG: 100 INJECTION SUBCUTANEOUS at 09:32

## 2022-09-09 RX ADMIN — DEXMEDETOMIDINE 1.2 MCG/KG/HR: 100 INJECTION, SOLUTION INTRAVENOUS at 00:41

## 2022-09-09 RX ADMIN — Medication 125 MCG/HR: at 17:09

## 2022-09-09 RX ADMIN — DEXMEDETOMIDINE 1.5 MCG/KG/HR: 100 INJECTION, SOLUTION INTRAVENOUS at 17:04

## 2022-09-09 RX ADMIN — DEXMEDETOMIDINE 1.5 MCG/KG/HR: 100 INJECTION, SOLUTION INTRAVENOUS at 06:47

## 2022-09-09 RX ADMIN — DEXMEDETOMIDINE 1.5 MCG/KG/HR: 100 INJECTION, SOLUTION INTRAVENOUS at 14:07

## 2022-09-09 RX ADMIN — FUROSEMIDE 40 MG: 10 INJECTION, SOLUTION INTRAMUSCULAR; INTRAVENOUS at 12:05

## 2022-09-09 RX ADMIN — DEXMEDETOMIDINE 1.5 MCG/KG/HR: 100 INJECTION, SOLUTION INTRAVENOUS at 04:00

## 2022-09-09 RX ADMIN — SODIUM CHLORIDE, PRESERVATIVE FREE 10 ML: 5 INJECTION INTRAVENOUS at 20:35

## 2022-09-09 RX ADMIN — PROPOFOL 10 MCG/KG/MIN: 10 INJECTION, EMULSION INTRAVENOUS at 20:35

## 2022-09-09 RX ADMIN — DEXMEDETOMIDINE 1.5 MCG/KG/HR: 100 INJECTION, SOLUTION INTRAVENOUS at 19:53

## 2022-09-09 RX ADMIN — DEXMEDETOMIDINE 1.5 MCG/KG/HR: 100 INJECTION, SOLUTION INTRAVENOUS at 22:53

## 2022-09-09 RX ADMIN — LEVETIRACETAM 1500 MG: 100 INJECTION, SOLUTION, CONCENTRATE INTRAVENOUS at 01:43

## 2022-09-09 RX ADMIN — LEVETIRACETAM 1500 MG: 100 INJECTION, SOLUTION, CONCENTRATE INTRAVENOUS at 14:15

## 2022-09-09 RX ADMIN — SODIUM CHLORIDE, PRESERVATIVE FREE 20 MG: 5 INJECTION INTRAVENOUS at 20:36

## 2022-09-09 RX ADMIN — FUROSEMIDE 40 MG: 10 INJECTION, SOLUTION INTRAMUSCULAR; INTRAVENOUS at 18:37

## 2022-09-09 RX ADMIN — THIAMINE HYDROCHLORIDE 100 MG: 100 INJECTION, SOLUTION INTRAMUSCULAR; INTRAVENOUS at 09:32

## 2022-09-09 ASSESSMENT — PULMONARY FUNCTION TESTS
PIF_VALUE: 24
PIF_VALUE: 23
PIF_VALUE: 21
PIF_VALUE: 32
PIF_VALUE: 22
PIF_VALUE: 23
PIF_VALUE: 21
PIF_VALUE: 28
PIF_VALUE: 28
PIF_VALUE: 29
PIF_VALUE: 23
PIF_VALUE: 19
PIF_VALUE: 13
PIF_VALUE: 20
PIF_VALUE: 29
PIF_VALUE: 30
PIF_VALUE: 36
PIF_VALUE: 20
PIF_VALUE: 26
PIF_VALUE: 28
PIF_VALUE: 24
PIF_VALUE: 25
PIF_VALUE: 27
PIF_VALUE: 24
PIF_VALUE: 39

## 2022-09-09 ASSESSMENT — PAIN SCALES - GENERAL: PAINLEVEL_OUTOF10: 0

## 2022-09-09 NOTE — PROGRESS NOTES
Bedside report received from [facility]. Pt resting in bed with fentanyl at 50 mcg/hr and precedex at 1.5 mcg/kg/hr. Plan is to wean fentanyl and extubate today.

## 2022-09-09 NOTE — PROGRESS NOTES
ICU Progress Note    Admit Date: 9/1/2022  Day: 7  Vent Day: 7  IV Access: Peripheral  Vasopressors: None                Antibiotics: None  Diet: Diet NPO  ADULT TUBE FEEDING; Orogastric; Peptide Based High Protein; Continuous; 10; Yes; 10; Q 4 hours; 75; 30; Q 4 hours    CC: AMS    Interval history:   No acute events overnight. Patient off sedation this morning, following commands and moving all extremities. HPI:   The patient is a 59-year-old man with past medical history significant for chronic alcoholism, ICH, CAD, DVT who presented today to the ED with an episode of syncope. The history was mainly obtained by the sister since patient was intubated. He was last known well at 9 AM but the sister. According to the sister, she had just returned home from work which she had found him half lying down on the couch covered in the stool while his car outside was running outside with occasion. She denies any complaints of recent fever, chills, chest pain cough, shortness of breath, lightheadedness, palpitations, nausea, vomiting, abdominal pain, diarrhea, fatigue, visual disturbance, changes in urination frequency or burning pain well urination or headaches by the patient in the preceding days to this incident. She does reinstate that her brother is a chronic alcoholic and had just gotten out of rehab. He has been sober since however she was not aware if he had recently had any alcoholic drink. He had also informed that he had recent episode of intracerebral hemorrhage on 9/4/2021. In the ED, there was a concern for possibility of a stroke however he was not considered a thrombolytic candidate because of her previous ICH and long time since his last known well. According to the ED staff his physical exam was more pertinent for nonfocal delirium. It was also significant for nystagmus and rightward gaze not fixed.   He was given 2 mg of Versed and it had worsened his oxygen saturations that had dropped to 88% PO2ART 68.0*   [id number] 26.7   BEART 2   [id number] 94   [id number] 28       INR: No results for input(s): INR in the last 72 hours. Lactate: No results for input(s): LACTATE in the last 72 hours. Cultures:  -----------------------------------------------------------------  RAD:   XR CHEST PORTABLE   Final Result   1. Worsened pulmonary edema. 2.  Persistent bibasilar atelectasis. MRI BRAIN WO CONTRAST   Final Result      Faint diffusion restriction in the right hippocampus with associated FLAIR signal abnormality. This is favored to represent sequelae of seizures, however other infectious/inflammatory etiologies are also possible. Mild atrophy and chronic small vessel ischemic change. Areas of encephalomalacia and gliosis in both cerebral hemispheres with remote hemorrhagic staining from prior insults. IR LUMBAR PUNCTURE FOR DIAGNOSIS   Final Result   . IMPRESSION:   1. Uneventful diagnostic fluoroscopic guided lumbar puncture as the   2. The flow reversed are risk and therefore a closing pressure was obtained at the end of the procedure, 26 cm of water. XR ABDOMEN (KUB) (SINGLE AP VIEW)   Final Result   Findings/Impression:    The tip of the enteric tube terminates in the distal body of the stomach. CT CHEST WO CONTRAST   Final Result      CHEST:      1. Moderate dependent atelectasis bilaterally. ABDOMEN/PELVIS:      1.  No acute abnormality on noncontrast CT of the abdomen and pelvis. 2.  Cholelithiasis. CT ABDOMEN PELVIS WO CONTRAST Additional Contrast? None   Final Result      CHEST:      1. Moderate dependent atelectasis bilaterally. ABDOMEN/PELVIS:      1.  No acute abnormality on noncontrast CT of the abdomen and pelvis. 2.  Cholelithiasis. CTA HEAD NECK W CONTRAST   Final Result      CTA Head:   No acute CTA findings. No hemodynamically significant stenosis or focal aneurysm. No arteriovenous confirmation.       CTA Neck:   No acute CTA findings. No hemodynamically significant stenosis or focal aneurysm. CT HEAD WO CONTRAST   Final Result      1. No findings for acute intracranial abnormality. 2.  Age-related atrophy with patchy periventricular white matter changes bilaterally consistent with chronic small vessel ischemia. 3.  Stable low attenuation left frontoparietal lobe consistent with previous insult from known prior intraparenchymal hematoma. Stable right frontoparietal cortical infarct. These findings were called to the emergency room physician Dr. Mavis Hernandez on 9/1/2022 at 5:30 p.m. XR CHEST PORTABLE   Final Result   1. No findings for acute cardiopulmonary disease. 2.  ET tube in good position in the mid trachea. Assessment/Plan:   Niki Waters is a 57 y.o. male with a PMH of alcohol abuse, CAD, CHF, essential tremor, HTN,  who was admitted with AMS. Neuro/Sedation:  Patient off sedation, following commands  #Acute Metabolic Encephalopathy 2/2 possible meningitis vs encephalitis induced seizure   On presentation AMS, elevated WBC: 21.7, Tachypneic , Tachycardic, Hypotensive. Hx of ICH, SAH and SDH 2/2 to fall, hx of chronic alcohol abuse,   CT head: Stable low attenuation left frontoparietal lobe consistent with previous insult from known prior intraparenchymal hematoma. Stable right frontoparietal cortical infarct. CT Abdomen, chest: Moderate dependent atelectasis bilaterally. No acute abnormality on noncontrast CT of the abdomen and pelvis. Cholelithiasis. CTA Head: No acute CTA findings. No hemodynamically significant stenosis or focal aneurysm. No arteriovenous confirmation. CTA Neck: No acute CTA findings. No hemodynamically significant stenosis or focal aneurysm.   -Off Levophed  -On broad spectrum antibiotics Vancomycin, ampicillin and ceftriaxone, acyclovir  -Procal  trending down 0.27 -> 0.19  -blood cultures ordered (9/2): No growth  -cEEG currently on readings: Generalized background abnormalities indicative of an underlying diffuse encephalopathy of non-specific etiology. Intermittent focal epileptiform discharges, right posterior temporal, conferring increased risk of focal onset seizures from this region.  -On Keppra 1500 bid  -Consulted Neurology: appreciate recs  -Lumbar puncture ordered wnl, awaiting culture  -MRI: Faint diffusion restriction in the right hippocampus with associated FLAIR signal abnormality. This is favored to represent sequelae of seizures, however other infectious/inflammatory etiologies are also possible. #Alcohol use disorder  -thiamine 100 mg daily  -monitor for withdrawal  -Ethanol level: none detected    Respiratory:   SpO2 96% on MV  Vent Settings: Vent Mode: AC/PRVC Resp Rate (Set): 14 bmp/Vt (Set, mL): 575 mL/ /FiO2 : 30 % PEEP 5  Recent Labs     09/08/22  1104   PHART 7.427   [id number] 40.5   PO2ART 68.0*   #Acute hypercapnic respiratory failure secondary to possible aspiration vs 2/2 medication  On presentation: VBG PH:7.264, PCO2: 56.4, 83.2  Latest: PH: 7.432, PCO2: 39, PO2:79 (09/05/2022)  Vent settings:AC/PVR, Rate Set: 14, Rate measured: 14, PIP:19, PEEP:5+ Vt: 575+ FiO2:30  -Ventilation wean today unsuccessful    Renal:  External catheter in place    GI:  Tube feeding  #Hx of Cirrhosis       -Ammonia:36 wnl(09/1/22)    Hematology/Oncology: Thrombocytopenia improved from admission, 135k today    Skin  Skin breakdown-Left and rt lower Leg excoriations    Code Status: Full Code  FEN: Orogastric; Peptide-based; Continuous; 10; Yes; 10; Q8 hours; 45; 30; Q4 hours; Protein; Twice per day administer via NG, flush 30 mL before and after administration  PPX: Enoxaparin  DISPO: ICU  ELOS: 4 days ICU  Barriers to discharge: Intubated    Grabiel Hollingsworth MD, PGY-1  09/09/22  4:55 PM    This patient has been staffed and discussed with Dr. Michelle Lao. Pulm/CC     Patient was seen, examined and discussed with Dr. Vivienne Zamarripa.  I agree with the interval history. My physical exam confirms the findings listed below  Chart was reviewed including labs, CXR, CT scan and medical records confirm the findings noted     While mental status is much better he fails his SBT within minutes multiple times today  Sedation is much lower and pt calmer in general  He had a little bit of emesis  CXR shows some pulm edema       Assessment  Acute encephalopathy -unclear etiology but improving  Acute respiratory failure on mechanical vent support. , RR 14, FiO2 30, PEEP 5. EEG with generalized discharges that may be associated with seizures  Leukocytosis - resolved   Mildly elevated troponin. Likely due to demand ischemia. EKG with septal Q waves and T inv laterally   Hypotension requiring levophed: Resolved  Hx of ICH  Hx of alcohol use   Pulmonary edema        Plan  Lasix 40 mg IV x 2 today  Continue Precedex and fentanyl predominantly for sedation with propofol used if needed  Daily SAT/SBT  Hold TF  Continue Keppra  Neurology following     Discussed with neurology     Pt has a high probability of imminent or life-threatening deterioration requiring close monitoring, and highly complex decision-making and/or interventions of high intensity to assess, manipulate, and support his critical organ systems to prevent a likely inevitable decline which could occur if left untreated. A total critical care time 32 minutes was used. This includes but not limited to examining patient, collaborating with other physicians, monitoring vital signs, telemetry, continuous pulse oximetry, and clinical response to IV medications, documentation time, review and interpretation of laboratory and radiological data, review of nursing notes and old record review.  This time excludes any time that may have been spent performing procedures for life threatening organ failure     Lucinda Arreola MD

## 2022-09-09 NOTE — PROGRESS NOTES
Hospitalist Progress Note      PCP: Lynsey Vaughn MD    Date of Admission: 9/1/2022    Chief Complaint: syncope    Hospital Course:  H& P reviewed       Subjective:     Pt remains intubated and sedated. Unable provide ROS        Medications:  Reviewed    Infusion Medications    fentaNYL 25 mcg/hr (09/09/22 1000)    dexmedetomidine (PRECEDEX) IV infusion 1.5 mcg/kg/hr (09/09/22 1022)    sodium chloride      propofol Stopped (09/08/22 1127)     Scheduled Medications    famotidine (PEPCID) injection  20 mg IntraVENous BID    levETIRAcetam  1,500 mg IntraVENous Q12H    enoxaparin  40 mg SubCUTAneous Daily    lidocaine PF  5 mL IntraDERmal Once    sodium chloride flush  5-40 mL IntraVENous 2 times per day    thiamine  100 mg IntraVENous Daily     PRN Meds: sodium chloride flush, sodium chloride, ondansetron **OR** ondansetron, polyethylene glycol, acetaminophen **OR** acetaminophen      Intake/Output Summary (Last 24 hours) at 9/9/2022 1213  Last data filed at 9/9/2022 1200  Gross per 24 hour   Intake 1991.92 ml   Output 1930 ml   Net 61.92 ml         Physical Exam Performed:    BP (!) 147/88   Pulse 88   Temp 98.7 °F (37.1 °C) (Axillary)   Resp 17   Ht 5' 10\" (1.778 m)   Wt 180 lb 1.9 oz (81.7 kg)   SpO2 95%   BMI 25.84 kg/m²     General appearance:  intubated and sedated   HEENT: Pupils equal, round, Conjunctivae/corneas clear. Neck: Supple, with full range of motion. No jugular venous distention. Trachea midline. Respiratory:   coarse vent sounds anteriorly   Cardiovascular: Regular rate and rhythm with normal S1/S2 without murmurs, rubs or gallops. Abdomen: Soft, non-tender, non-distended with normal bowel sounds. Musculoskeletal: No clubbing, cyanosis or edema bilaterally. Skin: cold,  no overt lesion on exposed skin.    Neurologic:  sedated          Labs:   Recent Labs     09/07/22  0418 09/08/22  0339 09/09/22  0305   WBC 5.1 8.8 9.7   HGB 13.2* 13.4* 13.5   HCT 38.6* 39.1* 38.9*   * 144 135       Recent Labs     09/07/22  0418 09/08/22  0339 09/09/22  0305    135* 135*   K 4.0 5.0 4.0    102 101   CO2 24 23 22   BUN 8 10 11   CREATININE <0.5* 0.5* <0.5*   CALCIUM 8.3 8.4 8.7   PHOS 3.1 3.6 2.9       No results for input(s): AST, ALT, BILIDIR, BILITOT, ALKPHOS in the last 72 hours. No results for input(s): INR in the last 72 hours. No results for input(s): Hamp Memory in the last 72 hours. Urinalysis:      Lab Results   Component Value Date/Time    NITRU Negative 09/01/2022 05:09 PM    WBCUA 0-2 09/01/2022 05:09 PM    BACTERIA Rare 09/01/2022 05:09 PM    RBCUA 0-2 09/01/2022 05:09 PM    BLOODU MODERATE 09/01/2022 05:09 PM    SPECGRAV >=1.030 09/01/2022 05:09 PM    GLUCOSEU Negative 09/01/2022 05:09 PM       Radiology:  XR CHEST PORTABLE   Final Result   1. Worsened pulmonary edema. 2.  Persistent bibasilar atelectasis. MRI BRAIN WO CONTRAST   Final Result      Faint diffusion restriction in the right hippocampus with associated FLAIR signal abnormality. This is favored to represent sequelae of seizures, however other infectious/inflammatory etiologies are also possible. Mild atrophy and chronic small vessel ischemic change. Areas of encephalomalacia and gliosis in both cerebral hemispheres with remote hemorrhagic staining from prior insults. IR LUMBAR PUNCTURE FOR DIAGNOSIS   Final Result   . IMPRESSION:   1. Uneventful diagnostic fluoroscopic guided lumbar puncture as the   2. The flow reversed are risk and therefore a closing pressure was obtained at the end of the procedure, 26 cm of water. XR ABDOMEN (KUB) (SINGLE AP VIEW)   Final Result   Findings/Impression:    The tip of the enteric tube terminates in the distal body of the stomach. CT CHEST WO CONTRAST   Final Result      CHEST:      1. Moderate dependent atelectasis bilaterally. ABDOMEN/PELVIS:      1.  No acute abnormality on noncontrast CT of the abdomen and pelvis.    2. Cholelithiasis. CT ABDOMEN PELVIS WO CONTRAST Additional Contrast? None   Final Result      CHEST:      1. Moderate dependent atelectasis bilaterally. ABDOMEN/PELVIS:      1.  No acute abnormality on noncontrast CT of the abdomen and pelvis. 2.  Cholelithiasis. CTA HEAD NECK W CONTRAST   Final Result      CTA Head:   No acute CTA findings. No hemodynamically significant stenosis or focal aneurysm. No arteriovenous confirmation. CTA Neck:   No acute CTA findings. No hemodynamically significant stenosis or focal aneurysm. CT HEAD WO CONTRAST   Final Result      1. No findings for acute intracranial abnormality. 2.  Age-related atrophy with patchy periventricular white matter changes bilaterally consistent with chronic small vessel ischemia. 3.  Stable low attenuation left frontoparietal lobe consistent with previous insult from known prior intraparenchymal hematoma. Stable right frontoparietal cortical infarct. These findings were called to the emergency room physician Dr. Villalobos Nurse on 9/1/2022 at 5:30 p.m. XR CHEST PORTABLE   Final Result   1. No findings for acute cardiopulmonary disease. 2.  ET tube in good position in the mid trachea. Assessment/Plan:    Active Hospital Problems    Diagnosis     Acute respiratory failure ([address] [J96.00]      Priority: Medium    Seizure ([address] [R56.9]      Priority: Medium             Acute encephalopathy:  most likely secondary to seizure. Vent management per pulmonary/  critical care. Neurology consulted and following. S/p LP. EEG and MRI reviewed. On Keppra. Acute respiratory failure with hypoxia[facility]  [facility] Vent management per pulmonary critical care      Elevated troponin:  cardiology consulted and following- recommending conservative management. History of alcohol abuse continue thiamine. SIRS:  unclear focus of infection. On empiric antibiotics and acyclovir.  Blood cultures with

## 2022-09-09 NOTE — PROGRESS NOTES
Comprehensive Nutrition Assessment    Type and Reason for Visit:  Reassess    Nutrition Recommendations/Plan:   Continue TF of Vital HP @ 75 mL/hr   Monitor for weaning from mechanical ventilation, needs will change if TF is to be continued  Monitor nutrition adequacy, pertinent labs, bowel habits, wt changes, and clinical progress     Malnutrition Assessment:  Malnutrition Status: At risk for malnutrition (Comment) (09/03/22 1250)      Nutrition Assessment:    Follow up: Pt remains intubated, plan to wean today. Sedated on precedex, propofol d/c'ed. Current TF order still meets pt needs, will continue. If pt is successfully weaned from ventilation will reassess pt needs if TF is to be continued versus PO diet. Pt lytes remain WNL, active bowel sounds. Will continue to monitor. Nutrition Related Findings:    OGT. Na 135. . Active bowel sounds. +BM 9/9. Non-pitting RUE/LUE edema. Wound Type:  (scattered abrasions)       Current Nutrition Intake & Therapies:    Average Meal Intake: NPO  Average Supplements Intake: NPO  Current Tube Feeding (TF) Orders:  Feeding Route: Orogastric  Formula: Peptide Based High Protein  Schedule: Continuous  Additives/Modulars: None  Goal TF & Flush Orders Provides: Vital HP @ 75 mL/hr to provide: 1800 mL TV, 1800 kcal, 157 g/pro, and 1505 mL FW + FW flushes of 30 mL q4    Anthropometric Measures:  Height: 5' 10\" (177.8 cm)  Ideal Body Weight (IBW): 166 lbs (75 kg)       Current Body Weight: 202 lb 11 oz (91.9 kg),   IBW. Weight Source: Bed Scale  Current BMI (kg/m2): 29.1                          BMI Categories: Overweight (BMI 25.0-29. 9)    Estimated Daily Nutrient Needs:  Energy Requirements Based On: Kcal/kg (20-25 kcal/kg admission wt)  Weight Used for Energy Requirements: Admission (Admission wt 86.8 kg)  Energy (kcal/day): 7912-5438  Weight Used for Protein Requirements: Admission (1.2-2.0 g/kg admission wt (86.8kg))  Protein (g/day): 104-174  Method Used for Fluid Requirements: 1 ml/kcal  Fluid (ml/day): or per MD    Nutrition Diagnosis:   Inadequate oral intake related to impaired respiratory function as evidenced by intubation, NPO or clear liquid status due to medical condition    Nutrition Interventions:   Food and/or Nutrient Delivery: Continue NPO, Continue Current Tube Feeding  Nutrition Education/Counseling: Education not appropriate  Coordination of Nutrition Care: Continue to monitor while inpatient       Goals:  Previous Goal Met: Goal(s) Achieved  Goals: Tolerate nutrition support at goal rate       Nutrition Monitoring and Evaluation:   Behavioral-Environmental Outcomes: None Identified  Food/Nutrient Intake Outcomes: Enteral Nutrition Intake/Tolerance  Physical Signs/Symptoms Outcomes: Biochemical Data, Nutrition Focused Physical Findings, Weight, Fluid Status or Edema    Discharge Planning:     Too soon to determine     Isael Ndiaye, [address] [place] [address], LD  Contact: [phone]

## 2022-09-09 NOTE — PLAN OF CARE
Problem: Safety - Medical Restraint  Goal: Remains free of injury from restraints (Restraint for Interference with Medical Device)  Description: INTERVENTIONS:  1. Determine that other, less restrictive measures have been tried or would not be effective before applying the restraint  2. Evaluate the patient's condition at the time of restraint application  3. Inform patient/family regarding the reason for restraint  4. Q2H: Monitor safety, psychosocial status, comfort, nutrition and hydration  9/8/2022 2131 by Nickolas Garcia RN  Outcome: Progressing  Flowsheets  Taken 9/8/2022 2000 by Nickolas Garcia RN  Remains free of injury from restraints (restraint for interference with medical device): Determine that other, less restrictive measures have been tried or would not be effective before applying the restraint  Taken 9/8/2022 1800 by Bc Jc RN  Remains free of injury from restraints (restraint for interference with medical device): Determine that other, less restrictive measures have been tried or would not be effective before applying the restraint     Problem: Discharge Planning  Goal: Discharge to home or other facility with appropriate resources  9/8/2022 2131 by Nickolas Garcia RN  Outcome: Progressing     Problem: Pain  Goal: Verbalizes/displays adequate comfort level or baseline comfort level  9/8/2022 2131 by Nickolas Garcia RN  Outcome: Progressing     Problem: Safety - Adult  Goal: Free from fall injury  9/8/2022 2131 by Nickolas Garcia RN  Outcome: Progressing     Problem: ABCDS Injury Assessment  Goal: Absence of physical injury  9/8/2022 2131 by Nickolas Garcia RN  Outcome: Progressing     Problem: Skin/Tissue Integrity  Goal: Absence of new skin breakdown  Description: 1. Monitor for areas of redness and/or skin breakdown  2. Assess vascular access sites hourly  3. Every 4-6 hours minimum:  Change oxygen saturation probe site  4.   Every 4-6 hours:  If on nasal continuous positive airway pressure, respiratory therapy assess nares and determine need for appliance change or resting period.   9/8/2022 2131 by Mi Arce RN  Outcome: Progressing     Problem: Chronic Conditions and Co-morbidities  Goal: Patient's chronic conditions and co-morbidity symptoms are monitored and maintained or improved  9/8/2022 2131 by Mi Arce RN  Outcome: Progressing     Problem: Nutrition Deficit:  Goal: Optimize nutritional status  9/8/2022 2131 by Mi Arce RN  Outcome: Progressing     Problem: Respiratory - Adult  Goal: Achieves optimal ventilation and oxygenation  9/8/2022 2131 by Mi Arce RN  Outcome: Progressing     Problem: Cardiovascular - Adult  Goal: Maintains optimal cardiac output and hemodynamic stability  9/8/2022 2131 by Mi Arce RN  Outcome: Progressing     Problem: Cardiovascular - Adult  Goal: Absence of cardiac dysrhythmias or at baseline  9/8/2022 2131 by Mi Arce RN  Outcome: Progressing     Problem: Metabolic/Fluid and Electrolytes - Adult  Goal: Electrolytes maintained within normal limits  9/8/2022 2131 by Mi Arce RN  Outcome: Progressing     Problem: Metabolic/Fluid and Electrolytes - Adult  Goal: Hemodynamic stability and optimal renal function maintained  9/8/2022 2131 by Mi Arce RN  Outcome: Progressing     Problem: Discharge Planning  Goal: Discharge to home or other facility with appropriate resources  9/8/2022 2131 by Mi Arce RN  Outcome: Progressing  9/8/2022 1648 by Zoe Duverney, RN  Outcome: Not Progressing  Flowsheets  Taken 9/8/2022 1648  Discharge to home or other facility with appropriate resources:   Identify barriers to discharge with patient and caregiver   Identify discharge learning needs (meds, wound care, etc)  Taken 9/8/2022 0800  Discharge to home or other facility with appropriate resources: Arrange for needed discharge resources and transportation as appropriate

## 2022-09-09 NOTE — PROGRESS NOTES
NEUROLOGY / NEUROCRITICAL CARE PROGRESS NOTE       Patient Name: Rell Batres YOB: 1960   Sex: Male Age: 57 yrs     CC / Reason for Consult: AMS    Interval Hx / Changes over last 24 hours:     Awake and following commands. No focal weakness. CXR shows some pulmonary edema and his is net positive for fluid. Plan to dieresis and re-access wean trial.      ROS: unobtainable sedation and intubation    HISTORY   Admission HPI:      Rell Batres is a 57 y.o. y/o male with hx of ETOH abuse, CAD/MI, CHF, HTN, HLD, prior traumatic ICH 9/2021 who presents with acute encephalopathy of unknown etiology. He was reportedly at home after a long recovery from his [address]. He lives with significant other who saw him last on 9/1/22 at 9am in his normal state of health. She later returned around 4pm and found patient's car in driving way running, patient was inside home, half on couch unresponsive w/ gurgling respirations w/ low O2 sats. EMS was called. He was taken to [facility], [facility] intubated on arrival to ED d/t continued hypoxia. Unclear etiology of symptoms, ETOH / drug screen negative. He did have some elevations in troponin. Lactic acid was mildly elevated and patient had R eye deviations with nystagmus that prompted concern for seizures. He was given keppra in the ED. Initial head CT was unremarkable, CTA of head / neck was unremarkable. He was admitted to ICU for further evaluation.      [facility] Past Medical History:   Diagnosis Date    Alcohol abuse     CAD (coronary artery disease)     with complete LAD occlusion    CHF (congestive heart failure) ([facility])     LVEF    Essential tremor     HTN (hypertension)     Hx of blood clots 05/2021    Hyperlipidemia     ICH (intracerebral hemorrhage) (Former[facility])     Lower extremity cellulitis     MI (mitral incompetence)       Allergies No Known Allergies   Diet Diet NPO  ADULT TUBE FEEDING; Orogastric; Peptide Based High Protein; Continuous; 10; Yes; 10; Q 4 hours; 75; 30; Q 4 hours   Isolation No active isolations     CURRENT SCHEDULED MEDICATIONS   Inpatient Medications     famotidine (PEPCID) injection, 20 mg, IntraVENous, BID    levETIRAcetam, 1,500 mg, IntraVENous, Q12H    enoxaparin, 40 mg, SubCUTAneous, Daily    lidocaine PF, 5 mL, IntraDERmal, Once    sodium chloride flush, 5-40 mL, IntraVENous, 2 times per day    thiamine, 100 mg, IntraVENous, Daily   Infusions    fentaNYL 25 mcg/hr (09/09/22 1000)    dexmedetomidine (PRECEDEX) IV infusion 1.5 mcg/kg/hr (09/09/22 1022)    sodium chloride      propofol Stopped (09/08/22 1127)      Antibiotics   Recent Abx Admin        No antibiotic orders with administrations found. LABS   Metabolic Panel Recent Labs     09/07/22 0418 09/08/22  0339 09/09/22  0305    135* 135*   K 4.0 5.0 4.0    102 101   CO2 24 23 22   BUN 8 10 11   CREATININE <0.5* 0.5* <0.5*   GLUCOSE 126* 115* 128*   CALCIUM 8.3 8.4 8.7   LABALBU 2.8* 3.0* 3.4   PHOS 3.1 3.6 2.9   MG 1.70* 1.70* 1.80        CBC / Coags Recent Labs     09/07/22 0418 09/08/22  0339 09/09/22  0305   WBC 5.1 8.8 9.7   RBC 4.14* 4.16* 4.17*   HGB 13.2* 13.4* 13.5   HCT 38.6* 39.1* 38.9*   * 144 135        CSF Protein, CSF 15 - 45 mg/dL 39      Glucose, CSF 40 - 80 mg/dL 67      Color, CSF Colorless Colorless     Appearance, CSF Clear Clear  Hazy Abnormal  R    Clot Evaluation CSF  see below     Comment: No Clots Seen   WBC, CSF 0 - 5 /cumm 2     RBC, CSF 0 /cumm 1 Abnormal        CSF Culture No Growth so far. Hold 7 days.  P  [facility]   Gram Stain Result No organisms seen       Meningitis Encephalitis Panel Meningitis Encephalitis Panel PCR Result: Not Detected   VDRL: in process  Crypto Ag, CSF Negative Negative         DIAGNOSTICS   IMAGES:    No new images to review             PHYSICAL EXAMINATION     PHYSICAL EXAM:  Vitals:    09/09/22 0925 09/09/22 1000 09/09/22 1100 09/09/22 1200   BP:   122/83 (!) 147/88   Pulse: 85 89 98 88 Resp: 23 20 20 17   Temp:    98.7 °F (37.1 °C)   TempSrc:    Axillary   SpO2: 99% 96% 96% 95%   Weight:       Height:           *Exam performed while patient sedated   Constitutional    Vital signs: BP, HR, and RR reviewed   General awake. Tracks examiner  Cardiovascular: pulses symmetric in all 4 extremities. No peripheral edema. Psychiatric: limited exam given encephalopathy but not agitated and no signs of hallucinations    Neurologic  Mental status: Alert. Following commands on both sides    CN2: Visual Fields: blink on both sides with threat  CN 3,4,6:  EOMI. Pupils equal round and reactive  CN7:face symmetric but exam limited by ET tube      Strength:   Generalized weakness. Symmetric bilaterally in the upper and lower extremities   Sensory: intact to light touch throughout   Cerebellar/coordination:limited exam given encephalopathy  Tone: normal in all 4 extremities      ASSESSMENT & RECOMMENDATIONS   Assessment:  Mr. Lucero Mariee is a 58 y/o man who presented with acute encephalopathy, found down at home. Recent ICH makes episode concerning for seizure. Hypoxic on admission. LEV increased 9/3 given frequent sharps on cvEEG. MRI showed a faint diffusion restriction in the R hippocampus with associated flair signal abnormality consistent with seizure. CSF unremarkable. Plan:  - Aline Thomas to discontinue cvEEG  - Wean sedation as able  - Continue LEV at 1500 mg BID, switch to PO at some dose when taking PO  - Restart home ASA and lipitor  - Follow up with Dr. Beatriz Paulson with [facility] neurology in 3 months  -Having a seizure while driving puts that patient at risk for injuring themselves, or others. If a patient drives while having uncontrolled seizures, they may be held liable for injuries to others. No driving until they have been spell free for at least 3 months. Inform patient that in other states and countries the driving limitation may be longer and to check with applicable local laws prior to travel.  Injury Risk: Please avoid working from [employer], swimming alone or taking baths in a bathtub, use showers only. [address] [facility], [place] - CNP   Neurology & Neurocritical Care   Neurology Line: [phone]  [employer]: [facility] NPs  9/9/2022 1:33 PM    I spent 35 minutes in the care of this patient. Over 50% of that time was in face-to-face counseling regarding disease process, diagnostic testing, preventative measures, and answering patient and family questions. Dressing: bandage

## 2022-09-09 NOTE — PROGRESS NOTES
Patient becoming increasingly agitated and pulling self down in bed with legs being thrown over railings. Redirection attempted many times and was unsuccessful. Fentanyl rate increased to 75 mcg/hr.

## 2022-09-10 LAB
ALBUMIN SERPL-MCNC: 3.3 G/DL (ref 3.4–5)
ANION GAP SERPL CALCULATED.3IONS-SCNC: 16 MMOL/L (ref 3–16)
BASE EXCESS ARTERIAL: 5 (ref -3–3)
BASOPHILS ABSOLUTE: 0.1 K/UL (ref 0–0.2)
BASOPHILS RELATIVE PERCENT: 0.9 %
BUN BLDV-MCNC: 11 MG/DL (ref 7–20)
CALCIUM IONIZED: 1.19 MMOL/L (ref 1.12–1.32)
CALCIUM SERPL-MCNC: 9.1 MG/DL (ref 8.3–10.6)
CHLORIDE BLD-SCNC: 98 MMOL/L (ref 99–110)
CO2: 22 MMOL/L (ref 21–32)
CREAT SERPL-MCNC: 0.6 MG/DL (ref 0.8–1.3)
EOSINOPHILS ABSOLUTE: 0.1 K/UL (ref 0–0.6)
EOSINOPHILS RELATIVE PERCENT: 1.4 %
GFR AFRICAN AMERICAN: >60
GFR NON-AFRICAN AMERICAN: >60
GLUCOSE BLD-MCNC: 105 MG/DL (ref 70–99)
GLUCOSE BLD-MCNC: 107 MG/DL (ref 70–99)
HCO3 ARTERIAL: 27.4 MMOL/L (ref 21–29)
HCT VFR BLD CALC: 40.8 % (ref 40.5–52.5)
HEMOGLOBIN: 14 G/DL (ref 13.5–17.5)
LACTATE: 1.02 MMOL/L (ref 0.4–2)
LYMPHOCYTES ABSOLUTE: 2.1 K/UL (ref 1–5.1)
LYMPHOCYTES RELATIVE PERCENT: 23.5 %
MAGNESIUM: 1.7 MG/DL (ref 1.8–2.4)
MCH RBC QN AUTO: 31.7 PG (ref 26–34)
MCHC RBC AUTO-ENTMCNC: 34.3 G/DL (ref 31–36)
MCV RBC AUTO: 92.5 FL (ref 80–100)
MONOCYTES ABSOLUTE: 1.4 K/UL (ref 0–1.3)
MONOCYTES RELATIVE PERCENT: 15.7 %
NEUTROPHILS ABSOLUTE: 5.1 K/UL (ref 1.7–7.7)
NEUTROPHILS RELATIVE PERCENT: 58.5 %
O2 SAT, ARTERIAL: 95 % (ref 93–100)
PCO2 ARTERIAL: 33.8 MM HG (ref 35–45)
PDW BLD-RTO: 13.8 % (ref 12.4–15.4)
PERFORMED ON: ABNORMAL
PH ARTERIAL: 7.52 (ref 7.35–7.45)
PHOSPHORUS: 3.1 MG/DL (ref 2.5–4.9)
PLATELET # BLD: 151 K/UL (ref 135–450)
PMV BLD AUTO: 8.5 FL (ref 5–10.5)
PO2 ARTERIAL: 68.8 MM HG (ref 75–108)
POC POTASSIUM: 3.5 MMOL/L (ref 3.5–5.1)
POC SAMPLE TYPE: ABNORMAL
POC SODIUM: 138 MMOL/L (ref 136–145)
POTASSIUM SERPL-SCNC: 3.6 MMOL/L (ref 3.5–5.1)
RBC # BLD: 4.41 M/UL (ref 4.2–5.9)
SODIUM BLD-SCNC: 136 MMOL/L (ref 136–145)
TCO2 ARTERIAL: 28 MMOL/L
WBC # BLD: 8.7 K/UL (ref 4–11)

## 2022-09-10 PROCEDURE — 94003 VENT MGMT INPAT SUBQ DAY: CPT

## 2022-09-10 PROCEDURE — 80069 RENAL FUNCTION PANEL: CPT

## 2022-09-10 PROCEDURE — 94761 N-INVAS EAR/PLS OXIMETRY MLT: CPT

## 2022-09-10 PROCEDURE — 2000000000 HC ICU R&B

## 2022-09-10 PROCEDURE — 82330 ASSAY OF CALCIUM: CPT

## 2022-09-10 PROCEDURE — 82803 BLOOD GASES ANY COMBINATION: CPT

## 2022-09-10 PROCEDURE — 2580000003 HC RX 258: Performed by: STUDENT IN AN ORGANIZED HEALTH CARE EDUCATION/TRAINING PROGRAM

## 2022-09-10 PROCEDURE — 83735 ASSAY OF MAGNESIUM: CPT

## 2022-09-10 PROCEDURE — 83605 ASSAY OF LACTIC ACID: CPT

## 2022-09-10 PROCEDURE — 36415 COLL VENOUS BLD VENIPUNCTURE: CPT

## 2022-09-10 PROCEDURE — A4216 STERILE WATER/SALINE, 10 ML: HCPCS | Performed by: STUDENT IN AN ORGANIZED HEALTH CARE EDUCATION/TRAINING PROGRAM

## 2022-09-10 PROCEDURE — 6360000002 HC RX W HCPCS: Performed by: STUDENT IN AN ORGANIZED HEALTH CARE EDUCATION/TRAINING PROGRAM

## 2022-09-10 PROCEDURE — 84295 ASSAY OF SERUM SODIUM: CPT

## 2022-09-10 PROCEDURE — 6360000002 HC RX W HCPCS: Performed by: NURSE PRACTITIONER

## 2022-09-10 PROCEDURE — 2500000003 HC RX 250 WO HCPCS: Performed by: STUDENT IN AN ORGANIZED HEALTH CARE EDUCATION/TRAINING PROGRAM

## 2022-09-10 PROCEDURE — 82947 ASSAY GLUCOSE BLOOD QUANT: CPT

## 2022-09-10 PROCEDURE — 6360000002 HC RX W HCPCS: Performed by: INTERNAL MEDICINE

## 2022-09-10 PROCEDURE — 94799 UNLISTED PULMONARY SVC/PX: CPT

## 2022-09-10 PROCEDURE — 84132 ASSAY OF SERUM POTASSIUM: CPT

## 2022-09-10 PROCEDURE — 2580000003 HC RX 258: Performed by: NURSE PRACTITIONER

## 2022-09-10 PROCEDURE — 2700000000 HC OXYGEN THERAPY PER DAY

## 2022-09-10 PROCEDURE — 6360000002 HC RX W HCPCS

## 2022-09-10 PROCEDURE — 99291 CRITICAL CARE FIRST HOUR: CPT | Performed by: INTERNAL MEDICINE

## 2022-09-10 PROCEDURE — 85025 COMPLETE CBC W/AUTO DIFF WBC: CPT

## 2022-09-10 RX ORDER — MECOBALAMIN 5000 MCG
5 TABLET,DISINTEGRATING ORAL NIGHTLY
Status: DISCONTINUED | OUTPATIENT
Start: 2022-09-10 | End: 2022-10-06 | Stop reason: HOSPADM

## 2022-09-10 RX ORDER — OLANZAPINE 10 MG/1
5 INJECTION, POWDER, LYOPHILIZED, FOR SOLUTION INTRAMUSCULAR
Status: COMPLETED | OUTPATIENT
Start: 2022-09-10 | End: 2022-09-10

## 2022-09-10 RX ORDER — MAGNESIUM SULFATE IN WATER 40 MG/ML
2000 INJECTION, SOLUTION INTRAVENOUS ONCE
Status: COMPLETED | OUTPATIENT
Start: 2022-09-10 | End: 2022-09-10

## 2022-09-10 RX ORDER — FUROSEMIDE 10 MG/ML
40 INJECTION INTRAMUSCULAR; INTRAVENOUS ONCE
Status: COMPLETED | OUTPATIENT
Start: 2022-09-10 | End: 2022-09-10

## 2022-09-10 RX ADMIN — DEXMEDETOMIDINE 1.5 MCG/KG/HR: 100 INJECTION, SOLUTION INTRAVENOUS at 09:43

## 2022-09-10 RX ADMIN — DEXMEDETOMIDINE 1.5 MCG/KG/HR: 100 INJECTION, SOLUTION INTRAVENOUS at 05:33

## 2022-09-10 RX ADMIN — PROPOFOL 25 MCG/KG/MIN: 10 INJECTION, EMULSION INTRAVENOUS at 05:37

## 2022-09-10 RX ADMIN — FUROSEMIDE 40 MG: 10 INJECTION, SOLUTION INTRAMUSCULAR; INTRAVENOUS at 08:38

## 2022-09-10 RX ADMIN — SODIUM CHLORIDE, PRESERVATIVE FREE 20 MG: 5 INJECTION INTRAVENOUS at 20:44

## 2022-09-10 RX ADMIN — SODIUM CHLORIDE, PRESERVATIVE FREE 10 ML: 5 INJECTION INTRAVENOUS at 20:44

## 2022-09-10 RX ADMIN — ENOXAPARIN SODIUM 40 MG: 100 INJECTION SUBCUTANEOUS at 08:39

## 2022-09-10 RX ADMIN — DEXMEDETOMIDINE 1.5 MCG/KG/HR: 100 INJECTION, SOLUTION INTRAVENOUS at 12:14

## 2022-09-10 RX ADMIN — DEXMEDETOMIDINE 1.3 MCG/KG/HR: 100 INJECTION, SOLUTION INTRAVENOUS at 01:59

## 2022-09-10 RX ADMIN — SODIUM CHLORIDE, PRESERVATIVE FREE 20 MG: 5 INJECTION INTRAVENOUS at 08:39

## 2022-09-10 RX ADMIN — Medication 100 MCG/HR: at 01:29

## 2022-09-10 RX ADMIN — LEVETIRACETAM 1500 MG: 100 INJECTION, SOLUTION, CONCENTRATE INTRAVENOUS at 01:59

## 2022-09-10 RX ADMIN — THIAMINE HYDROCHLORIDE 100 MG: 100 INJECTION, SOLUTION INTRAMUSCULAR; INTRAVENOUS at 08:39

## 2022-09-10 RX ADMIN — MAGNESIUM SULFATE HEPTAHYDRATE 2000 MG: 40 INJECTION, SOLUTION INTRAVENOUS at 08:50

## 2022-09-10 RX ADMIN — LEVETIRACETAM 1500 MG: 100 INJECTION, SOLUTION, CONCENTRATE INTRAVENOUS at 14:22

## 2022-09-10 RX ADMIN — DEXMEDETOMIDINE 1.5 MCG/KG/HR: 100 INJECTION, SOLUTION INTRAVENOUS at 15:42

## 2022-09-10 RX ADMIN — DEXMEDETOMIDINE 1.5 MCG/KG/HR: 100 INJECTION, SOLUTION INTRAVENOUS at 21:38

## 2022-09-10 RX ADMIN — DEXMEDETOMIDINE 1.5 MCG/KG/HR: 100 INJECTION, SOLUTION INTRAVENOUS at 19:10

## 2022-09-10 RX ADMIN — OLANZAPINE 5 MG: 10 INJECTION, POWDER, FOR SOLUTION INTRAMUSCULAR at 21:43

## 2022-09-10 ASSESSMENT — PULMONARY FUNCTION TESTS
PIF_VALUE: 20
PIF_VALUE: 13
PIF_VALUE: 21
PIF_VALUE: 21
PIF_VALUE: 13
PIF_VALUE: 21
PIF_VALUE: 30

## 2022-09-10 ASSESSMENT — PAIN SCALES - GENERAL
PAINLEVEL_OUTOF10: 0
PAINLEVEL_OUTOF10: 0

## 2022-09-10 NOTE — PROGRESS NOTES
Pt extubated at 1, placed on 4L nasal cannula. VSS and resting comfortably. All safety precautions in place.

## 2022-09-10 NOTE — PLAN OF CARE
Problem: Safety - Medical Restraint  Goal: Remains free of injury from restraints (Restraint for Interference with Medical Device)  Description: INTERVENTIONS:  1. Determine that other, less restrictive measures have been tried or would not be effective before applying the restraint  2. Evaluate the patient's condition at the time of restraint application  3. Inform patient/family regarding the reason for restraint  4.  Q2H: Monitor safety, psychosocial status, comfort, nutrition and hydration  Outcome: Progressing  Flowsheets  Taken 9/10/2022 1000  Remains free of injury from restraints (restraint for interference with medical device): Determine that other, less restrictive measures have been tried or would not be effective before applying the restraint  Taken 9/10/2022 0800  Remains free of injury from restraints (restraint for interference with medical device): Determine that other, less restrictive measures have been tried or would not be effective before applying the restraint     Problem: Discharge Planning  Goal: Discharge to home or other facility with appropriate resources  Outcome: Progressing  Flowsheets (Taken 9/10/2022 0754)  Discharge to home or other facility with appropriate resources: Identify barriers to discharge with patient and caregiver     Problem: Pain  Goal: Verbalizes/displays adequate comfort level or baseline comfort level  Outcome: Progressing  Flowsheets  Taken 9/10/2022 0800 by Elita Felty, RN  Verbalizes/displays adequate comfort level or baseline comfort level: Encourage patient to monitor pain and request assistance  Taken 9/10/2022 0000 by Lupe Landers RN  Verbalizes/displays adequate comfort level or baseline comfort level: Encourage patient to monitor pain and request assistance     Problem: Safety - Adult  Goal: Free from fall injury  Outcome: Progressing     Problem: ABCDS Injury Assessment  Goal: Absence of physical injury  Outcome: Progressing     Problem: Skin/Tissue Integrity  Goal: Absence of new skin breakdown  Description: 1. Monitor for areas of redness and/or skin breakdown  2. Assess vascular access sites hourly  3. Every 4-6 hours minimum:  Change oxygen saturation probe site  4. Every 4-6 hours:  If on nasal continuous positive airway pressure, respiratory therapy assess nares and determine need for appliance change or resting period.   Outcome: Progressing     Problem: Chronic Conditions and Co-morbidities  Goal: Patient's chronic conditions and co-morbidity symptoms are monitored and maintained or improved  Outcome: Progressing  Flowsheets (Taken 9/10/2022 0754)  Care Plan - Patient's Chronic Conditions and Co-Morbidity Symptoms are Monitored and Maintained or Improved: Monitor and assess patient's chronic conditions and comorbid symptoms for stability, deterioration, or improvement     Problem: Nutrition Deficit:  Goal: Optimize nutritional status  Outcome: Progressing     Problem: Respiratory - Adult  Goal: Achieves optimal ventilation and oxygenation  Outcome: Progressing     Problem: Cardiovascular - Adult  Goal: Maintains optimal cardiac output and hemodynamic stability  Outcome: Progressing     Problem: Cardiovascular - Adult  Goal: Absence of cardiac dysrhythmias or at baseline  Outcome: Progressing     Problem: Metabolic/Fluid and Electrolytes - Adult  Goal: Electrolytes maintained within normal limits  Outcome: Progressing  Flowsheets (Taken 9/10/2022 0754)  Electrolytes maintained within normal limits: Monitor labs and assess patient for signs and symptoms of electrolyte imbalances     Problem: Metabolic/Fluid and Electrolytes - Adult  Goal: Hemodynamic stability and optimal renal function maintained  Outcome: Progressing  Flowsheets (Taken 9/10/2022 0754)  Hemodynamic stability and optimal renal function maintained: Monitor intake, output and patient weight

## 2022-09-10 NOTE — PROGRESS NOTES
ICU Progress Note    Admit Date: 9/1/2022  IV Access: Peripheral  Vasopressors: None                Antibiotics: None  Diet: Diet NPO  ADULT TUBE FEEDING; Orogastric; Peptide Based High Protein; Continuous; 10; Yes; 10; Q 4 hours; 75; 30; Q 4 hours    CC: AMS    Interval history:   No acute events overnight. Failed SBT yesterday. Currently undergoing second attempt. Intermittently agitated. HPI:   The patient is a 75-year-old man with past medical history significant for chronic alcoholism, ICH, CAD, DVT who presented today to the ED with an episode of syncope. The history was mainly obtained by the sister since patient was intubated. He was last known well at 9 AM but the sister. According to the sister, she had just returned home from work which she had found him half lying down on the couch covered in the stool while his car outside was running outside with occasion. She denies any complaints of recent fever, chills, chest pain cough, shortness of breath, lightheadedness, palpitations, nausea, vomiting, abdominal pain, diarrhea, fatigue, visual disturbance, changes in urination frequency or burning pain well urination or headaches by the patient in the preceding days to this incident. She does reinstate that her brother is a chronic alcoholic and had just gotten out of rehab. He has been sober since however she was not aware if he had recently had any alcoholic drink. He had also informed that he had recent episode of intracerebral hemorrhage on 9/4/2021. In the ED, there was a concern for possibility of a stroke however he was not considered a thrombolytic candidate because of her previous ICH and long time since his last known well. According to the ED staff his physical exam was more pertinent for nonfocal delirium. It was also significant for nystagmus and rightward gaze not fixed.   He was given 2 mg of Versed and it had worsened his oxygen saturations that had dropped to 88% on a nonrebreather and therefore he was intubated. CT Abdomen, chest: Moderate dependent atelectasis bilaterally. No acute abnormality on noncontrast CT of the abdomen and pelvis. Cholelithiasis. CTA Head: No acute CTA findings. No hemodynamically significant stenosis or focal aneurysm. No arteriovenous confirmation. CTA Neck: No acute CTA findings. No hemodynamically significant stenosis or focal aneurysm.      Patient was admitted to the ICU for further workup and management of his possible meningitis, on MV.      ROS:  Patient mechanically ventilated, unable to complete d/t SBT    Medications:     Scheduled Meds:   furosemide  40 mg IntraVENous Once    magnesium sulfate  2,000 mg IntraVENous Once    famotidine (PEPCID) injection  20 mg IntraVENous BID    levETIRAcetam  1,500 mg IntraVENous Q12H    enoxaparin  40 mg SubCUTAneous Daily    lidocaine PF  5 mL IntraDERmal Once    sodium chloride flush  5-40 mL IntraVENous 2 times per day    thiamine  100 mg IntraVENous Daily     Continuous Infusions:   fentaNYL 75 mcg/hr (09/10/22 0621)    dexmedetomidine (PRECEDEX) IV infusion 1.1 mcg/kg/hr (09/10/22 0651)    sodium chloride      propofol 15 mcg/kg/min (09/10/22 0623)     PRN Meds:sodium chloride flush, sodium chloride, ondansetron **OR** ondansetron, polyethylene glycol, acetaminophen **OR** acetaminophen    Objective:   Vitals:   T-max:  Patient Vitals for the past 8 hrs:   BP Temp Temp src Pulse Resp SpO2   09/10/22 0715 -- -- -- (!) 47 16 94 %   09/10/22 0600 138/73 -- -- (!) 48 16 94 %   09/10/22 0400 118/76 98.3 °F (36.8 °C) Axillary 53 16 94 %   09/10/22 0300 137/73 -- -- (!) 48 16 96 %   09/10/22 0200 136/70 -- -- (!) 46 16 96 %   09/10/22 0100 120/72 -- -- 64 16 --         Intake/Output Summary (Last 24 hours) at 9/10/2022 0817  Last data filed at 9/10/2022 0602  Gross per 24 hour   Intake 1361.08 ml   Output 4895 ml   Net -3533.92 ml           Physical Exam:  Portions of exam deferred due to SBT; will reassess on completion. Physical Exam  Vitals and nursing note reviewed. Constitutional:       Appearance: He is obese. Cardiovascular:      Rate and Rhythm: Regular rhythm. Tachycardia present. Pulmonary:      Comments: CPAP/PS. Undergoing SBT at this time. Skin:     General: Skin is warm and dry. Neurological:      Mental Status: He is alert. LABS:    CBC:   Recent Labs     09/08/22  0339 09/09/22  0305 09/10/22  0526   WBC 8.8 9.7 8.7   HGB 13.4* 13.5 14.0   HCT 39.1* 38.9* 40.8    135 151   MCV 94.0 93.3 92.5       Renal:    Recent Labs     09/08/22  0339 09/09/22  0305 09/10/22  0526   * 135* 136   K 5.0 4.0 3.6    101 98*   CO2 23 22 22   BUN 10 11 11   CREATININE 0.5* <0.5* 0.6*   GLUCOSE 115* 128* 107*   CALCIUM 8.4 8.7 9.1   MG 1.70* 1.80 1.70*   PHOS 3.6 2.9 3.1   ANIONGAP 10 12 16       Hepatic:   Recent Labs     09/08/22  0339 09/09/22  0305 09/10/22  0526   LABALBU 3.0* 3.4 3.3*       Troponin: No results for input(s): TROPONINI in the last 72 hours. BNP: No results for input(s): BNP in the last 72 hours. Lipids: No results for input(s): CHOL, HDL in the last 72 hours. Invalid input(s): LDLCALCU, TRIGLYCERIDE  ABGs:    Recent Labs     09/08/22  1104   PHART 7.427   [id number] 40.5   PO2ART 68.0*   [id number] 26.7   BEART 2   [id number] 94   [id number] 28         INR: No results for input(s): INR in the last 72 hours. Lactate: No results for input(s): LACTATE in the last 72 hours. Cultures:  -----------------------------------------------------------------  RAD:   XR CHEST PORTABLE   Final Result   1. Worsened pulmonary edema. 2.  Persistent bibasilar atelectasis. MRI BRAIN WO CONTRAST   Final Result      Faint diffusion restriction in the right hippocampus with associated FLAIR signal abnormality. This is favored to represent sequelae of seizures, however other infectious/inflammatory etiologies are also possible. Mild atrophy and chronic small vessel ischemic change. Areas of encephalomalacia and gliosis in both cerebral hemispheres with remote hemorrhagic staining from prior insults. IR LUMBAR PUNCTURE FOR DIAGNOSIS   Final Result   . IMPRESSION:   1. Uneventful diagnostic fluoroscopic guided lumbar puncture as the   2. The flow reversed are risk and therefore a closing pressure was obtained at the end of the procedure, 26 cm of water. XR ABDOMEN (KUB) (SINGLE AP VIEW)   Final Result   Findings/Impression:    The tip of the enteric tube terminates in the distal body of the stomach. CT CHEST WO CONTRAST   Final Result      CHEST:      1. Moderate dependent atelectasis bilaterally. ABDOMEN/PELVIS:      1.  No acute abnormality on noncontrast CT of the abdomen and pelvis. 2.  Cholelithiasis. CT ABDOMEN PELVIS WO CONTRAST Additional Contrast? None   Final Result      CHEST:      1. Moderate dependent atelectasis bilaterally. ABDOMEN/PELVIS:      1.  No acute abnormality on noncontrast CT of the abdomen and pelvis. 2.  Cholelithiasis. CTA HEAD NECK W CONTRAST   Final Result      CTA Head:   No acute CTA findings. No hemodynamically significant stenosis or focal aneurysm. No arteriovenous confirmation. CTA Neck:   No acute CTA findings. No hemodynamically significant stenosis or focal aneurysm. CT HEAD WO CONTRAST   Final Result      1. No findings for acute intracranial abnormality. 2.  Age-related atrophy with patchy periventricular white matter changes bilaterally consistent with chronic small vessel ischemia. 3.  Stable low attenuation left frontoparietal lobe consistent with previous insult from known prior intraparenchymal hematoma. Stable right frontoparietal cortical infarct. These findings were called to the emergency room physician Dr. Kendrick Sandhu on 9/1/2022 at 5:30 p.m. XR CHEST PORTABLE   Final Result   1. No findings for acute cardiopulmonary disease.       2.  ET tube in good position in the mid trachea. Assessment/Plan:   Vin Hollingsworth is a 57 y.o. male with a PMH of alcohol abuse, CAD, CHF, essential tremor, HTN,  who was admitted with AMS. Neuro/Sedation:  Patient off sedation, following commands  #Acute Metabolic Encephalopathy 2/2 possible meningitis vs encephalitis induced seizure   On presentation AMS, elevated WBC: 21.7, Tachypneic , Tachycardic, Hypotensive. Hx of ICH, SAH and SDH 2/2 to fall, hx of chronic alcohol abuse,   CT head: Stable low attenuation left frontoparietal lobe consistent with previous insult from known prior intraparenchymal hematoma. Stable right frontoparietal cortical infarct. CT Abdomen, chest: Moderate dependent atelectasis bilaterally. No acute abnormality on noncontrast CT of the abdomen and pelvis. Cholelithiasis. CTA Head: No acute CTA findings. No hemodynamically significant stenosis or focal aneurysm. No arteriovenous confirmation. CTA Neck: No acute CTA findings. No hemodynamically significant stenosis or focal aneurysm.  -blood cultures ordered (9/2): No growth  -cEEG currently on readings: Generalized background abnormalities indicative of an underlying diffuse encephalopathy of non-specific etiology. Intermittent focal epileptiform discharges, right posterior temporal, conferring increased risk of focal onset seizures from this region.  -On Keppra 1500 bid  -Consulted Neurology: appreciate recs  -Lumbar puncture ordered wnl, awaiting culture  -MRI: Faint diffusion restriction in the right hippocampus with associated FLAIR signal abnormality.  This is favored to represent sequelae of seizures, however other infectious/inflammatory etiologies are also possible.  - SBT failed 9/9, repeat today    #Alcohol use disorder  -thiamine 100 mg daily  -monitor for withdrawal  -Ethanol level: none detected    Respiratory:   SpO2 96% on MV  Vent Settings: Vent Mode: AC/PRVC Resp Rate (Set): 16 bmp/Vt (Set, mL): 575 mL/ /FiO2 : 30 % PEEP 5  Recent Labs     09/08/22  1104   PHART 7.427   [id number] 40.5   PO2ART 68.0*     #Acute hypercapnic respiratory failure secondary to possible aspiration vs 2/2 medication  On presentation: VBG PH:7.264, PCO2: 56.4, 83.2  Latest: PH: 7.432, PCO2: 39, PO2:79 (09/05/2022)  Vent settings:AC/PVR, Rate Set: 16, PEEP:5, Vt: 575, FiO2:30  -Ventilation wean 9/9 unsuccessful    Renal:  External catheter in place    GI:  Tube feeding  #Hx of Cirrhosis       -Ammonia:36 wnl(09/1/22)    Hematology/Oncology: Thrombocytopenia improved from admission, 135k today    Skin  Skin breakdown-Left and rt lower Leg excoriations    Code Status: Full Code  FEN: Orogastric; Peptide-based; Continuous; 10; Yes; 10; Q8 hours; 45; 30; Q4 hours; Protein; Twice per day administer via NG, flush 30 mL before and after administration  PPX: Enoxaparin  DISPO: ICU  Barriers to discharge: Jaelyn Ray MD, PGY-1  09/10/22  8:17 AM    This patient has been staffed and discussed with Dr. Eb Vaughn. Pulm/CC     Patient was seen, examined and discussed with Dr. Karla Isaac. I agree with the interval history. My physical exam confirms the findings listed below  Chart was reviewed including labs, CXR, CT scan and medical records confirm the findings noted     Pt alert and follows commands  Good cough  Minimal secretions    SBT - Concluded at  941. Weaning Parameters/VS's at conclusion of SBT:        VE:        13.8 L        RR:        32 b/m        VT:        431 mL (average VT = VE/RR)        RSBI:    74 (RR/VT in liters)        ETCO2: 29 cmH2O        SPO2:   94 %         If on sedation, amount and type Fentanyl        Assessment  Acute encephalopathy -unclear etiology but improving  Acute respiratory failure on mechanical vent support. , RR 14, FiO2 30, PEEP 5. EEG with generalized discharges that may be associated with seizures  Leukocytosis - resolved   Mildly elevated troponin. Likely due to demand ischemia.   EKG with septal Q waves and T inv laterally   Hypotension requiring levophed: Resolved  Hx of ICH  Hx of alcohol use   Pulmonary edema     Plan  Lasix 40 mg IV x 1 today  Passed SBT - will extubate  Hold TF  Continue Keppra  Neurology following     Pt has a high probability of imminent or life-threatening deterioration requiring close monitoring, and highly complex decision-making and/or interventions of high intensity to assess, manipulate, and support his critical organ systems to prevent a likely inevitable decline which could occur if left untreated. A total critical care time 32 minutes was used. This includes but not limited to examining patient, collaborating with other physicians, monitoring vital signs, telemetry, continuous pulse oximetry, and clinical response to IV medications, documentation time, review and interpretation of laboratory and radiological data, review of nursing notes and old record review.  This time excludes any time that may have been spent performing procedures for life threatening organ failure     Jurline Roles MD

## 2022-09-10 NOTE — PLAN OF CARE
Problem: Safety - Medical Restraint  Goal: Remains free of injury from restraints (Restraint for Interference with Medical Device)  Description: INTERVENTIONS:  1. Determine that other, less restrictive measures have been tried or would not be effective before applying the restraint  2. Evaluate the patient's condition at the time of restraint application  3. Inform patient/family regarding the reason for restraint  4. Q2H: Monitor safety, psychosocial status, comfort, nutrition and hydration  9/10/2022 1936 by Jameel Friend RN  Outcome: Progressing     Problem: Discharge Planning  Goal: Discharge to home or other facility with appropriate resources  9/10/2022 1936 by Jameel Friend RN  Outcome: Progressing  Flowsheets (Taken 9/10/2022 1600 by Patricia Patterson RN)  Discharge to home or other facility with appropriate resources: Identify barriers to discharge with patient and caregiver     Problem: Pain  Goal: Verbalizes/displays adequate comfort level or baseline comfort level  9/10/2022 1936 by Jameel Friend RN  Outcome: Progressing     Problem: Safety - Adult  Goal: Free from fall injury  9/10/2022 1936 by Jameel Friend RN  Outcome: Progressing     Problem: ABCDS Injury Assessment  Goal: Absence of physical injury  9/10/2022 1936 by Jameel Friend RN  Outcome: Progressing     Problem: Skin/Tissue Integrity  Goal: Absence of new skin breakdown  Description: 1. Monitor for areas of redness and/or skin breakdown  2. Assess vascular access sites hourly  3. Every 4-6 hours minimum:  Change oxygen saturation probe site  4. Every 4-6 hours:  If on nasal continuous positive airway pressure, respiratory therapy assess nares and determine need for appliance change or resting period.   9/10/2022 1936 by Jameel Friend RN  Outcome: Progressing     Problem: Chronic Conditions and Co-morbidities  Goal: Patient's chronic conditions and co-morbidity symptoms are monitored and maintained or improved  9/10/2022 1936 by Jameel Friend RN  Outcome: Progressing  Flowsheets (Taken 9/10/2022 1600 by Tevin Martines RN)  Care Plan - Patient's Chronic Conditions and Co-Morbidity Symptoms are Monitored and Maintained or Improved: Monitor and assess patient's chronic conditions and comorbid symptoms for stability, deterioration, or improvement     Problem: Nutrition Deficit:  Goal: Optimize nutritional status  9/10/2022 1936 by Huyen Acosta RN  Outcome: Progressing     Problem: Respiratory - Adult  Goal: Achieves optimal ventilation and oxygenation  9/10/2022 1936 by Huyen Acosta RN  Outcome: Progressing     Problem: Cardiovascular - Adult  Goal: Maintains optimal cardiac output and hemodynamic stability  9/10/2022 1936 by Huyen Acosta RN  Outcome: Progressing     Problem: Cardiovascular - Adult  Goal: Absence of cardiac dysrhythmias or at baseline  9/10/2022 1936 by Huyen Acosta RN  Outcome: Progressing     Problem: Metabolic/Fluid and Electrolytes - Adult  Goal: Electrolytes maintained within normal limits  9/10/2022 1936 by Huyen Acosta RN  Outcome: Progressing  Flowsheets (Taken 9/10/2022 1600 by Tevin Martines RN)  Electrolytes maintained within normal limits: Monitor labs and assess patient for signs and symptoms of electrolyte imbalances     Problem: Metabolic/Fluid and Electrolytes - Adult  Goal: Hemodynamic stability and optimal renal function maintained  9/10/2022 1936 by Huyen Acosta RN  Outcome: Progressing  Flowsheets (Taken 9/10/2022 1600 by Tevin Martines RN)  Hemodynamic stability and optimal renal function maintained: Monitor labs and assess for signs and symptoms of volume excess or deficit

## 2022-09-10 NOTE — PROGRESS NOTES
Pt has been very impulsive and confused. Difficult to reorient. Pt's sister was updated at bedside. Able to ween SP02 2L nasal cannula. Tolerating well. VSS, safety precautions in place.

## 2022-09-10 NOTE — PLAN OF CARE
Problem: Safety - Medical Restraint  Goal: Remains free of injury from restraints (Restraint for Interference with Medical Device)  Description: INTERVENTIONS:  1. Determine that other, less restrictive measures have been tried or would not be effective before applying the restraint  2. Evaluate the patient's condition at the time of restraint application  3. Inform patient/family regarding the reason for restraint  4.  Q2H: Monitor safety, psychosocial status, comfort, nutrition and hydration  Outcome: Progressing  Flowsheets  Taken 9/9/2022 1800 by [facility], RN  Remains free of injury from restraints (restraint for interference with medical device):   Determine that other, less restrictive measures have been tried or would not be effective before applying the restraint   Evaluate the patient's condition at the time of restraint application   Inform patient/family regarding the reason for restraint   Every 2 hours: Monitor safety, psychosocial status, comfort, nutrition and hydration  Taken 9/9/2022 1600 by [facility] RN  Remains free of injury from restraints (restraint for interference with medical device):   Determine that other, less restrictive measures have been tried or would not be effective before applying the restraint   Evaluate the patient's condition at the time of restraint application   Inform patient/family regarding the reason for restraint   Every 2 hours: Monitor safety, psychosocial status, comfort, nutrition and hydration  Taken 9/9/2022 1400 by [facility] RN  Remains free of injury from restraints (restraint for interference with medical device):   Determine that other, less restrictive measures have been tried or would not be effective before applying the restraint   Evaluate the patient's condition at the time of restraint application   Inform patient/family regarding the reason for restraint   Every 2 hours: Monitor safety, psychosocial status, comfort, nutrition and hydration  Taken 9/9/2022 1200 by [facility], RN  Remains free of injury from restraints (restraint for interference with medical device):   Determine that other, less restrictive measures have been tried or would not be effective before applying the restraint   Evaluate the patient's condition at the time of restraint application   Inform patient/family regarding the reason for restraint   Every 2 hours: Monitor safety, psychosocial status, comfort, nutrition and hydration  Taken 9/9/2022 1000 by [facility] RN  Remains free of injury from restraints (restraint for interference with medical device):   Determine that other, less restrictive measures have been tried or would not be effective before applying the restraint   Evaluate the patient's condition at the time of restraint application   Inform patient/family regarding the reason for restraint   Every 2 hours: Monitor safety, psychosocial status, comfort, nutrition and hydration  Taken 9/9/2022 0800 by [facility] RN  Remains free of injury from restraints (restraint for interference with medical device):   Determine that other, less restrictive measures have been tried or would not be effective before applying the restraint   Evaluate the patient's condition at the time of restraint application   Inform patient/family regarding the reason for restraint   Every 2 hours: Monitor safety, psychosocial status, comfort, nutrition and hydration     Problem: Discharge Planning  Goal: Discharge to home or other facility with appropriate resources  Outcome: Progressing  Flowsheets (Taken 9/9/2022 2000)  Discharge to home or other facility with appropriate resources: Identify barriers to discharge with patient and caregiver     Problem: Pain  Goal: Verbalizes/displays adequate comfort level or baseline comfort level  Outcome: Progressing  Flowsheets  Taken 9/9/2022 2000 by Jameel Friend RN  Verbalizes/displays adequate comfort level or baseline comfort level: Encourage patient to monitor pain and request assistance  Taken 9/9/2022 1200 by [facility], RN  Verbalizes/displays adequate comfort level or baseline comfort level:   Encourage patient to monitor pain and request assistance   Assess pain using appropriate pain scale   Administer analgesics based on type and severity of pain and evaluate response   Implement non-pharmacological measures as appropriate and evaluate response   Consider cultural and social influences on pain and pain management  Taken 9/9/2022 0800 by [facility], RN  Verbalizes/displays adequate comfort level or baseline comfort level:   Assess pain using appropriate pain scale   Administer analgesics based on type and severity of pain and evaluate response   Implement non-pharmacological measures as appropriate and evaluate response   Consider cultural and social influences on pain and pain management     Problem: Safety - Adult  Goal: Free from fall injury  Outcome: Progressing  Flowsheets  Taken 9/9/2022 2000 by Dasha Lee RN  Free From Fall Injury: Instruct family/caregiver on patient safety  Taken 9/9/2022 0800 by [facility], RN  Free From Fall Injury: Instruct family/caregiver on patient safety     Problem: ABCDS Injury Assessment  Goal: Absence of physical injury  Outcome: Progressing  Flowsheets  Taken 9/9/2022 2000 by Dasha Lee RN  Absence of Physical Injury: Implement safety measures based on patient assessment  Taken 9/9/2022 0800 by [facility], RN  Absence of Physical Injury: Implement safety measures based on patient assessment     Problem: Skin/Tissue Integrity  Goal: Absence of new skin breakdown  Description: 1. Monitor for areas of redness and/or skin breakdown  2. Assess vascular access sites hourly  3. Every 4-6 hours minimum:  Change oxygen saturation probe site  4. Every 4-6 hours:  If on nasal continuous positive airway pressure, respiratory therapy assess nares and determine need for appliance change or resting period.   Outcome: Progressing Problem: Chronic Conditions and Co-morbidities  Goal: Patient's chronic conditions and co-morbidity symptoms are monitored and maintained or improved  Outcome: Progressing  Flowsheets  Taken 9/9/2022 2000 by Garrick Randolph RN  Care Plan - Patient's Chronic Conditions and Co-Morbidity Symptoms are Monitored and Maintained or Improved: Monitor and assess patient's chronic conditions and comorbid symptoms for stability, deterioration, or improvement  Taken 9/9/2022 0800 by Zev Helton RN  Care Plan - Patient's Chronic Conditions and Co-Morbidity Symptoms are Monitored and Maintained or Improved:   Monitor and assess patient's chronic conditions and comorbid symptoms for stability, deterioration, or improvement   Collaborate with multidisciplinary team to address chronic and comorbid conditions and prevent exacerbation or deterioration   Update acute care plan with appropriate goals if chronic or comorbid symptoms are exacerbated and prevent overall improvement and discharge     Problem: Nutrition Deficit:  Goal: Optimize nutritional status  Outcome: Progressing  Flowsheets (Taken 9/9/2022 1056 by Isael Ndiaye RD)  Nutrient intake appropriate for improving, restoring, or maintaining nutritional needs:   Monitor oral intake, labs, and treatment plans   Recommend, monitor, and adjust tube feedings and TPN/PPN based on assessed needs     Problem: Respiratory - Adult  Goal: Achieves optimal ventilation and oxygenation  Outcome: Progressing  Flowsheets  Taken 9/9/2022 2000 by Garrick Randolph RN  Achieves optimal ventilation and oxygenation: Assess for changes in respiratory status  Taken 9/9/2022 1200 by Zev Helton RN  Achieves optimal ventilation and oxygenation:   Assess for changes in respiratory status   Assess for changes in mentation and behavior   Position to facilitate oxygenation and minimize respiratory effort   Oxygen supplementation based on oxygen saturation or arterial blood gases  Taken 9/9/2022 0800 by [facility] symptoms of electrolyte imbalances   Administer electrolyte replacement as ordered   Monitor response to electrolyte replacements, including repeat lab results as appropriate     Problem: Metabolic/Fluid and Electrolytes - Adult  Goal: Hemodynamic stability and optimal renal function maintained  Outcome: Progressing  Flowsheets  Taken 9/9/2022 2000 by Kennedy Reyes RN  Hemodynamic stability and optimal renal function maintained: Monitor labs and assess for signs and symptoms of volume excess or deficit  Taken 9/9/2022 0800 by [facility], RN  Hemodynamic stability and optimal renal function maintained:   Monitor labs and assess for signs and symptoms of volume excess or deficit   Monitor intake, output and patient weight   Monitor response to interventions for patient's volume status, including labs, urine output, blood pressure (other measures as available)

## 2022-09-10 NOTE — PROGRESS NOTES
MECHANICAL VENTILATION WEANING PROTOCOL    PRE-TRIAL PATIENT ASSESSMENT - COMPLETED     Ventilatory Assessment:    PARAMETER CRITERIA FOR WEANING   Spontaneous Cough:  Yes    Sputum Characteristics:          SPONTANEOUS COUGH With small to moderate  Amount of secretions   FiO2 : 30 % FIO2 less than or equal to 50%     PEEP less than or equal to 8   Progressive Mobility Protocol  No     ABG:  Lab Results   Component Value Date/Time    PHART 7.427 09/08/2022 11:04 AM    RNP3HSU 40.5 09/08/2022 11:04 AM    PO2ART 68.0 09/08/2022 11:04 AM    M7AQKSWG 94 09/08/2022 11:04 AM    UYK8XBH 26.7 09/08/2022 11:04 AM    BEART 2 09/08/2022 11:04 AM     HGB/WBC:  Lab Results   Component Value Date/Time    HGB 14.0 09/10/2022 05:26 AM    WBC 8.7 09/10/2022 05:26 AM        Vital Signs:    PARAMETER CRITERIA FOR WEANING Meets Criteria   Heart Rate 63 Within patient's normal limits / stable Yes   Resp: 16 Less than or equal to 30 Yes   BP: 138/73 Within patient's normal limits / minimal pressors (Hemodynamically Stable) Yes   SpO2: 94 % Greater than or equal to 90% Yes   End Tidal CO2: 27 (%) Within patient's normal limits Yes   Temp: 98.3 °F (36.8 °C) Less than 38. 5oC / 101. 3oF Yes     [x]    Based on this assessment and the [facility] Ventilator Weaning Protocol, this patient  IS being placed on a Spontaneous Breathing Trial (SBT) at this time.  []    Based on this assessment and the [facility] Ventilator Weaning Protocol, this patient  IS NOT being placed on a Spontaneous Breathing Trial (SBT) at this time. []    Patient  IS NOT being placed on a Spontaneous Breathing Trial (SBT) at this time because of factors not previously addressed.   Those factors include      Vital Capacity (VC) = 386    Maximum Inspiratory Force (MIF) = <-25    SBT - Initiated at  746    Ventilator Settings:  CPAP - 8 cmH2O, PS - 5 cmH2O(if using settings other than CPAP 5/PS 8, please explain reasons for settings here):       1 Minute SBT Respiratory Parameters:   VE: 14.2 L   RR: 28 b/m   VT: 507 mL (average VT = VE/RR)   RSBI: 55 (RR/VT in liters)   ETCO2: 27 cmH2O   SPO2: 97 %   If on sedation, amount and type Precedex 1.5, Fentanyl 75    [x]   RR is less than 35, RSBI is less than 100, patient's vitals signs are stable, and patient is in no apparent distress; therefore, patient is being left on SBT for up to 1 hour. []   RR is greater than 35, RSBI is greater than 100, VS's are unstable, or patient is in distress; therefore, patient is being placed back on previous settings. SBT - Concluded at  941. Weaning Parameters/VS's at conclusion of SBT:   VE: 13.8 L   RR: 32 b/m   VT: 431 mL (average VT = VE/RR)   RSBI: 74 (RR/VT in liters)   ETCO2: 29 cmH2O   SPO2: 94 %    If on sedation, amount and type Fentanyl    [x]   Patient tolerated SBT for full 60 minutes with acceptable weaning parameters and vital signs and showed no signs or distress. []   Patient tolerated SBT for full 60 minutes, but had unacceptable weaning parameters or vital signs, and/or signs of distress. []   Patient was unable to tolerate SBT for 60 minutes and was placed back on previous settings.             COMMENTS:

## 2022-09-10 NOTE — PROGRESS NOTES
Hospitalist Progress Note      PCP: Andressa Garza MD    Date of Admission: 9/1/2022    Chief Complaint: syncope    Hospital Course:  H& P reviewed       Subjective: The patient was seen and examined at the bedside  Patient extubated today  Patient is alert but still drowsy  On 2 to 3 L oxygen      Medications:  Reviewed    Infusion Medications    dexmedetomidine (PRECEDEX) IV infusion 1.5 mcg/kg/hr (09/10/22 0943)    sodium chloride       Scheduled Medications    famotidine (PEPCID) injection  20 mg IntraVENous BID    levETIRAcetam  1,500 mg IntraVENous Q12H    enoxaparin  40 mg SubCUTAneous Daily    lidocaine PF  5 mL IntraDERmal Once    sodium chloride flush  5-40 mL IntraVENous 2 times per day    thiamine  100 mg IntraVENous Daily     PRN Meds: sodium chloride flush, sodium chloride, ondansetron **OR** ondansetron, polyethylene glycol, acetaminophen **OR** acetaminophen      Intake/Output Summary (Last 24 hours) at 9/10/2022 1138  Last data filed at 9/10/2022 1000  Gross per 24 hour   Intake 1145.33 ml   Output 5230 ml   Net -4084.67 ml         Physical Exam Performed:    /67   Pulse 76   Temp 98.5 °F (36.9 °C) (Axillary)   Resp 19   Ht 5' 10\" (1.778 m)   Wt 212 lb 1.3 oz (96.2 kg)   SpO2 96%   BMI 30.43 kg/m²     General appearance: Alert confused, in mild to moderate respiratory distress not in pain  HEENT: Pupils equal, round, Conjunctivae/corneas clear. Neck: Supple, with full range of motion. No jugular venous distention. Trachea midline. Respiratory:   coarse vent sounds anteriorly   Cardiovascular: Regular rate and rhythm with normal S1/S2 without murmurs, rubs or gallops. Abdomen: Soft, non-tender, non-distended with normal bowel sounds. Musculoskeletal: No clubbing, cyanosis or edema bilaterally. Skin: cold,  no overt lesion on exposed skin.    Neurologic: Alert confused, unable to assess accurately          Labs:   Recent Labs     09/08/22  0339 09/09/22  0305 09/10/22  7806 WBC 8.8 9.7 8.7   HGB 13.4* 13.5 14.0   HCT 39.1* 38.9* 40.8    135 151       Recent Labs     09/08/22  0339 09/09/22  0305 09/10/22  0526   * 135* 136   K 5.0 4.0 3.6    101 98*   CO2 23 22 22   BUN 10 11 11   CREATININE 0.5* <0.5* 0.6*   CALCIUM 8.4 8.7 9.1   PHOS 3.6 2.9 3.1       No results for input(s): AST, ALT, BILIDIR, BILITOT, ALKPHOS in the last 72 hours. No results for input(s): INR in the last 72 hours. No results for input(s): Yovany Ebbs in the last 72 hours. Urinalysis:      Lab Results   Component Value Date/Time    NITRU Negative 09/01/2022 05:09 PM    WBCUA 0-2 09/01/2022 05:09 PM    BACTERIA Rare 09/01/2022 05:09 PM    RBCUA 0-2 09/01/2022 05:09 PM    BLOODU MODERATE 09/01/2022 05:09 PM    SPECGRAV >=1.030 09/01/2022 05:09 PM    GLUCOSEU Negative 09/01/2022 05:09 PM       Radiology:  XR CHEST PORTABLE   Final Result   1. Worsened pulmonary edema. 2.  Persistent bibasilar atelectasis. MRI BRAIN WO CONTRAST   Final Result      Faint diffusion restriction in the right hippocampus with associated FLAIR signal abnormality. This is favored to represent sequelae of seizures, however other infectious/inflammatory etiologies are also possible. Mild atrophy and chronic small vessel ischemic change. Areas of encephalomalacia and gliosis in both cerebral hemispheres with remote hemorrhagic staining from prior insults. IR LUMBAR PUNCTURE FOR DIAGNOSIS   Final Result   . IMPRESSION:   1. Uneventful diagnostic fluoroscopic guided lumbar puncture as the   2. The flow reversed are risk and therefore a closing pressure was obtained at the end of the procedure, 26 cm of water. XR ABDOMEN (KUB) (SINGLE AP VIEW)   Final Result   Findings/Impression:    The tip of the enteric tube terminates in the distal body of the stomach. CT CHEST WO CONTRAST   Final Result      CHEST:      1. Moderate dependent atelectasis bilaterally.       ABDOMEN/PELVIS: 1.  No acute abnormality on noncontrast CT of the abdomen and pelvis. 2.  Cholelithiasis. CT ABDOMEN PELVIS WO CONTRAST Additional Contrast? None   Final Result      CHEST:      1. Moderate dependent atelectasis bilaterally. ABDOMEN/PELVIS:      1.  No acute abnormality on noncontrast CT of the abdomen and pelvis. 2.  Cholelithiasis. CTA HEAD NECK W CONTRAST   Final Result      CTA Head:   No acute CTA findings. No hemodynamically significant stenosis or focal aneurysm. No arteriovenous confirmation. CTA Neck:   No acute CTA findings. No hemodynamically significant stenosis or focal aneurysm. CT HEAD WO CONTRAST   Final Result      1. No findings for acute intracranial abnormality. 2.  Age-related atrophy with patchy periventricular white matter changes bilaterally consistent with chronic small vessel ischemia. 3.  Stable low attenuation left frontoparietal lobe consistent with previous insult from known prior intraparenchymal hematoma. Stable right frontoparietal cortical infarct. These findings were called to the emergency room physician Dr. Jojo Taylor on 9/1/2022 at 5:30 p.m. XR CHEST PORTABLE   Final Result   1. No findings for acute cardiopulmonary disease. 2.  ET tube in good position in the mid trachea. Assessment/Plan:    Active Hospital Problems    Diagnosis     Acute respiratory failure ([address] [J96.00]      Priority: Medium    Seizure ([address] [R56.9]      Priority: Medium             Acute encephalopathy:  most likely secondary to seizure. s/p intubation, and extubation today , pulmonary/  critical careon board . Neurology consulted and following. S/p LP. EEG and MRI reviewed. On Keppra.     Acute respiratory failure with hypoxia: Status post extubation   Continue on supplemental oxygen as needed  Wean off oxygen as possible  Chest x-ray show pulmonary edema, status post doses of Lasix    Elevated troponin:  cardiology consulted and following- recommending conservative management. History of alcohol abuse continue thiamine. SIRS:  unclear focus of infection. Was On empiric antibiotics and acyclovir. Blood cultures with no growth so far. CSF culture no growth  Off AB   Bridle no leukocytosis     Hypomagnesemia: replace     Hypothermia: cause unclear. TSH was abnormal. Shila hugger in place. Abnormal TFT: TSH 5.16, FT4 was normal. Possibly subclinical hypothyroidism from acute illness. Repeat TFT in 4-6 weeks. DVT Prophylaxis: lovenox     Diet: Diet NPO  ADULT TUBE FEEDING; Orogastric; Peptide Based High Protein; Continuous; 10; Yes; 10; Q 4 hours; 75; 30; Q 4 hours  Code Status: Full Code      Dispo - continue in ICU. Hard to estimate length of stay- pending clinical course.                Elizabeth Pretty MD

## 2022-09-11 LAB
A/G RATIO: 1.4 (ref 1.1–2.2)
ALBUMIN SERPL-MCNC: 3.7 G/DL (ref 3.4–5)
ALP BLD-CCNC: 79 U/L (ref 40–129)
ALT SERPL-CCNC: 14 U/L (ref 10–40)
ANION GAP SERPL CALCULATED.3IONS-SCNC: 16 MMOL/L (ref 3–16)
AST SERPL-CCNC: 35 U/L (ref 15–37)
BASOPHILS ABSOLUTE: 0 K/UL (ref 0–0.2)
BASOPHILS RELATIVE PERCENT: 0.3 %
BILIRUB SERPL-MCNC: 1.1 MG/DL (ref 0–1)
BUN BLDV-MCNC: 12 MG/DL (ref 7–20)
CALCIUM SERPL-MCNC: 9 MG/DL (ref 8.3–10.6)
CHLORIDE BLD-SCNC: 99 MMOL/L (ref 99–110)
CO2: 23 MMOL/L (ref 21–32)
CREAT SERPL-MCNC: 0.5 MG/DL (ref 0.8–1.3)
EOSINOPHILS ABSOLUTE: 0.1 K/UL (ref 0–0.6)
EOSINOPHILS RELATIVE PERCENT: 1.1 %
GFR AFRICAN AMERICAN: >60
GFR NON-AFRICAN AMERICAN: >60
GLUCOSE BLD-MCNC: 82 MG/DL (ref 70–99)
HCT VFR BLD CALC: 41 % (ref 40.5–52.5)
HEMOGLOBIN: 14.1 G/DL (ref 13.5–17.5)
LYMPHOCYTES ABSOLUTE: 1.6 K/UL (ref 1–5.1)
LYMPHOCYTES RELATIVE PERCENT: 17.4 %
MAGNESIUM: 1.7 MG/DL (ref 1.8–2.4)
MCH RBC QN AUTO: 31.9 PG (ref 26–34)
MCHC RBC AUTO-ENTMCNC: 34.3 G/DL (ref 31–36)
MCV RBC AUTO: 93 FL (ref 80–100)
MONOCYTES ABSOLUTE: 1.2 K/UL (ref 0–1.3)
MONOCYTES RELATIVE PERCENT: 12.7 %
NEUTROPHILS ABSOLUTE: 6.3 K/UL (ref 1.7–7.7)
NEUTROPHILS RELATIVE PERCENT: 68.5 %
PDW BLD-RTO: 13.6 % (ref 12.4–15.4)
PHOSPHORUS: 2.8 MG/DL (ref 2.5–4.9)
PLATELET # BLD: 160 K/UL (ref 135–450)
PMV BLD AUTO: 8.7 FL (ref 5–10.5)
POTASSIUM SERPL-SCNC: 3.7 MMOL/L (ref 3.5–5.1)
RBC # BLD: 4.41 M/UL (ref 4.2–5.9)
SODIUM BLD-SCNC: 138 MMOL/L (ref 136–145)
TOTAL PROTEIN: 6.3 G/DL (ref 6.4–8.2)
TRIGL SERPL-MCNC: 166 MG/DL (ref 0–150)
WBC # BLD: 9.3 K/UL (ref 4–11)

## 2022-09-11 PROCEDURE — 99233 SBSQ HOSP IP/OBS HIGH 50: CPT | Performed by: INTERNAL MEDICINE

## 2022-09-11 PROCEDURE — 6360000002 HC RX W HCPCS: Performed by: STUDENT IN AN ORGANIZED HEALTH CARE EDUCATION/TRAINING PROGRAM

## 2022-09-11 PROCEDURE — 2500000003 HC RX 250 WO HCPCS

## 2022-09-11 PROCEDURE — 94761 N-INVAS EAR/PLS OXIMETRY MLT: CPT

## 2022-09-11 PROCEDURE — 2500000003 HC RX 250 WO HCPCS: Performed by: STUDENT IN AN ORGANIZED HEALTH CARE EDUCATION/TRAINING PROGRAM

## 2022-09-11 PROCEDURE — 2580000003 HC RX 258: Performed by: STUDENT IN AN ORGANIZED HEALTH CARE EDUCATION/TRAINING PROGRAM

## 2022-09-11 PROCEDURE — 85025 COMPLETE CBC W/AUTO DIFF WBC: CPT

## 2022-09-11 PROCEDURE — A4216 STERILE WATER/SALINE, 10 ML: HCPCS | Performed by: STUDENT IN AN ORGANIZED HEALTH CARE EDUCATION/TRAINING PROGRAM

## 2022-09-11 PROCEDURE — 2580000003 HC RX 258: Performed by: NURSE PRACTITIONER

## 2022-09-11 PROCEDURE — 83735 ASSAY OF MAGNESIUM: CPT

## 2022-09-11 PROCEDURE — 6360000002 HC RX W HCPCS: Performed by: INTERNAL MEDICINE

## 2022-09-11 PROCEDURE — 80053 COMPREHEN METABOLIC PANEL: CPT

## 2022-09-11 PROCEDURE — 2700000000 HC OXYGEN THERAPY PER DAY

## 2022-09-11 PROCEDURE — 36415 COLL VENOUS BLD VENIPUNCTURE: CPT

## 2022-09-11 PROCEDURE — 6360000002 HC RX W HCPCS: Performed by: NURSE PRACTITIONER

## 2022-09-11 PROCEDURE — 2000000000 HC ICU R&B

## 2022-09-11 PROCEDURE — 84478 ASSAY OF TRIGLYCERIDES: CPT

## 2022-09-11 RX ORDER — OLANZAPINE 10 MG/1
INJECTION, POWDER, LYOPHILIZED, FOR SOLUTION INTRAMUSCULAR
Status: COMPLETED
Start: 2022-09-11 | End: 2022-09-11

## 2022-09-11 RX ORDER — OLANZAPINE 10 MG/1
5 INJECTION, POWDER, LYOPHILIZED, FOR SOLUTION INTRAMUSCULAR
Status: ACTIVE | OUTPATIENT
Start: 2022-09-11 | End: 2022-09-11

## 2022-09-11 RX ORDER — MAGNESIUM SULFATE IN WATER 40 MG/ML
2000 INJECTION, SOLUTION INTRAVENOUS ONCE
Status: COMPLETED | OUTPATIENT
Start: 2022-09-11 | End: 2022-09-11

## 2022-09-11 RX ADMIN — LEVETIRACETAM 1500 MG: 100 INJECTION, SOLUTION, CONCENTRATE INTRAVENOUS at 14:07

## 2022-09-11 RX ADMIN — DEXMEDETOMIDINE 1.5 MCG/KG/HR: 100 INJECTION, SOLUTION INTRAVENOUS at 08:20

## 2022-09-11 RX ADMIN — DEXMEDETOMIDINE 1.5 MCG/KG/HR: 100 INJECTION, SOLUTION INTRAVENOUS at 21:52

## 2022-09-11 RX ADMIN — DEXMEDETOMIDINE 1.5 MCG/KG/HR: 100 INJECTION, SOLUTION INTRAVENOUS at 01:37

## 2022-09-11 RX ADMIN — DEXMEDETOMIDINE 1.5 MCG/KG/HR: 100 INJECTION, SOLUTION INTRAVENOUS at 18:30

## 2022-09-11 RX ADMIN — DEXMEDETOMIDINE 1.5 MCG/KG/HR: 100 INJECTION, SOLUTION INTRAVENOUS at 11:45

## 2022-09-11 RX ADMIN — MAGNESIUM SULFATE HEPTAHYDRATE 2000 MG: 40 INJECTION, SOLUTION INTRAVENOUS at 15:04

## 2022-09-11 RX ADMIN — OLANZAPINE 10 MG: 10 INJECTION, POWDER, LYOPHILIZED, FOR SOLUTION INTRAMUSCULAR at 19:45

## 2022-09-11 RX ADMIN — SODIUM CHLORIDE, PRESERVATIVE FREE 20 MG: 5 INJECTION INTRAVENOUS at 08:55

## 2022-09-11 RX ADMIN — THIAMINE HYDROCHLORIDE 100 MG: 100 INJECTION, SOLUTION INTRAMUSCULAR; INTRAVENOUS at 08:55

## 2022-09-11 RX ADMIN — LEVETIRACETAM 1500 MG: 100 INJECTION, SOLUTION, CONCENTRATE INTRAVENOUS at 01:41

## 2022-09-11 RX ADMIN — ENOXAPARIN SODIUM 40 MG: 100 INJECTION SUBCUTANEOUS at 08:55

## 2022-09-11 RX ADMIN — SODIUM CHLORIDE, PRESERVATIVE FREE 20 MG: 5 INJECTION INTRAVENOUS at 21:07

## 2022-09-11 RX ADMIN — DEXMEDETOMIDINE 1.5 MCG/KG/HR: 100 INJECTION, SOLUTION INTRAVENOUS at 15:04

## 2022-09-11 RX ADMIN — DEXMEDETOMIDINE 1.5 MCG/KG/HR: 100 INJECTION, SOLUTION INTRAVENOUS at 04:58

## 2022-09-11 RX ADMIN — SODIUM CHLORIDE, PRESERVATIVE FREE 10 ML: 5 INJECTION INTRAVENOUS at 21:06

## 2022-09-11 ASSESSMENT — PAIN SCALES - GENERAL: PAINLEVEL_OUTOF10: 0

## 2022-09-11 NOTE — PLAN OF CARE
Problem: Safety - Medical Restraint  Goal: Remains free of injury from restraints (Restraint for Interference with Medical Device)  Description: INTERVENTIONS:  1. Determine that other, less restrictive measures have been tried or would not be effective before applying the restraint  2. Evaluate the patient's condition at the time of restraint application  3. Inform patient/family regarding the reason for restraint  4. Q2H: Monitor safety, psychosocial status, comfort, nutrition and hydration  Outcome: Progressing  Flowsheets (Taken 9/11/2022 0600)  Remains free of injury from restraints (restraint for interference with medical device): Determine that other, less restrictive measures have been tried or would not be effective before applying the restraint     Problem: Discharge Planning  Goal: Discharge to home or other facility with appropriate resources  Outcome: Progressing     Problem: Pain  Goal: Verbalizes/displays adequate comfort level or baseline comfort level    Problem: ABCDS Injury Assessment  Goal: Absence of physical injury  Outcome: Progressing     Problem: Skin/Tissue Integrity  Goal: Absence of new skin breakdown  Description: 1. Monitor for areas of redness and/or skin breakdown  2. Assess vascular access sites hourly  3. Every 4-6 hours minimum:  Change oxygen saturation probe site  4. Every 4-6 hours:  If on nasal continuous positive airway pressure, respiratory therapy assess nares and determine need for appliance change or resting period.   Outcome: Progressing     Problem: Chronic Conditions and Co-morbidities  Goal: Patient's chronic conditions and co-morbidity symptoms are monitored and maintained or improved  Outcome: Progressing     Problem: Nutrition Deficit:  Goal: Optimize nutritional status  Outcome: Progressing     Problem: Respiratory - Adult  Goal: Achieves optimal ventilation and oxygenation  Outcome: Progressing     Problem: Cardiovascular - Adult  Goal: Maintains optimal cardiac output and hemodynamic stability  Outcome: Progressing     Problem: Cardiovascular - Adult  Goal: Absence of cardiac dysrhythmias or at baseline  Outcome: Progressing     Problem: Metabolic/Fluid and Electrolytes - Adult  Goal: Electrolytes maintained within normal limits  Outcome: Progressing     Problem: Metabolic/Fluid and Electrolytes - Adult  Goal: Hemodynamic stability and optimal renal function maintained  Outcome: Progressing   Outcome: Progressing  Flowsheets (Taken 9/11/2022 0800 by Selin Meneses RN)  Verbalizes/displays adequate comfort level or baseline comfort level: Encourage patient to monitor pain and request assistance     Problem: Safety - Adult  Goal: Free from fall injury  Outcome: Progressing

## 2022-09-11 NOTE — PROGRESS NOTES
ICU Progress Note    Admit Date: 9/1/2022  IV Access: Peripheral  Vasopressors: None                Antibiotics: None  Diet: Diet NPO  ADULT TUBE FEEDING; Orogastric; Peptide Based High Protein; Continuous; 10; Yes; 10; Q 4 hours; 75; 30; Q 4 hours    CC: AMS    Interval history:   Successful extubation yesterday. Disoriented and restless overnight; received additional Zyprexa 5. Oriented to self, place and answers most questions appropriately, but remains overall confused. HPI:   The patient is a 61-year-old man with past medical history significant for chronic alcoholism, ICH, CAD, DVT who presented today to the ED with an episode of syncope. The history was mainly obtained by the sister since patient was intubated. He was last known well at 9 AM but the sister. According to the sister, she had just returned home from work which she had found him half lying down on the couch covered in the stool while his car outside was running outside with occasion. She denies any complaints of recent fever, chills, chest pain cough, shortness of breath, lightheadedness, palpitations, nausea, vomiting, abdominal pain, diarrhea, fatigue, visual disturbance, changes in urination frequency or burning pain well urination or headaches by the patient in the preceding days to this incident. She does reinstate that her brother is a chronic alcoholic and had just gotten out of rehab. He has been sober since however she was not aware if he had recently had any alcoholic drink. He had also informed that he had recent episode of intracerebral hemorrhage on 9/4/2021. In the ED, there was a concern for possibility of a stroke however he was not considered a thrombolytic candidate because of her previous ICH and long time since his last known well. According to the ED staff his physical exam was more pertinent for nonfocal delirium. It was also significant for nystagmus and rightward gaze not fixed.   He was given 2 mg of Versed and it had worsened his oxygen saturations that had dropped to 88% on a nonrebreather and therefore he was intubated. CT Abdomen, chest: Moderate dependent atelectasis bilaterally. No acute abnormality on noncontrast CT of the abdomen and pelvis. Cholelithiasis. CTA Head: No acute CTA findings. No hemodynamically significant stenosis or focal aneurysm. No arteriovenous confirmation. CTA Neck: No acute CTA findings. No hemodynamically significant stenosis or focal aneurysm. Patient was admitted to the ICU for further workup and management of his possible meningitis, on MV.      ROS:  Review of Systems   Unable to perform ROS: Mental status change       Medications:     Scheduled Meds:   melatonin  5 mg Oral Nightly    famotidine (PEPCID) injection  20 mg IntraVENous BID    levETIRAcetam  1,500 mg IntraVENous Q12H    enoxaparin  40 mg SubCUTAneous Daily    lidocaine PF  5 mL IntraDERmal Once    sodium chloride flush  5-40 mL IntraVENous 2 times per day    thiamine  100 mg IntraVENous Daily     Continuous Infusions:   dexmedetomidine (PRECEDEX) IV infusion 1.5 mcg/kg/hr (09/11/22 0820)    sodium chloride       PRN Meds:sodium chloride flush, sodium chloride, ondansetron **OR** ondansetron, polyethylene glycol, acetaminophen **OR** acetaminophen    Objective:   Vitals:   T-max:  Patient Vitals for the past 8 hrs:   BP Temp Temp src Pulse Resp SpO2   09/11/22 0700 124/72 -- -- 63 27 --   09/11/22 0500 (!) 148/78 -- -- 80 20 91 %   09/11/22 0400 (!) 148/75 98.7 °F (37.1 °C) Axillary 75 18 94 %   09/11/22 0300 (!) 147/71 -- -- 77 20 94 %         Intake/Output Summary (Last 24 hours) at 9/11/2022 0906  Last data filed at 9/11/2022 0900  Gross per 24 hour   Intake 805.3 ml   Output 1330 ml   Net -524.7 ml           Physical Exam:  Physical Exam  Vitals and nursing note reviewed. Constitutional:       Appearance: He is obese. HENT:      Head: Normocephalic and atraumatic.       Mouth/Throat:      Mouth: Mucous membranes are moist.      Pharynx: Oropharynx is clear. Cardiovascular:      Rate and Rhythm: Normal rate and regular rhythm. Pulmonary:      Effort: Pulmonary effort is normal. No respiratory distress. Breath sounds: Normal breath sounds. No wheezing or rales. Skin:     General: Skin is warm and dry. Coloration: Skin is not jaundiced or pale. Neurological:      Mental Status: He is alert. Comments: Oriented to self, place but not month. LABS:    CBC:   Recent Labs     09/09/22  0305 09/10/22  0526 09/11/22  0530   WBC 9.7 8.7 9.3   HGB 13.5 14.0 14.1   HCT 38.9* 40.8 41.0    151 160   MCV 93.3 92.5 93.0       Renal:    Recent Labs     09/09/22  0305 09/10/22  0526 09/11/22  0530   * 136 138   K 4.0 3.6 3.7    98* 99   CO2 22 22 23   BUN 11 11 12   CREATININE <0.5* 0.6* 0.5*   GLUCOSE 128* 107* 82   CALCIUM 8.7 9.1 9.0   MG 1.80 1.70* 1.70*   PHOS 2.9 3.1 2.8   ANIONGAP 12 16 16       Hepatic:   Recent Labs     09/09/22  0305 09/10/22  0526 09/11/22  0530   AST  --   --  35   ALT  --   --  14   BILITOT  --   --  1.1*   PROT  --   --  6.3*   LABALBU 3.4 3.3* 3.7   ALKPHOS  --   --  79       Troponin: No results for input(s): TROPONINI in the last 72 hours. BNP: No results for input(s): BNP in the last 72 hours. Lipids: No results for input(s): CHOL, HDL in the last 72 hours. Invalid input(s): LDLCALCU, TRIGLYCERIDE  ABGs:    Recent Labs     09/08/22  1104 09/10/22  0948   PHART 7.427 7.517*   [id number] 40.5 33.8*   PO2ART 68.0* 68.8*   [id number] 26.7 27.4   BEART 2 5*   [id number] 94 95   XBI8PBF 28 28         INR: No results for input(s): INR in the last 72 hours. Lactate:   Recent Labs     09/10/22  0948   LACTATE 1.02     Cultures:  -----------------------------------------------------------------  RAD:   XR CHEST PORTABLE   Final Result   1. Worsened pulmonary edema. 2.  Persistent bibasilar atelectasis.       MRI BRAIN WO CONTRAST   Final Result      Faint diffusion restriction in the right hippocampus with associated FLAIR signal abnormality. This is favored to represent sequelae of seizures, however other infectious/inflammatory etiologies are also possible. Mild atrophy and chronic small vessel ischemic change. Areas of encephalomalacia and gliosis in both cerebral hemispheres with remote hemorrhagic staining from prior insults. IR LUMBAR PUNCTURE FOR DIAGNOSIS   Final Result   . IMPRESSION:   1. Uneventful diagnostic fluoroscopic guided lumbar puncture as the   2. The flow reversed are risk and therefore a closing pressure was obtained at the end of the procedure, 26 cm of water. XR ABDOMEN (KUB) (SINGLE AP VIEW)   Final Result   Findings/Impression:    The tip of the enteric tube terminates in the distal body of the stomach. CT CHEST WO CONTRAST   Final Result      CHEST:      1. Moderate dependent atelectasis bilaterally. ABDOMEN/PELVIS:      1.  No acute abnormality on noncontrast CT of the abdomen and pelvis. 2.  Cholelithiasis. CT ABDOMEN PELVIS WO CONTRAST Additional Contrast? None   Final Result      CHEST:      1. Moderate dependent atelectasis bilaterally. ABDOMEN/PELVIS:      1.  No acute abnormality on noncontrast CT of the abdomen and pelvis. 2.  Cholelithiasis. CTA HEAD NECK W CONTRAST   Final Result      CTA Head:   No acute CTA findings. No hemodynamically significant stenosis or focal aneurysm. No arteriovenous confirmation. CTA Neck:   No acute CTA findings. No hemodynamically significant stenosis or focal aneurysm. CT HEAD WO CONTRAST   Final Result      1. No findings for acute intracranial abnormality. 2.  Age-related atrophy with patchy periventricular white matter changes bilaterally consistent with chronic small vessel ischemia. 3.  Stable low attenuation left frontoparietal lobe consistent with previous insult from known prior intraparenchymal hematoma. Stable right frontoparietal cortical infarct. These findings were called to the emergency room physician Dr. Kay Ny on 9/1/2022 at 5:30 p.m. XR CHEST PORTABLE   Final Result   1. No findings for acute cardiopulmonary disease. 2.  ET tube in good position in the mid trachea. Assessment/Plan:   Yunior Burger is a 57 y.o. male with a PMH of alcohol abuse, CAD, CHF, essential tremor, HTN,  who was admitted with AMS. #Acute Metabolic Encephalopathy 2/2 possible meningitis vs encephalitis induced seizure   On presentation AMS, elevated WBC: 21.7, Tachypneic , Tachycardic, Hypotensive. Hx of ICH, SAH and SDH 2/2 to fall, hx of chronic alcohol abuse,   CT head: Stable low attenuation left frontoparietal lobe consistent with previous insult from known prior intraparenchymal hematoma. Stable right frontoparietal cortical infarct. CT Abdomen, chest: Moderate dependent atelectasis bilaterally. No acute abnormality on noncontrast CT of the abdomen and pelvis. Cholelithiasis. CTA Head: No acute CTA findings. No hemodynamically significant stenosis or focal aneurysm. No arteriovenous confirmation. CTA Neck: No acute CTA findings. No hemodynamically significant stenosis or focal aneurysm.  -blood cultures ordered (9/2): No growth  -cEEG currently on readings: Generalized background abnormalities indicative of an underlying diffuse encephalopathy of non-specific etiology. Intermittent focal epileptiform discharges, right posterior temporal, conferring increased risk of focal onset seizures from this region.  -On Keppra 1500 bid  -Consulted Neurology: appreciate recs  -Lumbar puncture ordered wnl, awaiting culture  -MRI: Faint diffusion restriction in the right hippocampus with associated FLAIR signal abnormality. This is favored to represent sequelae of seizures, however other infectious/inflammatory etiologies are also possible.   - Wean precedex    #Alcohol use disorder  -thiamine 100 mg daily  -monitor for withdrawal  -Ethanol level: none detected    #Acute hypercapnic respiratory failure secondary to possible aspiration vs 2/2 medication  On presentation: VBG PH:7.264, PCO2: 56.4, 83.2  Extubated successfully 9/10    #Hx of Cirrhosis       -Ammonia:36 wnl(09/1/22)    Code Status: Full Code  FEN: Orogastric; Peptide-based; Continuous; 10; Yes; 10; Q8 hours; 45; 30; Q4 hours; Protein; Twice per day administer via NG, flush 30 mL before and after administration  PPX: Enoxaparin  DISPO: ICU  Barriers to discharge: Precedex gtt    Byron Lovett MD, PGY-1  09/11/22  9:06 AM    This patient has been staffed and discussed with Dr. Phong Packer. Pulm/CC     Patient was seen, examined and discussed with Dr. Piotr Thompson. I agree with the interval history. My physical exam confirms the findings listed below  Chart was reviewed including labs, CXR, CT scan and medical records confirm the findings noted     Pt alert and follows commands  Good cough  Minimal secretions     SBT - Concluded at  941. Weaning Parameters/VS's at conclusion of SBT:        VE:        13.8 L        RR:        32 b/m        VT:        431 mL (average VT = VE/RR)        RSBI:    74 (RR/VT in liters)        ETCO2: 29 cmH2O        SPO2:   94 %         If on sedation, amount and type Fentanyl        Assessment  Acute encephalopathy -unclear etiology but improving  Delerium  Acute respiratory failure - extubtaed 9/10   EEG with generalized discharges that may be associated with seizures  Leukocytosis - resolved   Mildly elevated troponin. Likely due to demand ischemia.   EKG with septal Q waves and T inv laterally   Hypotension requiring levophed: Resolved  Hx of ICH  Hx of alcohol use   Pulmonary edema     Plan  Cont precedex - wean as able  Wean oxygen for goal O2 sat 88%  Continue Keppra  Neurology following          Katey Nassar MD

## 2022-09-11 NOTE — PROGRESS NOTES
Hospitalist Progress Note      PCP: Ander Philip MD    Date of Admission: 9/1/2022    Chief Complaint: syncope    Hospital Course:  H& P reviewed       Subjective:   Seen and examined patient at bedside. Afebrile. extubated on 9/10. On RA with SpO2 95%  Oriented to self only. Agitated, confused, restless. Visual hallucinations last night. CIWA score 22 as of last night. Currently still on 1.5 mcg/kg/hr of precedex. Sleeping. Medications:  Reviewed    Infusion Medications    dexmedetomidine (PRECEDEX) IV infusion 1.5 mcg/kg/hr (09/11/22 0820)    sodium chloride       Scheduled Medications    melatonin  5 mg Oral Nightly    famotidine (PEPCID) injection  20 mg IntraVENous BID    levETIRAcetam  1,500 mg IntraVENous Q12H    enoxaparin  40 mg SubCUTAneous Daily    lidocaine PF  5 mL IntraDERmal Once    sodium chloride flush  5-40 mL IntraVENous 2 times per day    thiamine  100 mg IntraVENous Daily     PRN Meds: sodium chloride flush, sodium chloride, ondansetron **OR** ondansetron, polyethylene glycol, acetaminophen **OR** acetaminophen      Intake/Output Summary (Last 24 hours) at 9/11/2022 0935  Last data filed at 9/11/2022 0900  Gross per 24 hour   Intake 805.3 ml   Output 1330 ml   Net -524.7 ml         Physical Exam Performed:    /72   Pulse 63   Temp 98.7 °F (37.1 °C) (Axillary)   Resp 27   Ht 5' 10\" (1.778 m)   Wt 201 lb 4.5 oz (91.3 kg)   SpO2 91%   BMI 28.88 kg/m²     General appearance: sleeping on precedex  HEENT: Pupils equal, round, Conjunctivae/corneas clear. Neck: Supple, with full range of motion. No jugular venous distention. Trachea midline. Respiratory:   coarse vent sounds anteriorly   Cardiovascular: Regular rate and rhythm with normal S1/S2 without murmurs, rubs or gallops. Abdomen: Soft, non-tender, non-distended with normal bowel sounds. Musculoskeletal: No clubbing, cyanosis or edema bilaterally. Skin: cold,  no overt lesion on exposed skin.    Neurologic: sleepingOn Precedex          Labs:   Recent Labs     09/09/22  0305 09/10/22  0526 09/11/22  0530   WBC 9.7 8.7 9.3   HGB 13.5 14.0 14.1   HCT 38.9* 40.8 41.0    151 160       Recent Labs     09/09/22  0305 09/10/22  0526 09/11/22  0530   * 136 138   K 4.0 3.6 3.7    98* 99   CO2 22 22 23   BUN 11 11 12   CREATININE <0.5* 0.6* 0.5*   CALCIUM 8.7 9.1 9.0   PHOS 2.9 3.1 2.8       Recent Labs     09/11/22  0530   AST 35   ALT 14   BILITOT 1.1*   ALKPHOS 79     No results for input(s): INR in the last 72 hours. No results for input(s): Cleatrice Kendrick in the last 72 hours. Urinalysis:      Lab Results   Component Value Date/Time    NITRU Negative 09/01/2022 05:09 PM    WBCUA 0-2 09/01/2022 05:09 PM    BACTERIA Rare 09/01/2022 05:09 PM    RBCUA 0-2 09/01/2022 05:09 PM    BLOODU MODERATE 09/01/2022 05:09 PM    SPECGRAV >=1.030 09/01/2022 05:09 PM    GLUCOSEU Negative 09/01/2022 05:09 PM       Radiology:  XR CHEST PORTABLE   Final Result   1. Worsened pulmonary edema. 2.  Persistent bibasilar atelectasis. MRI BRAIN WO CONTRAST   Final Result      Faint diffusion restriction in the right hippocampus with associated FLAIR signal abnormality. This is favored to represent sequelae of seizures, however other infectious/inflammatory etiologies are also possible. Mild atrophy and chronic small vessel ischemic change. Areas of encephalomalacia and gliosis in both cerebral hemispheres with remote hemorrhagic staining from prior insults. IR LUMBAR PUNCTURE FOR DIAGNOSIS   Final Result   . IMPRESSION:   1. Uneventful diagnostic fluoroscopic guided lumbar puncture as the   2. The flow reversed are risk and therefore a closing pressure was obtained at the end of the procedure, 26 cm of water. XR ABDOMEN (KUB) (SINGLE AP VIEW)   Final Result   Findings/Impression:    The tip of the enteric tube terminates in the distal body of the stomach.       CT CHEST WO CONTRAST   Final Result CHEST:      1. Moderate dependent atelectasis bilaterally. ABDOMEN/PELVIS:      1.  No acute abnormality on noncontrast CT of the abdomen and pelvis. 2.  Cholelithiasis. CT ABDOMEN PELVIS WO CONTRAST Additional Contrast? None   Final Result      CHEST:      1. Moderate dependent atelectasis bilaterally. ABDOMEN/PELVIS:      1.  No acute abnormality on noncontrast CT of the abdomen and pelvis. 2.  Cholelithiasis. CTA HEAD NECK W CONTRAST   Final Result      CTA Head:   No acute CTA findings. No hemodynamically significant stenosis or focal aneurysm. No arteriovenous confirmation. CTA Neck:   No acute CTA findings. No hemodynamically significant stenosis or focal aneurysm. CT HEAD WO CONTRAST   Final Result      1. No findings for acute intracranial abnormality. 2.  Age-related atrophy with patchy periventricular white matter changes bilaterally consistent with chronic small vessel ischemia. 3.  Stable low attenuation left frontoparietal lobe consistent with previous insult from known prior intraparenchymal hematoma. Stable right frontoparietal cortical infarct. These findings were called to the emergency room physician Dr. Gunjan Zelaya on 9/1/2022 at 5:30 p.m. XR CHEST PORTABLE   Final Result   1. No findings for acute cardiopulmonary disease. 2.  ET tube in good position in the mid trachea. Assessment/Plan:    Active Hospital Problems    Diagnosis     Acute respiratory failure ([address] [J96.00]      Priority: Medium    Seizure ([address] [R56.9]      Priority: Medium             Acute encephalopathy:  most likely secondary to seizure. s/p intubation, and extubation 9/10 , pulmonary/  critical careon board . Neurology consulted and following. S/p LP. EEG and MRI reviewed. On ppra.     Acute respiratory failure with hypoxia: Status post extubation on 9/10, currently on RA  Chest x-ray show pulmonary edema, status post doses of Lasix    Elevated troponin:  cardiology consulted and following- recommending conservative management. History of alcohol abuse continue thiamine.  -On CIWA protocol, on precedex    Hypomagnesia  -replaced and recheck        SIRS:  unclear focus of infection. Was On empiric antibiotics and acyclovir. Blood cultures with no growth so far. CSF culture no growth  Off AB   Bridle no leukocytosis     Hypomagnesemia: replace     Hypothermia: cause unclear. TSH was abnormal. Shila hugger in place. Abnormal TFT: TSH 5.16, FT4 was normal. Possibly subclinical hypothyroidism from acute illness. Repeat TFT in 4-6 weeks. DVT Prophylaxis: lovenox     Diet: Diet NPO  ADULT TUBE FEEDING; Orogastric; Peptide Based High Protein; Continuous; 10; Yes; 10; Q 4 hours; 75; 30; Q 4 hours  Code Status: Full Code      Dispo - continue in ICU. Hard to estimate length of stay- pending clinical course.        Danielito Hickman MD

## 2022-09-11 NOTE — PROGRESS NOTES
Pt is oriented to self only. Pt is agitated, confused, restless. Visual hallucinations present. CIWA score 22. Currently on 1.5 mcg/kg/hr of precedex. Team made aware. VSS.  Now on room air, SpO2 95%

## 2022-09-12 LAB
ALBUMIN SERPL-MCNC: 3.7 G/DL (ref 3.4–5)
ANION GAP SERPL CALCULATED.3IONS-SCNC: 18 MMOL/L (ref 3–16)
BASOPHILS ABSOLUTE: 0 K/UL (ref 0–0.2)
BASOPHILS RELATIVE PERCENT: 0.5 %
BUN BLDV-MCNC: 11 MG/DL (ref 7–20)
CALCIUM SERPL-MCNC: 8.9 MG/DL (ref 8.3–10.6)
CHLORIDE BLD-SCNC: 102 MMOL/L (ref 99–110)
CO2: 22 MMOL/L (ref 21–32)
CREAT SERPL-MCNC: 0.6 MG/DL (ref 0.8–1.3)
EOSINOPHILS ABSOLUTE: 0.2 K/UL (ref 0–0.6)
EOSINOPHILS RELATIVE PERCENT: 2.5 %
GFR AFRICAN AMERICAN: >60
GFR NON-AFRICAN AMERICAN: >60
GLUCOSE BLD-MCNC: 69 MG/DL (ref 70–99)
GLUCOSE BLD-MCNC: 72 MG/DL (ref 70–99)
GLUCOSE BLD-MCNC: 74 MG/DL (ref 70–99)
HCT VFR BLD CALC: 40.4 % (ref 40.5–52.5)
HEMOGLOBIN: 13.7 G/DL (ref 13.5–17.5)
LACTIC ACID: 0.8 MMOL/L (ref 0.4–2)
LYMPHOCYTES ABSOLUTE: 2.1 K/UL (ref 1–5.1)
LYMPHOCYTES RELATIVE PERCENT: 22.8 %
MAGNESIUM: 2 MG/DL (ref 1.8–2.4)
MCH RBC QN AUTO: 31.6 PG (ref 26–34)
MCHC RBC AUTO-ENTMCNC: 33.9 G/DL (ref 31–36)
MCV RBC AUTO: 93.1 FL (ref 80–100)
MONOCYTES ABSOLUTE: 1.1 K/UL (ref 0–1.3)
MONOCYTES RELATIVE PERCENT: 11.6 %
NEUTROPHILS ABSOLUTE: 5.8 K/UL (ref 1.7–7.7)
NEUTROPHILS RELATIVE PERCENT: 62.6 %
PDW BLD-RTO: 14.1 % (ref 12.4–15.4)
PERFORMED ON: NORMAL
PERFORMED ON: NORMAL
PHOSPHORUS: 2.9 MG/DL (ref 2.5–4.9)
PLATELET # BLD: 184 K/UL (ref 135–450)
PMV BLD AUTO: 8.4 FL (ref 5–10.5)
POTASSIUM SERPL-SCNC: 4 MMOL/L (ref 3.5–5.1)
RBC # BLD: 4.34 M/UL (ref 4.2–5.9)
SODIUM BLD-SCNC: 142 MMOL/L (ref 136–145)
WBC # BLD: 9.2 K/UL (ref 4–11)

## 2022-09-12 PROCEDURE — 99291 CRITICAL CARE FIRST HOUR: CPT | Performed by: INTERNAL MEDICINE

## 2022-09-12 PROCEDURE — 6360000002 HC RX W HCPCS: Performed by: STUDENT IN AN ORGANIZED HEALTH CARE EDUCATION/TRAINING PROGRAM

## 2022-09-12 PROCEDURE — 6370000000 HC RX 637 (ALT 250 FOR IP): Performed by: STUDENT IN AN ORGANIZED HEALTH CARE EDUCATION/TRAINING PROGRAM

## 2022-09-12 PROCEDURE — 92610 EVALUATE SWALLOWING FUNCTION: CPT

## 2022-09-12 PROCEDURE — 83735 ASSAY OF MAGNESIUM: CPT

## 2022-09-12 PROCEDURE — 2580000003 HC RX 258: Performed by: STUDENT IN AN ORGANIZED HEALTH CARE EDUCATION/TRAINING PROGRAM

## 2022-09-12 PROCEDURE — 2500000003 HC RX 250 WO HCPCS: Performed by: STUDENT IN AN ORGANIZED HEALTH CARE EDUCATION/TRAINING PROGRAM

## 2022-09-12 PROCEDURE — 2000000000 HC ICU R&B

## 2022-09-12 PROCEDURE — 93005 ELECTROCARDIOGRAM TRACING: CPT

## 2022-09-12 PROCEDURE — 6370000000 HC RX 637 (ALT 250 FOR IP)

## 2022-09-12 PROCEDURE — 80069 RENAL FUNCTION PANEL: CPT

## 2022-09-12 PROCEDURE — 83605 ASSAY OF LACTIC ACID: CPT

## 2022-09-12 PROCEDURE — 2580000003 HC RX 258

## 2022-09-12 PROCEDURE — 2580000003 HC RX 258: Performed by: NURSE PRACTITIONER

## 2022-09-12 PROCEDURE — 2500000003 HC RX 250 WO HCPCS

## 2022-09-12 PROCEDURE — A4216 STERILE WATER/SALINE, 10 ML: HCPCS | Performed by: STUDENT IN AN ORGANIZED HEALTH CARE EDUCATION/TRAINING PROGRAM

## 2022-09-12 PROCEDURE — 36415 COLL VENOUS BLD VENIPUNCTURE: CPT

## 2022-09-12 PROCEDURE — 85025 COMPLETE CBC W/AUTO DIFF WBC: CPT

## 2022-09-12 PROCEDURE — 6360000002 HC RX W HCPCS: Performed by: NURSE PRACTITIONER

## 2022-09-12 RX ORDER — OLANZAPINE 10 MG/1
5 INJECTION, POWDER, LYOPHILIZED, FOR SOLUTION INTRAMUSCULAR ONCE
Status: COMPLETED | OUTPATIENT
Start: 2022-09-12 | End: 2022-09-12

## 2022-09-12 RX ORDER — OLANZAPINE 10 MG/1
5 INJECTION, POWDER, LYOPHILIZED, FOR SOLUTION INTRAMUSCULAR
Status: ACTIVE | OUTPATIENT
Start: 2022-09-12 | End: 2022-09-12

## 2022-09-12 RX ORDER — QUETIAPINE FUMARATE 25 MG/1
50 TABLET, FILM COATED ORAL NIGHTLY
Status: DISCONTINUED | OUTPATIENT
Start: 2022-09-12 | End: 2022-09-13

## 2022-09-12 RX ORDER — DEXTROSE MONOHYDRATE 100 MG/ML
INJECTION, SOLUTION INTRAVENOUS CONTINUOUS PRN
Status: DISCONTINUED | OUTPATIENT
Start: 2022-09-12 | End: 2022-10-06 | Stop reason: HOSPADM

## 2022-09-12 RX ORDER — OLANZAPINE 5 MG/1
5 TABLET ORAL EVERY 6 HOURS PRN
Status: DISCONTINUED | OUTPATIENT
Start: 2022-09-12 | End: 2022-09-12

## 2022-09-12 RX ADMIN — DEXMEDETOMIDINE HYDROCHLORIDE 1.5 MCG/KG/HR: 100 INJECTION, SOLUTION INTRAVENOUS at 21:28

## 2022-09-12 RX ADMIN — DEXMEDETOMIDINE HYDROCHLORIDE 1.5 MCG/KG/HR: 100 INJECTION, SOLUTION INTRAVENOUS at 14:40

## 2022-09-12 RX ADMIN — DEXMEDETOMIDINE HYDROCHLORIDE 1.5 MCG/KG/HR: 100 INJECTION, SOLUTION INTRAVENOUS at 18:11

## 2022-09-12 RX ADMIN — DEXMEDETOMIDINE 1.5 MCG/KG/HR: 100 INJECTION, SOLUTION INTRAVENOUS at 08:13

## 2022-09-12 RX ADMIN — DEXMEDETOMIDINE 1.5 MCG/KG/HR: 100 INJECTION, SOLUTION INTRAVENOUS at 04:53

## 2022-09-12 RX ADMIN — ENOXAPARIN SODIUM 40 MG: 100 INJECTION SUBCUTANEOUS at 08:43

## 2022-09-12 RX ADMIN — DEXMEDETOMIDINE HYDROCHLORIDE 1.3 MCG/KG/HR: 100 INJECTION, SOLUTION INTRAVENOUS at 11:14

## 2022-09-12 RX ADMIN — SODIUM CHLORIDE, PRESERVATIVE FREE 10 ML: 5 INJECTION INTRAVENOUS at 20:28

## 2022-09-12 RX ADMIN — DEXMEDETOMIDINE 1.5 MCG/KG/HR: 100 INJECTION, SOLUTION INTRAVENOUS at 01:13

## 2022-09-12 RX ADMIN — LEVETIRACETAM 1500 MG: 100 INJECTION, SOLUTION, CONCENTRATE INTRAVENOUS at 03:54

## 2022-09-12 RX ADMIN — OLANZAPINE 5 MG: 10 INJECTION, POWDER, FOR SOLUTION INTRAMUSCULAR at 17:18

## 2022-09-12 RX ADMIN — QUETIAPINE FUMARATE 50 MG: 25 TABLET ORAL at 20:27

## 2022-09-12 RX ADMIN — OLANZAPINE 5 MG: 5 TABLET, FILM COATED ORAL at 15:30

## 2022-09-12 RX ADMIN — Medication 5 MG: at 20:27

## 2022-09-12 RX ADMIN — SODIUM CHLORIDE, PRESERVATIVE FREE 20 MG: 5 INJECTION INTRAVENOUS at 20:27

## 2022-09-12 RX ADMIN — THIAMINE HYDROCHLORIDE 100 MG: 100 INJECTION, SOLUTION INTRAMUSCULAR; INTRAVENOUS at 08:43

## 2022-09-12 RX ADMIN — SODIUM CHLORIDE, PRESERVATIVE FREE 20 MG: 5 INJECTION INTRAVENOUS at 08:43

## 2022-09-12 RX ADMIN — OLANZAPINE 5 MG: 5 TABLET, FILM COATED ORAL at 20:33

## 2022-09-12 RX ADMIN — LEVETIRACETAM 1500 MG: 100 INJECTION, SOLUTION, CONCENTRATE INTRAVENOUS at 13:16

## 2022-09-12 NOTE — CARE COORDINATION
Case management is following for discharge planning. The chart was reviewed. Pt remains agitated and combative at times. Non-violent restraints in use. Precedex gtt continues. CIWA protocol. Will address disposition once he is more medically stable.     Electronically signed by MIGNON Taylor RN-[facility]  Case Management  [phone]

## 2022-09-12 NOTE — PROGRESS NOTES
ICU Progress Note    Admit Date: 9/1/2022  ICU Day: 10  Vent Day: None  IV Access: Peripheral  IV Fluids: None  Vasopressors: None                Antibiotics: None  Diet: Diet NPO  ADULT TUBE FEEDING; Orogastric; Peptide Based High Protein; Continuous; 10; Yes; 10; Q 4 hours; 75; 30; Q 4 hours    CC: AMS    Interval history:   Patient was agitated overnight, reportedly scratched a PCA overnight. Seen this morning, quite sedated. Not rousable to light painful stimuli. HPI:   The patient is a 55-year-old man with past medical history significant for chronic alcoholism, ICH, CAD, DVT who presented today to the ED with an episode of syncope. The history was mainly obtained by the sister since patient was intubated. He was last known well at 9 AM but the sister. According to the sister, she had just returned home from work which she had found him half lying down on the couch covered in the stool while his car outside was running outside with occasion. She denies any complaints of recent fever, chills, chest pain cough, shortness of breath, lightheadedness, palpitations, nausea, vomiting, abdominal pain, diarrhea, fatigue, visual disturbance, changes in urination frequency or burning pain well urination or headaches by the patient in the preceding days to this incident. She does reinstate that her brother is a chronic alcoholic and had just gotten out of rehab. He has been sober since however she was not aware if he had recently had any alcoholic drink. He had also informed that he had recent episode of intracerebral hemorrhage on 9/4/2021. In the ED, there was a concern for possibility of a stroke however he was not considered a thrombolytic candidate because of his previous ICH and long time since his last known well. According to the ED staff his physical exam was more pertinent for nonfocal delirium. It was also significant for nystagmus and rightward gaze not fixed.   He was given 2 mg of Versed and it had worsened his oxygen saturations that had dropped to 88% on a nonrebreather and therefore he was intubated. CT Abdomen, chest: Moderate dependent atelectasis bilaterally. No acute abnormality on noncontrast CT of the abdomen and pelvis. Cholelithiasis. CTA Head: No acute CTA findings. No hemodynamically significant stenosis or focal aneurysm. No arteriovenous confirmation. CTA Neck: No acute CTA findings. No hemodynamically significant stenosis or focal aneurysm.      Patient was admitted to the ICU for further workup and management of his possible meningitis, on MV.      ROS:  Review of Systems   Unable to perform ROS: Mental status change     Medications:     Scheduled Meds:   melatonin  5 mg Oral Nightly    famotidine (PEPCID) injection  20 mg IntraVENous BID    levETIRAcetam  1,500 mg IntraVENous Q12H    enoxaparin  40 mg SubCUTAneous Daily    lidocaine PF  5 mL IntraDERmal Once    sodium chloride flush  5-40 mL IntraVENous 2 times per day    thiamine  100 mg IntraVENous Daily     Continuous Infusions:   dextrose      dexmedetomidine (PRECEDEX) IV infusion 1.5 mcg/kg/hr (09/12/22 0813)    sodium chloride       PRN Meds:glucose, dextrose bolus **OR** dextrose bolus, glucagon (rDNA), dextrose, sodium chloride flush, sodium chloride, ondansetron **OR** ondansetron, polyethylene glycol, acetaminophen **OR** acetaminophen    Objective:   Vitals:   T-max:  Patient Vitals for the past 8 hrs:   BP Temp Temp src Pulse Resp SpO2   09/12/22 1000 126/64 -- -- 58 22 92 %   09/12/22 0900 135/66 -- -- 52 20 95 %   09/12/22 0800 131/64 97.8 °F (36.6 °C) Temporal 57 22 93 %   09/12/22 0700 (!) 140/68 -- -- 57 23 92 %   09/12/22 0630 138/69 -- -- 60 24 93 %   09/12/22 0500 (!) 125/105 -- -- 70 22 96 %   09/12/22 0400 (!) 155/107 98.4 °F (36.9 °C) Temporal 72 27 95 %   09/12/22 0300 134/72 -- -- 62 19 (!) 89 %       Intake/Output Summary (Last 24 hours) at 9/12/2022 1020  Last data filed at 9/12/2022 0605  Gross per 24 hour Intake 774.53 ml   Output 805 ml   Net -30.47 ml         Physical Exam:  CONSTITUTIONAL: no apparent distress, and appears stated age  EYES: Pupils equal round and reactive to light. Normal conjunctiva  EARS, NOSE, MOUTH & THROAT: Normal oropharynx. Ears and nose appear normal  NECK:  Supple, symmetrical, trachea midline, no adenopathy, thyroid symmetric, not enlarged and no tenderness, skin normal  LUNGS:  no increased work of breathing and clear to auscultation. No accessory muscle use  CARDIOVASCULAR:  normal S1 and S2, no edema and no JVD  ABDOMEN:  normal bowel sounds, non-distended and no masses palpated, and no tenderness to palpation. No hepatosplenomegaly  LYMPHADENOPATHY:  no axillary or supraclavicular adenopathy. No cervical adnenopathy  PSYCHIATRIC: Oriented to person place and time. No obvious depression or anxiety. MUSCULOSKELETAL: No obvious misalignment or effusion of the joints. No clubbing, cyanosis of the digits. SKIN:  normal skin color, texture, turgor and no redness, warmth, or swelling. No palpable nodules    LABS:    CBC:   Recent Labs     09/10/22  0526 09/11/22  0530 09/12/22  0316   WBC 8.7 9.3 9.2   HGB 14.0 14.1 13.7   HCT 40.8 41.0 40.4*    160 184   MCV 92.5 93.0 93.1     Renal:    Recent Labs     09/10/22  0526 09/11/22  0530 09/12/22  0316    138 142   K 3.6 3.7 4.0   CL 98* 99 102   CO2 22 23 22   BUN 11 12 11   CREATININE 0.6* 0.5* 0.6*   GLUCOSE 107* 82 69*   CALCIUM 9.1 9.0 8.9   MG 1.70* 1.70* 2.00   PHOS 3.1 2.8 2.9   ANIONGAP 16 16 18*     Hepatic:   Recent Labs     09/10/22  0526 09/11/22  0530 09/12/22  0316   AST  --  35  --    ALT  --  14  --    BILITOT  --  1.1*  --    PROT  --  6.3*  --    LABALBU 3.3* 3.7 3.7   ALKPHOS  --  79  --      Troponin: No results for input(s): TROPONINI in the last 72 hours. BNP: No results for input(s): BNP in the last 72 hours. Lipids: No results for input(s): CHOL, HDL in the last 72 hours.     Invalid input(s): LDLCALCU, TRIGLYCERIDE  ABGs:    Recent Labs     09/10/22  0948   PHART 7.517*   ECH8QFT 33.8*   PO2ART 68.8*   HAX6GMC 27.4   BEART 5*   D9TKRAGJ 95   SMX2OOI 28       INR: No results for input(s): INR in the last 72 hours. Lactate:   Recent Labs     09/10/22  0948   LACTATE 1.02     Cultures:  -----------------------------------------------------------------  RAD:   XR CHEST PORTABLE   Final Result   1. Worsened pulmonary edema. 2.  Persistent bibasilar atelectasis. MRI BRAIN WO CONTRAST   Final Result      Faint diffusion restriction in the right hippocampus with associated FLAIR signal abnormality. This is favored to represent sequelae of seizures, however other infectious/inflammatory etiologies are also possible. Mild atrophy and chronic small vessel ischemic change. Areas of encephalomalacia and gliosis in both cerebral hemispheres with remote hemorrhagic staining from prior insults. IR LUMBAR PUNCTURE FOR DIAGNOSIS   Final Result   . IMPRESSION:   1. Uneventful diagnostic fluoroscopic guided lumbar puncture as the   2. The flow reversed are risk and therefore a closing pressure was obtained at the end of the procedure, 26 cm of water. XR ABDOMEN (KUB) (SINGLE AP VIEW)   Final Result   Findings/Impression:    The tip of the enteric tube terminates in the distal body of the stomach. CT CHEST WO CONTRAST   Final Result      CHEST:      1. Moderate dependent atelectasis bilaterally. ABDOMEN/PELVIS:      1.  No acute abnormality on noncontrast CT of the abdomen and pelvis. 2.  Cholelithiasis. CT ABDOMEN PELVIS WO CONTRAST Additional Contrast? None   Final Result      CHEST:      1. Moderate dependent atelectasis bilaterally. ABDOMEN/PELVIS:      1.  No acute abnormality on noncontrast CT of the abdomen and pelvis. 2.  Cholelithiasis. CTA HEAD NECK W CONTRAST   Final Result      CTA Head:   No acute CTA findings.   No hemodynamically significant the change in medications will also allow us to wean off the Precedex. Will need to remain in ICU. Critical care time spent reviewing labs/films, examining patient, collaborating with other physicians but excluding procedures for life threatening organ failure is 35 minutes.     Raad Hester MD

## 2022-09-12 NOTE — PROGRESS NOTES
Pt increasingly agitated and now combative. Precedex 1.5 mcg/kg/hr. Zyprexa given. Will continue to monitor.

## 2022-09-12 NOTE — PLAN OF CARE
Problem: Safety - Medical Restraint  Goal: Remains free of injury from restraints (Restraint for Interference with Medical Device)  Description: INTERVENTIONS:  1. Determine that other, less restrictive measures have been tried or would not be effective before applying the restraint  2. Evaluate the patient's condition at the time of restraint application  3. Inform patient/family regarding the reason for restraint  4. Q2H: Monitor safety, psychosocial status, comfort, nutrition and hydration  9/11/2022 2001 by Yanely Garnett RN  Outcome: Progressing     Problem: Discharge Planning  Goal: Discharge to home or other facility with appropriate resources  9/11/2022 2001 by Yanely Garnett RN  Outcome: Progressing     Problem: Pain  Goal: Verbalizes/displays adequate comfort level or baseline comfort level  9/11/2022 2001 by Yanely Garnett RN  Outcome: Progressing     Problem: Safety - Adult  Goal: Free from fall injury  9/11/2022 2001 by Yanely Garnett RN  Outcome: Progressing     Problem: ABCDS Injury Assessment  Goal: Absence of physical injury  9/11/2022 2001 by Yanely Garnett RN  Outcome: Progressing     Problem: Skin/Tissue Integrity  Goal: Absence of new skin breakdown  Description: 1. Monitor for areas of redness and/or skin breakdown  2. Assess vascular access sites hourly  3. Every 4-6 hours minimum:  Change oxygen saturation probe site  4. Every 4-6 hours:  If on nasal continuous positive airway pressure, respiratory therapy assess nares and determine need for appliance change or resting period.   9/11/2022 2001 by Yanely Garnett RN  Outcome: Progressing

## 2022-09-12 NOTE — PLAN OF CARE
Problem: Safety - Medical Restraint  Goal: Remains free of injury from restraints (Restraint for Interference with Medical Device)  Description: INTERVENTIONS:  1. Determine that other, less restrictive measures have been tried or would not be effective before applying the restraint  2. Evaluate the patient's condition at the time of restraint application  3. Inform patient/family regarding the reason for restraint  4.  Q2H: Monitor safety, psychosocial status, comfort, nutrition and hydration  9/12/2022 0715 by Prashant Ruiz RN  Outcome: Progressing  Flowsheets (Taken 9/11/2022 2200 by Franca Osullivan RN)  Remains free of injury from restraints (restraint for interference with medical device): Every 2 hours: Monitor safety, psychosocial status, comfort, nutrition and hydration  9/11/2022 2001 by Raven Sosa RN  Outcome: Progressing  Flowsheets (Taken 9/11/2022 2000 by Franca Osullivan RN)  Remains free of injury from restraints (restraint for interference with medical device): Every 2 hours: Monitor safety, psychosocial status, comfort, nutrition and hydration  9/11/2022 1918 by Franca Osullivan RN  Outcome: Progressing  Flowsheets  Taken 9/11/2022 1800 by Raven Sosa RN  Remains free of injury from restraints (restraint for interference with medical device): Determine that other, less restrictive measures have been tried or would not be effective before applying the restraint  Taken 9/11/2022 1600 by Raven Sosa RN  Remains free of injury from restraints (restraint for interference with medical device): Determine that other, less restrictive measures have been tried or would not be effective before applying the restraint  Taken 9/11/2022 1400 by Raven Sosa RN  Remains free of injury from restraints (restraint for interference with medical device): Determine that other, less restrictive measures have been tried or would not be effective before applying the restraint  Taken 9/11/2022 1200 by Raven Sosa RN  Remains free of injury from restraints (restraint for interference with medical device): Determine that other, less restrictive measures have been tried or would not be effective before applying the restraint  Taken 9/11/2022 1000 by Tevin Martines RN  Remains free of injury from restraints (restraint for interference with medical device): Determine that other, less restrictive measures have been tried or would not be effective before applying the restraint  Taken 9/11/2022 0800 by Tevin Martines RN  Remains free of injury from restraints (restraint for interference with medical device): Determine that other, less restrictive measures have been tried or would not be effective before applying the restraint  Taken 9/11/2022 0600 by Huyen Acosta RN  Remains free of injury from restraints (restraint for interference with medical device): Determine that other, less restrictive measures have been tried or would not be effective before applying the restraint     Problem: Discharge Planning  Goal: Discharge to home or other facility with appropriate resources  9/12/2022 0715 by Theodora Cowart RN  Outcome: Progressing  9/11/2022 2001 by Tevin Martines RN  Outcome: Progressing  Flowsheets (Taken 9/11/2022 2000 by Huyen Acosta RN)  Discharge to home or other facility with appropriate resources: Identify barriers to discharge with patient and caregiver  9/11/2022 1918 by Huyen Acosta RN  Outcome: Progressing     Problem: Pain  Goal: Verbalizes/displays adequate comfort level or baseline comfort level  9/12/2022 0715 by Theodora Cowart RN  Outcome: Progressing  9/11/2022 2001 by Tevin Martines RN  Outcome: Progressing  9/11/2022 1918 by Huyen Acosta RN  Outcome: Progressing  Flowsheets (Taken 9/11/2022 0800 by Tevin Martines RN)  Verbalizes/displays adequate comfort level or baseline comfort level: Encourage patient to monitor pain and request assistance     Problem: Safety - Adult  Goal: Free from fall injury  9/12/2022 0715 by Theodora Cowart RN  Outcome: Progressing  Flowsheets (Taken 9/11/2022 2300 by Dewey De La Torre RN)  Free From Fall Injury: Instruct family/caregiver on patient safety  9/11/2022 2001 by Sincere Oliva RN  Outcome: Progressing  9/11/2022 1918 by Dewey De La Torre RN  Outcome: Progressing     Problem: ABCDS Injury Assessment  Goal: Absence of physical injury  9/12/2022 0715 by Ludwin Yang RN  Outcome: Progressing  Flowsheets (Taken 9/11/2022 2300 by Dewey De La Torre RN)  Absence of Physical Injury: Implement safety measures based on patient assessment  9/11/2022 2001 by Sincere Oliva RN  Outcome: Progressing  9/11/2022 1918 by Dewey De La Torre RN  Outcome: Progressing     Problem: Skin/Tissue Integrity  Goal: Absence of new skin breakdown  Description: 1. Monitor for areas of redness and/or skin breakdown  2. Assess vascular access sites hourly  3. Every 4-6 hours minimum:  Change oxygen saturation probe site  4. Every 4-6 hours:  If on nasal continuous positive airway pressure, respiratory therapy assess nares and determine need for appliance change or resting period.   9/12/2022 0715 by Ludwin Yang RN  Outcome: Progressing  9/11/2022 2001 by Sincere Oliva RN  Outcome: Progressing  9/11/2022 1918 by Dewey De La Torre RN  Outcome: Progressing     Problem: Chronic Conditions and Co-morbidities  Goal: Patient's chronic conditions and co-morbidity symptoms are monitored and maintained or improved  9/12/2022 0715 by Ludwin Yang RN  Outcome: Progressing  9/11/2022 2001 by Sincere Oliva RN  Outcome: rCistina Obregon (Taken 9/11/2022 2000 by Dewey De La Torre RN)  Care Plan - Patient's Chronic Conditions and Co-Morbidity Symptoms are Monitored and Maintained or Improved: Monitor and assess patient's chronic conditions and comorbid symptoms for stability, deterioration, or improvement  9/11/2022 1918 by Dewey De La Torre RN  Outcome: Progressing     Problem: Nutrition Deficit:  Goal: Optimize nutritional status  9/12/2022 0715 by Ludwin Yang RN  Outcome: Progressing  9/11/2022 2001 by Ginny Santiago RN  Outcome: Progressing  9/11/2022 1918 by Jud Oropeza RN  Outcome: Progressing     Problem: Respiratory - Adult  Goal: Achieves optimal ventilation and oxygenation  9/12/2022 0715 by Mercedes Morfin RN  Outcome: Progressing  9/11/2022 2001 by Ginny Santiago RN  Outcome: Progressing  Flowsheets (Taken 9/11/2022 2000 by Jud Oropeza RN)  Achieves optimal ventilation and oxygenation: Assess for changes in respiratory status  9/11/2022 1918 by Jud Oropeza RN  Outcome: Progressing     Problem: Cardiovascular - Adult  Goal: Maintains optimal cardiac output and hemodynamic stability  9/12/2022 0715 by Mercedes Morfin RN  Outcome: Progressing  9/11/2022 2001 by Ginny Santiago RN  Outcome: Progressing  Flowsheets (Taken 9/11/2022 2000 by Jud Oropzea RN)  Maintains optimal cardiac output and hemodynamic stability:   Monitor blood pressure and heart rate   Monitor urine output and notify Licensed Independent Practitioner for values outside of normal range  9/11/2022 1918 by Jud Oropeza RN  Outcome: Progressing  Goal: Absence of cardiac dysrhythmias or at baseline  9/12/2022 0715 by Mercedes Morfin RN  Outcome: Progressing  9/11/2022 2001 by Ginny Santiago RN  Outcome: Progressing  Flowsheets (Taken 9/11/2022 2000 by Jud Oropeza RN)  Absence of cardiac dysrhythmias or at baseline: Monitor cardiac rate and rhythm  9/11/2022 1918 by Jud Oropeza RN  Outcome: Progressing     Problem: Metabolic/Fluid and Electrolytes - Adult  Goal: Electrolytes maintained within normal limits  9/12/2022 0715 by Mercedes Morfin RN  Outcome: Progressing  9/11/2022 2001 by Ginny Santiago RN  Outcome: Progressing  [address] (Taken 9/11/2022 2000 by Jud Oropeza RN)  Electrolytes maintained within normal limits: Monitor labs and assess patient for signs and symptoms of electrolyte imbalances  9/11/2022 1918 by Jud Oropeza RN  Outcome: Progressing  Goal: Hemodynamic stability and optimal renal function maintained  9/12/2022 9301 by Boy Durán RN  Outcome: Progressing  9/11/2022 2001 by Anisa Winter RN  Outcome: Progressing  Flowsheets (Taken 9/11/2022 2000 by Yandy Quintero RN)  Hemodynamic stability and optimal renal function maintained: Monitor labs and assess for signs and symptoms of volume excess or deficit  9/11/2022 1918 by Yandy Quintero RN  Outcome: Progressing

## 2022-09-12 NOTE — FLOWSHEET NOTE
09/12/22 0900   RASS   Paz Agitation Sedation Scale (RASS) +1     Comments    Please wean precedex as tolerated. New nursing communication order received from Dr. Klaus Mesa, see above. Precedex gtt at 1.5 mcg/kg/hr & patient RASS +1, which is outside goal of therapy, see MAR. Will continue to monitor.

## 2022-09-12 NOTE — PROGRESS NOTES
Speech Language Pathology  Facility/Department: [facility] ICU   CLINICAL BEDSIDE SWALLOW EVALUATION    NAME: Rell Batres  : 1960  MRN: [id number]    ADMISSION DATE: 2022  ADMITTING DIAGNOSIS: has Mural thrombus of cardiac apex; Coronary artery disease due to lipid rich plaque; Abnormal angiogram; Acute deep vein thrombosis of left lower extremity ([address] Hypokalemia; Alcohol abuse with withdrawal ([address] Electrolyte abnormality; Intracranial hemorrhage ([address] Diastolic congestive heart failure, unspecified HF chronicity ([address] Skin pustule; Seizure ([address] and Acute respiratory failure ([address] on their problem list.  ONSET DATE: 22    Recent Chest Xray/CT of Chest: 22  Impression   1. Worsened pulmonary edema. 2.  Persistent bibasilar atelectasis. Recent MRI: 22  Impression       Faint diffusion restriction in the right hippocampus with associated FLAIR signal abnormality. This is favored to represent sequelae of seizures, however other infectious/inflammatory etiologies are also possible. Mild atrophy and chronic small vessel ischemic change. Areas of encephalomalacia and gliosis in both cerebral hemispheres with remote hemorrhagic staining from prior insults. Date of Eval: 2022  Evaluating Therapist: AJIT Marqeuz    Current Diet level:  Full Liquid    Primary Complaint  Patient Complaint: none    Pain:  Pain Assessment  Pain Assessment: None - Denies Pain  Pain Level: 0    Reason for Referral  Rell Batres was referred for a bedside swallow evaluation to assess the efficiency of his swallow function, identify signs and symptoms of aspiration and make recommendations regarding safe dietary consistencies, effective compensatory strategies, and safe eating environment. Impression  Dysphagia Diagnosis: Mild oral stage dysphagia; Suspected needs further assessment  Dysphagia Impression : Pt presents with mild oral dysphagia, and suspect need further assessment of pharyngeal phase. Pt seated upright in bed, required cues to accept all trials of PO. Pt with fluctuating attention, very confused and confabulating continuously. Pt presented with trials of ice chips, thin liquids, puree and small bite of regular solid. Pt demonstrated mildly prolonged mastication (pt is edentulous) and ? delayed swallow initiation at times. Pt appeared to swallow all trials with no overt s/s associated with penetration/aspiration, however silent aspiration cannot be ruled out at bedside. Mild-moderate residue after completion of swallow with cues to take liquid wash to clear. Due to increased agitation, reduced command following and per RN stating team started pt on full liquid diet, SLP OK'd diet level and discussed will continue to re-assess at bedside and complete MBS when medically appropriate (RN expressed concern with taking pt to radiology in wrist restraints at this time due to increased agitation, stated will follow-up tomorrow prior to ordering MBS). Pt would benefit from ongoing SLP services to target dysphagia and advance to least restrictive diet as able for safety of swallow. Pt would benefit from MBS, as mentioned above, due to prolonged intubation and to objectively assess oropharyngeal swallow function. Dysphagia Outcome Severity Scale: Level 2: Moderate Severe dysphagia- Maximum assistance or maximum use of strategies with partial PO only     Treatment Plan  Requires SLP Intervention: Yes  Duration of Treatment: LOS  D/C Recommendations: Ongoing speech therapy is recommended during this hospitalization     Recommended Diet and Intervention  Recommended Diet: Full Liquid  Recommended Form of Meds: Via alternative means of nutrition  Recommendations: Total feed;Dysphagia treatment; Modified barium swallow study  Therapeutic Interventions: Patient/Family education;Diet tolerance monitoring;Oral care; Therapeutic PO trials with SLP    Compensatory Swallowing Strategies  Compensatory Swallowing Strategies : Upright as possible for all oral intake; Total feed;Remain upright for 30-45 minutes after meals;Small bites/sips;Eat/Feed slowly    Treatment/Goals  Dysphagia Goals: The patient will tolerate repeat bedside swallowing evaluation when able. ;The patient will tolerate instrumental swallowing procedure; The patient will tolerate recommended diet without observed clinical signs of aspiration    General  Chart Reviewed: Yes  Subjective  Subjective: Pt seated upright in bed with bilateral wrist retraints in place and sitter present. Pt agitated, confused and requires max cues to redirect/sustain attention. Behavior/Cognition: Confused; Agitated; Uncooperative; Impulsive;Distractible;Requires cueing  Respiratory Status: Room air  O2 Device: None (Room air)  Communication Observation: Functional  Follows Directions: Simple  Dentition: Edentulous; Poor dental/oral hygeine  Patient Positioning: Upright in bed  Baseline Vocal Quality: Normal  Volitional Cough: Strong  Prior Dysphagia History: none  Consistencies Administered: Ice Chips; Thin - teaspoon; Thin - straw;Pureed;Regular    Vision/Hearing  Hearing  Hearing: Within functional limits    Oral Motor Deficits  Oral/Motor  Oral Hygiene: Dried secretions; Moist    Oral Phase Dysfunction  Oral Phase  Oral Phase: Exceptions     Indicators of Pharyngeal Phase Dysfunction  None, ? Delayed swallow initiation     Prognosis  Individuals consulted  Consulted and agree with results and recommendations: Patient;RN  RN Name: Marlon Matos  Patient Education: Discussed rationale for evaluation, results and recommendations. Patient Education Response: Verbalizes understanding;Needs reinforcement  Safety Devices in place: Yes  Type of devices: All fall risk precautions in place; Bed alarm in place;Call light within reach; Left in bed;Sitter present    Plan:  -Full Liquid Diet  -Total feed assist  -Seated upright with PO  -Oral care 3x/day with suction toothette   -Will re-assess tomorrow/discuss with RN/team to determine readiness for MBS prior to diet level advancement as able       Therapy Time  SLP Individual Minutes  Time In: 1540  Time Out: 1600  Minutes: 20     SLP Total Treatment Time  Total Treatment Time: 20      Electronically signed by:  Zulema Pastrana M.A., [address] [place] [address]  Speech-Language Pathologist

## 2022-09-12 NOTE — PROGRESS NOTES
Hospitalist Progress Note      PCP: Deirdre Hager MD    Date of Admission: 9/1/2022    Chief Complaint: syncope    Hospital Course:  H& P reviewed       Subjective:   Seen and examined patient at bedside. Afebrile. Combative overnight. Now more awake than yesterday. Able to tell me year and place. Following commands. Calm. Still on on 1.5 mcg/kg/hr of precedex. ICU to manage. Medications:  Reviewed    Infusion Medications    dextrose      dexmedetomidine (PRECEDEX) IV infusion 1.5 mcg/kg/hr (09/12/22 1440)    sodium chloride       Scheduled Medications    QUEtiapine  50 mg Oral Nightly    melatonin  5 mg Oral Nightly    famotidine (PEPCID) injection  20 mg IntraVENous BID    levETIRAcetam  1,500 mg IntraVENous Q12H    enoxaparin  40 mg SubCUTAneous Daily    lidocaine PF  5 mL IntraDERmal Once    sodium chloride flush  5-40 mL IntraVENous 2 times per day    thiamine  100 mg IntraVENous Daily     PRN Meds: glucose, dextrose bolus **OR** dextrose bolus, glucagon (rDNA), dextrose, OLANZapine, sodium chloride flush, sodium chloride, ondansetron **OR** ondansetron, polyethylene glycol, acetaminophen **OR** acetaminophen      Intake/Output Summary (Last 24 hours) at 9/12/2022 1806  Last data filed at 9/12/2022 1600  Gross per 24 hour   Intake 1262.42 ml   Output 2155 ml   Net -892.58 ml       Physical Exam Performed:    BP (!) 142/71   Pulse 58   Temp 97.9 °F (36.6 °C) (Temporal)   Resp 28   Ht 5' 10\" (1.778 m)   Wt 201 lb 1 oz (91.2 kg)   SpO2 94%   BMI 28.85 kg/m²     General appearance: more awake and alert, AAOx3, following commands  HEENT: Pupils equal, round, Conjunctivae/corneas clear. Neck: Supple, with full range of motion. No jugular venous distention. Trachea midline. Respiratory:   coarse vent sounds anteriorly   Cardiovascular: Regular rate and rhythm with normal S1/S2 without murmurs, rubs or gallops. Abdomen: Soft, non-tender, non-distended with normal bowel sounds.   Musculoskeletal: No clubbing, cyanosis or edema bilaterally. Skin: cold,  no overt lesion on exposed skin. Neurologic: AAOx3, following commands          Labs:   Recent Labs     09/10/22  0526 09/11/22  0530 09/12/22 0316   WBC 8.7 9.3 9.2   HGB 14.0 14.1 13.7   HCT 40.8 41.0 40.4*    160 184     Recent Labs     09/10/22  0526 09/11/22  0530 09/12/22 0316    138 142   K 3.6 3.7 4.0   CL 98* 99 102   CO2 22 23 22   BUN 11 12 11   CREATININE 0.6* 0.5* 0.6*   CALCIUM 9.1 9.0 8.9   PHOS 3.1 2.8 2.9     Recent Labs     09/11/22 0530   AST 35   ALT 14   BILITOT 1.1*   ALKPHOS 79     No results for input(s): INR in the last 72 hours. No results for input(s): Hamp Memory in the last 72 hours. Urinalysis:      Lab Results   Component Value Date/Time    NITRU Negative 09/01/2022 05:09 PM    WBCUA 0-2 09/01/2022 05:09 PM    BACTERIA Rare 09/01/2022 05:09 PM    RBCUA 0-2 09/01/2022 05:09 PM    BLOODU MODERATE 09/01/2022 05:09 PM    SPECGRAV >=1.030 09/01/2022 05:09 PM    GLUCOSEU Negative 09/01/2022 05:09 PM       Radiology:  XR CHEST PORTABLE   Final Result   1. Worsened pulmonary edema. 2.  Persistent bibasilar atelectasis. MRI BRAIN WO CONTRAST   Final Result      Faint diffusion restriction in the right hippocampus with associated FLAIR signal abnormality. This is favored to represent sequelae of seizures, however other infectious/inflammatory etiologies are also possible. Mild atrophy and chronic small vessel ischemic change. Areas of encephalomalacia and gliosis in both cerebral hemispheres with remote hemorrhagic staining from prior insults. IR LUMBAR PUNCTURE FOR DIAGNOSIS   Final Result   . IMPRESSION:   1. Uneventful diagnostic fluoroscopic guided lumbar puncture as the   2. The flow reversed are risk and therefore a closing pressure was obtained at the end of the procedure, 26 cm of water.       XR ABDOMEN (KUB) (SINGLE AP VIEW)   Final Result   Findings/Impression:    The tip of the enteric tube terminates in the distal body of the stomach. CT CHEST WO CONTRAST   Final Result      CHEST:      1. Moderate dependent atelectasis bilaterally. ABDOMEN/PELVIS:      1.  No acute abnormality on noncontrast CT of the abdomen and pelvis. 2.  Cholelithiasis. CT ABDOMEN PELVIS WO CONTRAST Additional Contrast? None   Final Result      CHEST:      1. Moderate dependent atelectasis bilaterally. ABDOMEN/PELVIS:      1.  No acute abnormality on noncontrast CT of the abdomen and pelvis. 2.  Cholelithiasis. CTA HEAD NECK W CONTRAST   Final Result      CTA Head:   No acute CTA findings. No hemodynamically significant stenosis or focal aneurysm. No arteriovenous confirmation. CTA Neck:   No acute CTA findings. No hemodynamically significant stenosis or focal aneurysm. CT HEAD WO CONTRAST   Final Result      1. No findings for acute intracranial abnormality. 2.  Age-related atrophy with patchy periventricular white matter changes bilaterally consistent with chronic small vessel ischemia. 3.  Stable low attenuation left frontoparietal lobe consistent with previous insult from known prior intraparenchymal hematoma. Stable right frontoparietal cortical infarct. These findings were called to the emergency room physician Dr. Angie Sol on 9/1/2022 at 5:30 p.m. XR CHEST PORTABLE   Final Result   1. No findings for acute cardiopulmonary disease. 2.  ET tube in good position in the mid trachea. Assessment/Plan:    Active Hospital Problems    Diagnosis     Acute respiratory failure ([address] [J96.00]      Priority: Medium    Seizure ([address] [R56.9]      Priority: Medium         Acute encephalopathy:  most likely secondary to seizure. s/p intubation, and extubation 9/10 , pulmonary/  critical careon board . Neurology consulted and following. S/p LP, non-contributable. EEG and MRI reviewed. On Keppra.     Acute respiratory failure with hypoxia: Status post extubation on 9/10, currently on RA  Chest x-ray show pulmonary edema, status post doses of Lasix    Elevated troponin:  cardiology consulted and following- recommending conservative management. History of alcohol abuse continue thiamine.  -On CIWA protocol, on precedex, wean per ICU    Hypomagnesia  -replaced and recheck    SIRS:  unclear focus of infection. Was On empiric antibiotics and acyclovir. Blood cultures with no growth so far. CSF culture no growth  Off ABx   No leukocytosis     Hypomagnesemia: replace     Hypothermia: cause unclear. TSH was abnormal. Shila hugger in place. Abnormal TFT: TSH 5.16, FT4 was normal. Possibly subclinical hypothyroidism from acute illness. Repeat TFT in 4-6 weeks. DVT Prophylaxis: lovenox     Diet: ADULT DIET; Full Liquid  Code Status: Full Code      Dispo - continue in ICU. Hard to estimate length of stay- pending clinical course.        Calista Ospina MD

## 2022-09-13 LAB
ALBUMIN SERPL-MCNC: 3.9 G/DL (ref 3.4–5)
ANION GAP SERPL CALCULATED.3IONS-SCNC: 22 MMOL/L (ref 3–16)
BASOPHILS ABSOLUTE: 0.1 K/UL (ref 0–0.2)
BASOPHILS RELATIVE PERCENT: 0.6 %
BILIRUBIN URINE: ABNORMAL
BLOOD, URINE: ABNORMAL
BUN BLDV-MCNC: 11 MG/DL (ref 7–20)
CALCIUM SERPL-MCNC: 9.1 MG/DL (ref 8.3–10.6)
CHLORIDE BLD-SCNC: 104 MMOL/L (ref 99–110)
CLARITY: ABNORMAL
CO2: 20 MMOL/L (ref 21–32)
COLOR: YELLOW
CREAT SERPL-MCNC: 0.6 MG/DL (ref 0.8–1.3)
CSF CULTURE: NORMAL
EKG ATRIAL RATE: 61 BPM
EKG DIAGNOSIS: NORMAL
EKG P AXIS: 41 DEGREES
EKG P-R INTERVAL: 182 MS
EKG Q-T INTERVAL: 468 MS
EKG QRS DURATION: 88 MS
EKG QTC CALCULATION (BAZETT): 471 MS
EKG R AXIS: -46 DEGREES
EKG T AXIS: 92 DEGREES
EKG VENTRICULAR RATE: 61 BPM
EOSINOPHILS ABSOLUTE: 0.2 K/UL (ref 0–0.6)
EOSINOPHILS RELATIVE PERCENT: 2 %
EPITHELIAL CELLS, UA: ABNORMAL /HPF (ref 0–5)
GFR AFRICAN AMERICAN: >60
GFR NON-AFRICAN AMERICAN: >60
GLUCOSE BLD-MCNC: 70 MG/DL (ref 70–99)
GLUCOSE URINE: NEGATIVE MG/DL
GRAM STAIN RESULT: NORMAL
HCT VFR BLD CALC: 40.8 % (ref 40.5–52.5)
HEMOGLOBIN: 13.9 G/DL (ref 13.5–17.5)
KETONES, URINE: >=80 MG/DL
LACTIC ACID: 1.2 MMOL/L (ref 0.4–2)
LEUKOCYTE ESTERASE, URINE: ABNORMAL
LYMPHOCYTES ABSOLUTE: 2.1 K/UL (ref 1–5.1)
LYMPHOCYTES RELATIVE PERCENT: 18.4 %
MAGNESIUM: 1.7 MG/DL (ref 1.8–2.4)
MCH RBC QN AUTO: 31.9 PG (ref 26–34)
MCHC RBC AUTO-ENTMCNC: 34.1 G/DL (ref 31–36)
MCV RBC AUTO: 93.7 FL (ref 80–100)
MICROSCOPIC EXAMINATION: YES
MONOCYTES ABSOLUTE: 0.9 K/UL (ref 0–1.3)
MONOCYTES RELATIVE PERCENT: 8.4 %
NEUTROPHILS ABSOLUTE: 7.9 K/UL (ref 1.7–7.7)
NEUTROPHILS RELATIVE PERCENT: 70.6 %
NITRITE, URINE: NEGATIVE
PDW BLD-RTO: 14.1 % (ref 12.4–15.4)
PH UA: 6 (ref 5–8)
PHOSPHORUS: 3.2 MG/DL (ref 2.5–4.9)
PLATELET # BLD: 185 K/UL (ref 135–450)
PMV BLD AUTO: 8.5 FL (ref 5–10.5)
POTASSIUM SERPL-SCNC: 3.9 MMOL/L (ref 3.5–5.1)
PROTEIN UA: NEGATIVE MG/DL
RBC # BLD: 4.35 M/UL (ref 4.2–5.9)
RBC UA: ABNORMAL /HPF (ref 0–4)
SODIUM BLD-SCNC: 146 MMOL/L (ref 136–145)
SPECIFIC GRAVITY UA: >=1.03 (ref 1–1.03)
TRIGL SERPL-MCNC: 165 MG/DL (ref 0–150)
URINE TYPE: ABNORMAL
UROBILINOGEN, URINE: 0.2 E.U./DL
WBC # BLD: 11.2 K/UL (ref 4–11)
WBC UA: ABNORMAL /HPF (ref 0–5)
YEAST: PRESENT /HPF

## 2022-09-13 PROCEDURE — 83735 ASSAY OF MAGNESIUM: CPT

## 2022-09-13 PROCEDURE — 99233 SBSQ HOSP IP/OBS HIGH 50: CPT | Performed by: INTERNAL MEDICINE

## 2022-09-13 PROCEDURE — 2580000003 HC RX 258: Performed by: STUDENT IN AN ORGANIZED HEALTH CARE EDUCATION/TRAINING PROGRAM

## 2022-09-13 PROCEDURE — 2500000003 HC RX 250 WO HCPCS: Performed by: STUDENT IN AN ORGANIZED HEALTH CARE EDUCATION/TRAINING PROGRAM

## 2022-09-13 PROCEDURE — 2580000003 HC RX 258: Performed by: NURSE PRACTITIONER

## 2022-09-13 PROCEDURE — 36415 COLL VENOUS BLD VENIPUNCTURE: CPT

## 2022-09-13 PROCEDURE — 85025 COMPLETE CBC W/AUTO DIFF WBC: CPT

## 2022-09-13 PROCEDURE — 6370000000 HC RX 637 (ALT 250 FOR IP)

## 2022-09-13 PROCEDURE — 2500000003 HC RX 250 WO HCPCS

## 2022-09-13 PROCEDURE — 93010 ELECTROCARDIOGRAM REPORT: CPT | Performed by: INTERNAL MEDICINE

## 2022-09-13 PROCEDURE — 6370000000 HC RX 637 (ALT 250 FOR IP): Performed by: STUDENT IN AN ORGANIZED HEALTH CARE EDUCATION/TRAINING PROGRAM

## 2022-09-13 PROCEDURE — 2000000000 HC ICU R&B

## 2022-09-13 PROCEDURE — 2580000003 HC RX 258

## 2022-09-13 PROCEDURE — 80069 RENAL FUNCTION PANEL: CPT

## 2022-09-13 PROCEDURE — 84478 ASSAY OF TRIGLYCERIDES: CPT

## 2022-09-13 PROCEDURE — 6360000002 HC RX W HCPCS: Performed by: STUDENT IN AN ORGANIZED HEALTH CARE EDUCATION/TRAINING PROGRAM

## 2022-09-13 PROCEDURE — 6360000002 HC RX W HCPCS

## 2022-09-13 PROCEDURE — 6360000002 HC RX W HCPCS: Performed by: NURSE PRACTITIONER

## 2022-09-13 PROCEDURE — 83605 ASSAY OF LACTIC ACID: CPT

## 2022-09-13 PROCEDURE — 81001 URINALYSIS AUTO W/SCOPE: CPT

## 2022-09-13 PROCEDURE — A4216 STERILE WATER/SALINE, 10 ML: HCPCS | Performed by: STUDENT IN AN ORGANIZED HEALTH CARE EDUCATION/TRAINING PROGRAM

## 2022-09-13 RX ORDER — OLANZAPINE 10 MG/1
5 INJECTION, POWDER, LYOPHILIZED, FOR SOLUTION INTRAMUSCULAR
Status: COMPLETED | OUTPATIENT
Start: 2022-09-13 | End: 2022-09-13

## 2022-09-13 RX ORDER — OLANZAPINE 10 MG/1
5 INJECTION, POWDER, LYOPHILIZED, FOR SOLUTION INTRAMUSCULAR EVERY 6 HOURS PRN
Status: DISCONTINUED | OUTPATIENT
Start: 2022-09-13 | End: 2022-09-14

## 2022-09-13 RX ORDER — QUETIAPINE FUMARATE 100 MG/1
100 TABLET, FILM COATED ORAL NIGHTLY
Status: DISCONTINUED | OUTPATIENT
Start: 2022-09-13 | End: 2022-09-16

## 2022-09-13 RX ORDER — MAGNESIUM SULFATE IN WATER 40 MG/ML
4000 INJECTION, SOLUTION INTRAVENOUS ONCE
Status: COMPLETED | OUTPATIENT
Start: 2022-09-13 | End: 2022-09-13

## 2022-09-13 RX ORDER — HYDRALAZINE HYDROCHLORIDE 20 MG/ML
10 INJECTION INTRAMUSCULAR; INTRAVENOUS EVERY 6 HOURS PRN
Status: DISCONTINUED | OUTPATIENT
Start: 2022-09-13 | End: 2022-09-16

## 2022-09-13 RX ADMIN — DEXMEDETOMIDINE HYDROCHLORIDE 1.5 MCG/KG/HR: 100 INJECTION, SOLUTION INTRAVENOUS at 21:13

## 2022-09-13 RX ADMIN — DEXMEDETOMIDINE HYDROCHLORIDE 0.8 MCG/KG/HR: 100 INJECTION, SOLUTION INTRAVENOUS at 14:28

## 2022-09-13 RX ADMIN — DEXMEDETOMIDINE HYDROCHLORIDE 1.5 MCG/KG/HR: 100 INJECTION, SOLUTION INTRAVENOUS at 04:40

## 2022-09-13 RX ADMIN — SODIUM CHLORIDE, PRESERVATIVE FREE 10 ML: 5 INJECTION INTRAVENOUS at 19:29

## 2022-09-13 RX ADMIN — SODIUM CHLORIDE, PRESERVATIVE FREE 10 ML: 5 INJECTION INTRAVENOUS at 08:35

## 2022-09-13 RX ADMIN — ENOXAPARIN SODIUM 40 MG: 100 INJECTION SUBCUTANEOUS at 09:11

## 2022-09-13 RX ADMIN — MAGNESIUM SULFATE HEPTAHYDRATE 4000 MG: 40 INJECTION, SOLUTION INTRAVENOUS at 09:07

## 2022-09-13 RX ADMIN — Medication 5 MG: at 19:23

## 2022-09-13 RX ADMIN — QUETIAPINE FUMARATE 100 MG: 100 TABLET ORAL at 19:23

## 2022-09-13 RX ADMIN — LEVETIRACETAM 1500 MG: 100 INJECTION, SOLUTION, CONCENTRATE INTRAVENOUS at 14:29

## 2022-09-13 RX ADMIN — THIAMINE HYDROCHLORIDE 100 MG: 100 INJECTION, SOLUTION INTRAMUSCULAR; INTRAVENOUS at 09:11

## 2022-09-13 RX ADMIN — SODIUM CHLORIDE, PRESERVATIVE FREE 20 MG: 5 INJECTION INTRAVENOUS at 19:23

## 2022-09-13 RX ADMIN — OLANZAPINE 5 MG: 10 INJECTION, POWDER, FOR SOLUTION INTRAMUSCULAR at 21:15

## 2022-09-13 RX ADMIN — OLANZAPINE 5 MG: 10 INJECTION, POWDER, FOR SOLUTION INTRAMUSCULAR at 02:31

## 2022-09-13 RX ADMIN — DEXMEDETOMIDINE HYDROCHLORIDE 1.3 MCG/KG/HR: 100 INJECTION, SOLUTION INTRAVENOUS at 01:00

## 2022-09-13 RX ADMIN — DEXMEDETOMIDINE HYDROCHLORIDE 1.1 MCG/KG/HR: 100 INJECTION, SOLUTION INTRAVENOUS at 08:34

## 2022-09-13 RX ADMIN — SODIUM CHLORIDE, PRESERVATIVE FREE 20 MG: 5 INJECTION INTRAVENOUS at 09:11

## 2022-09-13 RX ADMIN — HYDRALAZINE HYDROCHLORIDE 10 MG: 20 INJECTION INTRAMUSCULAR; INTRAVENOUS at 18:26

## 2022-09-13 RX ADMIN — LEVETIRACETAM 1500 MG: 100 INJECTION, SOLUTION, CONCENTRATE INTRAVENOUS at 02:08

## 2022-09-13 RX ADMIN — OLANZAPINE 5 MG: 10 INJECTION, POWDER, FOR SOLUTION INTRAMUSCULAR at 18:31

## 2022-09-13 ASSESSMENT — PAIN SCALES - GENERAL
PAINLEVEL_OUTOF10: 0
PAINLEVEL_OUTOF10: 2

## 2022-09-13 NOTE — PROGRESS NOTES
Speech Language Pathology    Attempted to see pt for dysphagia therapy. Pt too lethargic/not participatory at this time. Will re-attempt later as able/appropriate. D/w [address] [phone]  Pg.  # [id number]

## 2022-09-13 NOTE — PROGRESS NOTES
ICU Progress Note    Admit Date: 9/1/2022  Day: 11  Vent Day: None  IV Access: Peripal  IV Fluids: None  Vasopressors: None                Antibiotics: None  Diet: ADULT DIET; Full Liquid    CC: AMS    Interval history:   NAEON. Oral Zyprexa changed to IV because patient spat out oral form. Patient seen today at bedside quite somnolent. HPI:   The patient is a 20-year-old man with past medical history significant for chronic alcoholism, ICH, CAD, DVT who presented today to the ED with an episode of syncope. The history was mainly obtained by the sister since patient was intubated. He was last known well at 9 AM but the sister. According to the sister, she had just returned home from work which she had found him half lying down on the couch covered in the stool while his car outside was running outside with occasion. She denies any complaints of recent fever, chills, chest pain cough, shortness of breath, lightheadedness, palpitations, nausea, vomiting, abdominal pain, diarrhea, fatigue, visual disturbance, changes in urination frequency or burning pain well urination or headaches by the patient in the preceding days to this incident. She does reinstate that her brother is a chronic alcoholic and had just gotten out of rehab. He has been sober since however she was not aware if he had recently had any alcoholic drink. He had also informed that he had recent episode of intracerebral hemorrhage on 9/4/2021. In the ED, there was a concern for possibility of a stroke however he was not considered a thrombolytic candidate because of his previous ICH and long time since his last known well. According to the ED staff his physical exam was more pertinent for nonfocal delirium. It was also significant for nystagmus and rightward gaze not fixed. He was given 2 mg of Versed and it had worsened his oxygen saturations that had dropped to 88% on a nonrebreather and therefore he was intubated.   CT Abdomen, chest: Moderate dependent atelectasis bilaterally. No acute abnormality on noncontrast CT of the abdomen and pelvis. Cholelithiasis. CTA Head: No acute CTA findings. No hemodynamically significant stenosis or focal aneurysm. No arteriovenous confirmation. CTA Neck: No acute CTA findings. No hemodynamically significant stenosis or focal aneurysm. Patient was admitted to the ICU for further workup and management of his possible meningitis, on MV.      ROS:  Unable to assess due to patient's sedated status.     Medications:     Scheduled Meds:   QUEtiapine  50 mg Oral Nightly    melatonin  5 mg Oral Nightly    famotidine (PEPCID) injection  20 mg IntraVENous BID    levETIRAcetam  1,500 mg IntraVENous Q12H    enoxaparin  40 mg SubCUTAneous Daily    lidocaine PF  5 mL IntraDERmal Once    sodium chloride flush  5-40 mL IntraVENous 2 times per day    thiamine  100 mg IntraVENous Daily     Continuous Infusions:   dextrose      dexmedetomidine (PRECEDEX) IV infusion 1.1 mcg/kg/hr (09/13/22 0834)    sodium chloride       PRN Meds:glucose, dextrose bolus **OR** dextrose bolus, glucagon (rDNA), dextrose, sodium chloride flush, sodium chloride, ondansetron **OR** ondansetron, polyethylene glycol, acetaminophen **OR** acetaminophen    Objective:   Vitals:   T-max:  Patient Vitals for the past 8 hrs:   BP Temp Temp src Pulse Resp SpO2 Weight   09/13/22 0800 (!) 115/49 97.6 °F (36.4 °C) Axillary (!) 45 19 96 % --   09/13/22 0600 (!) 141/56 -- -- 55 24 -- 194 lb 3.6 oz (88.1 kg)   09/13/22 0400 (!) 145/127 98.6 °F (37 °C) Temporal 68 13 94 % --   09/13/22 0300 139/61 -- -- 59 19 -- --   09/13/22 0200 (!) 144/70 -- -- 59 23 -- --   09/13/22 0100 (!) 118/103 -- -- 53 23 -- --       Intake/Output Summary (Last 24 hours) at 9/13/2022 0846  Last data filed at 9/13/2022 0600  Gross per 24 hour   Intake 1097.44 ml   Output 1975 ml   Net -877.56 ml         Physical Exam:  CONSTITUTIONAL:  no apparent distress, and appears stated age  EYES: Pupils equal round and reactive to light. Normal conjunctiva  EARS, NOSE, MOUTH & THROAT: Normal oropharynx. Ears and nose appear normal  NECK:  Supple, symmetrical, trachea midline, no adenopathy, thyroid symmetric, not enlarged and no tenderness, skin normal  LUNGS:  no increased work of breathing and clear to auscultation. No accessory muscle use  CARDIOVASCULAR:  normal S1 and S2, no edema and no JVD  ABDOMEN:  normal bowel sounds, non-distended and no masses palpated, and no tenderness to palpation. No hepatospleenomegaly  LYMPHADENOPATHY:  no axillary or supraclavicular adenopathy. No cervical adnenopathy  PSYCHIATRIC: No obvious depression or anxiety. MUSCULOSKELETAL: No obvious misalignment or effusion of the joints. No clubbing, cyanosis of the digits. SKIN:  normal skin color, texture, turgor and no redness, warmth, or swelling. No palpable nodules  NEURO: Oriented to self (when seen in afternoon), aware he's in the hospital    LABS:    CBC:   Recent Labs     09/11/22 0530 09/12/22 0316 09/13/22  0330   WBC 9.3 9.2 11.2*   HGB 14.1 13.7 13.9   HCT 41.0 40.4* 40.8    184 185   MCV 93.0 93.1 93.7     Renal:    Recent Labs     09/11/22 0530 09/12/22 0316 09/13/22  0330    142 146*   K 3.7 4.0 3.9   CL 99 102 104   CO2 23 22 20*   BUN 12 11 11   CREATININE 0.5* 0.6* 0.6*   GLUCOSE 82 69* 70   CALCIUM 9.0 8.9 9.1   MG 1.70* 2.00 1.70*   PHOS 2.8 2.9 3.2   ANIONGAP 16 18* 22*     Hepatic:   Recent Labs     09/11/22 0530 09/12/22 0316 09/13/22  0330   AST 35  --   --    ALT 14  --   --    BILITOT 1.1*  --   --    PROT 6.3*  --   --    LABALBU 3.7 3.7 3.9   ALKPHOS 79  --   --      Troponin: No results for input(s): TROPONINI in the last 72 hours. BNP: No results for input(s): BNP in the last 72 hours. Lipids: No results for input(s): CHOL, HDL in the last 72 hours.     Invalid input(s): LDLCALCU, TRIGLYCERIDE  ABGs:    Recent Labs     09/10/22  0948   PHART 7.517*   ZRH2IXM 33.8*   PO2ART 68.8*   NJM3FZH 27.4   BEART 5*   W4XVOFJH 95   XQR6HHM 28       INR: No results for input(s): INR in the last 72 hours. Lactate:   Recent Labs     09/10/22  0948   LACTATE 1.02     Cultures:  -----------------------------------------------------------------  RAD:   XR CHEST PORTABLE   Final Result   1. Worsened pulmonary edema. 2.  Persistent bibasilar atelectasis. MRI BRAIN WO CONTRAST   Final Result      Faint diffusion restriction in the right hippocampus with associated FLAIR signal abnormality. This is favored to represent sequelae of seizures, however other infectious/inflammatory etiologies are also possible. Mild atrophy and chronic small vessel ischemic change. Areas of encephalomalacia and gliosis in both cerebral hemispheres with remote hemorrhagic staining from prior insults. IR LUMBAR PUNCTURE FOR DIAGNOSIS   Final Result   . IMPRESSION:   1. Uneventful diagnostic fluoroscopic guided lumbar puncture as the   2. The flow reversed are risk and therefore a closing pressure was obtained at the end of the procedure, 26 cm of water. XR ABDOMEN (KUB) (SINGLE AP VIEW)   Final Result   Findings/Impression:    The tip of the enteric tube terminates in the distal body of the stomach. CT CHEST WO CONTRAST   Final Result      CHEST:      1. Moderate dependent atelectasis bilaterally. ABDOMEN/PELVIS:      1.  No acute abnormality on noncontrast CT of the abdomen and pelvis. 2.  Cholelithiasis. CT ABDOMEN PELVIS WO CONTRAST Additional Contrast? None   Final Result      CHEST:      1. Moderate dependent atelectasis bilaterally. ABDOMEN/PELVIS:      1.  No acute abnormality on noncontrast CT of the abdomen and pelvis. 2.  Cholelithiasis. CTA HEAD NECK W CONTRAST   Final Result      CTA Head:   No acute CTA findings. No hemodynamically significant stenosis or focal aneurysm. No arteriovenous confirmation. CTA Neck:   No acute CTA findings.   No hemodynamically significant stenosis or focal aneurysm. CT HEAD WO CONTRAST   Final Result      1. No findings for acute intracranial abnormality. 2.  Age-related atrophy with patchy periventricular white matter changes bilaterally consistent with chronic small vessel ischemia. 3.  Stable low attenuation left frontoparietal lobe consistent with previous insult from known prior intraparenchymal hematoma. Stable right frontoparietal cortical infarct. These findings were called to the emergency room physician Dr. Jose Bolton on 9/1/2022 at 5:30 p.m. XR CHEST PORTABLE   Final Result   1. No findings for acute cardiopulmonary disease. 2.  ET tube in good position in the mid trachea. Assessment/Plan:   Alverto Chanel is a 57 y.o. male with a PMH of alcohol abuse, CAD, CHF, essential tremor, HTN,  who was admitted with AMS. #Acute Metabolic Encephalopathy 2/2 possible meningitis vs encephalitis induced seizure   On presentation AMS, elevated WBC: 21.7, Tachypneic, Tachycardic, Hypotensive. Hx of ICH, SAH and SDH 2/2 to fall, hx of chronic alcohol abuse,   CT head: Stable low attenuation left frontoparietal lobe consistent with previous insult from known prior intraparenchymal hematoma. Stable right frontoparietal cortical infarct. CT Abdomen, chest: Moderate dependent atelectasis bilaterally. No acute abnormality on noncontrast CT of the abdomen and pelvis. Cholelithiasis. CTA Head: No acute CTA findings. No hemodynamically significant stenosis or focal aneurysm. No arteriovenous confirmation. CTA Neck: No acute CTA findings. No hemodynamically significant stenosis or focal aneurysm. MRI: Faint diffusion restriction in the right hippocampus with associated FLAIR signal abnormality. This is favored to represent sequelae of seizures, however other infectious/inflammatory etiologies are also possible.   cEEG readings previously: Generalized background abnormalities indicative of an underlying diffuse encephalopathy of non-specific etiology. Intermittent focal epileptiform discharges, right posterior temporal, conferring increased risk of focal onset seizures from this region.    -Consulted Neurology: appreciate recs  -blood cultures ordered (9/2): No growth to date  -On Keppra 1500 bid  -Lumbar puncture ordered 9/6/2022: No growth to date, no positive immunological/microbiological results yet  - Nightly Seroquel 50mg, melatonin. IM Zyprexa PRN q6h  - Wean precedex     #Alcohol use disorder  Ethanol level: none detected  -thiamine 100 mg daily  -monitor for withdrawal     #Acute hypercapnic respiratory failure secondary to possible aspiration vs 2/2 medication  On presentation: VBG PH: 7.264, PCO2: 56.4, 83.2  Extubated successfully 9/10     #Hx of Cirrhosis  -Ammonia: 36 wnl(09/1/22)     Code Status: Full Code  FEN: OK for full liquid diet  PPX: Enoxaparin  DISPO: ICU  Barriers to discharge: Precedex gtt    Angela Perez MD, PGY-1  09/13/22  8:46 AM    This patient has been staffed and discussed with Dr. Marybeth Kirk. Patient seen, examined and discussed with the resident and I agree with the assessment and plan as edited above. Patient remains encephalopathic for unclear reasons. Overnight he was reportedly agitated a bit better behaved than the night before and the precedex has weaned to 0.8mcg/min as of this note writing. Unfortunately he's still delirious and agitated. Since he did ok with the nocturnal seroquel at 50mg I'm going to increase it to 100. Will continue the prn zyprexa. Hopefully the change in medications will also allow us to wean off the Precedex. Will need to remain in ICU while on precedex.

## 2022-09-13 NOTE — PLAN OF CARE
Problem: Safety - Medical Restraint  Goal: Remains free of injury from restraints (Restraint for Interference with Medical Device)  Description: INTERVENTIONS:  1. Determine that other, less restrictive measures have been tried or would not be effective before applying the restraint  2. Evaluate the patient's condition at the time of restraint application  3. Inform patient/family regarding the reason for restraint  4.  Q2H: Monitor safety, psychosocial status, comfort, nutrition and hydration  9/13/2022 0855 by Wendy Law RN  Outcome: Progressing  Flowsheets (Taken 9/13/2022 0818)  Remains free of injury from restraints (restraint for interference with medical device): Determine that other, less restrictive measures have been tried or would not be effective before applying the restraint  9/12/2022 2332 by Cristian Wills RN  Outcome: Progressing  Flowsheets  Taken 9/12/2022 2200 by Cristian Wills RN  Remains free of injury from restraints (restraint for interference with medical device):   Determine that other, less restrictive measures have been tried or would not be effective before applying the restraint   Evaluate the patient's condition at the time of restraint application   Inform patient/family regarding the reason for restraint   Every 2 hours: Monitor safety, psychosocial status, comfort, nutrition and hydration       Problem: Discharge Planning  Goal: Discharge to home or other facility with appropriate resources  9/13/2022 0855 by Wendy Law RN  Outcome: Progressing  Flowsheets (Taken 9/11/2022 2000 by Jamie Tovar RN)  Discharge to home or other facility with appropriate resources: Identify barriers to discharge with patient and caregiver  9/12/2022 2332 by Cristian Wills RN  Outcome: Progressing     Problem: Pain  Goal: Verbalizes/displays adequate comfort level or baseline comfort level  9/13/2022 0855 by Wendy Law RN  Outcome: Progressing  Flowsheets (Taken 9/11/2022 0800 by Nandini Casatneda Grand elisabet RN)  Verbalizes/displays adequate comfort level or baseline comfort level: Encourage patient to monitor pain and request assistance  9/12/2022 2332 by Kate Leventhal, RN  Outcome: Progressing     Problem: Safety - Adult  Goal: Free from fall injury  9/13/2022 0855 by Mai Loja RN  Outcome: Progressing  Flowsheets (Taken 9/11/2022 2300 by Rocael Madera RN)  Free From Fall Injury: Instruct family/caregiver on patient safety  9/12/2022 2332 by Kate Leventhal, RN  Outcome: Progressing     Problem: ABCDS Injury Assessment  Goal: Absence of physical injury  Outcome: Progressing  Flowsheets (Taken 9/12/2022 2200 by Kate Leventhal, RN)  Absence of Physical Injury: Implement safety measures based on patient assessment     Problem: Skin/Tissue Integrity  Goal: Absence of new skin breakdown  Description: 1. Monitor for areas of redness and/or skin breakdown  2. Assess vascular access sites hourly  3. Every 4-6 hours minimum:  Change oxygen saturation probe site  4. Every 4-6 hours:  If on nasal continuous positive airway pressure, respiratory therapy assess nares and determine need for appliance change or resting period.   Outcome: Progressing

## 2022-09-13 NOTE — PROGRESS NOTES
Patient is asleep upon morning assessment. Precedex gtt remains on and is currently at 1.2  Bilateral Wrist Restraints are in place, with a plan to wean precedex as tolerated with hopes of restraints being removed. Hemodynamically stable at this time. Bed locked, in lowest position, and alarm is on. Will continue to monitor.

## 2022-09-13 NOTE — PLAN OF CARE
Problem: Safety - Medical Restraint  Goal: Remains free of injury from restraints (Restraint for Interference with Medical Device)  Description: INTERVENTIONS:  1. Determine that other, less restrictive measures have been tried or would not be effective before applying the restraint  2. Evaluate the patient's condition at the time of restraint application  3. Inform patient/family regarding the reason for restraint  4.  Q2H: Monitor safety, psychosocial status, comfort, nutrition and hydration  Outcome: Progressing  Flowsheets  Taken 9/12/2022 2200 by Zenia Lopez RN  Remains free of injury from restraints (restraint for interference with medical device):   Determine that other, less restrictive measures have been tried or would not be effective before applying the restraint   Evaluate the patient's condition at the time of restraint application   Inform patient/family regarding the reason for restraint   Every 2 hours: Monitor safety, psychosocial status, comfort, nutrition and hydration    Problem: Discharge Planning  Goal: Discharge to home or other facility with appropriate resources  Outcome: Progressing     Problem: Pain  Goal: Verbalizes/displays adequate comfort level or baseline comfort level  Outcome: Progressing     Problem: Safety - Adult  Goal: Free from fall injury  Outcome: Progressing     Problem: Respiratory - Adult  Goal: Achieves optimal ventilation and oxygenation  Outcome: Progressing     Problem: Cardiovascular - Adult  Goal: Maintains optimal cardiac output and hemodynamic stability  Outcome: Progressing

## 2022-09-13 NOTE — CONSULTS
Comprehensive Nutrition Assessment    Type and Reason for Visit:  Reassess, Consult    Nutrition Recommendations/Plan:   Continue full liquid diet and advance per MD  Add Ensure Enlive TID   Monitor nutrition adequacy, pertinent labs, bowel habits, wt changes, and clinical progress     Malnutrition Assessment:  Malnutrition Status: At risk for malnutrition (Comment) (09/13/22 1118)    Context:  Acute Illness     Findings of the 6 clinical characteristics of malnutrition:  Energy Intake:  Mild decrease in energy intake (Comment)  Weight Loss:  1% to 2% over 1 week     Body Fat Loss:  No significant body fat loss     Muscle Mass Loss:  No significant muscle mass loss    Fluid Accumulation:  Mild      Nutrition Assessment:    Consult for ONS: Pt extubated, now on full liquid diet. Two intakes recorded since starting PO diet, both @ 0%. Pt states that his appetite is fine, explained the importance of consuming adequate calories and protein in order to heal. He is receptive to trying Ensures, prefers strawberry. Will add TID and monitor for intakes and tolerance. Nutrition Related Findings:    Na 146. Mg 1.7. Cr 0.6. Active bowel sounds. RUE non-pitting edema. Wound Type:  (scattered abrasions)       Current Nutrition Intake & Therapies:    Average Meal Intake: 0%  Average Supplements Intake: None Ordered  ADULT DIET; Full Liquid    Anthropometric Measures:  Height: 5' 10\" (177.8 cm)  Ideal Body Weight (IBW): 166 lbs (75 kg)       Current Body Weight: 202 lb 11 oz (91.9 kg),   IBW. Weight Source: Bed Scale  Current BMI (kg/m2): 29.1                          BMI Categories: Overweight (BMI 25.0-29. 9)    Estimated Daily Nutrient Needs:  Energy Requirements Based On: Kcal/kg (20-25 kcal/kg admission wt)  Weight Used for Energy Requirements: Admission (Admission wt 86.8 kg)  Energy (kcal/day): 8387-3815  Weight Used for Protein Requirements: Admission (1.2-2.0 g/kg admission wt (86.8kg))  Protein (g/day): 104-174  Method Used for Fluid Requirements: 1 ml/kcal  Fluid (ml/day): or per MD    Nutrition Diagnosis:   Inadequate energy intake related to inadequate protein-energy intake as evidenced by intake 0-25%    Nutrition Interventions:   Food and/or Nutrient Delivery: Continue Current Diet, Start Oral Nutrition Supplement  Nutrition Education/Counseling: Education not indicated  Coordination of Nutrition Care: Continue to monitor while inpatient  Plan of Care discussed with: Pt    Goals:  Previous Goal Met: Progressing toward Goal(s)  Goals: PO intake 50% or greater, prior to discharge       Nutrition Monitoring and Evaluation:   Behavioral-Environmental Outcomes: None Identified  Food/Nutrient Intake Outcomes: Food and Nutrient Intake, Supplement Intake, Diet Advancement/Tolerance  Physical Signs/Symptoms Outcomes: Biochemical Data, Nutrition Focused Physical Findings, Weight, Meal Time Behavior    Discharge Planning:     Too soon to determine     Antonina Berg, [address] [place] [address],   Contact: [phone]

## 2022-09-14 ENCOUNTER — APPOINTMENT (OUTPATIENT)
Dept: GENERAL RADIOLOGY | Age: 62
DRG: 130 | End: 2022-09-14
Payer: MEDICAID

## 2022-09-14 LAB
ALBUMIN SERPL-MCNC: 3.6 G/DL (ref 3.4–5)
ANION GAP SERPL CALCULATED.3IONS-SCNC: 22 MMOL/L (ref 3–16)
BASOPHILS ABSOLUTE: 0 K/UL (ref 0–0.2)
BASOPHILS RELATIVE PERCENT: 0.3 %
BUN BLDV-MCNC: 8 MG/DL (ref 7–20)
CALCIUM SERPL-MCNC: 8.8 MG/DL (ref 8.3–10.6)
CHLORIDE BLD-SCNC: 105 MMOL/L (ref 99–110)
CO2: 16 MMOL/L (ref 21–32)
CREAT SERPL-MCNC: 0.7 MG/DL (ref 0.8–1.3)
EOSINOPHILS ABSOLUTE: 0.1 K/UL (ref 0–0.6)
EOSINOPHILS RELATIVE PERCENT: 1.1 %
GFR AFRICAN AMERICAN: >60
GFR NON-AFRICAN AMERICAN: >60
GLUCOSE BLD-MCNC: 73 MG/DL (ref 70–99)
HCT VFR BLD CALC: 43.5 % (ref 40.5–52.5)
HEMOGLOBIN: 14.4 G/DL (ref 13.5–17.5)
LYMPHOCYTES ABSOLUTE: 1.9 K/UL (ref 1–5.1)
LYMPHOCYTES RELATIVE PERCENT: 15.9 %
MAGNESIUM: 1.8 MG/DL (ref 1.8–2.4)
MCH RBC QN AUTO: 32 PG (ref 26–34)
MCHC RBC AUTO-ENTMCNC: 33.1 G/DL (ref 31–36)
MCV RBC AUTO: 96.6 FL (ref 80–100)
MONOCYTES ABSOLUTE: 1.1 K/UL (ref 0–1.3)
MONOCYTES RELATIVE PERCENT: 9.3 %
NEUTROPHILS ABSOLUTE: 8.8 K/UL (ref 1.7–7.7)
NEUTROPHILS RELATIVE PERCENT: 73.4 %
PDW BLD-RTO: 14.3 % (ref 12.4–15.4)
PHOSPHORUS: 4.1 MG/DL (ref 2.5–4.9)
PLATELET # BLD: 224 K/UL (ref 135–450)
PMV BLD AUTO: 8.7 FL (ref 5–10.5)
POTASSIUM SERPL-SCNC: 4.2 MMOL/L (ref 3.5–5.1)
RBC # BLD: 4.51 M/UL (ref 4.2–5.9)
SODIUM BLD-SCNC: 143 MMOL/L (ref 136–145)
WBC # BLD: 12 K/UL (ref 4–11)

## 2022-09-14 PROCEDURE — 2000000000 HC ICU R&B

## 2022-09-14 PROCEDURE — 6370000000 HC RX 637 (ALT 250 FOR IP)

## 2022-09-14 PROCEDURE — 2580000003 HC RX 258: Performed by: STUDENT IN AN ORGANIZED HEALTH CARE EDUCATION/TRAINING PROGRAM

## 2022-09-14 PROCEDURE — 80069 RENAL FUNCTION PANEL: CPT

## 2022-09-14 PROCEDURE — 6370000000 HC RX 637 (ALT 250 FOR IP): Performed by: STUDENT IN AN ORGANIZED HEALTH CARE EDUCATION/TRAINING PROGRAM

## 2022-09-14 PROCEDURE — 74230 X-RAY XM SWLNG FUNCJ C+: CPT

## 2022-09-14 PROCEDURE — 6360000002 HC RX W HCPCS: Performed by: NURSE PRACTITIONER

## 2022-09-14 PROCEDURE — 2500000003 HC RX 250 WO HCPCS

## 2022-09-14 PROCEDURE — 99233 SBSQ HOSP IP/OBS HIGH 50: CPT | Performed by: INTERNAL MEDICINE

## 2022-09-14 PROCEDURE — 2580000003 HC RX 258

## 2022-09-14 PROCEDURE — 94761 N-INVAS EAR/PLS OXIMETRY MLT: CPT

## 2022-09-14 PROCEDURE — 85025 COMPLETE CBC W/AUTO DIFF WBC: CPT

## 2022-09-14 PROCEDURE — 2500000003 HC RX 250 WO HCPCS: Performed by: STUDENT IN AN ORGANIZED HEALTH CARE EDUCATION/TRAINING PROGRAM

## 2022-09-14 PROCEDURE — 92611 MOTION FLUOROSCOPY/SWALLOW: CPT

## 2022-09-14 PROCEDURE — 2580000003 HC RX 258: Performed by: NURSE PRACTITIONER

## 2022-09-14 PROCEDURE — 36415 COLL VENOUS BLD VENIPUNCTURE: CPT

## 2022-09-14 PROCEDURE — 92526 ORAL FUNCTION THERAPY: CPT

## 2022-09-14 PROCEDURE — A4216 STERILE WATER/SALINE, 10 ML: HCPCS | Performed by: STUDENT IN AN ORGANIZED HEALTH CARE EDUCATION/TRAINING PROGRAM

## 2022-09-14 PROCEDURE — 83735 ASSAY OF MAGNESIUM: CPT

## 2022-09-14 PROCEDURE — 6360000002 HC RX W HCPCS: Performed by: STUDENT IN AN ORGANIZED HEALTH CARE EDUCATION/TRAINING PROGRAM

## 2022-09-14 RX ORDER — OLANZAPINE 10 MG/1
5 INJECTION, POWDER, LYOPHILIZED, FOR SOLUTION INTRAMUSCULAR EVERY 6 HOURS PRN
Status: DISCONTINUED | OUTPATIENT
Start: 2022-09-14 | End: 2022-09-17

## 2022-09-14 RX ORDER — OLANZAPINE 5 MG/1
5 TABLET ORAL DAILY
Status: DISCONTINUED | OUTPATIENT
Start: 2022-09-15 | End: 2022-09-16

## 2022-09-14 RX ORDER — OLANZAPINE 5 MG/1
5 TABLET ORAL ONCE
Status: COMPLETED | OUTPATIENT
Start: 2022-09-14 | End: 2022-09-14

## 2022-09-14 RX ORDER — CARVEDILOL 3.12 MG/1
3.12 TABLET ORAL 2 TIMES DAILY WITH MEALS
Status: DISCONTINUED | OUTPATIENT
Start: 2022-09-14 | End: 2022-10-06 | Stop reason: HOSPADM

## 2022-09-14 RX ADMIN — SODIUM CHLORIDE, PRESERVATIVE FREE 20 MG: 5 INJECTION INTRAVENOUS at 09:28

## 2022-09-14 RX ADMIN — SODIUM CHLORIDE, PRESERVATIVE FREE 10 ML: 5 INJECTION INTRAVENOUS at 09:29

## 2022-09-14 RX ADMIN — CARVEDILOL 3.12 MG: 3.12 TABLET, FILM COATED ORAL at 17:10

## 2022-09-14 RX ADMIN — DEXMEDETOMIDINE HYDROCHLORIDE 0.8 MCG/KG/HR: 100 INJECTION, SOLUTION INTRAVENOUS at 05:31

## 2022-09-14 RX ADMIN — QUETIAPINE FUMARATE 100 MG: 100 TABLET ORAL at 21:11

## 2022-09-14 RX ADMIN — OLANZAPINE 5 MG: 5 TABLET, FILM COATED ORAL at 10:37

## 2022-09-14 RX ADMIN — Medication 5 MG: at 21:10

## 2022-09-14 RX ADMIN — DEXMEDETOMIDINE HYDROCHLORIDE 1.2 MCG/KG/HR: 100 INJECTION, SOLUTION INTRAVENOUS at 23:27

## 2022-09-14 RX ADMIN — LEVETIRACETAM 1500 MG: 100 INJECTION, SOLUTION, CONCENTRATE INTRAVENOUS at 01:06

## 2022-09-14 RX ADMIN — SODIUM CHLORIDE, PRESERVATIVE FREE 10 ML: 5 INJECTION INTRAVENOUS at 21:10

## 2022-09-14 RX ADMIN — THIAMINE HYDROCHLORIDE 100 MG: 100 INJECTION, SOLUTION INTRAMUSCULAR; INTRAVENOUS at 09:29

## 2022-09-14 RX ADMIN — LEVETIRACETAM 1500 MG: 100 INJECTION, SOLUTION, CONCENTRATE INTRAVENOUS at 14:10

## 2022-09-14 RX ADMIN — ENOXAPARIN SODIUM 40 MG: 100 INJECTION SUBCUTANEOUS at 09:29

## 2022-09-14 RX ADMIN — OLANZAPINE 5 MG: 10 INJECTION, POWDER, LYOPHILIZED, FOR SOLUTION INTRAMUSCULAR at 12:15

## 2022-09-14 RX ADMIN — DEXMEDETOMIDINE HYDROCHLORIDE 1.5 MCG/KG/HR: 100 INJECTION, SOLUTION INTRAVENOUS at 18:48

## 2022-09-14 RX ADMIN — OLANZAPINE 5 MG: 10 INJECTION, POWDER, LYOPHILIZED, FOR SOLUTION INTRAMUSCULAR at 18:01

## 2022-09-14 RX ADMIN — SODIUM CHLORIDE, PRESERVATIVE FREE 20 MG: 5 INJECTION INTRAVENOUS at 21:10

## 2022-09-14 RX ADMIN — DEXMEDETOMIDINE HYDROCHLORIDE 1.5 MCG/KG/HR: 100 INJECTION, SOLUTION INTRAVENOUS at 00:27

## 2022-09-14 ASSESSMENT — PAIN SCALES - GENERAL
PAINLEVEL_OUTOF10: 0
PAINLEVEL_OUTOF10: 0

## 2022-09-14 NOTE — PROGRESS NOTES
ICU Progress Note    Admit Date: 9/1/2022  Day: 12  Vent Day: None  IV Access: Peripheral  IV Fluids: None  Vasopressors: None                Antibiotics: None  Diet: ADULT DIET; Full Liquid  ADULT ORAL NUTRITION SUPPLEMENT; Breakfast, Lunch, Dinner; Standard High Calorie/High Protein Oral Supplement    CC: AMS    Interval history:   NAEON. Patient seen at bedside today. More alert than previous mornings. HPI:   The patient is a 77-year-old man with past medical history significant for chronic alcoholism, ICH, CAD, DVT who presented today to the ED with an episode of syncope. The history was mainly obtained by the sister since patient was intubated. He was last known well at 9 AM but the sister. According to the sister, she had just returned home from work which she had found him half lying down on the couch covered in the stool while his car outside was running outside with occasion. She denies any complaints of recent fever, chills, chest pain cough, shortness of breath, lightheadedness, palpitations, nausea, vomiting, abdominal pain, diarrhea, fatigue, visual disturbance, changes in urination frequency or burning pain well urination or headaches by the patient in the preceding days to this incident. She does reinstate that her brother is a chronic alcoholic and had just gotten out of rehab. He has been sober since however she was not aware if he had recently had any alcoholic drink. He had also informed that he had recent episode of intracerebral hemorrhage on 9/4/2021. In the ED, there was a concern for possibility of a stroke however he was not considered a thrombolytic candidate because of his previous ICH and long time since his last known well. According to the ED staff his physical exam was more pertinent for nonfocal delirium. It was also significant for nystagmus and rightward gaze not fixed.   He was given 2 mg of Versed and it had worsened his oxygen saturations that had dropped to 88% on a nonrebreather and therefore he was intubated. CT Abdomen, chest: Moderate dependent atelectasis bilaterally. No acute abnormality on noncontrast CT of the abdomen and pelvis. Cholelithiasis. CTA Head: No acute CTA findings. No hemodynamically significant stenosis or focal aneurysm. No arteriovenous confirmation. CTA Neck: No acute CTA findings. No hemodynamically significant stenosis or focal aneurysm. Patient was admitted to the ICU for further workup and management of his possible meningitis, on MV. Extubated 9/10, with precedex and restraint requirement for agitation since then. Improving gradually. ROS:  No nausea, no vomiting. Mild diarrhea. No fever, no chills. No SOB, no chest pain.     Medications:     Scheduled Meds:   QUEtiapine  100 mg Oral Nightly    melatonin  5 mg Oral Nightly    famotidine (PEPCID) injection  20 mg IntraVENous BID    levETIRAcetam  1,500 mg IntraVENous Q12H    enoxaparin  40 mg SubCUTAneous Daily    lidocaine PF  5 mL IntraDERmal Once    sodium chloride flush  5-40 mL IntraVENous 2 times per day    thiamine  100 mg IntraVENous Daily     Continuous Infusions:   dextrose      dexmedetomidine (PRECEDEX) IV infusion 0.8 mcg/kg/hr (09/14/22 0600)    sodium chloride       PRN Meds:OLANZapine, hydrALAZINE, glucose, dextrose bolus **OR** dextrose bolus, glucagon (rDNA), dextrose, sodium chloride flush, sodium chloride, ondansetron **OR** ondansetron, polyethylene glycol, acetaminophen **OR** acetaminophen    Objective:   Vitals:   T-max:  Patient Vitals for the past 8 hrs:   BP Temp Temp src Pulse Resp SpO2 Weight   09/14/22 0800 126/69 -- -- 84 18 -- --   09/14/22 0600 105/61 -- -- 61 17 95 % 192 lb (87.1 kg)   09/14/22 0500 105/61 -- -- 65 18 96 % --   09/14/22 0400 (!) 101/49 97.8 °F (36.6 °C) Oral 73 19 96 % --   09/14/22 0300 (!) 91/50 -- -- 83 22 95 % --   09/14/22 0205 (!) 103/59 -- -- 86 20 96 % --   09/14/22 0100 (!) 92/54 -- -- 76 21 95 % --       Intake/Output Summary (Last 24 hours) at 9/14/2022 0833  Last data filed at 9/14/2022 0600  Gross per 24 hour   Intake 808.8 ml   Output 1200 ml   Net -391.2 ml       Physical Exam:  CONSTITUTIONAL:  awake, alert, cooperative, no apparent distress, and appears stated age  EYES: Pupils equal round and reactive to light. Normal conjunctiva  EARS, NOSE, MOUTH & THROAT: Normal oropharynx. Ears and nose appear normal  NECK:  Supple, symmetrical, trachea midline, no adenopathy, thyroid symmetric, not enlarged and no tenderness, skin normal  LUNGS:  no increased work of breathing and clear to auscultation. No accessory muscle use  CARDIOVASCULAR:  normal S1 and S2, no edema and no JVD  ABDOMEN:  normal bowel sounds, non-distended and no masses palpated, and no tenderness to palpation. No hepatospleenomegaly  LYMPHADENOPATHY:  no axillary or supraclavicular adenopathy. No cervical adnenopathy  PSYCHIATRIC: Oriented to person place and time. No obvious depression or anxiety. MUSCULOSKELETAL: No obvious misalignment or effusion of the joints. No clubbing, cyanosis of the digits. SKIN:  normal skin color, texture, turgor and no redness, warmth, or swelling. No palpable nodules    LABS:    CBC:   Recent Labs     09/12/22 0316 09/13/22 0330 09/14/22 0456   WBC 9.2 11.2* 12.0*   HGB 13.7 13.9 14.4   HCT 40.4* 40.8 43.5    185 224   MCV 93.1 93.7 96.6     Renal:    Recent Labs     09/12/22 0316 09/13/22 0330 09/14/22 0456    146* 143   K 4.0 3.9 4.2    104 105   CO2 22 20* 16*   BUN 11 11 8   CREATININE 0.6* 0.6* 0.7*   GLUCOSE 69* 70 73   CALCIUM 8.9 9.1 8.8   MG 2.00 1.70* 1.80   PHOS 2.9 3.2 4.1   ANIONGAP 18* 22* 22*     Hepatic:   Recent Labs     09/12/22 0316 09/13/22 0330 09/14/22 0456   LABALBU 3.7 3.9 3.6     Troponin: No results for input(s): TROPONINI in the last 72 hours. BNP: No results for input(s): BNP in the last 72 hours. Lipids: No results for input(s): CHOL, HDL in the last 72 hours.     Invalid input(s): LDLCALCU, TRIGLYCERIDE  ABGs:  No results for input(s): PHART, DWB6UBC, PO2ART, UCJ0QDT, BEART, THGBART, W8JVNKAI, MVK1FJY in the last 72 hours. INR: No results for input(s): INR in the last 72 hours. Lactate: No results for input(s): LACTATE in the last 72 hours. Cultures:  -----------------------------------------------------------------  RAD:   XR CHEST PORTABLE   Final Result   1. Worsened pulmonary edema. 2.  Persistent bibasilar atelectasis. MRI BRAIN WO CONTRAST   Final Result      Faint diffusion restriction in the right hippocampus with associated FLAIR signal abnormality. This is favored to represent sequelae of seizures, however other infectious/inflammatory etiologies are also possible. Mild atrophy and chronic small vessel ischemic change. Areas of encephalomalacia and gliosis in both cerebral hemispheres with remote hemorrhagic staining from prior insults. IR LUMBAR PUNCTURE FOR DIAGNOSIS   Final Result   . IMPRESSION:   1. Uneventful diagnostic fluoroscopic guided lumbar puncture as the   2. The flow reversed are risk and therefore a closing pressure was obtained at the end of the procedure, 26 cm of water. XR ABDOMEN (KUB) (SINGLE AP VIEW)   Final Result   Findings/Impression:    The tip of the enteric tube terminates in the distal body of the stomach. CT CHEST WO CONTRAST   Final Result      CHEST:      1. Moderate dependent atelectasis bilaterally. ABDOMEN/PELVIS:      1.  No acute abnormality on noncontrast CT of the abdomen and pelvis. 2.  Cholelithiasis. CT ABDOMEN PELVIS WO CONTRAST Additional Contrast? None   Final Result      CHEST:      1. Moderate dependent atelectasis bilaterally. ABDOMEN/PELVIS:      1.  No acute abnormality on noncontrast CT of the abdomen and pelvis. 2.  Cholelithiasis. CTA HEAD NECK W CONTRAST   Final Result      CTA Head:   No acute CTA findings.   No hemodynamically significant stenosis or focal aneurysm. No arteriovenous confirmation. CTA Neck:   No acute CTA findings. No hemodynamically significant stenosis or focal aneurysm. CT HEAD WO CONTRAST   Final Result      1. No findings for acute intracranial abnormality. 2.  Age-related atrophy with patchy periventricular white matter changes bilaterally consistent with chronic small vessel ischemia. 3.  Stable low attenuation left frontoparietal lobe consistent with previous insult from known prior intraparenchymal hematoma. Stable right frontoparietal cortical infarct. These findings were called to the emergency room physician Dr. Kay Ny on 9/1/2022 at 5:30 p.m. XR CHEST PORTABLE   Final Result   1. No findings for acute cardiopulmonary disease. 2.  ET tube in good position in the mid trachea. Assessment/Plan:   Yunior Burger is a 57 y.o. male with a PMH of alcohol abuse, CAD, CHF, essential tremor, HTN,  who was admitted with AMS. Yunior Burger is a 57 y.o. male with a PMH of alcohol abuse, CAD, CHF, essential tremor, HTN,  who was admitted with AMS. #Acute Metabolic Encephalopathy 2/2 possible meningitis vs encephalitis induced seizure   On presentation AMS, elevated WBC: 21.7, Tachypneic, Tachycardic, Hypotensive. Hx of ICH, SAH and SDH 2/2 to fall, hx of chronic alcohol abuse,   CT head: Stable low attenuation left frontoparietal lobe consistent with previous insult from known prior intraparenchymal hematoma. Stable right frontoparietal cortical infarct. CT Abdomen, chest: Moderate dependent atelectasis bilaterally. No acute abnormality on noncontrast CT of the abdomen and pelvis. Cholelithiasis. CTA Head: No acute CTA findings. No hemodynamically significant stenosis or focal aneurysm. No arteriovenous confirmation. CTA Neck: No acute CTA findings. No hemodynamically significant stenosis or focal aneurysm.   MRI: Faint diffusion restriction in the right hippocampus with associated FLAIR signal abnormality. This is favored to represent sequelae of seizures, however other infectious/inflammatory etiologies are also possible. cEEG readings previously: Generalized background abnormalities indicative of an underlying diffuse encephalopathy of non-specific etiology. Intermittent focal epileptiform discharges, right posterior temporal, conferring increased risk of focal onset seizures from this region.     -Consulted Neurology: appreciate recs  -blood cultures ordered (9/2): No growth to date  -On Keppra 1500 bid  -Lumbar puncture ordered 9/6/2022: No growth to date, no positive immunological/microbiological results yet  - Nightly Seroquel 100mg, melatonin. IM Zyprexa PRN q6h  - Wean precedex     #Alcohol use disorder  Ethanol level: none detected  -thiamine 100 mg daily  -monitor for withdrawal     #Acute hypercapnic respiratory failure secondary to possible aspiration vs 2/2 medication  On presentation: VBG PH: 7.264, PCO2: 56.4, 83.2  Extubated successfully 9/10     #Hx of Cirrhosis  -Ammonia: 36 wnl(09/1/22)     Code Status: Full Code  FEN: OK for full liquid diet  PPX: Enoxaparin  DISPO: ICU  Barriers to discharge: Precedex gtt    Camila Reynaga MD, PGY-1  09/14/22  8:33 AM    This patient has been staffed and discussed with Dr. Ammy Estrella. Patient seen, examined and discussed with the resident and I agree with the assessment and plan as edited above. Remains delirious but is gradually improving. He was better able to retain information but only very short term. Did better with the oral seroquel 100 mg and a prn zyprexa overnight. Will add a scheduled dose of zyprexa this morning as well. He had the precedex turned off this morning and hopefully he can tolerate that change.     Damian Doherty MD

## 2022-09-14 NOTE — PROCEDURES
INSTRUMENTAL SWALLOW REPORT  MODIFIED BARIUM SWALLOW    NAME: Safia Schofield   : 1960  MRN: [id number]       Date of Eval: 2022     Ordering Physician: Dr. Meryl Barillas  Radiologist: Dr Concha Pena     Referring Diagnosis(es): Referring Diagnosis: seizure    Past Medical History:  has a past medical history of Alcohol abuse, CAD (coronary artery disease), CHF (congestive heart failure) ([facility] [address] Essential tremor, HTN (hypertension), Hx of blood clots, Hyperlipidemia, ICH (intracerebral hemorrhage) ([facility] [address] Lower extremity cellulitis, and MI (mitral incompetence). Past Surgical History:  has a past surgical history that includes Percutaneous Transluminal Coronary Angio and Upper gastrointestinal endoscopy (N/A, 2020). Type of Study: Initial MBS      Recent Chest Xray/CT of Chest: 22  Impression   1. Worsened pulmonary edema. 2.  Persistent bibasilar atelectasis. Recent MRI: 22  Impression       Faint diffusion restriction in the right hippocampus with associated FLAIR signal abnormality. This is favored to represent sequelae of seizures, however other infectious/inflammatory etiologies are also possible. Mild atrophy and chronic small vessel ischemic change. Areas of encephalomalacia and gliosis in both cerebral hemispheres with remote hemorrhagic staining from prior insults. Patient Complaints/Reason for Referral:  Safia Schofield was referred for a MBS to assess the efficiency of his/her swallow function, assess for aspiration, and to make recommendations regarding safe dietary consistencies, effective compensatory strategies, and safe eating environment. Onset of problem:   Date of Onset: 22    Behavior/Cognition/Vision/Hearing:  Behavior/Cognition: Alert;Confused; Impulsive;Distractible;Requires cueing  Vision: Within Functional Limits  Hearing: Within functional limits    Impressions:  Swallow WFL's. Swallow is timely and efficient.   No penetration/aspiration or pharyngeal residue identified. Mastication of solids is adequate, despite absent dentition, with effective clearance of oral cavity. Recommend Easy to Chew (due to absent dentition and cognitive status)/thin liquids    Treatment Dx and ICD 10: oropharyngeal dysphagia    Patient Position: Lateral and 90 degrees  Consistencies Administered: Regular;Pureed; Thin cup; Thin teaspoon; Thin straw  Dysphagia Outcome Severity Scale: Level 6: Within functional limits/Modified independence  Penetration-Aspiration Scale (PAS): 1 - Material does not enter the airway    Recommended Diet:  Solid consistency: Easy to Chew  Liquid consistency: Thin  Liquid administration via: Cup;Straw  Medication administration:  (as tolerated)    Safe Swallow Protocol:  Supervision: Close  Upright as possible for all oral intake    Recommendations/Treatment  Requires SLP Intervention: Yes  D/C Recommendations: To be determined     Recommended Exercises: n/a   Therapeutic Interventions: Oral care; Patient/Family education;Diet tolerance monitoring    Education: Images and recommendations were reviewed with pt following this exam.   Patient Education: Pt educated to results of MBS  Patient Education Response: No evidence of learning    Safety Devices  Safety Devices in place: Yes    Goals:    1- The patient will tolerate repeat bedside swallowing evaluation when able  9/14: pt alert, voice and volitional cough are strong. Pt has been tolerating full liquids without respiratory decline. Pt re-assessed with ice chips, water via cup and straw, including sequential swallows, puree and solid texture. No overt signs of aspiration emerged, voice remained clear. Pt is edentulous but demonstrated prolonged but adequate mastication  with soft solid. Due to prolonged intubation, will proceed with MBS that was recommended after initial evaluation, to r/o silent aspiration and assess swallow physiology.    Goal met     2 - The

## 2022-09-14 NOTE — PLAN OF CARE
Problem: Discharge Planning  Goal: Discharge to home or other facility with appropriate resources  Outcome: Progressing  Flowsheets (Taken 9/13/2022 2000)  Discharge to home or other facility with appropriate resources: Identify barriers to discharge with patient and caregiver     Problem: Discharge Planning  Goal: Discharge to home or other facility with appropriate resources  Outcome: Progressing  Flowsheets (Taken 9/13/2022 2000)  Discharge to home or other facility with appropriate resources: Identify barriers to discharge with patient and caregiver     Problem: Pain  Goal: Verbalizes/displays adequate comfort level or baseline comfort level  Outcome: Progressing     Problem: Safety - Adult  Goal: Free from fall injury  Outcome: Progressing     Problem: ABCDS Injury Assessment  Goal: Absence of physical injury  Outcome: Progressing     Problem: Safety - Medical Restraint  Goal: Remains free of injury from restraints (Restraint for Interference with Medical Device)  Description: INTERVENTIONS:  1. Determine that other, less restrictive measures have been tried or would not be effective before applying the restraint  2. Evaluate the patient's condition at the time of restraint application  3. Inform patient/family regarding the reason for restraint  4.  Q2H: Monitor safety, psychosocial status, comfort, nutrition and hydration  Outcome: Progressing  Flowsheets  Taken 9/13/2022 1800 by Wyonia Dance, RN  Remains free of injury from restraints (restraint for interference with medical device): Determine that other, less restrictive measures have been tried or would not be effective before applying the restraint  Taken 9/13/2022 1600 by Wyonia Dance, RN  Remains free of injury from restraints (restraint for interference with medical device): Determine that other, less restrictive measures have been tried or would not be effective before applying the restraint  Taken 9/13/2022 1400 by Wyonia Dance, RN  Remains free of injury from restraints (restraint for interference with medical device): Determine that other, less restrictive measures have been tried or would not be effective before applying the restraint  Taken 9/13/2022 1200 by Julia Portillo RN  Remains free of injury from restraints (restraint for interference with medical device): Determine that other, less restrictive measures have been tried or would not be effective before applying the restraint  Taken 9/13/2022 1000 by Julia Portillo RN  Remains free of injury from restraints (restraint for interference with medical device): Determine that other, less restrictive measures have been tried or would not be effective before applying the restraint

## 2022-09-14 NOTE — CARE COORDINATION
Case management is following for discharge disposition. The chart was reviewed. Mr. Gilmar Longoria remains in the ICU. Will address disposition and service needs once he is more medically stable.     Electronically signed by MIGNON Barrios RN-[facility]  Case Management  [phone]

## 2022-09-14 NOTE — PROGRESS NOTES
At start of shift, pt is very agitated. Pulling at gown, lines, restraints. Disoriented to everything except self. 5 mg Zyprexa given. Pt continues to be maxed on precedex. Bilateral wrist restraints for pt safety. Hemodynamically stable at this time.

## 2022-09-14 NOTE — PROGRESS NOTES
Speech Language Pathology  Facility/Department: [facility] ICU  Dysphagia Daily Treatment Note    NAME: Gigi Islas  : 1960  MRN: [id number]    Patient Diagnosis(es):   Patient Active Problem List    Diagnosis Date Noted    Acute respiratory failure ([address] 2022    Seizure ([address] 2022    Skin pustule     Diastolic congestive heart failure, unspecified HF chronicity ([address] 2022    Intracranial hemorrhage ([address] 2021    Electrolyte abnormality 2021    Alcohol abuse with withdrawal ([address] 01/10/2021    Hypokalemia 2020    Acute deep vein thrombosis of left lower extremity ([address] 2020    Abnormal angiogram 2020    Mural thrombus of cardiac apex     Coronary artery disease due to lipid rich plaque      Allergies: No Known Allergies    Recent Chest Xray/CT of Chest: 22  Impression   1. Worsened pulmonary edema. 2.  Persistent bibasilar atelectasis. Recent MRI: 22  Impression       Faint diffusion restriction in the right hippocampus with associated FLAIR signal abnormality. This is favored to represent sequelae of seizures, however other infectious/inflammatory etiologies are also possible. Mild atrophy and chronic small vessel ischemic change. Areas of encephalomalacia and gliosis in both cerebral hemispheres with remote hemorrhagic staining from prior insults. Previous MBS - n/a  Chart reviewed. Medical Diagnosis: Seizure ([address] [R56.9]  Acute respiratory failure, unspecified whether with hypoxia or hypercapnia ([address] [J96.00]  Altered mental status, unspecified altered mental status type [R41.82]   Treatment Diagnosis: dysphagia    BSE Impression 22  Dysphagia Impression : Pt presents with mild oral dysphagia, and suspect need further assessment of pharyngeal phase. Pt seated upright in bed, required cues to accept all trials of PO.  Pt with fluctuating attention, very confused and confabulating continuously. Pt presented with trials of ice chips, thin liquids, puree and small bite of regular solid. Pt demonstrated mildly prolonged mastication (pt is edentulous) and ? delayed swallow initiation at times. Pt appeared to swallow all trials with no overt s/s associated with penetration/aspiration, however silent aspiration cannot be ruled out at bedside. Mild-moderate residue after completion of swallow with cues to take liquid wash to clear. Due to increased agitation, reduced command following and per RN stating team started pt on full liquid diet, SLP OK'd diet level and discussed will continue to re-assess at bedside and complete MBS when medically appropriate (RN expressed concern with taking pt to radiology in wrist restraints at this time due to increased agitation, stated will follow-up tomorrow prior to ordering MBS). Pt would benefit from ongoing SLP services to target dysphagia and advance to least restrictive diet as able for safety of swallow. Pt would benefit from MBS, as mentioned above, due to prolonged intubation and to objectively assess oropharyngeal swallow function. Dysphagia Diagnosis: Mild oral stage dysphagia, Suspected needs further assessment    MBS results - schedule this date    Pain: denies    Current Diet : ADULT DIET; Full Liquid  ADULT ORAL NUTRITION SUPPLEMENT; Breakfast, Lunch, Dinner; Standard High Calorie/High Protein Oral Supplement   Recommended Form of Meds: Via alternative means of nutrition     Treatment:  Pt seen bedside to address the following goals:  1- The patient will tolerate repeat bedside swallowing evaluation when able  9/14: pt alert, voice and volitional cough are strong. Pt has been tolerating full liquids without respiratory decline. Pt re-assessed with ice chips, water via cup and straw, including sequential swallows, puree and solid texture. No overt signs of aspiration emerged, voice remained clear.   Pt is edentulous but demonstrated prolonged but adequate mastication  with soft solid. Due to prolonged intubation, will proceed with MBS that was recommended after initial evaluation, to r/o silent aspiration and assess swallow physiology. Goal met    2 - The patient will tolerate instrumental swallowing procedure  9/14: scheduled this date    3- The patient will tolerate recommended diet without observed clinical signs of aspiration  9/14: goal held until [facility] completed  Cont as appropriate    Patient/Family/Caregiver Education:  Pt educated to purpose of visit and recommendation for MBS. Pt stated comprehension and agreeable    Compensatory Strategies:   TBD after MBS      Plan:  Continue dysphagia treatment with goals per plan of care. Diet recommendations:  TBD after MBS  DC recommendation: TBD  Treatment: 13  D/W nursing Nisha  Needs met prior to leaving room, call button in reach. Sal Dumont M.S./[facility]-SLP #[id number]  Pg.  # [phone]  If patient is discharged prior to next treatment, this note will serve as the discharge summary

## 2022-09-14 NOTE — PROGRESS NOTES
Pt seen and examined being managed by icu team  Has acute metabolic encephalopathy from etoh wtihdrawal  Tolerating full liquid diet  Vitals:    09/14/22 1415 09/14/22 1430 09/14/22 1445 09/14/22 1500   BP:    (!) 159/86   Pulse: (!) 116 (!) 114 (!) 116 (!) 112   Resp: 21 27 23 28   Temp:       TempSrc:       SpO2: 98% 96% 96% 100%   Weight:       Height:          Gen: pleasant alert   Neck: no jvd  Chest: clear bilaterally  Cvs: s2s1 sinus tachycardia  Abd: soft nd nt bs normal active  Ext: no edema positive pulses    Bicarb is 16 wbc is 12 h/h 14.4 and 43.9    56 yo male admitted w etoh withdrawla syndrom was intubated had respiratory failure he is on precedex  Monitorelectrolytes and white cell count, he is more acidotic today, continue with monitoring.

## 2022-09-14 NOTE — PLAN OF CARE
Problem: Safety - Medical Restraint  Goal: Remains free of injury from restraints (Restraint for Interference with Medical Device)  Description: INTERVENTIONS:  1. Determine that other, less restrictive measures have been tried or would not be effective before applying the restraint  2. Evaluate the patient's condition at the time of restraint application  3. Inform patient/family regarding the reason for restraint  4. Q2H: Monitor safety, psychosocial status, comfort, nutrition and hydration  Outcome: Progressing     Problem: Discharge Planning  Goal: Discharge to home or other facility with appropriate resources  Outcome: Progressing  Flowsheets (Taken 9/14/2022 0800)  Discharge to home or other facility with appropriate resources: Identify barriers to discharge with patient and caregiver     Problem: Pain  Goal: Verbalizes/displays adequate comfort level or baseline comfort level  Outcome: Progressing     Problem: Safety - Adult  Goal: Free from fall injury  Outcome: Progressing  Flowsheets (Taken 9/14/2022 1851)  Free From Fall Injury: Instruct family/caregiver on patient safety     Problem: ABCDS Injury Assessment  Goal: Absence of physical injury  Outcome: Progressing  Flowsheets (Taken 9/14/2022 1851)  Absence of Physical Injury: Implement safety measures based on patient assessment     Problem: Skin/Tissue Integrity  Goal: Absence of new skin breakdown  Description: 1. Monitor for areas of redness and/or skin breakdown  2. Assess vascular access sites hourly  3. Every 4-6 hours minimum:  Change oxygen saturation probe site  4. Every 4-6 hours:  If on nasal continuous positive airway pressure, respiratory therapy assess nares and determine need for appliance change or resting period.   Outcome: Progressing     Problem: Chronic Conditions and Co-morbidities  Goal: Patient's chronic conditions and co-morbidity symptoms are monitored and maintained or improved  Outcome: Progressing  Flowsheets (Taken 9/14/2022 0800)  Care Plan - Patient's Chronic Conditions and Co-Morbidity Symptoms are Monitored and Maintained or Improved:   Monitor and assess patient's chronic conditions and comorbid symptoms for stability, deterioration, or improvement   Collaborate with multidisciplinary team to address chronic and comorbid conditions and prevent exacerbation or deterioration   Update acute care plan with appropriate goals if chronic or comorbid symptoms are exacerbated and prevent overall improvement and discharge     Problem: Nutrition Deficit:  Goal: Optimize nutritional status  Outcome: Progressing     Problem: Respiratory - Adult  Goal: Achieves optimal ventilation and oxygenation  Outcome: Progressing  Flowsheets (Taken 9/14/2022 0800)  Achieves optimal ventilation and oxygenation:   Assess for changes in respiratory status   Assess for changes in mentation and behavior     Problem: Cardiovascular - Adult  Goal: Maintains optimal cardiac output and hemodynamic stability  Outcome: Progressing  Flowsheets (Taken 9/14/2022 0800)  Maintains optimal cardiac output and hemodynamic stability:   Monitor blood pressure and heart rate   Monitor urine output and notify Licensed Independent Practitioner for values outside of normal range   Assess for signs of decreased cardiac output     Problem: Metabolic/Fluid and Electrolytes - Adult  Goal: Electrolytes maintained within normal limits  Outcome: Progressing  Flowsheets (Taken 9/14/2022 0800)  Electrolytes maintained within normal limits:   Monitor labs and assess patient for signs and symptoms of electrolyte imbalances   Administer electrolyte replacement as ordered

## 2022-09-15 LAB
ALBUMIN SERPL-MCNC: 3.5 G/DL (ref 3.4–5)
ANION GAP SERPL CALCULATED.3IONS-SCNC: 16 MMOL/L (ref 3–16)
BASOPHILS ABSOLUTE: 0.1 K/UL (ref 0–0.2)
BASOPHILS RELATIVE PERCENT: 0.7 %
BUN BLDV-MCNC: 7 MG/DL (ref 7–20)
CALCIUM SERPL-MCNC: 8.9 MG/DL (ref 8.3–10.6)
CHLORIDE BLD-SCNC: 109 MMOL/L (ref 99–110)
CO2: 18 MMOL/L (ref 21–32)
CREAT SERPL-MCNC: 0.6 MG/DL (ref 0.8–1.3)
EOSINOPHILS ABSOLUTE: 0.3 K/UL (ref 0–0.6)
EOSINOPHILS RELATIVE PERCENT: 2.7 %
GFR AFRICAN AMERICAN: >60
GFR NON-AFRICAN AMERICAN: >60
GLUCOSE BLD-MCNC: 102 MG/DL (ref 70–99)
HCT VFR BLD CALC: 40 % (ref 40.5–52.5)
HEMOGLOBIN: 13.6 G/DL (ref 13.5–17.5)
LYMPHOCYTES ABSOLUTE: 2.4 K/UL (ref 1–5.1)
LYMPHOCYTES RELATIVE PERCENT: 23.7 %
MAGNESIUM: 1.5 MG/DL (ref 1.8–2.4)
MCH RBC QN AUTO: 32.1 PG (ref 26–34)
MCHC RBC AUTO-ENTMCNC: 34 G/DL (ref 31–36)
MCV RBC AUTO: 94.4 FL (ref 80–100)
MONOCYTES ABSOLUTE: 1.1 K/UL (ref 0–1.3)
MONOCYTES RELATIVE PERCENT: 10.9 %
NEUTROPHILS ABSOLUTE: 6.3 K/UL (ref 1.7–7.7)
NEUTROPHILS RELATIVE PERCENT: 62 %
PDW BLD-RTO: 14.3 % (ref 12.4–15.4)
PHOSPHORUS: 3.6 MG/DL (ref 2.5–4.9)
PLATELET # BLD: 231 K/UL (ref 135–450)
PMV BLD AUTO: 8.6 FL (ref 5–10.5)
POTASSIUM SERPL-SCNC: 4.2 MMOL/L (ref 3.5–5.1)
RBC # BLD: 4.24 M/UL (ref 4.2–5.9)
SODIUM BLD-SCNC: 143 MMOL/L (ref 136–145)
WBC # BLD: 10.1 K/UL (ref 4–11)

## 2022-09-15 PROCEDURE — 2500000003 HC RX 250 WO HCPCS: Performed by: STUDENT IN AN ORGANIZED HEALTH CARE EDUCATION/TRAINING PROGRAM

## 2022-09-15 PROCEDURE — 2580000003 HC RX 258

## 2022-09-15 PROCEDURE — 6360000002 HC RX W HCPCS

## 2022-09-15 PROCEDURE — 94761 N-INVAS EAR/PLS OXIMETRY MLT: CPT

## 2022-09-15 PROCEDURE — 2500000003 HC RX 250 WO HCPCS

## 2022-09-15 PROCEDURE — 6370000000 HC RX 637 (ALT 250 FOR IP): Performed by: STUDENT IN AN ORGANIZED HEALTH CARE EDUCATION/TRAINING PROGRAM

## 2022-09-15 PROCEDURE — 6370000000 HC RX 637 (ALT 250 FOR IP)

## 2022-09-15 PROCEDURE — 99233 SBSQ HOSP IP/OBS HIGH 50: CPT | Performed by: INTERNAL MEDICINE

## 2022-09-15 PROCEDURE — 36415 COLL VENOUS BLD VENIPUNCTURE: CPT

## 2022-09-15 PROCEDURE — 80069 RENAL FUNCTION PANEL: CPT

## 2022-09-15 PROCEDURE — 2580000003 HC RX 258: Performed by: STUDENT IN AN ORGANIZED HEALTH CARE EDUCATION/TRAINING PROGRAM

## 2022-09-15 PROCEDURE — A4216 STERILE WATER/SALINE, 10 ML: HCPCS | Performed by: STUDENT IN AN ORGANIZED HEALTH CARE EDUCATION/TRAINING PROGRAM

## 2022-09-15 PROCEDURE — 6360000002 HC RX W HCPCS: Performed by: STUDENT IN AN ORGANIZED HEALTH CARE EDUCATION/TRAINING PROGRAM

## 2022-09-15 PROCEDURE — 83735 ASSAY OF MAGNESIUM: CPT

## 2022-09-15 PROCEDURE — 6360000002 HC RX W HCPCS: Performed by: NURSE PRACTITIONER

## 2022-09-15 PROCEDURE — 2580000003 HC RX 258: Performed by: NURSE PRACTITIONER

## 2022-09-15 PROCEDURE — 85025 COMPLETE CBC W/AUTO DIFF WBC: CPT

## 2022-09-15 PROCEDURE — 2000000000 HC ICU R&B

## 2022-09-15 RX ORDER — QUETIAPINE FUMARATE 25 MG/1
50 TABLET, FILM COATED ORAL ONCE
Status: COMPLETED | OUTPATIENT
Start: 2022-09-15 | End: 2022-09-15

## 2022-09-15 RX ORDER — QUETIAPINE FUMARATE 25 MG/1
50 TABLET, FILM COATED ORAL DAILY
Status: DISCONTINUED | OUTPATIENT
Start: 2022-09-16 | End: 2022-09-19

## 2022-09-15 RX ORDER — MAGNESIUM SULFATE IN WATER 40 MG/ML
4000 INJECTION, SOLUTION INTRAVENOUS ONCE
Status: COMPLETED | OUTPATIENT
Start: 2022-09-15 | End: 2022-09-15

## 2022-09-15 RX ADMIN — CARVEDILOL 3.12 MG: 3.12 TABLET, FILM COATED ORAL at 08:20

## 2022-09-15 RX ADMIN — DEXMEDETOMIDINE HYDROCHLORIDE 0.4 MCG/KG/HR: 100 INJECTION, SOLUTION INTRAVENOUS at 08:43

## 2022-09-15 RX ADMIN — ENOXAPARIN SODIUM 40 MG: 100 INJECTION SUBCUTANEOUS at 08:21

## 2022-09-15 RX ADMIN — WATER 10 ML: 1 INJECTION INTRAMUSCULAR; INTRAVENOUS; SUBCUTANEOUS at 22:28

## 2022-09-15 RX ADMIN — SODIUM CHLORIDE, PRESERVATIVE FREE 20 MG: 5 INJECTION INTRAVENOUS at 08:22

## 2022-09-15 RX ADMIN — SODIUM CHLORIDE, PRESERVATIVE FREE 10 ML: 5 INJECTION INTRAVENOUS at 21:29

## 2022-09-15 RX ADMIN — Medication 5 MG: at 21:28

## 2022-09-15 RX ADMIN — THIAMINE HYDROCHLORIDE 100 MG: 100 INJECTION, SOLUTION INTRAMUSCULAR; INTRAVENOUS at 08:21

## 2022-09-15 RX ADMIN — OLANZAPINE 5 MG: 10 INJECTION, POWDER, LYOPHILIZED, FOR SOLUTION INTRAMUSCULAR at 06:49

## 2022-09-15 RX ADMIN — OLANZAPINE 5 MG: 10 INJECTION, POWDER, LYOPHILIZED, FOR SOLUTION INTRAMUSCULAR at 22:28

## 2022-09-15 RX ADMIN — MAGNESIUM SULFATE HEPTAHYDRATE 4000 MG: 40 INJECTION, SOLUTION INTRAVENOUS at 09:55

## 2022-09-15 RX ADMIN — LEVETIRACETAM 1500 MG: 100 INJECTION, SOLUTION, CONCENTRATE INTRAVENOUS at 01:48

## 2022-09-15 RX ADMIN — DEXMEDETOMIDINE HYDROCHLORIDE 0.6 MCG/KG/HR: 100 INJECTION, SOLUTION INTRAVENOUS at 19:57

## 2022-09-15 RX ADMIN — QUETIAPINE FUMARATE 100 MG: 100 TABLET ORAL at 21:29

## 2022-09-15 RX ADMIN — SODIUM CHLORIDE, PRESERVATIVE FREE 10 ML: 5 INJECTION INTRAVENOUS at 08:32

## 2022-09-15 RX ADMIN — CARVEDILOL 3.12 MG: 3.12 TABLET, FILM COATED ORAL at 17:05

## 2022-09-15 RX ADMIN — DEXMEDETOMIDINE HYDROCHLORIDE 1 MCG/KG/HR: 100 INJECTION, SOLUTION INTRAVENOUS at 02:56

## 2022-09-15 RX ADMIN — OLANZAPINE 5 MG: 5 TABLET, FILM COATED ORAL at 08:20

## 2022-09-15 RX ADMIN — SODIUM CHLORIDE, PRESERVATIVE FREE 20 MG: 5 INJECTION INTRAVENOUS at 21:29

## 2022-09-15 RX ADMIN — QUETIAPINE FUMARATE 50 MG: 25 TABLET ORAL at 13:09

## 2022-09-15 RX ADMIN — LEVETIRACETAM 1500 MG: 100 INJECTION, SOLUTION, CONCENTRATE INTRAVENOUS at 14:22

## 2022-09-15 RX ADMIN — WATER: 1 INJECTION INTRAMUSCULAR; INTRAVENOUS; SUBCUTANEOUS at 06:55

## 2022-09-15 NOTE — PROGRESS NOTES
Hospitalist Progress Note      PCP: Cathie Langford MD    Date of Admission: 9/1/2022    Chief Complaint: syncope    Hospital Course:  H& P reviewed       Subjective:   Seen and examined patient at bedside. Alert, occasionally confused. He was hallucinating on my evaluation. Afebrile. Combative overnight. Required Zyprexa and soft restraints. Following commands, still on IV Precedex. Medications:  Reviewed    Infusion Medications    dextrose      dexmedetomidine (PRECEDEX) IV infusion 0.4 mcg/kg/hr (09/15/22 1538)    sodium chloride       Scheduled Medications    [START ON 9/16/2022] QUEtiapine  50 mg Oral Daily    OLANZapine  5 mg Oral Daily    carvedilol  3.125 mg Oral BID WC    QUEtiapine  100 mg Oral Nightly    melatonin  5 mg Oral Nightly    famotidine (PEPCID) injection  20 mg IntraVENous BID    levETIRAcetam  1,500 mg IntraVENous Q12H    enoxaparin  40 mg SubCUTAneous Daily    lidocaine PF  5 mL IntraDERmal Once    sodium chloride flush  5-40 mL IntraVENous 2 times per day    thiamine  100 mg IntraVENous Daily     PRN Meds: OLANZapine, hydrALAZINE, glucose, dextrose bolus **OR** dextrose bolus, glucagon (rDNA), dextrose, sodium chloride flush, sodium chloride, ondansetron **OR** ondansetron, polyethylene glycol, acetaminophen **OR** acetaminophen      Intake/Output Summary (Last 24 hours) at 9/15/2022 1717  Last data filed at 9/15/2022 1538  Gross per 24 hour   Intake 781.71 ml   Output --   Net 781.71 ml       Physical Exam Performed:    BP (!) 130/116   Pulse 78   Temp 97.8 °F (36.6 °C) (Oral)   Resp 23   Ht 5' 10\" (1.778 m)   Wt 187 lb 2.7 oz (84.9 kg)   SpO2 100%   BMI 26.86 kg/m²     General appearance: more awake and alert, AAOx3, following commands  HEENT: Pupils equal, round, Conjunctivae/corneas clear. Neck: Supple, with full range of motion. No jugular venous distention. Trachea midline.   Respiratory:   coarse vent sounds anteriorly   Cardiovascular: Regular rate and rhythm with normal S1/S2 without murmurs, rubs or gallops. Abdomen: Soft, non-tender, non-distended with normal bowel sounds. Musculoskeletal: No clubbing, cyanosis or edema bilaterally. Skin: cold,  no overt lesion on exposed skin. Neurologic: AAOx3, following commands      Labs:   Recent Labs     09/13/22  0330 09/14/22 0456 09/15/22  0527   WBC 11.2* 12.0* 10.1   HGB 13.9 14.4 13.6   HCT 40.8 43.5 40.0*    224 231     Recent Labs     09/13/22  0330 09/14/22  0456 09/15/22  0527   * 143 143   K 3.9 4.2 4.2    105 109   CO2 20* 16* 18*   BUN 11 8 7   CREATININE 0.6* 0.7* 0.6*   CALCIUM 9.1 8.8 8.9   PHOS 3.2 4.1 3.6     No results for input(s): AST, ALT, BILIDIR, BILITOT, ALKPHOS in the last 72 hours. No results for input(s): INR in the last 72 hours. No results for input(s): Christain Duff in the last 72 hours. Urinalysis:      Lab Results   Component Value Date/Time    NITRU Negative 09/13/2022 10:15 AM    WBCUA 0-2 09/13/2022 10:15 AM    BACTERIA Rare 09/01/2022 05:09 PM    RBCUA 0-2 09/13/2022 10:15 AM    BLOODU TRACE-INTACT 09/13/2022 10:15 AM    SPECGRAV >=1.030 09/13/2022 10:15 AM    GLUCOSEU Negative 09/13/2022 10:15 AM       Radiology:  [facility] MODIFIED BARIUM SWALLOW W VIDEO   Final Result      No penetration or aspiration. XR CHEST PORTABLE   Final Result   1. Worsened pulmonary edema. 2.  Persistent bibasilar atelectasis. MRI BRAIN WO CONTRAST   Final Result      Faint diffusion restriction in the right hippocampus with associated FLAIR signal abnormality. This is favored to represent sequelae of seizures, however other infectious/inflammatory etiologies are also possible. Mild atrophy and chronic small vessel ischemic change. Areas of encephalomalacia and gliosis in both cerebral hemispheres with remote hemorrhagic staining from prior insults. IR LUMBAR PUNCTURE FOR DIAGNOSIS   Final Result   . IMPRESSION:   1.  Uneventful diagnostic fluoroscopic guided lumbar puncture as the   2. The flow reversed are risk and therefore a closing pressure was obtained at the end of the procedure, 26 cm of water. XR ABDOMEN (KUB) (SINGLE AP VIEW)   Final Result   Findings/Impression:    The tip of the enteric tube terminates in the distal body of the stomach. CT CHEST WO CONTRAST   Final Result      CHEST:      1. Moderate dependent atelectasis bilaterally. ABDOMEN/PELVIS:      1.  No acute abnormality on noncontrast CT of the abdomen and pelvis. 2.  Cholelithiasis. CT ABDOMEN PELVIS WO CONTRAST Additional Contrast? None   Final Result      CHEST:      1. Moderate dependent atelectasis bilaterally. ABDOMEN/PELVIS:      1.  No acute abnormality on noncontrast CT of the abdomen and pelvis. 2.  Cholelithiasis. CTA HEAD NECK W CONTRAST   Final Result      CTA Head:   No acute CTA findings. No hemodynamically significant stenosis or focal aneurysm. No arteriovenous confirmation. CTA Neck:   No acute CTA findings. No hemodynamically significant stenosis or focal aneurysm. CT HEAD WO CONTRAST   Final Result      1. No findings for acute intracranial abnormality. 2.  Age-related atrophy with patchy periventricular white matter changes bilaterally consistent with chronic small vessel ischemia. 3.  Stable low attenuation left frontoparietal lobe consistent with previous insult from known prior intraparenchymal hematoma. Stable right frontoparietal cortical infarct. These findings were called to the emergency room physician Dr. Mellissa Guzman on 9/1/2022 at 5:30 p.m. XR CHEST PORTABLE   Final Result   1. No findings for acute cardiopulmonary disease. 2.  ET tube in good position in the mid trachea.           Assessment/Plan:    Active Hospital Problems    Diagnosis     Acute respiratory failure ([address] [J96.00]      Priority: Medium    Seizure ([address] [R56.9]      Priority: Medium         Acute encephalopathy: most likely secondary to seizure. s/p intubation, and extubation 9/10 , pulmonary/  critical care on board . Neurology consulted and following. S/p LP. EEG and MRI reviewed. On Keppra. .  -Still on IV Precedex. Acute respiratory failure with hypoxia: Status post extubation on 9/10, currently on RA  Chest x-ray show pulmonary edema, status post doses of Lasix    Elevated troponin:  cardiology consulted and following- recommending conservative management. History of alcohol abuse continue thiamine.  -On CIWA protocol, on precedex, wean per ICU    Hypomagnesia  -replaced and recheck    SIRS:  unclear focus of infection. Was On empiric antibiotics and acyclovir. Blood cultures with no growth so far. CSF culture no growth  Off ABx   No leukocytosis     Hypomagnesemia: replace     Hypothermia: cause unclear. TSH was abnormal. Shila hugger in place. Abnormal TFT: TSH 5.16, FT4 was normal. Possibly subclinical hypothyroidism from acute illness. Repeat TFT in 4-6 weeks. DVT Prophylaxis: lovenox     Diet: ADULT ORAL NUTRITION SUPPLEMENT; Breakfast, Lunch, Dinner; Standard High Calorie/High Protein Oral Supplement  ADULT DIET; Easy to Chew; 3 carb choices (45 gm/meal)  Code Status: Full Code    High risk given IV Precedex. Dispo - continue in ICU. Hard to estimate length of stay- pending clinical course.        Amber Stiles MD

## 2022-09-16 LAB
ALBUMIN SERPL-MCNC: 3.6 G/DL (ref 3.4–5)
ANION GAP SERPL CALCULATED.3IONS-SCNC: 16 MMOL/L (ref 3–16)
BASOPHILS ABSOLUTE: 0.1 K/UL (ref 0–0.2)
BASOPHILS RELATIVE PERCENT: 1.2 %
BUN BLDV-MCNC: 6 MG/DL (ref 7–20)
CALCIUM SERPL-MCNC: 8.9 MG/DL (ref 8.3–10.6)
CHLORIDE BLD-SCNC: 107 MMOL/L (ref 99–110)
CO2: 18 MMOL/L (ref 21–32)
CREAT SERPL-MCNC: 0.5 MG/DL (ref 0.8–1.3)
EOSINOPHILS ABSOLUTE: 0.3 K/UL (ref 0–0.6)
EOSINOPHILS RELATIVE PERCENT: 3 %
GFR AFRICAN AMERICAN: >60
GFR NON-AFRICAN AMERICAN: >60
GLUCOSE BLD-MCNC: 98 MG/DL (ref 70–99)
HCT VFR BLD CALC: 41.9 % (ref 40.5–52.5)
HEMOGLOBIN: 14.4 G/DL (ref 13.5–17.5)
LYMPHOCYTES ABSOLUTE: 2.3 K/UL (ref 1–5.1)
LYMPHOCYTES RELATIVE PERCENT: 23.5 %
MAGNESIUM: 1.6 MG/DL (ref 1.8–2.4)
MCH RBC QN AUTO: 32.4 PG (ref 26–34)
MCHC RBC AUTO-ENTMCNC: 34.3 G/DL (ref 31–36)
MCV RBC AUTO: 94.2 FL (ref 80–100)
MONOCYTES ABSOLUTE: 0.9 K/UL (ref 0–1.3)
MONOCYTES RELATIVE PERCENT: 8.9 %
NEUTROPHILS ABSOLUTE: 6.2 K/UL (ref 1.7–7.7)
NEUTROPHILS RELATIVE PERCENT: 63.4 %
PDW BLD-RTO: 14.4 % (ref 12.4–15.4)
PHOSPHORUS: 3.1 MG/DL (ref 2.5–4.9)
PLATELET # BLD: 211 K/UL (ref 135–450)
PMV BLD AUTO: 8.6 FL (ref 5–10.5)
POTASSIUM SERPL-SCNC: 4.1 MMOL/L (ref 3.5–5.1)
RBC # BLD: 4.44 M/UL (ref 4.2–5.9)
SODIUM BLD-SCNC: 141 MMOL/L (ref 136–145)
WBC # BLD: 9.8 K/UL (ref 4–11)

## 2022-09-16 PROCEDURE — 6360000002 HC RX W HCPCS

## 2022-09-16 PROCEDURE — 99291 CRITICAL CARE FIRST HOUR: CPT | Performed by: INTERNAL MEDICINE

## 2022-09-16 PROCEDURE — 6370000000 HC RX 637 (ALT 250 FOR IP)

## 2022-09-16 PROCEDURE — 83735 ASSAY OF MAGNESIUM: CPT

## 2022-09-16 PROCEDURE — 6370000000 HC RX 637 (ALT 250 FOR IP): Performed by: STUDENT IN AN ORGANIZED HEALTH CARE EDUCATION/TRAINING PROGRAM

## 2022-09-16 PROCEDURE — 2580000003 HC RX 258: Performed by: NURSE PRACTITIONER

## 2022-09-16 PROCEDURE — 2580000003 HC RX 258

## 2022-09-16 PROCEDURE — 2500000003 HC RX 250 WO HCPCS

## 2022-09-16 PROCEDURE — 2580000003 HC RX 258: Performed by: STUDENT IN AN ORGANIZED HEALTH CARE EDUCATION/TRAINING PROGRAM

## 2022-09-16 PROCEDURE — 80069 RENAL FUNCTION PANEL: CPT

## 2022-09-16 PROCEDURE — 6360000002 HC RX W HCPCS: Performed by: NURSE PRACTITIONER

## 2022-09-16 PROCEDURE — A4216 STERILE WATER/SALINE, 10 ML: HCPCS | Performed by: STUDENT IN AN ORGANIZED HEALTH CARE EDUCATION/TRAINING PROGRAM

## 2022-09-16 PROCEDURE — 36415 COLL VENOUS BLD VENIPUNCTURE: CPT

## 2022-09-16 PROCEDURE — 94761 N-INVAS EAR/PLS OXIMETRY MLT: CPT

## 2022-09-16 PROCEDURE — 2000000000 HC ICU R&B

## 2022-09-16 PROCEDURE — 2500000003 HC RX 250 WO HCPCS: Performed by: STUDENT IN AN ORGANIZED HEALTH CARE EDUCATION/TRAINING PROGRAM

## 2022-09-16 PROCEDURE — 85025 COMPLETE CBC W/AUTO DIFF WBC: CPT

## 2022-09-16 PROCEDURE — 6370000000 HC RX 637 (ALT 250 FOR IP): Performed by: INTERNAL MEDICINE

## 2022-09-16 PROCEDURE — 6360000002 HC RX W HCPCS: Performed by: STUDENT IN AN ORGANIZED HEALTH CARE EDUCATION/TRAINING PROGRAM

## 2022-09-16 RX ORDER — OLANZAPINE 5 MG/1
5 TABLET ORAL 2 TIMES DAILY
Status: DISCONTINUED | OUTPATIENT
Start: 2022-09-16 | End: 2022-09-19

## 2022-09-16 RX ORDER — MAGNESIUM SULFATE IN WATER 40 MG/ML
2000 INJECTION, SOLUTION INTRAVENOUS ONCE
Status: COMPLETED | OUTPATIENT
Start: 2022-09-16 | End: 2022-09-16

## 2022-09-16 RX ORDER — QUETIAPINE FUMARATE 200 MG/1
200 TABLET, FILM COATED ORAL NIGHTLY
Status: DISCONTINUED | OUTPATIENT
Start: 2022-09-16 | End: 2022-09-19

## 2022-09-16 RX ORDER — OLANZAPINE 5 MG/1
5 TABLET ORAL EVERY 6 HOURS
Status: DISCONTINUED | OUTPATIENT
Start: 2022-09-16 | End: 2022-09-16

## 2022-09-16 RX ADMIN — QUETIAPINE FUMARATE 200 MG: 200 TABLET ORAL at 21:15

## 2022-09-16 RX ADMIN — LEVETIRACETAM 1500 MG: 100 INJECTION, SOLUTION, CONCENTRATE INTRAVENOUS at 02:18

## 2022-09-16 RX ADMIN — SODIUM CHLORIDE, PRESERVATIVE FREE 20 MG: 5 INJECTION INTRAVENOUS at 08:55

## 2022-09-16 RX ADMIN — ENOXAPARIN SODIUM 40 MG: 100 INJECTION SUBCUTANEOUS at 08:55

## 2022-09-16 RX ADMIN — THIAMINE HYDROCHLORIDE 100 MG: 100 INJECTION, SOLUTION INTRAMUSCULAR; INTRAVENOUS at 08:55

## 2022-09-16 RX ADMIN — DEXMEDETOMIDINE HYDROCHLORIDE 0.8 MCG/KG/HR: 100 INJECTION, SOLUTION INTRAVENOUS at 23:40

## 2022-09-16 RX ADMIN — Medication 5 MG: at 21:14

## 2022-09-16 RX ADMIN — SODIUM CHLORIDE, PRESERVATIVE FREE 20 MG: 5 INJECTION INTRAVENOUS at 21:15

## 2022-09-16 RX ADMIN — QUETIAPINE FUMARATE 50 MG: 25 TABLET ORAL at 08:55

## 2022-09-16 RX ADMIN — MAGNESIUM SULFATE HEPTAHYDRATE 2000 MG: 40 INJECTION, SOLUTION INTRAVENOUS at 04:05

## 2022-09-16 RX ADMIN — SODIUM CHLORIDE, PRESERVATIVE FREE 10 ML: 5 INJECTION INTRAVENOUS at 09:06

## 2022-09-16 RX ADMIN — DEXMEDETOMIDINE HYDROCHLORIDE 0.8 MCG/KG/HR: 100 INJECTION, SOLUTION INTRAVENOUS at 02:16

## 2022-09-16 RX ADMIN — OLANZAPINE 5 MG: 5 TABLET, FILM COATED ORAL at 08:55

## 2022-09-16 RX ADMIN — LEVETIRACETAM 1500 MG: 100 INJECTION, SOLUTION, CONCENTRATE INTRAVENOUS at 15:00

## 2022-09-16 RX ADMIN — OLANZAPINE 5 MG: 5 TABLET, FILM COATED ORAL at 21:14

## 2022-09-16 RX ADMIN — CARVEDILOL 3.12 MG: 3.12 TABLET, FILM COATED ORAL at 08:55

## 2022-09-16 RX ADMIN — SODIUM CHLORIDE, PRESERVATIVE FREE 10 ML: 5 INJECTION INTRAVENOUS at 21:17

## 2022-09-16 RX ADMIN — CARVEDILOL 3.12 MG: 3.12 TABLET, FILM COATED ORAL at 17:05

## 2022-09-16 RX ADMIN — DEXMEDETOMIDINE HYDROCHLORIDE 1 MCG/KG/HR: 100 INJECTION, SOLUTION INTRAVENOUS at 17:00

## 2022-09-16 RX ADMIN — DEXMEDETOMIDINE HYDROCHLORIDE 1 MCG/KG/HR: 100 INJECTION, SOLUTION INTRAVENOUS at 11:56

## 2022-09-16 NOTE — PROGRESS NOTES
Hospitalist Progress Note      PCP: Aicha Rosas MD    Date of Admission: 9/1/2022    Chief Complaint: syncope    Hospital Course:  H& P reviewed       Subjective:   Seen and examined patient at bedside. Alert, occasionally confused. He was hallucinating on my evaluation. Afebrile. Continue to be combative overnight. Required sedatives and soft restraints. Still on IV Precedex    Medications:  Reviewed    Infusion Medications    dextrose      dexmedetomidine (PRECEDEX) IV infusion 1 mcg/kg/hr (09/16/22 1725)    sodium chloride       Scheduled Medications    OLANZapine  5 mg Oral BID    QUEtiapine  200 mg Oral Nightly    QUEtiapine  50 mg Oral Daily    carvedilol  3.125 mg Oral BID WC    melatonin  5 mg Oral Nightly    famotidine (PEPCID) injection  20 mg IntraVENous BID    levETIRAcetam  1,500 mg IntraVENous Q12H    enoxaparin  40 mg SubCUTAneous Daily    lidocaine PF  5 mL IntraDERmal Once    sodium chloride flush  5-40 mL IntraVENous 2 times per day    thiamine  100 mg IntraVENous Daily     PRN Meds: OLANZapine, glucose, dextrose bolus **OR** dextrose bolus, glucagon (rDNA), dextrose, sodium chloride flush, sodium chloride, ondansetron **OR** ondansetron, polyethylene glycol, acetaminophen **OR** acetaminophen      Intake/Output Summary (Last 24 hours) at 9/16/2022 1804  Last data filed at 9/16/2022 1725  Gross per 24 hour   Intake 625.46 ml   Output 550 ml   Net 75.46 ml       Physical Exam Performed:    /85   Pulse 62   Temp 97 °F (36.1 °C) (Temporal)   Resp 17   Ht 5' 10\" (1.778 m)   Wt 172 lb 13.5 oz (78.4 kg)   SpO2 96%   BMI 24.80 kg/m²     General appearance: more awake and alert, AAOx3, following commands  HEENT: Pupils equal, round, Conjunctivae/corneas clear. Neck: Supple, with full range of motion. No jugular venous distention. Trachea midline.   Respiratory:   coarse vent sounds anteriorly   Cardiovascular: Regular rate and rhythm with normal S1/S2 without murmurs, rubs or gallops. Abdomen: Soft, non-tender, non-distended with normal bowel sounds. Musculoskeletal: No clubbing, cyanosis or edema bilaterally. Skin: cold,  no overt lesion on exposed skin. Neurologic: AAOx3, following commands      Labs:   Recent Labs     09/14/22 0456 09/15/22  0527 09/16/22  0245   WBC 12.0* 10.1 9.8   HGB 14.4 13.6 14.4   HCT 43.5 40.0* 41.9    231 211     Recent Labs     09/14/22  0456 09/15/22  0527 09/16/22  0245    143 141   K 4.2 4.2 4.1    109 107   CO2 16* 18* 18*   BUN 8 7 6*   CREATININE 0.7* 0.6* 0.5*   CALCIUM 8.8 8.9 8.9   PHOS 4.1 3.6 3.1     No results for input(s): AST, ALT, BILIDIR, BILITOT, ALKPHOS in the last 72 hours. No results for input(s): INR in the last 72 hours. No results for input(s): Donne Austin in the last 72 hours. Urinalysis:      Lab Results   Component Value Date/Time    NITRU Negative 09/13/2022 10:15 AM    WBCUA 0-2 09/13/2022 10:15 AM    BACTERIA Rare 09/01/2022 05:09 PM    RBCUA 0-2 09/13/2022 10:15 AM    BLOODU TRACE-INTACT 09/13/2022 10:15 AM    SPECGRAV >=1.030 09/13/2022 10:15 AM    GLUCOSEU Negative 09/13/2022 10:15 AM       Radiology:  [facility] MODIFIED BARIUM SWALLOW W VIDEO   Final Result      No penetration or aspiration. XR CHEST PORTABLE   Final Result   1. Worsened pulmonary edema. 2.  Persistent bibasilar atelectasis. MRI BRAIN WO CONTRAST   Final Result      Faint diffusion restriction in the right hippocampus with associated FLAIR signal abnormality. This is favored to represent sequelae of seizures, however other infectious/inflammatory etiologies are also possible. Mild atrophy and chronic small vessel ischemic change. Areas of encephalomalacia and gliosis in both cerebral hemispheres with remote hemorrhagic staining from prior insults. IR LUMBAR PUNCTURE FOR DIAGNOSIS   Final Result   . IMPRESSION:   1. Uneventful diagnostic fluoroscopic guided lumbar puncture as the   2.  The flow reversed are risk and therefore a closing pressure was obtained at the end of the procedure, 26 cm of water. XR ABDOMEN (KUB) (SINGLE AP VIEW)   Final Result   Findings/Impression:    The tip of the enteric tube terminates in the distal body of the stomach. CT CHEST WO CONTRAST   Final Result      CHEST:      1. Moderate dependent atelectasis bilaterally. ABDOMEN/PELVIS:      1.  No acute abnormality on noncontrast CT of the abdomen and pelvis. 2.  Cholelithiasis. CT ABDOMEN PELVIS WO CONTRAST Additional Contrast? None   Final Result      CHEST:      1. Moderate dependent atelectasis bilaterally. ABDOMEN/PELVIS:      1.  No acute abnormality on noncontrast CT of the abdomen and pelvis. 2.  Cholelithiasis. CTA HEAD NECK W CONTRAST   Final Result      CTA Head:   No acute CTA findings. No hemodynamically significant stenosis or focal aneurysm. No arteriovenous confirmation. CTA Neck:   No acute CTA findings. No hemodynamically significant stenosis or focal aneurysm. CT HEAD WO CONTRAST   Final Result      1. No findings for acute intracranial abnormality. 2.  Age-related atrophy with patchy periventricular white matter changes bilaterally consistent with chronic small vessel ischemia. 3.  Stable low attenuation left frontoparietal lobe consistent with previous insult from known prior intraparenchymal hematoma. Stable right frontoparietal cortical infarct. These findings were called to the emergency room physician Dr. Lorenzo Gallegos on 9/1/2022 at 5:30 p.m. XR CHEST PORTABLE   Final Result   1. No findings for acute cardiopulmonary disease. 2.  ET tube in good position in the mid trachea. Assessment/Plan:    Active Hospital Problems    Diagnosis     Acute respiratory failure ([address] [J96.00]      Priority: Medium    Seizure ([address] [R56.9]      Priority: Medium         Acute encephalopathy:  most likely secondary to seizure. s/p intubation, and extubation 9/10 , pulmonary/  critical care on board . Neurology consulted and following. S/p LP. EEG and MRI reviewed. On Keppra. .  -Still on IV Precedex. Acute respiratory failure with hypoxia: Status post extubation on 9/10, currently on RA  Chest x-ray show pulmonary edema, status post doses of Lasix    Elevated troponin:  cardiology consulted and following- recommending conservative management. History of alcohol abuse continue thiamine.  -On CIWA protocol, on precedex, wean per ICU    Hypomagnesia  -replaced and recheck    SIRS:  unclear focus of infection. Was On empiric antibiotics and acyclovir. Blood cultures with no growth so far. CSF culture no growth  Off ABx   No leukocytosis     Hypomagnesemia: replace     Hypothermia: cause unclear. TSH was abnormal. Shila hugger in place. Abnormal TFT: TSH 5.16, FT4 was normal. Possibly subclinical hypothyroidism from acute illness. Repeat TFT in 4-6 weeks. DVT Prophylaxis: lovenox     Diet: ADULT ORAL NUTRITION SUPPLEMENT; Breakfast, Lunch, Dinner; Standard High Calorie/High Protein Oral Supplement  ADULT DIET; Easy to Chew; 3 carb choices (45 gm/meal)  Code Status: Full Code    High risk given IV Precedex. Dispo - continue in ICU. Hard to estimate length of stay- pending clinical course.        Mimi Arce MD

## 2022-09-16 NOTE — CARE COORDINATION
Case management continues to follow for discharge planning. The chart was reviewed. Mr. Kaur's remains in the ICU. Will address disposition once he is more medically stable.     Electronically signed by MIGNON Henley RN-[facility]  Case Management  [phone]

## 2022-09-16 NOTE — PROGRESS NOTES
Speech Language Pathology  ST attempting follow-up. Pt is agitated and not appropriate for dysphagia tx at this time. Poor intake reported. Nutrition monitoring closely given pt with AMS. Will continue to follow as schedule permits. Thank you.    Chano Melchor M.A, CCC-SLP/ CBIS

## 2022-09-16 NOTE — PROGRESS NOTES
ICU Progress Note    Admit Date: 9/1/2022  Day: 14  Vent Day: None  IV Access: Peripheral  IV Fluids: None  Vasopressors: None                Antibiotics: None  Diet: ADULT ORAL NUTRITION SUPPLEMENT; Breakfast, Lunch, Dinner; Standard High Calorie/High Protein Oral Supplement  ADULT DIET; Easy to Chew; 3 carb choices (45 gm/meal)    CC: AMS    Interval history:   No acute events overnight. Required soft restraint order overnight. Seen at bedside today, patient is calm and alert. Oriented to person and place, but stated the year was 2012. HPI:   The patient is a 25-year-old man with past medical history significant for chronic alcoholism, ICH, CAD, DVT who presented today to the ED with an episode of syncope. The history was mainly obtained by the sister since patient was intubated. He was last known well at 9 AM but the sister. According to the sister, she had just returned home from work which she had found him half lying down on the couch covered in the stool while his car outside was running outside with occasion. She denies any complaints of recent fever, chills, chest pain cough, shortness of breath, lightheadedness, palpitations, nausea, vomiting, abdominal pain, diarrhea, fatigue, visual disturbance, changes in urination frequency or burning pain well urination or headaches by the patient in the preceding days to this incident. She does reinstate that her brother is a chronic alcoholic and had just gotten out of rehab. He has been sober since however she was not aware if he had recently had any alcoholic drink. He had also informed that he had recent episode of intracerebral hemorrhage on 9/4/2021. In the ED, there was a concern for possibility of a stroke however he was not considered a thrombolytic candidate because of his previous ICH and long time since his last known well. According to the ED staff his physical exam was more pertinent for nonfocal delirium.   It was also significant for nystagmus and rightward gaze not fixed. He was given 2 mg of Versed and it had worsened his oxygen saturations that had dropped to 88% on a nonrebreather and therefore he was intubated. CT Abdomen, chest: Moderate dependent atelectasis bilaterally. No acute abnormality on noncontrast CT of the abdomen and pelvis. Cholelithiasis. CTA Head: No acute CTA findings. No hemodynamically significant stenosis or focal aneurysm. No arteriovenous confirmation. CTA Neck: No acute CTA findings. No hemodynamically significant stenosis or focal aneurysm. Patient was admitted to the ICU for further workup and management of his possible meningitis, on MV. Extubated 9/10, with precedex and restraint requirement for agitation since then. Improving gradually. ROS:  No fever, chills. No SOB, chest pain. No nausea, no diarrhea, no vomiting.     Medications:     Scheduled Meds:   QUEtiapine  50 mg Oral Daily    OLANZapine  5 mg Oral Daily    carvedilol  3.125 mg Oral BID WC    QUEtiapine  100 mg Oral Nightly    melatonin  5 mg Oral Nightly    famotidine (PEPCID) injection  20 mg IntraVENous BID    levETIRAcetam  1,500 mg IntraVENous Q12H    enoxaparin  40 mg SubCUTAneous Daily    lidocaine PF  5 mL IntraDERmal Once    sodium chloride flush  5-40 mL IntraVENous 2 times per day    thiamine  100 mg IntraVENous Daily     Continuous Infusions:   dextrose      dexmedetomidine (PRECEDEX) IV infusion 0.4 mcg/kg/hr (09/16/22 0710)    sodium chloride       PRN Meds:OLANZapine, glucose, dextrose bolus **OR** dextrose bolus, glucagon (rDNA), dextrose, sodium chloride flush, sodium chloride, ondansetron **OR** ondansetron, polyethylene glycol, acetaminophen **OR** acetaminophen    Objective:   Vitals:   T-max:  Patient Vitals for the past 8 hrs:   BP Temp Pulse Resp SpO2 Weight   09/16/22 1000 91/63 -- (!) 109 23 -- --   09/16/22 0900 (!) 123/58 -- 73 15 100 % --   09/16/22 0800 113/67 97 °F (36.1 °C) 67 19 100 % --   09/16/22 0700 (!) 113/91 -- 71 20 100 % --   09/16/22 0600 97/68 -- 59 13 100 % --   09/16/22 0500 (!) 83/53 -- 59 18 100 % --   09/16/22 0400 (!) 84/59 -- 70 25 98 % --   09/16/22 0300 127/72 -- 64 17 100 % 172 lb 13.5 oz (78.4 kg)       Intake/Output Summary (Last 24 hours) at 9/16/2022 1018  Last data filed at 9/16/2022 0815  Gross per 24 hour   Intake 638.76 ml   Output 400 ml   Net 238.76 ml         Physical Exam:  CONSTITUTIONAL:  awake, alert, cooperative, no apparent distress, and appears stated age  EYES: Pupils equal round and reactive to light. Normal conjunctiva  EARS, NOSE, MOUTH & THROAT: Normal oropharynx. Ears and nose appear normal  NECK:  Supple, symmetrical, trachea midline, no adenopathy, thyroid symmetric, not enlarged and no tenderness, skin normal  LUNGS:  no increased work of breathing and clear to auscultation. No accessory muscle use  CARDIOVASCULAR:  normal S1 and S2, no edema and no JVD  ABDOMEN:  normal bowel sounds, non-distended and no masses palpated, and no tenderness to palpation. No hepatospleenomegaly  LYMPHADENOPATHY:  no axillary or supraclavicular adenopathy. No cervical adnenopathy  PSYCHIATRIC: Oriented to person place. No obvious depression or anxiety. MUSCULOSKELETAL: No obvious misalignment or effusion of the joints. No clubbing, cyanosis of the digits. SKIN:  normal skin color, texture, turgor and no redness, warmth, or swelling.  No palpable nodules    LABS:    CBC:   Recent Labs     09/14/22 0456 09/15/22  0527 09/16/22  0245   WBC 12.0* 10.1 9.8   HGB 14.4 13.6 14.4   HCT 43.5 40.0* 41.9    231 211   MCV 96.6 94.4 94.2     Renal:    Recent Labs     09/14/22  0456 09/15/22  0527 09/16/22  0245    143 141   K 4.2 4.2 4.1    109 107   CO2 16* 18* 18*   BUN 8 7 6*   CREATININE 0.7* 0.6* 0.5*   GLUCOSE 73 102* 98   CALCIUM 8.8 8.9 8.9   MG 1.80 1.50* 1.60*   PHOS 4.1 3.6 3.1   ANIONGAP 22* 16 16     Hepatic:   Recent Labs     09/14/22  0456 09/15/22  0527 09/16/22  0245 LABALBU 3.6 3.5 3.6     Troponin: No results for input(s): TROPONINI in the last 72 hours. BNP: No results for input(s): BNP in the last 72 hours. Lipids: No results for input(s): CHOL, HDL in the last 72 hours. Invalid input(s): LDLCALCU, TRIGLYCERIDE  ABGs:  No results for input(s): PHART, SPE9XXY, PO2ART, EBO2EKP, BEART, THGBART, H7VHXVTW, OIJ1YIJ in the last 72 hours. INR: No results for input(s): INR in the last 72 hours. Lactate: No results for input(s): LACTATE in the last 72 hours. Cultures:  -----------------------------------------------------------------  RAD:   [place] MODIFIED BARIUM SWALLOW W VIDEO   Final Result      No penetration or aspiration. XR CHEST PORTABLE   Final Result   1. Worsened pulmonary edema. 2.  Persistent bibasilar atelectasis. MRI BRAIN WO CONTRAST   Final Result      Faint diffusion restriction in the right hippocampus with associated FLAIR signal abnormality. This is favored to represent sequelae of seizures, however other infectious/inflammatory etiologies are also possible. Mild atrophy and chronic small vessel ischemic change. Areas of encephalomalacia and gliosis in both cerebral hemispheres with remote hemorrhagic staining from prior insults. IR LUMBAR PUNCTURE FOR DIAGNOSIS   Final Result   . IMPRESSION:   1. Uneventful diagnostic fluoroscopic guided lumbar puncture as the   2. The flow reversed are risk and therefore a closing pressure was obtained at the end of the procedure, 26 cm of water. XR ABDOMEN (KUB) (SINGLE AP VIEW)   Final Result   Findings/Impression:    The tip of the enteric tube terminates in the distal body of the stomach. CT CHEST WO CONTRAST   Final Result      CHEST:      1. Moderate dependent atelectasis bilaterally. ABDOMEN/PELVIS:      1.  No acute abnormality on noncontrast CT of the abdomen and pelvis. 2.  Cholelithiasis.          CT ABDOMEN PELVIS WO CONTRAST Additional Contrast? None   Final Result      CHEST:      1. Moderate dependent atelectasis bilaterally. ABDOMEN/PELVIS:      1.  No acute abnormality on noncontrast CT of the abdomen and pelvis. 2.  Cholelithiasis. CTA HEAD NECK W CONTRAST   Final Result      CTA Head:   No acute CTA findings. No hemodynamically significant stenosis or focal aneurysm. No arteriovenous confirmation. CTA Neck:   No acute CTA findings. No hemodynamically significant stenosis or focal aneurysm. CT HEAD WO CONTRAST   Final Result      1. No findings for acute intracranial abnormality. 2.  Age-related atrophy with patchy periventricular white matter changes bilaterally consistent with chronic small vessel ischemia. 3.  Stable low attenuation left frontoparietal lobe consistent with previous insult from known prior intraparenchymal hematoma. Stable right frontoparietal cortical infarct. These findings were called to the emergency room physician Dr. Kay Ny on 9/1/2022 at 5:30 p.m. XR CHEST PORTABLE   Final Result   1. No findings for acute cardiopulmonary disease. 2.  ET tube in good position in the mid trachea. Assessment/Plan:   Yunior Burger is a 57 y.o. male with a PMH of alcohol abuse, CAD, CHF, essential tremor, HTN,  who was admitted with AMS. #Acute Metabolic Encephalopathy 2/2 possible meningitis vs encephalitis induced seizure   On presentation AMS, elevated WBC: 21.7, Tachypneic, Tachycardic, Hypotensive. Hx of ICH, SAH and SDH 2/2 to fall, hx of chronic alcohol abuse,   CT head: Stable low attenuation left frontoparietal lobe consistent with previous insult from known prior intraparenchymal hematoma. Stable right frontoparietal cortical infarct. CT Abdomen, chest: Moderate dependent atelectasis bilaterally. No acute abnormality on noncontrast CT of the abdomen and pelvis. Cholelithiasis. CTA Head: No acute CTA findings. No hemodynamically significant stenosis or focal aneurysm. No arteriovenous confirmation. CTA Neck: No acute CTA findings. No hemodynamically significant stenosis or focal aneurysm. MRI: Faint diffusion restriction in the right hippocampus with associated FLAIR signal abnormality. This is favored to represent sequelae of seizures, however other infectious/inflammatory etiologies are also possible. cEEG readings previously: Generalized background abnormalities indicative of an underlying diffuse encephalopathy of non-specific etiology. Intermittent focal epileptiform discharges, right posterior temporal, conferring increased risk of focal onset seizures from this region.    -Consulted Neurology: appreciate recs  -blood cultures ordered (9/2): No growth to date  -On Keppra 1500 bid  -Lumbar puncture ordered 9/6/2022: No growth to date, no positive immunological/microbiological results yet  - Nightly Seroquel 100mg, melatonin. AM seroquel 50 mg. IM Zyprexa every morning and PRN q6h  - Wean precedex     #Alcohol use disorder  Ethanol level: none detected  -thiamine 100 mg daily  -monitor for withdrawal     #Acute hypercapnic respiratory failure secondary to possible aspiration vs 2/2 medication  On presentation: VBG PH: 7.264, PCO2: 56.4, 83.2  Extubated successfully 9/10     #Hx of Cirrhosis  -Ammonia: 36 wnl(09/1/22)     Chronic conditions:  #Hypertension  -Resumed home carvedilol dose 3.125 mg twice daily 9/14  Code Status: Full Code  FEN: OK for full liquid diet  PPX: Enoxaparin  DISPO: ICU  Barriers to discharge: Precedex gtt    Diana Colvin MD, PGY-1  09/16/22  10:18 AM    This patient has been staffed and discussed with Dr. Celestine Favre    Patient seen, examined and discussed with the resident and I agree with the assessment and plan as edited above. Patient remains encephalopathic/delirious. We increased the dose of antipsychotics by adding Seroquel in the morning along with some scheduled Zyprexa in the morning.   However he continues to have waxing and waning mentation with agitation and his been placed back on Precedex up to 1 mcg/min. We have not been able to get it weaned off successfully. Fortunately we do not have an underlying etiology to cause his delirium as he has been here for 2 weeks and should be out of the range of any type of substance withdrawal.  He is on Keppra but neurology does not feel that it is causing his abnormal behavior. Apparently he has some impulsivity at baseline and is described by his sister as not being very nice. 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 Since we have 100 of Seroquel at night and 50 in the morning were going to schedule Zyprexa through the day. Am also going to redouble his Seroquel at night to try to make sure his sleep-wake cycles are appropriate and hopefully wean him off the Precedex. There does not appear to be any end in sight in the management of his delirium since we do not have an underlying cause identified that we can reverse. Patient is barely eating as well, but I do not try to put a [place]ak in him as I do not think he would ever stay and it would encourage something like an aspiration pneumonia. If he remains delirious through the weekend, and on Precedex I will ask palliative care to see him as well. Critical care time spent reviewing labs/films, examining patient, collaborating with other physicians but excluding procedures for life threatening organ failure is 38 minutes.     Carlos Akers MD

## 2022-09-16 NOTE — PROGRESS NOTES
Comprehensive Nutrition Assessment    Type and Reason for Visit:  Reassess    Nutrition Recommendations/Plan:   Due to inadequate intake, discussion of palliative care vs nutrition support should be considered. Consult dietitian if tube feeding is desired and consistent with patient's plan of care. Continue Easy to Chew; 3 carb choice diet  Continue Ensure TID  Monitor nutrition adequacy, pertinent labs, bowel habits, wt changes, and clinical progress     Malnutrition Assessment:  Malnutrition Status: At risk for malnutrition (Comment) (09/16/22 1109)    Context:  Acute Illness     Findings of the 6 clinical characteristics of malnutrition:  Energy Intake:  50% or less of estimated energy requirements for 5 or more days  Weight Loss:  No significant weight loss (2% in 2 week period)     Fluid Accumulation:  Mild      Nutrition Assessment:    Follow up: Pt advanced to Easy to Chew; 3 carb choice diet. 1 intake @ <25%. On previous assessment pt had been consuming 0%. Pt has been unable to consume adequate nutrition since TF was d/c'ed, now 9 days. Previously added Ensure TID, however pt appears to not be drinking them. He is AMS, on precedex. Recommend that if intakes do not improve w/in 48 hours that nutrition support is resumed to prevent malnutrition. Please consult RD for recommendations if warranted. Will continue to monitor. Nutrition Related Findings:    Mg 1.6. Active bowel sounds. +BM 9/14. +4.2L. Wound Type:  (scattered abrasions)       Current Nutrition Intake & Therapies:    Average Meal Intake: 1-25%, 0%  Average Supplements Intake: 0%  ADULT ORAL NUTRITION SUPPLEMENT; Breakfast, Lunch, Dinner; Standard High Calorie/High Protein Oral Supplement  ADULT DIET; Easy to Chew; 3 carb choices (45 gm/meal)    Anthropometric Measures:  Height: 5' 10\" (177.8 cm)  Ideal Body Weight (IBW): 166 lbs (75 kg)       Current Body Weight: 202 lb 11 oz (91.9 kg),   IBW.  Weight Source: Bed Scale  Current BMI (kg/m2): 29.1        Weight Adjustment For: No Adjustment                 BMI Categories: Overweight (BMI 25.0-29. 9)    Estimated Daily Nutrient Needs:  Energy Requirements Based On: Kcal/kg (20-25 kcal/kg admission wt)  Weight Used for Energy Requirements: Admission (Admission wt 86.8 kg)  Energy (kcal/day): 4699-3661  Weight Used for Protein Requirements: Admission (1.2-2.0 g/kg admission wt (86.8kg))  Protein (g/day): 104-174  Method Used for Fluid Requirements: 1 ml/kcal  Fluid (ml/day): or per MD    Nutrition Diagnosis:   Inadequate energy intake related to inadequate protein-energy intake as evidenced by intake 0-25%    Nutrition Interventions:   Food and/or Nutrient Delivery: Start Tube Feeding, Continue Current Diet, Continue Oral Nutrition Supplement  Nutrition Education/Counseling: Education not appropriate  Coordination of Nutrition Care: Continue to monitor while inpatient  Plan of Care discussed with: Pt    Goals:  Previous Goal Met: No Progress toward Goal(s)  Goals: PO intake 50% or greater, prior to discharge       Nutrition Monitoring and Evaluation:   Behavioral-Environmental Outcomes: None Identified  Food/Nutrient Intake Outcomes: Food and Nutrient Intake, Supplement Intake  Physical Signs/Symptoms Outcomes: Biochemical Data, Nutrition Focused Physical Findings, Weight, Meal Time Behavior    Discharge Planning:     Too soon to determine     Saad Collins, [address] [place] [address],   Contact: [phone]

## 2022-09-17 LAB
ALBUMIN SERPL-MCNC: 3.9 G/DL (ref 3.4–5)
ANION GAP SERPL CALCULATED.3IONS-SCNC: 14 MMOL/L (ref 3–16)
BASOPHILS ABSOLUTE: 0.1 K/UL (ref 0–0.2)
BASOPHILS RELATIVE PERCENT: 1.1 %
BUN BLDV-MCNC: 7 MG/DL (ref 7–20)
CALCIUM SERPL-MCNC: 9.3 MG/DL (ref 8.3–10.6)
CHLORIDE BLD-SCNC: 107 MMOL/L (ref 99–110)
CO2: 21 MMOL/L (ref 21–32)
CREAT SERPL-MCNC: 0.6 MG/DL (ref 0.8–1.3)
EOSINOPHILS ABSOLUTE: 0.3 K/UL (ref 0–0.6)
EOSINOPHILS RELATIVE PERCENT: 3.7 %
GFR AFRICAN AMERICAN: >60
GFR NON-AFRICAN AMERICAN: >60
GLUCOSE BLD-MCNC: 90 MG/DL (ref 70–99)
HCT VFR BLD CALC: 43.5 % (ref 40.5–52.5)
HEMOGLOBIN: 15.4 G/DL (ref 13.5–17.5)
LYMPHOCYTES ABSOLUTE: 1.7 K/UL (ref 1–5.1)
LYMPHOCYTES RELATIVE PERCENT: 24.9 %
MAGNESIUM: 1.4 MG/DL (ref 1.8–2.4)
MCH RBC QN AUTO: 32.8 PG (ref 26–34)
MCHC RBC AUTO-ENTMCNC: 35.3 G/DL (ref 31–36)
MCV RBC AUTO: 92.9 FL (ref 80–100)
MONOCYTES ABSOLUTE: 0.7 K/UL (ref 0–1.3)
MONOCYTES RELATIVE PERCENT: 9.6 %
NEUTROPHILS ABSOLUTE: 4.1 K/UL (ref 1.7–7.7)
NEUTROPHILS RELATIVE PERCENT: 60.7 %
PDW BLD-RTO: 14.3 % (ref 12.4–15.4)
PHOSPHORUS: 3.3 MG/DL (ref 2.5–4.9)
PLATELET # BLD: 236 K/UL (ref 135–450)
PMV BLD AUTO: 8.5 FL (ref 5–10.5)
POTASSIUM SERPL-SCNC: 3.9 MMOL/L (ref 3.5–5.1)
RBC # BLD: 4.68 M/UL (ref 4.2–5.9)
SODIUM BLD-SCNC: 142 MMOL/L (ref 136–145)
WBC # BLD: 6.8 K/UL (ref 4–11)

## 2022-09-17 PROCEDURE — 93005 ELECTROCARDIOGRAM TRACING: CPT

## 2022-09-17 PROCEDURE — 6370000000 HC RX 637 (ALT 250 FOR IP): Performed by: STUDENT IN AN ORGANIZED HEALTH CARE EDUCATION/TRAINING PROGRAM

## 2022-09-17 PROCEDURE — 2580000003 HC RX 258: Performed by: STUDENT IN AN ORGANIZED HEALTH CARE EDUCATION/TRAINING PROGRAM

## 2022-09-17 PROCEDURE — 85025 COMPLETE CBC W/AUTO DIFF WBC: CPT

## 2022-09-17 PROCEDURE — 36415 COLL VENOUS BLD VENIPUNCTURE: CPT

## 2022-09-17 PROCEDURE — 99233 SBSQ HOSP IP/OBS HIGH 50: CPT | Performed by: INTERNAL MEDICINE

## 2022-09-17 PROCEDURE — 2500000003 HC RX 250 WO HCPCS: Performed by: STUDENT IN AN ORGANIZED HEALTH CARE EDUCATION/TRAINING PROGRAM

## 2022-09-17 PROCEDURE — 2500000003 HC RX 250 WO HCPCS

## 2022-09-17 PROCEDURE — 6360000002 HC RX W HCPCS: Performed by: NURSE PRACTITIONER

## 2022-09-17 PROCEDURE — A4216 STERILE WATER/SALINE, 10 ML: HCPCS | Performed by: STUDENT IN AN ORGANIZED HEALTH CARE EDUCATION/TRAINING PROGRAM

## 2022-09-17 PROCEDURE — 6360000002 HC RX W HCPCS: Performed by: STUDENT IN AN ORGANIZED HEALTH CARE EDUCATION/TRAINING PROGRAM

## 2022-09-17 PROCEDURE — 2580000003 HC RX 258: Performed by: NURSE PRACTITIONER

## 2022-09-17 PROCEDURE — 6370000000 HC RX 637 (ALT 250 FOR IP): Performed by: INTERNAL MEDICINE

## 2022-09-17 PROCEDURE — 2000000000 HC ICU R&B

## 2022-09-17 PROCEDURE — 6370000000 HC RX 637 (ALT 250 FOR IP)

## 2022-09-17 PROCEDURE — 80069 RENAL FUNCTION PANEL: CPT

## 2022-09-17 PROCEDURE — 2580000003 HC RX 258

## 2022-09-17 PROCEDURE — 83735 ASSAY OF MAGNESIUM: CPT

## 2022-09-17 PROCEDURE — 6360000002 HC RX W HCPCS

## 2022-09-17 RX ORDER — ZIPRASIDONE HYDROCHLORIDE 20 MG/1
20 CAPSULE ORAL EVERY 8 HOURS PRN
Status: DISCONTINUED | OUTPATIENT
Start: 2022-09-17 | End: 2022-09-23

## 2022-09-17 RX ORDER — LANOLIN ALCOHOL/MO/W.PET/CERES
100 CREAM (GRAM) TOPICAL DAILY
Status: DISCONTINUED | OUTPATIENT
Start: 2022-09-17 | End: 2022-09-17

## 2022-09-17 RX ORDER — GAUZE BANDAGE 2" X 2"
100 BANDAGE TOPICAL DAILY
Status: DISCONTINUED | OUTPATIENT
Start: 2022-09-18 | End: 2022-10-06 | Stop reason: HOSPADM

## 2022-09-17 RX ORDER — LABETALOL HYDROCHLORIDE 5 MG/ML
10 INJECTION, SOLUTION INTRAVENOUS EVERY 6 HOURS PRN
Status: DISCONTINUED | OUTPATIENT
Start: 2022-09-17 | End: 2022-10-06 | Stop reason: HOSPADM

## 2022-09-17 RX ORDER — MAGNESIUM SULFATE IN WATER 40 MG/ML
4000 INJECTION, SOLUTION INTRAVENOUS ONCE
Status: COMPLETED | OUTPATIENT
Start: 2022-09-17 | End: 2022-09-17

## 2022-09-17 RX ORDER — MAGNESIUM SULFATE IN WATER 40 MG/ML
2000 INJECTION, SOLUTION INTRAVENOUS ONCE
Status: DISCONTINUED | OUTPATIENT
Start: 2022-09-17 | End: 2022-09-17

## 2022-09-17 RX ADMIN — Medication 5 MG: at 20:49

## 2022-09-17 RX ADMIN — SODIUM CHLORIDE, PRESERVATIVE FREE 20 MG: 5 INJECTION INTRAVENOUS at 08:43

## 2022-09-17 RX ADMIN — OLANZAPINE 5 MG: 5 TABLET, FILM COATED ORAL at 20:49

## 2022-09-17 RX ADMIN — DEXMEDETOMIDINE HYDROCHLORIDE 1 MCG/KG/HR: 100 INJECTION, SOLUTION INTRAVENOUS at 04:53

## 2022-09-17 RX ADMIN — Medication 100 MG: at 09:40

## 2022-09-17 RX ADMIN — ENOXAPARIN SODIUM 40 MG: 100 INJECTION SUBCUTANEOUS at 08:45

## 2022-09-17 RX ADMIN — CARVEDILOL 3.12 MG: 3.12 TABLET, FILM COATED ORAL at 09:40

## 2022-09-17 RX ADMIN — LEVETIRACETAM 1500 MG: 100 INJECTION, SOLUTION, CONCENTRATE INTRAVENOUS at 15:38

## 2022-09-17 RX ADMIN — QUETIAPINE FUMARATE 50 MG: 25 TABLET ORAL at 08:52

## 2022-09-17 RX ADMIN — SODIUM CHLORIDE: 9 INJECTION, SOLUTION INTRAVENOUS at 01:15

## 2022-09-17 RX ADMIN — OLANZAPINE 5 MG: 5 TABLET, FILM COATED ORAL at 08:51

## 2022-09-17 RX ADMIN — QUETIAPINE FUMARATE 200 MG: 200 TABLET ORAL at 20:49

## 2022-09-17 RX ADMIN — SODIUM CHLORIDE, PRESERVATIVE FREE 20 MG: 5 INJECTION INTRAVENOUS at 20:49

## 2022-09-17 RX ADMIN — MAGNESIUM SULFATE HEPTAHYDRATE 4000 MG: 40 INJECTION, SOLUTION INTRAVENOUS at 11:49

## 2022-09-17 RX ADMIN — LABETALOL HYDROCHLORIDE 10 MG: 5 INJECTION, SOLUTION INTRAVENOUS at 16:36

## 2022-09-17 RX ADMIN — ZIPRASIDONE HYDROCHLORIDE 20 MG: 20 CAPSULE ORAL at 15:35

## 2022-09-17 RX ADMIN — SODIUM CHLORIDE, PRESERVATIVE FREE 10 ML: 5 INJECTION INTRAVENOUS at 20:50

## 2022-09-17 RX ADMIN — CARVEDILOL 3.12 MG: 3.12 TABLET, FILM COATED ORAL at 17:02

## 2022-09-17 RX ADMIN — OLANZAPINE 5 MG: 10 INJECTION, POWDER, LYOPHILIZED, FOR SOLUTION INTRAMUSCULAR at 11:41

## 2022-09-17 RX ADMIN — LEVETIRACETAM 1500 MG: 100 INJECTION, SOLUTION, CONCENTRATE INTRAVENOUS at 02:36

## 2022-09-17 ASSESSMENT — PAIN SCALES - GENERAL
PAINLEVEL_OUTOF10: 0
PAINLEVEL_OUTOF10: 0

## 2022-09-17 NOTE — PROGRESS NOTES
Hospitalist Progress Note      PCP: Cici Townsend MD    Date of Admission: 9/1/2022      Subjective:   Seen and examined patient at bedside. Alert, confused. Seems oriented to place and situation. No acute events reported or observed overnight, occasionally agitated however. Was weaned off Precedex by 6:30 AM.    Medications:  Reviewed    Infusion Medications    dextrose      dexmedetomidine (PRECEDEX) IV infusion Stopped (09/17/22 4000)    sodium chloride 10 mL/hr at 09/17/22 7533     Scheduled Medications    magnesium sulfate  4,000 mg IntraVENous Once    [START ON 9/18/2022] thiamine mononitrate  100 mg Oral Daily    OLANZapine  5 mg Oral BID    QUEtiapine  200 mg Oral Nightly    QUEtiapine  50 mg Oral Daily    carvedilol  3.125 mg Oral BID WC    melatonin  5 mg Oral Nightly    famotidine (PEPCID) injection  20 mg IntraVENous BID    levETIRAcetam  1,500 mg IntraVENous Q12H    enoxaparin  40 mg SubCUTAneous Daily    lidocaine PF  5 mL IntraDERmal Once    sodium chloride flush  5-40 mL IntraVENous 2 times per day     PRN Meds: ziprasidone, glucose, dextrose bolus **OR** dextrose bolus, glucagon (rDNA), dextrose, sodium chloride flush, sodium chloride, ondansetron **OR** ondansetron, polyethylene glycol, acetaminophen **OR** acetaminophen      Intake/Output Summary (Last 24 hours) at 9/17/2022 1508  Last data filed at 9/17/2022 1405  Gross per 24 hour   Intake 949.2 ml   Output 775 ml   Net 174.2 ml         Physical Exam Performed:    BP (!) 158/96   Pulse 87   Temp 98.2 °F (36.8 °C) (Axillary)   Resp 17   Ht 5' 10\" (1.778 m)   Wt 184 lb 4.9 oz (83.6 kg)   SpO2 98%   BMI 26.44 kg/m²     General appearance: more awake and alert, AAOx3, following commands  HEENT: Pupils equal, round, Conjunctivae/corneas clear. Neck: Supple, with full range of motion. No jugular venous distention. Trachea midline.   Respiratory:   coarse vent sounds anteriorly   Cardiovascular: Regular rate and rhythm with normal S1/S2 without murmurs, rubs or gallops. Abdomen: Soft, non-tender, non-distended with normal bowel sounds. Musculoskeletal: No clubbing, cyanosis or edema bilaterally. Skin: cold,  no overt lesion on exposed skin. Neurologic: AAOx3, following commands      Labs:   Recent Labs     09/15/22  0527 09/16/22  0245 09/17/22  0922   WBC 10.1 9.8 6.8   HGB 13.6 14.4 15.4   HCT 40.0* 41.9 43.5    211 236       Recent Labs     09/15/22  0527 09/16/22  0245 09/17/22  0922    141 142   K 4.2 4.1 3.9    107 107   CO2 18* 18* 21   BUN 7 6* 7   CREATININE 0.6* 0.5* 0.6*   CALCIUM 8.9 8.9 9.3   PHOS 3.6 3.1 3.3       No results for input(s): AST, ALT, BILIDIR, BILITOT, ALKPHOS in the last 72 hours. No results for input(s): INR in the last 72 hours. No results for input(s): [place] in the last 72 hours. Urinalysis:      Lab Results   Component Value Date/Time    NITRU Negative 09/13/2022 10:15 AM    WBCUA 0-2 09/13/2022 10:15 AM    BACTERIA Rare 09/01/2022 05:09 PM    RBCUA 0-2 09/13/2022 10:15 AM    BLOODU TRACE-INTACT 09/13/2022 10:15 AM    SPECGRAV >=1.030 09/13/2022 10:15 AM    GLUCOSEU Negative 09/13/2022 10:15 AM       Radiology:  [facility] MODIFIED BARIUM SWALLOW W VIDEO   Final Result      No penetration or aspiration. XR CHEST PORTABLE   Final Result   1. Worsened pulmonary edema. 2.  Persistent bibasilar atelectasis. MRI BRAIN WO CONTRAST   Final Result      Faint diffusion restriction in the right hippocampus with associated FLAIR signal abnormality. This is favored to represent sequelae of seizures, however other infectious/inflammatory etiologies are also possible. Mild atrophy and chronic small vessel ischemic change. Areas of encephalomalacia and gliosis in both cerebral hemispheres with remote hemorrhagic staining from prior insults. IR LUMBAR PUNCTURE FOR DIAGNOSIS   Final Result   . IMPRESSION:   1.  Uneventful diagnostic fluoroscopic guided lumbar puncture as the   2. The flow reversed are risk and therefore a closing pressure was obtained at the end of the procedure, 26 cm of water. XR ABDOMEN (KUB) (SINGLE AP VIEW)   Final Result   Findings/Impression:    The tip of the enteric tube terminates in the distal body of the stomach. CT CHEST WO CONTRAST   Final Result      CHEST:      1. Moderate dependent atelectasis bilaterally. ABDOMEN/PELVIS:      1.  No acute abnormality on noncontrast CT of the abdomen and pelvis. 2.  Cholelithiasis. CT ABDOMEN PELVIS WO CONTRAST Additional Contrast? None   Final Result      CHEST:      1. Moderate dependent atelectasis bilaterally. ABDOMEN/PELVIS:      1.  No acute abnormality on noncontrast CT of the abdomen and pelvis. 2.  Cholelithiasis. CTA HEAD NECK W CONTRAST   Final Result      CTA Head:   No acute CTA findings. No hemodynamically significant stenosis or focal aneurysm. No arteriovenous confirmation. CTA Neck:   No acute CTA findings. No hemodynamically significant stenosis or focal aneurysm. CT HEAD WO CONTRAST   Final Result      1. No findings for acute intracranial abnormality. 2.  Age-related atrophy with patchy periventricular white matter changes bilaterally consistent with chronic small vessel ischemia. 3.  Stable low attenuation left frontoparietal lobe consistent with previous insult from known prior intraparenchymal hematoma. Stable right frontoparietal cortical infarct. These findings were called to the emergency room physician Dr. Travis Mendenhall on 9/1/2022 at 5:30 p.m. XR CHEST PORTABLE   Final Result   1. No findings for acute cardiopulmonary disease. 2.  ET tube in good position in the mid trachea.           Assessment/Plan:    Active Hospital Problems    Diagnosis     Acute respiratory failure ([address] [J96.00]      Priority: Medium    Seizure ([address] [R56.9]      Priority: Medium         Acute encephalopathy:  most likely secondary to seizure. s/p intubation, and extubation 9/10 , pulmonary/  critical care on board . Neurology consulted and following. S/p LP. EEG and MRI reviewed. On Keppra. .  -Weaned off IV Precedex as of 9/17/2022 6:30 AM.    Acute respiratory failure with hypoxia: Status post extubation on 9/10, currently on RA  Chest x-ray show pulmonary edema, status post doses of Lasix. Diuresed. Resolved. Elevated troponin:  cardiology consulted and following- recommending conservative management. History of alcohol abuse continue thiamine.  -On CIWA protocol, on precedex, wean per ICU    Hypomagnesia  -replaced    SIRS:  unclear focus of infection. Was On empiric antibiotics and acyclovir. Blood cultures with no growth so far. CSF culture no growth  Off ABx   No leukocytosis     Hypomagnesemia: replace     Hypothermia: cause unclear. TSH was abnormal.  Resolved. Abnormal TFT: TSH 5.16, FT4 was normal. Possibly subclinical hypothyroidism from acute illness. Repeat TFT in 4-6 weeks. #On scheduled olanzapine 5 mg twice daily and Seroquel 50 in the morning and Flonase at night. DVT Prophylaxis: lovenox     Diet: ADULT ORAL NUTRITION SUPPLEMENT; Breakfast, Lunch, Dinner; Standard High Calorie/High Protein Oral Supplement  ADULT DIET; Easy to Chew; 3 carb choices (45 gm/meal)  Code Status: Full Code    Dispo -palliative care consult, consider psych consult, challenging disposition plan.       Antonieta Walsh MD

## 2022-09-17 NOTE — PROGRESS NOTES
ICU Progress Note    Admit Date: 9/1/2022  Day: 17  Vent Day: None  IV Access: Peripheral  IV Fluids: None  Vasopressors: None                Antibiotics: None  Diet: ADULT ORAL NUTRITION SUPPLEMENT; Breakfast, Lunch, Dinner; Standard High Calorie/High Protein Oral Supplement  ADULT DIET; Easy to Chew; 3 carb choices (45 gm/meal)    CC: AMS    Interval history:   Patient was weaned off Precedex drip last night. Per nursing report, he had some delirium overnight, and took some swings at staff. He was not given any PRN Zyprexa. This morning, he is more lucid, and oriented. He denies any complaints, and states he would like to go home and get back to work. HPI:   The patient is a 58-year-old man with past medical history significant for chronic alcoholism, ICH, CAD, DVT who presented today to the ED with an episode of syncope. The history was mainly obtained by the sister since patient was intubated. He was last known well at 9 AM but the sister. According to the sister, she had just returned home from work which she had found him half lying down on the couch covered in the stool while his car outside was running outside with occasion. She denies any complaints of recent fever, chills, chest pain cough, shortness of breath, lightheadedness, palpitations, nausea, vomiting, abdominal pain, diarrhea, fatigue, visual disturbance, changes in urination frequency or burning pain well urination or headaches by the patient in the preceding days to this incident. She does reinstate that her brother is a chronic alcoholic and had just gotten out of rehab. He has been sober since however she was not aware if he had recently had any alcoholic drink. He had also informed that he had recent episode of intracerebral hemorrhage on 9/4/2021. In the ED, there was a concern for possibility of a stroke however he was not considered a thrombolytic candidate because of his previous ICH and long time since his last known well. According to the ED staff his physical exam was more pertinent for nonfocal delirium. It was also significant for nystagmus and rightward gaze not fixed. He was given 2 mg of Versed and it had worsened his oxygen saturations that had dropped to 88% on a nonrebreather and therefore he was intubated. CT Abdomen, chest: Moderate dependent atelectasis bilaterally. No acute abnormality on noncontrast CT of the abdomen and pelvis. Cholelithiasis. CTA Head: No acute CTA findings. No hemodynamically significant stenosis or focal aneurysm. No arteriovenous confirmation. CTA Neck: No acute CTA findings. No hemodynamically significant stenosis or focal aneurysm. Patient was admitted to the ICU for further workup and management of his possible meningitis, on MV. Extubated 9/10, with precedex and restraint requirement for agitation since then. Improving gradually. ROS:  No fever, chills. No SOB, chest pain. No nausea, no diarrhea, no vomiting.     Medications:     Scheduled Meds:   OLANZapine  5 mg Oral BID    QUEtiapine  200 mg Oral Nightly    QUEtiapine  50 mg Oral Daily    carvedilol  3.125 mg Oral BID WC    melatonin  5 mg Oral Nightly    famotidine (PEPCID) injection  20 mg IntraVENous BID    levETIRAcetam  1,500 mg IntraVENous Q12H    enoxaparin  40 mg SubCUTAneous Daily    lidocaine PF  5 mL IntraDERmal Once    sodium chloride flush  5-40 mL IntraVENous 2 times per day    thiamine  100 mg IntraVENous Daily     Continuous Infusions:   dextrose      dexmedetomidine (PRECEDEX) IV infusion Stopped (09/17/22 0991)    sodium chloride 10 mL/hr at 09/17/22 0637     PRN Meds:OLANZapine, glucose, dextrose bolus **OR** dextrose bolus, glucagon (rDNA), dextrose, sodium chloride flush, sodium chloride, ondansetron **OR** ondansetron, polyethylene glycol, acetaminophen **OR** acetaminophen    Objective:   Vitals:   T-max:  Patient Vitals for the past 8 hrs:   BP Temp Temp src Pulse Resp SpO2 Weight   09/17/22 0600 110/69 -- -- 56 22 95 % --   09/17/22 0500 (!) 146/78 -- -- 54 17 99 % 184 lb 4.9 oz (83.6 kg)   09/17/22 0400 (!) 116/56 97.6 °F (36.4 °C) Axillary 53 13 94 % --   09/17/22 0300 105/64 -- -- 52 14 96 % --   09/17/22 0200 (!) 146/69 -- -- 56 14 96 % --   09/17/22 0100 127/76 -- -- 73 21 98 % --   09/17/22 0000 127/70 98.2 °F (36.8 °C) Oral 69 19 100 % --         Intake/Output Summary (Last 24 hours) at 9/17/2022 0742  Last data filed at 9/17/2022 6856  Gross per 24 hour   Intake 629.2 ml   Output 550 ml   Net 79.2 ml           Physical Exam:  CONSTITUTIONAL:  awake, alert, cooperative, no apparent distress, and appears stated age  EYES: Pupils equal round and reactive to light. Normal conjunctiva  EARS, NOSE, MOUTH & THROAT: Normal oropharynx. Ears and nose appear normal  NECK:  Supple, symmetrical, trachea midline, no adenopathy, thyroid symmetric, not enlarged and no tenderness, skin normal  LUNGS:  no increased work of breathing and clear to auscultation. No accessory muscle use  CARDIOVASCULAR:  normal S1 and S2, no edema and no JVD  ABDOMEN:  normal bowel sounds, non-distended and no masses palpated. Hepatomegaly with tenderness  LYMPHADENOPATHY:  no axillary or supraclavicular adenopathy. No cervical adnenopathy  PSYCHIATRIC: Oriented to person place. No obvious depression or anxiety. Some delirium and delusions. Waxing and waning aggression  MUSCULOSKELETAL: No obvious misalignment or effusion of the joints. No clubbing, cyanosis of the digits. SKIN:  normal skin color, texture, turgor and no redness, warmth, or swelling.  No palpable nodules    LABS:    CBC:   Recent Labs     09/15/22  0527 09/16/22  0245   WBC 10.1 9.8   HGB 13.6 14.4   HCT 40.0* 41.9    211   MCV 94.4 94.2       Renal:    Recent Labs     09/15/22  0527 09/16/22  0245    141   K 4.2 4.1    107   CO2 18* 18*   BUN 7 6*   CREATININE 0.6* 0.5*   GLUCOSE 102* 98   CALCIUM 8.9 8.9   MG 1.50* 1.60*   PHOS 3.6 3.1   ANIONGAP 16 16 Hepatic:   Recent Labs     09/15/22  0527 09/16/22  0245   LABALBU 3.5 3.6       Troponin: No results for input(s): TROPONINI in the last 72 hours. BNP: No results for input(s): BNP in the last 72 hours. Lipids: No results for input(s): CHOL, HDL in the last 72 hours. Invalid input(s): LDLCALCU, TRIGLYCERIDE  ABGs:  No results for input(s): PHART, YYQ3GTX, PO2ART, OHX3NOC, BEART, THGBART, M0MYDOGU, CYS7BNQ in the last 72 hours. INR: No results for input(s): INR in the last 72 hours. Lactate: No results for input(s): LACTATE in the last 72 hours. Cultures:  -----------------------------------------------------------------  RAD:   FL MODIFIED BARIUM SWALLOW W VIDEO   Final Result      No penetration or aspiration. XR CHEST PORTABLE   Final Result   1. Worsened pulmonary edema. 2.  Persistent bibasilar atelectasis. MRI BRAIN WO CONTRAST   Final Result      Faint diffusion restriction in the right hippocampus with associated FLAIR signal abnormality. This is favored to represent sequelae of seizures, however other infectious/inflammatory etiologies are also possible. Mild atrophy and chronic small vessel ischemic change. Areas of encephalomalacia and gliosis in both cerebral hemispheres with remote hemorrhagic staining from prior insults. IR LUMBAR PUNCTURE FOR DIAGNOSIS   Final Result   . IMPRESSION:   1. Uneventful diagnostic fluoroscopic guided lumbar puncture as the   2. The flow reversed are risk and therefore a closing pressure was obtained at the end of the procedure, 26 cm of water. XR ABDOMEN (KUB) (SINGLE AP VIEW)   Final Result   Findings/Impression:    The tip of the enteric tube terminates in the distal body of the stomach. CT CHEST WO CONTRAST   Final Result      CHEST:      1. Moderate dependent atelectasis bilaterally. ABDOMEN/PELVIS:      1.  No acute abnormality on noncontrast CT of the abdomen and pelvis. 2.  Cholelithiasis.          CT ABDOMEN PELVIS WO CONTRAST Additional Contrast? None   Final Result      CHEST:      1. Moderate dependent atelectasis bilaterally. ABDOMEN/PELVIS:      1.  No acute abnormality on noncontrast CT of the abdomen and pelvis. 2.  Cholelithiasis. CTA HEAD NECK W CONTRAST   Final Result      CTA Head:   No acute CTA findings. No hemodynamically significant stenosis or focal aneurysm. No arteriovenous confirmation. CTA Neck:   No acute CTA findings. No hemodynamically significant stenosis or focal aneurysm. CT HEAD WO CONTRAST   Final Result      1. No findings for acute intracranial abnormality. 2.  Age-related atrophy with patchy periventricular white matter changes bilaterally consistent with chronic small vessel ischemia. 3.  Stable low attenuation left frontoparietal lobe consistent with previous insult from known prior intraparenchymal hematoma. Stable right frontoparietal cortical infarct. These findings were called to the emergency room physician Dr. Villalobos Nurse on 9/1/2022 at 5:30 p.m. XR CHEST PORTABLE   Final Result   1. No findings for acute cardiopulmonary disease. 2.  ET tube in good position in the mid trachea. Assessment/Plan:   Harsh Romeo is a 57 y.o. male with a PMH of alcohol abuse, CAD, CHF, essential tremor, HTN,  who was admitted with AMS. #Acute Metabolic Encephalopathy 2/2 possible meningitis vs encephalitis induced seizure   On presentation AMS, elevated WBC: 21.7, Tachypneic, Tachycardic, Hypotensive. Hx of ICH, SAH and SDH 2/2 to fall, hx of chronic alcohol abuse,   CT head: Stable low attenuation left frontoparietal lobe consistent with previous insult from known prior intraparenchymal hematoma. Stable right frontoparietal cortical infarct. CT Abdomen, chest: Moderate dependent atelectasis bilaterally. No acute abnormality on noncontrast CT of the abdomen and pelvis. Cholelithiasis. CTA Head: No acute CTA findings. No hemodynamically significant stenosis or focal aneurysm. No arteriovenous confirmation. CTA Neck: No acute CTA findings. No hemodynamically significant stenosis or focal aneurysm. MRI: Faint diffusion restriction in the right hippocampus with associated FLAIR signal abnormality. This is favored to represent sequelae of seizures, however other infectious/inflammatory etiologies are also possible. cEEG readings previously: Generalized background abnormalities indicative of an underlying diffuse encephalopathy of non-specific etiology. Intermittent focal epileptiform discharges, right posterior temporal, conferring increased risk of focal onset seizures from this region.    -Consulted Neurology: appreciate recs  -blood cultures ordered (9/2): No growth to date  -On Keppra 1500 bid  -Lumbar puncture ordered 9/6/2022: No growth to date, no positive immunological/microbiological results yet  - Nightly Seroquel increased to 200mg, melatonin. AM seroquel 50 mg. PO Zyprexa BID   -QTc appears prolonged on tele, will obtain EKG, and hold PRN ZyprexA    #Alcohol use disorder  Ethanol level: none detected  -thiamine 100 mg daily, changed to PO  -monitor for withdrawal     #Acute hypercapnic respiratory failure secondary to possible aspiration vs 2/2 medication  On presentation: VBG PH: 7.264, PCO2: 56.4, 83.2  Extubated successfully 9/10     #Hx of Cirrhosis  -Ammonia: 36 wnl(09/1/22)     Chronic conditions:  #Hypertension  -Resumed home carvedilol dose 3.125 mg twice daily 9/14    Code Status: Full Code  FEN: OK for full liquid diet  PPX: Enoxaparin  DISPO: ICU  Barriers to discharge: Precedex gtt    Nolan Medina DO, PGY-1  09/17/22  7:42 AM    Patient seen and discussed with Dr. Sung Saleh    Patient seen, examined and discussed with the resident and I agree with the assessment and plan. Remains delirious despite higher doses of seroquel. On scheduled zyprexa. Weaned off the precedex early this morning.   There doesn't appear to be any immediate solutions to his delirium so will ask palliative care to get involved as disposition will be a long term challenge and quality of life will be poor.       Rafa Willingham MD

## 2022-09-17 NOTE — PLAN OF CARE
Problem: Pain  Goal: Verbalizes/displays adequate comfort level or baseline comfort level  Outcome: Progressing     Problem: Safety - Adult  Goal: Free from fall injury  Outcome: Progressing  Flowsheets (Taken 9/17/2022 0921)  Free From Fall Injury:   Instruct family/caregiver on patient safety   Based on caregiver fall risk screen, instruct family/caregiver to ask for assistance with transferring infant if caregiver noted to have fall risk factors     Problem: ABCDS Injury Assessment  Goal: Absence of physical injury  Outcome: Progressing  Flowsheets (Taken 9/17/2022 0921)  Absence of Physical Injury: Implement safety measures based on patient assessment     Problem: Skin/Tissue Integrity  Goal: Absence of new skin breakdown  Description: 1. Monitor for areas of redness and/or skin breakdown  2. Assess vascular access sites hourly  3. Every 4-6 hours minimum:  Change oxygen saturation probe site  4. Every 4-6 hours:  If on nasal continuous positive airway pressure, respiratory therapy assess nares and determine need for appliance change or resting period. Outcome: Progressing     Problem: Safety - Medical Restraint  Goal: Remains free of injury from restraints (Restraint for Interference with Medical Device)  Description: INTERVENTIONS:  1. Determine that other, less restrictive measures have been tried or would not be effective before applying the restraint  2. Evaluate the patient's condition at the time of restraint application  3. Inform patient/family regarding the reason for restraint  4.  Q2H: Monitor safety, psychosocial status, comfort, nutrition and hydration  Outcome: Completed

## 2022-09-17 NOTE — PROGRESS NOTES
Patient oriented to self, has periods of lucidity in which he is oriented to place, typically states he is at work. Patient is anxious and delirious but non aggressive and cooperating with staff this shift. Patient has a decreased appetite, encouraging PO intake and supplementation. Hemodynamically stable. Will continue to monitor closely.

## 2022-09-18 PROBLEM — R41.0 DELIRIUM: Status: ACTIVE | Noted: 2022-09-18

## 2022-09-18 PROBLEM — G93.40 ACUTE ENCEPHALOPATHY: Status: ACTIVE | Noted: 2022-09-18

## 2022-09-18 LAB
ALBUMIN SERPL-MCNC: 2.5 G/DL (ref 3.4–5)
ALBUMIN SERPL-MCNC: 4 G/DL (ref 3.4–5)
ANION GAP SERPL CALCULATED.3IONS-SCNC: 15 MMOL/L (ref 3–16)
ANION GAP SERPL CALCULATED.3IONS-SCNC: 18 MMOL/L (ref 3–16)
BANDED NEUTROPHILS RELATIVE PERCENT: 1 % (ref 0–7)
BASOPHILS ABSOLUTE: 0 K/UL (ref 0–0.2)
BASOPHILS RELATIVE PERCENT: 0 %
BUN BLDV-MCNC: 5 MG/DL (ref 7–20)
BUN BLDV-MCNC: 6 MG/DL (ref 7–20)
CALCIUM SERPL-MCNC: 8.1 MG/DL (ref 8.3–10.6)
CALCIUM SERPL-MCNC: 9.5 MG/DL (ref 8.3–10.6)
CHLORIDE BLD-SCNC: 103 MMOL/L (ref 99–110)
CHLORIDE BLD-SCNC: 105 MMOL/L (ref 99–110)
CO2: 16 MMOL/L (ref 21–32)
CO2: 22 MMOL/L (ref 21–32)
CREAT SERPL-MCNC: 0.6 MG/DL (ref 0.8–1.3)
CREAT SERPL-MCNC: <0.5 MG/DL (ref 0.8–1.3)
EKG ATRIAL RATE: 64 BPM
EKG DIAGNOSIS: NORMAL
EKG P AXIS: 19 DEGREES
EKG P-R INTERVAL: 164 MS
EKG Q-T INTERVAL: 422 MS
EKG QRS DURATION: 82 MS
EKG QTC CALCULATION (BAZETT): 435 MS
EKG R AXIS: -40 DEGREES
EKG T AXIS: 112 DEGREES
EKG VENTRICULAR RATE: 64 BPM
EOSINOPHILS ABSOLUTE: 0.1 K/UL (ref 0–0.6)
EOSINOPHILS RELATIVE PERCENT: 1 %
GFR AFRICAN AMERICAN: >60
GFR AFRICAN AMERICAN: >60
GFR NON-AFRICAN AMERICAN: >60
GFR NON-AFRICAN AMERICAN: >60
GLUCOSE BLD-MCNC: 111 MG/DL (ref 70–99)
GLUCOSE BLD-MCNC: 80 MG/DL (ref 70–99)
HCT VFR BLD CALC: 41.2 % (ref 40.5–52.5)
HEMOGLOBIN: 14.1 G/DL (ref 13.5–17.5)
LYMPHOCYTES ABSOLUTE: 1.5 K/UL (ref 1–5.1)
LYMPHOCYTES RELATIVE PERCENT: 15 %
MAGNESIUM: 0.9 MG/DL (ref 1.8–2.4)
MAGNESIUM: 2.6 MG/DL (ref 1.8–2.4)
MCH RBC QN AUTO: 32.2 PG (ref 26–34)
MCHC RBC AUTO-ENTMCNC: 34.3 G/DL (ref 31–36)
MCV RBC AUTO: 93.7 FL (ref 80–100)
MONOCYTES ABSOLUTE: 0.8 K/UL (ref 0–1.3)
MONOCYTES RELATIVE PERCENT: 8 %
NEUTROPHILS ABSOLUTE: 7.7 K/UL (ref 1.7–7.7)
NEUTROPHILS RELATIVE PERCENT: 75 %
PDW BLD-RTO: 14.4 % (ref 12.4–15.4)
PHOSPHORUS: 1.9 MG/DL (ref 2.5–4.9)
PHOSPHORUS: 3.8 MG/DL (ref 2.5–4.9)
PLATELET # BLD: 271 K/UL (ref 135–450)
PMV BLD AUTO: 8.5 FL (ref 5–10.5)
POTASSIUM SERPL-SCNC: 3.8 MMOL/L (ref 3.5–5.1)
POTASSIUM SERPL-SCNC: 3.8 MMOL/L (ref 3.5–5.1)
RBC # BLD: 4.4 M/UL (ref 4.2–5.9)
SODIUM BLD-SCNC: 139 MMOL/L (ref 136–145)
SODIUM BLD-SCNC: 140 MMOL/L (ref 136–145)
WBC # BLD: 10.1 K/UL (ref 4–11)

## 2022-09-18 PROCEDURE — 6370000000 HC RX 637 (ALT 250 FOR IP)

## 2022-09-18 PROCEDURE — 85025 COMPLETE CBC W/AUTO DIFF WBC: CPT

## 2022-09-18 PROCEDURE — 6370000000 HC RX 637 (ALT 250 FOR IP): Performed by: STUDENT IN AN ORGANIZED HEALTH CARE EDUCATION/TRAINING PROGRAM

## 2022-09-18 PROCEDURE — 93010 ELECTROCARDIOGRAM REPORT: CPT | Performed by: INTERNAL MEDICINE

## 2022-09-18 PROCEDURE — 2580000003 HC RX 258

## 2022-09-18 PROCEDURE — 94761 N-INVAS EAR/PLS OXIMETRY MLT: CPT

## 2022-09-18 PROCEDURE — A4216 STERILE WATER/SALINE, 10 ML: HCPCS | Performed by: STUDENT IN AN ORGANIZED HEALTH CARE EDUCATION/TRAINING PROGRAM

## 2022-09-18 PROCEDURE — 36415 COLL VENOUS BLD VENIPUNCTURE: CPT

## 2022-09-18 PROCEDURE — 2580000003 HC RX 258: Performed by: STUDENT IN AN ORGANIZED HEALTH CARE EDUCATION/TRAINING PROGRAM

## 2022-09-18 PROCEDURE — 6360000002 HC RX W HCPCS

## 2022-09-18 PROCEDURE — 99233 SBSQ HOSP IP/OBS HIGH 50: CPT | Performed by: INTERNAL MEDICINE

## 2022-09-18 PROCEDURE — 93005 ELECTROCARDIOGRAM TRACING: CPT

## 2022-09-18 PROCEDURE — 2580000003 HC RX 258: Performed by: NURSE PRACTITIONER

## 2022-09-18 PROCEDURE — 6360000002 HC RX W HCPCS: Performed by: NURSE PRACTITIONER

## 2022-09-18 PROCEDURE — 1200000000 HC SEMI PRIVATE

## 2022-09-18 PROCEDURE — 80069 RENAL FUNCTION PANEL: CPT

## 2022-09-18 PROCEDURE — 6370000000 HC RX 637 (ALT 250 FOR IP): Performed by: INTERNAL MEDICINE

## 2022-09-18 PROCEDURE — 6360000002 HC RX W HCPCS: Performed by: STUDENT IN AN ORGANIZED HEALTH CARE EDUCATION/TRAINING PROGRAM

## 2022-09-18 PROCEDURE — 92526 ORAL FUNCTION THERAPY: CPT

## 2022-09-18 PROCEDURE — 2500000003 HC RX 250 WO HCPCS: Performed by: STUDENT IN AN ORGANIZED HEALTH CARE EDUCATION/TRAINING PROGRAM

## 2022-09-18 PROCEDURE — 83735 ASSAY OF MAGNESIUM: CPT

## 2022-09-18 RX ORDER — SODIUM CHLORIDE, SODIUM LACTATE, POTASSIUM CHLORIDE, AND CALCIUM CHLORIDE .6; .31; .03; .02 G/100ML; G/100ML; G/100ML; G/100ML
500 INJECTION, SOLUTION INTRAVENOUS ONCE
Status: COMPLETED | OUTPATIENT
Start: 2022-09-18 | End: 2022-09-18

## 2022-09-18 RX ORDER — MAGNESIUM SULFATE IN WATER 40 MG/ML
4000 INJECTION, SOLUTION INTRAVENOUS ONCE
Status: COMPLETED | OUTPATIENT
Start: 2022-09-18 | End: 2022-09-18

## 2022-09-18 RX ADMIN — THIAMINE HCL TAB 100 MG 100 MG: 100 TAB at 10:02

## 2022-09-18 RX ADMIN — SODIUM CHLORIDE, POTASSIUM CHLORIDE, SODIUM LACTATE AND CALCIUM CHLORIDE 500 ML: 600; 310; 30; 20 INJECTION, SOLUTION INTRAVENOUS at 10:51

## 2022-09-18 RX ADMIN — QUETIAPINE FUMARATE 50 MG: 25 TABLET ORAL at 10:02

## 2022-09-18 RX ADMIN — OLANZAPINE 5 MG: 5 TABLET, FILM COATED ORAL at 20:31

## 2022-09-18 RX ADMIN — ENOXAPARIN SODIUM 40 MG: 100 INJECTION SUBCUTANEOUS at 10:02

## 2022-09-18 RX ADMIN — CARVEDILOL 3.12 MG: 3.12 TABLET, FILM COATED ORAL at 10:02

## 2022-09-18 RX ADMIN — QUETIAPINE FUMARATE 200 MG: 200 TABLET ORAL at 20:31

## 2022-09-18 RX ADMIN — SODIUM CHLORIDE, PRESERVATIVE FREE 10 ML: 5 INJECTION INTRAVENOUS at 20:32

## 2022-09-18 RX ADMIN — SODIUM CHLORIDE, PRESERVATIVE FREE 10 ML: 5 INJECTION INTRAVENOUS at 10:01

## 2022-09-18 RX ADMIN — SODIUM CHLORIDE, PRESERVATIVE FREE 20 MG: 5 INJECTION INTRAVENOUS at 20:35

## 2022-09-18 RX ADMIN — ZIPRASIDONE HYDROCHLORIDE 20 MG: 20 CAPSULE ORAL at 00:22

## 2022-09-18 RX ADMIN — CARVEDILOL 3.12 MG: 3.12 TABLET, FILM COATED ORAL at 17:11

## 2022-09-18 RX ADMIN — SODIUM CHLORIDE, PRESERVATIVE FREE 20 MG: 5 INJECTION INTRAVENOUS at 10:02

## 2022-09-18 RX ADMIN — ZIPRASIDONE HYDROCHLORIDE 20 MG: 20 CAPSULE ORAL at 12:32

## 2022-09-18 RX ADMIN — LEVETIRACETAM 1500 MG: 100 INJECTION, SOLUTION, CONCENTRATE INTRAVENOUS at 03:15

## 2022-09-18 RX ADMIN — LEVETIRACETAM 1500 MG: 100 INJECTION, SOLUTION, CONCENTRATE INTRAVENOUS at 15:26

## 2022-09-18 RX ADMIN — OLANZAPINE 5 MG: 5 TABLET, FILM COATED ORAL at 10:02

## 2022-09-18 RX ADMIN — MAGNESIUM SULFATE HEPTAHYDRATE 4000 MG: 40 INJECTION, SOLUTION INTRAVENOUS at 17:11

## 2022-09-18 RX ADMIN — Medication 5 MG: at 20:31

## 2022-09-18 NOTE — PROGRESS NOTES
ICU Progress Note    Admit Date: 9/1/2022  Day: 18  Vent Day: None  IV Access: Peripheral  IV Fluids: None  Vasopressors: None                Antibiotics: None  Diet: ADULT ORAL NUTRITION SUPPLEMENT; Breakfast, Lunch, Dinner; Standard High Calorie/High Protein Oral Supplement  ADULT DIET; Easy to Chew; 3 carb choices (45 gm/meal)    CC: AMS    Interval history:   Patient with poor sleep overnight, and agitation, trying to climb out of the bed. He however did not display any violent behavior, or try to harm staff. He received 1 dose PRN Geodon yesterday afternoon, and another dose a little after midnight. He has been intermittently tachycardic up to the 120s since yesterday afternoon, but with stable pressures and at times with hypertension. This morning he is tachycardic into the 120s with regular rhythm. He denies any pain, is fairly lucid not fully oriented to place or situation, but is oriented to year, but not to month or season. Patient was able to walk to the bathroom with assistance for bowel movement. HPI:   The patient is a 58-year-old man with past medical history significant for chronic alcoholism, ICH, CAD, DVT who presented today to the ED with an episode of syncope. The history was mainly obtained by the sister since patient was intubated. He was last known well at 9 AM but the sister. According to the sister, she had just returned home from work which she had found him half lying down on the couch covered in the stool while his car outside was running outside with occasion. She denies any complaints of recent fever, chills, chest pain cough, shortness of breath, lightheadedness, palpitations, nausea, vomiting, abdominal pain, diarrhea, fatigue, visual disturbance, changes in urination frequency or burning pain well urination or headaches by the patient in the preceding days to this incident. She does reinstate that her brother is a chronic alcoholic and had just gotten out of rehab.   He has been sober since however she was not aware if he had recently had any alcoholic drink. He had also informed that he had recent episode of intracerebral hemorrhage on 9/4/2021. In the ED, there was a concern for possibility of a stroke however he was not considered a thrombolytic candidate because of his previous ICH and long time since his last known well. According to the ED staff his physical exam was more pertinent for nonfocal delirium. It was also significant for nystagmus and rightward gaze not fixed. He was given 2 mg of Versed and it had worsened his oxygen saturations that had dropped to 88% on a nonrebreather and therefore he was intubated. CT Abdomen, chest: Moderate dependent atelectasis bilaterally. No acute abnormality on noncontrast CT of the abdomen and pelvis. Cholelithiasis. CTA Head: No acute CTA findings. No hemodynamically significant stenosis or focal aneurysm. No arteriovenous confirmation. CTA Neck: No acute CTA findings. No hemodynamically significant stenosis or focal aneurysm. Patient was admitted to the ICU for further workup and management of his possible meningitis, on MV. Extubated 9/10, with precedex and restraint requirement for agitation since then. Improving gradually. ROS:  No fever, chills. No SOB, chest pain. No nausea, no diarrhea, no vomiting.     Medications:     Scheduled Meds:   thiamine mononitrate  100 mg Oral Daily    OLANZapine  5 mg Oral BID    QUEtiapine  200 mg Oral Nightly    QUEtiapine  50 mg Oral Daily    carvedilol  3.125 mg Oral BID WC    melatonin  5 mg Oral Nightly    famotidine (PEPCID) injection  20 mg IntraVENous BID    levETIRAcetam  1,500 mg IntraVENous Q12H    enoxaparin  40 mg SubCUTAneous Daily    lidocaine PF  5 mL IntraDERmal Once    sodium chloride flush  5-40 mL IntraVENous 2 times per day     Continuous Infusions:   dextrose      dexmedetomidine (PRECEDEX) IV infusion Stopped (09/17/22 0634)    sodium chloride 10 mL/hr at 09/17/22 0637     PRN Meds:ziprasidone, labetalol, glucose, dextrose bolus **OR** dextrose bolus, glucagon (rDNA), dextrose, sodium chloride flush, sodium chloride, ondansetron **OR** ondansetron, polyethylene glycol, acetaminophen **OR** acetaminophen    Objective:   Vitals:   T-max:  Patient Vitals for the past 8 hrs:   BP Temp Temp src Pulse Resp SpO2   09/18/22 0600 (!) 158/81 -- -- (!) 107 28 --   09/18/22 0315 (!) 145/67 98.7 °F (37.1 °C) Oral 91 21 99 %         Intake/Output Summary (Last 24 hours) at 9/18/2022 0953  Last data filed at 9/17/2022 2121  Gross per 24 hour   Intake 120 ml   Output 450 ml   Net -330 ml           Physical Exam:  CONSTITUTIONAL:  awake, alert, cooperative, no apparent distress, and appears stated age  EYES: Pupils equal round and reactive to light. Normal conjunctiva  EARS, NOSE, MOUTH & THROAT: Normal oropharynx. Ears and nose appear normal  NECK:  Supple, symmetrical, trachea midline, no adenopathy, thyroid symmetric, not enlarged and no tenderness, skin normal  LUNGS:  no increased work of breathing and clear to auscultation. No accessory muscle use  CARDIOVASCULAR:  normal S1 and S2, no edema and no JVD  ABDOMEN:  normal bowel sounds, non-distended and no masses palpated. Hepatomegaly with tenderness  LYMPHADENOPATHY:  no axillary or supraclavicular adenopathy. No cervical adnenopathy  PSYCHIATRIC: Oriented to person place. No obvious depression or anxiety. Some delirium and delusions. Waxing and waning aggression  MUSCULOSKELETAL: No obvious misalignment or effusion of the joints. No clubbing, cyanosis of the digits. SKIN:  normal skin color, texture, turgor and no redness, warmth, or swelling.  No palpable nodules    LABS:    CBC:   Recent Labs     09/16/22 0245 09/17/22 0922   WBC 9.8 6.8   HGB 14.4 15.4   HCT 41.9 43.5    236   MCV 94.2 92.9       Renal:    Recent Labs     09/16/22 0245 09/17/22 0922    142   K 4.1 3.9    107   CO2 18* 21   BUN 6* 7 CREATININE 0.5* 0.6*   GLUCOSE 98 90   CALCIUM 8.9 9.3   MG 1.60* 1.40*   PHOS 3.1 3.3   ANIONGAP 16 14       Hepatic:   Recent Labs     09/16/22  0245 09/17/22  0922   LABALBU 3.6 3.9       Troponin: No results for input(s): TROPONINI in the last 72 hours. BNP: No results for input(s): BNP in the last 72 hours. Lipids: No results for input(s): CHOL, HDL in the last 72 hours. Invalid input(s): LDLCALCU, TRIGLYCERIDE  ABGs:  No results for input(s): PHART, WZZ4DBK, PO2ART, YBO6CKK, BEART, THGBART, L7TEQCJT, HLX9DMQ in the last 72 hours. INR: No results for input(s): INR in the last 72 hours. Lactate: No results for input(s): LACTATE in the last 72 hours. Cultures:  -----------------------------------------------------------------  RAD:   FL MODIFIED BARIUM SWALLOW W VIDEO   Final Result      No penetration or aspiration. XR CHEST PORTABLE   Final Result   1. Worsened pulmonary edema. 2.  Persistent bibasilar atelectasis. MRI BRAIN WO CONTRAST   Final Result      Faint diffusion restriction in the right hippocampus with associated FLAIR signal abnormality. This is favored to represent sequelae of seizures, however other infectious/inflammatory etiologies are also possible. Mild atrophy and chronic small vessel ischemic change. Areas of encephalomalacia and gliosis in both cerebral hemispheres with remote hemorrhagic staining from prior insults. IR LUMBAR PUNCTURE FOR DIAGNOSIS   Final Result   . IMPRESSION:   1. Uneventful diagnostic fluoroscopic guided lumbar puncture as the   2. The flow reversed are risk and therefore a closing pressure was obtained at the end of the procedure, 26 cm of water. XR ABDOMEN (KUB) (SINGLE AP VIEW)   Final Result   Findings/Impression:    The tip of the enteric tube terminates in the distal body of the stomach. CT CHEST WO CONTRAST   Final Result      CHEST:      1. Moderate dependent atelectasis bilaterally.       ABDOMEN/PELVIS: 1.  No acute abnormality on noncontrast CT of the abdomen and pelvis. 2.  Cholelithiasis. CT ABDOMEN PELVIS WO CONTRAST Additional Contrast? None   Final Result      CHEST:      1. Moderate dependent atelectasis bilaterally. ABDOMEN/PELVIS:      1.  No acute abnormality on noncontrast CT of the abdomen and pelvis. 2.  Cholelithiasis. CTA HEAD NECK W CONTRAST   Final Result      CTA Head:   No acute CTA findings. No hemodynamically significant stenosis or focal aneurysm. No arteriovenous confirmation. CTA Neck:   No acute CTA findings. No hemodynamically significant stenosis or focal aneurysm. CT HEAD WO CONTRAST   Final Result      1. No findings for acute intracranial abnormality. 2.  Age-related atrophy with patchy periventricular white matter changes bilaterally consistent with chronic small vessel ischemia. 3.  Stable low attenuation left frontoparietal lobe consistent with previous insult from known prior intraparenchymal hematoma. Stable right frontoparietal cortical infarct. These findings were called to the emergency room physician Dr. Dayton Slaughter on 9/1/2022 at 5:30 p.m. XR CHEST PORTABLE   Final Result   1. No findings for acute cardiopulmonary disease. 2.  ET tube in good position in the mid trachea. Assessment/Plan:   Albino Reyes is a 57 y.o. male with a PMH of alcohol abuse, CAD, CHF, essential tremor, HTN,  who was admitted with AMS. #Acute Metabolic Encephalopathy 2/2 possible meningitis vs encephalitis induced seizure   On presentation AMS, elevated WBC: 21.7, Tachypneic, Tachycardic, Hypotensive. Hx of ICH, SAH and SDH 2/2 to fall, hx of chronic alcohol abuse,   CT head: Stable low attenuation left frontoparietal lobe consistent with previous insult from known prior intraparenchymal hematoma. Stable right frontoparietal cortical infarct. CT Abdomen, chest: Moderate dependent atelectasis bilaterally. No acute abnormality on noncontrast CT of the abdomen and pelvis. Cholelithiasis. CTA Head: No acute CTA findings. No hemodynamically significant stenosis or focal aneurysm. No arteriovenous confirmation. CTA Neck: No acute CTA findings. No hemodynamically significant stenosis or focal aneurysm. MRI: Faint diffusion restriction in the right hippocampus with associated FLAIR signal abnormality. This is favored to represent sequelae of seizures, however other infectious/inflammatory etiologies are also possible. cEEG readings previously: Generalized background abnormalities indicative of an underlying diffuse encephalopathy of non-specific etiology. Intermittent focal epileptiform discharges, right posterior temporal, conferring increased risk of focal onset seizures from this region.    -Consulted Neurology: appreciate recs  -blood cultures ordered (9/2): No growth to date  -On Keppra 1500 bid  -Lumbar puncture ordered 9/6/2022: No growth to date, no positive immunological/microbiological results yet  - Nightly Seroquel increased to 200mg, melatonin. AM seroquel 50 mg. PO Zyprexa BID   -Added 20mg PO Geodon q8hrs, seems to have helped, continue  -Patient successfully of Precedex drip >24 hours, appropriate for the floor     #Acute sinus tachycardia  Intermittently tachycardic into the 120s with stable pressures  -Most likely secondary to dehydration and poor p.o. intake  -Could also consider PE, however patient is showing no respiratory distress, denies chest pain, on room air, not tachypneic  -Will get an EKG to rule out other causes  -500 cc LR bolus to monitor for response.     #Alcohol use disorder  Ethanol level: none detected  -thiamine 100 mg daily, changed to PO  -monitor for withdrawal     #Acute hypercapnic respiratory failure secondary to possible aspiration vs 2/2 medication  On presentation: VBG PH: 7.264, PCO2: 56.4, 83.2  Extubated successfully 9/10     #Hx of Cirrhosis  -Ammonia: 36 wnl(09/1/22)     Chronic conditions:  #Hypertension  -Resumed home carvedilol dose 3.125 mg twice daily 9/14    Code Status: Full Code  FEN: OK for full liquid diet  PPX: Enoxaparin  DISPO: Floor      Ethyl Joannenena , PGY-1  09/18/22  9:53 AM    Patient seen, examined and discussed with the resident and I agree with the assessment and plan as edited above. Remains off precedex. Needed two doses of geodon in addition to the scheduled seroquel and zyprexa we had on his regimen. He remains delirious. Walked to the bathroom with a lot of assistance today. Since he's off precedex he can downgrade from ICU, but he must have a sitter as he is high risk for falls.     Colten Cuellar MD

## 2022-09-18 NOTE — PROGRESS NOTES
ICU TRANSFER TO THE FLOOR    Admit Date: 9/1/2022  Day: 18  Vent Day: None  IV Access: Peripheral  IV Fluids: None  Vasopressors: None                Antibiotics: None  Diet: ADULT ORAL NUTRITION SUPPLEMENT; Breakfast, Lunch, Dinner; Standard High Calorie/High Protein Oral Supplement  ADULT DIET; Easy to Chew; 3 carb choices (45 gm/meal)        CC: AMS    HPI:   The patient is a 58-year-old man with past medical history significant for chronic alcoholism, ICH, CAD, DVT who presented today to the ED with an episode of syncope. The history was mainly obtained by the sister since patient was intubated. He was last known well at 9 AM but the sister. According to the sister, she had just returned home from work which she had found him half lying down on the couch covered in the stool while his car outside was running outside with occasion. She denies any complaints of recent fever, chills, chest pain cough, shortness of breath, lightheadedness, palpitations, nausea, vomiting, abdominal pain, diarrhea, fatigue, visual disturbance, changes in urination frequency or burning pain well urination or headaches by the patient in the preceding days to this incident. She does reinstate that her brother is a chronic alcoholic and had just gotten out of rehab. He has been sober since however she was not aware if he had recently had any alcoholic drink. He had also informed that he had recent episode of intracerebral hemorrhage on 9/4/2021. In the ED, there was a concern for possibility of a stroke however he was not considered a thrombolytic candidate because of his previous ICH and long time since his last known well. According to the ED staff his physical exam was more pertinent for nonfocal delirium. It was also significant for nystagmus and rightward gaze not fixed.   He was given 2 mg of Versed and it had worsened his oxygen saturations that had dropped to 88% on a nonrebreather and therefore he was intubated. CT Abdomen, chest: Moderate dependent atelectasis bilaterally. No acute abnormality on noncontrast CT of the abdomen and pelvis. Cholelithiasis. CTA Head: No acute CTA findings. No hemodynamically significant stenosis or focal aneurysm. No arteriovenous confirmation. CTA Neck: No acute CTA findings. No hemodynamically significant stenosis or focal aneurysm. Patient was admitted to the ICU for further workup and management of his possible meningitis, on MV. Extubated 9/10, weaned off precedex but requires restraint for agitation. Improving gradually. Patient was seen and examined, stable and had no acute complaints, he is transferred to the floor for further management.   Plan was  discussed with ICU team.    Medications:     Scheduled Meds:   thiamine mononitrate  100 mg Oral Daily    OLANZapine  5 mg Oral BID    QUEtiapine  200 mg Oral Nightly    QUEtiapine  50 mg Oral Daily    carvedilol  3.125 mg Oral BID WC    melatonin  5 mg Oral Nightly    famotidine (PEPCID) injection  20 mg IntraVENous BID    levETIRAcetam  1,500 mg IntraVENous Q12H    enoxaparin  40 mg SubCUTAneous Daily    lidocaine PF  5 mL IntraDERmal Once    sodium chloride flush  5-40 mL IntraVENous 2 times per day     Continuous Infusions:   dextrose      dexmedetomidine (PRECEDEX) IV infusion Stopped (09/17/22 0634)    sodium chloride 10 mL/hr at 09/17/22 0637     PRN Meds:ziprasidone, labetalol, glucose, dextrose bolus **OR** dextrose bolus, glucagon (rDNA), dextrose, sodium chloride flush, sodium chloride, ondansetron **OR** ondansetron, polyethylene glycol, acetaminophen **OR** acetaminophen    Objective:   Vitals:   T-max:  Patient Vitals for the past 8 hrs:   BP Pulse Resp SpO2   09/18/22 1228 -- (!) 134 21 96 %   09/18/22 1046 (!) 129/94 (!) 123 18 96 %       Intake/Output Summary (Last 24 hours) at 9/18/2022 1500  Last data filed at 9/17/2022 2121  Gross per 24 hour   Intake --   Output 300 ml   Net -300 ml Physical Exam:  CONSTITUTIONAL:  awake, alert, cooperative, no apparent distress, and appears stated age  EYES: Pupils equal round and reactive to light. Normal conjunctiva  EARS, NOSE, MOUTH & THROAT: Normal oropharynx. Ears and nose appear normal  NECK:  Supple, symmetrical, trachea midline, no adenopathy, thyroid symmetric, not enlarged and no tenderness, skin normal  LUNGS:  no increased work of breathing and clear to auscultation. No accessory muscle use  CARDIOVASCULAR:  normal S1 and S2, no edema and no JVD  ABDOMEN:  normal bowel sounds, non-distended and no masses palpated. Hepatomegaly with tenderness  LYMPHADENOPATHY:  no axillary or supraclavicular adenopathy. No cervical adnenopathy  PSYCHIATRIC: Oriented to person place. No obvious depression or anxiety. Some delirium and delusions. Waxing and waning aggression  MUSCULOSKELETAL: No obvious misalignment or effusion of the joints. No clubbing, cyanosis of the digits. SKIN:  normal skin color, texture, turgor and no redness, warmth, or swelling. No palpable nodules    LABS:    CBC:   Recent Labs     09/16/22 0245 09/17/22 0922 09/18/22  1108   WBC 9.8 6.8 10.1   HGB 14.4 15.4 14.1   HCT 41.9 43.5 41.2    236 271   MCV 94.2 92.9 93.7     Renal:    Recent Labs     09/16/22 0245 09/17/22 0922 09/18/22  1108    142 139   K 4.1 3.9 3.8    107 105   CO2 18* 21 16*   BUN 6* 7 5*   CREATININE 0.5* 0.6* <0.5*   GLUCOSE 98 90 80   CALCIUM 8.9 9.3 8.1*   MG 1.60* 1.40* 0.90*   PHOS 3.1 3.3 1.9*   ANIONGAP 16 14 18*     Hepatic:   Recent Labs     09/16/22 0245 09/17/22 0922 09/18/22  1108   LABALBU 3.6 3.9 2.5*     Troponin: No results for input(s): TROPONINI in the last 72 hours. BNP: No results for input(s): BNP in the last 72 hours. Lipids: No results for input(s): CHOL, HDL in the last 72 hours.     Invalid input(s): LDLCALCU, TRIGLYCERIDE  ABGs:  No results for input(s): PHART, JVM9MHN, PO2ART, CJU9OBC, BEART, THGBART, S7CEDLOH, RBV9FND in the last 72 hours. INR: No results for input(s): INR in the last 72 hours. Lactate: No results for input(s): LACTATE in the last 72 hours. Cultures:  -----------------------------------------------------------------  RAD:   FL MODIFIED BARIUM SWALLOW W VIDEO   Final Result      No penetration or aspiration. XR CHEST PORTABLE   Final Result   1. Worsened pulmonary edema. 2.  Persistent bibasilar atelectasis. MRI BRAIN WO CONTRAST   Final Result      Faint diffusion restriction in the right hippocampus with associated FLAIR signal abnormality. This is favored to represent sequelae of seizures, however other infectious/inflammatory etiologies are also possible. Mild atrophy and chronic small vessel ischemic change. Areas of encephalomalacia and gliosis in both cerebral hemispheres with remote hemorrhagic staining from prior insults. IR LUMBAR PUNCTURE FOR DIAGNOSIS   Final Result   . IMPRESSION:   1. Uneventful diagnostic fluoroscopic guided lumbar puncture as the   2. The flow reversed are risk and therefore a closing pressure was obtained at the end of the procedure, 26 cm of water. XR ABDOMEN (KUB) (SINGLE AP VIEW)   Final Result   Findings/Impression:    The tip of the enteric tube terminates in the distal body of the stomach. CT CHEST WO CONTRAST   Final Result      CHEST:      1. Moderate dependent atelectasis bilaterally. ABDOMEN/PELVIS:      1.  No acute abnormality on noncontrast CT of the abdomen and pelvis. 2.  Cholelithiasis. CT ABDOMEN PELVIS WO CONTRAST Additional Contrast? None   Final Result      CHEST:      1. Moderate dependent atelectasis bilaterally. ABDOMEN/PELVIS:      1.  No acute abnormality on noncontrast CT of the abdomen and pelvis. 2.  Cholelithiasis. CTA HEAD NECK W CONTRAST   Final Result      CTA Head:   No acute CTA findings. No hemodynamically significant stenosis or focal aneurysm.   No arteriovenous confirmation. CTA Neck:   No acute CTA findings. No hemodynamically significant stenosis or focal aneurysm. CT HEAD WO CONTRAST   Final Result      1. No findings for acute intracranial abnormality. 2.  Age-related atrophy with patchy periventricular white matter changes bilaterally consistent with chronic small vessel ischemia. 3.  Stable low attenuation left frontoparietal lobe consistent with previous insult from known prior intraparenchymal hematoma. Stable right frontoparietal cortical infarct. These findings were called to the emergency room physician Dr. Mavis Hernandez on 9/1/2022 at 5:30 p.m. XR CHEST PORTABLE   Final Result   1. No findings for acute cardiopulmonary disease. 2.  ET tube in good position in the mid trachea. Assessment/Plan:   Niki Waters is a 57 y.o. male with a PMH of alcohol abuse, CAD, CHF, essential tremor, HTN,  who was admitted with AMS. #Acute metabolic encephalopathy(unclear etiology)-improving  On presentation AMS, elevated WBC: 21.7, Tachypneic, Tachycardic, Hypotensive. Hx of ICH, SAH and SDH 2/2 to fall, hx of chronic alcohol abuse,   CT head: Stable low attenuation left frontoparietal lobe consistent with previous insult from known prior intraparenchymal hematoma. Stable right frontoparietal cortical infarct. CT Abdomen, chest: Moderate dependent atelectasis bilaterally. No acute abnormality on noncontrast CT of the abdomen and pelvis. Cholelithiasis. CTA Head: No acute CTA findings. No hemodynamically significant stenosis or focal aneurysm. No arteriovenous confirmation. CTA Neck: No acute CTA findings. No hemodynamically significant stenosis or focal aneurysm. MRI: Faint diffusion restriction in the right hippocampus with associated FLAIR signal abnormality. This is favored to represent sequelae of seizures, however other infectious/inflammatory etiologies are also possible.   cEEG readings previously: Generalized background abnormalities indicative of an underlying diffuse encephalopathy of non-specific etiology. Intermittent focal epileptiform discharges, right posterior temporal, conferring increased risk of focal onset seizures from this region.  -Lumbar puncture ordered 9/6/2022: No growth. -On Keppra 1500mg bid  - Nightly Seroquel increased to 200mg, melatonin. AM seroquel 50 mg. PO Zyprexa BID   -Added 20mg PO Geodon q8hrs, seems to have helped, continue       #Acute sinus tachycardia  Intermittently tachycardic into the 120s with stable pressures  -Most likely secondary to dehydration and poor p.o. intake  -Could also consider PE, however patient is showing no respiratory distress, denies chest pain, on room air, not tachypneic  -Will get an EKG to rule out other causes  -500 cc LR bolus to monitor for response.     #Alcohol use disorder  Ethanol level: none detected  -thiamine 100 mg daily, changed to PO  -monitor for withdrawal     #Acute hypercapnic respiratory failure secondary to possible aspiration vs 2/2 medication (Extubated successfully 9/10)  On presentation: VBG PH: 7.264, PCO2: 56.4, 83.2     #Hx of Cirrhosis  -Ammonia: 36 wnl(09/1/22)     Chronic conditions:  #Hypertension  -Resumed home carvedilol dose 3.125 mg twice daily 9/14    Code Status: Full Code  FEN: OK for full liquid diet  PPX: Enoxaparin  DISPO: Safia Razo MD, PGY-1  09/18/22  3:00 PM

## 2022-09-18 NOTE — PROGRESS NOTES
Hospitalist Progress Note      PCP: Leny Dwyer MD    Date of Admission: 9/1/2022    Chief Complaint: Syncope    Hospital Course: 73-year-old man with past medical history significant for chronic alcoholism, ICH, CAD, DVT who presented today to the ED with an episode of syncope. Subjective: No acute events reported overnight. Patient states he feels okay this morning. He is alert and awake and denies new complaints. Medications:  Reviewed    Infusion Medications    dextrose      dexmedetomidine (PRECEDEX) IV infusion Stopped (09/17/22 0044)    sodium chloride 10 mL/hr at 09/17/22 4193     Scheduled Medications    magnesium sulfate  4,000 mg IntraVENous Once    thiamine mononitrate  100 mg Oral Daily    OLANZapine  5 mg Oral BID    QUEtiapine  200 mg Oral Nightly    QUEtiapine  50 mg Oral Daily    carvedilol  3.125 mg Oral BID WC    melatonin  5 mg Oral Nightly    famotidine (PEPCID) injection  20 mg IntraVENous BID    levETIRAcetam  1,500 mg IntraVENous Q12H    enoxaparin  40 mg SubCUTAneous Daily    lidocaine PF  5 mL IntraDERmal Once    sodium chloride flush  5-40 mL IntraVENous 2 times per day     PRN Meds: ziprasidone, labetalol, glucose, dextrose bolus **OR** dextrose bolus, glucagon (rDNA), dextrose, sodium chloride flush, sodium chloride, ondansetron **OR** ondansetron, polyethylene glycol, acetaminophen **OR** acetaminophen      Intake/Output Summary (Last 24 hours) at 9/18/2022 1636  Last data filed at 9/17/2022 2121  Gross per 24 hour   Intake --   Output 200 ml   Net -200 ml       Physical Exam Performed:    BP (!) 129/94   Pulse (!) 134   Temp 98.7 °F (37.1 °C) (Oral)   Resp 21   Ht 5' 10\" (1.778 m)   Wt 184 lb 4.9 oz (83.6 kg)   SpO2 96%   BMI 26.44 kg/m²     General appearance: No apparent distress, appears stated age and cooperative. HEENT: Pupils equal, round, and reactive to light. Conjunctivae/corneas clear. Neck: Supple, with full range of motion.  No jugular venous distention. Trachea midline. Respiratory:  Normal respiratory effort. Clear to auscultation, bilaterally without Rales/Wheezes/Rhonchi. Cardiovascular: Regular rate and rhythm with normal S1/S2 without murmurs, rubs or gallops. Abdomen: Soft, non-tender, non-distended with normal bowel sounds. Musculoskeletal: No clubbing, cyanosis or edema bilaterally. Full range of motion without deformity. Skin: Skin color, texture, turgor normal.  No rashes or lesions. Neurologic:  Neurovascularly intact without any focal sensory/motor deficits. Cranial nerves: II-XII intact, grossly non-focal.  Psychiatric: Alert and oriented, thought content appropriate, normal insight  Capillary Refill: Brisk, 3 seconds, normal   Peripheral Pulses: +2 palpable, equal bilaterally       Labs:   Recent Labs     09/16/22 0245 09/17/22 0922 09/18/22  1108   WBC 9.8 6.8 10.1   HGB 14.4 15.4 14.1   HCT 41.9 43.5 41.2    236 271     Recent Labs     09/16/22 0245 09/17/22 0922 09/18/22  1108    142 139   K 4.1 3.9 3.8    107 105   CO2 18* 21 16*   BUN 6* 7 5*   CREATININE 0.5* 0.6* <0.5*   CALCIUM 8.9 9.3 8.1*   PHOS 3.1 3.3 1.9*     No results for input(s): AST, ALT, BILIDIR, BILITOT, ALKPHOS in the last 72 hours. No results for input(s): INR in the last 72 hours. No results for input(s): Corky Stallion in the last 72 hours. Urinalysis:      Lab Results   Component Value Date/Time    NITRU Negative 09/13/2022 10:15 AM    WBCUA 0-2 09/13/2022 10:15 AM    BACTERIA Rare 09/01/2022 05:09 PM    RBCUA 0-2 09/13/2022 10:15 AM    BLOODU TRACE-INTACT 09/13/2022 10:15 AM    SPECGRAV >=1.030 09/13/2022 10:15 AM    GLUCOSEU Negative 09/13/2022 10:15 AM       Radiology:  [facility] MODIFIED BARIUM SWALLOW W VIDEO   Final Result      No penetration or aspiration. XR CHEST PORTABLE   Final Result   1. Worsened pulmonary edema. 2.  Persistent bibasilar atelectasis.       MRI BRAIN WO CONTRAST   Final Result      Faint diffusion restriction in the right hippocampus with associated FLAIR signal abnormality. This is favored to represent sequelae of seizures, however other infectious/inflammatory etiologies are also possible. Mild atrophy and chronic small vessel ischemic change. Areas of encephalomalacia and gliosis in both cerebral hemispheres with remote hemorrhagic staining from prior insults. IR LUMBAR PUNCTURE FOR DIAGNOSIS   Final Result   . IMPRESSION:   1. Uneventful diagnostic fluoroscopic guided lumbar puncture as the   2. The flow reversed are risk and therefore a closing pressure was obtained at the end of the procedure, 26 cm of water. XR ABDOMEN (KUB) (SINGLE AP VIEW)   Final Result   Findings/Impression:    The tip of the enteric tube terminates in the distal body of the stomach. CT CHEST WO CONTRAST   Final Result      CHEST:      1. Moderate dependent atelectasis bilaterally. ABDOMEN/PELVIS:      1.  No acute abnormality on noncontrast CT of the abdomen and pelvis. 2.  Cholelithiasis. CT ABDOMEN PELVIS WO CONTRAST Additional Contrast? None   Final Result      CHEST:      1. Moderate dependent atelectasis bilaterally. ABDOMEN/PELVIS:      1.  No acute abnormality on noncontrast CT of the abdomen and pelvis. 2.  Cholelithiasis. CTA HEAD NECK W CONTRAST   Final Result      CTA Head:   No acute CTA findings. No hemodynamically significant stenosis or focal aneurysm. No arteriovenous confirmation. CTA Neck:   No acute CTA findings. No hemodynamically significant stenosis or focal aneurysm. CT HEAD WO CONTRAST   Final Result      1. No findings for acute intracranial abnormality. 2.  Age-related atrophy with patchy periventricular white matter changes bilaterally consistent with chronic small vessel ischemia. 3.  Stable low attenuation left frontoparietal lobe consistent with previous insult from known prior intraparenchymal hematoma.   Stable right frontoparietal cortical infarct. These findings were called to the emergency room physician Dr. Jacinto Daily on 9/1/2022 at 5:30 p.m. XR CHEST PORTABLE   Final Result   1. No findings for acute cardiopulmonary disease. 2.  ET tube in good position in the mid trachea. Assessment/Plan:    Active Hospital Problems    Diagnosis     Acute encephalopathy [G93.40]      Priority: Medium    Delirium [R41.0]      Priority: Medium    Acute respiratory failure (HCC) [J96.00]      Priority: Medium    Seizure ([address] [R56.9]      Priority: Medium     Acute metabolic encephalopathy-improving  Sinus tachycardia  Hypomagnesemia  Alcohol abuse   Liver cirrhosis  Hypertension    Plan:  -MRI brain showed Faint diffusion restriction in the right hippocampus with associated FLAIR signal abnormality. This is favored to represent sequelae of seizures, however other infectious/inflammatory etiologies are also possible. -CVEEG reviewed generalized background abnormalities indicative of an underlying diffuse encephalopathy of non-specific etiology. -Replace and recheck magnesium  -Status post lumbar puncture on 9/6. No growth to date  - Continue Keppra 1500 mg twice daily  -Neurology following  -Continue Seroquel, Zyprexa/Geodon as needed  -Continue monitor for alcohol withdrawal.  Continue thiamine  -Continue Coreg    DVT Prophylaxis: Lovenox  Diet: ADULT ORAL NUTRITION SUPPLEMENT; Breakfast, Lunch, Dinner; Standard High Calorie/High Protein Oral Supplement  ADULT DIET; Easy to Chew; 3 carb choices (45 gm/meal)  Code Status: Full Code  PT/OT Eval Status: Pending    Dispo -pending clinical improvement.         Harjinder Carcamo MD

## 2022-09-18 NOTE — PROGRESS NOTES
Speech Language Pathology  Facility/Department: [facility] ICU  Dysphagia Daily Treatment Note/dc    NAME: Yunior Burger  : 1960  MRN: [id number]    Patient Diagnosis(es):   Patient Active Problem List    Diagnosis Date Noted    Acute respiratory failure ([address] 2022    Seizure ([facility] 2022    Skin pustule     Diastolic congestive heart failure, unspecified HF chronicity ([address] 2022    Intracranial hemorrhage ([address] 2021    Electrolyte abnormality 2021    Alcohol abuse with withdrawal ([facility] [facility] 01/10/2021    Hypokalemia 2020    Acute deep vein thrombosis of left lower extremity ([address] 2020    Abnormal angiogram 2020    Mural thrombus of cardiac apex     Coronary artery disease due to lipid rich plaque      Allergies: No Known Allergies    Recent Chest Xray/CT of Chest: 22  Impression   1. Worsened pulmonary edema. 2.  Persistent bibasilar atelectasis. Recent MRI: 22  Impression       Faint diffusion restriction in the right hippocampus with associated FLAIR signal abnormality. This is favored to represent sequelae of seizures, however other infectious/inflammatory etiologies are also possible. Mild atrophy and chronic small vessel ischemic change. Areas of encephalomalacia and gliosis in both cerebral hemispheres with remote hemorrhagic staining from prior insults. Previous MBS - n/a  Chart reviewed. Medical Diagnosis: Seizure ([facility] [address] [R56.9]  Acute respiratory failure, unspecified whether with hypoxia or hypercapnia ([address] [J96.00]  Altered mental status, unspecified altered mental status type [R41.82]   Treatment Diagnosis: dysphagia    BSE Impression 22  Dysphagia Impression : Pt presents with mild oral dysphagia, and suspect need further assessment of pharyngeal phase. Pt seated upright in bed, required cues to accept all trials of PO.  Pt with fluctuating attention, very confused and confabulating continuously. Pt presented with trials of ice chips, thin liquids, puree and small bite of regular solid. Pt demonstrated mildly prolonged mastication (pt is edentulous) and ? delayed swallow initiation at times. Pt appeared to swallow all trials with no overt s/s associated with penetration/aspiration, however silent aspiration cannot be ruled out at bedside. Mild-moderate residue after completion of swallow with cues to take liquid wash to clear. Due to increased agitation, reduced command following and per RN stating team started pt on full liquid diet, SLP OK'd diet level and discussed will continue to re-assess at bedside and complete MBS when medically appropriate (RN expressed concern with taking pt to radiology in wrist restraints at this time due to increased agitation, stated will follow-up tomorrow prior to ordering MBS). Pt would benefit from ongoing SLP services to target dysphagia and advance to least restrictive diet as able for safety of swallow. Pt would benefit from MBS, as mentioned above, due to prolonged intubation and to objectively assess oropharyngeal swallow function. Dysphagia Diagnosis: Mild oral stage dysphagia, Suspected needs further assessment    MBS results 9/14/22  Swallow WFL's. Swallow is timely and efficient. No penetration/aspiration or pharyngeal residue identified. Mastication of solids is adequate, despite absent dentition, with effective clearance of oral cavity.   Recommend Easy to Chew (due to absent dentition and cognitive status)/thin liquids     Pain: denies    Current Diet : ADULT ORAL NUTRITION SUPPLEMENT; Breakfast, Lunch, Dinner; Standard High Calorie/High Protein Oral Supplement  ADULT DIET; Easy to Chew; 3 carb choices (45 gm/meal)   Recommended Form of Meds: Via alternative means of nutrition     Treatment:  Pt seen bedside to address the following goals:  1- The patient will tolerate repeat bedside swallowing evaluation when able  9/14: pt alert, voice and volitional cough are strong. Pt has been tolerating full liquids without respiratory decline. Pt re-assessed with ice chips, water via cup and straw, including sequential swallows, puree and solid texture. No overt signs of aspiration emerged, voice remained clear. Pt is edentulous but demonstrated prolonged but adequate mastication  with soft solid. Due to prolonged intubation, will proceed with MBS that was recommended after initial evaluation, to r/o silent aspiration and assess swallow physiology. Goal met    2 - The patient will tolerate instrumental swallowing procedure  9/14:GOAL MET    3- The patient will tolerate recommended diet without observed clinical signs of aspiration  9/14: goal held until [facility] completed  Cont as appropriate  9/18: pt sitting up in bed, confused, fidgety, somewhat impulsive. Pt was accepting of breakfast, feeding self with some difficulty getting food on utensils. Pt demonstrated adequate mastication with soft solids (edentulous), with no pocketing/residue noted. No overt signs of aspiration emerged, voice remained clear when drinking ensure. No respiratory decline per chart review  Recommend cont with current texture due to edentulous and cognitive status. Goal met    Patient/Family/Caregiver Education:  Pt educated to reason for re-assessment and recommendations. Pt does not indicate full comprehension    Compensatory Strategies:  90 degrees all meals  Eat slowly     Plan  Diet recommendations: Easy to Chew/thin liquids  DC recommendation: pt may benefit from cog eval at next level of care if status does not improve  Treatment: 15  D/W nursing Harish  Needs met prior to leaving room, call button in reach. Alpesh Gary M.S./[facility]-SLP #[id number]  Pg.  # [phone]

## 2022-09-19 PROBLEM — R41.82 ALTERED MENTAL STATUS: Status: ACTIVE | Noted: 2022-09-19

## 2022-09-19 LAB
ALBUMIN SERPL-MCNC: 4.1 G/DL (ref 3.4–5)
ANION GAP SERPL CALCULATED.3IONS-SCNC: 16 MMOL/L (ref 3–16)
BASOPHILS ABSOLUTE: 0.1 K/UL (ref 0–0.2)
BASOPHILS RELATIVE PERCENT: 1.1 %
BUN BLDV-MCNC: 7 MG/DL (ref 7–20)
CALCIUM SERPL-MCNC: 9.2 MG/DL (ref 8.3–10.6)
CHLORIDE BLD-SCNC: 103 MMOL/L (ref 99–110)
CO2: 23 MMOL/L (ref 21–32)
CREAT SERPL-MCNC: 0.7 MG/DL (ref 0.8–1.3)
EKG ATRIAL RATE: 120 BPM
EKG DIAGNOSIS: NORMAL
EKG P AXIS: 41 DEGREES
EKG P-R INTERVAL: 158 MS
EKG Q-T INTERVAL: 336 MS
EKG QRS DURATION: 80 MS
EKG QTC CALCULATION (BAZETT): 474 MS
EKG R AXIS: -48 DEGREES
EKG T AXIS: 99 DEGREES
EKG VENTRICULAR RATE: 120 BPM
EOSINOPHILS ABSOLUTE: 0.3 K/UL (ref 0–0.6)
EOSINOPHILS RELATIVE PERCENT: 4.1 %
GFR AFRICAN AMERICAN: >60
GFR NON-AFRICAN AMERICAN: >60
GLUCOSE BLD-MCNC: 90 MG/DL (ref 70–99)
HCT VFR BLD CALC: 44.4 % (ref 40.5–52.5)
HEMOGLOBIN: 15.1 G/DL (ref 13.5–17.5)
LYMPHOCYTES ABSOLUTE: 2.3 K/UL (ref 1–5.1)
LYMPHOCYTES RELATIVE PERCENT: 31.4 %
MAGNESIUM: 2.2 MG/DL (ref 1.8–2.4)
MCH RBC QN AUTO: 32.2 PG (ref 26–34)
MCHC RBC AUTO-ENTMCNC: 34.1 G/DL (ref 31–36)
MCV RBC AUTO: 94.3 FL (ref 80–100)
MONOCYTES ABSOLUTE: 1.1 K/UL (ref 0–1.3)
MONOCYTES RELATIVE PERCENT: 15.4 %
NEUTROPHILS ABSOLUTE: 3.5 K/UL (ref 1.7–7.7)
NEUTROPHILS RELATIVE PERCENT: 48 %
PDW BLD-RTO: 14.2 % (ref 12.4–15.4)
PHOSPHORUS: 4.1 MG/DL (ref 2.5–4.9)
PLATELET # BLD: 273 K/UL (ref 135–450)
PMV BLD AUTO: 8.9 FL (ref 5–10.5)
POTASSIUM SERPL-SCNC: 3.9 MMOL/L (ref 3.5–5.1)
RBC # BLD: 4.7 M/UL (ref 4.2–5.9)
SODIUM BLD-SCNC: 142 MMOL/L (ref 136–145)
WBC # BLD: 7.4 K/UL (ref 4–11)

## 2022-09-19 PROCEDURE — 6370000000 HC RX 637 (ALT 250 FOR IP): Performed by: INTERNAL MEDICINE

## 2022-09-19 PROCEDURE — 6360000002 HC RX W HCPCS: Performed by: STUDENT IN AN ORGANIZED HEALTH CARE EDUCATION/TRAINING PROGRAM

## 2022-09-19 PROCEDURE — 97162 PT EVAL MOD COMPLEX 30 MIN: CPT

## 2022-09-19 PROCEDURE — 2580000003 HC RX 258: Performed by: STUDENT IN AN ORGANIZED HEALTH CARE EDUCATION/TRAINING PROGRAM

## 2022-09-19 PROCEDURE — 93010 ELECTROCARDIOGRAM REPORT: CPT | Performed by: INTERNAL MEDICINE

## 2022-09-19 PROCEDURE — 2500000003 HC RX 250 WO HCPCS: Performed by: STUDENT IN AN ORGANIZED HEALTH CARE EDUCATION/TRAINING PROGRAM

## 2022-09-19 PROCEDURE — 6370000000 HC RX 637 (ALT 250 FOR IP)

## 2022-09-19 PROCEDURE — 97530 THERAPEUTIC ACTIVITIES: CPT

## 2022-09-19 PROCEDURE — 97166 OT EVAL MOD COMPLEX 45 MIN: CPT

## 2022-09-19 PROCEDURE — 6360000002 HC RX W HCPCS: Performed by: NURSE PRACTITIONER

## 2022-09-19 PROCEDURE — 83735 ASSAY OF MAGNESIUM: CPT

## 2022-09-19 PROCEDURE — 36415 COLL VENOUS BLD VENIPUNCTURE: CPT

## 2022-09-19 PROCEDURE — 2580000003 HC RX 258: Performed by: NURSE PRACTITIONER

## 2022-09-19 PROCEDURE — A4216 STERILE WATER/SALINE, 10 ML: HCPCS | Performed by: STUDENT IN AN ORGANIZED HEALTH CARE EDUCATION/TRAINING PROGRAM

## 2022-09-19 PROCEDURE — 6370000000 HC RX 637 (ALT 250 FOR IP): Performed by: STUDENT IN AN ORGANIZED HEALTH CARE EDUCATION/TRAINING PROGRAM

## 2022-09-19 PROCEDURE — 97535 SELF CARE MNGMENT TRAINING: CPT

## 2022-09-19 PROCEDURE — 80069 RENAL FUNCTION PANEL: CPT

## 2022-09-19 PROCEDURE — 85025 COMPLETE CBC W/AUTO DIFF WBC: CPT

## 2022-09-19 PROCEDURE — 90792 PSYCH DIAG EVAL W/MED SRVCS: CPT | Performed by: NURSE PRACTITIONER

## 2022-09-19 PROCEDURE — 1200000000 HC SEMI PRIVATE

## 2022-09-19 RX ORDER — LEVETIRACETAM 750 MG/1
1500 TABLET ORAL 2 TIMES DAILY
Status: DISCONTINUED | OUTPATIENT
Start: 2022-09-19 | End: 2022-10-06 | Stop reason: HOSPADM

## 2022-09-19 RX ORDER — HALOPERIDOL 1 MG/1
1 TABLET ORAL 3 TIMES DAILY
Status: DISCONTINUED | OUTPATIENT
Start: 2022-09-20 | End: 2022-09-21

## 2022-09-19 RX ADMIN — LEVETIRACETAM 1500 MG: 100 INJECTION, SOLUTION, CONCENTRATE INTRAVENOUS at 13:15

## 2022-09-19 RX ADMIN — CARVEDILOL 3.12 MG: 3.12 TABLET, FILM COATED ORAL at 18:20

## 2022-09-19 RX ADMIN — THIAMINE HCL TAB 100 MG 100 MG: 100 TAB at 09:41

## 2022-09-19 RX ADMIN — OLANZAPINE 5 MG: 5 TABLET, FILM COATED ORAL at 09:41

## 2022-09-19 RX ADMIN — OLANZAPINE 5 MG: 5 TABLET, FILM COATED ORAL at 20:00

## 2022-09-19 RX ADMIN — ENOXAPARIN SODIUM 40 MG: 100 INJECTION SUBCUTANEOUS at 09:40

## 2022-09-19 RX ADMIN — CARVEDILOL 3.12 MG: 3.12 TABLET, FILM COATED ORAL at 09:41

## 2022-09-19 RX ADMIN — LEVETIRACETAM 1500 MG: 100 INJECTION, SOLUTION, CONCENTRATE INTRAVENOUS at 02:27

## 2022-09-19 RX ADMIN — QUETIAPINE FUMARATE 200 MG: 200 TABLET ORAL at 20:00

## 2022-09-19 RX ADMIN — ZIPRASIDONE HYDROCHLORIDE 20 MG: 20 CAPSULE ORAL at 20:54

## 2022-09-19 RX ADMIN — QUETIAPINE FUMARATE 50 MG: 25 TABLET ORAL at 09:41

## 2022-09-19 RX ADMIN — SODIUM CHLORIDE, PRESERVATIVE FREE 20 MG: 5 INJECTION INTRAVENOUS at 09:40

## 2022-09-19 RX ADMIN — Medication 5 MG: at 20:00

## 2022-09-19 RX ADMIN — SODIUM CHLORIDE, PRESERVATIVE FREE 10 ML: 5 INJECTION INTRAVENOUS at 20:01

## 2022-09-19 RX ADMIN — ZIPRASIDONE HYDROCHLORIDE 20 MG: 20 CAPSULE ORAL at 13:08

## 2022-09-19 RX ADMIN — SODIUM CHLORIDE, PRESERVATIVE FREE 20 MG: 5 INJECTION INTRAVENOUS at 20:01

## 2022-09-19 RX ADMIN — LEVETIRACETAM 1500 MG: 750 TABLET, FILM COATED ORAL at 20:00

## 2022-09-19 RX ADMIN — SODIUM CHLORIDE, PRESERVATIVE FREE 10 ML: 5 INJECTION INTRAVENOUS at 09:42

## 2022-09-19 ASSESSMENT — PAIN SCALES - GENERAL
PAINLEVEL_OUTOF10: 0
PAINLEVEL_OUTOF10: 0

## 2022-09-19 NOTE — CARE COORDINATION
Case management is following for discharge planning. The chart was reviewed. Palliative care and psychiatry have been consulted. Mr. Carline Newsome may need IP psych at discharge.      Electronically signed by Helen Watts MSN RN-[facility]  Case Management  [phone]

## 2022-09-19 NOTE — PROGRESS NOTES
Progress Note    Admit Date: 9/1/2022  Day: 17  Diet: ADULT ORAL NUTRITION SUPPLEMENT; Breakfast, Lunch, Dinner; Standard High Calorie/High Protein Oral Supplement  ADULT DIET; Easy to Chew; 3 carb choices (45 gm/meal)    Interval history:     Patient seen at bedside. Continues to be in sinus tach, slightly improved from before. Otherwise no acute events overnight. Sitter at bedside in ICU. Tachy to 100s, BP stable, satting 95% on RA. Labs reviewed, show lytes WNL, Hgb and WBC stable. UOP 1300 (-950cc)    HPI:  The patient is a 53-year-old man with past medical history significant for chronic alcoholism, ICH, CAD, DVT who presented today to the ED with an episode of syncope. The history was mainly obtained by the sister since patient was intubated. He was last known well at 9 AM but the sister. According to the sister, she had just returned home from work which she had found him half lying down on the couch covered in the stool while his car outside was running outside with occasion. She denies any complaints of recent fever, chills, chest pain cough, shortness of breath, lightheadedness, palpitations, nausea, vomiting, abdominal pain, diarrhea, fatigue, visual disturbance, changes in urination frequency or burning pain well urination or headaches by the patient in the preceding days to this incident. She does reinstate that her brother is a chronic alcoholic and had just gotten out of rehab. He has been sober since however she was not aware if he had recently had any alcoholic drink. He had also informed that he had recent episode of intracerebral hemorrhage on 9/4/2021. In the ED, there was a concern for possibility of a stroke however he was not considered a thrombolytic candidate because of his previous ICH and long time since his last known well. According to the ED staff his physical exam was more pertinent for nonfocal delirium.   It was also significant for nystagmus and rightward gaze not fixed.  He was given 2 mg of Versed and it had worsened his oxygen saturations that had dropped to 88% on a nonrebreather and therefore he was intubated. CT Abdomen, chest: Moderate dependent atelectasis bilaterally. No acute abnormality on noncontrast CT of the abdomen and pelvis. Cholelithiasis. CTA Head: No acute CTA findings. No hemodynamically significant stenosis or focal aneurysm. No arteriovenous confirmation. CTA Neck: No acute CTA findings. No hemodynamically significant stenosis or focal aneurysm. Patient was admitted to the ICU for further workup and management of his possible meningitis, on MV.     Medications:     Scheduled Meds:   thiamine mononitrate  100 mg Oral Daily    OLANZapine  5 mg Oral BID    QUEtiapine  200 mg Oral Nightly    QUEtiapine  50 mg Oral Daily    carvedilol  3.125 mg Oral BID WC    melatonin  5 mg Oral Nightly    famotidine (PEPCID) injection  20 mg IntraVENous BID    levETIRAcetam  1,500 mg IntraVENous Q12H    enoxaparin  40 mg SubCUTAneous Daily    lidocaine PF  5 mL IntraDERmal Once    sodium chloride flush  5-40 mL IntraVENous 2 times per day     Continuous Infusions:   dextrose      dexmedetomidine (PRECEDEX) IV infusion Stopped (09/17/22 0634)    sodium chloride 10 mL/hr at 09/17/22 0637     PRN Meds:ziprasidone, labetalol, glucose, dextrose bolus **OR** dextrose bolus, glucagon (rDNA), dextrose, sodium chloride flush, sodium chloride, ondansetron **OR** ondansetron, polyethylene glycol, acetaminophen **OR** acetaminophen    Objective:   Vitals:   T-max:  Patient Vitals for the past 8 hrs:   BP Temp Temp src Pulse Resp SpO2 Weight   09/19/22 0600 -- -- -- (!) 103 18 -- 182 lb 5.1 oz (82.7 kg)   09/19/22 0500 -- -- -- 96 14 -- --   09/19/22 0410 126/76 97.6 °F (36.4 °C) Oral 88 19 95 % --   09/19/22 0400 -- -- -- (!) 112 22 -- --   09/19/22 0300 -- -- -- (!) 105 25 -- --       Intake/Output Summary (Last 24 hours) at 9/19/2022 1008  Last data filed at 9/19/2022 0600  Gross per 24 hour   Intake 342 ml   Output 1300 ml   Net -958 ml       Physical Exam  Constitutional:       General: He is not in acute distress. HENT:      Mouth/Throat:      Mouth: Mucous membranes are dry. Eyes:      Pupils: Pupils are equal, round, and reactive to light. Comments: Uncooperative with EOM exam   Cardiovascular:      Rate and Rhythm: Regular rhythm. Tachycardia present. Pulses: Normal pulses. Heart sounds: Normal heart sounds. Pulmonary:      Effort: Pulmonary effort is normal.      Breath sounds: Normal breath sounds. Abdominal:      Tenderness: There is no abdominal tenderness. Musculoskeletal:      Right lower leg: No edema. Left lower leg: No edema. Skin:     Coloration: Skin is not jaundiced. Neurological:      Comments: Oriented to person, but not to place, time or situation. LABS:    CBC:   Recent Labs     09/17/22 0922 09/18/22 1108 09/19/22  0322   WBC 6.8 10.1 7.4   HGB 15.4 14.1 15.1   HCT 43.5 41.2 44.4    271 273   MCV 92.9 93.7 94.3     Renal:    Recent Labs     09/18/22 1108 09/18/22 2212 09/19/22  0322    140 142   K 3.8 3.8 3.9    103 103   CO2 16* 22 23   BUN 5* 6* 7   CREATININE <0.5* 0.6* 0.7*   GLUCOSE 80 111* 90   CALCIUM 8.1* 9.5 9.2   MG 0.90* 2.60* 2.20   PHOS 1.9* 3.8 4.1   ANIONGAP 18* 15 16     Hepatic:   Recent Labs     09/18/22 1108 09/18/22 2212 09/19/22  0322   LABALBU 2.5* 4.0 4.1     Troponin: No results for input(s): TROPONINI in the last 72 hours. BNP: No results for input(s): BNP in the last 72 hours. Lipids: No results for input(s): CHOL, HDL in the last 72 hours. Invalid input(s): LDLCALCU, TRIGLYCERIDE  ABGs:  No results for input(s): PHART, TIB6NMB, PO2ART, EUU0NCJ, BEART, THGBART, Y9BUJWSJ, YRV9HKF in the last 72 hours. INR: No results for input(s): INR in the last 72 hours.   Lactate: No results for input(s): LACTATE in the last 72 hours.  Cultures:  -----------------------------------------------------------------  RAD:   FL MODIFIED BARIUM SWALLOW W VIDEO   Final Result      No penetration or aspiration. XR CHEST PORTABLE   Final Result   1. Worsened pulmonary edema. 2.  Persistent bibasilar atelectasis. MRI BRAIN WO CONTRAST   Final Result      Faint diffusion restriction in the right hippocampus with associated FLAIR signal abnormality. This is favored to represent sequelae of seizures, however other infectious/inflammatory etiologies are also possible. Mild atrophy and chronic small vessel ischemic change. Areas of encephalomalacia and gliosis in both cerebral hemispheres with remote hemorrhagic staining from prior insults. IR LUMBAR PUNCTURE FOR DIAGNOSIS   Final Result   . IMPRESSION:   1. Uneventful diagnostic fluoroscopic guided lumbar puncture as the   2. The flow reversed are risk and therefore a closing pressure was obtained at the end of the procedure, 26 cm of water. XR ABDOMEN (KUB) (SINGLE AP VIEW)   Final Result   Findings/Impression:    The tip of the enteric tube terminates in the distal body of the stomach. CT CHEST WO CONTRAST   Final Result      CHEST:      1. Moderate dependent atelectasis bilaterally. ABDOMEN/PELVIS:      1.  No acute abnormality on noncontrast CT of the abdomen and pelvis. 2.  Cholelithiasis. CT ABDOMEN PELVIS WO CONTRAST Additional Contrast? None   Final Result      CHEST:      1. Moderate dependent atelectasis bilaterally. ABDOMEN/PELVIS:      1.  No acute abnormality on noncontrast CT of the abdomen and pelvis. 2.  Cholelithiasis. CTA HEAD NECK W CONTRAST   Final Result      CTA Head:   No acute CTA findings. No hemodynamically significant stenosis or focal aneurysm. No arteriovenous confirmation. CTA Neck:   No acute CTA findings. No hemodynamically significant stenosis or focal aneurysm.          CT HEAD WO CONTRAST Final Result      1. No findings for acute intracranial abnormality. 2.  Age-related atrophy with patchy periventricular white matter changes bilaterally consistent with chronic small vessel ischemia. 3.  Stable low attenuation left frontoparietal lobe consistent with previous insult from known prior intraparenchymal hematoma. Stable right frontoparietal cortical infarct. These findings were called to the emergency room physician Dr. Noelle Palomo on 9/1/2022 at 5:30 p.m. XR CHEST PORTABLE   Final Result   1. No findings for acute cardiopulmonary disease. 2.  ET tube in good position in the mid trachea. Assessment/Plan:     Acute metabolic encephalopathy(unclear etiology)-improving  On presentation AMS, elevated WBC: 21.7, Tachypneic, Tachycardic, Hypotensive. Hx of ICH, SAH and SDH 2/2 to fall, hx of chronic alcohol abuse,   CT head: prior Intraparenchymal hematoma. Stable right frontoparietal cortical infarct. CT Abdomen, chest: mod atelectasis bilaterally. No acute abnorms on CT abd except stable cholelithiasis   CTA Head: No acute CTA findings. CTA Neck: No acute CTA findings. MRI: Faint diffusion restriction in the right hippocampus with associated FLAIR signal abnormality. This is favored to represent sequelae of seizures. cEEG readings previously: Generalized background abnormalities indicative of an underlying diffuse encephalopathy of non-specific etiology. Intermittent focal epileptiform discharges, right posterior temporal, conferring increased risk of focal onset seizures from this region.  -Lumbar puncture ordered 9/6/2022: No growth. -Blood Cultures were -ve x2  -On Keppra 1500mg bid  - Nightly Seroquel increased to 200mg, melatonin. AM seroquel 50 mg. PO Zyprexa BID   - inpatient psych consulted - needs eval, possibly needs psych as inpatient.      Acute sinus tachycardia  Intermittently tachycardic into the 120s with stable pressures  -Most likely secondary to dehydration and poor p.o. intake  -Could also consider PE, however patient is showing no respiratory distress, denies chest pain, on room air, not tachypneic  -Will get an EKG to rule out other causes  -500 cc LR bolus to monitor for response. Alcohol use disorder  Ethanol level: none detected  -thiamine 100 mg daily, changed to PO  -monitor for withdrawal     Acute hypercapnic respiratory failure secondary to possible aspiration vs 2/2 medication (Extubated successfully 9/10)  On presentation: VBG PH: 7.264, PCO2: 56.4, 83.2     Hx of Cirrhosis  -Ammonia: 36 wnl(09/1/22), LFTs WNL.     Hypertension  -Resumed home carvedilol dose 3.125 mg twice daily 9/14    Code Status: Full Code  FEN: Easy chew diet, oral nutrition supplement  PPX: Lovenox  DISPO: s/o ICU, now Josiane Rodríguez MD, PGY-1  09/19/22  10:08 AM    This patient has been staffed and discussed with Lucero Duran MD. Trilobed Flap Text: The defect edges were debeveled with a #15 scalpel blade.  Given the location of the defect and the proximity to free margins a trilobed flap was deemed most appropriate.  Using a sterile surgical marker, an appropriate trilobed flap drawn around the defect.    The area thus outlined was incised deep to adipose tissue with a #15 scalpel blade.  The skin margins were undermined to an appropriate distance in all directions utilizing iris scissors.

## 2022-09-19 NOTE — CONSULTS
[facility]  Palliative Medicine Consultation Note      Date Of Admission:9/1/2022  Date of consult: 09/19/22  Seen by [facility] in the past:  No    Recommendations:        Saw Anthony at the bedside. He is up in the chair, eating breakfast. He remains only oriented to self. He was able to tell me that his sister, Chelsie Sood, helps take care of him, though he has 6 additional siblings. He has no children and has never been . His delusions most consist of thinking he is working in a store. He said \"I see you at my job all the time. \" There is a sitter at the bedside. I think Jenna Hernandez would benefit from a psych consult. Inpatient psych may be the most appropriate discharge plan at this point. Called Chelsie Sood and left voicemail. Will follow up this afternoon. Addendum 1023: Spoke with Chelsie Sood via phone. She said prior to admission, Jenna Hernandez was caring for himself and driving. Last year, he had a fall resulting in a SDH, which led to him going to a SNF and then assisted living. After that, he came back to live with Chelsie Sood. He has lived with Chelsie Sood for about 5 years. Chelsie Sood said she will not be able to take care of him in his current state. She is fearful that he could become aggressive and do something to hurt himself or her. I discussed the next of kin hierarchy with her. She gave me the phone numbers to the siblings she is in touch with. They are as follows:    Edson Mediate: [phone]  Thor Martine: [phone]  Clay Longest: [phone]  Jeremie Holes: [phone]    The other two sibling, Chelsie Sood is estranged from. Chelsie Sood believes the other siblings will defer decision making to her. Will get in touch with them to check. We discussed the need for a psych consult and possible need for inpatient psych placement. Chelsie Sood is in agreement with plan if necessary. Addendum [phone]: Was able to speak with all four of the siblings via phone and explained the [place] next of hierarchy.  All are in agreement to defer decision making to Sallie Torres. All questions answered. 1. Goals of Care/Advanced Care planning/Code status: Continue current medical management. 2. Pain: Denies  3. SOB: Denies  4. Alcohol abuse: on antipsychotic meds, outside of the window for alcohol withdraw  5. Disposition: TBD - pending hospital course     Reason for Consult:         [x]  Goals of Care  [x]  Code Status Discussion/Advanced Care Planning   []  Psychosocial/Family Support  []  Symptom Management  []  Other (Specify)    Requesting Physician: Dr. Sierra Love:  AMS    History Obtained From:  electronic medical record    History of Present Illness:         Lucinda Piña is a 57 y.o. male with PMH of chronic alcoholism, ICH, CAD, DVT who presented following an episode of syncope. His sister came home from work and found him on the couch, covered in stool, with his car running in the driveway. In the ED, there was concern for stroke, but d/t hx of ICH and time from [address] [place] [address], he was not a candidate for thrombolytic treatment. Since admission, he has been delirious, trying to get out of bed, completely disoriented, on and off of Precedex gtt. He is now on Zyprexa, Geodon, and Seroquel; Precedex gtt off. Palliative care consulted for goals of care discussion.     Subjective:         Past Medical History:        Diagnosis Date    Alcohol abuse     CAD (coronary artery disease)     with complete LAD occlusion    CHF (congestive heart failure) (HCC)     LVEF    Essential tremor     HTN (hypertension)     Hx of blood clots 05/2021    Hyperlipidemia     ICH (intracerebral hemorrhage) (HCC)     Lower extremity cellulitis     MI (mitral incompetence)        Past Surgical History:        Procedure Laterality Date    PTCA      UPPER GASTROINTESTINAL ENDOSCOPY N/A 7/28/2020    EGD BIOPSY performed by Elliot Garcia MD at [address] ENDOSCOPY       Current Medications:    Medications Prior to Admission: aspirin 81 MG EC tablet, Take 81 mg by mouth in the morning. atorvastatin (LIPITOR) 40 MG tablet, Take 1 tablet by mouth in the morning. carvedilol (COREG) 3.125 MG tablet, Take 1 tablet by mouth in the morning and 1 tablet before bedtime. folic acid (FOLVITE) 1 MG tablet, Take 1 tablet by mouth in the morning.  magnesium 200 MG TABS tablet, Take 2 tablets by mouth in the morning. vitamin B-12 (CYANOCOBALAMIN) 100 MCG tablet, Take 1 tablet by mouth in the morning. vitamin D3 (CHOLECALCIFEROL) 25 MCG (1000 UT) TABS tablet, Take 1 tablet by mouth in the morning. Thiamine Mononitrate (B1) 100 MG TABS, Take 100 mg by mouth daily  melatonin 5 MG TBDP disintegrating tablet, Take 1 tablet by mouth nightly  hydrocortisone 1 % cream, Apply topically 2 times daily    Allergies:  Patient has no known allergies. Social History:    TOBACCO: reports that he quit smoking about 20 months ago. His smoking use included cigarettes. He has a 20.00 pack-year smoking history. He has never used smokeless tobacco.  ETOH:   reports current alcohol use of about 2.0 standard drinks per week. Patient currently lives with family, sister    Review of Systems -   Review of Systems   Unable to perform ROS: Mental status change     Objective:        Physical Exam  HENT:      Head: Normocephalic and atraumatic. Nose: Nose normal.      Mouth/Throat:      Mouth: Mucous membranes are moist.      Pharynx: Oropharynx is clear. Eyes:      Pupils: Pupils are equal, round, and reactive to light. Cardiovascular:      Rate and Rhythm: Regular rhythm. Tachycardia present. Pulses: Normal pulses. Heart sounds: Normal heart sounds. Pulmonary:      Effort: Pulmonary effort is normal.      Breath sounds: Normal breath sounds. Abdominal:      General: Bowel sounds are normal.      Palpations: Abdomen is soft. Musculoskeletal:         General: Normal range of motion. Cervical back: Normal range of motion. Skin:     General: Skin is warm and dry.    Neurological: General: No focal deficit present. Mental Status: He is alert. He is disoriented. Psychiatric:         Behavior: Behavior is cooperative. Thought Content: Thought content is delusional.         Judgment: Judgment is impulsive. Palliative Performance Scale:  [] 60% Ambulation reduced; Significant disease; Can't do hobbies/housework; intake normal or reduced; occasional assist; LOC full/confusion  [x] 50% Mainly sit/lie; Extensive disease; Can't do any work; Considerable assist; intake normal  Or reduced; LOC full/confusion  [] 40% Mainly in bed; Extensive disease; Mainly assist; intake normal or reduced; occasional assist; LOC full/confusion  [] 30% Bed Bound; Extensive disease; Total care; intake reduced; LOC full/confusion  [] 20% Bed Bound; Extensive disease; Total care; intake minimal; Drowsy/coma  [] 10% Bed Bound; Extensive disease; Total care; Mouth care only; Drowsy/coma  [] 0% Death    PPS: 50%    Vitals:    /76   Pulse (!) 103   Temp 97.6 °F (36.4 °C) (Oral)   Resp 18   Ht 5' 10\" (1.778 m)   Wt 182 lb 5.1 oz (82.7 kg)   SpO2 95%   BMI 26.16 kg/m²     Labs:    BMP:   Recent Labs     09/18/22  1108 09/18/22 2212 09/19/22  0322    140 142   K 3.8 3.8 3.9    103 103   CO2 16* 22 23   BUN 5* 6* 7   CREATININE <0.5* 0.6* 0.7*   GLUCOSE 80 111* 90     CBC:   Recent Labs     09/17/22 0922 09/18/22  1108 09/19/22  0322   WBC 6.8 10.1 7.4   HGB 15.4 14.1 15.1   HCT 43.5 41.2 44.4    271 273       LFT's: No results for input(s): AST, ALT, ALB, BILITOT, ALKPHOS in the last 72 hours. Troponin: No results for input(s): TROPONINI in the last 72 hours. BNP: No results for input(s): BNP in the last 72 hours. ABGs: No results for input(s): PHART, HIH8UTD, PO2ART in the last 72 hours. INR: No results for input(s): INR in the last 72 hours.     U/A:No results for input(s): NITRITE, COLORU, PHUR, LABCAST, WBCUA, RBCUA, MUCUS, TRICHOMONAS, YEAST, BACTERIA, CLARITYU, Mary Soliz, UROBILINOGEN, BILIRUBINUR, BLOODU, GLUCOSEU, AMORPHOUS in the last 72 hours. Invalid input(s): KETONESU    FL MODIFIED BARIUM SWALLOW W VIDEO   Final Result      No penetration or aspiration. XR CHEST PORTABLE   Final Result   1. Worsened pulmonary edema. 2.  Persistent bibasilar atelectasis. MRI BRAIN WO CONTRAST   Final Result      Faint diffusion restriction in the right hippocampus with associated FLAIR signal abnormality. This is favored to represent sequelae of seizures, however other infectious/inflammatory etiologies are also possible. Mild atrophy and chronic small vessel ischemic change. Areas of encephalomalacia and gliosis in both cerebral hemispheres with remote hemorrhagic staining from prior insults. IR LUMBAR PUNCTURE FOR DIAGNOSIS   Final Result   . IMPRESSION:   1. Uneventful diagnostic fluoroscopic guided lumbar puncture as the   2. The flow reversed are risk and therefore a closing pressure was obtained at the end of the procedure, 26 cm of water. XR ABDOMEN (KUB) (SINGLE AP VIEW)   Final Result   Findings/Impression:    The tip of the enteric tube terminates in the distal body of the stomach. CT CHEST WO CONTRAST   Final Result      CHEST:      1. Moderate dependent atelectasis bilaterally. ABDOMEN/PELVIS:      1.  No acute abnormality on noncontrast CT of the abdomen and pelvis. 2.  Cholelithiasis. CT ABDOMEN PELVIS WO CONTRAST Additional Contrast? None   Final Result      CHEST:      1. Moderate dependent atelectasis bilaterally. ABDOMEN/PELVIS:      1.  No acute abnormality on noncontrast CT of the abdomen and pelvis. 2.  Cholelithiasis. CTA HEAD NECK W CONTRAST   Final Result      CTA Head:   No acute CTA findings. No hemodynamically significant stenosis or focal aneurysm. No arteriovenous confirmation. CTA Neck:   No acute CTA findings.   No hemodynamically significant stenosis or focal aneurysm. CT HEAD WO CONTRAST   Final Result      1. No findings for acute intracranial abnormality. 2.  Age-related atrophy with patchy periventricular white matter changes bilaterally consistent with chronic small vessel ischemia. 3.  Stable low attenuation left frontoparietal lobe consistent with previous insult from known prior intraparenchymal hematoma. Stable right frontoparietal cortical infarct. These findings were called to the emergency room physician Dr. Beth Thomson on 9/1/2022 at 5:30 p.m. XR CHEST PORTABLE   Final Result   1. No findings for acute cardiopulmonary disease. 2.  ET tube in good position in the mid trachea. Conclusion/Time spent:         Recommendations see above    Time spent with patient and/or family: 25  Time reviewing records: 10 min   Time communicating with staff: 5 min     A total of  minutes spent with the patient and family on unit greater than 50% in counseling regarding palliative care and in goals of care for the patient. Thank you to Dr. Yony Ayala for this consultation. We will continue to follow Mr. Cabrera's care as needed.      MERVIN Rodríguez NP

## 2022-09-19 NOTE — CONSULTS
Psychiatry Initial Consultation    Patient Name: Casper Babinski  MRN: [id number]  Admission Date: 9/1/2022    Reason for Consult: Encephalopathy, still unclear etiology    HPI:   Casper Babinski is a 57 y.o. male who presented to the hospital on 09/01/2022 with a chief complaint of altered mental status. Per most recent MD note indicates that he has had an extensive workup for etiology of encephalopathy. These include head CT, CT abdomen and chest, CTA head, CTA neck, MRI, and cEEG. He also had a lumbar puncture that showed no growth, blood cultures were negative x2. Ammonia was normal. TSH with reflex in April 2022 was WNL. He was consulted by palliative care who noted he expressed delusional thinking and recommended a psych consult. He currently has both Seroquel (total 250 mg) and Zyprexa (5 mg) ordered during this hospitalization; unclear as to why he is on dual antipsychotic therapy or if these were home medications. Most recent EKG on 09/19/2022 showed QTc of 474. Attempted to meet with Zoila Gordon, who was overall a poor historian. Speech was mumbled and difficult to understand. He was able to identify that we are in a hospital but believed to be the [place]; he does not know why he is in the hospital. Believes it to be \"January or February 2022\". When asked about previous psychiatric history, he told me to call his sister, Indu Horton; Alessandra Kirkland knows all that stuff\". No Indu Horton listed as patient contact in [facility]. Call placed to Shala Weber (sister; [phone]). She states that she came home from work and found him in the living room, sprawled and unresponsive on the couch, moaning, eyes rolled back, raising his arms. She notes that he has a history of a blood clot, stent placement, brain bleed. He then went to an assisted living facility and improved enough to come back home. He has not had any presentations like this before.  Notes his mental status has been \"totally different\" since he was extubated, more confused, disoriented, picking at sheet, hallucinating. She states he has no previous psychiatric history to her knowledge; no history of hallucinations prior to current hospitalization. No previous diagnosis of dementia but has been experiencing memory issues since his stroke. He does have a history of alcoholism and she believes he has been drinking recently, just not to the extent that he was previously. No history of drug use. She states she will have her cell phone ([phone]) on her person while at work [employer] if we need to contact her. Duration: 17-18 days   Severity: Severe  Context: Stress, medical issues   Associated Symptoms: As above    Past Psychiatric History:    Past psychiatric history is widely unknown due to patient being a poor historian. No psychiatric history found on chart review outside of history of alcohol abuse. No known previous psychiatric hospitalizations or outpatient psychiatric treatment. No known previous suicide attempts. Currently on Seroquel and Zyprexa; unclear if these were home medications prior to admission. Past Medical History:  Past Medical History:   Diagnosis Date    Alcohol abuse     CAD (coronary artery disease)     with complete LAD occlusion    CHF (congestive heart failure) ([facility])     LVEF    Essential tremor     HTN (hypertension)     Hx of blood clots 05/2021    Hyperlipidemia     ICH (intracerebral hemorrhage) ([facility])     Lower extremity cellulitis     MI (mitral incompetence)      Home Medications:  Prior to Admission medications    Medication Sig Start Date End Date Taking? Authorizing Provider   aspirin 81 MG EC tablet Take 81 mg by mouth in the morning. Historical Provider, MD   atorvastatin (LIPITOR) 40 MG tablet Take 1 tablet by mouth in the morning. 8/1/22   Jamee Garcia MD   carvedilol (COREG) 3.125 MG tablet Take 1 tablet by mouth in the morning and 1 tablet before bedtime.  8/1/22   Jamee Garcia MD   folic acid (FOLVITE) 1 MG tablet Take 1 tablet by mouth in the morning. 8/1/22   Jamilah Cherry MD   magnesium 200 MG TABS tablet Take 2 tablets by mouth in the morning. 8/1/22   Jamilah Cherry MD   vitamin B-12 (CYANOCOBALAMIN) 100 MCG tablet Take 1 tablet by mouth in the morning. 8/1/22   Jamilah Cherry MD   vitamin D3 (CHOLECALCIFEROL) 25 MCG (1000 UT) TABS tablet Take 1 tablet by mouth in the morning.  8/1/22   Jamilah Cherry MD   Thiamine Mononitrate (B1) 100 MG TABS Take 100 mg by mouth daily 8/1/22   Jamilah Cherry MD   melatonin 5 MG TBDP disintegrating tablet Take 1 tablet by mouth nightly 9/9/21   Thierry Osullivan MD   hydrocortisone 1 % cream Apply topically 2 times daily    Historical Provider, MD   thiamine 100 MG tablet Take 1 tablet by mouth daily 8/27/20 6/16/21  Eliu Gibson, APRN - CNP     Chemical Dependency History:   Tobacco: Per Epic, former smoker, quit 2021  Alcohol: History of alcohol abuse; family unaware if he has been drinking recently  Illicit: Per Epic, never    Family Hx:    Family History   Problem Relation Age of Onset    Stroke Mother     Heart Disease Father       Social Hx:   Marital Status: Single  Children: None   Housing: Living at home   Vocational: Unemployed  Abuse/Trauma: None disclosed  Legal: None disclosed    Current Medications Ordered:   thiamine mononitrate  100 mg Oral Daily    OLANZapine  5 mg Oral BID    QUEtiapine  200 mg Oral Nightly    QUEtiapine  50 mg Oral Daily    carvedilol  3.125 mg Oral BID WC    melatonin  5 mg Oral Nightly    famotidine (PEPCID) injection  20 mg IntraVENous BID    levETIRAcetam  1,500 mg IntraVENous Q12H    enoxaparin  40 mg SubCUTAneous Daily    lidocaine PF  5 mL IntraDERmal Once    sodium chloride flush  5-40 mL IntraVENous 2 times per day      PRN Meds: ziprasidone, labetalol, glucose, dextrose bolus **OR** dextrose bolus, glucagon (rDNA), dextrose, sodium chloride flush, sodium chloride, ondansetron **OR** ondansetron, polyethylene glycol, acetaminophen **OR** acetaminophen     ROS: See Medical H&PE     PE:    /81   Pulse (!) 119   Temp 97.7 °F (36.5 °C) (Oral)   Resp 27   Ht 5' 10\" (1.778 m)   Wt 182 lb 5.1 oz (82.7 kg)   SpO2 96%   BMI 26.16 kg/m²       Motor / Gait: Observed laying in bed  AIMS: 0    Mental Status Examination:    Appearance: WM, appears stated age, lying in bed, wearing hospital attire, fair grooming and fair hygiene  Behavior/Attitude Toward Examiner: Overall cooperative, fair eye contact  Speech: Mumbled, quiet, difficult to understand  Mood: \"Okay\"  Affect: Flat  Thought Processes: Difficult to fully assess  Thought Content: No SI/HI verbalized, no delusions voiced but chart review he has to other staff, no obsessions  Perceptions: No AVH verbalized, did not appear to be actively RTIS during my assessment   Attention: Impaired  Cognition: Alert and oriented to person, place (\"hospital\"), immediate, recent, and remote recall impaired  Insight: Impaired insight   Judgment: Impaired judgment      LAB: Reviewed all labs obtained during admission to date. Dx:   Primary Psychiatric (DSM V) Diagnosis: Altered mental status (r/o delirium)  Secondary Psychiatric (DSM V) Diagnoses: None  Chemical Dependency Diagnoses: History of alcohol abuse     Assessment  Reviewed nursing and ancillary staff notes since arrival to hospital, reviewed previous records and records available through [facility]. Evaluated medications and assessed for side effects and effectiveness. Assessed patient's educational needs including reviewing plan of care, medications, and diagnosis. Recommendations:    Based on his presentation and lack of previous psychiatric history, I would suspect this is a complex presentation of delirium post medical issues, intubation, and prolonged hospitalization and that inpatient psychiatric admission would not be warranted. Delirium takes time to clear.   Unclear as to why he is on dual antipsychotic therapy but these were not home medications. Will discontinue Seroquel and Zyprexa and change to Haldol 1  mg TID. Monitor QTc. Discussed change with sister, who was in agreement. Vitamin D, B12, and folate ordered. Spent >80 minutes face to face with patient of which >50% was spent counseling and providing education regarding diagnosis, treatment options, and prognosis. Thank you for consult. Please do not hesitate to contact provider if there are additional questions regarding patient.     Reji Antonio, MPH, PMHNP-BC  09/19/22

## 2022-09-19 NOTE — PLAN OF CARE
Problem: Discharge Planning  Goal: Discharge to home or other facility with appropriate resources  Outcome: Progressing     Problem: Pain  Goal: Verbalizes/displays adequate comfort level or baseline comfort level  Outcome: Progressing     Problem: Safety - Adult  Goal: Free from fall injury  Outcome: Progressing     Problem: ABCDS Injury Assessment  Goal: Absence of physical injury  Outcome: Progressing     Problem: Skin/Tissue Integrity  Goal: Absence of new skin breakdown  Description: 1. Monitor for areas of redness and/or skin breakdown  2. Assess vascular access sites hourly  3. Every 4-6 hours minimum:  Change oxygen saturation probe site  4. Every 4-6 hours:  If on nasal continuous positive airway pressure, respiratory therapy assess nares and determine need for appliance change or resting period.   Outcome: Progressing     Problem: Chronic Conditions and Co-morbidities  Goal: Patient's chronic conditions and co-morbidity symptoms are monitored and maintained or improved  Outcome: Progressing     Problem: Nutrition Deficit:  Goal: Optimize nutritional status  Outcome: Progressing     Problem: Respiratory - Adult  Goal: Achieves optimal ventilation and oxygenation  Outcome: Progressing     Problem: Cardiovascular - Adult  Goal: Maintains optimal cardiac output and hemodynamic stability  Outcome: Progressing  Goal: Absence of cardiac dysrhythmias or at baseline  Outcome: Progressing     Problem: Metabolic/Fluid and Electrolytes - Adult  Goal: Electrolytes maintained within normal limits  Outcome: Progressing  Goal: Hemodynamic stability and optimal renal function maintained  Outcome: Progressing

## 2022-09-19 NOTE — PROGRESS NOTES
Physical Therapy  Facility/Department: [address] [place] [address] N ICU  Physical Therapy Initial Assessment & Tx     Name: Harsh Romeo  : 1960  MRN: [id number]  Date of Service: 2022    Discharge Recommendations:    Harsh Romeo scored a  on the AM-PAC short mobility form. Current research shows that an AM-PAC score of 17 or less is typically not associated with a discharge to the patient's home setting. Based on the patient's AM-PAC score and their current functional mobility deficits, it is recommended that the patient have 3-5 sessions per week of Physical Therapy at d/c to increase the patient's independence. Please see assessment section for further patient specific details. If patient discharges prior to next session this note will serve as a discharge summary. Please see below for the latest assessment towards goals. PT Equipment Recommendations  Equipment Needed:  (defer to next level of care)      Patient Diagnosis(es): The primary encounter diagnosis was Acute respiratory failure, unspecified whether with hypoxia or hypercapnia ([address] A diagnosis of Altered mental status, unspecified altered mental status type was also pertinent to this visit. Past Medical History:  has a past medical history of Alcohol abuse, CAD (coronary artery disease), CHF (congestive heart failure) ([address] Essential tremor, HTN (hypertension), Hx of blood clots, Hyperlipidemia, ICH (intracerebral hemorrhage) ([address] Lower extremity cellulitis, and MI (mitral incompetence). Past Surgical History:  has a past surgical history that includes Percutaneous Transluminal Coronary Angio and Upper gastrointestinal endoscopy (N/A, 2020). Assessment   Body Structures, Functions, Activity Limitations Requiring Skilled Therapeutic Intervention: Decreased functional mobility ; Decreased cognition;Decreased coordination;Decreased endurance;Decreased strength;Decreased safe awareness;Decreased balance  Assessment: Bryan Pichardo presents to hospital s/p sz. He is functioning well below his mobility baseilne at this time. He is typically independent & working in construction. He required 1-2 person assist to complete basic functional txfs today & demo's impaired balance, strength, coordination & cognition. He will continue to benefit from skilled PT services during his hospital stay to address these deficits & upon medical clearance prior to returning home with sister. Treatment Diagnosis: Dec'd balance & mobility  Therapy Prognosis: Good  Decision Making: Medium Complexity  Barriers to Learning: cognition  Requires PT Follow-Up: Yes  Activity Tolerance  Activity Tolerance: Treatment limited secondary to decreased cognition     Plan   Plan  Plan:  (2-5x/week)  Current Treatment Recommendations: Strengthening, Balance training, Gait training, Equipment evaluation, education, & procurement, Functional mobility training, Stair training, Neuromuscular re-education, Transfer training, Endurance training, Patient/Caregiver education & training, Therapeutic activities  Safety Devices  Type of Devices: Gait belt, Left in chair, Sitter present, Chair alarm in place, Call light within reach, Nurse notified  Restraints  Restraints Initially in Place: No  Restraints: bilateral wrist restraints     Restrictions  Restrictions/Precautions  Restrictions/Precautions: Bed Alarm, Seizure  Position Activity Restriction  Other position/activity restrictions: transfer pt     Subjective   General  Chart Reviewed: Yes  Patient assessed for rehabilitation services?: Yes  Additional Pertinent Hx: 58 y/o M found down unresponsive. Dx of Acute encephalopathy 2/2 seizure. +acute hypoxia requiring intubation with extubation noted on 9/10. Response To Previous Treatment: Not applicable  Family / Caregiver Present: No  Referring Practitioner: Sunitha Mera DO  Diagnosis: Seizures  Follows Commands: Impaired  Other (Comment): +confusion.  pt able to follow simple one step commands. Easily distracted & preseverating on contacting sister and his phone. General Comment  Comments: Pt resting in bed upon PT arrival  Subjective  Subjective: Pt agreeable to PT evaluation/tx         Social/Functional History  Social/Functional History  Lives With: Family  Type of Home: House  Home Layout: Two level, Bed/Bath upstairs  Home Access: Stairs to enter with rails  Entrance Stairs - Number of Steps: 4  Entrance Stairs - Rails: Left  Bathroom Shower/Tub: Tub/Shower unit  Bathroom Equipment:  (none)  Home Equipment:  (none)  Has the patient had two or more falls in the past year or any fall with injury in the past year?: No  ADL Assistance: Independent  Homemaking Assistance: Independent  Homemaking Responsibilities: Yes  Ambulation Assistance: Independent  Transfer Assistance: Independent  Active : Yes  Occupation: Full time employment  Type of Occupation: construction  Additional Comments: Pt is a questionable historian. No family/friends present to assist with social hx. Will update as able. Vision/Hearing  Vision  Vision: Impaired  Vision Exceptions: Wears glasses for reading  Hearing  Hearing: Within functional limits    Cognition   Orientation  Overall Orientation Status: Impaired  Orientation Level: Oriented to person;Disoriented to place; Disoriented to time;Disoriented to situation     Objective   Heart Rate: (!) 103  BP: 126/76  BP Location: Right upper arm  BP Method: Automatic  Patient Position: Semi fowlers  MAP (Calculated): 92.67  Resp: 18  SpO2: 95 %  O2 Device: None (Room air)     Observation/Palpation  Posture: Fair        AROM RLE (degrees)  RLE AROM: WFL  AROM LLE (degrees)  LLE AROM : WFL  Strength RLE  Comment: global weakness with strength assessed to be at least 3+/5 as observed through functional mobility  Strength LLE  Comment: global weakness with strength assessed to be at least 3+/5 as observed through functional mobility           Bed mobility  Supine to Sit: Minimal assistance (with HOB elevated.)    Transfers  Sit to Stand: Moderate Assistance (cues for anterior wt shifting 2/2 pt demo's retropulsion with transitional mvmt & in standing position. BUE support provided for balance.)  Stand to sit: Moderate Assistance (with cues for safety & positioning. Daniella Bermudez )  Bed to Chair: 2 Person Assistance; Moderate assistance;Contact guard assistance (via a step pivot txf. Step by step cues for sequencing. +time to complete. Short steps noted wtih periods of retropulsion requiring cues for anterior wt shifting & safety)    Ambulation  Comments: ambulation deferred 2/2 impaired balance, strength & safety concerns. Will continue to assess & update as appopriate. Balance  Posture: Fair  Sitting - Static:  (CGA<>Martina at EOB)  Sitting - Dynamic:  (Martina at EOB)  Standing - Static:  (ModA with BUE support)  Standing - Dynamic:  (2 person assist during bed>chair txf)                  AM-PAC Score  AM-PAC Inpatient Mobility Raw Score : 12 (09/19/22 1023)  AM-PAC Inpatient T-Scale Score : 35.33 (09/19/22 1023)  Mobility Inpatient CMS 0-100% Score: 68.66 (09/19/22 1023)  Mobility Inpatient CMS G-Code Modifier : CL (09/19/22 1023)        Goals  Short Term Goals  Time Frame for Short term goals: DC  Short term goal 1: Pt will complete functional txfs with S  Short term goal 2: Pt will tolerate ambulation assessment  Short term goal 3: Pt will tolerate stair assessment  Patient Goals   Patient goals :  To get phone/speak with sister       Education  Patient Education  Education Given To: Patient  Education Provided: Role of Therapy;Transfer Training  Education Method: Verbal  Barriers to Learning: Cognition  Education Outcome: Continued education needed      Therapy Time   Individual Concurrent Group Co-treatment   Time In [phone]         Time Out 0905         Minutes 30         Timed Code Treatment Minutes: [address] PT

## 2022-09-19 NOTE — PROGRESS NOTES
Occupational Therapy  Facility/Department: [facility] ICU  Occupational Therapy Initial Assessment    Name: Lydia Lo  : 1960  MRN: [id number]  Date of Service: 2022    Discharge Recommendations:   Lydia Lo scored a 14/24 on the AM-PAC ADL Inpatient form. Current research shows that an AM-PAC score of 17 or less is typically not associated with a discharge to the patient's home setting. Based on the patient's AM-PAC score and their current ADL deficits, it is recommended that the patient have 3-5 sessions per week of Occupational Therapy at d/c to increase the patient's independence. Please see assessment section for further patient specific details. If patient discharges prior to next session this note will serve as a discharge summary. Please see below for the latest assessment towards goals. OT Equipment Recommendations  Equipment Needed:  (defer to next level of care)       Patient Diagnosis(es): The primary encounter diagnosis was Acute respiratory failure, unspecified whether with hypoxia or hypercapnia ([address] A diagnosis of Altered mental status, unspecified altered mental status type was also pertinent to this visit. Past Medical History:  has a past medical history of Alcohol abuse, CAD (coronary artery disease), CHF (congestive heart failure) ([address] Essential tremor, HTN (hypertension), Hx of blood clots, Hyperlipidemia, ICH (intracerebral hemorrhage) ([address] Lower extremity cellulitis, and MI (mitral incompetence). Past Surgical History:  has a past surgical history that includes Percutaneous Transluminal Coronary Angio and Upper gastrointestinal endoscopy (N/A, 2020). Treatment Diagnosis: Impaired ADLs and functional transfers      Assessment   Performance deficits / Impairments: Decreased functional mobility ; Decreased safe awareness;Decreased balance;Decreased coordination;Decreased ADL status; Decreased cognition;Decreased posture;Decreased endurance;Decreased strength;Decreased fine motor control    Assessment: Pt is a 58 y/o male admitted due to AMS. He reports living in a house with his sister and being independent with functional transfers and ADLs PTA. Currently pt is functioning below baseline, requiring max A for LB dressing and mod A for stand pivot transfers. He is limited by impaired cognition. Pt to benefit from continued skilled IP OT intervention and continued OT tx upon discharge to further address deficits and maximize independence with ADLs and functional transfers/mobility. Treatment Diagnosis: Impaired ADLs and functional transfers  Prognosis: Good  Decision Making: Medium Complexity  REQUIRES OT FOLLOW-UP: Yes  Activity Tolerance  Activity Tolerance: Patient Tolerated treatment well  Activity Tolerance Comments: Pt pleasantly confused throughout session, but participated well in therapy.         Plan   Plan  Times per Week: 5-7  Times per Day: Daily  Current Treatment Recommendations: Strengthening, Balance training, Functional mobility training, Endurance training, Neuromuscular re-education, Safety education & training, Equipment evaluation, education, & procurement, Patient/Caregiver education & training, Self-Care / ADL     Restrictions  Restrictions/Precautions  Restrictions/Precautions: Bed Alarm, Seizure  Position Activity Restriction  Other position/activity restrictions: transfer pt    Subjective   General  Chart Reviewed: Yes  Patient assessed for rehabilitation services?: Yes  Family / Caregiver Present: No  Referring Practitioner: Grazyna Mares DO  Subjective  Subjective: Pt supine in bed upon OT arrival. Pt pleasantly confused and agreeable to OT eval.  General Comment  Comments: No c/o pain     Social/Functional History  Social/Functional History  Lives With: Family  Type of Home: House  Home Layout: Two level, Bed/Bath upstairs  Home Access: Stairs to enter with rails  Entrance Stairs - Number of Steps: 4  Entrance Stairs - Rails: Left  Bathroom Shower/Tub: Tub/Shower unit  Bathroom Equipment:  (none)  Home Equipment:  (none)  Has the patient had two or more falls in the past year or any fall with injury in the past year?: No  ADL Assistance: Independent  Homemaking Assistance: Independent  Homemaking Responsibilities: Yes  Ambulation Assistance: Independent  Transfer Assistance: Independent  Active : Yes  Occupation: Full time employment  Type of Occupation: construction  Additional Comments: Pt is a questionable historian. No family/friends present to assist with social hx. Will update as able. Objective           Observation/Palpation  Posture: Fair  Safety Devices  Type of Devices: Gait belt;Left in chair;Sitter present; Chair alarm in place;Call light within reach;Nurse notified  Restraints  Restraints Initially in Place: No        AROM: Within functional limits  Strength: Generally decreased, functional  Coordination: Generally decreased, functional  ADL  LE Dressing: Maximum assistance  LE Dressing Skilled Clinical Factors: pt required assist to doff brief and thread LLE into clean brief, able to partially thread into RLE, assist to pull up over hips in stance due to posterior lean     Activity Tolerance  Activity Tolerance: Treatment limited secondary to decreased cognition  Bed mobility  Supine to Sit: Minimal assistance (min A x 1)  Transfers  Stand Pivot Transfers: Moderate assistance (mod A x 1 + CGA x 1 for safety)  Sit to stand:  Moderate assistance (mod A x 1, posterior lean noted)  Stand to sit: Minimal assistance  Vision  Vision: Impaired  Vision Exceptions: Wears glasses for reading  Hearing  Hearing: Within functional limits  Cognition  Overall Cognitive Status: Exceptions  Following Commands: Inconsistently follows commands (increased time and repetition of commands required)  Attention Span: Difficulty attending to directions  Memory: Decreased short term memory;Decreased recall of recent events  Safety Judgement: Decreased awareness of need for assistance;Decreased awareness of need for safety  Problem Solving: Decreased awareness of errors;Assistance required to generate solutions;Assistance required to implement solutions  Insights: Decreased awareness of deficits  Initiation: Requires cues for some  Sequencing: Requires cues for some  Orientation  Overall Orientation Status: Impaired  Orientation Level: Oriented to person;Disoriented to place; Disoriented to time;Disoriented to situation                  Education Given To: Patient  Education Provided: Role of Therapy;Transfer Training;Plan of Care;Orientation; ADL Adaptive Strategies  Education Method: Demonstration;Verbal  Barriers to Learning: Cognition  Education Outcome: Verbalized understanding;Continued education needed                   AM-PAC Score        AM-PAC Inpatient Daily Activity Raw Score: 14 (09/19/22 1003)  AM-PAC Inpatient ADL T-Scale Score : 33.39 (09/19/22 1003)  ADL Inpatient CMS 0-100% Score: 59.67 (09/19/22 1003)  ADL Inpatient CMS G-Code Modifier : CK (09/19/22 1003)    Goals  Short Term Goals  Time Frame for Short term goals: Discharge  Short Term Goal 1: Pt to perform bed to chair transfer with CGA. Short Term Goal 2: Pt to perform LB dressing with mod A. Short Term Goal 3: Pt to tolerate 3 min static standing activity with CGA for self care tasks. Patient Goals   Patient goals :  To go home       Therapy Time   Individual Concurrent Group Co-treatment   Time In 6061         Time Out 0904         Minutes 31         Timed Code Treatment Minutes: 16 Minutes     Total Minutes: [place], [place]

## 2022-09-20 LAB
ALBUMIN SERPL-MCNC: 4.1 G/DL (ref 3.4–5)
ANION GAP SERPL CALCULATED.3IONS-SCNC: 16 MMOL/L (ref 3–16)
BASOPHILS ABSOLUTE: 0 K/UL (ref 0–0.2)
BASOPHILS RELATIVE PERCENT: 0.6 %
BUN BLDV-MCNC: 13 MG/DL (ref 7–20)
CALCIUM SERPL-MCNC: 9.5 MG/DL (ref 8.3–10.6)
CHLORIDE BLD-SCNC: 105 MMOL/L (ref 99–110)
CO2: 23 MMOL/L (ref 21–32)
CREAT SERPL-MCNC: 0.9 MG/DL (ref 0.8–1.3)
EOSINOPHILS ABSOLUTE: 0.4 K/UL (ref 0–0.6)
EOSINOPHILS RELATIVE PERCENT: 5 %
FOLATE: 12.96 NG/ML (ref 4.78–24.2)
GFR AFRICAN AMERICAN: >60
GFR NON-AFRICAN AMERICAN: >60
GLUCOSE BLD-MCNC: 99 MG/DL (ref 70–99)
HCT VFR BLD CALC: 44.6 % (ref 40.5–52.5)
HEMOGLOBIN: 15 G/DL (ref 13.5–17.5)
LYMPHOCYTES ABSOLUTE: 2.5 K/UL (ref 1–5.1)
LYMPHOCYTES RELATIVE PERCENT: 30.2 %
MAGNESIUM: 1.7 MG/DL (ref 1.8–2.4)
MCH RBC QN AUTO: 31.9 PG (ref 26–34)
MCHC RBC AUTO-ENTMCNC: 33.6 G/DL (ref 31–36)
MCV RBC AUTO: 95 FL (ref 80–100)
MONOCYTES ABSOLUTE: 1.1 K/UL (ref 0–1.3)
MONOCYTES RELATIVE PERCENT: 13.5 %
NEUTROPHILS ABSOLUTE: 4.2 K/UL (ref 1.7–7.7)
NEUTROPHILS RELATIVE PERCENT: 50.7 %
PDW BLD-RTO: 14.4 % (ref 12.4–15.4)
PHOSPHORUS: 4.7 MG/DL (ref 2.5–4.9)
PLATELET # BLD: 256 K/UL (ref 135–450)
PMV BLD AUTO: 9.3 FL (ref 5–10.5)
POTASSIUM SERPL-SCNC: 4.5 MMOL/L (ref 3.5–5.1)
RBC # BLD: 4.7 M/UL (ref 4.2–5.9)
SODIUM BLD-SCNC: 144 MMOL/L (ref 136–145)
VITAMIN B-12: 1402 PG/ML (ref 211–911)
VITAMIN D 25-HYDROXY: 34.8 NG/ML
WBC # BLD: 8.2 K/UL (ref 4–11)

## 2022-09-20 PROCEDURE — 85025 COMPLETE CBC W/AUTO DIFF WBC: CPT

## 2022-09-20 PROCEDURE — A4216 STERILE WATER/SALINE, 10 ML: HCPCS | Performed by: STUDENT IN AN ORGANIZED HEALTH CARE EDUCATION/TRAINING PROGRAM

## 2022-09-20 PROCEDURE — 82746 ASSAY OF FOLIC ACID SERUM: CPT

## 2022-09-20 PROCEDURE — 2580000003 HC RX 258: Performed by: STUDENT IN AN ORGANIZED HEALTH CARE EDUCATION/TRAINING PROGRAM

## 2022-09-20 PROCEDURE — 6370000000 HC RX 637 (ALT 250 FOR IP): Performed by: INTERNAL MEDICINE

## 2022-09-20 PROCEDURE — 6370000000 HC RX 637 (ALT 250 FOR IP): Performed by: NURSE PRACTITIONER

## 2022-09-20 PROCEDURE — 6370000000 HC RX 637 (ALT 250 FOR IP): Performed by: STUDENT IN AN ORGANIZED HEALTH CARE EDUCATION/TRAINING PROGRAM

## 2022-09-20 PROCEDURE — 6370000000 HC RX 637 (ALT 250 FOR IP)

## 2022-09-20 PROCEDURE — 82607 VITAMIN B-12: CPT

## 2022-09-20 PROCEDURE — 82306 VITAMIN D 25 HYDROXY: CPT

## 2022-09-20 PROCEDURE — 83735 ASSAY OF MAGNESIUM: CPT

## 2022-09-20 PROCEDURE — 97530 THERAPEUTIC ACTIVITIES: CPT

## 2022-09-20 PROCEDURE — 6360000002 HC RX W HCPCS

## 2022-09-20 PROCEDURE — 97116 GAIT TRAINING THERAPY: CPT

## 2022-09-20 PROCEDURE — 2500000003 HC RX 250 WO HCPCS: Performed by: STUDENT IN AN ORGANIZED HEALTH CARE EDUCATION/TRAINING PROGRAM

## 2022-09-20 PROCEDURE — 1200000000 HC SEMI PRIVATE

## 2022-09-20 PROCEDURE — 6360000002 HC RX W HCPCS: Performed by: STUDENT IN AN ORGANIZED HEALTH CARE EDUCATION/TRAINING PROGRAM

## 2022-09-20 PROCEDURE — 36415 COLL VENOUS BLD VENIPUNCTURE: CPT

## 2022-09-20 PROCEDURE — 99232 SBSQ HOSP IP/OBS MODERATE 35: CPT | Performed by: NURSE PRACTITIONER

## 2022-09-20 PROCEDURE — 80069 RENAL FUNCTION PANEL: CPT

## 2022-09-20 PROCEDURE — 97535 SELF CARE MNGMENT TRAINING: CPT

## 2022-09-20 RX ORDER — PROMETHAZINE HYDROCHLORIDE 12.5 MG/1
12.5 TABLET ORAL EVERY 6 HOURS PRN
Status: DISCONTINUED | OUTPATIENT
Start: 2022-09-20 | End: 2022-10-06 | Stop reason: HOSPADM

## 2022-09-20 RX ORDER — DIPHENHYDRAMINE HCL 25 MG
25 TABLET ORAL EVERY 6 HOURS PRN
Status: DISCONTINUED | OUTPATIENT
Start: 2022-09-20 | End: 2022-10-06 | Stop reason: HOSPADM

## 2022-09-20 RX ORDER — MAGNESIUM SULFATE IN WATER 40 MG/ML
4000 INJECTION, SOLUTION INTRAVENOUS ONCE
Status: COMPLETED | OUTPATIENT
Start: 2022-09-20 | End: 2022-09-20

## 2022-09-20 RX ORDER — FAMOTIDINE 20 MG/1
20 TABLET, FILM COATED ORAL 2 TIMES DAILY
Status: DISCONTINUED | OUTPATIENT
Start: 2022-09-20 | End: 2022-09-27

## 2022-09-20 RX ADMIN — LEVETIRACETAM 1500 MG: 750 TABLET, FILM COATED ORAL at 19:48

## 2022-09-20 RX ADMIN — SODIUM CHLORIDE, PRESERVATIVE FREE 10 ML: 5 INJECTION INTRAVENOUS at 10:15

## 2022-09-20 RX ADMIN — ZIPRASIDONE HYDROCHLORIDE 20 MG: 20 CAPSULE ORAL at 22:50

## 2022-09-20 RX ADMIN — FAMOTIDINE 20 MG: 20 TABLET ORAL at 21:15

## 2022-09-20 RX ADMIN — THIAMINE HCL TAB 100 MG 100 MG: 100 TAB at 10:14

## 2022-09-20 RX ADMIN — Medication 5 MG: at 19:48

## 2022-09-20 RX ADMIN — LEVETIRACETAM 1500 MG: 750 TABLET, FILM COATED ORAL at 10:14

## 2022-09-20 RX ADMIN — ENOXAPARIN SODIUM 40 MG: 100 INJECTION SUBCUTANEOUS at 10:13

## 2022-09-20 RX ADMIN — SODIUM CHLORIDE, PRESERVATIVE FREE 20 MG: 5 INJECTION INTRAVENOUS at 10:13

## 2022-09-20 RX ADMIN — DIPHENHYDRAMINE HYDROCHLORIDE 25 MG: 25 TABLET ORAL at 21:15

## 2022-09-20 RX ADMIN — CARVEDILOL 3.12 MG: 3.12 TABLET, FILM COATED ORAL at 10:14

## 2022-09-20 RX ADMIN — HALOPERIDOL 1 MG: 1 TABLET ORAL at 19:48

## 2022-09-20 RX ADMIN — HALOPERIDOL 1 MG: 1 TABLET ORAL at 15:36

## 2022-09-20 RX ADMIN — MAGNESIUM SULFATE HEPTAHYDRATE 4000 MG: 40 INJECTION, SOLUTION INTRAVENOUS at 07:08

## 2022-09-20 RX ADMIN — HALOPERIDOL 1 MG: 1 TABLET ORAL at 10:15

## 2022-09-20 RX ADMIN — CARVEDILOL 3.12 MG: 3.12 TABLET, FILM COATED ORAL at 18:04

## 2022-09-20 ASSESSMENT — PAIN SCALES - GENERAL
PAINLEVEL_OUTOF10: 0

## 2022-09-20 NOTE — PROGRESS NOTES
Lab called at [phone] and [phone] to come and drawl pt. Labs that were ordered at 2030. Will continue to follow up.

## 2022-09-20 NOTE — DISCHARGE SUMMARY
INTERNAL MEDICINE DEPARTMENT AT [address] [place] [address]  DISCHARGE SUMMARY    Patient ID: Jerrod Ho                                             Discharge Date: 9/28/2022   Patient's PCP: Idalia Pagan MD                                          Discharge Physician: Ton Martin MD  Admit Date: 9/1/2022   Admitting Physician: Yohan Laguna MD    PROBLEMS DURING HOSPITALIZATION:  Present on Admission:   Seizure ([address]   Acute respiratory failure ([address]   Acute encephalopathy   Delirium   Altered mental status      DISCHARGE DIAGNOSES:  Acute metabolic encephalopathy  Acute sinus tachycardia  Alcohol use disorder  Acute hypercapnic respiratory failure  CAD s/p stent 2020  Hx of Mural thrombus  Hx of Cirrhosis  Hypertension    HPI:  The patient is a 69-year-old man with past medical history significant for chronic alcoholism, ICH, CAD, DVT who presented today to the ED with an episode of syncope. The history was mainly obtained by the sister since patient was intubated. He was last known well at 9 AM but the sister. According to the sister, she had just returned home from work which she had found him half lying down on the couch covered in the stool while his car outside was running outside with occasion. She denies any complaints of recent fever, chills, chest pain cough, shortness of breath, lightheadedness, palpitations, nausea, vomiting, abdominal pain, diarrhea, fatigue, visual disturbance, changes in urination frequency or burning pain well urination or headaches by the patient in the preceding days to this incident. She does reinstate that her brother is a chronic alcoholic and had just gotten out of rehab. He has been sober since however she was not aware if he had recently had any alcoholic drink. He had also informed that he had recent episode of intracerebral hemorrhage on 9/4/2021.       In the ED, there was a concern for possibility of a stroke however he was not considered a thrombolytic candidate because of his previous ICH and long time since his last known well. According to the ED staff his physical exam was more pertinent for nonfocal delirium. It was also significant for nystagmus and rightward gaze not fixed. He was given 2 mg of Versed and it had worsened his oxygen saturations that had dropped to 88% on a nonrebreather and therefore he was intubated. CT Abdomen, chest: Moderate dependent atelectasis bilaterally. No acute abnormality on noncontrast CT of the abdomen and pelvis. Cholelithiasis. CTA Head: No acute CTA findings. No hemodynamically significant stenosis or focal aneurysm. No arteriovenous confirmation. CTA Neck: No acute CTA findings. No hemodynamically significant stenosis or focal aneurysm. Patient was admitted to the ICU for further workup and management of his possible meningitis, on MV. After the patient no longer required ICU level of care he was transition to care by the hospital medicine team.  The hospital medicine team continue to treat his occasional outbursts and worked on placement. Patient was also seen by a psychiatry nurse practitioner who recommended inpatient psychiatric placement as all reversible causes of encephalopathy and delirium had been ruled out via the medical team.  At the time of discharge to the inpatient psychiatry unit, the patient was medically cleared for psychiatric placement. Day of discharge: On the day of discharge, the patient was determined to be medically cleared to go to a psychiatric facility if needed. Although, if that psychiatric facility will not accept the patient, case management working and is able to find a SNF placement for the patient. His agitation has been well controlled and the patient has not required restraints for the last 48 hours. There has been no improvement in his cognition over the last 3+ weeks. However today, no new complaints.          The following issues were addressed during hospitalization:  Acute metabolic encephalopathy(unclear etiology)-improving  Patient with history of chronic alcoholism and ICH/SDH/SAH of chronic and acute varieties noted 09/2021 presented with altered mental status. History was provided by sister who reports patient was last well known 9 AM the day prior to admission. Initial suspicion was for stroke however patient was not a candidate for TNK given his recent history of brain bleeds. However per ED, clinical picture was more suitable for delirium. Throughout his stay reversible causes of delirium were not discovered however. CT head or CTA head did not show any acute findings. MRI did show faint diffusion restriction in the right hippocampus with associated FLAIR signal abnormality which raise suspicion for seizure, patient was loaded with Keppra and placed on 1500 mg Keppra twice daily. However, continuous EEG recordings were indicative of generalized slowing and less for seizure activity. Meningitis was also on the differential however lumbar puncture showed no growth and blood cultures were negative x2. Patient's encephalopathy/delirium was managed with antipsychotics. He was given Seroquel and Zyprexa as well as a Precedex drip; however, patient noted to be having waxing and waning mentation with agitation. Substance withdrawal was also on the differential however given his prolonged stay in the ICU this was less likely given he should be out of range of any type of substance withdrawal at this point. No concrete etiology was discovered for his encephalopathy or delirium. B12/folate, TSH were within normal limits/only mildly elevated and less likely to be contributing or causing his condition. Given his history of chronic alcoholism with multiple falls as evidenced by his acute and chronic varieties of brain bleeds it is possible that this could be secondary to traumatic brain injury.   Ultimately palliative care as well as psychiatry were consulted for possible outpatient psychiatry placement upon discharge. Patient continued to be agitated and disoriented, especially at night, throughout his stay. He was transitioned to Haldol per psychiatry, however he was noted to have prolonged QTC on serial EKG and thus was put back on Zyprexa. Given his continuous agitation/delirium he required the use as needed Geodon and ultimately as needed Ativan p.o. was added as well. Patient did require increasing doses of Zyprexa. Psychiatry recommended that outpatient psychiatric placement was not warranted. However given his delirium was likely not due to reversible causes, psychiatry reevaluated the patient and was deemed to be less likely to have    Acute sinus tachycardia  Patient noted to have sinus tachycardia to the 120s. This was noted to occur after being transferred out of ICU level care and back to general medicine. PE was on the differential, however given his lack of respiratory distress and lack of need for supplemental oxygen, PE was less likely. Ultimately patient did have normalization of his heart rate, but was noted to have episodes of sinus tachycardia with unknown etiology. Could possibly be related to his delirium. However ultimately patient's heart rate did seem to normalize with only occasional elevations into the low 100s 110s. No interventions were deemed to be needed as patient did spontaneously resolve on multiple occasions. No possible sources of causes were found. Alcohol use disorder  Patient with extensive history of alcohol use disorder was monitored daily for withdrawal and managed with thiamine infusions which were eventually converted to p.o. Acute hypercapnic respiratory failure secondary to possible aspiration vs 2/2 medication   Patient on arrival with notable delirium.   Patient was given 2 mg of Versed and was noted to have dropping oxygen saturations down to the 80s while on a nonrebreather mask and was therefore intubated and transferred to the ICU. Possible aspiration pneumonia was suspected to be contributing to this given elevated white count and initial elevated lactic acid. However no clear focus of infection was noted on imaging. However patient was empirically treated with vancomycin as well as ceftriaxone and ampicillin. While intubated, patient did require Levophed for hypotension, this was suspected to be due to sedation however. Ultimately antibiotics were discontinued as pneumonia was less likely. Patient was monitored with daily SBT's, however he remained intubated given his poor mental status and concern for his ability to protect his airway. He remained intubated on Precedex and fentanyl drip. After 10 days patient was extubated after successfully passing his SBT. His hypotension had also resolved no longer requiring pressors prior to this extubation. He remained on Precedex however given his agitation and delirium but did not require reintubation and O2 sats remained stable. Patient was weaned off Precedex at 1.4 did require reinitiation for Precedex given agitation. However, ultimately patient was weaned off Precedex and no longer required ICU level care. Elevated Troponin  CAD s/p stent 2020  Hx of Mural thrombus  Patient presented with elevated troponins. He was placed on heparin drip for possible MI. However following involvement of cardiology, patient was taken off heparin drip given suspicion for MI was less likely and troponinemia was deemed to be due to demand ischemia. Of note patient was previously on Eliquis and Plavix due to CAD status post stent in 2020 after having apical MI and noted to have an atrial mural thrombus. However in 09/2021, he was taken off Eliquis and Plavix due to evidence of acute and chronic varieties of ICH/SDH/SAH on imaging suggesting frequent falls secondary to chronic alcoholism.     Hx of Cirrhosis  Patient with chronic alcoholism and history of cirrhosis. Ammonia levels were within normal limits and LFTs were also within normal limits during laboratory evaluation this admission. Hypertension  Home carvedilol was initially held given hypotension requiring pressors in the ICU, however after transfer out of the ICU patient was put back on his carvedilol and BP remained stable. Physical Exam:  Constitutional:       General: He is not in acute distress. HENT:      Mouth/Throat:      Mouth: Mucous membranes are dry. Eyes:      Pupils: Pupils are equal, round, and reactive to light. Comments: Uncooperative with EOM exam   Cardiovascular:      Rate and Rhythm: Regular rhythm. Tachycardia present. Pulses: Normal pulses. Heart sounds: Normal heart sounds. Pulmonary:      Effort: Pulmonary effort is normal.      Breath sounds: Normal breath sounds. Abdominal:      Tenderness: There is no abdominal tenderness. Musculoskeletal:      Right lower leg: No edema. Left lower leg: No edema. Skin:     Coloration: Skin is not jaundiced. Neurological:      Comments: Oriented to person, but not to place, time or situation. Consults: ICU, neurology cardiology  Significant Diagnostic Studies: CT head, CTA head, MRI head, EEG, chest x-ray, CT chest abdomen pelvis  Treatments: Antipsychotics, pressors while in ICU, Keppra, antibiotics initially. Disposition: Inpatient Psych-patient medically cleared for inpatient psych  Discharged Condition: Stable  Follow Up: Primary Care Physician in one week    DISCHARGE MEDICATION:       Medication List        START taking these medications      levETIRAcetam 750 MG tablet  Commonly known as: KEPPRA  Take 2 tablets by mouth 2 times daily            CONTINUE taking these medications      aspirin 81 MG EC tablet     atorvastatin 40 MG tablet  Commonly known as: LIPITOR  Take 1 tablet by mouth in the morning.      B1 100 MG Tabs  Take 100 mg by mouth daily     carvedilol 3.125 MG tablet  Commonly

## 2022-09-20 NOTE — PROGRESS NOTES
Progress Note    Admit Date: 9/1/2022  Day: 17  Diet: ADULT ORAL NUTRITION SUPPLEMENT; Breakfast, Lunch, Dinner; Standard High Calorie/High Protein Oral Supplement  ADULT DIET; Easy to Chew; 3 carb choices (45 gm/meal)    Interval history:   Pt seen at bedside. No issues overnight, denies CP, SOB, abd pain, dysuria. Continues to be oriented only to person and time, not place and situation. Afebrile, BP stable, tachy to 1110-120s, improved to 70-90s early AM, satting upper 90s on RA. Labs show Cr and lytes WNL, Mag was low, repleted. WBC/Hgb WNL. Seroquel/Zyprexa d/c'ed and Haldol 1mg TID started per psych, inpatient psych not warranted at time per psych. Delirium thought to be post medical issues vs psych issue per psychiatry. 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 However, patient admitted 09/2021 with CT showing acute and chronic varieties of ICH/SAH/SDH deemed to be due to chronic alcoholism resulting in frequent falls. Thus Plavix/Eliquis was discontinued. HPI:  The patient is a 68-year-old man with past medical history significant for chronic alcoholism, ICH, CAD, DVT who presented today to the ED with an episode of syncope. The history was mainly obtained by the sister since patient was intubated. He was last known well at 9 AM but the sister. According to the sister, she had just returned home from work which she had found him half lying down on the couch covered in the stool while his car outside was running outside with occasion. She denies any complaints of recent fever, chills, chest pain cough, shortness of breath, lightheadedness, palpitations, nausea, vomiting, abdominal pain, diarrhea, fatigue, visual disturbance, changes in urination frequency or burning pain well urination or headaches by the patient in the preceding days to this incident.   She does reinstate that her brother is a chronic alcoholic and had just gotten out of rehab. He has been sober since however she was not aware if he had recently had any alcoholic drink. He had also informed that he had recent episode of intracerebral hemorrhage on 9/4/2021. In the ED, there was a concern for possibility of a stroke however he was not considered a thrombolytic candidate because of his previous ICH and long time since his last known well. According to the ED staff his physical exam was more pertinent for nonfocal delirium. It was also significant for nystagmus and rightward gaze not fixed. He was given 2 mg of Versed and it had worsened his oxygen saturations that had dropped to 88% on a nonrebreather and therefore he was intubated. CT Abdomen, chest: Moderate dependent atelectasis bilaterally. No acute abnormality on noncontrast CT of the abdomen and pelvis. Cholelithiasis. CTA Head: No acute CTA findings. No hemodynamically significant stenosis or focal aneurysm. No arteriovenous confirmation. CTA Neck: No acute CTA findings. No hemodynamically significant stenosis or focal aneurysm. Patient was admitted to the ICU for further workup and management of his possible meningitis, on MV.     Medications:     Scheduled Meds:   magnesium sulfate  4,000 mg IntraVENous Once    levETIRAcetam  1,500 mg Oral BID    haloperidol  1 mg Oral TID    thiamine mononitrate  100 mg Oral Daily    carvedilol  3.125 mg Oral BID WC    melatonin  5 mg Oral Nightly    famotidine (PEPCID) injection  20 mg IntraVENous BID    enoxaparin  40 mg SubCUTAneous Daily    lidocaine PF  5 mL IntraDERmal Once    sodium chloride flush  5-40 mL IntraVENous 2 times per day     Continuous Infusions:   dextrose      dexmedetomidine (PRECEDEX) IV infusion Stopped (09/17/22 0634)    sodium chloride 10 mL/hr at 09/17/22 0637     PRN Meds:ziprasidone, labetalol, glucose, dextrose bolus **OR** dextrose bolus, glucagon (rDNA), dextrose, sodium chloride flush, sodium chloride, ondansetron **OR** ondansetron, polyethylene glycol, acetaminophen **OR** acetaminophen    Objective:   Vitals:   T-max:  Patient Vitals for the past 8 hrs:   BP Temp Temp src Pulse Resp SpO2 Height Weight   09/20/22 0903 -- -- -- -- -- -- 5' 10\" (1.778 m) --   09/20/22 0520 -- -- -- -- -- -- -- 182 lb 15.7 oz (83 kg)   09/20/22 0400 126/70 98 °F (36.7 °C) Oral 92 15 96 % -- --       Intake/Output Summary (Last 24 hours) at 9/20/2022 1020  Last data filed at 9/20/2022 0958  Gross per 24 hour   Intake --   Output 450 ml   Net -450 ml       Physical Exam  Constitutional:       General: He is not in acute distress. HENT:      Mouth/Throat:      Mouth: Mucous membranes are dry. Eyes:      Pupils: Pupils are equal, round, and reactive to light. Comments: Uncooperative with EOM exam   Cardiovascular:      Rate and Rhythm: Regular rhythm. Tachycardia present. Pulses: Normal pulses. Heart sounds: Normal heart sounds. Pulmonary:      Effort: Pulmonary effort is normal.      Breath sounds: Normal breath sounds. Abdominal:      Tenderness: There is no abdominal tenderness. Musculoskeletal:      Right lower leg: No edema. Left lower leg: No edema. Skin:     Coloration: Skin is not jaundiced. Neurological:      Comments: Oriented to person, but not to place, time or situation.          LABS:    CBC:   Recent Labs     09/18/22  1108 09/19/22  0322 09/20/22  0329   WBC 10.1 7.4 8.2   HGB 14.1 15.1 15.0   HCT 41.2 44.4 44.6    273 256   MCV 93.7 94.3 95.0     Renal:    Recent Labs     09/18/22  2212 09/19/22  0322 09/20/22  0329    142 144   K 3.8 3.9 4.5    103 105   CO2 22 23 23   BUN 6* 7 13   CREATININE 0.6* 0.7* 0.9   GLUCOSE 111* 90 99   CALCIUM 9.5 9.2 9.5   MG 2.60* 2.20 1.70*   PHOS 3.8 4.1 4.7   ANIONGAP 15 16 16     Hepatic:   Recent Labs     09/18/22  2212 09/19/22  0322 09/20/22  0329   LABALBU 4.0 4.1 4.1     Troponin: No results for input(s): TROPONINI in the last 72 hours. BNP: No results for input(s): BNP in the last 72 hours. Lipids: No results for input(s): CHOL, HDL in the last 72 hours. Invalid input(s): LDLCALCU, TRIGLYCERIDE  ABGs:  No results for input(s): PHART, [id number], PO2ART, FCP5FSD, BEART, THGBART, O5BIIAUU, TNF9LDF in the last 72 hours. INR: No results for input(s): INR in the last 72 hours. Lactate: No results for input(s): LACTATE in the last 72 hours. Cultures:  -----------------------------------------------------------------  RAD:   FL MODIFIED BARIUM SWALLOW W VIDEO   Final Result      No penetration or aspiration. XR CHEST PORTABLE   Final Result   1. Worsened pulmonary edema. 2.  Persistent bibasilar atelectasis. MRI BRAIN WO CONTRAST   Final Result      Faint diffusion restriction in the right hippocampus with associated FLAIR signal abnormality. This is favored to represent sequelae of seizures, however other infectious/inflammatory etiologies are also possible. Mild atrophy and chronic small vessel ischemic change. Areas of encephalomalacia and gliosis in both cerebral hemispheres with remote hemorrhagic staining from prior insults. IR LUMBAR PUNCTURE FOR DIAGNOSIS   Final Result   . IMPRESSION:   1. Uneventful diagnostic fluoroscopic guided lumbar puncture as the   2. The flow reversed are risk and therefore a closing pressure was obtained at the end of the procedure, 26 cm of water. XR ABDOMEN (KUB) (SINGLE AP VIEW)   Final Result   Findings/Impression:    The tip of the enteric tube terminates in the distal body of the stomach. CT CHEST WO CONTRAST   Final Result      CHEST:      1. Moderate dependent atelectasis bilaterally. ABDOMEN/PELVIS:      1.  No acute abnormality on noncontrast CT of the abdomen and pelvis. 2.  Cholelithiasis. CT ABDOMEN PELVIS WO CONTRAST Additional Contrast? None   Final Result      CHEST:      1. Moderate dependent atelectasis bilaterally. ABDOMEN/PELVIS:      1.  No acute abnormality on noncontrast CT of the abdomen and pelvis. 2.  Cholelithiasis. CTA HEAD NECK W CONTRAST   Final Result      CTA Head:   No acute CTA findings. No hemodynamically significant stenosis or focal aneurysm. No arteriovenous confirmation. CTA Neck:   No acute CTA findings. No hemodynamically significant stenosis or focal aneurysm. CT HEAD WO CONTRAST   Final Result      1. No findings for acute intracranial abnormality. 2.  Age-related atrophy with patchy periventricular white matter changes bilaterally consistent with chronic small vessel ischemia. 3.  Stable low attenuation left frontoparietal lobe consistent with previous insult from known prior intraparenchymal hematoma. Stable right frontoparietal cortical infarct. These findings were called to the emergency room physician Dr. Fred Olmos on 9/1/2022 at 5:30 p.m. XR CHEST PORTABLE   Final Result   1. No findings for acute cardiopulmonary disease. 2.  ET tube in good position in the mid trachea. Assessment/Plan:     Acute metabolic encephalopathy(unclear etiology)-improving  On presentation AMS, elevated WBC: 21.7, Tachypneic, Tachycardic, Hypotensive. Hx of ICH, SAH and SDH 2/2 to fall, hx of chronic alcohol abuse,   CT head: prior Intraparenchymal hematoma. Stable right frontoparietal cortical infarct. CT Abdomen, chest: mod atelectasis bilaterally. No acute abnorms on CT abd except stable cholelithiasis   CTA Head: No acute CTA findings. CTA Neck: No acute CTA findings. MRI: Faint diffusion restriction in the right hippocampus with associated FLAIR signal abnormality. This is favored to represent sequelae of seizures. cEEG readings previously: Generalized background abnormalities indicative of an underlying diffuse encephalopathy of non-specific etiology.  Intermittent focal epileptiform discharges, right posterior temporal, conferring increased risk of focal onset seizures from this region.  -Lumbar puncture ordered 9/6/2022: No growth. -Blood Cultures were -ve x2  -On Keppra 1500mg bid  - Zyprexa/Seroquel d/c - now on Haldol 1mg TID per psych.   - inpatient psych consulted - no inpatient psych upon discharge warranted, condition due to medical issues per Pysch. - Palliative consult, able to contact family (multiple siblings), all agreed that sister (present on admission) can make decision for patient. Acute sinus tachycardia  Intermittently tachycardic into the 120s with stable pressures  -Most likely secondary to dehydration and poor p.o. intake  -Could also consider PE, however patient is showing no respiratory distress, denies chest pain, on room air, not tachypneic  -Will get an EKG to rule out other causes  -500 cc LR bolus to monitor for response. Alcohol use disorder  Ethanol level: none detected  -thiamine 100 mg daily, changed to PO  -monitor for withdrawal     Acute hypercapnic respiratory failure secondary to possible aspiration vs 2/2 medication (Extubated successfully 9/10)  On presentation: VBG PH: 7.264, PCO2: 56.4, 83.2    CAD s/p stent 2020  Hx of Mural thrombus  - previous Eliquis/Plavix stopped due to frequent falls 2/2 chronic alcoholism  - [facility] 09/2021 suggesting acute and chronic ICH/SAH/SDH     Hx of Cirrhosis  -Ammonia: 36 wnl(09/1/22), LFTs WNL.     Hypertension  -Resumed home carvedilol dose 3.125 mg twice daily 9/14    Code Status: Full Code  FEN: Easy chew diet, oral nutrition supplement  PPX: Lovenox  DISPO: s/o ICU, now Misty Padgett MD, PGY-1  09/20/22  10:20 AM    This patient has been staffed and discussed with Juni Ro MD.

## 2022-09-20 NOTE — PLAN OF CARE
Problem: Discharge Planning  Goal: Discharge to home or other facility with appropriate resources  Outcome: Progressing     Problem: Pain  Goal: Verbalizes/displays adequate comfort level or baseline comfort level  Outcome: Progressing     Problem: Safety - Adult  Goal: Free from fall injury  Outcome: Progressing  Flowsheets (Taken 9/19/2022 2105)  Free From Fall Injury: Instruct family/caregiver on patient safety     Problem: ABCDS Injury Assessment  Goal: Absence of physical injury  Outcome: Progressing  Flowsheets (Taken 9/19/2022 2105)  Absence of Physical Injury: Implement safety measures based on patient assessment     Problem: Skin/Tissue Integrity  Goal: Absence of new skin breakdown  Description: 1. Monitor for areas of redness and/or skin breakdown  2. Assess vascular access sites hourly  3. Every 4-6 hours minimum:  Change oxygen saturation probe site  4. Every 4-6 hours:  If on nasal continuous positive airway pressure, respiratory therapy assess nares and determine need for appliance change or resting period.   Outcome: Progressing     Problem: Chronic Conditions and Co-morbidities  Goal: Patient's chronic conditions and co-morbidity symptoms are monitored and maintained or improved  Outcome: Progressing

## 2022-09-20 NOTE — BH NOTE
Psychiatry Follow-Up Consultation    Patient Name: Felisa Jenkins  MRN: [id number]  Admission Date: 9/1/2022  Date seen: 09/20/2022  Reason for Consult: encephalopathy, still unclear etiology    Dx:   Primary Psychiatric (DSM V) Diagnosis: Altered Mental Status (r/o delirium)  Secondary Psychiatric (DSM V) Diagnoses: none  Chemical Dependency Diagnoses: hx of alcohol abuse    Assessment  Reviewed nursing and ancillary staff notes since arrival to hospital, reviewed previous records and records available through [facility]. Evaluated medications and assessed for side effects and effectiveness. Assessed patient's educational needs including reviewing plan of care, medications, and diagnosis. Recommendations:    Cont haldol 1 mg TID as pt appears to be improving and his episodes of psychosis throughout the day appear to be lessening in intensity and length. Cont to monitor QTC. Is not appropriate for inpatient psychiatric admission at this time. Needs time for his delirium to clear. Since delirium appears to be beginning to clear, will not cont to follow inpatient unless his s/s of psychosis worsen. If this occurs, then may reconsult psychiatry. Continue all medical treatments as ordered per medical team at this time. ---------------------------------------------------------------------------------------------------    Patient's chart was reviewed, case was discussed with nursing/OT/RT staff, and collaborated with  about the treatment plan. Alyse Dumont is a 57 yr old male who originally presented to the hospital on 09/01/2022 with a chief complaint of altered mental status. Was admitted and since that time he has had an extensive workup for etiology of encephalopathy but no clear cause has been determined as of yet. Initial psychiatric consultation completed yesterday. Was on both seroquel total of 250 mg and zyprexa 5 mg at time of yesterday's consultation.  Was switched to haldol 1 mg TID. Last QTC measured yesterday at 474. Vitamin D, b-12 and folate levels ordered. Psych NP attempted to meet with patient yesterday but was not able to get much information from him. Patient's sister was called. According to the note from Geovanna Sherman, [facility] yesterday, Bony Lawler states that she came home from work and found him in the living room, sprawled and unresponsive on the couch, moaning, eyes rolled back, raising his arms. She notes that he has a history of a blood clot, stent placement, brain bleed. He then went to an assisted living facility and improved enough to come back home. He has not had any presentations like this before. Notes his mental status has been \"totally different\" since he was extubated, more confused, disoriented, picking at sheet, hallucinating. She states he has no previous psychiatric history to her knowledge; no history of hallucinations prior to current hospitalization. No previous diagnosis of dementia but has been experiencing memory issues since his stroke. He does have a history of alcoholism and she believes he has been drinking recently, just not to the extent that he was previously. No history of drug use. \"    Suicidal Ideation: none voiced  Homicidal Ideation: none voiced  Medication Side Effects: none reported    ROS: Patient has new complaints: no    Met with patient in his room. Initially asked me to come back as he needs to \"get something straightened out\" before he talks with me. When I reenter the room, he is still in his chair but is noted to be very restless and is pulling at his gown in several directions as he tries to take it off. Safety staff reports that pt was able to take gown off a few moments ago and she just got it back on him. Reports that since she has been there today, pt has been acting like this. Pt continues to mumble to himself and it is very difficult to understand what he is actually talking about.  When I ask him if he could tell me where he was, he reports, \"here. \" Does not remember where he is nor why he is here. States that he is not in any pain but he has to \"get this done,\" but I am not able to understand what he is trying to do. Is able to state he is doing something for [employer] but when asked if he works there, he reports \"years ago. \"    When I speak to the nurse that has cared for patient for the past couple of days, he reports that pt has continued to be disoriented and confused but it \"waxes and wanes,\" reports these episodes have lessened in length and intensity today. Appears patient is beginning to improve although it may be slowly at present.     Current Medications Ordered:   levETIRAcetam  1,500 mg Oral BID    haloperidol  1 mg Oral TID    thiamine mononitrate  100 mg Oral Daily    carvedilol  3.125 mg Oral BID WC    melatonin  5 mg Oral Nightly    famotidine (PEPCID) injection  20 mg IntraVENous BID    enoxaparin  40 mg SubCUTAneous Daily    lidocaine PF  5 mL IntraDERmal Once    sodium chloride flush  5-40 mL IntraVENous 2 times per day      PRN Meds: promethazine, ziprasidone, labetalol, glucose, dextrose bolus **OR** dextrose bolus, glucagon (rDNA), dextrose, sodium chloride flush, sodium chloride, polyethylene glycol, acetaminophen **OR** acetaminophen     Objective:     PE:    /79   Pulse 92   Temp 98.5 °F (36.9 °C) (Oral)   Resp 14   Ht 5' 10\" (1.778 m)   Wt 182 lb 15.7 oz (83 kg)   SpO2 96%   BMI 26.26 kg/m²       Motor / Gait: psychomotor agitation, gait deferred as patient was sitting in chair at bedside,  1 to 1 present in patient's room    Mental Status Examination:    Appearance: male well developed and well nourished, appears stated age,  sitting in chair, wearing hospital gown, appropriate grooming and hygiene   Behavior/Attitude Toward Examiner: somewhat cooperative but also actively hallucinating, intermittently attentive, minimal eye contact  Speech: Spontaneous, rapid and mumbled, soft in tone, difficult to understand   Mood: appears slightly agitated   Affect: Mood congruent   Thought Processes: rapid, illogical, perseverative at times  Thought Content: no SI, no HI, no delusions voiced, appears to be obsessed with packing something for someone to take to the [employer] where patient states he worked Spoke:  actively reporting AVH, asking if I see what is packed over there (points under his bed) and is observed to be RTIS  Attention: impaired/poor  Cognition: alert and oriented to person only, difficult to fully evaluate cognition as speech is mumbled and is observed responding to internal stimuli  Insight: impaired insight   Judgment: poor judgment      LAB: Reviewed labs from last 24 hours, vitamin D and folate are WNL, b-12 is high at 1402-will cont to monitor    Total face to face time with patient was 35 minutes and more than 50% of that time was spent counseling the patient on their symptoms, treatment and expected goals.     [facility], [address], [address] [place] [address]  09/20/2022

## 2022-09-20 NOTE — PROGRESS NOTES
Comprehensive Nutrition Assessment    Type and Reason for Visit:  Reassess    Nutrition Recommendations/Plan:   Due to inadequate intake, discussion of palliative care vs nutrition support should be considered. Consult dietitian if tube feeding is desired and consistent with patient's plan of care. Continue Easy to Chew; 3 carb choice diet  Continue Ensure Enlive TID  Monitor nutrition adequacy, pertinent labs, bowel habits, wt changes, and clinical progress     Malnutrition Assessment:  Malnutrition Status:  Severe malnutrition (09/20/22 0910)    Context:  Acute Illness     Findings of the 6 clinical characteristics of malnutrition:  Energy Intake:  50% or less of estimated energy requirements for 5 or more days  Weight Loss:  Greater than 2% over 1 week (6% in 1 week period)     Body Fat Loss:  No significant body fat loss     Muscle Mass Loss:  No significant muscle mass loss    Fluid Accumulation:  Mild      Nutrition Assessment:    Follow up: Pt continues on Easy to Chew; 3 carb choice diet, w/ Ensure TID. Intakes are still poor, w/ meals averaging <50%. He continues w/ encephalopathy, AMS, unclear etiology, psychiatry is following. Pt has lost 6% body wt in 1 week period, and has had intakes 50% or less for a 12 day period. If family plan of care is for aggressive care to continue, recommendation for nutrition support remains. Please consult RD for recommendations if warranted. Will continue to monitor. Nutrition Related Findings:    Mg 1.7. Active bowel sounds. +BM 9/17.  Wound Type:  (scattered abrasions)       Current Nutrition Intake & Therapies:    Average Meal Intake: 0%, 1-25%, 51-75%  Average Supplements Intake: 0%  ADULT ORAL NUTRITION SUPPLEMENT; Breakfast, Lunch, Dinner; Standard High Calorie/High Protein Oral Supplement  ADULT DIET; Easy to Chew; 3 carb choices (45 gm/meal)    Anthropometric Measures:  Height: 5' 10\" (177.8 cm)  Ideal Body Weight (IBW): 166 lbs (75 kg)    Admission Body Weight: 190 lb 14.7 oz (86.6 kg)  Current Body Weight: 182 lb 15.7 oz (83 kg), 110.2 % IBW. Weight Source: Bed Scale  Current BMI (kg/m2): 26.3        Weight Adjustment For: No Adjustment                 BMI Categories: Overweight (BMI 25.0-29. 9)    Estimated Daily Nutrient Needs:  Energy Requirements Based On: Kcal/kg (20-25 kcal/kg admission wt)  Weight Used for Energy Requirements: Admission (Admission wt 86.8 kg)  Energy (kcal/day): 4885-4806  Weight Used for Protein Requirements: Admission (1.2-2.0 g/kg admission wt (86.8kg))  Protein (g/day): 104-174  Method Used for Fluid Requirements: 1 ml/kcal  Fluid (ml/day): or per MD    Nutrition Diagnosis:   Severe malnutrition related to inadequate protein-energy intake as evidenced by Criteria as identified in malnutrition assessment, intake 0-25%, weight loss greater than or equal to 2% in 1 week    Nutrition Interventions:   Food and/or Nutrient Delivery: Start Tube Feeding, Continue Current Diet, Continue Oral Nutrition Supplement  Nutrition Education/Counseling: Education not appropriate  Coordination of Nutrition Care: Continue to monitor while inpatient  Plan of Care discussed with: Pt    Goals:  Previous Goal Met: No Progress toward Goal(s)  Goals: PO intake 50% or greater, prior to discharge       Nutrition Monitoring and Evaluation:   Behavioral-Environmental Outcomes: None Identified  Food/Nutrient Intake Outcomes: Food and Nutrient Intake, Supplement Intake  Physical Signs/Symptoms Outcomes: Biochemical Data, Nutrition Focused Physical Findings, Weight, Meal Time Behavior    Discharge Planning:     Too soon to determine     Liza Gruber, [address] [place] [address],   Contact: [phone]

## 2022-09-20 NOTE — PROGRESS NOTES
Physical Therapy  Facility/Department: [facility]  Physical Therapy Daily Treatment    Name: Lydia Lo  : 1960  MRN: [id number]  Date of Service: 2022    Discharge Recommendations:    Lydia Lo scored a 13/24 on the AM-PAC short mobility form. Current research shows that an AM-PAC score of 17 or less is typically not associated with a discharge to the patient's home setting. Based on the patient's AM-PAC score and their current functional mobility deficits, it is recommended that the patient have 3-5 sessions per week of Physical Therapy at d/c to increase the patient's independence. Please see assessment section for further patient specific details. If patient discharges prior to next session this note will serve as a discharge summary. Please see below for the latest assessment towards goals. PT Equipment Recommendations  Equipment Needed:  (defer to next level of care)      Patient Diagnosis(es): The primary encounter diagnosis was Acute respiratory failure, unspecified whether with hypoxia or hypercapnia ([address] A diagnosis of Altered mental status, unspecified altered mental status type was also pertinent to this visit. Past Medical History:  has a past medical history of Alcohol abuse, CAD (coronary artery disease), CHF (congestive heart failure) ([address] Essential tremor, HTN (hypertension), Hx of blood clots, Hyperlipidemia, ICH (intracerebral hemorrhage) ([address] Lower extremity cellulitis, and MI (mitral incompetence). Past Surgical History:  has a past surgical history that includes Percutaneous Transluminal Coronary Angio and Upper gastrointestinal endoscopy (N/A, 2020). Assessment   Body Structures, Functions, Activity Limitations Requiring Skilled Therapeutic Intervention: Decreased functional mobility ; Decreased cognition;Decreased coordination;Decreased endurance;Decreased strength;Decreased safe awareness;Decreased balance  Assessment: Merari Hayden presents to hospital s/p sz. He is functioning well below his mobility baseilne at this time. He is typically independent & working in construction. He required 1-2 person assist to complete basic functional txfs today & demo's impaired balance, strength, coordination & cognition. He will continue to benefit from skilled PT services during his hospital stay to address these deficits & upon medical clearance prior to returning home with sister. Treatment Diagnosis: Dec'd balance & mobility  Therapy Prognosis: Good  Decision Making: Medium Complexity  Barriers to Learning: cognition  Requires PT Follow-Up: Yes  Activity Tolerance  Activity Tolerance: Treatment limited secondary to decreased cognition     Plan   Plan  Plan:  (2-5x/week)  Current Treatment Recommendations: Strengthening, Balance training, Gait training, Equipment evaluation, education, & procurement, Functional mobility training, Stair training, Neuromuscular re-education, Transfer training, Endurance training, Patient/Caregiver education & training, Therapeutic activities  Safety Devices  Type of Devices: Gait belt, Left in chair, Sitter present, Chair alarm in place, Call light within reach, Nurse notified  Restraints  Restraints Initially in Place: No  Restraints: bilateral wrist restraints     Restrictions  Restrictions/Precautions  Restrictions/Precautions: Bed Alarm, Seizure  Position Activity Restriction  Other position/activity restrictions: transfer pt     Subjective   General Comment  Comments: pt cleared to see by NYASIA Guerra on entry  Subjective  Subjective: Pt seated in recliner, agreeable to session. Notably confused, impulsive. Denies pain.          Social/Functional History  Social/Functional History  Lives With: Family  Type of Home: House  Home Layout: Two level, Bed/Bath upstairs  Home Access: Stairs to enter with rails  Entrance Stairs - Number of Steps: 4  Entrance Stairs - Rails: Left  Bathroom Shower/Tub: Tub/Shower unit  Bathroom Equipment: (none)  Home Equipment:  (none)  Has the patient had two or more falls in the past year or any fall with injury in the past year?: No  ADL Assistance: Independent  Homemaking Assistance: Independent  Homemaking Responsibilities: Yes  Ambulation Assistance: Independent  Transfer Assistance: Independent  Active : Yes  Occupation: Full time employment  Type of Occupation: construction  Additional Comments: Pt is a questionable historian. No family/friends present to assist with social hx. Will update as able. Vision/Hearing  Vision  Vision: Impaired  Vision Exceptions: Wears glasses for reading  Hearing  Hearing: Within functional limits    Cognition   Orientation  Overall Orientation Status: Impaired  Orientation Level: Oriented to person;Disoriented to place; Disoriented to time;Disoriented to situation  Cognition  Overall Cognitive Status: Exceptions  Following Commands: Inconsistently follows commands (increased time and repetition of commands required)  Attention Span: Difficulty attending to directions  Memory: Decreased short term memory;Decreased recall of recent events  Safety Judgement: Decreased awareness of need for assistance;Decreased awareness of need for safety  Problem Solving: Decreased awareness of errors;Assistance required to generate solutions;Assistance required to implement solutions  Insights: Decreased awareness of deficits  Initiation: Requires cues for some  Sequencing: Requires cues for some  Cognition Comment: Very impulsive, attempting to stand and sit when unsafe. Objective   Heart Rate: 92  Heart Rate Source: Monitor  BP: 109/79  BP Location: Right upper arm  BP Method: Automatic  Patient Position: Up in chair  MAP (Calculated): 89  Resp: 14  SpO2: 96 %         Transfers  Sit to Stand: Moderate Assistance (cues for anterior wt shifting 2/2 pt demo's retropulsion with transitional mvmt & in standing position.  Pt req'd several attempts to stand)  Stand to sit: Moderate Assistance (with cues for safety & positioning. pt impulsively attempting to sit prior to safely arriving in front of chair)  Ambulation  Surface: level tile  Device: No Device  Assistance: 2 Person assistance; Moderate assistance  Gait Deviations: Staggers; Increased ALEA  Comments: pt amb recliner <>bathroom ~15ft total. Req'd mod x2 for balance and safety - pt reaching outside ALEA grasping at walls, rails. HHA provided on L with cues. Pt insisted on standing at toilet to urinate despite cues to sit for safety. Pt manages to void in standing with min A for balance at toilet and sink to wash hands. Req'd max cues to navigate back to recliner with pt impulsively attempting to sit before reaching chair. Balance  Sitting - Static:  (SPV sitting in recliner)  Sitting - Dynamic:  (CGA when reaching to don socks/pants sitting in recliner)  Standing - Static:  (min-mod A due to retropulsion)                AM-PAC Score  AM-PAC Inpatient Mobility Raw Score : 13 (09/20/22 1442)  AM-PAC Inpatient T-Scale Score : 36.74 (09/20/22 1442)  Mobility Inpatient CMS 0-100% Score: 64.91 (09/20/22 1442)  Mobility Inpatient CMS G-Code Modifier : CL (09/20/22 1442)        Goals  Short Term Goals  Time Frame for Short term goals: DC  Short term goal 1: Pt will complete functional txfs with S  Short term goal 2: Pt will tolerate ambulation assessment - goal met/revised 9/20: pt will ambulate 20ft with LRAD with min A x1  Short term goal 3: Pt will tolerate stair assessment  Patient Goals   Patient goals :  To get phone/speak with sister       Education  Patient Education  Education Given To: Patient  Education Provided: Role of Therapy;Transfer Training  Education Method: Verbal  Barriers to Learning: Cognition  Education Outcome: Continued education needed      Therapy Time   Individual Concurrent Group Co-treatment   Time In 1330         Time Out 1400         Minutes 30          Timed Code Treatment Minutes:30    Total Treatment Minutes:30     If patient is discharged prior to next treatment, this note will serve as the discharge summary.     Margaret Patterson PT, DPT, NCS, CSRS

## 2022-09-20 NOTE — DISCHARGE SUMMARY
INTERNAL MEDICINE DEPARTMENT AT [address] [place] [address]  DISCHARGE SUMMARY    Patient ID: Galileo Powell                                             Discharge Date: 9/28/2022   Patient's PCP: Kaylah Modi MD                                          Discharge Physician: Bonnie Chew MD  Admit Date: 9/1/2022   Admitting Physician: Baron Jennifer MD    PROBLEMS DURING HOSPITALIZATION:  Present on Admission:   Seizure ([facility]   Acute respiratory failure ([address]   Acute encephalopathy   Delirium   Altered mental status      DISCHARGE DIAGNOSES:  Acute metabolic encephalopathy  Acute sinus tachycardia  Alcohol use disorder  Acute hypercapnic respiratory failure  CAD s/p stent 2020  Hx of Mural thrombus  Hx of Cirrhosis  Hypertension    HPI:  The patient is a 64-year-old man with past medical history significant for chronic alcoholism, ICH, CAD, DVT who presented today to the ED with an episode of syncope. The history was mainly obtained by the sister since patient was intubated. He was last known well at 9 AM but the sister. According to the sister, she had just returned home from work which she had found him half lying down on the couch covered in the stool while his car outside was running outside with occasion. She denies any complaints of recent fever, chills, chest pain cough, shortness of breath, lightheadedness, palpitations, nausea, vomiting, abdominal pain, diarrhea, fatigue, visual disturbance, changes in urination frequency or burning pain well urination or headaches by the patient in the preceding days to this incident. She does reinstate that her brother is a chronic alcoholic and had just gotten out of rehab. He has been sober since however she was not aware if he had recently had any alcoholic drink. He had also informed that he had recent episode of intracerebral hemorrhage on 9/4/2021.       In the ED, there was a concern for possibility of a stroke however he was not considered a thrombolytic candidate because of his previous ICH and long time since his last known well. According to the ED staff his physical exam was more pertinent for nonfocal delirium. It was also significant for nystagmus and rightward gaze not fixed. He was given 2 mg of Versed and it had worsened his oxygen saturations that had dropped to 88% on a nonrebreather and therefore he was intubated. CT Abdomen, chest: Moderate dependent atelectasis bilaterally. No acute abnormality on noncontrast CT of the abdomen and pelvis. Cholelithiasis. CTA Head: No acute CTA findings. No hemodynamically significant stenosis or focal aneurysm. No arteriovenous confirmation. CTA Neck: No acute CTA findings. No hemodynamically significant stenosis or focal aneurysm. Patient was admitted to the ICU for further workup and management of his possible meningitis, on MV. After the patient no longer required ICU level of care he was transition to care by the hospital medicine team.  The hospital medicine team continue to treat his occasional outbursts and worked on placement. Patient was also seen by a psychiatry nurse practitioner who recommended inpatient psychiatric placement as all reversible causes of encephalopathy and delirium had been ruled out via the medical team.  At the time of discharge to the inpatient psychiatry unit, the patient was medically cleared for psychiatric placement. Day of discharge: On the day of discharge, the patient was determined to be medically cleared to go to a psychiatric facility if needed. Although, if that psychiatric facility will not accept the patient, case management working and is able to find a SNF placement for the patient. His agitation has been well controlled and the patient has not required restraints for the last 48 hours. There has been no improvement in his cognition over the last 3+ weeks. However today, no new complaints.          The following issues were addressed during hospitalization:  Acute metabolic encephalopathy(unclear etiology)-improving  Patient with history of chronic alcoholism and ICH/SDH/SAH of chronic and acute varieties noted 09/2021 presented with altered mental status. History was provided by sister who reports patient was last well known 9 AM the day prior to admission. Initial suspicion was for stroke however patient was not a candidate for TNK given his recent history of brain bleeds. However per ED, clinical picture was more suitable for delirium. Throughout his stay reversible causes of delirium were not discovered however. CT head or CTA head did not show any acute findings. MRI did show faint diffusion restriction in the right hippocampus with associated FLAIR signal abnormality which raise suspicion for seizure, patient was loaded with Keppra and placed on 1500 mg Keppra twice daily. However, continuous EEG recordings were indicative of generalized slowing and less for seizure activity. Meningitis was also on the differential however lumbar puncture showed no growth and blood cultures were negative x2. Patient's encephalopathy/delirium was managed with antipsychotics. He was given Seroquel and Zyprexa as well as a Precedex drip; however, patient noted to be having waxing and waning mentation with agitation. Substance withdrawal was also on the differential however given his prolonged stay in the ICU this was less likely given he should be out of range of any type of substance withdrawal at this point. No concrete etiology was discovered for his encephalopathy or delirium. B12/folate, TSH were within normal limits/only mildly elevated and less likely to be contributing or causing his condition. Given his history of chronic alcoholism with multiple falls as evidenced by his acute and chronic varieties of brain bleeds it is possible that this could be secondary to traumatic brain injury.   Ultimately palliative care as well as psychiatry were consulted for possible outpatient psychiatry placement upon discharge. Patient continued to be agitated and disoriented, especially at night, throughout his stay. He was transitioned to Haldol per psychiatry, however he was noted to have prolonged QTC on serial EKG and thus was put back on Zyprexa. Given his continuous agitation/delirium he required the use as needed Geodon and ultimately as needed Ativan p.o. was added as well. Patient did require increasing doses of Zyprexa. Psychiatry recommended that outpatient psychiatric placement was not warranted. However given his delirium was likely not due to reversible causes, psychiatry reevaluated the patient and was deemed to be less likely to have    Acute sinus tachycardia  Patient noted to have sinus tachycardia to the 120s. This was noted to occur after being transferred out of ICU level care and back to general medicine. PE was on the differential, however given his lack of respiratory distress and lack of need for supplemental oxygen, PE was less likely. Ultimately patient did have normalization of his heart rate, but was noted to have episodes of sinus tachycardia with unknown etiology. Could possibly be related to his delirium. However ultimately patient's heart rate did seem to normalize with only occasional elevations into the low 100s 110s. No interventions were deemed to be needed as patient did spontaneously resolve on multiple occasions. No possible sources of causes were found. Alcohol use disorder  Patient with extensive history of alcohol use disorder was monitored daily for withdrawal and managed with thiamine infusions which were eventually converted to p.o. Acute hypercapnic respiratory failure secondary to possible aspiration vs 2/2 medication   Patient on arrival with notable delirium.   Patient was given 2 mg of Versed and was noted to have dropping oxygen saturations down to the 80s while on a nonrebreather mask and was therefore intubated and transferred to the ICU. Possible aspiration pneumonia was suspected to be contributing to this given elevated white count and initial elevated lactic acid. However no clear focus of infection was noted on imaging. However patient was empirically treated with vancomycin as well as ceftriaxone and ampicillin. While intubated, patient did require Levophed for hypotension, this was suspected to be due to sedation however. Ultimately antibiotics were discontinued as pneumonia was less likely. Patient was monitored with daily SBT's, however he remained intubated given his poor mental status and concern for his ability to protect his airway. He remained intubated on Precedex and fentanyl drip. After 10 days patient was extubated after successfully passing his SBT. His hypotension had also resolved no longer requiring pressors prior to this extubation. He remained on Precedex however given his agitation and delirium but did not require reintubation and O2 sats remained stable. Patient was weaned off Precedex at 1.4 did require reinitiation for Precedex given agitation. However, ultimately patient was weaned off Precedex and no longer required ICU level care. Elevated Troponin  CAD s/p stent 2020  Hx of Mural thrombus  Patient presented with elevated troponins. He was placed on heparin drip for possible MI. However following involvement of cardiology, patient was taken off heparin drip given suspicion for MI was less likely and troponinemia was deemed to be due to demand ischemia. Of note patient was previously on Eliquis and Plavix due to CAD status post stent in 2020 after having apical MI and noted to have an atrial mural thrombus. However in 09/2021, he was taken off Eliquis and Plavix due to evidence of acute and chronic varieties of ICH/SDH/SAH on imaging suggesting frequent falls secondary to chronic alcoholism.     Hx of Cirrhosis  Patient with chronic alcoholism and history of cirrhosis. Ammonia levels were within normal limits and LFTs were also within normal limits during laboratory evaluation this admission. Hypertension  Home carvedilol was initially held given hypotension requiring pressors in the ICU, however after transfer out of the ICU patient was put back on his carvedilol and BP remained stable. Physical Exam:  Constitutional:       General: He is not in acute distress. HENT:      Mouth/Throat:      Mouth: Mucous membranes are dry. Eyes:      Pupils: Pupils are equal, round, and reactive to light. Comments: Uncooperative with EOM exam   Cardiovascular:      Rate and Rhythm: Regular rhythm. Tachycardia present. Pulses: Normal pulses. Heart sounds: Normal heart sounds. Pulmonary:      Effort: Pulmonary effort is normal.      Breath sounds: Normal breath sounds. Abdominal:      Tenderness: There is no abdominal tenderness. Musculoskeletal:      Right lower leg: No edema. Left lower leg: No edema. Skin:     Coloration: Skin is not jaundiced. Neurological:      Comments: Oriented to person, but not to place, time or situation. Consults: ICU, neurology cardiology  Significant Diagnostic Studies: CT head, CTA head, MRI head, EEG, chest x-ray, CT chest abdomen pelvis  Treatments: Antipsychotics, pressors while in ICU, Keppra, antibiotics initially. Disposition: Inpatient Psych-patient medically cleared for inpatient psych  Discharged Condition: Stable  Follow Up: Primary Care Physician in one week    DISCHARGE MEDICATION:       Medication List        START taking these medications      levETIRAcetam 750 MG tablet  Commonly known as: KEPPRA  Take 2 tablets by mouth 2 times daily            CONTINUE taking these medications      aspirin 81 MG EC tablet     atorvastatin 40 MG tablet  Commonly known as: LIPITOR  Take 1 tablet by mouth in the morning.      B1 100 MG Tabs  Take 100 mg by mouth daily     carvedilol 3.125 MG tablet  Commonly known as: COREG  Take 1 tablet by mouth in the morning and 1 tablet before bedtime. folic acid 1 MG tablet  Commonly known as: FOLVITE  Take 1 tablet by mouth in the morning. hydrocortisone 1 % cream     magnesium 200 MG Tabs tablet  Take 2 tablets by mouth in the morning. melatonin 5 MG Tbdp disintegrating tablet  Take 1 tablet by mouth nightly     vitamin B-12 100 MCG tablet  Commonly known as: CYANOCOBALAMIN  Take 1 tablet by mouth in the morning. vitamin D3 25 MCG (1000 UT) Tabs tablet  Commonly known as: CHOLECALCIFEROL  Take 1 tablet by mouth in the morning. Where to Get Your Medications        These medications were sent to [facility], [address] [facility] [phone] - F [phone]  [address] [place] [id number]      Phone: [phone]   levETIRAcetam 750 MG tablet          Activity: activity as tolerated  Diet: regular diet  Wound Care: as directed    Time Spent on discharge is more than 1 hour    Huma Brown MD  PGY1, Internal Medicine  09/28/22  3:00 PM

## 2022-09-21 LAB
ALBUMIN SERPL-MCNC: 4.3 G/DL (ref 3.4–5)
ANION GAP SERPL CALCULATED.3IONS-SCNC: 16 MMOL/L (ref 3–16)
BASOPHILS ABSOLUTE: 0.1 K/UL (ref 0–0.2)
BASOPHILS RELATIVE PERCENT: 0.7 %
BUN BLDV-MCNC: 16 MG/DL (ref 7–20)
CALCIUM SERPL-MCNC: 9.8 MG/DL (ref 8.3–10.6)
CHLORIDE BLD-SCNC: 104 MMOL/L (ref 99–110)
CO2: 23 MMOL/L (ref 21–32)
CREAT SERPL-MCNC: 0.8 MG/DL (ref 0.8–1.3)
EKG ATRIAL RATE: 82 BPM
EKG DIAGNOSIS: NORMAL
EKG P AXIS: -6 DEGREES
EKG P-R INTERVAL: 172 MS
EKG Q-T INTERVAL: 432 MS
EKG QRS DURATION: 82 MS
EKG QTC CALCULATION (BAZETT): 504 MS
EKG R AXIS: -38 DEGREES
EKG T AXIS: 137 DEGREES
EKG VENTRICULAR RATE: 82 BPM
EOSINOPHILS ABSOLUTE: 0.4 K/UL (ref 0–0.6)
EOSINOPHILS RELATIVE PERCENT: 5 %
GFR AFRICAN AMERICAN: >60
GFR NON-AFRICAN AMERICAN: >60
GLUCOSE BLD-MCNC: 103 MG/DL (ref 70–99)
HCT VFR BLD CALC: 45.3 % (ref 40.5–52.5)
HEMOGLOBIN: 15.6 G/DL (ref 13.5–17.5)
LYMPHOCYTES ABSOLUTE: 2.2 K/UL (ref 1–5.1)
LYMPHOCYTES RELATIVE PERCENT: 24.9 %
MAGNESIUM: 1.9 MG/DL (ref 1.8–2.4)
MCH RBC QN AUTO: 32.1 PG (ref 26–34)
MCHC RBC AUTO-ENTMCNC: 34.4 G/DL (ref 31–36)
MCV RBC AUTO: 93.1 FL (ref 80–100)
MONOCYTES ABSOLUTE: 1.3 K/UL (ref 0–1.3)
MONOCYTES RELATIVE PERCENT: 14.3 %
NEUTROPHILS ABSOLUTE: 4.8 K/UL (ref 1.7–7.7)
NEUTROPHILS RELATIVE PERCENT: 55.1 %
PDW BLD-RTO: 14.7 % (ref 12.4–15.4)
PHOSPHORUS: 3.2 MG/DL (ref 2.5–4.9)
PLATELET # BLD: 271 K/UL (ref 135–450)
PMV BLD AUTO: 9 FL (ref 5–10.5)
POTASSIUM SERPL-SCNC: 3.8 MMOL/L (ref 3.5–5.1)
RBC # BLD: 4.87 M/UL (ref 4.2–5.9)
SODIUM BLD-SCNC: 143 MMOL/L (ref 136–145)
WBC # BLD: 8.7 K/UL (ref 4–11)

## 2022-09-21 PROCEDURE — 80069 RENAL FUNCTION PANEL: CPT

## 2022-09-21 PROCEDURE — 6370000000 HC RX 637 (ALT 250 FOR IP)

## 2022-09-21 PROCEDURE — 1200000000 HC SEMI PRIVATE

## 2022-09-21 PROCEDURE — 6370000000 HC RX 637 (ALT 250 FOR IP): Performed by: STUDENT IN AN ORGANIZED HEALTH CARE EDUCATION/TRAINING PROGRAM

## 2022-09-21 PROCEDURE — 36415 COLL VENOUS BLD VENIPUNCTURE: CPT

## 2022-09-21 PROCEDURE — 93005 ELECTROCARDIOGRAM TRACING: CPT

## 2022-09-21 PROCEDURE — 93010 ELECTROCARDIOGRAM REPORT: CPT | Performed by: INTERNAL MEDICINE

## 2022-09-21 PROCEDURE — 6360000002 HC RX W HCPCS: Performed by: STUDENT IN AN ORGANIZED HEALTH CARE EDUCATION/TRAINING PROGRAM

## 2022-09-21 PROCEDURE — 83735 ASSAY OF MAGNESIUM: CPT

## 2022-09-21 PROCEDURE — 6370000000 HC RX 637 (ALT 250 FOR IP): Performed by: NURSE PRACTITIONER

## 2022-09-21 PROCEDURE — 85025 COMPLETE CBC W/AUTO DIFF WBC: CPT

## 2022-09-21 PROCEDURE — 6370000000 HC RX 637 (ALT 250 FOR IP): Performed by: INTERNAL MEDICINE

## 2022-09-21 RX ORDER — MAGNESIUM SULFATE IN WATER 40 MG/ML
2000 INJECTION, SOLUTION INTRAVENOUS ONCE
Status: DISCONTINUED | OUTPATIENT
Start: 2022-09-21 | End: 2022-09-21

## 2022-09-21 RX ORDER — LORAZEPAM 0.5 MG/1
0.5 TABLET ORAL EVERY 4 HOURS PRN
Status: DISCONTINUED | OUTPATIENT
Start: 2022-09-21 | End: 2022-09-25

## 2022-09-21 RX ORDER — LANOLIN ALCOHOL/MO/W.PET/CERES
400 CREAM (GRAM) TOPICAL ONCE
Status: COMPLETED | OUTPATIENT
Start: 2022-09-21 | End: 2022-09-21

## 2022-09-21 RX ORDER — OLANZAPINE 5 MG/1
5 TABLET ORAL 2 TIMES DAILY
Status: DISCONTINUED | OUTPATIENT
Start: 2022-09-21 | End: 2022-09-23

## 2022-09-21 RX ORDER — OLANZAPINE 10 MG/1
5 INJECTION, POWDER, LYOPHILIZED, FOR SOLUTION INTRAMUSCULAR ONCE
Status: COMPLETED | OUTPATIENT
Start: 2022-09-21 | End: 2022-09-22

## 2022-09-21 RX ADMIN — FAMOTIDINE 20 MG: 20 TABLET ORAL at 20:10

## 2022-09-21 RX ADMIN — ACETAMINOPHEN 650 MG: 325 TABLET, FILM COATED ORAL at 20:10

## 2022-09-21 RX ADMIN — THIAMINE HCL TAB 100 MG 100 MG: 100 TAB at 11:17

## 2022-09-21 RX ADMIN — ZIPRASIDONE HYDROCHLORIDE 20 MG: 20 CAPSULE ORAL at 18:20

## 2022-09-21 RX ADMIN — LORAZEPAM 0.5 MG: 0.5 TABLET ORAL at 00:58

## 2022-09-21 RX ADMIN — DIPHENHYDRAMINE HYDROCHLORIDE 25 MG: 25 TABLET ORAL at 03:09

## 2022-09-21 RX ADMIN — ZIPRASIDONE HYDROCHLORIDE 20 MG: 20 CAPSULE ORAL at 03:10

## 2022-09-21 RX ADMIN — CARVEDILOL 3.12 MG: 3.12 TABLET, FILM COATED ORAL at 17:58

## 2022-09-21 RX ADMIN — LORAZEPAM 0.5 MG: 0.5 TABLET ORAL at 20:10

## 2022-09-21 RX ADMIN — HALOPERIDOL 1 MG: 1 TABLET ORAL at 11:17

## 2022-09-21 RX ADMIN — Medication 400 MG: at 11:17

## 2022-09-21 RX ADMIN — LORAZEPAM 0.5 MG: 0.5 TABLET ORAL at 05:05

## 2022-09-21 RX ADMIN — OLANZAPINE 5 MG: 5 TABLET, FILM COATED ORAL at 20:10

## 2022-09-21 RX ADMIN — OLANZAPINE 5 MG: 5 TABLET, FILM COATED ORAL at 16:03

## 2022-09-21 RX ADMIN — Medication 5 MG: at 20:10

## 2022-09-21 RX ADMIN — FAMOTIDINE 20 MG: 20 TABLET ORAL at 11:17

## 2022-09-21 RX ADMIN — LEVETIRACETAM 1500 MG: 750 TABLET, FILM COATED ORAL at 20:09

## 2022-09-21 RX ADMIN — DIPHENHYDRAMINE HYDROCHLORIDE 25 MG: 25 TABLET ORAL at 20:10

## 2022-09-21 RX ADMIN — LEVETIRACETAM 1500 MG: 750 TABLET, FILM COATED ORAL at 11:16

## 2022-09-21 RX ADMIN — ENOXAPARIN SODIUM 40 MG: 100 INJECTION SUBCUTANEOUS at 11:16

## 2022-09-21 RX ADMIN — CARVEDILOL 3.12 MG: 3.12 TABLET, FILM COATED ORAL at 11:17

## 2022-09-21 ASSESSMENT — PAIN SCALES - GENERAL
PAINLEVEL_OUTOF10: 3
PAINLEVEL_OUTOF10: 0

## 2022-09-21 ASSESSMENT — PAIN DESCRIPTION - LOCATION: LOCATION: GENERALIZED

## 2022-09-21 ASSESSMENT — PAIN DESCRIPTION - DESCRIPTORS: DESCRIPTORS: ACHING

## 2022-09-21 NOTE — PROGRESS NOTES
Progress Note    Admit Date: 9/1/2022  Day: 17  Diet: ADULT ORAL NUTRITION SUPPLEMENT; Breakfast, Lunch, Dinner; Standard High Calorie/High Protein Oral Supplement  ADULT DIET; Easy to Chew; 3 carb choices (45 gm/meal)    Interval history:     Agitated overnight, reported to be thinking he is at bar, not redirectable and trying to get out of bed. Required restraints and PO Ativan PRN added. Pt continues on scheduled Haldol and PRN Geodon. Pt seen at bedside, pleasantly sleeping in bed. Likely due to effects of Ativan given overnight for agitations. Will f/u. Afebrile, HR 80-90s, increases to low 100s, BP stable, satting upper 90s on RA. Labs reviewed, Cr/lytes WNL, Hgb/WBC WNL. EKG shows QTC to 500, will consider changing around psych medications given pt on Haldol/Geodon. HPI:  The patient is a 58-year-old man with past medical history significant for chronic alcoholism, ICH, CAD, DVT who presented today to the ED with an episode of syncope. The history was mainly obtained by the sister since patient was intubated. He was last known well at 9 AM but the sister. According to the sister, she had just returned home from work which she had found him half lying down on the couch covered in the stool while his car outside was running outside with occasion. She denies any complaints of recent fever, chills, chest pain cough, shortness of breath, lightheadedness, palpitations, nausea, vomiting, abdominal pain, diarrhea, fatigue, visual disturbance, changes in urination frequency or burning pain well urination or headaches by the patient in the preceding days to this incident. She does reinstate that her brother is a chronic alcoholic and had just gotten out of rehab. He has been sober since however she was not aware if he had recently had any alcoholic drink. He had also informed that he had recent episode of intracerebral hemorrhage on 9/4/2021.       In the ED, there was a concern for possibility of a stroke however he was not considered a thrombolytic candidate because of his previous ICH and long time since his last known well. According to the ED staff his physical exam was more pertinent for nonfocal delirium. It was also significant for nystagmus and rightward gaze not fixed. He was given 2 mg of Versed and it had worsened his oxygen saturations that had dropped to 88% on a nonrebreather and therefore he was intubated. CT Abdomen, chest: Moderate dependent atelectasis bilaterally. No acute abnormality on noncontrast CT of the abdomen and pelvis. Cholelithiasis. CTA Head: No acute CTA findings. No hemodynamically significant stenosis or focal aneurysm. No arteriovenous confirmation. CTA Neck: No acute CTA findings. No hemodynamically significant stenosis or focal aneurysm. Patient was admitted to the ICU for further workup and management of his possible meningitis, on MV.     Medications:     Scheduled Meds:   magnesium oxide  400 mg Oral Once    famotidine  20 mg Oral BID    levETIRAcetam  1,500 mg Oral BID    haloperidol  1 mg Oral TID    thiamine mononitrate  100 mg Oral Daily    carvedilol  3.125 mg Oral BID WC    melatonin  5 mg Oral Nightly    enoxaparin  40 mg SubCUTAneous Daily    lidocaine PF  5 mL IntraDERmal Once    sodium chloride flush  5-40 mL IntraVENous 2 times per day     Continuous Infusions:   dextrose      dexmedetomidine (PRECEDEX) IV infusion Stopped (09/17/22 0634)    sodium chloride 10 mL/hr at 09/17/22 0637     PRN Meds:LORazepam, promethazine, diphenhydrAMINE, ziprasidone, labetalol, glucose, dextrose bolus **OR** dextrose bolus, glucagon (rDNA), dextrose, sodium chloride flush, sodium chloride, polyethylene glycol, acetaminophen **OR** acetaminophen    Objective:   Vitals:   T-max:  Patient Vitals for the past 8 hrs:   BP Temp Temp src Pulse Resp Weight   09/21/22 0600 -- -- -- 82 20 --   09/21/22 0508 120/79 -- -- (!) 103 28 --   09/21/22 0419 -- -- -- -- -- 186 lb 8.2 oz (84.6 kg)   09/21/22 0400 120/79 98.5 °F (36.9 °C) Oral 90 19 --       Intake/Output Summary (Last 24 hours) at 9/21/2022 1005  Last data filed at 9/20/2022 2249  Gross per 24 hour   Intake --   Output 150 ml   Net -150 ml       Physical Exam  Constitutional:       General: He is not in acute distress. HENT:      Mouth/Throat:      Mouth: Mucous membranes are dry. Eyes:      Pupils: Pupils are equal, round, and reactive to light. Comments: Uncooperative with EOM exam   Cardiovascular:      Rate and Rhythm: Regular rhythm. Tachycardia present. Pulses: Normal pulses. Heart sounds: Normal heart sounds. Pulmonary:      Effort: Pulmonary effort is normal.      Breath sounds: Normal breath sounds. Abdominal:      Tenderness: There is no abdominal tenderness. Musculoskeletal:      Right lower leg: No edema. Left lower leg: No edema. Skin:     Coloration: Skin is not jaundiced. Neurological:      Comments: Oriented to person, but not to place, time or situation. LABS:    CBC:   Recent Labs     09/19/22 0322 09/20/22 0329 09/21/22  0559   WBC 7.4 8.2 8.7   HGB 15.1 15.0 15.6   HCT 44.4 44.6 45.3    256 271   MCV 94.3 95.0 93.1     Renal:    Recent Labs     09/19/22 0322 09/20/22 0329 09/21/22  0559    144 143   K 3.9 4.5 3.8    105 104   CO2 23 23 23   BUN 7 13 16   CREATININE 0.7* 0.9 0.8   GLUCOSE 90 99 103*   CALCIUM 9.2 9.5 9.8   MG 2.20 1.70* 1.90   PHOS 4.1 4.7 3.2   ANIONGAP 16 16 16     Hepatic:   Recent Labs     09/19/22 0322 09/20/22 0329 09/21/22  0559   LABALBU 4.1 4.1 4.3     Troponin: No results for input(s): TROPONINI in the last 72 hours. BNP: No results for input(s): BNP in the last 72 hours. Lipids: No results for input(s): CHOL, HDL in the last 72 hours. Invalid input(s): LDLCALCU, TRIGLYCERIDE  ABGs:  No results for input(s): PHART, HXA7WZE, PO2ART, VJC6ZWN, BEART, THGBART, K2ZPAFNY, RNV6NOB in the last 72 hours.     INR: No results for findings. No hemodynamically significant stenosis or focal aneurysm. CT HEAD WO CONTRAST   Final Result      1. No findings for acute intracranial abnormality. 2.  Age-related atrophy with patchy periventricular white matter changes bilaterally consistent with chronic small vessel ischemia. 3.  Stable low attenuation left frontoparietal lobe consistent with previous insult from known prior intraparenchymal hematoma. Stable right frontoparietal cortical infarct. These findings were called to the emergency room physician Dr. Angie Sol on 9/1/2022 at 5:30 p.m. XR CHEST PORTABLE   Final Result   1. No findings for acute cardiopulmonary disease. 2.  ET tube in good position in the mid trachea. Assessment/Plan:     Acute metabolic encephalopathy(unclear etiology)-improving  On presentation AMS, elevated WBC: 21.7, Tachypneic, Tachycardic, Hypotensive. Hx of ICH, SAH and SDH 2/2 to fall, hx of chronic alcohol abuse,   CT head: prior Intraparenchymal hematoma. Stable right frontoparietal cortical infarct. CT Abdomen, chest: mod atelectasis bilaterally. No acute abnorms on CT abd except stable cholelithiasis   CTA Head: No acute CTA findings. CTA Neck: No acute CTA findings. MRI: Faint diffusion restriction in the right hippocampus with associated FLAIR signal abnormality. This is favored to represent sequelae of seizures. cEEG readings previously: Generalized background abnormalities indicative of an underlying diffuse encephalopathy of non-specific etiology. Intermittent focal epileptiform discharges, right posterior temporal, conferring increased risk of focal onset seizures from this region.  -Lumbar puncture ordered 9/6/2022: No growth. -Blood Cultures were -ve x2  -On Keppra 1500mg bid  - Zyprexa/Seroquel d/c - now on Haldol 1mg TID per psych.   - inpatient psych consulted - no inpatient psych upon discharge warranted, condition due to medical issues per Pysch.    - Palliative consult, able to contact family (multiple siblings), all agreed that sister (present on admission) can make decision for patient. Acute sinus tachycardia  Intermittently tachycardic into the 120s with stable pressures  -Most likely secondary to dehydration and poor p.o. intake  -Could also consider PE, however patient is showing no respiratory distress, denies chest pain, on room air, not tachypneic  -Will get an EKG to rule out other causes  -500 cc LR bolus to monitor for response. Alcohol use disorder  Ethanol level: none detected  -thiamine 100 mg daily, changed to PO  -monitor for withdrawal     Acute hypercapnic respiratory failure secondary to possible aspiration vs 2/2 medication (Extubated successfully 9/10)  On presentation: VBG PH: 7.264, PCO2: 56.4, 83.2    CAD s/p stent 2020  Hx of Mural thrombus  - previous Eliquis/Plavix stopped due to frequent falls 2/2 chronic alcoholism  - [facility] 09/2021 suggesting acute and chronic ICH/SAH/SDH     Hx of Cirrhosis  -Ammonia: 36 wnl(09/1/22), LFTs WNL.     Hypertension  -Resumed home carvedilol dose 3.125 mg twice daily 9/14    Code Status: Full Code  FEN: Easy chew diet, oral nutrition supplement  PPX: Lovenox  DISPO: s/o ICU, now Kadie Noyola MD, PGY-1  09/21/22  10:05 AM    This patient has been staffed and discussed with Rubia Alejandra MD.

## 2022-09-21 NOTE — PROGRESS NOTES
Pt. Started pushing to get out of bed and was not redirectable. Pt. Very agitated and verbally aggressive. Restraints ordered and applied.

## 2022-09-21 NOTE — PROGRESS NOTES
Pt. Pulled IV out during dayshift. Went to place a new IV and was unsuccessful. Another nurse tried and was unsuccessful. Hospitalist made aware of the unsuccessfully placement. Suggested a PICC consult to be placed for the morning if IV access still reviewed as needed. Rash noted on arms and face, Hospitalist made aware of this too.

## 2022-09-21 NOTE — PROGRESS NOTES
Pt. Very agitated and thinks he is at a bar. Pt. Continues to get out of bed and is not redirectable. [facility] residents are made aware and prn ordered will continue to monitor.

## 2022-09-21 NOTE — PROGRESS NOTES
Palliative Care Chart Review  and Check in Note:     NAME:  Sharmin Yeboah  Admit Date: 9/1/2022  Hospital Day:  Hospital Day: 21   Current Code status: Full Code    Palliative care is continuing to following Mr. Lucero Mariee for symptom management, and goals of care discussion as needed. Patient's chart reviewed today 9/21/22. Saw Raulito Yeboah at the bedside this morning, eating breakfast. He is less alert than a couple days ago, not participating in as much conversation. Per nursing notes, overnight, he was agitated and required PRN Ativan and Geodon. Per psych, Artur's mental status is d/t delirium. I encouraged nursing staff to keep Raulito Yeboah awake throughout the day to promote sleep/wake cycle. Helping him with daily cares, shower, shaving, etc., may be helpful for treating delirium. The following are the currently established goals/code status, and Symptom management. Goals of care: Continue current medical management.      Code status: Full    Discharge plan: TBD - pending hospital course       MERVIN Lopez - KARLA  09/21/22  2:41 PM

## 2022-09-21 NOTE — PLAN OF CARE
Problem: Safety - Medical Restraint  Goal: Remains free of injury from restraints (Restraint for Interference with Medical Device)  Description: INTERVENTIONS:  1. Determine that other, less restrictive measures have been tried or would not be effective before applying the restraint  2. Evaluate the patient's condition at the time of restraint application  3. Inform patient/family regarding the reason for restraint  4.  Q2H: Monitor safety, psychosocial status, comfort, nutrition and hydration  9/21/2022 0626 by Ester Hamilton RN  Outcome: Progressing  9/21/2022 0625 by Ester Hamilton RN  Reactivated  Flowsheets  Taken 9/21/2022 0600  Remains free of injury from restraints (restraint for interference with medical device): Determine that other, less restrictive measures have been tried or would not be effective before applying the restraint  Taken 9/21/2022 0400  Remains free of injury from restraints (restraint for interference with medical device): Determine that other, less restrictive measures have been tried or would not be effective before applying the restraint  Taken 9/21/2022 0200  Remains free of injury from restraints (restraint for interference with medical device): Determine that other, less restrictive measures have been tried or would not be effective before applying the restraint  Taken 9/21/2022 0127  Remains free of injury from restraints (restraint for interference with medical device): Determine that other, less restrictive measures have been tried or would not be effective before applying the restraint     Problem: Discharge Planning  Goal: Discharge to home or other facility with appropriate resources  Outcome: Progressing     Problem: Safety - Adult  Goal: Free from fall injury  Outcome: Progressing  Flowsheets (Taken 9/20/2022 2128)  Free From Fall Injury: Instruct family/caregiver on patient safety     Problem: ABCDS Injury Assessment  Goal: Absence of physical injury  Outcome: Progressing  Flowsheets (Taken 9/20/2022 2128)  Absence of Physical Injury: Implement safety measures based on patient assessment     Problem: Chronic Conditions and Co-morbidities  Goal: Patient's chronic conditions and co-morbidity symptoms are monitored and maintained or improved  Outcome: Progressing     Problem: Nutrition Deficit:  Goal: Optimize nutritional status  Outcome: Progressing

## 2022-09-21 NOTE — CARE COORDINATION
Case management is following for discharge planning. The chart was reviewed. AMS persists. Psych was consulted. No IP psych recommended for DC. Antipsychotic medications changed to Haldol 1mg TD for behavior management. The pt was agitated overnight and placed in restraints and he has a sitter. Therapy has recommended SNF at discharge. The pt's sister, Brittaney Barnhart, is helping with decision-making. [phone]. Will reach out to discuss facilities for post-cute care. PT AM-PAC: 15 / 24 per last evaluation on: 9/20    OT AM-PAC: 16 / 24 per last evaluation on: 9/20    Kar Cherry and his family were provided with choice of provider; he and his family are in agreement with the discharge plan.     Care Transition Patient: Yes    Nima Harper RN  [facility]  Case Management Department  [phone]

## 2022-09-22 LAB
ALBUMIN SERPL-MCNC: 4.2 G/DL (ref 3.4–5)
ANION GAP SERPL CALCULATED.3IONS-SCNC: 19 MMOL/L (ref 3–16)
BASOPHILS ABSOLUTE: 0 K/UL (ref 0–0.2)
BASOPHILS RELATIVE PERCENT: 0.6 %
BUN BLDV-MCNC: 16 MG/DL (ref 7–20)
CALCIUM SERPL-MCNC: 9.7 MG/DL (ref 8.3–10.6)
CHLORIDE BLD-SCNC: 105 MMOL/L (ref 99–110)
CO2: 16 MMOL/L (ref 21–32)
CREAT SERPL-MCNC: 0.7 MG/DL (ref 0.8–1.3)
EKG ATRIAL RATE: 71 BPM
EKG DIAGNOSIS: NORMAL
EKG P AXIS: 2 DEGREES
EKG P-R INTERVAL: 174 MS
EKG Q-T INTERVAL: 448 MS
EKG QRS DURATION: 84 MS
EKG QTC CALCULATION (BAZETT): 486 MS
EKG R AXIS: -38 DEGREES
EKG T AXIS: 131 DEGREES
EKG VENTRICULAR RATE: 71 BPM
EOSINOPHILS ABSOLUTE: 0.4 K/UL (ref 0–0.6)
EOSINOPHILS RELATIVE PERCENT: 5.5 %
GFR AFRICAN AMERICAN: >60
GFR NON-AFRICAN AMERICAN: >60
GLUCOSE BLD-MCNC: 92 MG/DL (ref 70–99)
HCT VFR BLD CALC: 48.5 % (ref 40.5–52.5)
HEMOGLOBIN: 16.1 G/DL (ref 13.5–17.5)
LYMPHOCYTES ABSOLUTE: 2.1 K/UL (ref 1–5.1)
LYMPHOCYTES RELATIVE PERCENT: 28.8 %
MAGNESIUM: 1.6 MG/DL (ref 1.8–2.4)
MCH RBC QN AUTO: 31.7 PG (ref 26–34)
MCHC RBC AUTO-ENTMCNC: 33.2 G/DL (ref 31–36)
MCV RBC AUTO: 95.5 FL (ref 80–100)
MONOCYTES ABSOLUTE: 1 K/UL (ref 0–1.3)
MONOCYTES RELATIVE PERCENT: 14.1 %
NEUTROPHILS ABSOLUTE: 3.7 K/UL (ref 1.7–7.7)
NEUTROPHILS RELATIVE PERCENT: 51 %
PDW BLD-RTO: 14.1 % (ref 12.4–15.4)
PHOSPHORUS: 4.4 MG/DL (ref 2.5–4.9)
PLATELET # BLD: 261 K/UL (ref 135–450)
PMV BLD AUTO: 9.5 FL (ref 5–10.5)
POTASSIUM SERPL-SCNC: 4.2 MMOL/L (ref 3.5–5.1)
RBC # BLD: 5.08 M/UL (ref 4.2–5.9)
SODIUM BLD-SCNC: 140 MMOL/L (ref 136–145)
WBC # BLD: 7.2 K/UL (ref 4–11)

## 2022-09-22 PROCEDURE — 83735 ASSAY OF MAGNESIUM: CPT

## 2022-09-22 PROCEDURE — 94761 N-INVAS EAR/PLS OXIMETRY MLT: CPT

## 2022-09-22 PROCEDURE — 99232 SBSQ HOSP IP/OBS MODERATE 35: CPT | Performed by: NURSE PRACTITIONER

## 2022-09-22 PROCEDURE — 93010 ELECTROCARDIOGRAM REPORT: CPT | Performed by: INTERNAL MEDICINE

## 2022-09-22 PROCEDURE — 6370000000 HC RX 637 (ALT 250 FOR IP)

## 2022-09-22 PROCEDURE — 6360000002 HC RX W HCPCS: Performed by: STUDENT IN AN ORGANIZED HEALTH CARE EDUCATION/TRAINING PROGRAM

## 2022-09-22 PROCEDURE — 1200000000 HC SEMI PRIVATE

## 2022-09-22 PROCEDURE — 85025 COMPLETE CBC W/AUTO DIFF WBC: CPT

## 2022-09-22 PROCEDURE — 80069 RENAL FUNCTION PANEL: CPT

## 2022-09-22 PROCEDURE — 6370000000 HC RX 637 (ALT 250 FOR IP): Performed by: STUDENT IN AN ORGANIZED HEALTH CARE EDUCATION/TRAINING PROGRAM

## 2022-09-22 PROCEDURE — 36415 COLL VENOUS BLD VENIPUNCTURE: CPT

## 2022-09-22 PROCEDURE — 93005 ELECTROCARDIOGRAM TRACING: CPT

## 2022-09-22 PROCEDURE — 6370000000 HC RX 637 (ALT 250 FOR IP): Performed by: INTERNAL MEDICINE

## 2022-09-22 PROCEDURE — 2500000003 HC RX 250 WO HCPCS: Performed by: INTERNAL MEDICINE

## 2022-09-22 RX ORDER — LANOLIN ALCOHOL/MO/W.PET/CERES
400 CREAM (GRAM) TOPICAL 2 TIMES DAILY
Status: COMPLETED | OUTPATIENT
Start: 2022-09-22 | End: 2022-09-22

## 2022-09-22 RX ORDER — MAGNESIUM SULFATE IN WATER 40 MG/ML
2000 INJECTION, SOLUTION INTRAVENOUS ONCE
Status: DISCONTINUED | OUTPATIENT
Start: 2022-09-22 | End: 2022-09-22

## 2022-09-22 RX ADMIN — LEVETIRACETAM 1500 MG: 750 TABLET, FILM COATED ORAL at 08:02

## 2022-09-22 RX ADMIN — LORAZEPAM 0.5 MG: 0.5 TABLET ORAL at 20:19

## 2022-09-22 RX ADMIN — Medication 400 MG: at 08:02

## 2022-09-22 RX ADMIN — FAMOTIDINE 20 MG: 20 TABLET ORAL at 20:18

## 2022-09-22 RX ADMIN — ACETAMINOPHEN 650 MG: 325 TABLET, FILM COATED ORAL at 20:19

## 2022-09-22 RX ADMIN — Medication 400 MG: at 20:19

## 2022-09-22 RX ADMIN — ZIPRASIDONE HYDROCHLORIDE 20 MG: 20 CAPSULE ORAL at 05:02

## 2022-09-22 RX ADMIN — CARVEDILOL 3.12 MG: 3.12 TABLET, FILM COATED ORAL at 08:03

## 2022-09-22 RX ADMIN — ENOXAPARIN SODIUM 40 MG: 100 INJECTION SUBCUTANEOUS at 08:02

## 2022-09-22 RX ADMIN — DIPHENHYDRAMINE HYDROCHLORIDE 25 MG: 25 TABLET ORAL at 05:02

## 2022-09-22 RX ADMIN — Medication 5 MG: at 20:19

## 2022-09-22 RX ADMIN — DIPHENHYDRAMINE HYDROCHLORIDE 25 MG: 25 TABLET ORAL at 20:19

## 2022-09-22 RX ADMIN — CARVEDILOL 3.12 MG: 3.12 TABLET, FILM COATED ORAL at 16:38

## 2022-09-22 RX ADMIN — OLANZAPINE 5 MG: 5 TABLET, FILM COATED ORAL at 20:19

## 2022-09-22 RX ADMIN — OLANZAPINE 5 MG: 5 TABLET, FILM COATED ORAL at 08:02

## 2022-09-22 RX ADMIN — OLANZAPINE 5 MG: 10 INJECTION, POWDER, LYOPHILIZED, FOR SOLUTION INTRAMUSCULAR at 00:31

## 2022-09-22 RX ADMIN — THIAMINE HCL TAB 100 MG 100 MG: 100 TAB at 08:03

## 2022-09-22 RX ADMIN — FAMOTIDINE 20 MG: 20 TABLET ORAL at 08:02

## 2022-09-22 RX ADMIN — LEVETIRACETAM 1500 MG: 750 TABLET, FILM COATED ORAL at 21:00

## 2022-09-22 ASSESSMENT — PAIN SCALES - GENERAL: PAINLEVEL_OUTOF10: 1

## 2022-09-22 NOTE — PROGRESS NOTES
2300 Pt was helped up by staff to bathroom. Pt being agitated attempted to grab writer and pull him close enough to strike. Pt then changed his mind about needing toilet and proceeded to walk back to bed, still needing assistance to walk. Then as pt got to bed, was refusing to get in bed cause of all the ants in the bed (no ants in the bed). Pt almost fell and was forced in the bed, pt then \"flopped around and reaching to pull things down to make it look extra dramatic (grabbed for keyboard and managed to knock scanner onto the floor). Pt's agitation and aggression are getting worse as shift progresses. All PRN medications have been given. [address] for further medications for intervention. 2320 Zyprexa 5 mg IM was ordered. 0030 Pt awake and immediately agitated. IM Zyprexa to be administered. 0230 Pt was able to sleep for about 1.5 hours after IM Zyprexa.     0510 Pt c/o itchiness and was agitated and restless, benadryl and Geodon given    0615 Pt continues to be restless in bed, follows some commands, still hallucinating

## 2022-09-22 NOTE — BH NOTE
Psychiatry Consult Inpatient Follow Up  [facility] [place], [place], [address] [place] [address]  9/22/2022  4:37 PM    Patient Name: Magda Christopher  MRN: [id number]  Admission Date: 9/1/2022    Initial reason for consult/CC: Encephalopathy, still unclear etiology  Referring Provider:  Bianca Chaves MD    Dx:   Primary Psychiatric (DSM V) Diagnosis: Altered Mental Status (r/o delirium)  Secondary Psychiatric (DSM V) Diagnoses: none  Chemical Dependency Diagnoses: hx of alcohol abuse     Assessment  Reviewed nursing and ancillary staff notes since arrival to hospital, reviewed previous records and records available through [facility], [facility]. Evaluated medications and assessed for side effects and effectiveness. Assessed patient's educational needs including reviewing plan of care, medications, and diagnosis. Recommendations:   Cont zyprexa but increase from 5 mg BID to 5 mg in am and 7.5 mg at bedtime to assist with sleep and lessen amount of PRN medication that is needed. Most recent QTC was 486 at [address] [place] [address] Rd., [address] on 9/22/2022. Will also decrease geodon 20 mg PRN to every 12 hours, instead of the every 8 hours as currently ordered. May need to decrease further if QTC increases again. Is not appropriate for inpatient psychiatric admission. Needs time for his delirium to clear. Continue all current medical treatments as ordered per medical team.   Psychiatry will cont to follow as long as pt has sitter at bedside due to safety concerns of him attempting to get out of bed or chair at bedside unattended. ----------------------------------------------------------------------------------------------------------  I have reviewed recent documentation and case was discussed with treatment team members:  Magda Christopher is a 57 y.o. male who originally presented to the hospital on 09/01/2022 with a chief complaint of altered mental status.  Was admitted and since that time he has had an extensive workup for etiology of encephalopathy but no clear cause has been determined as of yet. Initial psychiatric consultation completed 09/19/2022. Was on both seroquel 250 mg and zyprexa 5 mg both daily when this initial consult was completed. Medication changed to haldol 1 mg TID with most recent QTC measuring 474. After receiving the haldol for two days and geodon 20 mg PRN, QTC was up to 500. Subjective/Interval Hx: Medication was changed again from haldol/geodon to zyprexa 5 mg BID on 09/21/2022. Then QTC on 09/22/2022 at 0630 am was down to 486. No SNF placements have been made at present as pt remains agitated needing multiple PRN medications, and requiring sitter at bedside. Behaviors in the past 24 hours: Pt cont to have delusions and AVH as yesterday's shift progressed, requiring frequent interventions from sitter and RN to accommodate pt and keep him from wanting to get up and out of bed to leave. At 2300 nurse documented agitation and aggression getting worse as shift progresses even after all PRN medications were given. At [facility], IM zyprexa was given and slept for 1.5 hours, 0510 noted to be itching and agitated requiring more PRN medications including benadryl and geodon. Location:  [facility]  Severity: moderate to severe  Context:  As above. Modifiers: medication switched from haldol 1 mg TID to zyprexa 5 mg BID. Quality: agitation    I met with pt in his room with sitter at his bedside. Pt continues to remain alert but is only oriented to name. Cont to have AVH and delusions. Speech is mumbled and difficult to understand most of the time. Pt was able to follow limited directions from sitter and did not attempt to fully get out of bed. Put his legs back in bed when told by sitter to do so. I was able to only make out a few words pt was telling me. He continues to speak about work and completing tasks. 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   I informed him he must have been a hard worker and he stated that it gave him something to do. I ask ed him if he was able to sleep last night and he replied no. It appears as though he is able to answer minimal questions at times. Spoke with his nurse who reported that pt cont to not be able to be alone without sitter due to pt always trying to get out of bed or his chair. RN stated that without sitter present, pt would surely injure himself in some way. Objective:  Vitals:    09/22/22 0725 09/22/22 1104 09/22/22 1245 09/22/22 1609   BP: (!) 133/91  117/79 118/73   Pulse: 90 (!) 105 (!) 104 (!) 111   Resp: 30 23 25 18   Temp:   97.6 °F (36.4 °C) 97.4 °F (36.3 °C)   TempSrc:   Oral Oral   SpO2: 98%      Weight:       Height:          Body mass index is 26.76 kg/m².     Recent Results (from the past 168 hour(s))   CBC with Auto Differential    Collection Time: 09/16/22  2:45 AM   Result Value Ref Range    WBC 9.8 4.0 - 11.0 K/uL    RBC 4.44 4.20 - 5.90 M/uL    Hemoglobin 14.4 13.5 - 17.5 g/dL    Hematocrit 41.9 40.5 - 52.5 %    MCV 94.2 80.0 - 100.0 fL    MCH 32.4 26.0 - 34.0 pg    MCHC 34.3 31.0 - 36.0 g/dL    RDW 14.4 12.4 - 15.4 %    Platelets 300 216 - 609 K/uL    MPV 8.6 5.0 - 10.5 fL    Neutrophils % 63.4 %    Lymphocytes % 23.5 %    Monocytes % 8.9 %    Eosinophils % 3.0 %    Basophils % 1.2 %    Neutrophils Absolute 6.2 1.7 - 7.7 K/uL    Lymphocytes Absolute 2.3 1.0 - 5.1 K/uL    Monocytes Absolute 0.9 0.0 - 1.3 K/uL    Eosinophils Absolute 0.3 0.0 - 0.6 K/uL    Basophils Absolute 0.1 0.0 - 0.2 K/uL   Renal Function Panel    Collection Time: 09/16/22  2:45 AM   Result Value Ref Range    Sodium 141 136 - 145 mmol/L    Potassium 4.1 3.5 - 5.1 mmol/L    Chloride 107 99 - 110 mmol/L    CO2 18 (L) 21 - 32 mmol/L    Anion Gap 16 3 - 16    Glucose 98 70 - 99 mg/dL    BUN 6 (L) 7 - 20 mg/dL    Creatinine 0.5 (L) 0.8 - 1.3 mg/dL    GFR Non-African American >60 >60    GFR African American >60 >60    Calcium 8.9 8.3 - 10.6 mg/dL    Phosphorus 3.1 2.5 - 4.9 mg/dL    Albumin 3.6 3.4 - 5.0 g/dL   Magnesium    Collection Time: 09/16/22  2:45 AM   Result Value Ref Range    Magnesium 1.60 (L) 1.80 - 2.40 mg/dL   CBC with Auto Differential    Collection Time: 09/17/22  9:22 AM   Result Value Ref Range    WBC 6.8 4.0 - 11.0 K/uL    RBC 4.68 4.20 - 5.90 M/uL    Hemoglobin 15.4 13.5 - 17.5 g/dL    Hematocrit 43.5 40.5 - 52.5 %    MCV 92.9 80.0 - 100.0 fL    MCH 32.8 26.0 - 34.0 pg    MCHC 35.3 31.0 - 36.0 g/dL    RDW 14.3 12.4 - 15.4 %    Platelets 104 860 - 777 K/uL    MPV 8.5 5.0 - 10.5 fL    Neutrophils % 60.7 %    Lymphocytes % 24.9 %    Monocytes % 9.6 %    Eosinophils % 3.7 %    Basophils % 1.1 %    Neutrophils Absolute 4.1 1.7 - 7.7 K/uL    Lymphocytes Absolute 1.7 1.0 - 5.1 K/uL    Monocytes Absolute 0.7 0.0 - 1.3 K/uL    Eosinophils Absolute 0.3 0.0 - 0.6 K/uL    Basophils Absolute 0.1 0.0 - 0.2 K/uL   Renal Function Panel    Collection Time: 09/17/22  9:22 AM   Result Value Ref Range    Sodium 142 136 - 145 mmol/L    Potassium 3.9 3.5 - 5.1 mmol/L    Chloride 107 99 - 110 mmol/L    CO2 21 21 - 32 mmol/L    Anion Gap 14 3 - 16    Glucose 90 70 - 99 mg/dL    BUN 7 7 - 20 mg/dL    Creatinine 0.6 (L) 0.8 - 1.3 mg/dL    GFR Non-African American >60 >60    GFR African American >60 >60    Calcium 9.3 8.3 - 10.6 mg/dL    Phosphorus 3.3 2.5 - 4.9 mg/dL    Albumin 3.9 3.4 - 5.0 g/dL   Magnesium    Collection Time: 09/17/22  9:22 AM   Result Value Ref Range    Magnesium 1.40 (L) 1.80 - 2.40 mg/dL   EKG 12 Lead    Collection Time: 09/17/22 10:58 AM   Result Value Ref Range    Ventricular Rate 64 BPM    Atrial Rate 64 BPM    P-R Interval 164 ms    QRS Duration 82 ms    Q-T Interval 422 ms    QTc Calculation (Bazett) 435 ms    P Axis 19 degrees    R Axis -40 degrees    T Axis 112 degrees    Diagnosis       Normal sinus rhythmLeft axis deviationMinimal voltage criteria for LVH, may be normal variantSeptal infarct , age undeterminedT wave abnormality, consider lateral ischemiaAbnormal ECGConfirmed by [facility] - DIANA QUIÑONES MD ([id number]) on 9/18/2022 9:09:59 AM   EKG 12 Lead    Collection Time: 09/18/22 10:28 AM   Result Value Ref Range    Ventricular Rate 120 BPM    Atrial Rate 120 BPM    P-R Interval 158 ms    QRS Duration 80 ms    Q-T Interval 336 ms    QTc Calculation (Bazett) 474 ms    P Axis 41 degrees    R Axis -48 degrees    T Axis 99 degrees    Diagnosis       Sinus tachycardiaLeft anterior fascicular blockMinimal voltage criteria for LVH, may be normal variantSeptal infarct , age undeterminedAbnormal ECGPoor R wave progressionConfirmed by [facility] NAHOMY SEGOVIA, Gauri Montgomery ([id number]) on 9/19/2022 9:27:37 AM   CBC with Auto Differential    Collection Time: 09/18/22 11:08 AM   Result Value Ref Range    WBC 10.1 4.0 - 11.0 K/uL    RBC 4.40 4.20 - 5.90 M/uL    Hemoglobin 14.1 13.5 - 17.5 g/dL    Hematocrit 41.2 40.5 - 52.5 %    MCV 93.7 80.0 - 100.0 fL    MCH 32.2 26.0 - 34.0 pg    MCHC 34.3 31.0 - 36.0 g/dL    RDW 14.4 12.4 - 15.4 %    Platelets 925 534 - 990 K/uL    MPV 8.5 5.0 - 10.5 fL    Neutrophils % 75.0 %    Lymphocytes % 15.0 %    Monocytes % 8.0 %    Eosinophils % 1.0 %    Basophils % 0.0 %    Neutrophils Absolute 7.7 1.7 - 7.7 K/uL    Lymphocytes Absolute 1.5 1.0 - 5.1 K/uL    Monocytes Absolute 0.8 0.0 - 1.3 K/uL    Eosinophils Absolute 0.1 0.0 - 0.6 K/uL    Basophils Absolute 0.0 0.0 - 0.2 K/uL    Bands Relative 1 0 - 7 %   Renal Function Panel    Collection Time: 09/18/22 11:08 AM   Result Value Ref Range    Sodium 139 136 - 145 mmol/L    Potassium 3.8 3.5 - 5.1 mmol/L    Chloride 105 99 - 110 mmol/L    CO2 16 (L) 21 - 32 mmol/L    Anion Gap 18 (H) 3 - 16    Glucose 80 70 - 99 mg/dL    BUN 5 (L) 7 - 20 mg/dL    Creatinine <0.5 (L) 0.8 - 1.3 mg/dL    GFR Non-African American >60 >60    GFR African American >60 >60    Calcium 8.1 (L) 8.3 - 10.6 mg/dL    Phosphorus 1.9 (L) 2.5 - 4.9 mg/dL    Albumin 2.5 (L) 3.4 - 5.0 g/dL   Magnesium    Collection Time: 09/18/22 11:08 AM   Result Value Ref Range Magnesium 0.90 (LL) 1.80 - 2.40 mg/dL   Renal Function Panel    Collection Time: 09/18/22 10:12 PM   Result Value Ref Range    Sodium 140 136 - 145 mmol/L    Potassium 3.8 3.5 - 5.1 mmol/L    Chloride 103 99 - 110 mmol/L    CO2 22 21 - 32 mmol/L    Anion Gap 15 3 - 16    Glucose 111 (H) 70 - 99 mg/dL    BUN 6 (L) 7 - 20 mg/dL    Creatinine 0.6 (L) 0.8 - 1.3 mg/dL    GFR Non-African American >60 >60    GFR African American >60 >60    Calcium 9.5 8.3 - 10.6 mg/dL    Phosphorus 3.8 2.5 - 4.9 mg/dL    Albumin 4.0 3.4 - 5.0 g/dL   Magnesium    Collection Time: 09/18/22 10:12 PM   Result Value Ref Range    Magnesium 2.60 (H) 1.80 - 2.40 mg/dL   CBC with Auto Differential    Collection Time: 09/19/22  3:22 AM   Result Value Ref Range    WBC 7.4 4.0 - 11.0 K/uL    RBC 4.70 4.20 - 5.90 M/uL    Hemoglobin 15.1 13.5 - 17.5 g/dL    Hematocrit 44.4 40.5 - 52.5 %    MCV 94.3 80.0 - 100.0 fL    MCH 32.2 26.0 - 34.0 pg    MCHC 34.1 31.0 - 36.0 g/dL    RDW 14.2 12.4 - 15.4 %    Platelets 780 501 - 037 K/uL    MPV 8.9 5.0 - 10.5 fL    Neutrophils % 48.0 %    Lymphocytes % 31.4 %    Monocytes % 15.4 %    Eosinophils % 4.1 %    Basophils % 1.1 %    Neutrophils Absolute 3.5 1.7 - 7.7 K/uL    Lymphocytes Absolute 2.3 1.0 - 5.1 K/uL    Monocytes Absolute 1.1 0.0 - 1.3 K/uL    Eosinophils Absolute 0.3 0.0 - 0.6 K/uL    Basophils Absolute 0.1 0.0 - 0.2 K/uL   Renal Function Panel    Collection Time: 09/19/22  3:22 AM   Result Value Ref Range    Sodium 142 136 - 145 mmol/L    Potassium 3.9 3.5 - 5.1 mmol/L    Chloride 103 99 - 110 mmol/L    CO2 23 21 - 32 mmol/L    Anion Gap 16 3 - 16    Glucose 90 70 - 99 mg/dL    BUN 7 7 - 20 mg/dL    Creatinine 0.7 (L) 0.8 - 1.3 mg/dL    GFR Non-African American >60 >60    GFR African American >60 >60    Calcium 9.2 8.3 - 10.6 mg/dL    Phosphorus 4.1 2.5 - 4.9 mg/dL    Albumin 4.1 3.4 - 5.0 g/dL   Magnesium    Collection Time: 09/19/22  3:22 AM   Result Value Ref Range    Magnesium 2.20 1.80 - 2.40 mg/dL CBC with Auto Differential    Collection Time: 09/20/22  3:29 AM   Result Value Ref Range    WBC 8.2 4.0 - 11.0 K/uL    RBC 4.70 4.20 - 5.90 M/uL    Hemoglobin 15.0 13.5 - 17.5 g/dL    Hematocrit 44.6 40.5 - 52.5 %    MCV 95.0 80.0 - 100.0 fL    MCH 31.9 26.0 - 34.0 pg    MCHC 33.6 31.0 - 36.0 g/dL    RDW 14.4 12.4 - 15.4 %    Platelets 298 355 - 794 K/uL    MPV 9.3 5.0 - 10.5 fL    Neutrophils % 50.7 %    Lymphocytes % 30.2 %    Monocytes % 13.5 %    Eosinophils % 5.0 %    Basophils % 0.6 %    Neutrophils Absolute 4.2 1.7 - 7.7 K/uL    Lymphocytes Absolute 2.5 1.0 - 5.1 K/uL    Monocytes Absolute 1.1 0.0 - 1.3 K/uL    Eosinophils Absolute 0.4 0.0 - 0.6 K/uL    Basophils Absolute 0.0 0.0 - 0.2 K/uL   Renal Function Panel    Collection Time: 09/20/22  3:29 AM   Result Value Ref Range    Sodium 144 136 - 145 mmol/L    Potassium 4.5 3.5 - 5.1 mmol/L    Chloride 105 99 - 110 mmol/L    CO2 23 21 - 32 mmol/L    Anion Gap 16 3 - 16    Glucose 99 70 - 99 mg/dL    BUN 13 7 - 20 mg/dL    Creatinine 0.9 0.8 - 1.3 mg/dL    GFR Non-African American >60 >60    GFR African American >60 >60    Calcium 9.5 8.3 - 10.6 mg/dL    Phosphorus 4.7 2.5 - 4.9 mg/dL    Albumin 4.1 3.4 - 5.0 g/dL   Magnesium    Collection Time: 09/20/22  3:29 AM   Result Value Ref Range    Magnesium 1.70 (L) 1.80 - 2.40 mg/dL   Vitamin D 25 Hydroxy    Collection Time: 09/20/22  3:29 AM   Result Value Ref Range    Vit D, 25-Hydroxy 34.8 >=30 ng/mL   Vitamin B12    Collection Time: 09/20/22  3:29 AM   Result Value Ref Range    Vitamin B-12 1402 (H) 211 - 911 pg/mL   Folate    Collection Time: 09/20/22  3:29 AM   Result Value Ref Range    Folate 12.96 4.78 - 24.20 ng/mL   CBC with Auto Differential    Collection Time: 09/21/22  5:59 AM   Result Value Ref Range    WBC 8.7 4.0 - 11.0 K/uL    RBC 4.87 4.20 - 5.90 M/uL    Hemoglobin 15.6 13.5 - 17.5 g/dL    Hematocrit 45.3 40.5 - 52.5 %    MCV 93.1 80.0 - 100.0 fL    MCH 32.1 26.0 - 34.0 pg    MCHC 34.4 31.0 - 36.0 g/dL    RDW 14.7 12.4 - 15.4 %    Platelets 814 091 - 192 K/uL    MPV 9.0 5.0 - 10.5 fL    Neutrophils % 55.1 %    Lymphocytes % 24.9 %    Monocytes % 14.3 %    Eosinophils % 5.0 %    Basophils % 0.7 %    Neutrophils Absolute 4.8 1.7 - 7.7 K/uL    Lymphocytes Absolute 2.2 1.0 - 5.1 K/uL    Monocytes Absolute 1.3 0.0 - 1.3 K/uL    Eosinophils Absolute 0.4 0.0 - 0.6 K/uL    Basophils Absolute 0.1 0.0 - 0.2 K/uL   Renal Function Panel    Collection Time: 09/21/22  5:59 AM   Result Value Ref Range    Sodium 143 136 - 145 mmol/L    Potassium 3.8 3.5 - 5.1 mmol/L    Chloride 104 99 - 110 mmol/L    CO2 23 21 - 32 mmol/L    Anion Gap 16 3 - 16    Glucose 103 (H) 70 - 99 mg/dL    BUN 16 7 - 20 mg/dL    Creatinine 0.8 0.8 - 1.3 mg/dL    GFR Non-African American >60 >60    GFR African American >60 >60    Calcium 9.8 8.3 - 10.6 mg/dL    Phosphorus 3.2 2.5 - 4.9 mg/dL    Albumin 4.3 3.4 - 5.0 g/dL   Magnesium    Collection Time: 09/21/22  5:59 AM   Result Value Ref Range    Magnesium 1.90 1.80 - 2.40 mg/dL   EKG 12 Lead    Collection Time: 09/21/22  7:02 AM   Result Value Ref Range    Ventricular Rate 82 BPM    Atrial Rate 82 BPM    P-R Interval 172 ms    QRS Duration 82 ms    Q-T Interval 432 ms    QTc Calculation (Bazett) 504 ms    P Axis -6 degrees    R Axis -38 degrees    T Axis 137 degrees    Diagnosis       Sinus rhythm with occasional Premature ventricular complexesLeft axis deviationMinimal voltage criteria for LVH, may be normal variantSeptal infarct , age undeterminedT wave abnormality, consider lateral ischemiaProlonged QTAbnormal ECGConfirmed by [facility] - DIANA QUIÑONES MD ([id number]) on 9/21/2022 8:12:06 AM     CBC with Auto Differential    Collection Time: 09/22/22  4:52 AM   Result Value Ref Range    WBC 7.2 4.0 - 11.0 K/uL    RBC 5.08 4.20 - 5.90 M/uL    Hemoglobin 16.1 13.5 - 17.5 g/dL    Hematocrit 48.5 40.5 - 52.5 %    MCV 95.5 80.0 - 100.0 fL    MCH 31.7 26.0 - 34.0 pg    MCHC 33.2 31.0 - 36.0 g/dL    RDW 14.1 12.4 - 15.4 % Platelets 170 764 - 841 K/uL    MPV 9.5 5.0 - 10.5 fL    Neutrophils % 51.0 %    Lymphocytes % 28.8 %    Monocytes % 14.1 %    Eosinophils % 5.5 %    Basophils % 0.6 %    Neutrophils Absolute 3.7 1.7 - 7.7 K/uL    Lymphocytes Absolute 2.1 1.0 - 5.1 K/uL    Monocytes Absolute 1.0 0.0 - 1.3 K/uL    Eosinophils Absolute 0.4 0.0 - 0.6 K/uL    Basophils Absolute 0.0 0.0 - 0.2 K/uL   Renal Function Panel    Collection Time: 09/22/22  4:52 AM   Result Value Ref Range    Sodium 140 136 - 145 mmol/L    Potassium 4.2 3.5 - 5.1 mmol/L    Chloride 105 99 - 110 mmol/L    CO2 16 (L) 21 - 32 mmol/L    Anion Gap 19 (H) 3 - 16    Glucose 92 70 - 99 mg/dL    BUN 16 7 - 20 mg/dL    Creatinine 0.7 (L) 0.8 - 1.3 mg/dL    GFR Non-African American >60 >60    GFR African American >60 >60    Calcium 9.7 8.3 - 10.6 mg/dL    Phosphorus 4.4 2.5 - 4.9 mg/dL    Albumin 4.2 3.4 - 5.0 g/dL   Magnesium    Collection Time: 09/22/22  4:52 AM   Result Value Ref Range    Magnesium 1.60 (L) 1.80 - 2.40 mg/dL   EKG 12 Lead    Collection Time: 09/22/22  6:23 AM   Result Value Ref Range    Ventricular Rate 71 BPM    Atrial Rate 71 BPM    P-R Interval 174 ms    QRS Duration 84 ms    Q-T Interval 448 ms    QTc Calculation (Bazett) 486 ms    P Axis 2 degrees    R Axis -38 degrees    T Axis 131 degrees    Diagnosis       Sinus rhythm with occasional Premature ventricular complexesLeft axis deviationMinimal voltage criteria for LVH, may be normal variantSeptal infarct , age undeterminedT wave abnormality, consider lateral ischemiaAbnormal ECGConfirmed by [facility] - DIANA QUIÑONES MD ([id number]) on 9/22/2022 8:41:37 AM         Current Facility-Administered Medications   Medication Dose Route Frequency Provider Last Rate Last Admin    magnesium oxide (MAG-OX) tablet 400 mg  400 mg Oral BID Felipe Sargent MD   400 mg at 09/22/22 0802    LORazepam (ATIVAN) tablet 0.5 mg  0.5 mg Oral Q4H PRN Loraine Orellana MD   0.5 mg at 09/21/22 2010    OLANZapine (ZYPREXA) tablet 5 mg  5 mg Oral BID Vipul Contreras MD   5 mg at 09/22/22 0802    promethazine (PHENERGAN) tablet 12.5 mg  12.5 mg Oral Q6H PRN Dominic Mason MD        famotidine (PEPCID) tablet 20 mg  20 mg Oral BID Chano Diego MD   20 mg at 09/22/22 0802    diphenhydrAMINE (BENADRYL) tablet 25 mg  25 mg Oral Q6H PRN Chano Diego MD   25 mg at 09/22/22 0502    levETIRAcetam (KEPPRA) tablet 1,500 mg  1,500 mg Oral BID Rajwinder Morrison MD   1,500 mg at 09/22/22 1480    thiamine mononitrate tablet 100 mg  100 mg Oral Daily Clute Poag Spritzer, DO   100 mg at 09/22/22 0803    ziprasidone (GEODON) capsule 20 mg  20 mg Oral Q8H PRN Clute Poag Spritzer, DO   20 mg at 09/22/22 0502    labetalol (NORMODYNE;TRANDATE) injection 10 mg  10 mg IntraVENous Q6H PRN Clute Poag Spritzer, DO   10 mg at 09/17/22 1636    carvedilol (COREG) tablet 3.125 mg  3.125 mg Oral BID  Angela Perez MD   3.125 mg at 09/22/22 0803    glucose chewable tablet 16 g  4 tablet Oral PRN Elbert Waggoner MD        dextrose bolus 10% 125 mL  125 mL IntraVENous PRN Elbert Waggoner MD        Or    dextrose bolus 10% 250 mL  250 mL IntraVENous PRN Elbert Waggoner MD        glucagon (rDNA) injection 1 mg  1 mg SubCUTAneous PRN Elbert Waggoner MD        dextrose 10 % infusion   IntraVENous Continuous PRMARIELY Waggoner MD        dexmedetomidine (PRECEDEX) 400 mcg in sodium chloride 0.9 % 100 mL infusion  0.1-1.5 mcg/kg/hr IntraVENous Continuous Michell Gilmore MD   Stopped at 09/17/22 5648    melatonin disintegrating tablet 5 mg  5 mg Oral Nightly Michelle Murillo MD   5 mg at 09/21/22 2010    enoxaparin (LOVENOX) injection 40 mg  40 mg SubCUTAneous Daily Elbert Waggoner MD   40 mg at 09/22/22 0802    lidocaine PF 1 % injection 5 mL  5 mL IntraDERmal Once Ricki Mendieta MD        sodium chloride flush 0.9 % injection 5-40 mL  5-40 mL IntraVENous 2 times per day Elbert Waggoner MD   10 mL at 09/20/22 1015    sodium chloride flush 0.9 % injection 5-40 mL  5-40 mL IntraVENous PRN Elbert RUIZ Edilson, MD        0.9 % sodium chloride infusion   IntraVENous PRN Erich Aponte MD 10 mL/hr at 09/17/22 0637 Rate Verify at 09/17/22 0637    polyethylene glycol (GLYCOLAX) packet 17 g  17 g Oral Daily PRN Erihc Aponte MD        acetaminophen (TYLENOL) tablet 650 mg  650 mg Oral Q6H PRN Erich Aponte MD   650 mg at 09/21/22 2010    Or    acetaminophen (TYLENOL) suppository 650 mg  650 mg Rectal Q6H PRN Erich Aponte MD         Examination  Review of Systems - see H and P for details     MSE:  Appearance    awake, alert and lying in bed with sitter at bedside  Speech    spontaneous, rapid, and mumbled and often difficult to understand  Mood    irritable  Affect    labile affect  Thought Content    delusional, appears to be responding to internal stimuli at times  Thought Process    rapid and illogical  Associations    loose associations  Insight    Unable to Assess  Judgment    Impaired  Motor agitation and restlessness  Orientation    oriented to person  Memory    global memory impairment noted  Attention/Concentration    impaired  Language    2 - severe aphasia; all communication is through fragmentary expression; great need for inference, questioning, and guessing by the listener. Range of information that can be exchanged is limited; listener carries burden of communication. Examiner cannot identify materials provided from patient response. Fund of Knowledge    impaired  Suicide Assessment    no suicidal ideation voiced    Today, I have spent greater than 25 minutes face to face with patient, communicating with members of the treatment team, reviewing medical records and documenting in patient's medical record.

## 2022-09-22 NOTE — PROGRESS NOTES
Palliative Care Chart Review  and Check in Note:     NAME:  Josafat Mckeon  Admit Date: 9/1/2022  Hospital Day:  Hospital Day: 22   Current Code status: Limited    Palliative care is continuing to following Mr. Katherine French for symptom management, and goals of care discussion as needed. Patient's chart reviewed today 9/22/22. Spoke with pt's sister, Manny Douglass, via phone. She called for an update on placement. I explained that when a pt is confused, requiring a sitter and restraints, it can be a challenge to find a SNF. I gave her the phone number for Wendy Wilson RN case manager. We discussed code status as well. She was agreeable to changing him to a limited code - DNR/DNI. She said he would not want to be intubated in the event that he was in respiratory distress. The following are the currently established goals/code status, and Symptom management.      Goals of care: Improve mental status and find placement    Code status: Limited - DNR/DNI    Discharge plan: TBD - pending placement      MERVIN Ayers NP  09/22/22  9:42 AM

## 2022-09-22 NOTE — PROGRESS NOTES
Attempted to get into contact with patient's sister Marybeth Mccloud. Called both home phone and mobile numbers as listed. Call not answered, voicemail with callback instruction was left.     Robert Womack MD  PGY-1

## 2022-09-22 NOTE — PROGRESS NOTES
Shift change, bedside report given to  Zoila MURRELL. Pt exhibits no s/s of distress. Sitter at bedside. Care has been transferred at this time.
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
## 2022-09-22 NOTE — PROGRESS NOTES
Pt has just managed to fall asleep before further medications could be given. Letting pt rest for now.

## 2022-09-22 NOTE — CARE COORDINATION
Case management is following for discharge planning needs. The chart was reviewed. Mr. González Samuels will need skilled nursing facility placement at discharge. He is still intermittently agitated and combative requiring PRN antipsychotic medications for behavioral management, restraints, and a sitter. A call was placed to the pt's sister, Shanice Telles, to discuss options for post-acute care. The pt will need [address] pre-cert. Electronically signed by Linda Teran MSN RN-[facility]  Case Management  [phone]    Addendum [phone]  Spoke with Mr. Carol Huynh sister, Shanice Telles. Discussed skilled nursing facility options. Shanice Telles requested referrals to [facility], [facility], and [facility]. Referrals were sent via [employer]. Will update Shanice Telles with decisions from the referral sources.

## 2022-09-22 NOTE — PROGRESS NOTES
Pt continues to have delusions and hallucinations as the shift progresses.  Frequent interventions from sitter and RN to accommodate pt and him wanting to get up and leave the hospital.

## 2022-09-23 LAB
ALBUMIN SERPL-MCNC: 4.3 G/DL (ref 3.4–5)
ANION GAP SERPL CALCULATED.3IONS-SCNC: 16 MMOL/L (ref 3–16)
BASOPHILS ABSOLUTE: 0.1 K/UL (ref 0–0.2)
BASOPHILS RELATIVE PERCENT: 0.9 %
BUN BLDV-MCNC: 15 MG/DL (ref 7–20)
CALCIUM SERPL-MCNC: 9.8 MG/DL (ref 8.3–10.6)
CHLORIDE BLD-SCNC: 102 MMOL/L (ref 99–110)
CO2: 24 MMOL/L (ref 21–32)
CREAT SERPL-MCNC: 0.7 MG/DL (ref 0.8–1.3)
EOSINOPHILS ABSOLUTE: 0.4 K/UL (ref 0–0.6)
EOSINOPHILS RELATIVE PERCENT: 5.7 %
GFR AFRICAN AMERICAN: >60
GFR NON-AFRICAN AMERICAN: >60
GLUCOSE BLD-MCNC: 95 MG/DL (ref 70–99)
HCT VFR BLD CALC: 47.3 % (ref 40.5–52.5)
HEMOGLOBIN: 16.1 G/DL (ref 13.5–17.5)
LYMPHOCYTES ABSOLUTE: 2.3 K/UL (ref 1–5.1)
LYMPHOCYTES RELATIVE PERCENT: 31.5 %
MAGNESIUM: 1.7 MG/DL (ref 1.8–2.4)
MCH RBC QN AUTO: 31.8 PG (ref 26–34)
MCHC RBC AUTO-ENTMCNC: 34.2 G/DL (ref 31–36)
MCV RBC AUTO: 93 FL (ref 80–100)
MONOCYTES ABSOLUTE: 1.1 K/UL (ref 0–1.3)
MONOCYTES RELATIVE PERCENT: 14.3 %
NEUTROPHILS ABSOLUTE: 3.5 K/UL (ref 1.7–7.7)
NEUTROPHILS RELATIVE PERCENT: 47.6 %
PDW BLD-RTO: 14.4 % (ref 12.4–15.4)
PHOSPHORUS: 4.3 MG/DL (ref 2.5–4.9)
PLATELET # BLD: 276 K/UL (ref 135–450)
PMV BLD AUTO: 9.8 FL (ref 5–10.5)
POTASSIUM SERPL-SCNC: 4 MMOL/L (ref 3.5–5.1)
RBC # BLD: 5.09 M/UL (ref 4.2–5.9)
SODIUM BLD-SCNC: 142 MMOL/L (ref 136–145)
WBC # BLD: 7.5 K/UL (ref 4–11)

## 2022-09-23 PROCEDURE — 99233 SBSQ HOSP IP/OBS HIGH 50: CPT | Performed by: NURSE PRACTITIONER

## 2022-09-23 PROCEDURE — 36415 COLL VENOUS BLD VENIPUNCTURE: CPT

## 2022-09-23 PROCEDURE — 6370000000 HC RX 637 (ALT 250 FOR IP): Performed by: STUDENT IN AN ORGANIZED HEALTH CARE EDUCATION/TRAINING PROGRAM

## 2022-09-23 PROCEDURE — 6360000002 HC RX W HCPCS: Performed by: STUDENT IN AN ORGANIZED HEALTH CARE EDUCATION/TRAINING PROGRAM

## 2022-09-23 PROCEDURE — 93005 ELECTROCARDIOGRAM TRACING: CPT

## 2022-09-23 PROCEDURE — 6370000000 HC RX 637 (ALT 250 FOR IP)

## 2022-09-23 PROCEDURE — 85025 COMPLETE CBC W/AUTO DIFF WBC: CPT

## 2022-09-23 PROCEDURE — 80069 RENAL FUNCTION PANEL: CPT

## 2022-09-23 PROCEDURE — 6370000000 HC RX 637 (ALT 250 FOR IP): Performed by: NURSE PRACTITIONER

## 2022-09-23 PROCEDURE — 6370000000 HC RX 637 (ALT 250 FOR IP): Performed by: INTERNAL MEDICINE

## 2022-09-23 PROCEDURE — 83735 ASSAY OF MAGNESIUM: CPT

## 2022-09-23 PROCEDURE — 1200000000 HC SEMI PRIVATE

## 2022-09-23 RX ORDER — OLANZAPINE 5 MG/1
5 TABLET ORAL EVERY MORNING
Status: DISCONTINUED | OUTPATIENT
Start: 2022-09-23 | End: 2022-09-23

## 2022-09-23 RX ORDER — OLANZAPINE 5 MG/1
10 TABLET ORAL 2 TIMES DAILY
Status: CANCELLED | OUTPATIENT
Start: 2022-09-23

## 2022-09-23 RX ORDER — ZIPRASIDONE HYDROCHLORIDE 20 MG/1
20 CAPSULE ORAL EVERY 12 HOURS PRN
Status: DISCONTINUED | OUTPATIENT
Start: 2022-09-23 | End: 2022-10-06 | Stop reason: HOSPADM

## 2022-09-23 RX ORDER — LANOLIN ALCOHOL/MO/W.PET/CERES
400 CREAM (GRAM) TOPICAL 2 TIMES DAILY
Status: COMPLETED | OUTPATIENT
Start: 2022-09-23 | End: 2022-09-23

## 2022-09-23 RX ORDER — OLANZAPINE 5 MG/1
10 TABLET ORAL 2 TIMES DAILY
Status: DISCONTINUED | OUTPATIENT
Start: 2022-09-23 | End: 2022-09-26

## 2022-09-23 RX ADMIN — Medication 400 MG: at 19:53

## 2022-09-23 RX ADMIN — CARVEDILOL 3.12 MG: 3.12 TABLET, FILM COATED ORAL at 17:56

## 2022-09-23 RX ADMIN — LEVETIRACETAM 1500 MG: 750 TABLET, FILM COATED ORAL at 21:30

## 2022-09-23 RX ADMIN — LORAZEPAM 0.5 MG: 0.5 TABLET ORAL at 15:10

## 2022-09-23 RX ADMIN — OLANZAPINE 10 MG: 5 TABLET, FILM COATED ORAL at 12:47

## 2022-09-23 RX ADMIN — DIPHENHYDRAMINE HYDROCHLORIDE 25 MG: 25 TABLET ORAL at 04:16

## 2022-09-23 RX ADMIN — FAMOTIDINE 20 MG: 20 TABLET ORAL at 19:53

## 2022-09-23 RX ADMIN — ZIPRASIDONE HYDROCHLORIDE 20 MG: 20 CAPSULE ORAL at 04:16

## 2022-09-23 RX ADMIN — FAMOTIDINE 20 MG: 20 TABLET ORAL at 08:59

## 2022-09-23 RX ADMIN — THIAMINE HCL TAB 100 MG 100 MG: 100 TAB at 08:59

## 2022-09-23 RX ADMIN — LEVETIRACETAM 1500 MG: 750 TABLET, FILM COATED ORAL at 08:59

## 2022-09-23 RX ADMIN — Medication 5 MG: at 19:52

## 2022-09-23 RX ADMIN — ZIPRASIDONE HYDROCHLORIDE 20 MG: 20 CAPSULE ORAL at 15:57

## 2022-09-23 RX ADMIN — ACETAMINOPHEN 650 MG: 325 TABLET, FILM COATED ORAL at 19:53

## 2022-09-23 RX ADMIN — LORAZEPAM 0.5 MG: 0.5 TABLET ORAL at 04:16

## 2022-09-23 RX ADMIN — Medication 400 MG: at 08:59

## 2022-09-23 RX ADMIN — ENOXAPARIN SODIUM 40 MG: 100 INJECTION SUBCUTANEOUS at 08:59

## 2022-09-23 RX ADMIN — OLANZAPINE 10 MG: 5 TABLET, FILM COATED ORAL at 19:53

## 2022-09-23 RX ADMIN — CARVEDILOL 3.12 MG: 3.12 TABLET, FILM COATED ORAL at 08:59

## 2022-09-23 RX ADMIN — LORAZEPAM 0.5 MG: 0.5 TABLET ORAL at 19:53

## 2022-09-23 RX ADMIN — OLANZAPINE 5 MG: 5 TABLET, FILM COATED ORAL at 08:59

## 2022-09-23 RX ADMIN — DIPHENHYDRAMINE HYDROCHLORIDE 25 MG: 25 TABLET ORAL at 19:53

## 2022-09-23 ASSESSMENT — PAIN SCALES - GENERAL
PAINLEVEL_OUTOF10: 0

## 2022-09-23 NOTE — PROGRESS NOTES
Progress Note    Admit Date: 9/1/2022  Day: 17  Diet: ADULT ORAL NUTRITION SUPPLEMENT; Breakfast, Lunch, Dinner; Standard High Calorie/High Protein Oral Supplement  ADULT DIET; Easy to Chew; 3 carb choices (45 gm/meal)    Interval history:     Pt seen at bedside and was resting in bed. Not agitated at the moment. Not reported to have episodes of agitation overnight. VSS, labs show stable BMP/CBC. Mg low, replaced. No inpatient psychiatric admission warranted per psych re-evaluation. However, no reversible causes of delirium identified thus far. Changed from Zyprexa 5 BID to have additional 2.5 at night to lessen agitation/improve sleep. PRN Geodon decreased. QTC today, will cont to monitor.       Medications:     Scheduled Meds:   magnesium oxide  400 mg Oral BID    OLANZapine  10 mg Oral BID    famotidine  20 mg Oral BID    levETIRAcetam  1,500 mg Oral BID    thiamine mononitrate  100 mg Oral Daily    carvedilol  3.125 mg Oral BID WC    melatonin  5 mg Oral Nightly    enoxaparin  40 mg SubCUTAneous Daily    lidocaine PF  5 mL IntraDERmal Once    sodium chloride flush  5-40 mL IntraVENous 2 times per day     Continuous Infusions:   dextrose      dexmedetomidine (PRECEDEX) IV infusion Stopped (09/17/22 0634)    sodium chloride 10 mL/hr at 09/17/22 0637     PRN Meds:ziprasidone, LORazepam, promethazine, diphenhydrAMINE, labetalol, glucose, dextrose bolus **OR** dextrose bolus, glucagon (rDNA), dextrose, sodium chloride flush, sodium chloride, polyethylene glycol, acetaminophen **OR** acetaminophen    Objective:   Vitals:   T-max:  Patient Vitals for the past 8 hrs:   BP Temp Temp src Pulse Resp SpO2 Weight   09/23/22 1205 113/75 -- -- 100 -- 97 % --   09/23/22 1200 113/75 98 °F (36.7 °C) Oral (!) 107 18 97 % --   09/23/22 1000 -- -- -- -- -- -- 185 lb 13.6 oz (84.3 kg)   09/23/22 0900 133/72 97.8 °F (36.6 °C) Temporal 81 15 96 % --       Intake/Output Summary (Last 24 hours) at 9/23/2022 1423  Last data filed at 9/23/2022 1200  Gross per 24 hour   Intake 360 ml   Output 250 ml   Net 110 ml       Physical Exam  Constitutional:       General: He is not in acute distress. HENT:      Mouth/Throat:      Mouth: Mucous membranes are dry. Eyes:      Pupils: Pupils are equal, round, and reactive to light. Comments: Uncooperative with EOM exam   Cardiovascular:      Rate and Rhythm: Regular rhythm. Tachycardia present. Pulses: Normal pulses. Heart sounds: Normal heart sounds. Pulmonary:      Effort: Pulmonary effort is normal.      Breath sounds: Normal breath sounds. Abdominal:      Tenderness: There is no abdominal tenderness. Musculoskeletal:      Right lower leg: No edema. Left lower leg: No edema. Skin:     Coloration: Skin is not jaundiced. Neurological:      Comments: Oriented to person, but not to place, time or situation. LABS:    CBC:   Recent Labs     09/21/22  0559 09/22/22 0452 09/23/22  0436   WBC 8.7 7.2 7.5   HGB 15.6 16.1 16.1   HCT 45.3 48.5 47.3    261 276   MCV 93.1 95.5 93.0     Renal:    Recent Labs     09/21/22  0559 09/22/22 0452 09/23/22  0436    140 142   K 3.8 4.2 4.0    105 102   CO2 23 16* 24   BUN 16 16 15   CREATININE 0.8 0.7* 0.7*   GLUCOSE 103* 92 95   CALCIUM 9.8 9.7 9.8   MG 1.90 1.60* 1.70*   PHOS 3.2 4.4 4.3   ANIONGAP 16 19* 16     Hepatic:   Recent Labs     09/21/22  0559 09/22/22  0452 09/23/22  0436   LABALBU 4.3 4.2 4.3     Troponin: No results for input(s): TROPONINI in the last 72 hours. BNP: No results for input(s): BNP in the last 72 hours. Lipids: No results for input(s): CHOL, HDL in the last 72 hours. Invalid input(s): LDLCALCU, TRIGLYCERIDE  ABGs:  No results for input(s): PHART, GIR8GEX, PO2ART, URE6DRA, BEART, THGBART, Y0QSMRUZ, USJ1CGW in the last 72 hours. INR: No results for input(s): INR in the last 72 hours.   Lactate: No results for input(s): LACTATE in the last 72 hours.  Cultures:  -----------------------------------------------------------------  RAD:   FL MODIFIED BARIUM SWALLOW W VIDEO   Final Result      No penetration or aspiration. XR CHEST PORTABLE   Final Result   1. Worsened pulmonary edema. 2.  Persistent bibasilar atelectasis. MRI BRAIN WO CONTRAST   Final Result      Faint diffusion restriction in the right hippocampus with associated FLAIR signal abnormality. This is favored to represent sequelae of seizures, however other infectious/inflammatory etiologies are also possible. Mild atrophy and chronic small vessel ischemic change. Areas of encephalomalacia and gliosis in both cerebral hemispheres with remote hemorrhagic staining from prior insults. IR LUMBAR PUNCTURE FOR DIAGNOSIS   Final Result   . IMPRESSION:   1. Uneventful diagnostic fluoroscopic guided lumbar puncture as the   2. The flow reversed are risk and therefore a closing pressure was obtained at the end of the procedure, 26 cm of water. XR ABDOMEN (KUB) (SINGLE AP VIEW)   Final Result   Findings/Impression:    The tip of the enteric tube terminates in the distal body of the stomach. CT CHEST WO CONTRAST   Final Result      CHEST:      1. Moderate dependent atelectasis bilaterally. ABDOMEN/PELVIS:      1.  No acute abnormality on noncontrast CT of the abdomen and pelvis. 2.  Cholelithiasis. CT ABDOMEN PELVIS WO CONTRAST Additional Contrast? None   Final Result      CHEST:      1. Moderate dependent atelectasis bilaterally. ABDOMEN/PELVIS:      1.  No acute abnormality on noncontrast CT of the abdomen and pelvis. 2.  Cholelithiasis. CTA HEAD NECK W CONTRAST   Final Result      CTA Head:   No acute CTA findings. No hemodynamically significant stenosis or focal aneurysm. No arteriovenous confirmation. CTA Neck:   No acute CTA findings. No hemodynamically significant stenosis or focal aneurysm.          CT HEAD WO CONTRAST Final Result      1. No findings for acute intracranial abnormality. 2.  Age-related atrophy with patchy periventricular white matter changes bilaterally consistent with chronic small vessel ischemia. 3.  Stable low attenuation left frontoparietal lobe consistent with previous insult from known prior intraparenchymal hematoma. Stable right frontoparietal cortical infarct. These findings were called to the emergency room physician Dr. Crystal Umana on 9/1/2022 at 5:30 p.m. XR CHEST PORTABLE   Final Result   1. No findings for acute cardiopulmonary disease. 2.  ET tube in good position in the mid trachea. Assessment/Plan:     Acute metabolic encephalopathy(unclear etiology)-improving  On presentation AMS, elevated WBC: 21.7, Tachypneic, Tachycardic, Hypotensive. Hx of ICH, SAH and SDH 2/2 to fall, hx of chronic alcohol abuse,   CT head: prior Intraparenchymal hematoma. Stable right frontoparietal cortical infarct. CT Abdomen, chest: mod atelectasis bilaterally. No acute abnorms on CT abd except stable cholelithiasis   CTA Head: No acute CTA findings. CTA Neck: No acute CTA findings. MRI: Faint diffusion restriction in the right hippocampus with associated FLAIR signal abnormality. This is favored to represent sequelae of seizures. cEEG readings previously: Generalized background abnormalities indicative of an underlying diffuse encephalopathy of non-specific etiology. Intermittent focal epileptiform discharges, right posterior temporal, conferring increased risk of focal onset seizures from this region.  -Lumbar puncture ordered 9/6/2022: No growth. -Blood Cultures were -ve x2  -On Keppra 1500mg bid  - inpatient psych consulted - no inpatient psych upon discharge warranted - decision remains on psych re-eval  - Palliative consult, able to contact family (multiple siblings), all agreed that sister (present on admission) can make decision for patient.    - Zyprexa 5 BID > 5mg AM and 7.5mg PM  - PRN Geodon decreased q8>q12. Acute sinus tachycardia, improving  Intermittently tachycardic into the 120s with stable pressures  -Most likely secondary to dehydration and poor p.o. intake  -Could also consider PE, however patient is showing no respiratory distress, denies chest pain, on room air, not tachypneic  -Will get an EKG to rule out other causes  -500 cc LR bolus to monitor for response. Alcohol use disorder  Ethanol level: none detected  -thiamine 100 mg daily, changed to PO  -monitor for withdrawal     Acute hypercapnic respiratory failure secondary to possible aspiration vs 2/2 medication (Extubated successfully 9/10)  On presentation: VBG PH: 7.264, PCO2: 56.4, 83.2  - Intubated for 10 days in ICU  - after transfer out of ICU - pt is stable on room air. CAD s/p stent 2020  Hx of Mural thrombus  - in 2020, anterior apical MI with atrial thrombus s/p stenting, pt was put on Eliquis/Plavix. - Eliquis/Plavix stopped due to frequent falls 2/2 chronic alcoholism  - [facility] 09/2021 suggesting acute and chronic ICH/SAH/SDH     Hx of Cirrhosis  -Ammonia: 36 wnl(09/1/22), LFTs WNL.     Hypertension  -Resumed home carvedilol dose 3.125 mg twice daily 9/14    Code Status: Full Code  FEN: Easy chew diet, oral nutrition supplement  PPX: Lovenox  DISPO: IP, awaiting SNF placement    Dawson Gee MD, PGY-1  09/23/22  2:23 PM    This patient has been staffed and discussed with Mile Alston MD.
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
## 2022-09-23 NOTE — PLAN OF CARE
Problem: Safety - Medical Restraint  Goal: Remains free of injury from restraints (Restraint for Interference with Medical Device)  Description: INTERVENTIONS:  1. Determine that other, less restrictive measures have been tried or would not be effective before applying the restraint  2. Evaluate the patient's condition at the time of restraint application  3. Inform patient/family regarding the reason for restraint  4.  Q2H: Monitor safety, psychosocial status, comfort, nutrition and hydration  Outcome: Progressing     Problem: Discharge Planning  Goal: Discharge to home or other facility with appropriate resources  Outcome: Progressing     Problem: Pain  Goal: Verbalizes/displays adequate comfort level or baseline comfort level  Outcome: Progressing     Problem: Safety - Adult  Goal: Free from fall injury  Outcome: Progressing  Flowsheets (Taken 9/22/2022 2147)  Free From Fall Injury: Based on caregiver fall risk screen, instruct family/caregiver to ask for assistance with transferring infant if caregiver noted to have fall risk factors

## 2022-09-23 NOTE — BH NOTE
Psychiatry Follow-Up Consultation    Patient Name: Leatha Garcia  MRN: [id number]  Admission Date: 9/1/2022    Reason for Consult: Encephalopathy, still unclear etiology    Patient's chart was reviewed, case was discussed with nursing/OT/RT staff, and collaborated with  about the treatment plan. Met with Anthony. Believes that we have met before in [place], [place]. He was initially unable to tell me where we are, but when given choices was able to identify it is a hospital. Shahid Shorten why he is here. Believes it to be February 1, 2022. Expressed some frustration of being in the hospital, \"I want to go home\". He was not in restraints during my assessment. Seen by Ethel Gamez yesterday and Zyprexa was adjusted to 5 mg QD and 7.5 mg HS due to agitation and aggression the previous night. Serial EKGs show improvement in QTc; he did have some prolongation with Haldol. Most recent EKG this morning showed QTc of 440 (preliminary result in Epic).  He has received the following PRNs in the past 36 hours:  09/22, 0502: Geodon 20 mg   09/22, 2019: Ativan 0.5 mg  09/23, 0419: Geodon 20 mg - unclear as to behaviors warranting PRN  09/23, 0416: Ativan 0.5 mg - unclear as to behaviors warranting PRN    ROS: Patient has new complaints: No    Current Medications Ordered:   OLANZapine  5 mg Oral QAM    OLANZapine  7.5 mg Oral Nightly    magnesium oxide  400 mg Oral BID    famotidine  20 mg Oral BID    levETIRAcetam  1,500 mg Oral BID    thiamine mononitrate  100 mg Oral Daily    carvedilol  3.125 mg Oral BID WC    melatonin  5 mg Oral Nightly    enoxaparin  40 mg SubCUTAneous Daily    lidocaine PF  5 mL IntraDERmal Once    sodium chloride flush  5-40 mL IntraVENous 2 times per day      PRN Meds: ziprasidone, LORazepam, promethazine, diphenhydrAMINE, labetalol, glucose, dextrose bolus **OR** dextrose bolus, glucagon (rDNA), dextrose, sodium chloride flush, sodium chloride, polyethylene glycol, acetaminophen **OR** acetaminophen     Objective:     PE:    BP (!) 138/91   Pulse 67   Temp 97.5 °F (36.4 °C)   Resp 27   Ht 5' 10\" (1.778 m)   Wt 186 lb 8.2 oz (84.6 kg)   SpO2 99%   BMI 26.76 kg/m²       Motor / Gait: Observed laying in bed    Mental Status Examination:    Appearance: WM, appears stated age, lying in bed, wearing hospital attire, fair grooming and fair hygiene  Behavior/Attitude Toward Examiner: Overall cooperative, fair eye contact  Speech: Mumbled, quiet, difficult to understand at times  Mood: \"Okay\"  Affect: Flat  Thought Processes: Difficult to fully assess  Thought Content: No SI/HI verbalized, no delusions voiced, no obsessions  Perceptions: No AVH verbalized, did not appear to be actively RTIS during my assessment   Attention: Impaired  Cognition: Alert and oriented to person, partially to time (able to identify 2022 but believed it to be February), immediate, recent, and remote recall impaired  Insight: Impaired insight   Judgment: Impaired judgment      LAB: Reviewed labs from last 24 hours      Dx:   Primary Psychiatric (DSM V) Diagnosis: Altered mental status (r/o delirium)  Secondary Psychiatric (DSM V) Diagnoses: None  Chemical Dependency Diagnoses: History of alcohol abuse     Assessment  Reviewed nursing and ancillary staff notes since arrival to hospital, reviewed previous records and records available through Care Everywhere. Evaluated medications and assessed for side effects and effectiveness. Assessed patient's educational needs including reviewing plan of care, medications, and diagnosis. Recommendations:    Based on his presentation and lack of previous psychiatric history, I would suspect this is a complex presentation of delirium post medical issues, intubation, and prolonged hospitalization and that inpatient psychiatric admission would not be warranted. Delirium takes time to clear; sometimes weeks to months. With improvement of QTc, will adjust Zyprexa to 10 mg BID.  Continue daily EKGs. If QTc becomes prolonged or patient becomes oversedated on increased dose, decrease back down. Keppra can sometimes worsen behavioral issues, such as agitation. Consider change to different agent if appropriate. Will plan on following up with him on Monday, if he is not discharged to SNF over the weekend. Total face to face time with patient was 35 minutes and more than 50% of that time was spent counseling the patient on their symptoms, treatment and expected goals.     Timo Blanca, MPH, PMHNP-BC  09/23/22

## 2022-09-23 NOTE — PROGRESS NOTES
Pt remains nonsensical conversation with hallucinations + delirium/ sometimes follows commands rarely follows any conversation/ has been calm up to this point he now agitated/ in chair constantly getting up and wanting to leave/ pt is unsteady unbalanced and takes 2 people to steady him from bed to chair/ did give him his increased/ /adjusted dose of zyprexa /cont to assess for improvement with agitation + anxiety    1520 transferred pt to bed/ unable to keep him safe from falling from chair/ combative / swinging/ profanity/able to adminster ativan 0.5mg po/ cont to monitor    1548 no change in behavior / pt constantly throwing legs over bed rails/ unable to make sense of conversation/ yelling for Jean/ using profanity/ meds ineffective thus far/ spoke with pharmacy to send geodon po dose/ will cont to assess + eval/ see flow sheet/emar    1557 behavior unchanged/ administered geodon 20mg po  prn as ordered/ cont to assess/eval    1800 remains nonsensical with conversation/ continues with Hallucinations with calmness/ less agitated + less anxiety

## 2022-09-23 NOTE — CARE COORDINATION
Received a call from Raquel at [facility]. The director of nursing declined. Waiting to hear back from [facility] [place] and [facility]. Spoke with Pako Hawk at [facility] and Lord Snell at [place].     Electronically signed by MIGNON Joe RN-[facility]  Case Management  [phone]

## 2022-09-23 NOTE — PROGRESS NOTES
Comprehensive Nutrition Assessment    Type and Reason for Visit:  Reassess    Nutrition Recommendations/Plan:   Due to inadequate intake, discussion of palliative care vs nutrition support should be considered. Consult dietitian if tube feeding is desired and consistent with patient's plan of care. Continue Easy to Chew; 3 carb choice diet  Continue Ensure TID  Monitor nutrition adequacy, pertinent labs, bowel habits, wt changes, and clinical progress     Malnutrition Assessment:  Malnutrition Status:  Severe malnutrition (09/20/22 0910)    Context:  Acute Illness     Findings of the 6 clinical characteristics of malnutrition:  Energy Intake:  50% or less of estimated energy requirements for 5 or more days  Weight Loss:  Greater than 2% over 1 week (6% in 1 week period)     Body Fat Loss:  No significant body fat loss     Muscle Mass Loss:  No significant muscle mass loss    Fluid Accumulation:  Mild      Nutrition Assessment:    Follow up: Pt continues on Easy to Chew; 3 carb choice diet w/ Ensure TID. Pt intakes continue to be poor <50%. Encephalopathy continues, requiring sitter. Palliative is now following and discussing placement w/ family. Continued recommendation for nutrition support. Will continue to monitor. Nutrition Related Findings:    Mg 1.7. Cr 0.7. Active bowel sounds. Wound Type:  (scattered abrasions)       Current Nutrition Intake & Therapies:    Average Meal Intake: 26-50%, 51-75% (x2 meals recorded since last assessment)  Average Supplements Intake: 0%  ADULT ORAL NUTRITION SUPPLEMENT; Breakfast, Lunch, Dinner; Standard High Calorie/High Protein Oral Supplement  ADULT DIET; Easy to Chew; 3 carb choices (45 gm/meal)    Anthropometric Measures:  Height: 5' 10\" (177.8 cm)  Ideal Body Weight (IBW): 166 lbs (75 kg)    Admission Body Weight: 190 lb 14.7 oz (86.6 kg)  Current Body Weight: 182 lb 15.7 oz (83 kg), 110.2 % IBW.  Weight Source: Bed Scale  Current BMI (kg/m2): 26.3        Weight Adjustment For: No Adjustment                 BMI Categories: Overweight (BMI 25.0-29. 9)    Estimated Daily Nutrient Needs:  Energy Requirements Based On: Kcal/kg (20-25 kcal/kg admission wt)  Weight Used for Energy Requirements: Admission (Admission wt 86.8 kg)  Energy (kcal/day): 5452-8691  Weight Used for Protein Requirements: Admission (1.2-2.0 g/kg admission wt (86.8kg))  Protein (g/day): 104-174  Method Used for Fluid Requirements: 1 ml/kcal  Fluid (ml/day): or per MD    Nutrition Diagnosis:   Severe malnutrition related to inadequate protein-energy intake as evidenced by Criteria as identified in malnutrition assessment, intake 0-25%, weight loss greater than or equal to 2% in 1 week    Nutrition Interventions:   Food and/or Nutrient Delivery: Start Tube Feeding, Continue Current Diet, Continue Oral Nutrition Supplement  Nutrition Education/Counseling: Education not appropriate  Coordination of Nutrition Care: Continue to monitor while inpatient  Plan of Care discussed with: Pt    Goals:  Previous Goal Met: No Progress toward Goal(s)  Goals: PO intake 50% or greater, prior to discharge       Nutrition Monitoring and Evaluation:   Behavioral-Environmental Outcomes: None Identified  Food/Nutrient Intake Outcomes: Food and Nutrient Intake, Supplement Intake  Physical Signs/Symptoms Outcomes: Biochemical Data, Nutrition Focused Physical Findings, Weight, Meal Time Behavior    Discharge Planning:     Too soon to determine     Javed Estrada, [address] [place] [address],   Contact: [phone]

## 2022-09-23 NOTE — PLAN OF CARE
Problem: Discharge Planning  Goal: Discharge to home or other facility with appropriate resources  Outcome: Progressing     Problem: Pain  Goal: Verbalizes/displays adequate comfort level or baseline comfort level  Outcome: Progressing     Problem: Safety - Adult  Goal: Free from fall injury  Outcome: Progressing     Problem: ABCDS Injury Assessment  Goal: Absence of physical injury  Outcome: Progressing     Problem: Skin/Tissue Integrity  Goal: Absence of new skin breakdown  Description: 1. Monitor for areas of redness and/or skin breakdown  2. Assess vascular access sites hourly  3. Every 4-6 hours minimum:  Change oxygen saturation probe site  4. Every 4-6 hours:  If on nasal continuous positive airway pressure, respiratory therapy assess nares and determine need for appliance change or resting period.   Outcome: Progressing     Problem: Chronic Conditions and Co-morbidities  Goal: Patient's chronic conditions and co-morbidity symptoms are monitored and maintained or improved  Outcome: Progressing     Problem: Nutrition Deficit:  Goal: Optimize nutritional status  Outcome: Progressing  Flowsheets (Taken 9/23/2022 1252 by Ulysses Admire, RD)  Nutrient intake appropriate for improving, restoring, or maintaining nutritional needs:   Monitor oral intake, labs, and treatment plans   Assess nutritional status and recommend course of action     Problem: Respiratory - Adult  Goal: Achieves optimal ventilation and oxygenation  Outcome: Progressing     Problem: Cardiovascular - Adult  Goal: Maintains optimal cardiac output and hemodynamic stability  Outcome: Progressing

## 2022-09-24 LAB
ALBUMIN SERPL-MCNC: 4.3 G/DL (ref 3.4–5)
ANION GAP SERPL CALCULATED.3IONS-SCNC: 16 MMOL/L (ref 3–16)
BASOPHILS ABSOLUTE: 0.1 K/UL (ref 0–0.2)
BASOPHILS RELATIVE PERCENT: 0.9 %
BUN BLDV-MCNC: 18 MG/DL (ref 7–20)
CALCIUM SERPL-MCNC: 9.8 MG/DL (ref 8.3–10.6)
CHLORIDE BLD-SCNC: 103 MMOL/L (ref 99–110)
CO2: 23 MMOL/L (ref 21–32)
CREAT SERPL-MCNC: 0.8 MG/DL (ref 0.8–1.3)
EOSINOPHILS ABSOLUTE: 0.3 K/UL (ref 0–0.6)
EOSINOPHILS RELATIVE PERCENT: 4.2 %
GFR AFRICAN AMERICAN: >60
GFR NON-AFRICAN AMERICAN: >60
GLUCOSE BLD-MCNC: 101 MG/DL (ref 70–99)
HCT VFR BLD CALC: 45.2 % (ref 40.5–52.5)
HEMOGLOBIN: 15.7 G/DL (ref 13.5–17.5)
LYMPHOCYTES ABSOLUTE: 2.5 K/UL (ref 1–5.1)
LYMPHOCYTES RELATIVE PERCENT: 32.4 %
MAGNESIUM: 1.8 MG/DL (ref 1.8–2.4)
MCH RBC QN AUTO: 32.1 PG (ref 26–34)
MCHC RBC AUTO-ENTMCNC: 34.7 G/DL (ref 31–36)
MCV RBC AUTO: 92.6 FL (ref 80–100)
MONOCYTES ABSOLUTE: 1.2 K/UL (ref 0–1.3)
MONOCYTES RELATIVE PERCENT: 15.4 %
NEUTROPHILS ABSOLUTE: 3.6 K/UL (ref 1.7–7.7)
NEUTROPHILS RELATIVE PERCENT: 47.1 %
PDW BLD-RTO: 14.4 % (ref 12.4–15.4)
PHOSPHORUS: 4.1 MG/DL (ref 2.5–4.9)
PLATELET # BLD: 258 K/UL (ref 135–450)
PMV BLD AUTO: 10.1 FL (ref 5–10.5)
POTASSIUM SERPL-SCNC: 4 MMOL/L (ref 3.5–5.1)
RBC # BLD: 4.88 M/UL (ref 4.2–5.9)
SODIUM BLD-SCNC: 142 MMOL/L (ref 136–145)
WBC # BLD: 7.7 K/UL (ref 4–11)

## 2022-09-24 PROCEDURE — 1200000000 HC SEMI PRIVATE

## 2022-09-24 PROCEDURE — 6370000000 HC RX 637 (ALT 250 FOR IP): Performed by: INTERNAL MEDICINE

## 2022-09-24 PROCEDURE — 36415 COLL VENOUS BLD VENIPUNCTURE: CPT

## 2022-09-24 PROCEDURE — 83735 ASSAY OF MAGNESIUM: CPT

## 2022-09-24 PROCEDURE — 6370000000 HC RX 637 (ALT 250 FOR IP)

## 2022-09-24 PROCEDURE — 6370000000 HC RX 637 (ALT 250 FOR IP): Performed by: NURSE PRACTITIONER

## 2022-09-24 PROCEDURE — 6370000000 HC RX 637 (ALT 250 FOR IP): Performed by: STUDENT IN AN ORGANIZED HEALTH CARE EDUCATION/TRAINING PROGRAM

## 2022-09-24 PROCEDURE — 80069 RENAL FUNCTION PANEL: CPT

## 2022-09-24 PROCEDURE — 93005 ELECTROCARDIOGRAM TRACING: CPT

## 2022-09-24 PROCEDURE — 6360000002 HC RX W HCPCS: Performed by: STUDENT IN AN ORGANIZED HEALTH CARE EDUCATION/TRAINING PROGRAM

## 2022-09-24 PROCEDURE — 85025 COMPLETE CBC W/AUTO DIFF WBC: CPT

## 2022-09-24 RX ORDER — LANOLIN ALCOHOL/MO/W.PET/CERES
400 CREAM (GRAM) TOPICAL DAILY
Status: DISCONTINUED | OUTPATIENT
Start: 2022-09-24 | End: 2022-10-06 | Stop reason: HOSPADM

## 2022-09-24 RX ADMIN — FAMOTIDINE 20 MG: 20 TABLET ORAL at 09:28

## 2022-09-24 RX ADMIN — LORAZEPAM 0.5 MG: 0.5 TABLET ORAL at 12:56

## 2022-09-24 RX ADMIN — LORAZEPAM 0.5 MG: 0.5 TABLET ORAL at 20:38

## 2022-09-24 RX ADMIN — THIAMINE HCL TAB 100 MG 100 MG: 100 TAB at 09:28

## 2022-09-24 RX ADMIN — ZIPRASIDONE HYDROCHLORIDE 20 MG: 20 CAPSULE ORAL at 22:55

## 2022-09-24 RX ADMIN — FAMOTIDINE 20 MG: 20 TABLET ORAL at 20:46

## 2022-09-24 RX ADMIN — LORAZEPAM 0.5 MG: 0.5 TABLET ORAL at 04:00

## 2022-09-24 RX ADMIN — LEVETIRACETAM 1500 MG: 750 TABLET, FILM COATED ORAL at 09:27

## 2022-09-24 RX ADMIN — OLANZAPINE 10 MG: 5 TABLET, FILM COATED ORAL at 20:38

## 2022-09-24 RX ADMIN — Medication 5 MG: at 20:38

## 2022-09-24 RX ADMIN — DIPHENHYDRAMINE HYDROCHLORIDE 25 MG: 25 TABLET ORAL at 14:31

## 2022-09-24 RX ADMIN — Medication 400 MG: at 09:28

## 2022-09-24 RX ADMIN — DIPHENHYDRAMINE HYDROCHLORIDE 25 MG: 25 TABLET ORAL at 04:00

## 2022-09-24 RX ADMIN — LEVETIRACETAM 1500 MG: 750 TABLET, FILM COATED ORAL at 20:39

## 2022-09-24 RX ADMIN — CARVEDILOL 3.12 MG: 3.12 TABLET, FILM COATED ORAL at 17:53

## 2022-09-24 RX ADMIN — CARVEDILOL 3.12 MG: 3.12 TABLET, FILM COATED ORAL at 09:28

## 2022-09-24 RX ADMIN — ENOXAPARIN SODIUM 40 MG: 100 INJECTION SUBCUTANEOUS at 09:28

## 2022-09-24 RX ADMIN — OLANZAPINE 10 MG: 5 TABLET, FILM COATED ORAL at 09:28

## 2022-09-24 ASSESSMENT — PAIN SCALES - GENERAL
PAINLEVEL_OUTOF10: 0

## 2022-09-24 NOTE — PROGRESS NOTES
Report at bedside/ pt resting quiet + calm/ did sleep most of night shift/ remains hemodynamically stable/cont to assess + eval mentation for improvement    0923 pt awaiting breakfast/ following some commands/ calm/ administer am meds/ prepare for breakfast/ Plan oob hopeful improve mentally enough to increase activity with ambulation +BRP/ cont to monitor as ordered    1256 pt restless/ anxious/ nonsensical conversation/ irritated/ pushing away when near/ admin ativan 0.5mg po as ordered    1500 pt less anxious/calm/ continues with nonsensical conversation/calmer behavior at this time

## 2022-09-24 NOTE — PROGRESS NOTES
Progress Note    Admit Date: 9/1/2022  Day: 17  Diet: ADULT ORAL NUTRITION SUPPLEMENT; Breakfast, Lunch, Dinner; Standard High Calorie/High Protein Oral Supplement  ADULT DIET; Easy to Chew; 3 carb choices (45 gm/meal)    Interval history:   Patient seen at bedside this AM, resting in bed comfortably. Patient did have some episodes of agitation but did not require restraints and were managed with PRN Geodon/Ativan as well as Zyprexa dose, of note Zyprexa was increased to 10 twice daily. VSS, labs show stable BMP/CBC. No inpatient psychiatric admission warranted per psych re-evaluation. However, no reversible causes of delirium identified thus far.      QTC more prolonged today, will cont to monitor with daily EKGs    Medications:     Scheduled Meds:   magnesium oxide  400 mg Oral Daily    OLANZapine  10 mg Oral BID    famotidine  20 mg Oral BID    levETIRAcetam  1,500 mg Oral BID    thiamine mononitrate  100 mg Oral Daily    carvedilol  3.125 mg Oral BID WC    melatonin  5 mg Oral Nightly    enoxaparin  40 mg SubCUTAneous Daily    lidocaine PF  5 mL IntraDERmal Once    sodium chloride flush  5-40 mL IntraVENous 2 times per day     Continuous Infusions:   dextrose      dexmedetomidine (PRECEDEX) IV infusion Stopped (09/17/22 0634)    sodium chloride 10 mL/hr at 09/17/22 0637     PRN Meds:ziprasidone, LORazepam, promethazine, diphenhydrAMINE, labetalol, glucose, dextrose bolus **OR** dextrose bolus, glucagon (rDNA), dextrose, sodium chloride flush, sodium chloride, polyethylene glycol, acetaminophen **OR** acetaminophen    Objective:   Vitals:   T-max:  Patient Vitals for the past 8 hrs:   BP Temp Pulse Resp SpO2   09/24/22 0600 -- -- 76 12 --   09/24/22 0500 -- -- 85 17 --   09/24/22 0400 130/71 98.2 °F (36.8 °C) 90 19 98 %       Intake/Output Summary (Last 24 hours) at 9/24/2022 0949  Last data filed at 9/24/2022 0901  Gross per 24 hour   Intake 360 ml   Output 375 ml   Net -15 ml       Physical Exam  Constitutional:       General: He is not in acute distress. HENT:      Mouth/Throat:      Mouth: Mucous membranes are dry. Eyes:      Pupils: Pupils are equal, round, and reactive to light. Comments: Uncooperative with EOM exam   Cardiovascular:      Rate and Rhythm: Regular rhythm. Tachycardia present. Pulses: Normal pulses. Heart sounds: Normal heart sounds. Pulmonary:      Effort: Pulmonary effort is normal.      Breath sounds: Normal breath sounds. Abdominal:      Tenderness: There is no abdominal tenderness. Musculoskeletal:      Right lower leg: No edema. Left lower leg: No edema. Skin:     Coloration: Skin is not jaundiced. Neurological:      Comments: Oriented to person, but not to place, time or situation. LABS:    CBC:   Recent Labs     09/22/22 0452 09/23/22 0436 09/24/22 0317   WBC 7.2 7.5 7.7   HGB 16.1 16.1 15.7   HCT 48.5 47.3 45.2    276 258   MCV 95.5 93.0 92.6     Renal:    Recent Labs     09/22/22 0452 09/23/22 0436 09/24/22 0317    142 142   K 4.2 4.0 4.0    102 103   CO2 16* 24 23   BUN 16 15 18   CREATININE 0.7* 0.7* 0.8   GLUCOSE 92 95 101*   CALCIUM 9.7 9.8 9.8   MG 1.60* 1.70* 1.80   PHOS 4.4 4.3 4.1   ANIONGAP 19* 16 16     Hepatic:   Recent Labs     09/22/22 0452 09/23/22 0436 09/24/22 0317   LABALBU 4.2 4.3 4.3     Troponin: No results for input(s): TROPONINI in the last 72 hours. BNP: No results for input(s): BNP in the last 72 hours. Lipids: No results for input(s): CHOL, HDL in the last 72 hours. Invalid input(s): LDLCALCU, TRIGLYCERIDE  ABGs:  No results for input(s): PHART, XVV6SIJ, PO2ART, RQB6KNM, BEART, THGBART, G6AFNAEY, YXC5JBF in the last 72 hours. INR: No results for input(s): INR in the last 72 hours. Lactate: No results for input(s): LACTATE in the last 72 hours.   Cultures:  -----------------------------------------------------------------  RAD:   FL MODIFIED BARIUM SWALLOW W VIDEO   Final Result      No penetration or aspiration. XR CHEST PORTABLE   Final Result   1. Worsened pulmonary edema. 2.  Persistent bibasilar atelectasis. MRI BRAIN WO CONTRAST   Final Result      Faint diffusion restriction in the right hippocampus with associated FLAIR signal abnormality. This is favored to represent sequelae of seizures, however other infectious/inflammatory etiologies are also possible. Mild atrophy and chronic small vessel ischemic change. Areas of encephalomalacia and gliosis in both cerebral hemispheres with remote hemorrhagic staining from prior insults. IR LUMBAR PUNCTURE FOR DIAGNOSIS   Final Result   . IMPRESSION:   1. Uneventful diagnostic fluoroscopic guided lumbar puncture as the   2. The flow reversed are risk and therefore a closing pressure was obtained at the end of the procedure, 26 cm of water. XR ABDOMEN (KUB) (SINGLE AP VIEW)   Final Result   Findings/Impression:    The tip of the enteric tube terminates in the distal body of the stomach. CT CHEST WO CONTRAST   Final Result      CHEST:      1. Moderate dependent atelectasis bilaterally. ABDOMEN/PELVIS:      1.  No acute abnormality on noncontrast CT of the abdomen and pelvis. 2.  Cholelithiasis. CT ABDOMEN PELVIS WO CONTRAST Additional Contrast? None   Final Result      CHEST:      1. Moderate dependent atelectasis bilaterally. ABDOMEN/PELVIS:      1.  No acute abnormality on noncontrast CT of the abdomen and pelvis. 2.  Cholelithiasis. CTA HEAD NECK W CONTRAST   Final Result      CTA Head:   No acute CTA findings. No hemodynamically significant stenosis or focal aneurysm. No arteriovenous confirmation. CTA Neck:   No acute CTA findings. No hemodynamically significant stenosis or focal aneurysm. CT HEAD WO CONTRAST   Final Result      1. No findings for acute intracranial abnormality.       2.  Age-related atrophy with patchy periventricular white matter changes bilaterally consistent with chronic small vessel ischemia. 3.  Stable low attenuation left frontoparietal lobe consistent with previous insult from known prior intraparenchymal hematoma. Stable right frontoparietal cortical infarct. These findings were called to the emergency room physician Dr. rCystal Umana on 9/1/2022 at 5:30 p.m. XR CHEST PORTABLE   Final Result   1. No findings for acute cardiopulmonary disease. 2.  ET tube in good position in the mid trachea. Assessment/Plan:     Acute metabolic encephalopathy(unclear etiology)  Delirium, does not appear to be 2/2 reversible causes  On presentation AMS, elevated WBC: 21.7, Tachypneic, Tachycardic, Hypotensive. Hx of ICH, SAH and SDH 2/2 to fall, hx of chronic alcohol abuse,   CT head: prior Intraparenchymal hematoma. Stable right frontoparietal cortical infarct. CT Abdomen, chest: mod atelectasis bilaterally. No acute abnorms on CT abd except stable cholelithiasis   CTA Head: No acute CTA findings. CTA Neck: No acute CTA findings. MRI: Faint diffusion restriction in the right hippocampus with associated FLAIR signal abnormality. This is favored to represent sequelae of seizures. cEEG readings previously: Generalized background abnormalities indicative of an underlying diffuse encephalopathy of non-specific etiology. Intermittent focal epileptiform discharges, right posterior temporal, conferring increased risk of focal onset seizures from this region.  -Lumbar puncture ordered 9/6/2022: No growth. -Blood Cultures were -ve x2  -On Keppra 1500mg bid  - inpatient psych consulted - no inpatient psych upon discharge warranted - decision remains on psych re-eval  - Palliative consult, able to contact family (multiple siblings), all agreed that sister (present on admission) can make decision for patient. - Zyprexa increased to 10 BID  - PRN Geodon decreased q8>q12.   - QTC appears to be fluctuant, but prolonged Acute sinus tachycardia, improving  Intermittently tachycardic into the 120s with stable pressures  -Most likely secondary to dehydration and poor p.o. intake  -Could also consider PE, however patient is showing no respiratory distress, denies chest pain, on room air, not tachypneic  -Will get an EKG to rule out other causes  -500 cc LR bolus to monitor for response. Alcohol use disorder  Ethanol level: none detected  -thiamine 100 mg daily, changed to PO  -monitor for withdrawal     Acute hypercapnic respiratory failure secondary to possible aspiration vs 2/2 medication (Extubated successfully 9/10)  On presentation: VBG PH: 7.264, PCO2: 56.4, 83.2  - Intubated for 10 days in ICU  - after transfer out of ICU - pt is stable on room air. CAD s/p stent 2020  Hx of Mural thrombus  - in 2020, anterior apical MI with atrial thrombus s/p stenting, pt was put on Eliquis/Plavix. - Eliquis/Plavix stopped due to frequent falls 2/2 chronic alcoholism  - [facility] 09/2021 suggesting acute and chronic ICH/SAH/SDH     Hx of Cirrhosis  -Ammonia: 36 wnl(09/1/22), LFTs WNL. Hypertension  -Resumed home carvedilol dose 3.125 mg twice daily 9/14    Code Status: Full Code  FEN: Easy chew diet, oral nutrition supplement  PPX: Lovenox  DISPO: IP, awaiting SNF placement    Steven Sanchez MD, PGY-1  09/24/22  9:49 AM    This patient has been staffed and discussed with Kristan Billings MD.    I independently examined the patient and reviewed the patient's medical history, the resident's findings on physical examination, the patient's diagnoses, and treatment plan as documented in the resident note. I concur with the treatment plan as documented.       Rachelle Merritt MD

## 2022-09-24 NOTE — PLAN OF CARE
Problem: Discharge Planning  Goal: Discharge to home or other facility with appropriate resources  9/23/2022 2118 by Dana Prieto RN  Outcome: Progressing  9/23/2022 1630 by Jorge Costa RN  Outcome: Progressing     Problem: Pain  Goal: Verbalizes/displays adequate comfort level or baseline comfort level  9/23/2022 2118 by Dana Prieto RN  Outcome: Progressing  9/23/2022 1630 by Jorge Costa RN  Outcome: Progressing     Problem: Safety - Adult  Goal: Free from fall injury  9/23/2022 2118 by Dana Prieto RN  Outcome: Progressing  9/23/2022 1630 by Jorge Costa RN  Outcome: Progressing     Problem: ABCDS Injury Assessment  Goal: Absence of physical injury  9/23/2022 2118 by Dana Prieto RN  Outcome: Progressing  9/23/2022 1630 by Jorge Costa RN  Outcome: Progressing     Problem: Skin/Tissue Integrity  Goal: Absence of new skin breakdown  Description: 1. Monitor for areas of redness and/or skin breakdown  2. Assess vascular access sites hourly  3. Every 4-6 hours minimum:  Change oxygen saturation probe site  4. Every 4-6 hours:  If on nasal continuous positive airway pressure, respiratory therapy assess nares and determine need for appliance change or resting period.   9/23/2022 2118 by Dana Prieto RN  Outcome: Progressing  9/23/2022 1630 by Jorge Costa RN  Outcome: Progressing     Problem: Chronic Conditions and Co-morbidities  Goal: Patient's chronic conditions and co-morbidity symptoms are monitored and maintained or improved  9/23/2022 1630 by Jorge Costa RN  Outcome: Progressing     Problem: Nutrition Deficit:  Goal: Optimize nutritional status  9/23/2022 1630 by Jorge Costa RN  Outcome: Progressing  Flowsheets (Taken 9/23/2022 1252 by Zulema Paula RD)  Nutrient intake appropriate for improving, restoring, or maintaining nutritional needs:   Monitor oral intake, labs, and treatment plans   Assess nutritional status and recommend course of action     Problem: Respiratory - Adult  Goal: Achieves optimal ventilation and oxygenation  9/23/2022 1630 by Nida Busby RN  Outcome: Progressing     Problem: Cardiovascular - Adult  Goal: Maintains optimal cardiac output and hemodynamic stability  9/23/2022 1630 by Nida Busby RN  Outcome: Progressing

## 2022-09-25 LAB
ALBUMIN SERPL-MCNC: 4.7 G/DL (ref 3.4–5)
ANION GAP SERPL CALCULATED.3IONS-SCNC: 19 MMOL/L (ref 3–16)
BASOPHILS ABSOLUTE: 0.1 K/UL (ref 0–0.2)
BASOPHILS RELATIVE PERCENT: 0.7 %
BUN BLDV-MCNC: 19 MG/DL (ref 7–20)
CALCIUM SERPL-MCNC: 10.2 MG/DL (ref 8.3–10.6)
CHLORIDE BLD-SCNC: 103 MMOL/L (ref 99–110)
CO2: 20 MMOL/L (ref 21–32)
CREAT SERPL-MCNC: 0.8 MG/DL (ref 0.8–1.3)
EKG ATRIAL RATE: 83 BPM
EKG DIAGNOSIS: NORMAL
EKG P AXIS: 22 DEGREES
EKG P-R INTERVAL: 174 MS
EKG Q-T INTERVAL: 408 MS
EKG QRS DURATION: 82 MS
EKG QTC CALCULATION (BAZETT): 479 MS
EKG R AXIS: -40 DEGREES
EKG T AXIS: 130 DEGREES
EKG VENTRICULAR RATE: 83 BPM
EOSINOPHILS ABSOLUTE: 0.3 K/UL (ref 0–0.6)
EOSINOPHILS RELATIVE PERCENT: 4.2 %
GFR AFRICAN AMERICAN: >60
GFR NON-AFRICAN AMERICAN: >60
GLUCOSE BLD-MCNC: 89 MG/DL (ref 70–99)
HCT VFR BLD CALC: 47.8 % (ref 40.5–52.5)
HEMOGLOBIN: 16.5 G/DL (ref 13.5–17.5)
LYMPHOCYTES ABSOLUTE: 2.3 K/UL (ref 1–5.1)
LYMPHOCYTES RELATIVE PERCENT: 28.7 %
MAGNESIUM: 1.9 MG/DL (ref 1.8–2.4)
MCH RBC QN AUTO: 32 PG (ref 26–34)
MCHC RBC AUTO-ENTMCNC: 34.6 G/DL (ref 31–36)
MCV RBC AUTO: 92.6 FL (ref 80–100)
MONOCYTES ABSOLUTE: 1.3 K/UL (ref 0–1.3)
MONOCYTES RELATIVE PERCENT: 16.4 %
NEUTROPHILS ABSOLUTE: 4 K/UL (ref 1.7–7.7)
NEUTROPHILS RELATIVE PERCENT: 50 %
PDW BLD-RTO: 14.3 % (ref 12.4–15.4)
PHOSPHORUS: 3.7 MG/DL (ref 2.5–4.9)
PLATELET # BLD: 233 K/UL (ref 135–450)
PMV BLD AUTO: 10.6 FL (ref 5–10.5)
POTASSIUM SERPL-SCNC: 4.2 MMOL/L (ref 3.5–5.1)
RBC # BLD: 5.16 M/UL (ref 4.2–5.9)
SODIUM BLD-SCNC: 142 MMOL/L (ref 136–145)
WBC # BLD: 8 K/UL (ref 4–11)

## 2022-09-25 PROCEDURE — 80069 RENAL FUNCTION PANEL: CPT

## 2022-09-25 PROCEDURE — 6370000000 HC RX 637 (ALT 250 FOR IP)

## 2022-09-25 PROCEDURE — 85025 COMPLETE CBC W/AUTO DIFF WBC: CPT

## 2022-09-25 PROCEDURE — 83735 ASSAY OF MAGNESIUM: CPT

## 2022-09-25 PROCEDURE — 6370000000 HC RX 637 (ALT 250 FOR IP): Performed by: NURSE PRACTITIONER

## 2022-09-25 PROCEDURE — 2500000003 HC RX 250 WO HCPCS

## 2022-09-25 PROCEDURE — 6370000000 HC RX 637 (ALT 250 FOR IP): Performed by: INTERNAL MEDICINE

## 2022-09-25 PROCEDURE — 6360000002 HC RX W HCPCS: Performed by: STUDENT IN AN ORGANIZED HEALTH CARE EDUCATION/TRAINING PROGRAM

## 2022-09-25 PROCEDURE — 6370000000 HC RX 637 (ALT 250 FOR IP): Performed by: STUDENT IN AN ORGANIZED HEALTH CARE EDUCATION/TRAINING PROGRAM

## 2022-09-25 PROCEDURE — 2580000003 HC RX 258

## 2022-09-25 PROCEDURE — 1200000000 HC SEMI PRIVATE

## 2022-09-25 PROCEDURE — 93005 ELECTROCARDIOGRAM TRACING: CPT

## 2022-09-25 PROCEDURE — 36415 COLL VENOUS BLD VENIPUNCTURE: CPT

## 2022-09-25 RX ORDER — LORAZEPAM 0.5 MG/1
0.5 TABLET ORAL
Status: ACTIVE | OUTPATIENT
Start: 2022-09-25 | End: 2022-09-26

## 2022-09-25 RX ORDER — HALOPERIDOL 5 MG/ML
1 INJECTION INTRAMUSCULAR DAILY PRN
Status: DISCONTINUED | OUTPATIENT
Start: 2022-09-25 | End: 2022-09-25

## 2022-09-25 RX ORDER — OLANZAPINE 10 MG/1
5 INJECTION, POWDER, LYOPHILIZED, FOR SOLUTION INTRAMUSCULAR DAILY PRN
Status: DISCONTINUED | OUTPATIENT
Start: 2022-09-25 | End: 2022-10-06 | Stop reason: HOSPADM

## 2022-09-25 RX ADMIN — FAMOTIDINE 20 MG: 20 TABLET ORAL at 21:24

## 2022-09-25 RX ADMIN — CARVEDILOL 3.12 MG: 3.12 TABLET, FILM COATED ORAL at 16:27

## 2022-09-25 RX ADMIN — LEVETIRACETAM 1500 MG: 750 TABLET, FILM COATED ORAL at 21:24

## 2022-09-25 RX ADMIN — LEVETIRACETAM 1500 MG: 750 TABLET, FILM COATED ORAL at 08:30

## 2022-09-25 RX ADMIN — OLANZAPINE 10 MG: 5 TABLET, FILM COATED ORAL at 21:24

## 2022-09-25 RX ADMIN — FAMOTIDINE 20 MG: 20 TABLET ORAL at 08:30

## 2022-09-25 RX ADMIN — CARVEDILOL 3.12 MG: 3.12 TABLET, FILM COATED ORAL at 08:30

## 2022-09-25 RX ADMIN — THIAMINE HCL TAB 100 MG 100 MG: 100 TAB at 08:31

## 2022-09-25 RX ADMIN — OLANZAPINE 10 MG: 5 TABLET, FILM COATED ORAL at 08:30

## 2022-09-25 RX ADMIN — ENOXAPARIN SODIUM 40 MG: 100 INJECTION SUBCUTANEOUS at 08:31

## 2022-09-25 RX ADMIN — Medication 5 MG: at 21:24

## 2022-09-25 RX ADMIN — WATER 10 ML: 1 INJECTION INTRAMUSCULAR; INTRAVENOUS; SUBCUTANEOUS at 17:14

## 2022-09-25 RX ADMIN — DIPHENHYDRAMINE HYDROCHLORIDE 25 MG: 25 TABLET ORAL at 08:31

## 2022-09-25 RX ADMIN — OLANZAPINE 5 MG: 10 INJECTION, POWDER, LYOPHILIZED, FOR SOLUTION INTRAMUSCULAR at 17:11

## 2022-09-25 RX ADMIN — ZIPRASIDONE HYDROCHLORIDE 20 MG: 20 CAPSULE ORAL at 11:21

## 2022-09-25 RX ADMIN — Medication 400 MG: at 08:31

## 2022-09-25 RX ADMIN — DIPHENHYDRAMINE HYDROCHLORIDE 25 MG: 25 TABLET ORAL at 14:30

## 2022-09-25 ASSESSMENT — PAIN SCALES - GENERAL: PAINLEVEL_OUTOF10: 0

## 2022-09-25 ASSESSMENT — PAIN DESCRIPTION - LOCATION: LOCATION: GENERALIZED

## 2022-09-25 NOTE — PLAN OF CARE
Problem: Safety - Medical Restraint  Goal: Remains free of injury from restraints (Restraint for Interference with Medical Device)  Description: INTERVENTIONS:  1. Determine that other, less restrictive measures have been tried or would not be effective before applying the restraint  2. Evaluate the patient's condition at the time of restraint application  3. Inform patient/family regarding the reason for restraint  4. Q2H: Monitor safety, psychosocial status, comfort, nutrition and hydration  Outcome: Not Progressing  Flowsheets  Taken 9/25/2022 1524  Remains free of injury from restraints (restraint for interference with medical device): Determine that other, less restrictive measures have been tried or would not be effective before applying the restraint  Taken 9/25/2022 1503  Remains free of injury from restraints (restraint for interference with medical device): Determine that other, less restrictive measures have been tried or would not be effective before applying the restraint  Note: Pt agitated with staff. Pt endorsing hallucinations about a truck in the room and needing to get keys. Pt not redirectable. Pt unable to be reoriented to reality. PRN medications were used but unable to treat agitation. Pt has made multiple attempts to climb over side rail. Pt place in bilateral soft wrist restraints for pt safety and agitation.

## 2022-09-25 NOTE — PROGRESS NOTES
Rest for patient very poor overnight. Remains very confused. Oriented to self only. Intermittently aggressive.  at bedside. No additional signs or symptoms of distress. No other wants or needs.

## 2022-09-25 NOTE — PROGRESS NOTES
Progress Note    Admit Date: 9/1/2022  Day: 18  Diet: ADULT ORAL NUTRITION SUPPLEMENT; Breakfast, Lunch, Dinner; Standard High Calorie/High Protein Oral Supplement  ADULT DIET; Easy to Chew; 3 carb choices (45 gm/meal)    Interval history:   Patient seen at bedside. No overnight events. Patient was sleeping well this morning with a sitter at bedside. EKG this morning showed QTC of high 400s. Remains on antipsychotics and has episodes of agitation. Spoken to family during the week and updated them. Vitals were reviewed which showed systolic blood pressure 247X but remains tachycardic as well. Labs reviewed which showed a drop in bicarb which made anion gap increased to 19 today. Rest of his labs were stable. Denies any nausea vomiting diarrhea or fevers. No inpatient psychiatric admission warranted per psych re-evaluation. However, no reversible causes of delirium identified thus far.      Medications:     Scheduled Meds:   magnesium oxide  400 mg Oral Daily    OLANZapine  10 mg Oral BID    famotidine  20 mg Oral BID    levETIRAcetam  1,500 mg Oral BID    thiamine mononitrate  100 mg Oral Daily    carvedilol  3.125 mg Oral BID WC    melatonin  5 mg Oral Nightly    enoxaparin  40 mg SubCUTAneous Daily    lidocaine PF  5 mL IntraDERmal Once    sodium chloride flush  5-40 mL IntraVENous 2 times per day     Continuous Infusions:   dextrose      dexmedetomidine (PRECEDEX) IV infusion Stopped (09/17/22 0634)    sodium chloride 10 mL/hr at 09/17/22 0637     PRN Meds:ziprasidone, LORazepam, promethazine, diphenhydrAMINE, labetalol, glucose, dextrose bolus **OR** dextrose bolus, glucagon (rDNA), dextrose, sodium chloride flush, sodium chloride, polyethylene glycol, acetaminophen **OR** acetaminophen    Objective:   Vitals:   T-max:  Patient Vitals for the past 8 hrs:   BP Temp Temp src Pulse Resp SpO2 Weight   09/25/22 0800 (!) 143/84 98.3 °F (36.8 °C) Oral 78 20 97 % --   09/25/22 0600 125/77 98 °F (36.7 °C) Temporal 94 22 97 % 183 lb 14.4 oz (83.4 kg)         Intake/Output Summary (Last 24 hours) at 9/25/2022 1026  Last data filed at 9/24/2022 1909  Gross per 24 hour   Intake 240 ml   Output 125 ml   Net 115 ml         Physical Exam  Constitutional:       General: He is not in acute distress. HENT:      Mouth/Throat:      Mouth: Mucous membranes are dry. Eyes:      Pupils: Pupils are equal, round, and reactive to light. Comments: Uncooperative with EOM exam   Cardiovascular:      Rate and Rhythm: Regular rhythm. Tachycardia present. Pulses: Normal pulses. Heart sounds: Normal heart sounds. Pulmonary:      Effort: Pulmonary effort is normal.      Breath sounds: Normal breath sounds. Abdominal:      Tenderness: There is no abdominal tenderness. Musculoskeletal:      Right lower leg: No edema. Left lower leg: No edema. Skin:     Coloration: Skin is not jaundiced. Neurological:      Comments: Oriented to person, but not to place, time or situation. LABS:    CBC:   Recent Labs     09/23/22 0436 09/24/22 0317 09/25/22 0318   WBC 7.5 7.7 8.0   HGB 16.1 15.7 16.5   HCT 47.3 45.2 47.8    258 233   MCV 93.0 92.6 92.6       Renal:    Recent Labs     09/23/22 0436 09/24/22 0317 09/25/22 0318    142 142   K 4.0 4.0 4.2    103 103   CO2 24 23 20*   BUN 15 18 19   CREATININE 0.7* 0.8 0.8   GLUCOSE 95 101* 89   CALCIUM 9.8 9.8 10.2   MG 1.70* 1.80 1.90   PHOS 4.3 4.1 3.7   ANIONGAP 16 16 19*       Hepatic:   Recent Labs     09/23/22 0436 09/24/22 0317 09/25/22 0318   LABALBU 4.3 4.3 4.7       Troponin: No results for input(s): TROPONINI in the last 72 hours. BNP: No results for input(s): BNP in the last 72 hours. Lipids: No results for input(s): CHOL, HDL in the last 72 hours. Invalid input(s): LDLCALCU, TRIGLYCERIDE  ABGs:  No results for input(s): PHART, IJU8CQW, PO2ART, OZV8SBO, BEART, THGBART, G3MYIHVL, XSF0NYP in the last 72 hours.     INR: No results for input(s): INR in the last 72 hours. Lactate: No results for input(s): LACTATE in the last 72 hours. Cultures:  -----------------------------------------------------------------  RAD:   FL MODIFIED BARIUM SWALLOW W VIDEO   Final Result      No penetration or aspiration. XR CHEST PORTABLE   Final Result   1. Worsened pulmonary edema. 2.  Persistent bibasilar atelectasis. MRI BRAIN WO CONTRAST   Final Result      Faint diffusion restriction in the right hippocampus with associated FLAIR signal abnormality. This is favored to represent sequelae of seizures, however other infectious/inflammatory etiologies are also possible. Mild atrophy and chronic small vessel ischemic change. Areas of encephalomalacia and gliosis in both cerebral hemispheres with remote hemorrhagic staining from prior insults. IR LUMBAR PUNCTURE FOR DIAGNOSIS   Final Result   . IMPRESSION:   1. Uneventful diagnostic fluoroscopic guided lumbar puncture as the   2. The flow reversed are risk and therefore a closing pressure was obtained at the end of the procedure, 26 cm of water. XR ABDOMEN (KUB) (SINGLE AP VIEW)   Final Result   Findings/Impression:    The tip of the enteric tube terminates in the distal body of the stomach. CT CHEST WO CONTRAST   Final Result      CHEST:      1. Moderate dependent atelectasis bilaterally. ABDOMEN/PELVIS:      1.  No acute abnormality on noncontrast CT of the abdomen and pelvis. 2.  Cholelithiasis. CT ABDOMEN PELVIS WO CONTRAST Additional Contrast? None   Final Result      CHEST:      1. Moderate dependent atelectasis bilaterally. ABDOMEN/PELVIS:      1.  No acute abnormality on noncontrast CT of the abdomen and pelvis. 2.  Cholelithiasis. CTA HEAD NECK W CONTRAST   Final Result      CTA Head:   No acute CTA findings. No hemodynamically significant stenosis or focal aneurysm. No arteriovenous confirmation.       CTA Neck:   No acute CTA findings. No hemodynamically significant stenosis or focal aneurysm. CT HEAD WO CONTRAST   Final Result      1. No findings for acute intracranial abnormality. 2.  Age-related atrophy with patchy periventricular white matter changes bilaterally consistent with chronic small vessel ischemia. 3.  Stable low attenuation left frontoparietal lobe consistent with previous insult from known prior intraparenchymal hematoma. Stable right frontoparietal cortical infarct. These findings were called to the emergency room physician Dr. Jacinto Daily on 9/1/2022 at 5:30 p.m. XR CHEST PORTABLE   Final Result   1. No findings for acute cardiopulmonary disease. 2.  ET tube in good position in the mid trachea. Assessment/Plan:     9/25 addendum:  Patient had become agitated requiring bilateral restraints. Nurse had perfect served that he had became uncontrollable and not listening. He was pulling out the rails and not being compliant despite having a sitter and taking his PO meds earlier that day. Patient did not receive any Ativan as it was discontinued by day team.  We added on IM Zyprexa as needed for agitation. This event has been happening daily despite medication increase and changes. Patient has been on as needed Ativan, Seroquel, Zyprexa IM and p.o., and IM Haldol prn. His mentation has been difficult to treat. Psych to see him in the morning to aid us in keeping him more calm and not have to use restraints. Recommendations highly appreciated. Acute metabolic encephalopathy(unclear etiology)  Delirium  On presentation AMS, elevated WBC: 21.7, Tachypneic, Tachycardic, Hypotensive. Hx of ICH, SAH and SDH 2/2 to fall, hx of chronic alcohol abuse,   CT head: prior Intraparenchymal hematoma. Stable right frontoparietal cortical infarct. CT Abdomen, chest: mod atelectasis bilaterally.  No acute abnorms on CT abd except stable cholelithiasis   CTA Head: No acute CTA findings. CTA Neck: No acute CTA findings. MRI: Faint diffusion restriction in the right hippocampus with associated FLAIR signal abnormality. This is favored to represent sequelae of seizures. cEEG readings previously: Generalized background abnormalities indicative of an underlying diffuse encephalopathy of non-specific etiology. Intermittent focal epileptiform discharges, right posterior temporal, conferring increased risk of focal onset seizures from this region.    -Lumbar puncture ordered 9/6/2022: No growth. -Blood Cultures were -ve x2  -On Keppra 1500mg bid  - inpatient psych consulted - no inpatient psych upon discharge warranted - decision remains on psych re-eval  - Palliative consult, able to contact family (multiple siblings), all agreed that sister (present on admission) can make decision for patient. - Zyprexa increased to 10 BID  - PRN Geodon decreased q8>q12. - Monitor QTC - daily EKG     Acute sinus tachycardia  Intermittently tachycardic into the 120s with stable pressures  -Most likely secondary to dehydration and poor p.o. intake with agitation      Alcohol use disorder  Ethanol level: none detected  -thiamine 100 mg daily, changed to PO  -monitor for withdrawal     Acute hypercapnic respiratory failure secondary to possible aspiration vs 2/2 medication (Extubated successfully 9/10)  On presentation: VBG PH: 7.264, PCO2: 56.4, 83.2  - Intubated for 10 days in ICU  - after transfer out of ICU - pt is stable on room air. CAD s/p stent 2020  Hx of Mural thrombus  - in 2020, anterior apical MI with atrial thrombus s/p stenting, pt was put on Eliquis/Plavix. - Eliquis/Plavix stopped due to frequent falls 2/2 chronic alcoholism  - [facility] 09/2021 suggesting acute and chronic ICH/SAH/SDH     Hx of Cirrhosis  -Ammonia: 36 wnl(09/1/22), LFTs WNL.     Hypertension  -Resumed home carvedilol dose 3.125 mg twice daily     Code Status: Full Code  FEN: Easy chew diet, oral nutrition supplement  PPX: Lovenox  DISPO: IP, awaiting SNF placement    Shae Ramírez MD, PGY-2  09/25/22  10:26 AM    This patient has been staffed and discussed with Nicole Dumas MD.

## 2022-09-26 LAB
ANION GAP SERPL CALCULATED.3IONS-SCNC: 16 MMOL/L (ref 3–16)
BUN BLDV-MCNC: 19 MG/DL (ref 7–20)
CALCIUM SERPL-MCNC: 9.7 MG/DL (ref 8.3–10.6)
CHLORIDE BLD-SCNC: 106 MMOL/L (ref 99–110)
CO2: 20 MMOL/L (ref 21–32)
CREAT SERPL-MCNC: 0.7 MG/DL (ref 0.8–1.3)
EKG ATRIAL RATE: 79 BPM
EKG ATRIAL RATE: 81 BPM
EKG ATRIAL RATE: 84 BPM
EKG DIAGNOSIS: NORMAL
EKG P AXIS: 12 DEGREES
EKG P AXIS: 26 DEGREES
EKG P-R INTERVAL: 166 MS
EKG P-R INTERVAL: 168 MS
EKG P-R INTERVAL: 178 MS
EKG Q-T INTERVAL: 430 MS
EKG Q-T INTERVAL: 436 MS
EKG Q-T INTERVAL: 440 MS
EKG QRS DURATION: 78 MS
EKG QRS DURATION: 88 MS
EKG QRS DURATION: 88 MS
EKG QTC CALCULATION (BAZETT): 504 MS
EKG QTC CALCULATION (BAZETT): 506 MS
EKG QTC CALCULATION (BAZETT): 508 MS
EKG R AXIS: -32 DEGREES
EKG R AXIS: -42 DEGREES
EKG R AXIS: -54 DEGREES
EKG T AXIS: 111 DEGREES
EKG T AXIS: 113 DEGREES
EKG T AXIS: 124 DEGREES
EKG VENTRICULAR RATE: 79 BPM
EKG VENTRICULAR RATE: 81 BPM
EKG VENTRICULAR RATE: 84 BPM
GFR AFRICAN AMERICAN: >60
GFR NON-AFRICAN AMERICAN: >60
GLUCOSE BLD-MCNC: 141 MG/DL (ref 70–99)
POTASSIUM SERPL-SCNC: 3.9 MMOL/L (ref 3.5–5.1)
SODIUM BLD-SCNC: 142 MMOL/L (ref 136–145)

## 2022-09-26 PROCEDURE — 6370000000 HC RX 637 (ALT 250 FOR IP): Performed by: INTERNAL MEDICINE

## 2022-09-26 PROCEDURE — 93005 ELECTROCARDIOGRAM TRACING: CPT

## 2022-09-26 PROCEDURE — 1200000000 HC SEMI PRIVATE

## 2022-09-26 PROCEDURE — 97530 THERAPEUTIC ACTIVITIES: CPT

## 2022-09-26 PROCEDURE — 6370000000 HC RX 637 (ALT 250 FOR IP): Performed by: STUDENT IN AN ORGANIZED HEALTH CARE EDUCATION/TRAINING PROGRAM

## 2022-09-26 PROCEDURE — 2500000003 HC RX 250 WO HCPCS

## 2022-09-26 PROCEDURE — 6370000000 HC RX 637 (ALT 250 FOR IP)

## 2022-09-26 PROCEDURE — 93010 ELECTROCARDIOGRAM REPORT: CPT | Performed by: INTERNAL MEDICINE

## 2022-09-26 PROCEDURE — 86701 HIV-1ANTIBODY: CPT

## 2022-09-26 PROCEDURE — 99233 SBSQ HOSP IP/OBS HIGH 50: CPT | Performed by: NURSE PRACTITIONER

## 2022-09-26 PROCEDURE — 6370000000 HC RX 637 (ALT 250 FOR IP): Performed by: NURSE PRACTITIONER

## 2022-09-26 PROCEDURE — 80048 BASIC METABOLIC PNL TOTAL CA: CPT

## 2022-09-26 PROCEDURE — 86702 HIV-2 ANTIBODY: CPT

## 2022-09-26 PROCEDURE — 94761 N-INVAS EAR/PLS OXIMETRY MLT: CPT

## 2022-09-26 PROCEDURE — 97116 GAIT TRAINING THERAPY: CPT

## 2022-09-26 PROCEDURE — 87390 HIV-1 AG IA: CPT

## 2022-09-26 PROCEDURE — 6360000002 HC RX W HCPCS: Performed by: STUDENT IN AN ORGANIZED HEALTH CARE EDUCATION/TRAINING PROGRAM

## 2022-09-26 PROCEDURE — 97535 SELF CARE MNGMENT TRAINING: CPT

## 2022-09-26 RX ORDER — OLANZAPINE 5 MG/1
5 TABLET ORAL 2 TIMES DAILY
Status: DISCONTINUED | OUTPATIENT
Start: 2022-09-26 | End: 2022-10-06 | Stop reason: HOSPADM

## 2022-09-26 RX ADMIN — LEVETIRACETAM 1500 MG: 750 TABLET, FILM COATED ORAL at 20:38

## 2022-09-26 RX ADMIN — CARVEDILOL 3.12 MG: 3.12 TABLET, FILM COATED ORAL at 08:06

## 2022-09-26 RX ADMIN — OLANZAPINE 5 MG: 10 INJECTION, POWDER, LYOPHILIZED, FOR SOLUTION INTRAMUSCULAR at 16:39

## 2022-09-26 RX ADMIN — FAMOTIDINE 20 MG: 20 TABLET ORAL at 08:06

## 2022-09-26 RX ADMIN — ENOXAPARIN SODIUM 40 MG: 100 INJECTION SUBCUTANEOUS at 08:06

## 2022-09-26 RX ADMIN — FAMOTIDINE 20 MG: 20 TABLET ORAL at 20:39

## 2022-09-26 RX ADMIN — Medication 400 MG: at 08:06

## 2022-09-26 RX ADMIN — Medication 5 MG: at 20:38

## 2022-09-26 RX ADMIN — OLANZAPINE 5 MG: 5 TABLET, FILM COATED ORAL at 20:38

## 2022-09-26 RX ADMIN — ZIPRASIDONE HYDROCHLORIDE 20 MG: 20 CAPSULE ORAL at 13:41

## 2022-09-26 RX ADMIN — CARVEDILOL 3.12 MG: 3.12 TABLET, FILM COATED ORAL at 15:36

## 2022-09-26 RX ADMIN — DIPHENHYDRAMINE HYDROCHLORIDE 25 MG: 25 TABLET ORAL at 08:06

## 2022-09-26 RX ADMIN — OLANZAPINE 10 MG: 5 TABLET, FILM COATED ORAL at 08:06

## 2022-09-26 RX ADMIN — LEVETIRACETAM 1500 MG: 750 TABLET, FILM COATED ORAL at 08:06

## 2022-09-26 RX ADMIN — THIAMINE HCL TAB 100 MG 100 MG: 100 TAB at 08:06

## 2022-09-26 RX ADMIN — DIPHENHYDRAMINE HYDROCHLORIDE 25 MG: 25 TABLET ORAL at 15:36

## 2022-09-26 ASSESSMENT — PAIN SCALES - GENERAL: PAINLEVEL_OUTOF10: 0

## 2022-09-26 NOTE — PROGRESS NOTES
Physician Progress Note      PATIENT:               Alberta Neighbor  CSN #:                  [id number]  :                       1960  ADMIT DATE:       2022 4:30 PM  [address] [place] DATE:  RESPONDING  PROVIDER #:        Candelario Kemp          QUERY TEXT:    Pt admitted with encephalopathy, Acute hypoxic?hypercapnic? Respiratory. Noted   documentation of  Severe malnutrition (22 [id number])EW dietary consultant. If possible, please document in progress notes and discharge summary:    The medical record reflects the following:  Risk Factors: multiple co-morbid conditions  Clinical Indicators: dietary ([facility])-Severe malnutrition ([id number]); Energy Intake:  50% or less of estimated energy  requirements for 5 or more days; Weight Loss:  Greater than 2% over 1 week (6%   in 1 week period)  Treatment: per dietary-Due to inadequate intake, discussion of palliative care   vs nutrition support should be considered. Consult  dietitian if tube feeding is desired and consistent with patient's plan of   care; Continue Easy to Chew; 3 carb choice diet; Continue  Ensure TID; Monitor nutrition adequacy, pertinent labs, bowel habits, wt   changes, and clinical progress  Options provided:  -- Severe malnutrition confirmed not present on admission  -- Severe malnutrition ruled out  -- Other - I will add my own diagnosis  -- Disagree - Not applicable / Not valid  -- Disagree - Clinically unable to determine / Unknown  -- Refer to Clinical Documentation Reviewer    PROVIDER RESPONSE TEXT:    The diagnosis of Severe malnutrition was confirmed not present on admission.     Query created by: Jesenia Reid on 2022 2:01 PM      Electronically signed by:  Candelario Kemp 2022 7:48 AM

## 2022-09-26 NOTE — PLAN OF CARE
Problem: Discharge Planning  Goal: Discharge to home or other facility with appropriate resources  Outcome: Progressing     Problem: Pain  Goal: Verbalizes/displays adequate comfort level or baseline comfort level  Outcome: Progressing     Problem: Safety - Adult  Goal: Free from fall injury  Outcome: Progressing     Problem: ABCDS Injury Assessment  Goal: Absence of physical injury  Outcome: Progressing     Problem: Skin/Tissue Integrity  Goal: Absence of new skin breakdown  Description: 1. Monitor for areas of redness and/or skin breakdown  2. Assess vascular access sites hourly  3. Every 4-6 hours minimum:  Change oxygen saturation probe site  4. Every 4-6 hours:  If on nasal continuous positive airway pressure, respiratory therapy assess nares and determine need for appliance change or resting period. Outcome: Progressing     Problem: Chronic Conditions and Co-morbidities  Goal: Patient's chronic conditions and co-morbidity symptoms are monitored and maintained or improved  Outcome: Progressing     Problem: Nutrition Deficit:  Goal: Optimize nutritional status  Outcome: Progressing     Problem: Respiratory - Adult  Goal: Achieves optimal ventilation and oxygenation  Outcome: Progressing     Problem: Cardiovascular - Adult  Goal: Maintains optimal cardiac output and hemodynamic stability  Outcome: Progressing  Goal: Absence of cardiac dysrhythmias or at baseline  Outcome: Progressing     Problem: Metabolic/Fluid and Electrolytes - Adult  Goal: Electrolytes maintained within normal limits  Outcome: Progressing  Goal: Hemodynamic stability and optimal renal function maintained  Outcome: Progressing     Problem: Safety - Medical Restraint  Goal: Remains free of injury from restraints (Restraint for Interference with Medical Device)  Description: INTERVENTIONS:  1. Determine that other, less restrictive measures have been tried or would not be effective before applying the restraint  2.  Evaluate the patient's condition at the time of restraint application  3. Inform patient/family regarding the reason for restraint  4. Q2H: Monitor safety, psychosocial status, comfort, nutrition and hydration  Recent Flowsheet Documentation  Taken 9/26/2022 0855 by Leonardo Wright RN  Remains free of injury from restraints (restraint for interference with medical device):   Determine that other, less restrictive measures have been tried or would not be effective before applying the restraint   Every 2 hours: Monitor safety, psychosocial status, comfort, nutrition and hydration  Taken 9/26/2022 0600 by Quinton Boston RN  Remains free of injury from restraints (restraint for interference with medical device): Determine that other, less restrictive measures have been tried or would not be effective before applying the restraint  Taken 9/26/2022 0400 by Quinton Boston RN  Remains free of injury from restraints (restraint for interference with medical device): Determine that other, less restrictive measures have been tried or would not be effective before applying the restraint     Problem: Safety - Medical Restraint  Goal: Remains free of injury from restraints (Restraint for Interference with Medical Device)  Description: INTERVENTIONS:  1. Determine that other, less restrictive measures have been tried or would not be effective before applying the restraint  2. Evaluate the patient's condition at the time of restraint application  3. Inform patient/family regarding the reason for restraint  4.  Q2H: Monitor safety, psychosocial status, comfort, nutrition and hydration  Outcome: Completed  Flowsheets  Taken 9/26/2022 0855 by Leonardo Wright RN  Remains free of injury from restraints (restraint for interference with medical device):   Determine that other, less restrictive measures have been tried or would not be effective before applying the restraint   Every 2 hours: Monitor safety, psychosocial status, comfort, nutrition and hydration  Taken 9/26/2022 0600 by Miguel Hence, RN  Remains free of injury from restraints (restraint for interference with medical device): Determine that other, less restrictive measures have been tried or would not be effective before applying the restraint  Taken 9/26/2022 0400 by Miguel Hence, RN  Remains free of injury from restraints (restraint for interference with medical device): Determine that other, less restrictive measures have been tried or would not be effective before applying the restraint     Problem: Discharge Planning  Goal: Discharge to home or other facility with appropriate resources  Outcome: Progressing     Problem: Pain  Goal: Verbalizes/displays adequate comfort level or baseline comfort level  Outcome: Progressing     Problem: Safety - Adult  Goal: Free from fall injury  Outcome: Progressing     Problem: ABCDS Injury Assessment  Goal: Absence of physical injury  Outcome: Progressing     Problem: Skin/Tissue Integrity  Goal: Absence of new skin breakdown  Description: 1. Monitor for areas of redness and/or skin breakdown  2. Assess vascular access sites hourly  3. Every 4-6 hours minimum:  Change oxygen saturation probe site  4. Every 4-6 hours:  If on nasal continuous positive airway pressure, respiratory therapy assess nares and determine need for appliance change or resting period.   Outcome: Progressing     Problem: Chronic Conditions and Co-morbidities  Goal: Patient's chronic conditions and co-morbidity symptoms are monitored and maintained or improved  Outcome: Progressing     Problem: Nutrition Deficit:  Goal: Optimize nutritional status  Outcome: Progressing     Problem: Respiratory - Adult  Goal: Achieves optimal ventilation and oxygenation  Outcome: Progressing     Problem: Cardiovascular - Adult  Goal: Maintains optimal cardiac output and hemodynamic stability  Outcome: Progressing  Goal: Absence of cardiac dysrhythmias or at baseline  Outcome: Progressing Problem: Metabolic/Fluid and Electrolytes - Adult  Goal: Electrolytes maintained within normal limits  Outcome: Progressing  Goal: Hemodynamic stability and optimal renal function maintained  Outcome: Progressing

## 2022-09-26 NOTE — PROGRESS NOTES
Occupational Therapy  Facility/Department: [facility] ICU  Occupational Therapy Daily Treatment    Name: Maxwell Cheadle  : 1960  MRN: [id number]  Date of Service: 2022    Discharge Recommendations:  Maxwell Cheadle scored a 16/24 on the AM-PAC ADL Inpatient form. Current research shows that an AM-PAC score of 17 or less is typically not associated with a discharge to the patient's home setting. Based on the patient's AM-PAC score and their current ADL deficits, it is recommended that the patient have 3-5 sessions per week of Occupational Therapy at d/c to increase the patient's independence. Please see assessment section for further patient specific details. If patient discharges prior to next session this note will serve as a discharge summary. Please see below for the latest assessment towards goals. OT Equipment Recommendations  Equipment Needed: No  Other: Defer to next level of care       Patient Diagnosis(es): The primary encounter diagnosis was Acute respiratory failure, unspecified whether with hypoxia or hypercapnia ([address] A diagnosis of Altered mental status, unspecified altered mental status type was also pertinent to this visit. Past Medical History:  has a past medical history of Alcohol abuse, CAD (coronary artery disease), CHF (congestive heart failure) ([address] Essential tremor, HTN (hypertension), Hx of blood clots, Hyperlipidemia, ICH (intracerebral hemorrhage) ([address] Lower extremity cellulitis, and MI (mitral incompetence). Past Surgical History:  has a past surgical history that includes Percutaneous Transluminal Coronary Angio and Upper gastrointestinal endoscopy (N/A, 2020). Treatment Diagnosis: Impaired ADLs and functional transfers      Assessment   Performance deficits / Impairments: Decreased functional mobility ; Decreased safe awareness;Decreased balance;Decreased coordination;Decreased ADL status; Decreased cognition;Decreased posture;Decreased endurance;Decreased strength;Decreased fine motor control  Assessment: Pt tolerated session fairly well this date. Needing increased assist for bed mobility this date. Pt very confused with conversation and with increased agitation as session progress. Inconsistently following commands. Needs cues/facilitation at times to initiate tasks and to stay on task. Needing min to mod assist x 2 for transfers and functional mobility this date. Pt appeared more focused and less agitated when provided with task of folding paper towels. Pt required Min A for LE dressing this date. Pt would benefit from cont skilled OT services upon dc to maximize safety and functional independence w/ ADLs. Will cont to follow  Treatment Diagnosis: Impaired ADLs and functional transfers  Activity Tolerance  Activity Tolerance: Treatment limited secondary to decreased cognition  Activity Tolerance Comments: Pt appeared mildly agitated at beginning of session        Plan   Plan  Times per Week: 2-5  Times per Day: Daily  Current Treatment Recommendations: Strengthening, Balance training, Functional mobility training, Endurance training, Neuromuscular re-education, Safety education & training, Equipment evaluation, education, & procurement, Patient/Caregiver education & training, Self-Care / ADL     Restrictions  Restrictions/Precautions  Restrictions/Precautions: Bed Alarm, Seizure  Position Activity Restriction  Other position/activity restrictions: transfer pt    Subjective   General  Chart Reviewed: Yes  Patient assessed for rehabilitation services?: Yes  Family / Caregiver Present: No  Referring Practitioner: Bg Shaw DO  Subjective  Subjective: Pt supine in bed upon entry.  Pt agreeable to therapy treatment session  General Comment  Comments: No c/o pain       Social/Functional History  Social/Functional History  Lives With: Family  Type of Home: House  Home Layout: Two level, Bed/Bath upstairs  Home Access: Stairs to enter with rails  Entrance Stairs - Number of Steps: 4  Entrance Stairs - Rails: Left  Bathroom Shower/Tub: Tub/Shower unit  Bathroom Equipment:  (none)  Home Equipment:  (none)  Has the patient had two or more falls in the past year or any fall with injury in the past year?: No  ADL Assistance: Independent  Homemaking Assistance: Independent  Homemaking Responsibilities: Yes  Ambulation Assistance: Independent  Transfer Assistance: Independent  Active : Yes  Occupation: Full time employment  Type of Occupation: construction  Additional Comments: Pt is a questionable historian. No family/friends present to assist with social hx. Will update as able. Objective              Safety Devices  Type of Devices: Gait belt;Sitter present;Call light within reach;Nurse notified; Bed alarm in place; Left in bed  Restraints  Restraints Initially in Place: No  Balance  Sitting:  (SBA-CGA sitting EOB)  Standing:  (MinAx2- Mod Ax2)  Gait  Overall Level of Assistance: Assist X2;Minimum assistance; Moderate assistance (Min Ax2 w/ RW; Mod Ax2 w/o RW. pt ambulated to/from bathroom)        ADL  LE Dressing: Minimal assistance;Verbal cueing  LE Dressing Skilled Clinical Factors: pt doffed/donned socks sitting on couch w/ figure 4 tech. Assist provided to don socks  while sitting EOB d/t difficulty initiating task        Bed mobility  Supine to Sit: Dependent/Total (mod assist x 2)  Sit to Supine: Moderate assistance (for LEs)  Transfers  Sit to stand: Minimal assistance;2 Person assistance; Moderate assistance (min assist x 2 from bed, mod assist x 1 from couch)  Stand to sit: Moderate assistance (poor safety awareness - attempting to sit before backed up to seat)     Cognition  Overall Cognitive Status: Exceptions  Following Commands: Inconsistently follows commands  Attention Span: Difficulty attending to directions  Memory: Decreased short term memory;Decreased recall of recent events  Safety Judgement: Decreased awareness of need for assistance;Decreased awareness of need for safety  Problem Solving: Decreased awareness of errors;Assistance required to generate solutions;Assistance required to implement solutions  Insights: Decreased awareness of deficits  Initiation: Requires cues for some  Sequencing: Requires cues for some  Cognition Comment: Very impulsive, attempting to stand and sit when unsafe. Pt demo increased difficulty focusing during session and required frequent cues to re-direct to task. Pt appeared to be focused on completing tasks w/ his hands and began to grap at his heart monitor. Pt provided w/ task of folding paper towels. Pt sat on couch and folded paper towels and appeared to be less anxious. Pt left supine in bed at end of session w/ paper towels to fold on bed tray- pt focused and pleasantly engaging in folding task  Orientation  Overall Orientation Status: Impaired  Orientation Level: Oriented to person;Disoriented to place; Disoriented to time;Disoriented to situation                  Education Given To: Patient  Education Provided: Orientation;Transfer Training;Role of Therapy  Education Method: Demonstration;Verbal  Barriers to Learning: Cognition  Education Outcome: Continued education needed                   AM-PAC Score        AM-[facility] Inpatient Daily Activity Raw Score: 16 (09/26/22 1552)  AM-PAC Inpatient ADL T-Scale Score : 35.96 (09/26/22 1552)  ADL Inpatient CMS 0-100% Score: 53.32 (09/26/22 1552)  ADL Inpatient CMS G-Code Modifier : CK (09/26/22 1552)      Goals  Short Term Goals  Time Frame for Short term goals: Discharge  Short Term Goal 1: Pt to perform bed to chair transfer with CGA. - ongoing  Short Term Goal 2: Pt to perform LB dressing with mod A. -MET 9/20/22-  Short Term Goal 3: Pt to tolerate 3 min static standing activity with CGA for self care tasks. - ongoing  Short Term Goal 4: Lower body dressing with SBA- ongoing  Patient Goals   Patient goals :  To go home       Therapy Time   Individual Concurrent Group Co-treatment   Time In 2626         Time Out 1416         Minutes [address] [place] [address]

## 2022-09-26 NOTE — PROGRESS NOTES
Progress Note    Admit Date: 9/1/2022  Day: 18  Diet: ADULT ORAL NUTRITION SUPPLEMENT; Breakfast, Lunch, Dinner; Standard High Calorie/High Protein Oral Supplement  ADULT DIET; Easy to Chew; 3 carb choices (45 gm/meal)    Interval history:   Patient seen at bedside. No overnight events. Patient was sleeping well this morning with a sitter at bedside. EKG this morning showed QTC of high 400s. Remains on antipsychotics and has episodes of agitation. Spoken to family during the week and updated them. Vitals were reviewed which showed systolic blood pressure 246E but remains tachycardic as well. Labs reviewed which showed a drop in bicarb which made anion gap increased to 19 today. Rest of his labs were stable. Denies any nausea vomiting diarrhea or fevers. No inpatient psychiatric admission warranted per psych re-evaluation. However, no reversible causes of delirium identified thus far.      Medications:     Scheduled Meds:   magnesium oxide  400 mg Oral Daily    OLANZapine  10 mg Oral BID    famotidine  20 mg Oral BID    levETIRAcetam  1,500 mg Oral BID    thiamine mononitrate  100 mg Oral Daily    carvedilol  3.125 mg Oral BID WC    melatonin  5 mg Oral Nightly    enoxaparin  40 mg SubCUTAneous Daily    lidocaine PF  5 mL IntraDERmal Once    sodium chloride flush  5-40 mL IntraVENous 2 times per day     Continuous Infusions:   dextrose      dexmedetomidine (PRECEDEX) IV infusion Stopped (09/17/22 0634)    sodium chloride 10 mL/hr at 09/17/22 0637     PRN Meds:OLANZapine, LORazepam, ziprasidone, promethazine, diphenhydrAMINE, labetalol, glucose, dextrose bolus **OR** dextrose bolus, glucagon (rDNA), dextrose, sodium chloride flush, sodium chloride, polyethylene glycol, acetaminophen **OR** acetaminophen    Objective:   Vitals:   T-max:  Patient Vitals for the past 8 hrs:   BP Temp Temp src Pulse Resp SpO2 Weight   09/26/22 0600 -- -- -- -- -- -- 173 lb 8 oz (78.7 kg)   09/26/22 0400 (!) 157/105 97.8 °F (36.6 °C) Oral 97 14 99 % --       No intake or output data in the 24 hours ending 09/26/22 0809      Physical Exam  Constitutional:       General: He is not in acute distress. HENT:      Mouth/Throat:      Mouth: Mucous membranes are dry. Eyes:      Pupils: Pupils are equal, round, and reactive to light. Comments: Uncooperative with EOM exam   Cardiovascular:      Rate and Rhythm: Regular rhythm. Tachycardia present. Pulses: Normal pulses. Heart sounds: Normal heart sounds. Pulmonary:      Effort: Pulmonary effort is normal.      Breath sounds: Normal breath sounds. Abdominal:      Tenderness: There is no abdominal tenderness. Musculoskeletal:      Right lower leg: No edema. Left lower leg: No edema. Skin:     Coloration: Skin is not jaundiced. Neurological:      Comments: Oriented to person, but not to place, time or situation. LABS:    CBC:   Recent Labs     09/24/22 0317 09/25/22 0318   WBC 7.7 8.0   HGB 15.7 16.5   HCT 45.2 47.8    233   MCV 92.6 92.6       Renal:    Recent Labs     09/24/22 0317 09/25/22 0318    142   K 4.0 4.2    103   CO2 23 20*   BUN 18 19   CREATININE 0.8 0.8   GLUCOSE 101* 89   CALCIUM 9.8 10.2   MG 1.80 1.90   PHOS 4.1 3.7   ANIONGAP 16 19*       Hepatic:   Recent Labs     09/24/22 0317 09/25/22 0318   LABALBU 4.3 4.7       Troponin: No results for input(s): TROPONINI in the last 72 hours. BNP: No results for input(s): BNP in the last 72 hours. Lipids: No results for input(s): CHOL, HDL in the last 72 hours. Invalid input(s): LDLCALCU, TRIGLYCERIDE  ABGs:  No results for input(s): PHART, VTA5HUG, PO2ART, FDF1HCL, BEART, THGBART, R5GDHRQJ, BNY8DKS in the last 72 hours. INR: No results for input(s): INR in the last 72 hours. Lactate: No results for input(s): LACTATE in the last 72 hours.   Cultures:  -----------------------------------------------------------------  RAD:   FL MODIFIED BARIUM SWALLOW W VIDEO   Final Result      No penetration or aspiration. XR CHEST PORTABLE   Final Result   1. Worsened pulmonary edema. 2.  Persistent bibasilar atelectasis. MRI BRAIN WO CONTRAST   Final Result      Faint diffusion restriction in the right hippocampus with associated FLAIR signal abnormality. This is favored to represent sequelae of seizures, however other infectious/inflammatory etiologies are also possible. Mild atrophy and chronic small vessel ischemic change. Areas of encephalomalacia and gliosis in both cerebral hemispheres with remote hemorrhagic staining from prior insults. IR LUMBAR PUNCTURE FOR DIAGNOSIS   Final Result   . IMPRESSION:   1. Uneventful diagnostic fluoroscopic guided lumbar puncture as the   2. The flow reversed are risk and therefore a closing pressure was obtained at the end of the procedure, 26 cm of water. XR ABDOMEN (KUB) (SINGLE AP VIEW)   Final Result   Findings/Impression:    The tip of the enteric tube terminates in the distal body of the stomach. CT CHEST WO CONTRAST   Final Result      CHEST:      1. Moderate dependent atelectasis bilaterally. ABDOMEN/PELVIS:      1.  No acute abnormality on noncontrast CT of the abdomen and pelvis. 2.  Cholelithiasis. CT ABDOMEN PELVIS WO CONTRAST Additional Contrast? None   Final Result      CHEST:      1. Moderate dependent atelectasis bilaterally. ABDOMEN/PELVIS:      1.  No acute abnormality on noncontrast CT of the abdomen and pelvis. 2.  Cholelithiasis. CTA HEAD NECK W CONTRAST   Final Result      CTA Head:   No acute CTA findings. No hemodynamically significant stenosis or focal aneurysm. No arteriovenous confirmation. CTA Neck:   No acute CTA findings. No hemodynamically significant stenosis or focal aneurysm. CT HEAD WO CONTRAST   Final Result      1. No findings for acute intracranial abnormality.       2.  Age-related atrophy with patchy periventricular white matter changes bilaterally consistent with chronic small vessel ischemia. 3.  Stable low attenuation left frontoparietal lobe consistent with previous insult from known prior intraparenchymal hematoma. Stable right frontoparietal cortical infarct. These findings were called to the emergency room physician Dr. Mylene Kunz on 9/1/2022 at 5:30 p.m. XR CHEST PORTABLE   Final Result   1. No findings for acute cardiopulmonary disease. 2.  ET tube in good position in the mid trachea. Assessment/Plan:     9/25 addendum:  Patient had become agitated requiring bilateral restraints. Nurse had perfect served that he had became uncontrollable and not listening. He was pulling out the rails and not being compliant despite having a sitter and taking his PO meds earlier that day. Patient did not receive any Ativan as it was discontinued by day team.  We added on IM Zyprexa as needed for agitation. This event has been happening daily despite medication increase and changes. Patient has been on as needed Ativan, Seroquel, Zyprexa IM and p.o., and IM Haldol prn. His mentation has been difficult to treat. Psych to see him in the morning to aid us in keeping him more calm and not have to use restraints. Recommendations highly appreciated. Acute metabolic encephalopathy(unclear etiology)  Delirium  On presentation AMS, elevated WBC: 21.7, Tachypneic, Tachycardic, Hypotensive. Hx of ICH, SAH and SDH 2/2 to fall, hx of chronic alcohol abuse,   CT head: prior Intraparenchymal hematoma. Stable right frontoparietal cortical infarct. CT Abdomen, chest: mod atelectasis bilaterally. No acute abnorms on CT abd except stable cholelithiasis   CTA Head: No acute CTA findings. CTA Neck: No acute CTA findings. MRI: Faint diffusion restriction in the right hippocampus with associated FLAIR signal abnormality. This is favored to represent sequelae of seizures.   cEEG readings previously: Generalized background abnormalities indicative of an underlying diffuse encephalopathy of non-specific etiology. Intermittent focal epileptiform discharges, right posterior temporal, conferring increased risk of focal onset seizures from this region.    -Lumbar puncture ordered 9/6/2022: No growth. -Blood Cultures were -ve x2  -On Keppra 1500mg bid  - inpatient psych consulted - no inpatient psych upon discharge warranted - decision remains on psych re-eval  - Palliative consult, able to contact family (multiple siblings), all agreed that sister (present on admission) can make decision for patient. - Zyprexa increased to 10 BID  - PRN Geodon decreased q8>q12. - Monitor QTC - daily EKG     Acute sinus tachycardia  Intermittently tachycardic into the 120s with stable pressures  -Most likely secondary to dehydration and poor p.o. intake with agitation      Alcohol use disorder  Ethanol level: none detected  -thiamine 100 mg daily, changed to PO  -monitor for withdrawal     Acute hypercapnic respiratory failure secondary to possible aspiration vs 2/2 medication (Extubated successfully 9/10)  On presentation: VBG PH: 7.264, PCO2: 56.4, 83.2  - Intubated for 10 days in ICU  - after transfer out of ICU - pt is stable on room air. CAD s/p stent 2020  Hx of Mural thrombus  - in 2020, anterior apical MI with atrial thrombus s/p stenting, pt was put on Eliquis/Plavix. - Eliquis/Plavix stopped due to frequent falls 2/2 chronic alcoholism  - [facility] 09/2021 suggesting acute and chronic ICH/SAH/SDH     Hx of Cirrhosis  -Ammonia: 36 wnl(09/1/22), LFTs WNL.     Hypertension  -Resumed home carvedilol dose 3.125 mg twice daily     Code Status: Full Code  FEN: Easy chew diet, oral nutrition supplement  PPX: Lovenox  DISPO: IP, awaiting SNF placement    Anshul Altamirano MD, PGY-2  09/26/22  8:09 AM    This patient has been staffed and discussed with Alka Duff MD.

## 2022-09-26 NOTE — PROGRESS NOTES
Physical Therapy  Facility/Department: [facility] ICU  Physical Therapy Treatment Note    Name: Vipin Krause  : 1960  MRN: [id number]  Date of Service: 2022    Discharge Recommendations:    Vipin Krause scored a  on the AM-PAC short mobility form. Current research shows that an AM-PAC score of 17 or less is typically not associated with a discharge to the patient's home setting. Based on the patient's AM-PAC score and their current functional mobility deficits, it is recommended that the patient have 3-5 sessions per week of Physical Therapy at d/c to increase the patient's independence. Please see assessment section for further patient specific details. If patient discharges prior to next session this note will serve as a discharge summary. Please see below for the latest assessment towards goals. PT Equipment Recommendations  Equipment Needed:  (defer)      Patient Diagnosis(es): The primary encounter diagnosis was Acute respiratory failure, unspecified whether with hypoxia or hypercapnia ([address] A diagnosis of Altered mental status, unspecified altered mental status type was also pertinent to this visit. Past Medical History:  has a past medical history of Alcohol abuse, CAD (coronary artery disease), CHF (congestive heart failure) ([address] Essential tremor, HTN (hypertension), Hx of blood clots, Hyperlipidemia, ICH (intracerebral hemorrhage) ([address] Lower extremity cellulitis, and MI (mitral incompetence). Past Surgical History:  has a past surgical history that includes Percutaneous Transluminal Coronary Angio and Upper gastrointestinal endoscopy (N/A, 2020). Assessment   Assessment: Needing increased assist for bed mobility. Pt very confused with conversation. Inconsistently following commands. Needs cues/facilitation at times to initiate tasks and to stay on task. Needing min to mod assist x 2 for transfers/gt. At high risk for falls and not safe to ambualte alone. Poor safety awareness. Rec continue trial PT to maximize mobility adn independence  Treatment Diagnosis: Dec'd balance & mobility        Plan   Plan  Plan:  (2-5x/week)  Current Treatment Recommendations: Strengthening, Balance training, Gait training, Equipment evaluation, education, & procurement, Functional mobility training, Stair training, Neuromuscular re-education, Transfer training, Endurance training, Patient/Caregiver education & training, Therapeutic activities  Safety Devices  Type of Devices: Gait belt, Sitter present, Call light within reach, Nurse notified, Bed alarm in place, Left in bed  Restraints  Restraints Initially in Place: No  Restraints: bilateral wrist restraints     Restrictions  Restrictions/Precautions  Restrictions/Precautions: Bed Alarm, Seizure  Position Activity Restriction  Other position/activity restrictions: transfer pt     Subjective   General  Chart Reviewed: Yes  Additional Pertinent Hx: 58 y/o M found down unresponsive. Dx of Acute encephalopathy 2/2 seizure. +acute hypoxia requiring intubation with extubation noted on 9/10. Subjective  Subjective: Pt found supine. Confused with conversation. \"What are we doing? \"  No c/o pain         Social/Functional History  Social/Functional History  Lives With: Family  Type of Home: House  Home Layout: Two level, Bed/Bath upstairs  Home Access: Stairs to enter with rails  Entrance Stairs - Number of Steps: 4  Entrance Stairs - Rails: Left  Bathroom Shower/Tub: Tub/Shower unit  Bathroom Equipment:  (none)  Home Equipment:  (none)  Has the patient had two or more falls in the past year or any fall with injury in the past year?: No  ADL Assistance: Independent  Homemaking Assistance: Independent  Homemaking Responsibilities: Yes  Ambulation Assistance: Independent  Transfer Assistance: Independent  Active : Yes  Occupation: Full time employment  Type of Occupation: construction  Additional Comments: Pt is a questionable historian. No family/friends present to assist with social hx. Will update as able. Objective                                Bed mobility  Supine to Sit: Dependent/Total (mod assist x 2)  Sit to Supine: Moderate assistance (for LEs)  Transfers  Sit to Stand: Dependent/Total (min assist x 2 from bed, mod assist x 1 from couch)  Stand to sit: Moderate Assistance (poor safety awareness - attempting to sit before backed up to seat)  Ambulation  Device: Rolling Walker  Assistance: Dependent/Total (min assist x 2)  Quality of Gait: decreased step length/height, pushes walker too far ahead, unsteady  Distance: 8'  Comments: Amb 10' and 14' without AD with mod assist x 2. Unsteady and impulsive, decreased ALEA at times, post lean                 OutComes Score                                                  AM-PAC Score  AM-PAC Inpatient Mobility Raw Score : 12 (09/26/22 1453)  AM-PAC Inpatient T-Scale Score : 35.33 (09/26/22 1453)  Mobility Inpatient CMS 0-100% Score: 68.66 (09/26/22 1453)  Mobility Inpatient CMS G-Code Modifier : CL (09/26/22 1453)          Tinneti Score       Goals  Short Term Goals  Time Frame for Short term goals: DC  Short term goal 1: Pt will complete functional txfs with S.  Ongoing  Short term goal 2: Pt will tolerate ambulation assessment - goal met/revised 9/20: pt will ambulate 20ft with LRAD with min A x1. Ongoing  Short term goal 3: Pt will tolerate stair assessment. Ongoing  Patient Goals   Patient goals : To get phone/speak with sister       Education  Patient Education  Education Given To: Patient  Education Provided: Role of Therapy;Transfer Training  Education Method: Verbal  Barriers to Learning: Cognition  Education Outcome: Continued education needed; Unable to demonstrate understanding; Unable to verbalize      Therapy Time   Individual Concurrent Group Co-treatment   Time In 1348         Time Out 1416         Minutes 28               Timed Code Treatment Minutes:  28    Total Treatment Minutes:  [address] [facility], [address]

## 2022-09-26 NOTE — CARE COORDINATION
Case management continues to follow for discharge planning. The chart was reviewed. Mr. Earle Patel was confused and agitated overnight. Soft wrist restraints in use. Cristobal Rubalcava are still following. He will need [facility] pre-cert and to be out of restraints for 24h.     Electronically signed by MIGNON Henley RN-[facility]  Case Management  [phone]

## 2022-09-26 NOTE — PROGRESS NOTES
Patient remained confused and agitated overnight. Adamant that he is in an office and needs to count the cash drawer. Incontinent of urine without bowel movement. Unable to persuade him to eat but tolerating liquids well. Soft wrist restraints in use. Vitals remain wnl. No additional signs or symptoms of distress.   No

## 2022-09-27 LAB
EKG ATRIAL RATE: 76 BPM
EKG ATRIAL RATE: 83 BPM
EKG DIAGNOSIS: NORMAL
EKG DIAGNOSIS: NORMAL
EKG P AXIS: 10 DEGREES
EKG P AXIS: 22 DEGREES
EKG P-R INTERVAL: 174 MS
EKG P-R INTERVAL: 182 MS
EKG Q-T INTERVAL: 408 MS
EKG Q-T INTERVAL: 436 MS
EKG QRS DURATION: 80 MS
EKG QRS DURATION: 82 MS
EKG QTC CALCULATION (BAZETT): 479 MS
EKG QTC CALCULATION (BAZETT): 490 MS
EKG R AXIS: -33 DEGREES
EKG R AXIS: -40 DEGREES
EKG T AXIS: 130 DEGREES
EKG T AXIS: 145 DEGREES
EKG VENTRICULAR RATE: 76 BPM
EKG VENTRICULAR RATE: 83 BPM
HIV AG/AB: NORMAL
HIV ANTIGEN: NORMAL
HIV-1 ANTIBODY: NORMAL
HIV-2 AB: NORMAL
SARS-COV-2, NAAT: NOT DETECTED

## 2022-09-27 PROCEDURE — 6370000000 HC RX 637 (ALT 250 FOR IP): Performed by: NURSE PRACTITIONER

## 2022-09-27 PROCEDURE — 1200000000 HC SEMI PRIVATE

## 2022-09-27 PROCEDURE — 6370000000 HC RX 637 (ALT 250 FOR IP)

## 2022-09-27 PROCEDURE — 6370000000 HC RX 637 (ALT 250 FOR IP): Performed by: INTERNAL MEDICINE

## 2022-09-27 PROCEDURE — 97116 GAIT TRAINING THERAPY: CPT

## 2022-09-27 PROCEDURE — 6370000000 HC RX 637 (ALT 250 FOR IP): Performed by: STUDENT IN AN ORGANIZED HEALTH CARE EDUCATION/TRAINING PROGRAM

## 2022-09-27 PROCEDURE — 87635 SARS-COV-2 COVID-19 AMP PRB: CPT

## 2022-09-27 PROCEDURE — 2500000003 HC RX 250 WO HCPCS

## 2022-09-27 PROCEDURE — 93010 ELECTROCARDIOGRAM REPORT: CPT | Performed by: INTERNAL MEDICINE

## 2022-09-27 PROCEDURE — 97530 THERAPEUTIC ACTIVITIES: CPT

## 2022-09-27 PROCEDURE — 97535 SELF CARE MNGMENT TRAINING: CPT

## 2022-09-27 PROCEDURE — 93005 ELECTROCARDIOGRAM TRACING: CPT

## 2022-09-27 PROCEDURE — 6360000002 HC RX W HCPCS: Performed by: STUDENT IN AN ORGANIZED HEALTH CARE EDUCATION/TRAINING PROGRAM

## 2022-09-27 RX ADMIN — OLANZAPINE 5 MG: 5 TABLET, FILM COATED ORAL at 20:03

## 2022-09-27 RX ADMIN — OLANZAPINE 5 MG: 10 INJECTION, POWDER, LYOPHILIZED, FOR SOLUTION INTRAMUSCULAR at 14:39

## 2022-09-27 RX ADMIN — OLANZAPINE 5 MG: 5 TABLET, FILM COATED ORAL at 08:36

## 2022-09-27 RX ADMIN — Medication 5 MG: at 20:02

## 2022-09-27 RX ADMIN — THIAMINE HCL TAB 100 MG 100 MG: 100 TAB at 08:36

## 2022-09-27 RX ADMIN — ENOXAPARIN SODIUM 40 MG: 100 INJECTION SUBCUTANEOUS at 08:36

## 2022-09-27 RX ADMIN — FAMOTIDINE 20 MG: 20 TABLET ORAL at 08:36

## 2022-09-27 RX ADMIN — LEVETIRACETAM 1500 MG: 750 TABLET, FILM COATED ORAL at 20:03

## 2022-09-27 RX ADMIN — CARVEDILOL 3.12 MG: 3.12 TABLET, FILM COATED ORAL at 08:36

## 2022-09-27 RX ADMIN — LEVETIRACETAM 1500 MG: 750 TABLET, FILM COATED ORAL at 08:36

## 2022-09-27 RX ADMIN — DIPHENHYDRAMINE HYDROCHLORIDE 25 MG: 25 TABLET ORAL at 20:03

## 2022-09-27 RX ADMIN — CARVEDILOL 3.12 MG: 3.12 TABLET, FILM COATED ORAL at 15:16

## 2022-09-27 RX ADMIN — ZIPRASIDONE HYDROCHLORIDE 20 MG: 20 CAPSULE ORAL at 15:16

## 2022-09-27 RX ADMIN — ACETAMINOPHEN 650 MG: 325 TABLET, FILM COATED ORAL at 08:36

## 2022-09-27 RX ADMIN — Medication 400 MG: at 08:36

## 2022-09-27 RX ADMIN — DIPHENHYDRAMINE HYDROCHLORIDE 25 MG: 25 TABLET ORAL at 08:37

## 2022-09-27 ASSESSMENT — PAIN SCALES - GENERAL: PAINLEVEL_OUTOF10: 7

## 2022-09-27 NOTE — CARE COORDINATION
Per administration, the plan is now for Mr. Kaur's to go to inpatient psychiatry for behavioral stabilization and medication management prior to going to a nursing facility. A rapid COVID test was ordered. Once the test results, Dr. Raji Solano will enter a note that specifically states the pt is medically cleared for psych placement. Administration instructed him to call the [id number] [facility] ([phone]) [facility] to initiate the referral. CM updated the charge nurse, Bacilio Rivera, of the reported change in plan of care.      Electronically signed by Bernadette Solano, MSN RN-[facility] ACM-RN  Case Management  [phone]

## 2022-09-27 NOTE — PROGRESS NOTES
Physical Therapy  Facility/Department: [facility] ICU  Physical Therapy Treatment Note    Name: Ramesh Sylvester  : 1960  MRN: [id number]  Date of Service: 2022    Discharge Recommendations:    Ramesh Sylvester scored a 13/24 on the AM-PAC short mobility form. Current research shows that an AM-PAC score of 17 or less is typically not associated with a discharge to the patient's home setting. Based on the patient's AM-PAC score and their current functional mobility deficits, it is recommended that the patient have 3-5 sessions per week of Physical Therapy at d/c to increase the patient's independence. Please see assessment section for further patient specific details. If patient discharges prior to next session this note will serve as a discharge summary. Please see below for the latest assessment towards goals. PT Equipment Recommendations  Equipment Needed:  (defer)      Patient Diagnosis(es): The primary encounter diagnosis was Acute respiratory failure, unspecified whether with hypoxia or hypercapnia ([address] A diagnosis of Altered mental status, unspecified altered mental status type was also pertinent to this visit. Past Medical History:  has a past medical history of Alcohol abuse, CAD (coronary artery disease), CHF (congestive heart failure) ([address] Essential tremor, HTN (hypertension), Hx of blood clots, Hyperlipidemia, ICH (intracerebral hemorrhage) ([address] Lower extremity cellulitis, and MI (mitral incompetence). Past Surgical History:  has a past surgical history that includes Percutaneous Transluminal Coronary Angio and Upper gastrointestinal endoscopy (N/A, 2020). Assessment   Assessment: Improved cooperation and participation this date. Still confused but able to more consistently follow commands. Decreased assist for bed mobility and transfers/gt. Increased gt endurance. Continues to be at risk for falls with decreased safety awareness. Not safe to get up alone.   Rec cont construction  Additional Comments: Pt is a questionable historian. No family/friends present to assist with social hx. Will update as able. Vision/Hearing     [facility]  Cognition   Orientation  Orientation Level: Oriented to person;Disoriented to place; Disoriented to time;Disoriented to situation     Objective                                Bed mobility  Supine to Sit: Stand by assistance (increased time and effort to complete)  Scooting: Moderate assistance (to EOB)  Transfers  Sit to Stand: Moderate Assistance  Stand to sit: Moderate Assistance (cues for safety - trying to sit before fully backed up to chair)  Ambulation  Device: Rolling Walker  Assistance: Minimal assistance (to mod assist)  Quality of Gait: unsteady with LOB/post lean at times, decreased bilat step length/height  Distance: [de-identified]'        Exercise Treatment: Performed AP, LAQ, marching  x 5-10 reps independent with occas cues for technique        OutComes Score                                                  AM-PAC Score  AM-PAC Inpatient Mobility Raw Score : 13 (09/27/22 1031)  AM-PAC Inpatient T-Scale Score : 36.74 (09/27/22 1031)  Mobility Inpatient CMS 0-100% Score: 64.91 (09/27/22 1031)  Mobility Inpatient CMS G-Code Modifier : CL (09/27/22 1031)          Tinneti Score       Goals  Short Term Goals  Time Frame for Short term goals: DC  Short term goal 1: Pt will complete functional txfs with S.  Ongoing  Short term goal 2: Pt will tolerate ambulation assessment - goal met/revised 9/20: pt will ambulate 20ft with LRAD with min A x1. Partially met 9/27. Revised goal:  Pt will ambulate >100' with LRAD CGA  Short term goal 3: Pt will tolerate stair assessment. Ongoing  Patient Goals   Patient goals :  To get phone/speak with sister       Education  Patient Education  Education Given To: Patient  Education Provided: Role of Therapy;Transfer Training  Education Method: Verbal  Barriers to Learning: Cognition  Education Outcome: Continued education needed; Unable to demonstrate understanding; Unable to verbalize      Therapy Time   Individual Concurrent Group Co-treatment   Time In 0941         Time Out 1007         Minutes 26             Timed Code Treatment Minutes:  11    Total Treatment Minutes:  39 Nenita Hernandez, PT

## 2022-09-27 NOTE — PROGRESS NOTES
Progress Note    Admit Date: 9/1/2022  Day: 18  Diet: ADULT ORAL NUTRITION SUPPLEMENT; Breakfast, Lunch, Dinner; Standard High Calorie/High Protein Oral Supplement  ADULT DIET; Easy to Chew; 3 carb choices (45 gm/meal)    Interval history:   Patient was seen yesterday in the afternoon by the psychiatry nurse practitioner who recommended transfer to inpatient psych due to his continued aggression despite numerous pharmacologic agents, pending a negative HIV test which is still pending at this time. Patient was seen at the bedside this morning. Patient oriented to place but not to time. Otherwise no new complaints. Medications:     Scheduled Meds:   OLANZapine  5 mg Oral BID    magnesium oxide  400 mg Oral Daily    famotidine  20 mg Oral BID    levETIRAcetam  1,500 mg Oral BID    thiamine mononitrate  100 mg Oral Daily    carvedilol  3.125 mg Oral BID WC    melatonin  5 mg Oral Nightly    enoxaparin  40 mg SubCUTAneous Daily    lidocaine PF  5 mL IntraDERmal Once    sodium chloride flush  5-40 mL IntraVENous 2 times per day     Continuous Infusions:   dextrose      sodium chloride 10 mL/hr at 09/17/22 0637     PRN Meds:OLANZapine, ziprasidone, promethazine, diphenhydrAMINE, labetalol, glucose, dextrose bolus **OR** dextrose bolus, glucagon (rDNA), dextrose, sodium chloride flush, sodium chloride, polyethylene glycol, acetaminophen **OR** acetaminophen    Objective:   Vitals:   T-max:  Patient Vitals for the past 8 hrs:   BP Pulse Resp SpO2   09/27/22 0225 (!) 121/91 (!) 101 19 97 %   09/27/22 0000 -- 98 23 --         Intake/Output Summary (Last 24 hours) at 9/27/2022 0754  Last data filed at 9/26/2022 2300  Gross per 24 hour   Intake 420 ml   Output 225 ml   Net 195 ml         Physical Exam  Constitutional:       General: He is not in acute distress. HENT:      Mouth/Throat:      Mouth: Mucous membranes are dry. Eyes:      Pupils: Pupils are equal, round, and reactive to light.       Comments: Uncooperative with EOM exam   Cardiovascular:      Rate and Rhythm: Regular rhythm. Tachycardia present. Pulses: Normal pulses. Heart sounds: Normal heart sounds. Pulmonary:      Effort: Pulmonary effort is normal.      Breath sounds: Normal breath sounds. Abdominal:      Tenderness: There is no abdominal tenderness. Musculoskeletal:      Right lower leg: No edema. Left lower leg: No edema. Skin:     Coloration: Skin is not jaundiced. Neurological:      Comments: Oriented to person, but not to place, time or situation. LABS:    CBC:   Recent Labs     09/25/22 0318   WBC 8.0   HGB 16.5   HCT 47.8      MCV 92.6       Renal:    Recent Labs     09/25/22 0318 09/26/22  1237    142   K 4.2 3.9    106   CO2 20* 20*   BUN 19 19   CREATININE 0.8 0.7*   GLUCOSE 89 141*   CALCIUM 10.2 9.7   MG 1.90  --    PHOS 3.7  --    ANIONGAP 19* 16       Hepatic:   Recent Labs     09/25/22 0318   LABALBU 4.7       Troponin: No results for input(s): TROPONINI in the last 72 hours. BNP: No results for input(s): BNP in the last 72 hours. Lipids: No results for input(s): CHOL, HDL in the last 72 hours. Invalid input(s): LDLCALCU, TRIGLYCERIDE  ABGs:  No results for input(s): PHART, HRW0SYW, PO2ART, LAF8KAD, BEART, THGBART, I4ETQJSE, HYM8KIG in the last 72 hours. INR: No results for input(s): INR in the last 72 hours. Lactate: No results for input(s): LACTATE in the last 72 hours. Cultures:  -----------------------------------------------------------------  RAD:   FL MODIFIED BARIUM SWALLOW W VIDEO   Final Result      No penetration or aspiration. XR CHEST PORTABLE   Final Result   1. Worsened pulmonary edema. 2.  Persistent bibasilar atelectasis. MRI BRAIN WO CONTRAST   Final Result      Faint diffusion restriction in the right hippocampus with associated FLAIR signal abnormality.  This is favored to represent sequelae of seizures, however other infectious/inflammatory etiologies are also possible. Mild atrophy and chronic small vessel ischemic change. Areas of encephalomalacia and gliosis in both cerebral hemispheres with remote hemorrhagic staining from prior insults. IR LUMBAR PUNCTURE FOR DIAGNOSIS   Final Result   . IMPRESSION:   1. Uneventful diagnostic fluoroscopic guided lumbar puncture as the   2. The flow reversed are risk and therefore a closing pressure was obtained at the end of the procedure, 26 cm of water. XR ABDOMEN (KUB) (SINGLE AP VIEW)   Final Result   Findings/Impression:    The tip of the enteric tube terminates in the distal body of the stomach. CT CHEST WO CONTRAST   Final Result      CHEST:      1. Moderate dependent atelectasis bilaterally. ABDOMEN/PELVIS:      1.  No acute abnormality on noncontrast CT of the abdomen and pelvis. 2.  Cholelithiasis. CT ABDOMEN PELVIS WO CONTRAST Additional Contrast? None   Final Result      CHEST:      1. Moderate dependent atelectasis bilaterally. ABDOMEN/PELVIS:      1.  No acute abnormality on noncontrast CT of the abdomen and pelvis. 2.  Cholelithiasis. CTA HEAD NECK W CONTRAST   Final Result      CTA Head:   No acute CTA findings. No hemodynamically significant stenosis or focal aneurysm. No arteriovenous confirmation. CTA Neck:   No acute CTA findings. No hemodynamically significant stenosis or focal aneurysm. CT HEAD WO CONTRAST   Final Result      1. No findings for acute intracranial abnormality. 2.  Age-related atrophy with patchy periventricular white matter changes bilaterally consistent with chronic small vessel ischemia. 3.  Stable low attenuation left frontoparietal lobe consistent with previous insult from known prior intraparenchymal hematoma. Stable right frontoparietal cortical infarct. These findings were called to the emergency room physician Dr. Letty Avalos on 9/1/2022 at 5:30 p.m. XR CHEST PORTABLE   Final Result   1. No findings for acute cardiopulmonary disease. 2.  ET tube in good position in the mid trachea. Assessment/Plan:     9/25 addendum:  Patient had become agitated requiring bilateral restraints. Nurse had perfect served that he had became uncontrollable and not listening. He was pulling out the rails and not being compliant despite having a sitter and taking his PO meds earlier that day. Patient did not receive any Ativan as it was discontinued by day team.  We added on IM Zyprexa as needed for agitation. This event has been happening daily despite medication increase and changes. Patient has been on as needed Ativan, Seroquel, Zyprexa IM and p.o., and IM Haldol prn. His mentation has been difficult to treat. Psych to see him in the morning to aid us in keeping him more calm and not have to use restraints. Recommendations highly appreciated. Acute metabolic encephalopathy(unclear etiology)  Delirium  On presentation AMS, elevated WBC: 21.7, Tachypneic, Tachycardic, Hypotensive. Hx of ICH, SAH and SDH 2/2 to fall, hx of chronic alcohol abuse,   CT head: prior Intraparenchymal hematoma. Stable right frontoparietal cortical infarct. CT Abdomen, chest: mod atelectasis bilaterally. No acute abnorms on CT abd except stable cholelithiasis   CTA Head: No acute CTA findings. CTA Neck: No acute CTA findings. MRI: Faint diffusion restriction in the right hippocampus with associated FLAIR signal abnormality. This is favored to represent sequelae of seizures. cEEG readings previously: Generalized background abnormalities indicative of an underlying diffuse encephalopathy of non-specific etiology. Intermittent focal epileptiform discharges, right posterior temporal, conferring increased risk of focal onset seizures from this region.    -Lumbar puncture ordered 9/6/2022: No growth.   -Blood Cultures were -ve x2  -On Keppra 1500mg bid  - inpatient psych consulted - no inpatient psych upon discharge warranted - decision remains on psych re-eval  - Palliative consult, able to contact family (multiple siblings), all agreed that sister (present on admission) can make decision for patient. - Zyprexa increased to 10 BID  - PRN Geodon decreased q8>q12. - Monitor QTC - daily EKG     Acute sinus tachycardia  Intermittently tachycardic into the 120s with stable pressures  -Most likely secondary to dehydration and poor p.o. intake with agitation      Alcohol use disorder  Ethanol level: none detected  -thiamine 100 mg daily, changed to PO  -monitor for withdrawal     Acute hypercapnic respiratory failure secondary to possible aspiration vs 2/2 medication (Extubated successfully 9/10)  On presentation: VBG PH: 7.264, PCO2: 56.4, 83.2  - Intubated for 10 days in ICU  - after transfer out of ICU - pt is stable on room air. CAD s/p stent 2020  Hx of Mural thrombus  - in 2020, anterior apical MI with atrial thrombus s/p stenting, pt was put on Eliquis/Plavix. - Eliquis/Plavix stopped due to frequent falls 2/2 chronic alcoholism  - [address] [place] [address] 09/2021 suggesting acute and chronic ICH/SAH/SDH     Hx of Cirrhosis  -Ammonia: 36 wnl(09/1/22), LFTs WNL.     Hypertension  -Resumed home carvedilol dose 3.125 mg twice daily     Code Status: Full Code  FEN: Easy chew diet, oral nutrition supplement  PPX: Lovenox  DISPO: IP, awaiting SNF placement    Jose Young MD  PGY1, Internal Medicine  09/27/22  8:59 AM    This patient has been staffed and discussed with Rachel Andrew MD.

## 2022-09-27 NOTE — PROGRESS NOTES
Comprehensive Nutrition Assessment    Type and Reason for Visit:  Reassess    Nutrition Recommendations/Plan:   Due to inadequate intake, discussion of palliative care vs nutrition support should be considered. Consult dietitian if tube feeding is desired and consistent with patient's plan of care. Continue Easy to Chew; 3 carb choice diet  Continue Ensure TID  Monitor nutrition adequacy, pertinent labs, bowel habits, wt changes, and clinical progress     Malnutrition Assessment:  Malnutrition Status:  Severe malnutrition (09/20/22 0910)    Context:  Acute Illness     Findings of the 6 clinical characteristics of malnutrition:  Energy Intake:  50% or less of estimated energy requirements for 5 or more days  Weight Loss:  Greater than 2% over 1 week (6% in 1 week period)     Body Fat Loss:  No significant body fat loss     Muscle Mass Loss:  No significant muscle mass loss    Fluid Accumulation:  Mild      Nutrition Assessment:    Follow up: Pt continues on Easy to Chew; 3 carb choice diet w/ Ensure TID. Intakes continue to be poor at <50%. Pt agitation is worsening, he is now being referred to inpatient psych d/t worsening condition. Pt is at high nutritional risk d/t poor intakes, however d/t mental status pt is likely not appropriate for NGT as he is requiring restraints at this time. Will continue diet and ONS and monitor. Nutrition Related Findings:    . Active bowel sounds.  Wound Type:  (scattered abrasions)       Current Nutrition Intake & Therapies:    Average Meal Intake: 0%, 1-25%, 26-50%, 51-75%  Average Supplements Intake: Unable to assess  ADULT ORAL NUTRITION SUPPLEMENT; Breakfast, Lunch, Dinner; Standard High Calorie/High Protein Oral Supplement  ADULT DIET; Easy to Chew; 3 carb choices (45 gm/meal)    Anthropometric Measures:  Height: 5' 10\" (177.8 cm)  Ideal Body Weight (IBW): 166 lbs (75 kg)    Admission Body Weight: 190 lb 14.7 oz (86.6 kg)  Current Body Weight: 183 lb 13.8 oz (83.4 kg), 110.8 % IBW. Weight Source: Bed Scale  Current BMI (kg/m2): 26.4        Weight Adjustment For: No Adjustment                 BMI Categories: Normal Weight (BMI 18.5-24. 9)    Estimated Daily Nutrient Needs:  Energy Requirements Based On: Kcal/kg (20-25 kcal/kg admission wt)  Weight Used for Energy Requirements: Admission (Admission wt 86.8 kg)  Energy (kcal/day): 2879-1143  Weight Used for Protein Requirements: Admission (1.2-2.0 g/kg admission wt (86.8kg))  Protein (g/day): 104-174  Method Used for Fluid Requirements: 1 ml/kcal  Fluid (ml/day): or per MD    Nutrition Diagnosis:   Severe malnutrition related to inadequate protein-energy intake as evidenced by Criteria as identified in malnutrition assessment, intake 0-25%, weight loss greater than or equal to 2% in 1 week    Nutrition Interventions:   Food and/or Nutrient Delivery: Continue Current Diet, Start Oral Nutrition Supplement  Nutrition Education/Counseling: Education not appropriate  Coordination of Nutrition Care: Continue to monitor while inpatient  Plan of Care discussed with: Pt    Goals:  Previous Goal Met: No Progress toward Goal(s)  Goals: PO intake 50% or greater, prior to discharge       Nutrition Monitoring and Evaluation:   Behavioral-Environmental Outcomes: None Identified  Food/Nutrient Intake Outcomes: Food and Nutrient Intake, Supplement Intake  Physical Signs/Symptoms Outcomes: Nutrition Focused Physical Findings, Biochemical Data, Weight, Meal Time Behavior    Discharge Planning:     Too soon to determine     Honorio Renteria, [address] [place] [address],   Contact: [phone]

## 2022-09-27 NOTE — CARE COORDINATION
Case management is following for discharge planning. The chart was reviewed. Mr. Lucero Mariee has been more calm and cooperative over the last 24h. He does not have a sitter and is not in restraints. A call was placed to Nola Islas at [facility]. She is going to review the clinicals. If not able to accommodate, she will send the referral to other [address] [place] [address] for review. [address] pre-cert needed. Waiting to hear back.     Electronically signed by MIGNON Mcgill RN-[facility]  Case Management  [phone]

## 2022-09-27 NOTE — PROGRESS NOTES
Occupational Therapy  Facility/Department: [address] [place] [address] ICU  Occupational Therapy Treatment  Name: Felisa Jenkins  : 1960  MRN: [id number]  Date of Service: 2022    Discharge Recommendations:   Felisa Jenkins scored a 16/24 on the AM-PAC ADL Inpatient form. Current research shows that an AM-PAC score of 17 or less is typically not associated with a discharge to the patient's home setting. Based on the patient's AM-PAC score and their current ADL deficits, it is recommended that the patient have 3-5 sessions per week of Occupational Therapy at d/c to increase the patient's independence. Please see assessment section for further patient specific details. If patient discharges prior to next session this note will serve as a discharge summary. Please see below for the latest assessment towards goals. OT Equipment Recommendations  Equipment Needed: No  Other: Defer to next level of care       Patient Diagnosis(es): The primary encounter diagnosis was Acute respiratory failure, unspecified whether with hypoxia or hypercapnia ([address] A diagnosis of Altered mental status, unspecified altered mental status type was also pertinent to this visit. Past Medical History:  has a past medical history of Alcohol abuse, CAD (coronary artery disease), CHF (congestive heart failure) ([address] Essential tremor, HTN (hypertension), Hx of blood clots, Hyperlipidemia, ICH (intracerebral hemorrhage) ([address] Lower extremity cellulitis, and MI (mitral incompetence). Past Surgical History:  has a past surgical history that includes Percutaneous Transluminal Coronary Angio and Upper gastrointestinal endoscopy (N/A, 2020). Treatment Diagnosis: Impaired ADLs and functional transfers      Assessment   Performance deficits / Impairments: Decreased functional mobility ; Decreased safe awareness;Decreased balance;Decreased coordination;Decreased ADL status; Decreased cognition;Decreased posture;Decreased endurance;Decreased strength;Decreased fine motor control  Assessment: Pt was calm and cooperative. Pt req mod assist to CG for mobility. Donned socks with SBA. Cont OT tx per plan of care. Treatment Diagnosis: Impaired ADLs and functional transfers  Prognosis: Fair  REQUIRES OT FOLLOW-UP: Yes  Activity Tolerance  Activity Tolerance: Patient Tolerated treatment well        Plan   Plan  Times per Week: 2-5  Times per Day: Daily  Current Treatment Recommendations: Strengthening, Balance training, Functional mobility training, Endurance training, Neuromuscular re-education, Safety education & training, Equipment evaluation, education, & procurement, Patient/Caregiver education & training, Self-Care / ADL     Restrictions  Restrictions/Precautions  Restrictions/Precautions: Bed Alarm, Seizure  Position Activity Restriction  Other position/activity restrictions: transfer pt    Subjective   General  Chart Reviewed: Yes  Patient assessed for rehabilitation services?: Yes  Family / Caregiver Present: No  Referring Practitioner: Bisi Alfonso DO  Subjective  Subjective: Pt supine in bed upon entry. Pt agreeable to therapy treatment session  General Comment  Comments: No c/o pain     Social/Functional History  Social/Functional History  Lives With: Family  Type of Home: House  Home Layout: Two level, Bed/Bath upstairs  Home Access: Stairs to enter with rails  Entrance Stairs - Number of Steps: 4  Entrance Stairs - Rails: Left  Bathroom Shower/Tub: Tub/Shower unit  Bathroom Equipment:  (none)  Home Equipment:  (none)  Has the patient had two or more falls in the past year or any fall with injury in the past year?: No  ADL Assistance: Independent  Homemaking Assistance: Independent  Homemaking Responsibilities: Yes  Ambulation Assistance: Independent  Transfer Assistance: Independent  Active : Yes  Occupation: Full time employment  Type of Occupation: construction  Additional Comments: Pt is a questionable historian.  No family/friends present to assist with social hx. Will update as able. Objective      Safety Devices  Type of Devices: Sitter present;Call light within reach;Nurse notified;Gait belt; Chair alarm in place; Left in chair  Restraints  Restraints Initially in Place: No           ADL  LE Dressing: Stand by assistance (to don socks. Pt declined hospital pants.)  Toileting:  (Denied need)        Bed mobility  Supine to Sit: Stand by assistance (increased time and effort to complete)  Scooting: Moderate assistance (to EOB)  Transfers  Stand Step Transfers: Minimal assistance (+cues)  Sit to stand: Moderate assistance (+cues)  Stand to sit: Minimal assistance (+cues)  Transfer Comments: Pt walked in [place] with wheeled walker ndmod assist to CG     Cognition  Overall Cognitive Status: Exceptions  Following Commands: Inconsistently follows commands  Attention Span: Difficulty attending to directions  Safety Judgement: Decreased awareness of need for assistance;Decreased awareness of need for safety  Problem Solving: Decreased awareness of errors;Assistance required to generate solutions;Assistance required to implement solutions  Insights: Decreased awareness of deficits  Initiation: Requires cues for some  Sequencing: Requires cues for some  Orientation  Orientation Level: Oriented to person;Disoriented to place; Disoriented to time;Disoriented to situation               A/AROM Exercises: AROM: Shld flx x10, elbow flx/ext x10  Education Given To: Patient  Education Provided: Transfer Training;Role of Therapy  Education Method: Demonstration;Verbal  Barriers to Learning: Cognition  Education Outcome: Continued education needed                 AM-PAC Score        AM-[facility] Inpatient Daily Activity Raw Score: 16 (09/20/22 1503)  AM-PAC Inpatient ADL T-Scale Score : 35.96 (09/20/22 1503)  ADL Inpatient CMS 0-100% Score: 53.32 (09/20/22 1503)  ADL Inpatient CMS G-Code Modifier : CK (09/20/22 1503)    Goals  Short Term Goals  Time Frame for Short term goals: Discharge  Short Term Goal 1: Pt to perform bed to chair transfer with CGA. - ongoing  Short Term Goal 2: Pt to perform LB dressing with mod A. -MET 9/20/22-  Short Term Goal 3: Pt to tolerate 3 min static standing activity with CGA for self care tasks. - ongoing  Short Term Goal 4: Lower body dressing with SBA- ongoing  Patient Goals   Patient goals :  To go home       Therapy Time   Individual Concurrent Group Co-treatment   Time In 0941         Time Out 1008         Minutes 27         Timed Code Treatment Minutes: [facility], OTR/L [id number]

## 2022-09-28 PROCEDURE — 97530 THERAPEUTIC ACTIVITIES: CPT

## 2022-09-28 PROCEDURE — 6370000000 HC RX 637 (ALT 250 FOR IP)

## 2022-09-28 PROCEDURE — 97535 SELF CARE MNGMENT TRAINING: CPT

## 2022-09-28 PROCEDURE — 2500000003 HC RX 250 WO HCPCS

## 2022-09-28 PROCEDURE — 6370000000 HC RX 637 (ALT 250 FOR IP): Performed by: INTERNAL MEDICINE

## 2022-09-28 PROCEDURE — 97116 GAIT TRAINING THERAPY: CPT

## 2022-09-28 PROCEDURE — 99233 SBSQ HOSP IP/OBS HIGH 50: CPT | Performed by: NURSE PRACTITIONER

## 2022-09-28 PROCEDURE — 1200000000 HC SEMI PRIVATE

## 2022-09-28 PROCEDURE — 6370000000 HC RX 637 (ALT 250 FOR IP): Performed by: NURSE PRACTITIONER

## 2022-09-28 PROCEDURE — 6360000002 HC RX W HCPCS: Performed by: STUDENT IN AN ORGANIZED HEALTH CARE EDUCATION/TRAINING PROGRAM

## 2022-09-28 PROCEDURE — 6370000000 HC RX 637 (ALT 250 FOR IP): Performed by: STUDENT IN AN ORGANIZED HEALTH CARE EDUCATION/TRAINING PROGRAM

## 2022-09-28 RX ORDER — LEVETIRACETAM 750 MG/1
1500 TABLET ORAL 2 TIMES DAILY
Qty: 60 TABLET | Refills: 3 | Status: SHIPPED | OUTPATIENT
Start: 2022-09-28

## 2022-09-28 RX ADMIN — OLANZAPINE 5 MG: 5 TABLET, FILM COATED ORAL at 08:27

## 2022-09-28 RX ADMIN — CARVEDILOL 3.12 MG: 3.12 TABLET, FILM COATED ORAL at 17:07

## 2022-09-28 RX ADMIN — Medication 5 MG: at 20:25

## 2022-09-28 RX ADMIN — LEVETIRACETAM 1500 MG: 750 TABLET, FILM COATED ORAL at 20:25

## 2022-09-28 RX ADMIN — OLANZAPINE 5 MG: 10 INJECTION, POWDER, LYOPHILIZED, FOR SOLUTION INTRAMUSCULAR at 13:51

## 2022-09-28 RX ADMIN — Medication 400 MG: at 08:27

## 2022-09-28 RX ADMIN — OLANZAPINE 5 MG: 5 TABLET, FILM COATED ORAL at 20:26

## 2022-09-28 RX ADMIN — ENOXAPARIN SODIUM 40 MG: 100 INJECTION SUBCUTANEOUS at 08:27

## 2022-09-28 RX ADMIN — DIPHENHYDRAMINE HYDROCHLORIDE 25 MG: 25 TABLET ORAL at 17:08

## 2022-09-28 RX ADMIN — CARVEDILOL 3.12 MG: 3.12 TABLET, FILM COATED ORAL at 08:28

## 2022-09-28 RX ADMIN — ZIPRASIDONE HYDROCHLORIDE 20 MG: 20 CAPSULE ORAL at 14:04

## 2022-09-28 RX ADMIN — THIAMINE HCL TAB 100 MG 100 MG: 100 TAB at 08:28

## 2022-09-28 RX ADMIN — LEVETIRACETAM 1500 MG: 750 TABLET, FILM COATED ORAL at 08:28

## 2022-09-28 NOTE — PROGRESS NOTES
Hospitalist Daily Progress Note    Admit Date: 9/1/2022  Day: 18  Diet: ADULT ORAL NUTRITION SUPPLEMENT; Breakfast, Lunch, Dinner; Standard High Calorie/High Protein Oral Supplement  ADULT DIET; Easy to Chew; 3 carb choices (45 gm/meal)    Interval history:   No acute clinical changes reported. Afebrile and hemodynamically stable. Tolerating diet. No acute agitation episodes recently and has no required restraints for a few days now. Medications:     Scheduled Meds:   OLANZapine  5 mg Oral BID    magnesium oxide  400 mg Oral Daily    levETIRAcetam  1,500 mg Oral BID    thiamine mononitrate  100 mg Oral Daily    carvedilol  3.125 mg Oral BID WC    melatonin  5 mg Oral Nightly    enoxaparin  40 mg SubCUTAneous Daily    lidocaine PF  5 mL IntraDERmal Once    sodium chloride flush  5-40 mL IntraVENous 2 times per day     Continuous Infusions:   dextrose      sodium chloride 10 mL/hr at 09/17/22 0637     PRN Meds:OLANZapine, ziprasidone, promethazine, diphenhydrAMINE, labetalol, glucose, dextrose bolus **OR** dextrose bolus, glucagon (rDNA), dextrose, sodium chloride flush, sodium chloride, polyethylene glycol, acetaminophen **OR** acetaminophen    Objective:   Vitals:   T-max:  Patient Vitals for the past 8 hrs:   BP Temp Temp src Pulse Resp SpO2   09/28/22 1707 (!) 115/59 -- -- 100 -- --   09/28/22 1600 (!) 145/95 -- -- (!) 103 28 97 %   09/28/22 1549 123/89 98 °F (36.7 °C) Oral 93 17 97 %   09/28/22 1433 (!) 121/92 98 °F (36.7 °C) Oral 90 16 96 %   09/28/22 1200 (!) 119/97 -- -- 84 20 98 %   09/28/22 1155 118/77 97.6 °F (36.4 °C) Oral 94 17 98 %         Intake/Output Summary (Last 24 hours) at 9/28/2022 1912  Last data filed at 9/28/2022 1044  Gross per 24 hour   Intake 600 ml   Output 175 ml   Net 425 ml         Physical Exam  Constitutional:       General: He is not in acute distress. Appearance: He is not ill-appearing.    HENT:      Mouth/Throat:      Mouth: Mucous membranes are moist.   Eyes:      Pupils: Pupils are equal, round, and reactive to light. Comments: Uncooperative with EOM exam   Cardiovascular:      Rate and Rhythm: Normal rate and regular rhythm. Pulses: Normal pulses. Heart sounds: Normal heart sounds. Pulmonary:      Effort: Pulmonary effort is normal.      Breath sounds: Normal breath sounds. Abdominal:      Tenderness: There is no abdominal tenderness. Musculoskeletal:      Right lower leg: No edema. Left lower leg: No edema. Skin:     Coloration: Skin is not jaundiced. Neurological:      Comments: Alert and oriented x2, no focal sensory or motor deficits   Psychiatric:      Comments: Able to answer basic questions but often has non-linear/tangential thought content. No overt delusions or hallucinations noted         LABS:    CBC:   No results for input(s): WBC, HGB, HCT, PLT, MCV in the last 72 hours. Renal:    Recent Labs     09/26/22  1237      K 3.9      CO2 20*   BUN 19   CREATININE 0.7*   GLUCOSE 141*   CALCIUM 9.7   ANIONGAP 16       Hepatic:   No results for input(s): AST, ALT, BILITOT, BILIDIR, PROT, LABALBU, ALKPHOS in the last 72 hours. Troponin: No results for input(s): TROPONINI in the last 72 hours. BNP: No results for input(s): BNP in the last 72 hours. Lipids: No results for input(s): CHOL, HDL in the last 72 hours. Invalid input(s): LDLCALCU, TRIGLYCERIDE  ABGs:  No results for input(s): PHART, GQZ0NBD, PO2ART, JEN0WJX, BEART, THGBART, S2VTQMKA, QSD7NFM in the last 72 hours. INR: No results for input(s): INR in the last 72 hours. Lactate: No results for input(s): LACTATE in the last 72 hours. Cultures:  -----------------------------------------------------------------  RAD:   FL MODIFIED BARIUM SWALLOW W VIDEO   Final Result      No penetration or aspiration. XR CHEST PORTABLE   Final Result   1. Worsened pulmonary edema. 2.  Persistent bibasilar atelectasis.       MRI BRAIN WO CONTRAST   Final Result      Faint diffusion restriction in the right hippocampus with associated FLAIR signal abnormality. This is favored to represent sequelae of seizures, however other infectious/inflammatory etiologies are also possible. Mild atrophy and chronic small vessel ischemic change. Areas of encephalomalacia and gliosis in both cerebral hemispheres with remote hemorrhagic staining from prior insults. IR LUMBAR PUNCTURE FOR DIAGNOSIS   Final Result   . IMPRESSION:   1. Uneventful diagnostic fluoroscopic guided lumbar puncture as the   2. The flow reversed are risk and therefore a closing pressure was obtained at the end of the procedure, 26 cm of water. XR ABDOMEN (KUB) (SINGLE AP VIEW)   Final Result   Findings/Impression:    The tip of the enteric tube terminates in the distal body of the stomach. CT CHEST WO CONTRAST   Final Result      CHEST:      1. Moderate dependent atelectasis bilaterally. ABDOMEN/PELVIS:      1.  No acute abnormality on noncontrast CT of the abdomen and pelvis. 2.  Cholelithiasis. CT ABDOMEN PELVIS WO CONTRAST Additional Contrast? None   Final Result      CHEST:      1. Moderate dependent atelectasis bilaterally. ABDOMEN/PELVIS:      1.  No acute abnormality on noncontrast CT of the abdomen and pelvis. 2.  Cholelithiasis. CTA HEAD NECK W CONTRAST   Final Result      CTA Head:   No acute CTA findings. No hemodynamically significant stenosis or focal aneurysm. No arteriovenous confirmation. CTA Neck:   No acute CTA findings. No hemodynamically significant stenosis or focal aneurysm. CT HEAD WO CONTRAST   Final Result      1. No findings for acute intracranial abnormality. 2.  Age-related atrophy with patchy periventricular white matter changes bilaterally consistent with chronic small vessel ischemia. 3.  Stable low attenuation left frontoparietal lobe consistent with previous insult from known prior intraparenchymal hematoma. Stable right frontoparietal cortical infarct. These findings were called to the emergency room physician Dr. Audrey Dillard on 9/1/2022 at 5:30 p.m. XR CHEST PORTABLE   Final Result   1. No findings for acute cardiopulmonary disease. 2.  ET tube in good position in the mid trachea. Assessment/Plan:   Acute encephalopathy(unclear etiology)  Delirium vs progressive dementia  On presentation AMS, elevated WBC: 21.7, Tachypneic, Tachycardic, Hypotensive. Hx of ICH, SAH and SDH 2/2 to fall, hx of chronic alcohol abuse,   CT head: prior Intraparenchymal hematoma. Stable right frontoparietal cortical infarct. CT Abdomen, chest: mod atelectasis bilaterally. No acute abnorms on CT abd except stable cholelithiasis   CTA Head: No acute CTA findings. CTA Neck: No acute CTA findings. MRI: Faint diffusion restriction in the right hippocampus with associated FLAIR signal abnormality. This is favored to represent sequelae of seizures. cEEG readings previously: Generalized background abnormalities indicative of an underlying diffuse encephalopathy of non-specific etiology. Intermittent focal epileptiform discharges, right posterior temporal, conferring increased risk of focal onset seizures from this region.    -Lumbar puncture ordered 9/6/2022: No growth.   -Blood Cultures were -ve x2  -Continue Keppra 1500mg bid  - inpatient psych consulted   --Previously investigating inpatient psych due to agitation/behavioral concerns but with recent improvement no longer appropriate given lack of primary psych diagnosis at this time  - Will investigate long term care placement w/ CM  - If needs to remain inpatient for few more days will consider re-consult of Neuro to aid in investigation of neuro-cognitive decline (e.g. Alzheimer's)  - Zyprexa 5 mg bid PO w/ additional 5 mg IM prn agitation    Acute sinus tachycardia, improved  Intermittently tachycardic into the 120s with stable pressures  -Monitor     Alcohol use disorder  Ethanol level: none detected  -thiamine 100 mg PO daily       Acute hypercapnic respiratory failure secondary to possible aspiration vs 2/2 medication (Extubated successfully 9/10)  On presentation: VBG PH: 7.264, PCO2: 56.4, 83.2  - Intubated for 10 days in ICU, extubated 09/10  - pt is stable on room air. CAD s/p stent 2020  Hx of Mural thrombus  - in 2020, anterior apical MI with atrial thrombus s/p stenting, pt was put on Eliquis/Plavix. - Eliquis/Plavix stopped due to frequent falls 2/2 chronic alcoholism  - [facility] 09/2021 suggesting acute and chronic ICH/SAH/SDH     Hx of Cirrhosis  -Ammonia: 36 wnl(09/1/22), LFTs WNL. Hypertension  -Resumed home carvedilol dose 3.125 mg twice daily     - Palliative consulted able to contact family (multiple siblings), all agreed that sister (present on admission) can make decision for patient.     Code Status: Full Code  FEN: Easy chew diet, oral nutrition supplement  PPX: Lovenox  DISPO: IP, awaiting SNF placement    Alessandra Horton MD

## 2022-09-28 NOTE — PROGRESS NOTES
Palliative Care Chart Review  and Check in Note:     NAME:  Maxwell Cheadle  Admit Date: 9/1/2022  Hospital Day:  Hospital Day: 28   Current Code status: Limited    Palliative care is continuing to following Mr. Jody Lucio for symptom management, and goals of care discussion as needed. Patient's chart reviewed today 9/28/22. Saw Danley Dubin at the bedside. He remains pleasantly confused.  is at the bedside. She said Danley Dubin has been up to the bathroom, but has not walked much more than that. Danley Dubin denies any complaints. Spoke with Orest Angelucci, RN case manager, and Dr. Park Dunbar about the case. Nj Parrish and Manish will discuss pt transferring to IP psych. Will cont to keep family updated. The following are the currently established goals/code status, and Symptom management. Goals of care: Continue current medical management.      Code status: DNR/DNI    Discharge plan: TBD - IP psych vs SNF      MERVIN Kapadia - NP  09/28/22  11:42 AM

## 2022-09-28 NOTE — PROGRESS NOTES
Occupational Therapy  Facility/Department: [facility] ICU  Occupational Therapy Treatment    Name: Galileo Powell  : 1960  MRN: [id number]  Date of Service: 2022    Discharge Recommendations:   Galileo Powell scored a 16/24 on the AM-PAC ADL Inpatient form. Current research shows that an AM-PAC score of 17 or less is typically not associated with a discharge to the patient's home setting. Based on the patient's AM-PAC score and their current ADL deficits, it is recommended that the patient have 3-5 sessions per week of Occupational Therapy at d/c to increase the patient's independence. Please see assessment section for further patient specific details. If patient discharges prior to next session this note will serve as a discharge summary. Please see below for the latest assessment towards goals. OT Equipment Recommendations  Equipment Needed: No  Other: Defer to next level of care       Patient Diagnosis(es): The primary encounter diagnosis was Acute respiratory failure, unspecified whether with hypoxia or hypercapnia ([address] A diagnosis of Altered mental status, unspecified altered mental status type was also pertinent to this visit. Past Medical History:  has a past medical history of Alcohol abuse, CAD (coronary artery disease), CHF (congestive heart failure) ([address] Essential tremor, HTN (hypertension), Hx of blood clots, Hyperlipidemia, ICH (intracerebral hemorrhage) ([address] Lower extremity cellulitis, and MI (mitral incompetence). Past Surgical History:  has a past surgical history that includes Percutaneous Transluminal Coronary Angio and Upper gastrointestinal endoscopy (N/A, 2020). Treatment Diagnosis: Impaired ADLs and functional transfers      Assessment   Performance deficits / Impairments: Decreased functional mobility ; Decreased safe awareness;Decreased balance;Decreased coordination;Decreased ADL status; Decreased cognition;Decreased posture;Decreased endurance;Decreased strength;Decreased fine motor control  Assessment: Pt cont to be confused. Pt participated in some ADL and mobility activities, but declined exercises and futher activitiy. Pt cont to be unsteady. Cont OT tx per plan of care. Treatment Diagnosis: Impaired ADLs and functional transfers  REQUIRES OT FOLLOW-UP: Yes  Activity Tolerance  Activity Tolerance: Treatment limited secondary to decreased cognition        Plan   Plan  Times per Week: 2-5  Times per Day: Daily  Current Treatment Recommendations: Strengthening, Balance training, Functional mobility training, Endurance training, Neuromuscular re-education, Safety education & training, Equipment evaluation, education, & procurement, Patient/Caregiver education & training, Self-Care / ADL     Restrictions  Restrictions/Precautions  Restrictions/Precautions: Bed Alarm, Seizure  Position Activity Restriction  Other position/activity restrictions: transfer pt    Subjective   General  Chart Reviewed: Yes  Patient assessed for rehabilitation services?: Yes  Family / Caregiver Present: No  Referring Practitioner: Amparo Rodgers DO  Subjective  Subjective: Upon entry, pt on toilet (nurse present). Where's my key?   General Comment  Comments: No c/o pain     Social/Functional History  Social/Functional History  Lives With: Family  Type of Home: House  Home Layout: Two level, Bed/Bath upstairs  Home Access: Stairs to enter with rails  Entrance Stairs - Number of Steps: 4  Entrance Stairs - Rails: Left  Bathroom Shower/Tub: Tub/Shower unit  Bathroom Equipment:  (none)  Home Equipment:  (none)  Has the patient had two or more falls in the past year or any fall with injury in the past year?: No  ADL Assistance: Independent  Homemaking Assistance: Independent  Homemaking Responsibilities: Yes  Ambulation Assistance: Independent  Transfer Assistance: Independent  Active : Yes  Occupation: Full time employment  Type of Occupation: construction  Additional Comments: Pt is a questionable historian. No family/friends present to assist with social hx. Will update as able. Objective      Safety Devices  Type of Devices: Sitter present;Call light within reach;Nurse notified;Gait belt;Left in bed;Bed alarm in place  Restraints  Restraints Initially in Place: No  Balance  Sitting:  (SBA-CGA sitting EOB)  Standing:  (min A standing at sink, posterior lean)  Toilet Transfers  Toilet - Technique: Ambulating  Equipment Used: Standard toilet (grab bar)  Toilet Transfer: Minimal assistance  Toilet Transfers Comments: Posterior loss of balance after standing up from the toilet     ADL  Grooming: Setup;Verbal cueing;Contact guard assistance (to wash hands, standing at sink)  LE Dressing: Stand by assistance (to don/doff socks)  Toileting: Contact guard assistance;Setup (cues. (+) BM)     Activity Tolerance  Activity Tolerance: Treatment limited secondary to decreased cognition  Activity Tolerance Comments: pt initially agreeable to therex, then refused after sitting down. Session limited by participation  Bed mobility  Supine to Sit: Stand by assistance (+ cues to scoot toward head of bed)  Transfers  Stand Step Transfers: Minimal assistance (+cues)  Sit to stand: Moderate assistance (+cues)  Stand to sit: Contact guard assistance (+cues)  Transfer Comments: Pt walked in [place] with wheeled walker and min assist to CG  Vision  Vision: Impaired  Vision Exceptions: Wears glasses for reading  Hearing  Hearing: Within functional limits  Cognition  Overall Cognitive Status: Exceptions  Following Commands: Inconsistently follows commands  Attention Span: Difficulty attending to directions; Difficulty dividing attention  Memory: Decreased short term memory;Decreased recall of recent events  Safety Judgement: Decreased awareness of need for assistance;Decreased awareness of need for safety  Problem Solving: Decreased awareness of errors;Assistance required to generate solutions;Assistance required to implement solutions  Insights: Decreased awareness of deficits  Initiation: Requires cues for some  Sequencing: Requires cues for some  Cognition Comment: Very impulsive, attempting to stand and sit when unsafe. Orientation  Overall Orientation Status: Impaired                  Education Given To: Patient  Education Provided: Transfer Training;Role of Therapy;Plan of Care  Education Method: Verbal  Barriers to Learning: Cognition  Education Outcome: Continued education needed               AM-PAC Score        AM-PAC Inpatient Daily Activity Raw Score: 16 (09/20/22 1503)  AM-PAC Inpatient ADL T-Scale Score : 35.96 (09/20/22 1503)  ADL Inpatient CMS 0-100% Score: 53.32 (09/20/22 1503)  ADL Inpatient CMS G-Code Modifier : CK (09/20/22 1503)      Goals  Short Term Goals  Time Frame for Short term goals: Discharge  Short Term Goal 1: Pt to perform bed to chair transfer with CGA. - ongoing  Short Term Goal 2: Pt to perform LB dressing with mod A. -MET 9/20/22-  Short Term Goal 3: Pt to tolerate 3 min static standing activity with CGA for self care tasks. - ongoing  Short Term Goal 4: Lower body dressing with SBA- ongoing  Patient Goals   Patient goals :  To go home       Therapy Time   Individual Concurrent Group Co-treatment   Time In 1300         Time Out 1323         Minutes 23         Timed Code Treatment Minutes: 23 Minutes   Total Treatment 44 Nenita Felder OTR/L [id number]

## 2022-09-28 NOTE — CARE COORDINATION
Case management continues to follow for discharge planning. Spoke with the Psych NP, Tonya, regarding her recommendations. Mr. Cyrus Rose will not meet criteria for inpatient psych placement at this time. ADELA has been in contact with Marquisetacos Nunez in [place] [facility] They had accepted him for admission yesterday. The referral was stopped due to the new referral to  psych. Asked Mauricio Nunez to re-evaluate and review the psych NP's updated notes. Les Pimple He is still amenable to accepting Mr. Cyrus Rose at the facility. Mauricio Nunez will do an on-site visit this afternoon and start [address] pre-cert if appropriate. ADELA reached out to Mr. Cabrera's sister, Tone Werner, to discuss options for post-acute care. Spoke at length about the pt's status. She in agreement with the referral to Michaela Laura..     Electronically signed by MIGNON Castro RN-[facility]  Case Management  [phone]

## 2022-09-28 NOTE — BH NOTE
Psychiatry Follow-Up Consultation    Patient Name: Angel Amaro  MRN: [id number]  Admission Date: 9/1/2022    Reason for Consult: Encephalopathy, still unclear etiology    Patient's chart was reviewed, case was discussed with nursing/OT/RT staff, and collaborated with  about the treatment plan. Met with Anthony. He was calm and cooperative in interactions, much more clear and appropriate in conversation in comparison to previous days. Out of restraints currently. He was able to identify that he is in the hospital but did not know which one. States it is April and did not know the year. He is unsure why he is in the hospital but states \"my sister told me I had a heart attack\". Endorses confusion and there are a lot of missing pieces of memory from his admission here.  He denies issues with depression and anxiety prior to his hospitalization and denies recent/current SI.     ROS: Patient has new complaints: No     Current Medications Ordered:   OLANZapine  5 mg Oral BID    magnesium oxide  400 mg Oral Daily    levETIRAcetam  1,500 mg Oral BID    thiamine mononitrate  100 mg Oral Daily    carvedilol  3.125 mg Oral BID WC    melatonin  5 mg Oral Nightly    enoxaparin  40 mg SubCUTAneous Daily    lidocaine PF  5 mL IntraDERmal Once    sodium chloride flush  5-40 mL IntraVENous 2 times per day      PRN Meds: OLANZapine, ziprasidone, promethazine, diphenhydrAMINE, labetalol, glucose, dextrose bolus **OR** dextrose bolus, glucagon (rDNA), dextrose, sodium chloride flush, sodium chloride, polyethylene glycol, acetaminophen **OR** acetaminophen     Objective:     PE:    /77   Pulse 94   Temp 97.6 °F (36.4 °C) (Oral)   Resp 17   Ht 5' 10\" (1.778 m)   Wt 169 lb 12.1 oz (77 kg)   SpO2 98%   BMI 24.36 kg/m²       Motor / Gait: Observed seated in bed, in restraints, gait deferred    Mental Status Examination:   Appearance: WM, appears stated age, seated in bed, wearing hospital attire, fair grooming and fair hygiene  Behavior/Attitude Toward Examiner: Cooperative, pleasant, good eye contact  Speech: Normal rate, normal volume  Mood: \"Okay\"  Affect: Flat  Thought Processes: Confused  Thought Content: No SI/HI verbalized, no delusions voiced, no obsessions  Perceptions: No AVH verbalized, did not appear to be actively RTIS during my assessment   Attention: Impaired  Cognition: Alert and oriented to person, place [employer]"); disoriented to time and situation; immediate, recent, and remote recall impaired  Insight: Impaired insight   Judgment: Impaired judgment      LAB: Reviewed labs from last 24 hours      Dx:   Primary Psychiatric (DSM V) Diagnosis: Altered mental status (r/o delirium)  Secondary Psychiatric (DSM V) Diagnoses: None  Chemical Dependency Diagnoses: History of alcohol abuse     Assessment  Reviewed nursing and ancillary staff notes since arrival to hospital, reviewed previous records and records available through [facility]. Evaluated medications and assessed for side effects and effectiveness. Assessed patient's educational needs including reviewing plan of care, medications, and diagnosis. Recommendations:     Recommendation for inpatient psychiatry was made on Monday when patient was agitated, combative, and in restraints during the weekend. Recent documentation indicates that he has been calm and cooperative, out of restraints for the past approx. 36 hours. With current presentation, patient may not meet criteria for inpatient acute psychiatry and may no longer require it. Dr. Anmol Cruz updated on this. Will discuss case with collaborating physician, Dr. Aramis De La Cruz, this afternoon and update note after I speak to him. Addendum, 1400: Spoke to Dr. Aramis De La Cruz regarding caseLyndsey Ellis is no longer demonstrating the behaviors that warranted the recommendation of inpatient psychiatry on Monday.  He was calm and cooperative and appropriate in conversation today; has been calm and out of restraints for the past approximately 36 hours. Because of the improvement in his presentation, he no longer meets criteria for inpatient psychiatry and I recommend SNF placement with psychiatry follow-up in house. Continue current Zyprexa 5 mg BID. Total face to face time with patient was 35 minutes and more than 50% of that time was spent counseling the patient on their symptoms, treatment and expected goals.     Aylin Duran, MPH, PMHNP-BC  09/28/22

## 2022-09-28 NOTE — PROGRESS NOTES
Physical Therapy  Facility/Department: [facility]  Physical Therapy Daily Treatment    Name: Harsh Romeo  : 1960  MRN: [id number]  Date of Service: 2022    Discharge Recommendations:    Harsh Romeo scored a 13/24 on the AM-PAC short mobility form. Current research shows that an AM-PAC score of 17 or less is typically not associated with a discharge to the patient's home setting. Based on the patient's AM-PAC score and their current functional mobility deficits, it is recommended that the patient have 3-5 sessions per week of Physical Therapy at d/c to increase the patient's independence. Please see assessment section for further patient specific details. If patient discharges prior to next session this note will serve as a discharge summary. Please see below for the latest assessment towards goals. PT Equipment Recommendations  Equipment Needed:  (defer)      Patient Diagnosis(es): The primary encounter diagnosis was Acute respiratory failure, unspecified whether with hypoxia or hypercapnia ([address] A diagnosis of Altered mental status, unspecified altered mental status type was also pertinent to this visit. Past Medical History:  has a past medical history of Alcohol abuse, CAD (coronary artery disease), CHF (congestive heart failure) ([address] Essential tremor, HTN (hypertension), Hx of blood clots, Hyperlipidemia, ICH (intracerebral hemorrhage) ([address] Lower extremity cellulitis, and MI (mitral incompetence). Past Surgical History:  has a past surgical history that includes Percutaneous Transluminal Coronary Angio and Upper gastrointestinal endoscopy (N/A, 2020). Assessment   Body Structures, Functions, Activity Limitations Requiring Skilled Therapeutic Intervention: Decreased functional mobility ; Decreased cognition;Decreased coordination;Decreased endurance;Decreased strength;Decreased safe awareness;Decreased balance  Assessment: Improved cooperation and participation this date.  Still confused but able to more consistently follow commands. Decreased assist for bed mobility and transfers/gt. Increased gt endurance. Continues to be at risk for falls with decreased safety awareness. Not safe to get up alone. Rec cont skilled PT to maximize mobiltiy and independence. Treatment Diagnosis: Dec'd balance & mobility  Requires PT Follow-Up: Yes  Activity Tolerance  Activity Tolerance: Treatment limited secondary to decreased cognition  Activity Tolerance Comments: pt initially agreeable to therex, then refused after sitting down. Session limited by participation     Plan   Plan  Plan:  (2-5x/week)  Current Treatment Recommendations: Strengthening, Balance training, Gait training, Equipment evaluation, education, & procurement, Functional mobility training, Stair training, Neuromuscular re-education, Transfer training, Endurance training, Patient/Caregiver education & training, Therapeutic activities  Safety Devices  Type of Devices: Sitter present, Call light within reach, Nurse notified, Gait belt, Left in bed, Bed alarm in place  Restraints  Restraints Initially in Place: No  Restraints: bilateral wrist restraints     Restrictions  Restrictions/Precautions  Restrictions/Precautions: Bed Alarm, Seizure  Position Activity Restriction  Other position/activity restrictions: transfer pt     Subjective   General Comment  Comments: pt cleared to see by NYASIA Mckeon on entry  Subjective  Subjective: pt found in bathroom with RN, pt agreeable to therapy. Pt denies pain, continues to be confused in conversation, looking for his keys.          Social/Functional History  Social/Functional History  Lives With: Family  Type of Home: House  Home Layout: Two level, Bed/Bath upstairs  Home Access: Stairs to enter with rails  Entrance Stairs - Number of Steps: 4  Entrance Stairs - Rails: Left  Bathroom Shower/Tub: Tub/Shower unit  Bathroom Equipment:  (none)  Home Equipment:  (none)  Has the patient had two or more falls in the past year or any fall with injury in the past year?: No  ADL Assistance: Independent  Homemaking Assistance: Independent  Homemaking Responsibilities: Yes  Ambulation Assistance: Independent  Transfer Assistance: Independent  Active : Yes  Occupation: Full time employment  Type of Occupation: construction  Additional Comments: Pt is a questionable historian. No family/friends present to assist with social hx. Will update as able. Vision/Hearing  Vision  Vision: Impaired  Vision Exceptions: Wears glasses for reading  Hearing  Hearing: Within functional limits    Cognition   Orientation  Overall Orientation Status: Impaired  Cognition  Overall Cognitive Status: Exceptions  Following Commands: Inconsistently follows commands  Attention Span: Difficulty attending to directions; Difficulty dividing attention  Memory: Decreased short term memory;Decreased recall of recent events  Safety Judgement: Decreased awareness of need for assistance;Decreased awareness of need for safety  Problem Solving: Decreased awareness of errors;Assistance required to generate solutions;Assistance required to implement solutions  Insights: Decreased awareness of deficits  Initiation: Requires cues for some  Sequencing: Requires cues for some  Cognition Comment: Very impulsive, attempting to stand and sit when unsafe.      Objective   Heart Rate: 94  Heart Rate Source: Monitor  BP: 118/77  BP Location: Left upper arm  BP Method: Automatic  Patient Position: Semi fowlers  MAP (Calculated): 90.67  Resp: 17  SpO2: 98 %  O2 Device: None (Room air)      Balance  Sitting:  (SBA-CGA sitting EOB)  Standing:  (min A standing at sink, posterior lean)  Bed mobility  Sit to Supine: Stand by assistance (HOB flat)  Transfers  Sit to Stand: Minimal Assistance  Stand to sit: Minimal Assistance  Comment: min A to stand from toilet to walker due to posterior lean  Ambulation  Surface: level tile  Device: Rolling Walker  Assistance: Minimal assistance;Contact guard assistance  Quality of Gait: CGA most of the time on straightaways, unsteady with turns, LOB/post lean at times, decreased bilat step length/height  Distance: 120ft     AM-PAC Score  AM-PAC Inpatient Mobility Raw Score : 13 (09/28/22 1331)  AM-PAC Inpatient T-Scale Score : 36.74 (09/28/22 1331)  Mobility Inpatient CMS 0-100% Score: 64.91 (09/28/22 1331)  Mobility Inpatient CMS G-Code Modifier : CL (09/28/22 1331)     Goals  Short Term Goals  Time Frame for Short term goals: DC  Short term goal 1: Pt will complete functional txfs with S.  Ongoing  Short term goal 2: Pt will tolerate ambulation assessment - goal met/revised 9/20: pt will ambulate 20ft with LRAD with min A x1. Partially met 9/27. Revised goal:  Pt will ambulate >100' with LRAD CGA. Goal met/revised 9/28: pt will amb 150ft with LRAD and SBA  Short term goal 3: Pt will tolerate stair assessment. Ongoing  Patient Goals   Patient goals : To get phone/speak with sister       Education  Patient Education  Education Given To: Patient  Education Provided: Role of Therapy;Transfer Training  Education Method: Verbal  Barriers to Learning: Cognition  Education Outcome: Continued education needed; Unable to demonstrate understanding; Unable to verbalize      Therapy Time   Individual Concurrent Group Co-treatment   Time In 1300         Time Out 1323         Minutes 23          Timed Code Treatment Minutes:23    Total Treatment Minutes:23     If patient is discharged prior to next treatment, this note will serve as the discharge summary.     Cookie July PT, DPT, NCS, CSRS

## 2022-09-28 NOTE — PROGRESS NOTES
Writer just received call from transfer center stating that Dr. Dee Schumacher is choosing to defer acceptance of patient at this time and wants to talk to the patient's psychiatric NP and his medical physician before he makes any decisions.

## 2022-09-29 LAB
ALBUMIN SERPL-MCNC: 4.2 G/DL (ref 3.4–5)
ALP BLD-CCNC: 87 U/L (ref 40–129)
ALT SERPL-CCNC: 25 U/L (ref 10–40)
ANION GAP SERPL CALCULATED.3IONS-SCNC: 10 MMOL/L (ref 3–16)
AST SERPL-CCNC: 27 U/L (ref 15–37)
BILIRUB SERPL-MCNC: 0.5 MG/DL (ref 0–1)
BILIRUBIN DIRECT: <0.2 MG/DL (ref 0–0.3)
BILIRUBIN, INDIRECT: NORMAL MG/DL (ref 0–1)
BUN BLDV-MCNC: 21 MG/DL (ref 7–20)
CALCIUM SERPL-MCNC: 10.1 MG/DL (ref 8.3–10.6)
CHLORIDE BLD-SCNC: 99 MMOL/L (ref 99–110)
CO2: 30 MMOL/L (ref 21–32)
CREAT SERPL-MCNC: 0.8 MG/DL (ref 0.8–1.3)
GFR AFRICAN AMERICAN: >60
GFR NON-AFRICAN AMERICAN: >60
GLUCOSE BLD-MCNC: 109 MG/DL (ref 70–99)
HCT VFR BLD CALC: 44.3 % (ref 40.5–52.5)
HEMOGLOBIN: 15.4 G/DL (ref 13.5–17.5)
MCH RBC QN AUTO: 32 PG (ref 26–34)
MCHC RBC AUTO-ENTMCNC: 34.7 G/DL (ref 31–36)
MCV RBC AUTO: 92.1 FL (ref 80–100)
PDW BLD-RTO: 14.2 % (ref 12.4–15.4)
PLATELET # BLD: 187 K/UL (ref 135–450)
PMV BLD AUTO: 10.2 FL (ref 5–10.5)
POTASSIUM REFLEX MAGNESIUM: 4 MMOL/L (ref 3.5–5.1)
RBC # BLD: 4.81 M/UL (ref 4.2–5.9)
SODIUM BLD-SCNC: 139 MMOL/L (ref 136–145)
TOTAL PROTEIN: 7.8 G/DL (ref 6.4–8.2)
WBC # BLD: 7.1 K/UL (ref 4–11)

## 2022-09-29 PROCEDURE — 6370000000 HC RX 637 (ALT 250 FOR IP)

## 2022-09-29 PROCEDURE — 6370000000 HC RX 637 (ALT 250 FOR IP): Performed by: STUDENT IN AN ORGANIZED HEALTH CARE EDUCATION/TRAINING PROGRAM

## 2022-09-29 PROCEDURE — 85027 COMPLETE CBC AUTOMATED: CPT

## 2022-09-29 PROCEDURE — 6370000000 HC RX 637 (ALT 250 FOR IP): Performed by: INTERNAL MEDICINE

## 2022-09-29 PROCEDURE — 6360000002 HC RX W HCPCS: Performed by: STUDENT IN AN ORGANIZED HEALTH CARE EDUCATION/TRAINING PROGRAM

## 2022-09-29 PROCEDURE — 99232 SBSQ HOSP IP/OBS MODERATE 35: CPT | Performed by: NURSE PRACTITIONER

## 2022-09-29 PROCEDURE — 94761 N-INVAS EAR/PLS OXIMETRY MLT: CPT

## 2022-09-29 PROCEDURE — 80076 HEPATIC FUNCTION PANEL: CPT

## 2022-09-29 PROCEDURE — 1200000000 HC SEMI PRIVATE

## 2022-09-29 PROCEDURE — 80048 BASIC METABOLIC PNL TOTAL CA: CPT

## 2022-09-29 RX ORDER — DIVALPROEX SODIUM 125 MG/1
125 CAPSULE, COATED PELLETS ORAL EVERY 8 HOURS SCHEDULED
Status: CANCELLED | OUTPATIENT
Start: 2022-09-29

## 2022-09-29 RX ORDER — DIVALPROEX SODIUM 125 MG/1
250 CAPSULE, COATED PELLETS ORAL EVERY 12 HOURS SCHEDULED
Status: DISCONTINUED | OUTPATIENT
Start: 2022-09-29 | End: 2022-10-05

## 2022-09-29 RX ADMIN — DIVALPROEX SODIUM 250 MG: 125 CAPSULE, COATED PELLETS ORAL at 16:53

## 2022-09-29 RX ADMIN — Medication 5 MG: at 20:30

## 2022-09-29 RX ADMIN — DIPHENHYDRAMINE HYDROCHLORIDE 25 MG: 25 TABLET ORAL at 13:07

## 2022-09-29 RX ADMIN — LEVETIRACETAM 1500 MG: 750 TABLET, FILM COATED ORAL at 20:31

## 2022-09-29 RX ADMIN — CARVEDILOL 3.12 MG: 3.12 TABLET, FILM COATED ORAL at 07:59

## 2022-09-29 RX ADMIN — LEVETIRACETAM 1500 MG: 750 TABLET, FILM COATED ORAL at 07:59

## 2022-09-29 RX ADMIN — CARVEDILOL 3.12 MG: 3.12 TABLET, FILM COATED ORAL at 16:51

## 2022-09-29 RX ADMIN — OLANZAPINE 5 MG: 5 TABLET, FILM COATED ORAL at 20:30

## 2022-09-29 RX ADMIN — ENOXAPARIN SODIUM 40 MG: 100 INJECTION SUBCUTANEOUS at 07:59

## 2022-09-29 RX ADMIN — THIAMINE HCL TAB 100 MG 100 MG: 100 TAB at 07:59

## 2022-09-29 RX ADMIN — Medication 400 MG: at 07:59

## 2022-09-29 RX ADMIN — OLANZAPINE 5 MG: 5 TABLET, FILM COATED ORAL at 07:59

## 2022-09-29 ASSESSMENT — PAIN SCALES - GENERAL: PAINLEVEL_OUTOF10: 0

## 2022-09-29 NOTE — CARE COORDINATION
Case management is following for discharge planning. The chart was reviewed. Received a message from KEILA with [facility] They are still willing to accept. Unfortunately, the pt had a sitter overnight. He will need to be out of restraints for 24-48h prior to admission to the facility. Electronically signed by MIGNON Causey RN-[facility]  Case Management  [phone]    Addendum [phone] with KEILA at [address] Explained the pt is pleasantly confused to day. He has not had a sitter. ALHAJI stated he would stop by in the morning to reassess.

## 2022-09-29 NOTE — PROGRESS NOTES
Progress Note    Admit Date: 9/1/2022  Day: 18  Diet: ADULT ORAL NUTRITION SUPPLEMENT; Breakfast, Lunch, Dinner; Standard High Calorie/High Protein Oral Supplement  ADULT DIET; Easy to Chew; 3 carb choices (45 gm/meal)    Interval history:   Patient was seen this morning at bedside. No new events overnight however the patient required a sitter. Patient has been preliminarily accepted by SNF, but the patient will will need to be out of restraints for between 24 to 48 hours before he can be transferred there. Case management continues to work on placement.     Medications:     Scheduled Meds:   OLANZapine  5 mg Oral BID    magnesium oxide  400 mg Oral Daily    levETIRAcetam  1,500 mg Oral BID    thiamine mononitrate  100 mg Oral Daily    carvedilol  3.125 mg Oral BID WC    melatonin  5 mg Oral Nightly    enoxaparin  40 mg SubCUTAneous Daily    lidocaine PF  5 mL IntraDERmal Once    sodium chloride flush  5-40 mL IntraVENous 2 times per day     Continuous Infusions:   dextrose      sodium chloride 10 mL/hr at 09/17/22 0637     PRN Meds:OLANZapine, ziprasidone, promethazine, diphenhydrAMINE, labetalol, glucose, dextrose bolus **OR** dextrose bolus, glucagon (rDNA), dextrose, sodium chloride flush, sodium chloride, polyethylene glycol, acetaminophen **OR** acetaminophen    Objective:   Vitals:   T-max:  Patient Vitals for the past 8 hrs:   BP Temp Temp src Pulse Resp SpO2 Weight   09/29/22 0835 -- -- -- 81 18 98 % --   09/29/22 0800 123/63 97.7 °F (36.5 °C) Oral 92 13 96 % --   09/29/22 0759 112/61 -- -- 93 -- -- --   09/29/22 0430 -- -- -- -- -- -- 176 lb 12.9 oz (80.2 kg)   09/29/22 0400 -- -- -- (!) 102 21 -- --   09/29/22 0350 109/75 97.5 °F (36.4 °C) Oral 95 18 96 % --         Intake/Output Summary (Last 24 hours) at 9/29/2022 8196  Last data filed at 9/29/2022 7342  Gross per 24 hour   Intake 720 ml   Output 150 ml   Net 570 ml         Physical Exam  Constitutional:       General: He is not in acute distress. HENT:      Mouth/Throat:      Mouth: Mucous membranes are dry. Eyes:      Pupils: Pupils are equal, round, and reactive to light. Comments: Uncooperative with EOM exam   Cardiovascular:      Rate and Rhythm: Regular rhythm. Tachycardia present. Pulses: Normal pulses. Heart sounds: Normal heart sounds. Pulmonary:      Effort: Pulmonary effort is normal.      Breath sounds: Normal breath sounds. Abdominal:      Tenderness: There is no abdominal tenderness. Musculoskeletal:      Right lower leg: No edema. Left lower leg: No edema. Skin:     Coloration: Skin is not jaundiced. Neurological:      Comments: Oriented to person, but not to place, time or situation. LABS:    CBC:   No results for input(s): WBC, HGB, HCT, PLT, MCV in the last 72 hours. Renal:    Recent Labs     09/26/22  1237      K 3.9      CO2 20*   BUN 19   CREATININE 0.7*   GLUCOSE 141*   CALCIUM 9.7   ANIONGAP 16       Hepatic:   No results for input(s): AST, ALT, BILITOT, BILIDIR, PROT, LABALBU, ALKPHOS in the last 72 hours. Troponin: No results for input(s): TROPONINI in the last 72 hours. BNP: No results for input(s): BNP in the last 72 hours. Lipids: No results for input(s): CHOL, HDL in the last 72 hours. Invalid input(s): LDLCALCU, TRIGLYCERIDE  ABGs:  No results for input(s): PHART, SJO2MTV, PO2ART, AAK2EZA, BEART, THGBART, G7XFCFLM, NAX2ECH in the last 72 hours. INR: No results for input(s): INR in the last 72 hours. Lactate: No results for input(s): LACTATE in the last 72 hours. Cultures:  -----------------------------------------------------------------  RAD:   FL MODIFIED BARIUM SWALLOW W VIDEO   Final Result      No penetration or aspiration. XR CHEST PORTABLE   Final Result   1. Worsened pulmonary edema. 2.  Persistent bibasilar atelectasis.       MRI BRAIN WO CONTRAST   Final Result      Faint diffusion restriction in the right hippocampus with associated FLAIR signal abnormality. This is favored to represent sequelae of seizures, however other infectious/inflammatory etiologies are also possible. Mild atrophy and chronic small vessel ischemic change. Areas of encephalomalacia and gliosis in both cerebral hemispheres with remote hemorrhagic staining from prior insults. IR LUMBAR PUNCTURE FOR DIAGNOSIS   Final Result   . IMPRESSION:   1. Uneventful diagnostic fluoroscopic guided lumbar puncture as the   2. The flow reversed are risk and therefore a closing pressure was obtained at the end of the procedure, 26 cm of water. XR ABDOMEN (KUB) (SINGLE AP VIEW)   Final Result   Findings/Impression:    The tip of the enteric tube terminates in the distal body of the stomach. CT CHEST WO CONTRAST   Final Result      CHEST:      1. Moderate dependent atelectasis bilaterally. ABDOMEN/PELVIS:      1.  No acute abnormality on noncontrast CT of the abdomen and pelvis. 2.  Cholelithiasis. CT ABDOMEN PELVIS WO CONTRAST Additional Contrast? None   Final Result      CHEST:      1. Moderate dependent atelectasis bilaterally. ABDOMEN/PELVIS:      1.  No acute abnormality on noncontrast CT of the abdomen and pelvis. 2.  Cholelithiasis. CTA HEAD NECK W CONTRAST   Final Result      CTA Head:   No acute CTA findings. No hemodynamically significant stenosis or focal aneurysm. No arteriovenous confirmation. CTA Neck:   No acute CTA findings. No hemodynamically significant stenosis or focal aneurysm. CT HEAD WO CONTRAST   Final Result      1. No findings for acute intracranial abnormality. 2.  Age-related atrophy with patchy periventricular white matter changes bilaterally consistent with chronic small vessel ischemia. 3.  Stable low attenuation left frontoparietal lobe consistent with previous insult from known prior intraparenchymal hematoma. Stable right frontoparietal cortical infarct.         These findings were called to the emergency room physician Dr. Gunjan Zelaya on 9/1/2022 at 5:30 p.m. XR CHEST PORTABLE   Final Result   1. No findings for acute cardiopulmonary disease. 2.  ET tube in good position in the mid trachea. Assessment/Plan:     9/25 addendum:  Patient had become agitated requiring bilateral restraints. Nurse had perfect served that he had became uncontrollable and not listening. He was pulling out the rails and not being compliant despite having a sitter and taking his PO meds earlier that day. Patient did not receive any Ativan as it was discontinued by day team.  We added on IM Zyprexa as needed for agitation. This event has been happening daily despite medication increase and changes. Patient has been on as needed Ativan, Seroquel, Zyprexa IM and p.o., and IM Haldol prn. His mentation has been difficult to treat. Psych to see him in the morning to aid us in keeping him more calm and not have to use restraints. Recommendations highly appreciated. Acute metabolic encephalopathy(unclear etiology)  Delirium  On presentation AMS, elevated WBC: 21.7, Tachypneic, Tachycardic, Hypotensive. Hx of ICH, SAH and SDH 2/2 to fall, hx of chronic alcohol abuse,   CT head: prior Intraparenchymal hematoma. Stable right frontoparietal cortical infarct. CT Abdomen, chest: mod atelectasis bilaterally. No acute abnorms on CT abd except stable cholelithiasis   CTA Head: No acute CTA findings. CTA Neck: No acute CTA findings. MRI: Faint diffusion restriction in the right hippocampus with associated FLAIR signal abnormality. This is favored to represent sequelae of seizures. cEEG readings previously: Generalized background abnormalities indicative of an underlying diffuse encephalopathy of non-specific etiology.  Intermittent focal epileptiform discharges, right posterior temporal, conferring increased risk of focal onset seizures from this region.    -Lumbar puncture ordered 9/6/2022: No growth. -Blood Cultures were -ve x2  -On Keppra 1500mg bid  - inpatient psych consulted - no inpatient psych upon discharge warranted - decision remains on psych re-eval  - Palliative consult, able to contact family (multiple siblings), all agreed that sister (present on admission) can make decision for patient. - Zyprexa increased to 10 BID  - PRN Geodon decreased q8>q12. - Monitor QTC - daily EKG     Acute sinus tachycardia  Intermittently tachycardic into the 120s with stable pressures  -Most likely secondary to dehydration and poor p.o. intake with agitation      Alcohol use disorder  Ethanol level: none detected  -thiamine 100 mg daily, changed to PO  -monitor for withdrawal     Acute hypercapnic respiratory failure secondary to possible aspiration vs 2/2 medication (Extubated successfully 9/10)  On presentation: VBG PH: 7.264, PCO2: 56.4, 83.2  - Intubated for 10 days in ICU  - after transfer out of ICU - pt is stable on room air. CAD s/p stent 2020  Hx of Mural thrombus  - in 2020, anterior apical MI with atrial thrombus s/p stenting, pt was put on Eliquis/Plavix. - Eliquis/Plavix stopped due to frequent falls 2/2 chronic alcoholism  - [facility] 09/2021 suggesting acute and chronic ICH/SAH/SDH     Hx of Cirrhosis  -Ammonia: 36 wnl(09/1/22), LFTs WNL.     Hypertension  -Resumed home carvedilol dose 3.125 mg twice daily     Code Status: Full Code  FEN: Easy chew diet, oral nutrition supplement  PPX: Lovenox  DISPO: IP, awaiting SNF placement    Cyrus Antonio MD  PGY1, Internal Medicine  09/29/22  9:23 AM    This patient has been staffed and discussed with Lito Koch MD.

## 2022-09-29 NOTE — PLAN OF CARE
Problem: Discharge Planning  Goal: Discharge to home or other facility with appropriate resources  Outcome: Progressing     Problem: Pain  Goal: Verbalizes/displays adequate comfort level or baseline comfort level  Outcome: Progressing  Flowsheets (Taken 9/28/2022 1600 by Sasha Oates RN)  Verbalizes/displays adequate comfort level or baseline comfort level: Encourage patient to monitor pain and request assistance     Problem: Safety - Adult  Goal: Free from fall injury  Outcome: Progressing     Problem: ABCDS Injury Assessment  Goal: Absence of physical injury  Outcome: Progressing     Problem: Skin/Tissue Integrity  Goal: Absence of new skin breakdown  Description: 1. Monitor for areas of redness and/or skin breakdown  2. Assess vascular access sites hourly  3. Every 4-6 hours minimum:  Change oxygen saturation probe site  4. Every 4-6 hours:  If on nasal continuous positive airway pressure, respiratory therapy assess nares and determine need for appliance change or resting period.   Outcome: Progressing     Problem: Chronic Conditions and Co-morbidities  Goal: Patient's chronic conditions and co-morbidity symptoms are monitored and maintained or improved  Outcome: Progressing     Problem: Nutrition Deficit:  Goal: Optimize nutritional status  Outcome: Progressing     Problem: Respiratory - Adult  Goal: Achieves optimal ventilation and oxygenation  Outcome: Progressing  Flowsheets (Taken 9/28/2022 1600 by Sasha Oates RN)  Achieves optimal ventilation and oxygenation: Assess for changes in respiratory status     Problem: Cardiovascular - Adult  Goal: Maintains optimal cardiac output and hemodynamic stability  Outcome: Progressing  Flowsheets (Taken 9/28/2022 1600 by Sasha Oates RN)  Maintains optimal cardiac output and hemodynamic stability: Monitor blood pressure and heart rate  Goal: Absence of cardiac dysrhythmias or at baseline  Outcome: Progressing  Flowsheets (Taken 9/28/2022 1600 by Mike SANTOS Cherrie Varela RN)  Absence of cardiac dysrhythmias or at baseline: Monitor cardiac rate and rhythm     Problem: Metabolic/Fluid and Electrolytes - Adult  Goal: Electrolytes maintained within normal limits  Outcome: Progressing  Goal: Hemodynamic stability and optimal renal function maintained  Outcome: Progressing

## 2022-09-29 NOTE — BH NOTE
Psychiatry Follow-Up Consultation    Patient Name: Ramesh Sylvester  MRN: [id number]  Admission Date: 9/1/2022    Reason for Consult: Encephalopathy, still unclear etiology    Dx:   Primary Psychiatric (DSM V) Diagnosis: Altered mental status (r/o delirium)  Secondary Psychiatric (DSM V) Diagnoses: none  Chemical Dependency Diagnoses:  History of alcohol abuse     Recommendations: As previously discussed with internal med MD, d/c geodon for agitation as already has zyprexa 5 mg in place. Depakote started at 250 mg BID per internal MD as per our discussion once Lfts came back normal.   Cont the zyprexa 5 mg BID and cont to monitor QTC closely. If out of restrains for remainder of this evening and night-should be able to transfer to SNF tomorrow for further rehabilitation services.   --------------------------------------------------------------------------------------------------------------------------------------------------------------------------------------------------------------------------------------------------------------------------------------    Patient's chart was reviewed, case was discussed with nursing/OT/RT staff, and collaborated with  about the treatment plan. Initial consult completed by Dempsey Ganser, PMHNP-BC on 9/19/2022. At that time pt had been in the ICU for 18 days. Seroquel and zyprexa were initially being given for   Pt's delirium and agitation beginning on 9/8/2022. Medications were changed to haldol 1 mg TID after this initial psychiatric consult on 9/19/2022. Noted to have prolonged QTC on serial EKGs with haldol so was changed back to zyprexa 5 mg BID on 9/21/2022. Due to increased overnight agitation, I increased the dose to 5 mg in the day and 7.5 mg at night beginning on 09/23/2022. The following morning zyprexa was increased to 10 mg BID due to continued agitation and hallucinations.  QTC continued to increase (high 400s) and zyprexa was lowered back to 5 mg BID on 9/26/2022 and psychiatry recommendation was to transfer to inpatient psychiatry for further evaluation and medication management. On 09/28/2022, psychiatric NP met with pt again and he appeared calm, alert x 3 and held an appropriate conversation. Decided at that point that pt did not need inpatient psychiatry but should go to a SNF upon discharge to receive further rehabilitation (OT/PT, speech). Zyprexa 5 mg BID cont. I was briefed by Young Rendon, that saw pt 09/28/2022. We discussed case in detail. Informed me I may get called concerning pt today. Last 24 hours: no major events overnight but required a sitter again. According to internal med MD, Patient has been preliminarily accepted by SNF, but the patient will will need to be out of restraints for between 24 to 48 hours before he can be transferred there. Case management continues to work on placement. Internal Med MD texted me this early afternoon asking for further recommendations concerning pt medications for agitation. Informed me of prolonged QTC and need to find something other than antipsychotic medications that may be helpful for pt's agitation. Pt to be free of restraints 24 to 48 hours prior to d/c for transfer to SNF. Discussed case with Young Rendon again and decided to try depakote 250 mg BID in place of zyprexa if LFTs were WNL. Previous tests were WNL on 09/11/2022 but had not been tested since then and pt has hx of cirrhosis of liver. Informed MD of the above plans and he was in agreement. I went to see pt in his room this afternoon at around 3 pm or so-at that time, pt was noted to be in bed with the covers over him, snoring and sound asleep. He was not in restraints at this time.  I spoke with the RN caring for him and he reported that he had cared for pt for several days and the time in which pt was often more agitated and restless was between 2:30 and 6 pm. Discussed plan with him and he was to obtain blood for repeat LFTs.     ROS: Patient has new complaints: no, see H and P for details    Current Medications Ordered:   OLANZapine  5 mg Oral BID    magnesium oxide  400 mg Oral Daily    levETIRAcetam  1,500 mg Oral BID    thiamine mononitrate  100 mg Oral Daily    carvedilol  3.125 mg Oral BID WC    melatonin  5 mg Oral Nightly    enoxaparin  40 mg SubCUTAneous Daily    lidocaine PF  5 mL IntraDERmal Once    sodium chloride flush  5-40 mL IntraVENous 2 times per day      PRN Meds: OLANZapine, ziprasidone, promethazine, diphenhydrAMINE, labetalol, glucose, dextrose bolus **OR** dextrose bolus, glucagon (rDNA), dextrose, sodium chloride flush, sodium chloride, polyethylene glycol, acetaminophen **OR** acetaminophen     Objective:     PE:    /73   Pulse 77   Temp 97.5 °F (36.4 °C) (Oral)   Resp 17   Ht 5' 10\" (1.778 m)   Wt 176 lb 12.9 oz (80.2 kg)   SpO2 98%   BMI 25.37 kg/m²       Motor / Gait: resting in bed with eyes closed, snoring, deferred    Mental Status Examination:    Appearance: older male well nourished, appears stated age,  laying in bed, wearing hospital gown, adequate grooming and hygiene   Behavior/Attitude Toward Examiner: unable to assess as was asleep when I saw him  Speech: unable to assess as was asleep when I saw him  Mood: unable to assess for above reason  Affect: unable to assess for above reason, but appeared calm, asleep in bed  Thought Processes: unable to assess for above reason  Thought Content: unable to assess for above reason  Perceptions: unable to assess for above reason  Attention: unable to assess for above reason  Cognition: unable to assess for above reason  Insight: unable to assess for above reason   Judgment: unable to assess for above reason    LAB: Reviewed labs from last 24 hours, LFTs completed later this afternoon and WNL     Assessment  Reviewed nursing and ancillary staff notes since arrival to hospital, reviewed previous records and records available through [facility]. Evaluated medications and assessed for side effects and effectiveness. Assessed patient's educational needs including reviewing plan of care, medications, and diagnosis. Today spend  greater than 35 minutes face to face with patient, communicating with members of the tx team, reviewing medical records and documenting within pt's medical record.      MERVIN Jacinto, [facility]  09/29/2022

## 2022-09-29 NOTE — PROGRESS NOTES
Palliative Care Chart Review  and Check in Note:     NAME:  Clara Nixon  Admit Date: 9/1/2022  Hospital Day:  Hospital Day: 34   Current Code status: Limited    Palliative care is continuing to following Mr. Sindi Lin for symptom management, and goals of care discussion as needed. Patient's chart reviewed today 9/29/22. Saw Holly Kumar at the bedside this morning. He was sleeping, shades were closed. Upon opening the shades, pt woke up. He remains pleasantly confused. Delirium precautions should be implemented, shades open, TV on. Pt should be kept awake as much as possible during the day to promote proper sleep/wake cycle. Discussed case with Nelda Graham RN case manager, who continues to seek placement. Pt needs to remain restraint free. The following are the currently established goals/code status, and Symptom management. Goals of care: Continue current medical management.      Code status: DNR/DNI    Discharge plan: TBD - pending placement       MERVIN Sheikh NP  09/29/22  10:50 AM

## 2022-09-30 PROCEDURE — 6370000000 HC RX 637 (ALT 250 FOR IP)

## 2022-09-30 PROCEDURE — 51798 US URINE CAPACITY MEASURE: CPT

## 2022-09-30 PROCEDURE — 6370000000 HC RX 637 (ALT 250 FOR IP): Performed by: INTERNAL MEDICINE

## 2022-09-30 PROCEDURE — 1200000000 HC SEMI PRIVATE

## 2022-09-30 PROCEDURE — 94761 N-INVAS EAR/PLS OXIMETRY MLT: CPT

## 2022-09-30 PROCEDURE — 97116 GAIT TRAINING THERAPY: CPT

## 2022-09-30 PROCEDURE — 97530 THERAPEUTIC ACTIVITIES: CPT

## 2022-09-30 PROCEDURE — 2580000003 HC RX 258

## 2022-09-30 PROCEDURE — 6360000002 HC RX W HCPCS: Performed by: STUDENT IN AN ORGANIZED HEALTH CARE EDUCATION/TRAINING PROGRAM

## 2022-09-30 PROCEDURE — 6370000000 HC RX 637 (ALT 250 FOR IP): Performed by: STUDENT IN AN ORGANIZED HEALTH CARE EDUCATION/TRAINING PROGRAM

## 2022-09-30 PROCEDURE — 2500000003 HC RX 250 WO HCPCS

## 2022-09-30 PROCEDURE — 6370000000 HC RX 637 (ALT 250 FOR IP): Performed by: NURSE PRACTITIONER

## 2022-09-30 RX ADMIN — Medication 5 MG: at 19:45

## 2022-09-30 RX ADMIN — OLANZAPINE 5 MG: 10 INJECTION, POWDER, LYOPHILIZED, FOR SOLUTION INTRAMUSCULAR at 22:29

## 2022-09-30 RX ADMIN — THIAMINE HCL TAB 100 MG 100 MG: 100 TAB at 08:38

## 2022-09-30 RX ADMIN — OLANZAPINE 5 MG: 5 TABLET, FILM COATED ORAL at 08:37

## 2022-09-30 RX ADMIN — ZIPRASIDONE HYDROCHLORIDE 20 MG: 20 CAPSULE ORAL at 23:47

## 2022-09-30 RX ADMIN — DIVALPROEX SODIUM 250 MG: 125 CAPSULE, COATED PELLETS ORAL at 19:58

## 2022-09-30 RX ADMIN — CARVEDILOL 3.12 MG: 3.12 TABLET, FILM COATED ORAL at 08:38

## 2022-09-30 RX ADMIN — CARVEDILOL 3.12 MG: 3.12 TABLET, FILM COATED ORAL at 17:34

## 2022-09-30 RX ADMIN — WATER 10 ML: 1 INJECTION INTRAMUSCULAR; INTRAVENOUS; SUBCUTANEOUS at 22:30

## 2022-09-30 RX ADMIN — DIPHENHYDRAMINE HYDROCHLORIDE 25 MG: 25 TABLET ORAL at 13:36

## 2022-09-30 RX ADMIN — OLANZAPINE 5 MG: 5 TABLET, FILM COATED ORAL at 19:45

## 2022-09-30 RX ADMIN — LEVETIRACETAM 1500 MG: 750 TABLET, FILM COATED ORAL at 08:37

## 2022-09-30 RX ADMIN — DIVALPROEX SODIUM 250 MG: 125 CAPSULE, COATED PELLETS ORAL at 08:46

## 2022-09-30 RX ADMIN — ACETAMINOPHEN 650 MG: 325 TABLET, FILM COATED ORAL at 08:38

## 2022-09-30 RX ADMIN — LEVETIRACETAM 1500 MG: 750 TABLET, FILM COATED ORAL at 19:45

## 2022-09-30 RX ADMIN — Medication 400 MG: at 08:37

## 2022-09-30 RX ADMIN — ENOXAPARIN SODIUM 40 MG: 100 INJECTION SUBCUTANEOUS at 08:38

## 2022-09-30 ASSESSMENT — PAIN SCALES - GENERAL
PAINLEVEL_OUTOF10: 0
PAINLEVEL_OUTOF10: 3
PAINLEVEL_OUTOF10: 0

## 2022-09-30 ASSESSMENT — PAIN DESCRIPTION - LOCATION: LOCATION: GENERALIZED

## 2022-09-30 NOTE — PLAN OF CARE
Problem: Metabolic/Fluid and Electrolytes - Adult  Goal: Electrolytes maintained within normal limits  Outcome: Progressing  Goal: Hemodynamic stability and optimal renal function maintained  Outcome: Progressing     Problem: Cardiovascular - Adult  Goal: Maintains optimal cardiac output and hemodynamic stability  Outcome: Progressing  Goal: Absence of cardiac dysrhythmias or at baseline  Outcome: Progressing      Problem: Respiratory - Adult  Goal: Achieves optimal ventilation and oxygenation  Outcome: Progressing     Problem: Nutrition Deficit:  Goal: Optimize nutritional status  Outcome: Progressing     Problem: Chronic Conditions and Co-morbidities  Goal: Patient's chronic conditions and co-morbidity symptoms are monitored and maintained or improved  9/30/2022 0933 by Anastasiya Lee RN  Outcome: Progressing    Problem: Skin/Tissue Integrity  Goal: Absence of new skin breakdown  Description: 1. Monitor for areas of redness and/or skin breakdown  2. Assess vascular access sites hourly  3. Every 4-6 hours minimum:  Change oxygen saturation probe site  4. Every 4-6 hours:  If on nasal continuous positive airway pressure, respiratory therapy assess nares and determine need for appliance change or resting period.   9/30/2022 0933 by Delisa Hendrix RN  Outcome: Progressing    Problem: ABCDS Injury Assessment  Goal: Absence of physical injury  9/30/2022 0933 by Anastasiya Lee RN  Outcome: Progressing    Problem: Safety - Adult  Goal: Free from fall injury  9/30/2022 0933 by Anastasiya Lee RN  Outcome: Progressing    Problem: Pain  Goal: Verbalizes/displays adequate comfort level or baseline comfort level  Outcome: Progressing     Problem: Discharge Planning  Goal: Discharge to home or other facility with appropriate resources  9/30/2022 0933 by Anastasiya Lee RN  Outcome: Progressing

## 2022-09-30 NOTE — PROGRESS NOTES
Progress Note    Admit Date: 9/1/2022  Day: 18  Diet: ADULT ORAL NUTRITION SUPPLEMENT; Breakfast, Lunch, Dinner; Standard High Calorie/High Protein Oral Supplement  ADULT DIET; Easy to Chew; 3 carb choices (45 gm/meal)    Interval history:   Patient was seen at the bedside this morning. No acute events overnight. Patient was seen by the psychiatry nurse practitioner yesterday who recommended initiating valproic acid 250 mg twice daily for symptomatic agitation. Also recommended discontinuing Geodon as needed as the patient already has Zyprexa as needed available. Plan for SNF placement after having 24 hours without a sitter. That should be this morning to hit that 24-hour goal.  We will continue to work with case management for placement. Otherwise, medically stable to be discharged.     Medications:     Scheduled Meds:   divalproex  250 mg Oral 2 times per day    OLANZapine  5 mg Oral BID    magnesium oxide  400 mg Oral Daily    levETIRAcetam  1,500 mg Oral BID    thiamine mononitrate  100 mg Oral Daily    carvedilol  3.125 mg Oral BID WC    melatonin  5 mg Oral Nightly    enoxaparin  40 mg SubCUTAneous Daily    lidocaine PF  5 mL IntraDERmal Once    sodium chloride flush  5-40 mL IntraVENous 2 times per day     Continuous Infusions:   dextrose      sodium chloride 10 mL/hr at 09/17/22 0637     PRN Meds:OLANZapine, ziprasidone, promethazine, diphenhydrAMINE, labetalol, glucose, dextrose bolus **OR** dextrose bolus, glucagon (rDNA), dextrose, sodium chloride flush, sodium chloride, polyethylene glycol, acetaminophen **OR** acetaminophen    Objective:   Vitals:   T-max:  Patient Vitals for the past 8 hrs:   BP Temp Temp src Pulse Resp SpO2 Weight   09/30/22 0424 -- -- -- -- -- -- 178 lb 5.6 oz (80.9 kg)   09/30/22 0358 107/78 98 °F (36.7 °C) Oral 79 20 100 % --         Intake/Output Summary (Last 24 hours) at 9/30/2022 0819  Last data filed at 9/30/2022 0424  Gross per 24 hour   Intake 960 ml   Output 200 ml Net 760 ml         Physical Exam  Constitutional:       General: He is not in acute distress. HENT:      Mouth/Throat:      Mouth: Mucous membranes are dry. Eyes:      Pupils: Pupils are equal, round, and reactive to light. Comments: Uncooperative with EOM exam   Cardiovascular:      Rate and Rhythm: Regular rhythm. Tachycardia present. Pulses: Normal pulses. Heart sounds: Normal heart sounds. Pulmonary:      Effort: Pulmonary effort is normal.      Breath sounds: Normal breath sounds. Abdominal:      Tenderness: There is no abdominal tenderness. Musculoskeletal:      Right lower leg: No edema. Left lower leg: No edema. Skin:     Coloration: Skin is not jaundiced. Neurological:      Comments: Oriented to person, but not to place, time or situation. LABS:    CBC:   Recent Labs     09/29/22  1540   WBC 7.1   HGB 15.4   HCT 44.3      MCV 92.1       Renal:    Recent Labs     09/29/22  1540      K 4.0   CL 99   CO2 30   BUN 21*   CREATININE 0.8   GLUCOSE 109*   CALCIUM 10.1   ANIONGAP 10       Hepatic:   Recent Labs     09/29/22  1540   AST 27   ALT 25   BILITOT 0.5   BILIDIR <0.2   PROT 7.8   LABALBU 4.2   ALKPHOS 87       Troponin: No results for input(s): TROPONINI in the last 72 hours. BNP: No results for input(s): BNP in the last 72 hours. Lipids: No results for input(s): CHOL, HDL in the last 72 hours. Invalid input(s): LDLCALCU, TRIGLYCERIDE  ABGs:  No results for input(s): PHART, YBM1WVT, PO2ART, KHJ0CJX, BEART, THGBART, E0FVCBBY, GAC0XNT in the last 72 hours. INR: No results for input(s): INR in the last 72 hours. Lactate: No results for input(s): LACTATE in the last 72 hours. Cultures:  -----------------------------------------------------------------  RAD:   FL MODIFIED BARIUM SWALLOW W VIDEO   Final Result      No penetration or aspiration. XR CHEST PORTABLE   Final Result   1. Worsened pulmonary edema.    2.  Persistent bibasilar atelectasis. MRI BRAIN WO CONTRAST   Final Result      Faint diffusion restriction in the right hippocampus with associated FLAIR signal abnormality. This is favored to represent sequelae of seizures, however other infectious/inflammatory etiologies are also possible. Mild atrophy and chronic small vessel ischemic change. Areas of encephalomalacia and gliosis in both cerebral hemispheres with remote hemorrhagic staining from prior insults. IR LUMBAR PUNCTURE FOR DIAGNOSIS   Final Result   . IMPRESSION:   1. Uneventful diagnostic fluoroscopic guided lumbar puncture as the   2. The flow reversed are risk and therefore a closing pressure was obtained at the end of the procedure, 26 cm of water. XR ABDOMEN (KUB) (SINGLE AP VIEW)   Final Result   Findings/Impression:    The tip of the enteric tube terminates in the distal body of the stomach. CT CHEST WO CONTRAST   Final Result      CHEST:      1. Moderate dependent atelectasis bilaterally. ABDOMEN/PELVIS:      1.  No acute abnormality on noncontrast CT of the abdomen and pelvis. 2.  Cholelithiasis. CT ABDOMEN PELVIS WO CONTRAST Additional Contrast? None   Final Result      CHEST:      1. Moderate dependent atelectasis bilaterally. ABDOMEN/PELVIS:      1.  No acute abnormality on noncontrast CT of the abdomen and pelvis. 2.  Cholelithiasis. CTA HEAD NECK W CONTRAST   Final Result      CTA Head:   No acute CTA findings. No hemodynamically significant stenosis or focal aneurysm. No arteriovenous confirmation. CTA Neck:   No acute CTA findings. No hemodynamically significant stenosis or focal aneurysm. CT HEAD WO CONTRAST   Final Result      1. No findings for acute intracranial abnormality. 2.  Age-related atrophy with patchy periventricular white matter changes bilaterally consistent with chronic small vessel ischemia.       3.  Stable low attenuation left frontoparietal lobe consistent with previous insult from known prior intraparenchymal hematoma. Stable right frontoparietal cortical infarct. These findings were called to the emergency room physician Dr. Jose Bolton on 9/1/2022 at 5:30 p.m. XR CHEST PORTABLE   Final Result   1. No findings for acute cardiopulmonary disease. 2.  ET tube in good position in the mid trachea. Assessment/Plan:     Acute metabolic encephalopathy(unclear etiology)  Delirium  On presentation AMS, elevated WBC: 21.7, Tachypneic, Tachycardic, Hypotensive. Hx of ICH, SAH and SDH 2/2 to fall, hx of chronic alcohol abuse,   CT head: prior Intraparenchymal hematoma. Stable right frontoparietal cortical infarct. CT Abdomen, chest: mod atelectasis bilaterally. No acute abnorms on CT abd except stable cholelithiasis   CTA Head: No acute CTA findings. CTA Neck: No acute CTA findings. MRI: Faint diffusion restriction in the right hippocampus with associated FLAIR signal abnormality. This is favored to represent sequelae of seizures. cEEG readings previously: Generalized background abnormalities indicative of an underlying diffuse encephalopathy of non-specific etiology. Intermittent focal epileptiform discharges, right posterior temporal, conferring increased risk of focal onset seizures from this region.    -Lumbar puncture ordered 9/6/2022: No growth. -Blood Cultures were -ve x2  -On Keppra 1500mg bid  - inpatient psych consulted - no inpatient psych upon discharge warranted - decision remains on psych re-eval  - Palliative consult, able to contact family (multiple siblings), all agreed that sister (present on admission) can make decision for patient. - Zyprexa 5 mg BID  - PRN Geodon decreased q8>q12.   - Monitor QTC - daily EKG     Acute sinus tachycardia  Intermittently tachycardic into the 120s with stable pressures  -Most likely secondary to dehydration and poor p.o. intake with agitation      Alcohol use disorder  Ethanol level: none detected  -thiamine 100 mg daily, changed to PO  -monitor for withdrawal     Acute hypercapnic respiratory failure secondary to possible aspiration vs 2/2 medication (Extubated successfully 9/10) - on RA satting well    CAD s/p stent 2020  Hx of Mural thrombus  - in 2020, anterior apical MI with atrial thrombus s/p stenting, pt was put on Eliquis/Plavix. - Eliquis/Plavix stopped due to frequent falls 2/2 chronic alcoholism. [facility] 09/2021 suggesting acute and chronic ICH/SAH/SDH. Last echo in 2021 did not show evidence of thrombus     Hx of Cirrhosis - ammonia: 36 wnl(09/1/22), LFTs WNL. Hypertension -Resumed home carvedilol dose 3.125 mg twice daily     Code Status: Full Code  FEN: Easy chew diet, oral nutrition supplement  PPX: Lovenox  DISPO: IP, awaiting SNF placement    Rosa Emmanuel MD  PGY1, Internal Medicine  09/30/22  8:19 AM    This patient has been staffed and discussed with Allyn Cleaning MD.    I have seen, examined and evaluated the patient as did the resident physician. We have discussed the plan of care and decisions made during that discussion were incorporated into this note. I have reviewed the resident physician's note and agree with the assessment and plan of care as documented.     Medically optimized for discharge  CM working on placement  Residents to sign off 7 AM 10/01/22    Allyn Cleaning MD  Hospitalist  Attending Physician,

## 2022-09-30 NOTE — PROGRESS NOTES
Comprehensive Nutrition Assessment    Type and Reason for Visit:  Reassess    Nutrition Recommendations/Plan:   Due to inadequate intake, discussion of palliative care vs nutrition support should be considered. Consult dietitian if tube feeding is desired and consistent with patient's plan of care. Continue Easy to Chew; 3 carb choice diet  Continue Ensure TID  Monitor nutrition adequacy, pertinent labs, bowel habits, wt changes, and clinical progress     Malnutrition Assessment:  Malnutrition Status:  Severe malnutrition (09/20/22 0910)    Context:  Acute Illness     Findings of the 6 clinical characteristics of malnutrition:  Energy Intake:  50% or less of estimated energy requirements for 5 or more days  Weight Loss:  Greater than 2% over 1 week (6% in 1 week period)     Body Fat Loss:  No significant body fat loss     Muscle Mass Loss:  No significant muscle mass loss    Fluid Accumulation:  Mild      Nutrition Assessment:    Follow up: Pt continues on Easy to Chew; 3 carb choice diet w/ Ensure TID. Pt continues w/ poor intakes. Pt was sleeping during visit, 0% of breakfast consumed. No longer appropriate for inpatient psych as pt is more alert than previsouly. Pt may not be appropriate for feeding tube as he may pull it. Palliative following to discuss plan of care. Restraints removed. Will continue to monitor. Nutrition Related Findings:    . Active bowel sounds. +BM 9/29. Non-pitting edema.  Wound Type:  (scattered abrasions)       Current Nutrition Intake & Therapies:    Average Meal Intake: 26-50%, 1-25%, 0%  Average Supplements Intake: 0%  ADULT ORAL NUTRITION SUPPLEMENT; Breakfast, Lunch, Dinner; Standard High Calorie/High Protein Oral Supplement  ADULT DIET; Easy to Chew; 3 carb choices (45 gm/meal)    Anthropometric Measures:  Height: 5' 10\" (177.8 cm)  Ideal Body Weight (IBW): 166 lbs (75 kg)    Admission Body Weight: 190 lb 14.7 oz (86.6 kg)  Current Body Weight: 183 lb 13.8 oz (83.4 kg), 110.8 % IBW. Weight Source: Bed Scale  Current BMI (kg/m2): 26.4        Weight Adjustment For: No Adjustment                 BMI Categories: Normal Weight (BMI 18.5-24. 9)    Estimated Daily Nutrient Needs:  Energy Requirements Based On: Kcal/kg (20-25 kcal/kg admission wt)  Weight Used for Energy Requirements: Admission (Admission wt 86.8 kg)  Energy (kcal/day): 4527-0017  Weight Used for Protein Requirements: Admission (1.2-2.0 g/kg admission wt (86.8kg))  Protein (g/day): 104-174  Method Used for Fluid Requirements: 1 ml/kcal  Fluid (ml/day): or per MD    Nutrition Diagnosis:   Severe malnutrition related to inadequate protein-energy intake as evidenced by Criteria as identified in malnutrition assessment, intake 0-25%, weight loss greater than or equal to 2% in 1 week    Nutrition Interventions:   Food and/or Nutrient Delivery: Continue Current Diet, Continue Oral Nutrition Supplement  Nutrition Education/Counseling: Education not appropriate  Coordination of Nutrition Care: Continue to monitor while inpatient  Plan of Care discussed with: Pt    Goals:  Previous Goal Met: No Progress toward Goal(s)  Goals: PO intake 50% or greater, prior to discharge       Nutrition Monitoring and Evaluation:   Behavioral-Environmental Outcomes: None Identified  Food/Nutrient Intake Outcomes: Food and Nutrient Intake, Supplement Intake  Physical Signs/Symptoms Outcomes: Biochemical Data, Nutrition Focused Physical Findings, Weight, Meal Time Behavior    Discharge Planning:     Too soon to determine     Saad Collins, [address] [place] [address],   Contact: [phone]

## 2022-09-30 NOTE — PROGRESS NOTES
Physical Therapy  Daily Treatment Note    Discharge Recommendations: Jarek Holley scored a 16/24 on the AM-PAC short mobility form. Current research shows that an AM-PAC score of 17 or less is typically not associated with a discharge to the patient's home setting. Based on the patient's AM-PAC score and their current functional mobility deficits, it is recommended that the patient have 3-5 sessions per week of Physical Therapy at d/c to increase the patient's independence. Please see assessment section for further patient specific details. Assessment:  Pt with improved gait tolerance this session. However, pt needing Min assist for safe ambulation with or without use of wheeled walker due to unsteadiness. Pt with decreased insight and doesn't seem to be aware that he is unsteady. Would benefit from continued IP PT at D/C to maximize strength and function. Equipment Needs: Defer to next level of care    Chart Reviewed: Yes   Restrictions/Precautions: Bed Alarm, Seizure Other position/activity restrictions: transfer pt   Additional Pertinent Hx: 56 y/o M found down unresponsive. Dx of Acute encephalopathy 2/2 seizure. +acute hypoxia requiring intubation with extubation noted on 9/10. Diagnosis: Seizures   Treatment Diagnosis: Dec'd balance & mobility    Subjective: Pt in bed when therapist arrived, attempting to get up. \"You mean I can't even get up and walk around the room by myself? \"  Agreeable to working with therapy after min encouragement. Pain: Denies    Objective:    Transfers  Sit to stand: CGA from bed: CGA from chair  Stand to sit: CGA into chair (twice); CGA onto bed    Ambulation  Assistance Level: Min assist x 1  Assistive device: Wheeled walker  Distance: ~250 ft in [place]. Several brief standing rest breaks.    Quality of gait: Mildly unsteady; veering to L; occasional side stepping/LOB--pt seemingly unaware  Other:  Also ambulated shorter distances in [place] without walker and Min assist x 1    Balance  Sat EOB with SBA  Static stance with wheeled walker CGA  Ambulation with/without wheeled walker Min assist    Patient Education  Role of PT. Importance of calling for assist before getting up. Use of call button. Expressed understanding. Safety Devices  Pt left with needs in reach. Pt refusing to get back to bed. RN OK with pt up in chair with alarm on. Pt agreeable to calling for nurse prior to getting up. RN updated. AM-PAC score  AM-PAC Inpatient Mobility Raw Score : 16  AM-PAC Inpatient T-Scale Score : 40.78  Mobility Inpatient CMS 0-100% Score: 54.16  Mobility Inpatient CMS G-Code Modifier : CK    Goals: (as determined and assessed by primary PT)  Time Frame for Short Term Goals: DC  Short Term Goal 1: Pt will complete functional txfs with S.  Ongoing   Short Term Goal 2: Pt will tolerate ambulation assessment - goal met/revised 9/20: pt will ambulate 20ft with LRAD with min A x1. Partially met 9/27. Revised goal:  Pt will ambulate >100' with LRAD CGA. Goal met/revised 9/28: pt will amb 150ft with LRAD and SBA   Short Term Goal 3: Pt will tolerate stair assessment. Ongoing        Plan:  Plan:  (2-5x/week)  Current Treatment Recommendations: Strengthening, Balance training, Gait training, Equipment evaluation, education, & procurement, Functional mobility training, Stair training, Neuromuscular re-education, Transfer training, Endurance training, Patient/Caregiver education & training, Therapeutic activities    Therapy Time    Individual  Concurrent  Group  Co-treatment    Time In  1102            Time Out  1130            Minutes  28              Timed Code Treatment Minutes: 28  Total Treatment Minutes: 28    Will continue per plan of care. If patient is discharged prior to next treatment, this note will serve as the discharge summary.     [place], [place] #[address]

## 2022-09-30 NOTE — PROGRESS NOTES
Pt remains alert, oriented to self only; follows commands and is cooperative with care at this time. VSS, afebrile, 95% on RA. Pt declined offer to sit in chair or eat breakfast at this time, prefers to rest in bed. Declines offers to assist with toileting and denies needs/wants as of time of note. Bed alarm on, call light and personal belongings within reach. Will monitor.

## 2022-09-30 NOTE — CARE COORDINATION
Case management is following for discharge planning. The chart was reviewed. The psych NP evaluated Mr. Whit Hassan yesterday. Medications were changed. He has been out of restraints and without a sitter for over 24h. He is up walking around his room with therapy this morning. He is pleasant on approach, cooperative, and directable. Placed a call to Jakob Giron in admissions at [facility]. He planned to do another site visit. Hopefully, Mr. Whit Hassan will be a good fit for the facility and pre-cert can be initiated. Waiting to hear back regarding a decision. Electronically signed by MIGNON Kaufman RN-[facility] ACM-RN  Case Management  [phone]    Addendum [id number]  ADELA received a call from Jakob Giron at [facility]. He stated his Director of Nursing is concerned the pt will need a 1:1 and they cannot provide that type of staffing. When Jakob Giron visited earlier in the week, the nurse in the ICU told him the pt will need a 1:1 indefinitely. Explained the pt has not had a sitter since yesterday morning. Also described the observations above. The [facility] DON wants the pt sitter free through the weekend before admission will be considered. ADELA reached out to Scott the liaison with [facility]. Requested a call back regarding referrals to one of the their facilities. Will send [facility] referrals to [facility], [facility] [place], and [facility]. Waiting to hear back from Scott.

## 2022-09-30 NOTE — PROGRESS NOTES
Occupational Therapy  Occupational Therapy  Daily Treatment Note  Patient Name: Leatha Garcia  MRN: [id number]    Assessment:   Pt with decreased deficit awareness climbing over bed rail upon entry. Pt completing mobility to look out window and in hallway with CGA to [address] using RW requiring redirection and safety cues. Pt declining ADLs. Pt would benefit from inpt OT upon discharge. Discharge Recommendations:     Leatha Garcia scored a 17/24 on the AM-PAC ADL Inpatient form. Current research shows that an AM-PAC score of 17 or less is typically not associated with a discharge to the patient's home setting. Based on the patient's AM-PAC score and their current ADL deficits, it is recommended that the patient have 3-5 sessions per week of Occupational Therapy at d/c to increase the patient's independence. Please see assessment section for further patient specific details. If patient discharges prior to next session this note will serve as a discharge summary. Please see below for the latest assessment towards goals. Equipment Needs:      Chart Reviewed: Yes     Restrictions/Precautions: Bed Alarm, Seizure Other position/activity restrictions: transfer pt     Additional Pertinent Hx: 58 y/o M found down unresponsive. Dx of Acute encephalopathy 2/2 seizure. +acute hypoxia requiring intubation with extubation noted on 9/10. Treatment Diagnosis: Impaired ADLs and functional transfers    Subjective:   Pt climbing over bed rail upon entry requiring Vcs for safety. Pt compliant with encouragement and increased time.      Pain:   Denies    Social/Functional History  Lives With: Family  Type of Home: House  Home Layout: Two level, Bed/Bath upstairs  Home Access: Stairs to enter with rails  Entrance Stairs - Number of Steps: 4  Entrance Stairs - Rails: Left  Bathroom Shower/Tub: Tub/Shower unit  Bathroom Equipment:  (none)  Home Equipment:  (none)  Has the patient had two or more falls in the past year or any fall with injury in the past year?: No  ADL Assistance: Independent  Homemaking Assistance: Independent  Homemaking Responsibilities: Yes  Ambulation Assistance: Independent  Transfer Assistance: Independent  Active : Yes  Occupation: Full time employment  Type of Occupation: construction  Additional Comments: Pt is a questionable historian. No family/friends present to assist with social hx. Will update as able. Prior Function  ADL Assistance: Independent  Homemaking Assistance: Independent  Ambulation Assistance: Independent  Transfer Assistance: Independent  Additional Comments: Pt is a questionable historian. No family/friends present to assist with social hx. Will update as able. Objective:    Cognition/Orientation:  Pt with intermitent confusion stating that he has people coming and that the door will lock behind him. Bed mobility   Supine to sit: SBA and Vcs for safety  Scooting: SBA    Functional Mobility   Sit to Stand: CGA from EOB and couch with general cues for safety awarness  Stand to Sit: CGA  Bed to Chair Transfer:  CGA to Min A due to impulsivity and safety  Commode Transfer:decline  Other: Functional mobility demonstrated in room to look out window and down hallway with CGA to Min A with RW. Pt bumping into objects on L side and decreased safety awareness. LE dressing  Pt able to fix socks with SBA using figure 4    Activity Tolerance:  Pt limited by slight resistance to redirection and impulsivity     Patient Education:   General safety and use of call light - pt will need reinforcement       Safety Devices in Place:  Pt left seated in recliner chair with alarm on and call light in reach. RN informed    Second Session Therapy Time:   Individual Concurrent Group Co-treatment   Time In 1102         Time Out 1130         Minutes 28           Timed Code Treatment Minutes:  28    Total Treatment Minutes:       Timed Code Treatment Minutes:    Total Treatment Time: Goals: (as determined and assessed by primary OT)   Short Term Goals  Time Frame for Short Term Goals: Discharge  Short Term Goal 1: Pt to perform bed to chair transfer with CGA. - ongoing  Short Term Goal 2: Pt to perform LB dressing with mod A. -MET 9/20/22-  Short Term Goal 3: Pt to tolerate 3 min static standing activity with CGA for self care tasks. - ongoing  Short Term Goal 4: Lower body dressing with SBA- ongoing         Plan:      Times Per Week: 2-5   Times Per Day: Daily    If patient is discharged prior to next treatment, this note will serve as the discharge summary.       [address] [place] [address], [place], ADILIA/L

## 2022-10-01 PROCEDURE — 6370000000 HC RX 637 (ALT 250 FOR IP)

## 2022-10-01 PROCEDURE — 6370000000 HC RX 637 (ALT 250 FOR IP): Performed by: STUDENT IN AN ORGANIZED HEALTH CARE EDUCATION/TRAINING PROGRAM

## 2022-10-01 PROCEDURE — 6360000002 HC RX W HCPCS: Performed by: STUDENT IN AN ORGANIZED HEALTH CARE EDUCATION/TRAINING PROGRAM

## 2022-10-01 PROCEDURE — 94761 N-INVAS EAR/PLS OXIMETRY MLT: CPT

## 2022-10-01 PROCEDURE — 6370000000 HC RX 637 (ALT 250 FOR IP): Performed by: INTERNAL MEDICINE

## 2022-10-01 PROCEDURE — 1200000000 HC SEMI PRIVATE

## 2022-10-01 RX ORDER — DIVALPROEX SODIUM 125 MG/1
250 CAPSULE, COATED PELLETS ORAL EVERY 12 HOURS SCHEDULED
Qty: 60 CAPSULE | Refills: 3 | Status: SHIPPED | OUTPATIENT
Start: 2022-10-01

## 2022-10-01 RX ADMIN — LEVETIRACETAM 1500 MG: 750 TABLET, FILM COATED ORAL at 21:33

## 2022-10-01 RX ADMIN — Medication 5 MG: at 21:33

## 2022-10-01 RX ADMIN — OLANZAPINE 5 MG: 5 TABLET, FILM COATED ORAL at 21:33

## 2022-10-01 RX ADMIN — CARVEDILOL 3.12 MG: 3.12 TABLET, FILM COATED ORAL at 08:40

## 2022-10-01 RX ADMIN — DIVALPROEX SODIUM 250 MG: 125 CAPSULE, COATED PELLETS ORAL at 09:39

## 2022-10-01 RX ADMIN — DIVALPROEX SODIUM 250 MG: 125 CAPSULE, COATED PELLETS ORAL at 21:34

## 2022-10-01 RX ADMIN — ENOXAPARIN SODIUM 40 MG: 100 INJECTION SUBCUTANEOUS at 09:41

## 2022-10-01 RX ADMIN — Medication 400 MG: at 08:40

## 2022-10-01 RX ADMIN — CARVEDILOL 3.12 MG: 3.12 TABLET, FILM COATED ORAL at 17:11

## 2022-10-01 RX ADMIN — LEVETIRACETAM 1500 MG: 750 TABLET, FILM COATED ORAL at 08:40

## 2022-10-01 RX ADMIN — OLANZAPINE 5 MG: 5 TABLET, FILM COATED ORAL at 08:40

## 2022-10-01 RX ADMIN — THIAMINE HCL TAB 100 MG 100 MG: 100 TAB at 08:40

## 2022-10-01 ASSESSMENT — PAIN SCALES - GENERAL
PAINLEVEL_OUTOF10: 0
PAINLEVEL_OUTOF10: 0

## 2022-10-01 NOTE — CARE COORDINATION
ADELA spoke with Danii Loredo from [facility]. Discussed referrals for skilled nursing facilities. Danii Loredo stated she would reach [facility] in admissions at the referral sites to check bed availability and the decisions regardng clinical review. Wating to hear back.     Electronically signed by Naila Miles, MSN RN-[facility]  Case Management  [phone]

## 2022-10-01 NOTE — PROGRESS NOTES
Progress Note    Admit Date: 9/1/2022  Day: 18  Diet: ADULT ORAL NUTRITION SUPPLEMENT; Breakfast, Lunch, Dinner; Standard High Calorie/High Protein Oral Supplement  ADULT DIET; Easy to Chew; 3 carb choices (45 gm/meal)    Interval history:   Patient was seen at the bedside this morning. No acute events overnight. Patient was seen by the psychiatry nurse practitioner  who recommended initiating valproic acid 250 mg twice daily for symptomatic agitation. Also recommended discontinuing Geodon as needed as the patient already has Zyprexa as needed available. Plan for SNF placement after having 24 hours without a sitter. We will continue to work with case management for placement. Otherwise, medically stable to be discharged.     Medications:     Scheduled Meds:   divalproex  250 mg Oral 2 times per day    OLANZapine  5 mg Oral BID    magnesium oxide  400 mg Oral Daily    levETIRAcetam  1,500 mg Oral BID    thiamine mononitrate  100 mg Oral Daily    carvedilol  3.125 mg Oral BID WC    melatonin  5 mg Oral Nightly    enoxaparin  40 mg SubCUTAneous Daily    lidocaine PF  5 mL IntraDERmal Once    sodium chloride flush  5-40 mL IntraVENous 2 times per day     Continuous Infusions:   dextrose      sodium chloride 10 mL/hr at 09/17/22 0637     PRN Meds:OLANZapine, ziprasidone, promethazine, diphenhydrAMINE, labetalol, glucose, dextrose bolus **OR** dextrose bolus, glucagon (rDNA), dextrose, sodium chloride flush, sodium chloride, polyethylene glycol, acetaminophen **OR** acetaminophen    Objective:   Vitals:   T-max:  Patient Vitals for the past 8 hrs:   BP Temp Temp src Pulse Resp SpO2   10/01/22 1711 (!) 112/96 -- -- 64 -- --   10/01/22 1600 128/83 98.7 °F (37.1 °C) Oral 64 18 98 %   10/01/22 1200 125/64 98.9 °F (37.2 °C) Oral 74 16 --         Intake/Output Summary (Last 24 hours) at 10/1/2022 1831  Last data filed at 10/1/2022 1518  Gross per 24 hour   Intake 480 ml   Output 350 ml   Net 130 ml         Physical Exam  Constitutional:       General: He is not in acute distress. HENT:      Mouth/Throat:      Mouth: Mucous membranes are dry. Eyes:      Pupils: Pupils are equal, round, and reactive to light. Comments: Uncooperative with EOM exam   Cardiovascular:      Rate and Rhythm: Regular rhythm. Tachycardia present. Pulses: Normal pulses. Heart sounds: Normal heart sounds. Pulmonary:      Effort: Pulmonary effort is normal.      Breath sounds: Normal breath sounds. Abdominal:      Tenderness: There is no abdominal tenderness. Musculoskeletal:      Right lower leg: No edema. Left lower leg: No edema. Skin:     Coloration: Skin is not jaundiced. Neurological:      Comments: Oriented to person, but not to place, time or situation. LABS:    CBC:   Recent Labs     09/29/22  1540   WBC 7.1   HGB 15.4   HCT 44.3      MCV 92.1       Renal:    Recent Labs     09/29/22  1540      K 4.0   CL 99   CO2 30   BUN 21*   CREATININE 0.8   GLUCOSE 109*   CALCIUM 10.1   ANIONGAP 10       Hepatic:   Recent Labs     09/29/22  1540   AST 27   ALT 25   BILITOT 0.5   BILIDIR <0.2   PROT 7.8   LABALBU 4.2   ALKPHOS 87       Troponin: No results for input(s): TROPONINI in the last 72 hours. BNP: No results for input(s): BNP in the last 72 hours. Lipids: No results for input(s): CHOL, HDL in the last 72 hours. Invalid input(s): LDLCALCU, TRIGLYCERIDE  ABGs:  No results for input(s): PHART, OWQ4KZH, PO2ART, ODG1ZAA, BEART, THGBART, Z1QPRYJS, DYS7HWM in the last 72 hours. INR: No results for input(s): INR in the last 72 hours. Lactate: No results for input(s): LACTATE in the last 72 hours. Cultures:  -----------------------------------------------------------------  RAD:   FL MODIFIED BARIUM SWALLOW W VIDEO   Final Result      No penetration or aspiration. XR CHEST PORTABLE   Final Result   1. Worsened pulmonary edema. 2.  Persistent bibasilar atelectasis.       MRI BRAIN WO CONTRAST   Final Result      Faint diffusion restriction in the right hippocampus with associated FLAIR signal abnormality. This is favored to represent sequelae of seizures, however other infectious/inflammatory etiologies are also possible. Mild atrophy and chronic small vessel ischemic change. Areas of encephalomalacia and gliosis in both cerebral hemispheres with remote hemorrhagic staining from prior insults. IR LUMBAR PUNCTURE FOR DIAGNOSIS   Final Result   . IMPRESSION:   1. Uneventful diagnostic fluoroscopic guided lumbar puncture as the   2. The flow reversed are risk and therefore a closing pressure was obtained at the end of the procedure, 26 cm of water. XR ABDOMEN (KUB) (SINGLE AP VIEW)   Final Result   Findings/Impression:    The tip of the enteric tube terminates in the distal body of the stomach. CT CHEST WO CONTRAST   Final Result      CHEST:      1. Moderate dependent atelectasis bilaterally. ABDOMEN/PELVIS:      1.  No acute abnormality on noncontrast CT of the abdomen and pelvis. 2.  Cholelithiasis. CT ABDOMEN PELVIS WO CONTRAST Additional Contrast? None   Final Result      CHEST:      1. Moderate dependent atelectasis bilaterally. ABDOMEN/PELVIS:      1.  No acute abnormality on noncontrast CT of the abdomen and pelvis. 2.  Cholelithiasis. CTA HEAD NECK W CONTRAST   Final Result      CTA Head:   No acute CTA findings. No hemodynamically significant stenosis or focal aneurysm. No arteriovenous confirmation. CTA Neck:   No acute CTA findings. No hemodynamically significant stenosis or focal aneurysm. CT HEAD WO CONTRAST   Final Result      1. No findings for acute intracranial abnormality. 2.  Age-related atrophy with patchy periventricular white matter changes bilaterally consistent with chronic small vessel ischemia.       3.  Stable low attenuation left frontoparietal lobe consistent with previous insult from known prior intraparenchymal hematoma. Stable right frontoparietal cortical infarct. These findings were called to the emergency room physician Dr. Carlie Shearer on 9/1/2022 at 5:30 p.m. XR CHEST PORTABLE   Final Result   1. No findings for acute cardiopulmonary disease. 2.  ET tube in good position in the mid trachea. Assessment/Plan:     Acute metabolic encephalopathy(unclear etiology)  Delirium  On presentation AMS, elevated WBC: 21.7, Tachypneic, Tachycardic, Hypotensive. Hx of ICH, SAH and SDH 2/2 to fall, hx of chronic alcohol abuse,   CT head: prior Intraparenchymal hematoma. Stable right frontoparietal cortical infarct. CT Abdomen, chest: mod atelectasis bilaterally. No acute abnorms on CT abd except stable cholelithiasis   CTA Head: No acute CTA findings. CTA Neck: No acute CTA findings. MRI: Faint diffusion restriction in the right hippocampus with associated FLAIR signal abnormality. This is favored to represent sequelae of seizures. cEEG readings previously: Generalized background abnormalities indicative of an underlying diffuse encephalopathy of non-specific etiology. Intermittent focal epileptiform discharges, right posterior temporal, conferring increased risk of focal onset seizures from this region.    -Lumbar puncture ordered 9/6/2022: No growth. -Blood Cultures were -ve x2  -On Keppra 1500mg bid  - inpatient psych consulted - no inpatient psych upon discharge warranted - decision remains on psych re-eval  - Palliative consult, able to contact family (multiple siblings), all agreed that sister (present on admission) can make decision for patient. - Zyprexa 5 mg BID  - PRN Geodon decreased q8>q12.   - Monitor QTC - daily EKG     Acute sinus tachycardia  Intermittently tachycardic into the 120s with stable pressures  -Most likely secondary to dehydration and poor p.o. intake with agitation      Alcohol use disorder  Ethanol level: none detected  -thiamine 100 mg daily, changed to PO  -monitor for withdrawal     Acute hypercapnic respiratory failure secondary to possible aspiration vs 2/2 medication (Extubated successfully 9/10) - on RA satting well    CAD s/p stent 2020  Hx of Mural thrombus  - in 2020, anterior apical MI with atrial thrombus s/p stenting, pt was put on Eliquis/Plavix. - Eliquis/Plavix stopped due to frequent falls 2/2 chronic alcoholism. [facility] 09/2021 suggesting acute and chronic ICH/SAH/SDH. Last echo in 2021 did not show evidence of thrombus     Hx of Cirrhosis - ammonia: 36 wnl(09/1/22), LFTs WNL.     Hypertension -Resumed home carvedilol dose 3.125 mg twice daily     Code Status: Full Code  FEN: Easy chew diet, oral nutrition supplement  PPX: Lovenox  DISPO: IP, awaiting SNF placement    10/01/22  6:31 PM      Medically optimized for discharge  CM working on placement      Natalio Joseph MD  Hospitalist  Attending Physician,

## 2022-10-02 PROCEDURE — 6370000000 HC RX 637 (ALT 250 FOR IP): Performed by: STUDENT IN AN ORGANIZED HEALTH CARE EDUCATION/TRAINING PROGRAM

## 2022-10-02 PROCEDURE — 6370000000 HC RX 637 (ALT 250 FOR IP): Performed by: NURSE PRACTITIONER

## 2022-10-02 PROCEDURE — 6370000000 HC RX 637 (ALT 250 FOR IP)

## 2022-10-02 PROCEDURE — 2500000003 HC RX 250 WO HCPCS

## 2022-10-02 PROCEDURE — 6370000000 HC RX 637 (ALT 250 FOR IP): Performed by: INTERNAL MEDICINE

## 2022-10-02 PROCEDURE — 6360000002 HC RX W HCPCS: Performed by: STUDENT IN AN ORGANIZED HEALTH CARE EDUCATION/TRAINING PROGRAM

## 2022-10-02 PROCEDURE — 1200000000 HC SEMI PRIVATE

## 2022-10-02 PROCEDURE — 94761 N-INVAS EAR/PLS OXIMETRY MLT: CPT

## 2022-10-02 RX ADMIN — CARVEDILOL 3.12 MG: 3.12 TABLET, FILM COATED ORAL at 08:45

## 2022-10-02 RX ADMIN — CARVEDILOL 3.12 MG: 3.12 TABLET, FILM COATED ORAL at 16:56

## 2022-10-02 RX ADMIN — OLANZAPINE 5 MG: 5 TABLET, FILM COATED ORAL at 08:44

## 2022-10-02 RX ADMIN — DIVALPROEX SODIUM 250 MG: 125 CAPSULE, COATED PELLETS ORAL at 08:48

## 2022-10-02 RX ADMIN — LEVETIRACETAM 1500 MG: 750 TABLET, FILM COATED ORAL at 19:59

## 2022-10-02 RX ADMIN — OLANZAPINE 5 MG: 5 TABLET, FILM COATED ORAL at 19:59

## 2022-10-02 RX ADMIN — OLANZAPINE 5 MG: 10 INJECTION, POWDER, LYOPHILIZED, FOR SOLUTION INTRAMUSCULAR at 16:49

## 2022-10-02 RX ADMIN — LEVETIRACETAM 1500 MG: 750 TABLET, FILM COATED ORAL at 08:44

## 2022-10-02 RX ADMIN — ENOXAPARIN SODIUM 40 MG: 100 INJECTION SUBCUTANEOUS at 08:44

## 2022-10-02 RX ADMIN — Medication 5 MG: at 19:59

## 2022-10-02 RX ADMIN — THIAMINE HCL TAB 100 MG 100 MG: 100 TAB at 08:45

## 2022-10-02 RX ADMIN — Medication 400 MG: at 08:44

## 2022-10-02 RX ADMIN — ZIPRASIDONE HYDROCHLORIDE 20 MG: 20 CAPSULE ORAL at 20:00

## 2022-10-02 ASSESSMENT — PAIN SCALES - GENERAL
PAINLEVEL_OUTOF10: 0

## 2022-10-02 NOTE — PLAN OF CARE
Problem: Discharge Planning  Goal: Discharge to home or other facility with appropriate resources  Recent Flowsheet Documentation  Taken 10/2/2022 0947 by Valentino Sciara, RN  Discharge to home or other facility with appropriate resources:   Identify barriers to discharge with patient and caregiver   Arrange for needed discharge resources and transportation as appropriate  Taken 10/2/2022 0101 by Candelaria Camacho RN  Discharge to home or other facility with appropriate resources:   Identify barriers to discharge with patient and caregiver   Arrange for needed discharge resources and transportation as appropriate   Identify discharge learning needs (meds, wound care, etc)   Refer to discharge planning if patient needs post-hospital services based on physician order or complex needs related to functional status, cognitive ability or social support system     Problem: Discharge Planning  Goal: Discharge to home or other facility with appropriate resources  10/2/2022 0947 by Valentino Sciara, RN  Outcome: Progressing  Flowsheets (Taken 10/2/2022 0947)  Discharge to home or other facility with appropriate resources:   Identify barriers to discharge with patient and caregiver   Arrange for needed discharge resources and transportation as appropriate  10/2/2022 0101 by Candelaria Camacho RN  Outcome: Progressing  Flowsheets (Taken 10/2/2022 0101)  Discharge to home or other facility with appropriate resources:   Identify barriers to discharge with patient and caregiver   Arrange for needed discharge resources and transportation as appropriate   Identify discharge learning needs (meds, wound care, etc)   Refer to discharge planning if patient needs post-hospital services based on physician order or complex needs related to functional status, cognitive ability or social support system

## 2022-10-02 NOTE — PROGRESS NOTES
Progress Note    Admit Date: 9/1/2022  Day: 18  Diet: ADULT ORAL NUTRITION SUPPLEMENT; Breakfast, Lunch, Dinner; Standard High Calorie/High Protein Oral Supplement  ADULT DIET; Easy to Chew; 3 carb choices (45 gm/meal)    Interval history:   Patient was seen at the bedside this morning. No acute events overnight. Patient was seen by the psychiatry nurse practitioner  who recommended initiating valproic acid 250 mg twice daily for symptomatic agitation. Also recommended discontinuing Geodon as needed as the patient already has Zyprexa as needed available. Plan for SNF placement after having 24 hours without a sitter. We will continue to work with case management for placement. Otherwise, medically stable to be discharged.     Medications:     Scheduled Meds:   divalproex  250 mg Oral 2 times per day    OLANZapine  5 mg Oral BID    magnesium oxide  400 mg Oral Daily    levETIRAcetam  1,500 mg Oral BID    thiamine mononitrate  100 mg Oral Daily    carvedilol  3.125 mg Oral BID WC    melatonin  5 mg Oral Nightly    enoxaparin  40 mg SubCUTAneous Daily    lidocaine PF  5 mL IntraDERmal Once    sodium chloride flush  5-40 mL IntraVENous 2 times per day     Continuous Infusions:   dextrose      sodium chloride 10 mL/hr at 09/17/22 0637     PRN Meds:OLANZapine, ziprasidone, promethazine, diphenhydrAMINE, labetalol, glucose, dextrose bolus **OR** dextrose bolus, glucagon (rDNA), dextrose, sodium chloride flush, sodium chloride, polyethylene glycol, acetaminophen **OR** acetaminophen    Objective:   Vitals:   T-max:  Patient Vitals for the past 8 hrs:   BP Temp Temp src Pulse Resp SpO2   10/02/22 1502 96/75 98.4 °F (36.9 °C) Axillary 82 16 100 %   10/02/22 1149 106/69 98.2 °F (36.8 °C) Axillary 89 16 100 %         Intake/Output Summary (Last 24 hours) at 10/2/2022 1740  Last data filed at 10/2/2022 1225  Gross per 24 hour   Intake 760 ml   Output 0 ml   Net 760 ml         Physical Exam  Constitutional:       General: He is not in acute distress. HENT:      Mouth/Throat:      Mouth: Mucous membranes are dry. Eyes:      Pupils: Pupils are equal, round, and reactive to light. Comments: Uncooperative with EOM exam   Cardiovascular:      Rate and Rhythm: Regular rhythm. Tachycardia present. Pulses: Normal pulses. Heart sounds: Normal heart sounds. Pulmonary:      Effort: Pulmonary effort is normal.      Breath sounds: Normal breath sounds. Abdominal:      Tenderness: There is no abdominal tenderness. Musculoskeletal:      Right lower leg: No edema. Left lower leg: No edema. Skin:     Coloration: Skin is not jaundiced. Neurological:      Comments: Oriented to person, but not to place, time or situation. LABS:    CBC:   No results for input(s): WBC, HGB, HCT, PLT, MCV in the last 72 hours. Renal:    No results for input(s): NA, K, CL, CO2, BUN, CREATININE, GLUCOSE, CALCIUM, MG, PHOS, ANIONGAP in the last 72 hours. Hepatic:   No results for input(s): AST, ALT, BILITOT, BILIDIR, PROT, LABALBU, ALKPHOS in the last 72 hours. Troponin: No results for input(s): TROPONINI in the last 72 hours. BNP: No results for input(s): BNP in the last 72 hours. Lipids: No results for input(s): CHOL, HDL in the last 72 hours. Invalid input(s): LDLCALCU, TRIGLYCERIDE  ABGs:  No results for input(s): PHART, UWO7JCH, PO2ART, RQW3JAJ, BEART, THGBART, P2SODUOQ, QHP7UHC in the last 72 hours. INR: No results for input(s): INR in the last 72 hours. Lactate: No results for input(s): LACTATE in the last 72 hours. Cultures:  -----------------------------------------------------------------  RAD:   FL MODIFIED BARIUM SWALLOW W VIDEO   Final Result      No penetration or aspiration. XR CHEST PORTABLE   Final Result   1. Worsened pulmonary edema. 2.  Persistent bibasilar atelectasis.       MRI BRAIN WO CONTRAST   Final Result      Faint diffusion restriction in the right hippocampus with associated FLAIR signal abnormality. This is favored to represent sequelae of seizures, however other infectious/inflammatory etiologies are also possible. Mild atrophy and chronic small vessel ischemic change. Areas of encephalomalacia and gliosis in both cerebral hemispheres with remote hemorrhagic staining from prior insults. IR LUMBAR PUNCTURE FOR DIAGNOSIS   Final Result   . IMPRESSION:   1. Uneventful diagnostic fluoroscopic guided lumbar puncture as the   2. The flow reversed are risk and therefore a closing pressure was obtained at the end of the procedure, 26 cm of water. XR ABDOMEN (KUB) (SINGLE AP VIEW)   Final Result   Findings/Impression:    The tip of the enteric tube terminates in the distal body of the stomach. CT CHEST WO CONTRAST   Final Result      CHEST:      1. Moderate dependent atelectasis bilaterally. ABDOMEN/PELVIS:      1.  No acute abnormality on noncontrast CT of the abdomen and pelvis. 2.  Cholelithiasis. CT ABDOMEN PELVIS WO CONTRAST Additional Contrast? None   Final Result      CHEST:      1. Moderate dependent atelectasis bilaterally. ABDOMEN/PELVIS:      1.  No acute abnormality on noncontrast CT of the abdomen and pelvis. 2.  Cholelithiasis. CTA HEAD NECK W CONTRAST   Final Result      CTA Head:   No acute CTA findings. No hemodynamically significant stenosis or focal aneurysm. No arteriovenous confirmation. CTA Neck:   No acute CTA findings. No hemodynamically significant stenosis or focal aneurysm. CT HEAD WO CONTRAST   Final Result      1. No findings for acute intracranial abnormality. 2.  Age-related atrophy with patchy periventricular white matter changes bilaterally consistent with chronic small vessel ischemia. 3.  Stable low attenuation left frontoparietal lobe consistent with previous insult from known prior intraparenchymal hematoma. Stable right frontoparietal cortical infarct.         These findings were called to the emergency room physician Dr. Venu Obregon on 9/1/2022 at 5:30 p.m. XR CHEST PORTABLE   Final Result   1. No findings for acute cardiopulmonary disease. 2.  ET tube in good position in the mid trachea. Assessment/Plan:     Acute metabolic encephalopathy(unclear etiology)  Delirium  On presentation AMS, elevated WBC: 21.7, Tachypneic, Tachycardic, Hypotensive. Hx of ICH, SAH and SDH 2/2 to fall, hx of chronic alcohol abuse,   CT head: prior Intraparenchymal hematoma. Stable right frontoparietal cortical infarct. CT Abdomen, chest: mod atelectasis bilaterally. No acute abnorms on CT abd except stable cholelithiasis   CTA Head: No acute CTA findings. CTA Neck: No acute CTA findings. MRI: Faint diffusion restriction in the right hippocampus with associated FLAIR signal abnormality. This is favored to represent sequelae of seizures. cEEG readings previously: Generalized background abnormalities indicative of an underlying diffuse encephalopathy of non-specific etiology. Intermittent focal epileptiform discharges, right posterior temporal, conferring increased risk of focal onset seizures from this region.    -Lumbar puncture ordered 9/6/2022: No growth. -Blood Cultures were -ve x2  -On Keppra 1500mg bid  - inpatient psych consulted - no inpatient psych upon discharge warranted - decision remains on psych re-eval  - Palliative consult, able to contact family (multiple siblings), all agreed that sister (present on admission) can make decision for patient. - Zyprexa 5 mg BID  - PRN Geodon decreased q8>q12.   - Monitor QTC - daily EKG     Acute sinus tachycardia  Intermittently tachycardic into the 120s with stable pressures  -Most likely secondary to dehydration and poor p.o. intake with agitation      Alcohol use disorder  Ethanol level: none detected  -thiamine 100 mg daily, changed to PO  -monitor for withdrawal     Acute hypercapnic respiratory failure secondary to possible aspiration vs 2/2 medication (Extubated successfully 9/10) - on RA satting well    CAD s/p stent 2020  Hx of Mural thrombus  - in 2020, anterior apical MI with atrial thrombus s/p stenting, pt was put on Eliquis/Plavix. - Eliquis/Plavix stopped due to frequent falls 2/2 chronic alcoholism. [facility] 09/2021 suggesting acute and chronic ICH/SAH/SDH. Last echo in 2021 did not show evidence of thrombus     Hx of Cirrhosis - ammonia: 36 wnl(09/1/22), LFTs WNL.     Hypertension -Resumed home carvedilol dose 3.125 mg twice daily     Code Status: Full Code  FEN: Easy chew diet, oral nutrition supplement  PPX: Lovenox  DISPO: IP, awaiting SNF placement    10/02/22  5:40 PM      Medically optimized for discharge  CM working on placement      Lynsey Sanz MD  Hospitalist  Attending Physician,

## 2022-10-02 NOTE — PLAN OF CARE
Problem: Chronic Conditions and Co-morbidities  Goal: Patient's chronic conditions and co-morbidity symptoms are monitored and maintained or improved  Recent Flowsheet Documentation  Taken 10/2/2022 0101 by William Lopez RN  Care Plan - Patient's Chronic Conditions and Co-Morbidity Symptoms are Monitored and Maintained or Improved:   Monitor and assess patient's chronic conditions and comorbid symptoms for stability, deterioration, or improvement   Collaborate with multidisciplinary team to address chronic and comorbid conditions and prevent exacerbation or deterioration   Update acute care plan with appropriate goals if chronic or comorbid symptoms are exacerbated and prevent overall improvement and discharge     Problem: Safety - Adult  Goal: Free from fall injury  Outcome: Progressing  Flowsheets (Taken 10/2/2022 0102)  Free From Fall Injury: Instruct family/caregiver on patient safety     Problem: Safety - Adult  Goal: Free from fall injury  Recent Flowsheet Documentation  Taken 10/2/2022 0102 by William Lopez RN  Free From Fall Injury: Instruct family/caregiver on patient safety     Problem: Discharge Planning  Goal: Discharge to home or other facility with appropriate resources  Outcome: Progressing  Flowsheets (Taken 10/2/2022 0101)  Discharge to home or other facility with appropriate resources:   Identify barriers to discharge with patient and caregiver   Arrange for needed discharge resources and transportation as appropriate   Identify discharge learning needs (meds, wound care, etc)   Refer to discharge planning if patient needs post-hospital services based on physician order or complex needs related to functional status, cognitive ability or social support system     Problem: Neurosensory - Adult  Goal: Achieves stable or improved neurological status  Outcome: Progressing  Flowsheets (Taken 10/2/2022 0103)  Achieves stable or improved neurological status: Assess for and report changes in neurological status

## 2022-10-03 PROCEDURE — 6370000000 HC RX 637 (ALT 250 FOR IP): Performed by: NURSE PRACTITIONER

## 2022-10-03 PROCEDURE — 6370000000 HC RX 637 (ALT 250 FOR IP)

## 2022-10-03 PROCEDURE — 1200000000 HC SEMI PRIVATE

## 2022-10-03 PROCEDURE — 6370000000 HC RX 637 (ALT 250 FOR IP): Performed by: STUDENT IN AN ORGANIZED HEALTH CARE EDUCATION/TRAINING PROGRAM

## 2022-10-03 PROCEDURE — 6370000000 HC RX 637 (ALT 250 FOR IP): Performed by: INTERNAL MEDICINE

## 2022-10-03 PROCEDURE — 2500000003 HC RX 250 WO HCPCS

## 2022-10-03 PROCEDURE — 6360000002 HC RX W HCPCS: Performed by: STUDENT IN AN ORGANIZED HEALTH CARE EDUCATION/TRAINING PROGRAM

## 2022-10-03 PROCEDURE — 2580000003 HC RX 258

## 2022-10-03 RX ADMIN — THIAMINE HCL TAB 100 MG 100 MG: 100 TAB at 08:49

## 2022-10-03 RX ADMIN — DIVALPROEX SODIUM 250 MG: 125 CAPSULE, COATED PELLETS ORAL at 08:49

## 2022-10-03 RX ADMIN — DIVALPROEX SODIUM 250 MG: 125 CAPSULE, COATED PELLETS ORAL at 20:30

## 2022-10-03 RX ADMIN — ZIPRASIDONE HYDROCHLORIDE 20 MG: 20 CAPSULE ORAL at 12:33

## 2022-10-03 RX ADMIN — LEVETIRACETAM 1500 MG: 750 TABLET, FILM COATED ORAL at 08:48

## 2022-10-03 RX ADMIN — DIPHENHYDRAMINE HYDROCHLORIDE 25 MG: 25 TABLET ORAL at 19:58

## 2022-10-03 RX ADMIN — DIPHENHYDRAMINE HYDROCHLORIDE 25 MG: 25 TABLET ORAL at 02:13

## 2022-10-03 RX ADMIN — ENOXAPARIN SODIUM 40 MG: 100 INJECTION SUBCUTANEOUS at 08:34

## 2022-10-03 RX ADMIN — CARVEDILOL 3.12 MG: 3.12 TABLET, FILM COATED ORAL at 08:48

## 2022-10-03 RX ADMIN — Medication 5 MG: at 19:58

## 2022-10-03 RX ADMIN — WATER 1 ML: 1 INJECTION INTRAMUSCULAR; INTRAVENOUS; SUBCUTANEOUS at 14:00

## 2022-10-03 RX ADMIN — OLANZAPINE 5 MG: 10 INJECTION, POWDER, LYOPHILIZED, FOR SOLUTION INTRAMUSCULAR at 13:47

## 2022-10-03 RX ADMIN — OLANZAPINE 5 MG: 5 TABLET, FILM COATED ORAL at 19:58

## 2022-10-03 RX ADMIN — Medication 400 MG: at 08:49

## 2022-10-03 RX ADMIN — LEVETIRACETAM 1500 MG: 750 TABLET, FILM COATED ORAL at 20:00

## 2022-10-03 RX ADMIN — OLANZAPINE 5 MG: 5 TABLET, FILM COATED ORAL at 08:48

## 2022-10-03 ASSESSMENT — PAIN SCALES - GENERAL
PAINLEVEL_OUTOF10: 0

## 2022-10-03 NOTE — CARE COORDINATION
[place] [facility] [phone]. Discussed the referral for SNF again. She will review and call CCM back. Left a message for Nancy at [address]. Referral given. Waiting to hear back regarding a decision. Electronically signed by Kennedi Eduardo, MSN RN-[facility]  Case Management  [phone]    Addendum [phone] with Fozia from [facility]. [facility] [place] pending a psych diagnosis is added to the hospital problem list. Fozia to start [address] [facility] pre-cert today. A rapid COVID w/in 24h of DC needed.

## 2022-10-03 NOTE — PLAN OF CARE
Problem: Discharge Planning  Goal: Discharge to home or other facility with appropriate resources  Outcome: Progressing  Flowsheets (Taken 10/3/2022 0800)  Discharge to home or other facility with appropriate resources: Identify barriers to discharge with patient and caregiver     Problem: Chronic Conditions and Co-morbidities  Goal: Patient's chronic conditions and co-morbidity symptoms are monitored and maintained or improved  Outcome: Progressing     Problem: Safety - Adult  Goal: Free from fall injury  Outcome: Progressing  Flowsheets (Taken 10/3/2022 0800)  Free From Fall Injury:   Instruct family/caregiver on patient safety   Based on caregiver fall risk screen, instruct family/caregiver to ask for assistance with transferring infant if caregiver noted to have fall risk factors     Problem: Neurosensory - Adult  Goal: Achieves stable or improved neurological status  Outcome: Progressing  Goal: Absence of seizures  Outcome: Progressing  Flowsheets (Taken 10/3/2022 0800)  Absence of seizures: Monitor for seizure activity.   If seizure occurs, document type and location of movements and any associated apnea  Goal: Remains free of injury related to seizures activity  Outcome: Progressing

## 2022-10-03 NOTE — PROGRESS NOTES
Pt becoming increasingly agitated. Pt alert, oriented only to self. 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 Distraction, redirection, and reorientation minimally effective. PRN Geodon given. VSS, afebrile.

## 2022-10-03 NOTE — PROGRESS NOTES
Progress Note    Admit Date: 9/1/2022  Day: 18  Diet: ADULT ORAL NUTRITION SUPPLEMENT; Breakfast, Lunch, Dinner; Standard High Calorie/High Protein Oral Supplement  ADULT DIET; Easy to Chew; 3 carb choices (45 gm/meal)    Interval history:   Patient was seen at the bedside this morning. No acute events overnight. Patient was seen by the psychiatry nurse practitioner  who recommended initiating valproic acid 250 mg twice daily for symptomatic agitation. Also recommended discontinuing Geodon as needed as the patient already has Zyprexa as needed available. Plan for SNF placement after having 24 hours without a sitter which currently passed. We will continue to work with case management for placement. Otherwise, medically stable to be discharged. At the time of my evaluation patient trying to get out of the bed and received 1 dose of Zyprexa.     Medications:     Scheduled Meds:   divalproex  250 mg Oral 2 times per day    OLANZapine  5 mg Oral BID    magnesium oxide  400 mg Oral Daily    levETIRAcetam  1,500 mg Oral BID    thiamine mononitrate  100 mg Oral Daily    carvedilol  3.125 mg Oral BID WC    melatonin  5 mg Oral Nightly    enoxaparin  40 mg SubCUTAneous Daily    lidocaine PF  5 mL IntraDERmal Once    sodium chloride flush  5-40 mL IntraVENous 2 times per day     Continuous Infusions:   dextrose      sodium chloride 10 mL/hr at 09/17/22 0637     PRN Meds:OLANZapine, ziprasidone, promethazine, diphenhydrAMINE, labetalol, glucose, dextrose bolus **OR** dextrose bolus, glucagon (rDNA), dextrose, sodium chloride flush, sodium chloride, polyethylene glycol, acetaminophen **OR** acetaminophen    Objective:   Vitals:   T-max:  Patient Vitals for the past 8 hrs:   BP Temp Temp src Pulse Resp SpO2 Weight   10/03/22 0800 124/74 97.7 °F (36.5 °C) Oral 80 16 100 % --   10/03/22 0618 -- -- -- -- -- -- 179 lb 0.2 oz (81.2 kg)   10/03/22 0426 123/74 98.4 °F (36.9 °C) Oral 84 16 99 % --         Intake/Output Summary (Last 24 hours) at 10/3/2022 0950  Last data filed at 10/3/2022 0587  Gross per 24 hour   Intake 678 ml   Output 400 ml   Net 278 ml         Physical Exam  Constitutional:       General: He is not in acute distress. HENT:      Mouth/Throat:      Mouth: Mucous membranes are dry. Eyes:      Pupils: Pupils are equal, round, and reactive to light. Comments: Uncooperative with EOM exam   Cardiovascular:      Rate and Rhythm: Regular rhythm. Tachycardia present. Pulses: Normal pulses. Heart sounds: Normal heart sounds. Pulmonary:      Effort: Pulmonary effort is normal.      Breath sounds: Normal breath sounds. Abdominal:      Tenderness: There is no abdominal tenderness. Musculoskeletal:      Right lower leg: No edema. Left lower leg: No edema. Skin:     Coloration: Skin is not jaundiced. Neurological:      Comments: Oriented to person, but not to place, time or situation. LABS:    CBC:   No results for input(s): WBC, HGB, HCT, PLT, MCV in the last 72 hours. Renal:    No results for input(s): NA, K, CL, CO2, BUN, CREATININE, GLUCOSE, CALCIUM, MG, PHOS, ANIONGAP in the last 72 hours. Hepatic:   No results for input(s): AST, ALT, BILITOT, BILIDIR, PROT, LABALBU, ALKPHOS in the last 72 hours. Troponin: No results for input(s): TROPONINI in the last 72 hours. BNP: No results for input(s): BNP in the last 72 hours. Lipids: No results for input(s): CHOL, HDL in the last 72 hours. Invalid input(s): LDLCALCU, TRIGLYCERIDE  ABGs:  No results for input(s): PHART, RKA1UNM, PO2ART, RJY5WRD, BEART, THGBART, M3HPCFLJ, YMM8JCH in the last 72 hours. INR: No results for input(s): INR in the last 72 hours. Lactate: No results for input(s): LACTATE in the last 72 hours. Cultures:  -----------------------------------------------------------------  RAD:   FL MODIFIED BARIUM SWALLOW W VIDEO   Final Result      No penetration or aspiration. XR CHEST PORTABLE   Final Result   1. Worsened pulmonary edema. 2.  Persistent bibasilar atelectasis. MRI BRAIN WO CONTRAST   Final Result      Faint diffusion restriction in the right hippocampus with associated FLAIR signal abnormality. This is favored to represent sequelae of seizures, however other infectious/inflammatory etiologies are also possible. Mild atrophy and chronic small vessel ischemic change. Areas of encephalomalacia and gliosis in both cerebral hemispheres with remote hemorrhagic staining from prior insults. IR LUMBAR PUNCTURE FOR DIAGNOSIS   Final Result   . IMPRESSION:   1. Uneventful diagnostic fluoroscopic guided lumbar puncture as the   2. The flow reversed are risk and therefore a closing pressure was obtained at the end of the procedure, 26 cm of water. XR ABDOMEN (KUB) (SINGLE AP VIEW)   Final Result   Findings/Impression:    The tip of the enteric tube terminates in the distal body of the stomach. CT CHEST WO CONTRAST   Final Result      CHEST:      1. Moderate dependent atelectasis bilaterally. ABDOMEN/PELVIS:      1.  No acute abnormality on noncontrast CT of the abdomen and pelvis. 2.  Cholelithiasis. CT ABDOMEN PELVIS WO CONTRAST Additional Contrast? None   Final Result      CHEST:      1. Moderate dependent atelectasis bilaterally. ABDOMEN/PELVIS:      1.  No acute abnormality on noncontrast CT of the abdomen and pelvis. 2.  Cholelithiasis. CTA HEAD NECK W CONTRAST   Final Result      CTA Head:   No acute CTA findings. No hemodynamically significant stenosis or focal aneurysm. No arteriovenous confirmation. CTA Neck:   No acute CTA findings. No hemodynamically significant stenosis or focal aneurysm. CT HEAD WO CONTRAST   Final Result      1. No findings for acute intracranial abnormality. 2.  Age-related atrophy with patchy periventricular white matter changes bilaterally consistent with chronic small vessel ischemia.       3.  Stable low attenuation left frontoparietal lobe consistent with previous insult from known prior intraparenchymal hematoma. Stable right frontoparietal cortical infarct. These findings were called to the emergency room physician Dr. Sandra Delgado on 9/1/2022 at 5:30 p.m. XR CHEST PORTABLE   Final Result   1. No findings for acute cardiopulmonary disease. 2.  ET tube in good position in the mid trachea. Assessment/Plan:     Acute metabolic encephalopathy(unclear etiology)  Delirium  On presentation AMS, elevated WBC: 21.7, Tachypneic, Tachycardic, Hypotensive. Hx of ICH, SAH and SDH 2/2 to fall, hx of chronic alcohol abuse,   CT head: prior Intraparenchymal hematoma. Stable right frontoparietal cortical infarct. CT Abdomen, chest: mod atelectasis bilaterally. No acute abnorms on CT abd except stable cholelithiasis   CTA Head: No acute CTA findings. CTA Neck: No acute CTA findings. MRI: Faint diffusion restriction in the right hippocampus with associated FLAIR signal abnormality. This is favored to represent sequelae of seizures. cEEG readings previously: Generalized background abnormalities indicative of an underlying diffuse encephalopathy of non-specific etiology. Intermittent focal epileptiform discharges, right posterior temporal, conferring increased risk of focal onset seizures from this region.    -Lumbar puncture ordered 9/6/2022: No growth. -Blood Cultures were -ve x2  -On Keppra 1500mg bid  - inpatient psych consulted - no inpatient psych upon discharge warranted - decision remains on psych re-eval  - Palliative consult, able to contact family (multiple siblings), all agreed that sister (present on admission) can make decision for patient. - Zyprexa 5 mg BID  - PRN Geodon decreased q8>q12.   - Monitor QTC - daily EKG     Acute sinus tachycardia  Intermittently tachycardic into the 120s with stable pressures  -Most likely secondary to dehydration and poor p.o. intake with agitation Alcohol use disorder  Ethanol level: none detected  -thiamine 100 mg daily, changed to PO  -monitor for withdrawal     Acute hypercapnic respiratory failure secondary to possible aspiration vs 2/2 medication (Extubated successfully 9/10) - on RA satting well    CAD s/p stent 2020  Hx of Mural thrombus  - in 2020, anterior apical MI with atrial thrombus s/p stenting, pt was put on Eliquis/Plavix. - Eliquis/Plavix stopped due to frequent falls 2/2 chronic alcoholism. [facility] 09/2021 suggesting acute and chronic ICH/SAH/SDH. Last echo in 2021 did not show evidence of thrombus     Hx of Cirrhosis - ammonia: 36 wnl(09/1/22), LFTs WNL.     Hypertension -Resumed home carvedilol dose 3.125 mg twice daily     Code Status: Full Code  FEN: Easy chew diet, oral nutrition supplement  PPX: Lovenox  DISPO: IP, awaiting SNF placement    10/03/22  9:50 AM      Medically optimized for discharge  CM working on placement      Manuel Delgado MD  Hospitalist  Attending Physician,

## 2022-10-04 PROBLEM — F10.97: Status: ACTIVE | Noted: 2022-10-04

## 2022-10-04 PROCEDURE — 97530 THERAPEUTIC ACTIVITIES: CPT

## 2022-10-04 PROCEDURE — 6370000000 HC RX 637 (ALT 250 FOR IP)

## 2022-10-04 PROCEDURE — 99233 SBSQ HOSP IP/OBS HIGH 50: CPT | Performed by: NURSE PRACTITIONER

## 2022-10-04 PROCEDURE — 97116 GAIT TRAINING THERAPY: CPT

## 2022-10-04 PROCEDURE — 6360000002 HC RX W HCPCS: Performed by: STUDENT IN AN ORGANIZED HEALTH CARE EDUCATION/TRAINING PROGRAM

## 2022-10-04 PROCEDURE — 6370000000 HC RX 637 (ALT 250 FOR IP): Performed by: INTERNAL MEDICINE

## 2022-10-04 PROCEDURE — 97535 SELF CARE MNGMENT TRAINING: CPT

## 2022-10-04 PROCEDURE — 6370000000 HC RX 637 (ALT 250 FOR IP): Performed by: STUDENT IN AN ORGANIZED HEALTH CARE EDUCATION/TRAINING PROGRAM

## 2022-10-04 PROCEDURE — 1200000000 HC SEMI PRIVATE

## 2022-10-04 RX ADMIN — Medication 400 MG: at 07:29

## 2022-10-04 RX ADMIN — LEVETIRACETAM 1500 MG: 750 TABLET, FILM COATED ORAL at 20:53

## 2022-10-04 RX ADMIN — LEVETIRACETAM 1500 MG: 750 TABLET, FILM COATED ORAL at 07:29

## 2022-10-04 RX ADMIN — DIVALPROEX SODIUM 250 MG: 125 CAPSULE, COATED PELLETS ORAL at 09:04

## 2022-10-04 RX ADMIN — DIVALPROEX SODIUM 250 MG: 125 CAPSULE, COATED PELLETS ORAL at 20:53

## 2022-10-04 RX ADMIN — CARVEDILOL 3.12 MG: 3.12 TABLET, FILM COATED ORAL at 07:29

## 2022-10-04 RX ADMIN — Medication 5 MG: at 20:53

## 2022-10-04 RX ADMIN — OLANZAPINE 5 MG: 5 TABLET, FILM COATED ORAL at 20:53

## 2022-10-04 RX ADMIN — ENOXAPARIN SODIUM 40 MG: 100 INJECTION SUBCUTANEOUS at 07:29

## 2022-10-04 RX ADMIN — THIAMINE HCL TAB 100 MG 100 MG: 100 TAB at 07:29

## 2022-10-04 RX ADMIN — OLANZAPINE 5 MG: 5 TABLET, FILM COATED ORAL at 07:29

## 2022-10-04 ASSESSMENT — PAIN SCALES - GENERAL: PAINLEVEL_OUTOF10: 0

## 2022-10-04 NOTE — PROGRESS NOTES
Occupational Therapy  Facility/Department: [facility]  Occupational Therapy Treatment    Name: Gigi Islas  : 1960  MRN: [id number]  Date of Service: 10/4/2022    Discharge Recommendations: Gigi Islas scored a 17/24 on the AM-PAC ADL Inpatient form. Current research shows that an AM-PAC score of 17 or less is typically not associated with a discharge to the patient's home setting. Based on the patient's AM-PAC score and their current ADL deficits, it is recommended that the patient have 3-5 sessions per week of Occupational Therapy at d/c to increase the patient's independence. Please see assessment section for further patient specific details. If patient discharges prior to next session this note will serve as a discharge summary. Please see below for the latest assessment towards goals. Patient Diagnosis(es): The primary encounter diagnosis was Acute respiratory failure, unspecified whether with hypoxia or hypercapnia ([address] A diagnosis of Altered mental status, unspecified altered mental status type was also pertinent to this visit. Past Medical History:  has a past medical history of Alcohol abuse, CAD (coronary artery disease), CHF (congestive heart failure) ([address] Essential tremor, HTN (hypertension), Hx of blood clots, Hyperlipidemia, ICH (intracerebral hemorrhage) ([address] Lower extremity cellulitis, and MI (mitral incompetence). Past Surgical History:  has a past surgical history that includes Percutaneous Transluminal Coronary Angio and Upper gastrointestinal endoscopy (N/A, 2020). Treatment Diagnosis: Impaired ADLs and functional transfers      Assessment   Performance deficits / Impairments: Decreased functional mobility ; Decreased safe awareness;Decreased balance;Decreased coordination;Decreased ADL status; Decreased cognition;Decreased posture;Decreased endurance;Decreased strength;Decreased fine motor control  Assessment: Pt continued to be confused at this date, saying nonsensical phrases at times. Currently requiring min A for initiation of bed mobility, min A x1 for sit>stand w/ RW and VC for hand placement, CGA to Martina for functional mobility w/ RW, CGA for toileting in stance. Pt requiring increased verbal cues for all ADL's and mobility/transfers. Pt has decreased insight into deficits, very impulsive. OT recommending cont IP OT at d/c. Cont w/ POC. Treatment Diagnosis: Impaired ADLs and functional transfers  Activity Tolerance  Activity Tolerance: Treatment limited secondary to decreased cognition        Plan   Occupational Therapy Plan  Times Per Week: 2-5  Times Per Day: Once a day  Current Treatment Recommendations: Strengthening, Balance training, Functional mobility training, Endurance training, Neuromuscular re-education, Safety education & training, Equipment evaluation, education, & procurement, Patient/Caregiver education & training, Self-Care / ADL     Restrictions  Restrictions/Precautions  Restrictions/Precautions: Bed Alarm, Seizure  Position Activity Restriction  Other position/activity restrictions: transfer pt    Subjective   General  Chart Reviewed: Yes  Patient assessed for rehabilitation services?: Yes  Family / Caregiver Present: No  Referring Practitioner: Priti Ray DO  Subjective  Subjective: Pt supine in bed, agreeable to therapy session.  Pt reporting no pain  General Comment  Comments: No c/o pain     Social/Functional History  Social/Functional History  Lives With: Family  Type of Home: House  Home Layout: Two level, Bed/Bath upstairs  Home Access: Stairs to enter with rails  Entrance Stairs - Number of Steps: 4  Entrance Stairs - Rails: Left  Bathroom Shower/Tub: Tub/Shower unit  Bathroom Equipment:  (none)  Home Equipment:  (none)  Has the patient had two or more falls in the past year or any fall with injury in the past year?: No  ADL Assistance: [address] [place] [address]: Independent  Homemaking Responsibilities: Yes  Ambulation Assistance: Independent  Transfer Assistance: Independent  Active : Yes  Occupation: Full time employment  Type of Occupation: construction  Additional Comments: Pt is a questionable historian. No family/friends present to assist with social hx. Will update as able. Safety Devices  Type of Devices: Patient at risk for falls; All fall risk precautions in place; Bed alarm in place;Call light within reach;Gait belt;Left in bed  Restraints  Restraints Initially in Place: No  Bed Mobility Training  Bed Mobility Training: Yes  Overall Level of Assistance: Minimum assistance;Contact-guard assistance (Min A for initation of sup>sit, CGA w/ verbal cues for rest of bed mob.)  Supine to Sit: Contact-guard assistance  Sit to Supine: Contact-guard assistance (verbal cues)  Balance  Sitting: Impaired (SBA)  Standing: Impaired (CGA)  Transfer Training  Transfer Training: Yes  Overall Level of Assistance: Minimum assistance;Assist X1;Adaptive equipment (from EOB w/ RW. VC's for hand placement)  Sit to Stand: Adaptive equipment;Minimum assistance;Assist X1  Stand to Sit: Minimum assistance;Assist X1;Adaptive equipment  Gait  Overall Level of Assistance: Contact-guard assistance;Assist X1;Adaptive equipment (Pt completed room/bathroom mobility and walk in hallway w/ RW)        ADL  Grooming: Contact guard assistance;Verbal cueing  Grooming Skilled Clinical Factors: in stance at sink to wash hands after toileting.  Verbal cues for RW management and hand soap (pt attempting to use other objects on sink)  UE Dressing: Verbal cueing;Setup;Stand by assistance  UE Dressing Skilled Clinical Factors: seated EOB to change gown  LE Dressing: Stand by assistance;Maximum assistance  LE Dressing Skilled Clinical Factors: seated EOB: SBA for donning/doffing socks, max A for threading brief through BLE's, pt able to manage brief up w/ Min A-CGA  Toileting: Contact guard assistance  Toileting Skilled Clinical Factors: pushed RW up to toilet. In stance to use bathroom. Pt required verbal cues for RW management, urinated on floor. Activity Tolerance  Activity Tolerance: Treatment limited secondary to decreased cognition  Activity Tolerance Comments: Limited by participation        Vision  Vision: Impaired  Vision Exceptions: Wears glasses for reading  Hearing  Hearing: Within functional limits  Orientation  Overall Orientation Status: Impaired  Orientation Level: Oriented to person;Disoriented to time;Disoriented to situation;Oriented to place                  Education Given To: Patient  Education Provided: Transfer Training;Role of Therapy;Plan of Care;IADL Safety  Education Method: Verbal  Barriers to Learning: Cognition  Education Outcome: Continued education needed       AM-PAC Score        AM-PAC Inpatient Daily Activity Raw Score: 17 (10/04/22 1103)  AM-PAC Inpatient ADL T-Scale Score : 37.26 (10/04/22 1103)  ADL Inpatient CMS 0-100% Score: 50.11 (10/04/22 1103)  ADL Inpatient CMS G-Code Modifier : CK (10/04/22 1103)           Goals  Short Term Goals  Time Frame for Short Term Goals: Discharge  Short Term Goal 1: Pt to perform bed to chair transfer with CGA. - ongoing 10/4  Short Term Goal 2: Pt to perform LB dressing with mod A. - progressing 10/4  Short Term Goal 3: Pt to tolerate 3 min static standing activity with CGA for self care tasks. - ongoing 10/4  Short Term Goal 4: Lower body dressing with SBA- ongoing 10/4  Patient Goals   Patient goals : To go home       Therapy Time   Individual Concurrent Group Co-treatment   Time In 1004         Time Out 1030         Minutes 26         Total Treatment Minutes:  [address], OTS    Therapist was present, directed the patients care, made skilled judgement and was responsible for assessment and treatment of the patient.      JOHN Patricia, OTR/L

## 2022-10-04 NOTE — BH NOTE
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 This is underlying the delirium that he first presented with and is making it more difficult to discern. 2.   May cont the depakote ER but will increase to 500 mg BID at present to see if will further assist with agitation and restlessness. Has only been on depakote for two days. Will need depakote level prior to d/c. Ordered for morning on 10/6/2022 at 0600 before am dose is given. 3. Cont the zyprexa 5 mg BID at present. 4. Awaiting results from ammonia level and another UA to make one final attempt to rule out any medical reason for the delirium symptoms which appear secondary to the patient's dementia. 5. Recommend transfer to a SNF with psychiatric services for further assessment and rehabilitation services. 6. Does not need psychiatric inpatient psychiatric services at this time. Patient's chart was reviewed, case was discussed with nursing/OT/RT staff, and collaborated with  about the treatment plan. Initial consult completed by RONEL Murphy on 9/19/2022. At that time pt had been in the ICU for 18 days. Seroquel and zyprexa were initially being given for   Pt's delirium and agitation beginning on 9/8/2022. Medications were changed to haldol 1 mg TID after this initial psychiatric consult on 9/19/2022. Noted to have prolonged QTC on serial EKGs with haldol so was changed back to zyprexa 5 mg BID on 9/21/2022.  Due to increased overnight agitation, I increased the dose to 5 mg in the day and 7.5 mg at night beginning on 2022. The following morning zyprexa was increased to 10 mg BID due to continued agitation and hallucinations. QTC continued to increase (high 400s) and zyprexa was lowered back to 5 mg BID on 2022 and psychiatry recommendation was to transfer to inpatient psychiatry for further evaluation and medication management. On 2022, psychiatric NP met with pt again and he appeared calm, alert x 3 and held an appropriate conversation. Decided at that point that pt did not need inpatient psychiatry but should go to a SNF upon discharge to receive further rehabilitation (OT/PT, speech). Zyprexa 5 mg BID cont. Pt had been agitated off and on and continued to present with altered mental status. Due to difficulty finding a SNF, 2022, added depakote 250 mg BID to assist with agitation. Again, pt continued to present with altered mental status and increased agitation at times. Pt reported to be actively hallucinating off and on as well. Found to be medically stable on 10/4/2022, but needed more accurate psychiatric diagnosis in order be transferred to SNF. Met with patient in his room. 1:1 sitter at the bedside stating that he was doing his \"usual\" trying to get out of bed. Pt alert to name, but could not recall place, time or situation. Reported that he was \"here\" and trying to do work. Calm but pulling at cord to nurse call light. Does not remember why he is here and when asked about pain he reports that his \"toes hurt. \" When asked if he was drinking alcohol before he states yes. When asked about how much, states that it depended on who you asked. Sister came to visit. She states that pt has lived with her for the past five years. Pt is one of 8 siblings. Sister, Manny Douglass is the oldest of 6, age 68. Pt left home at age 14 and did not return until  for a  of a family member.  Sister reconnected with him there, told him he may come to live with her if ever needed. He had been living in [place] previously. Sister reports that 5 years ago, pt called her again and ask to come and live with her. Reports that pt had moved from [place] [place] to [place] to be with a woman but did not work out. Sister states that pt had been working until recently when he fell about a year ago and hit his head resulting in a brain bleed. Went to rehab and then came home. After he came home sister noticed more drinking but did not know how much. Reports that he has not been himself since then. Found him unresponsive one day and pt ended up in ER on 2022. Since pt has been in hospital sister has cleaned out pt room and car, has found beer cans and empty bottles of hard liquor. Not sure how much he was drinking but was a lot. States their father was an alcoholic, so were two uncles and all  due to alcohol related complications. Also states two of pt's siblings are also alcoholics and have many health issues related to the drinking. Sister states she is not going to be able to care for pt at home and wants to make sure he goes to a good place to be taken care of. Reviewed case with my collaborating MD, Dr. Nadeen Patel and recommended that another UA and ammonia level be checked prior to sending to SNF to rule out any further possible medical concerns for infection to worsen delirium. Last UA  did not show signs of infection, last ammonia level on 2022 36 WNL. Also recommended to increase the depakote to 500 mg BID to see if agitation will decrease. Inpatient psychiatry is still not a viable option at present and pt will gain most benefit from a SNF with psychiatric services available.     ROS: See H and P for details    Current Medications   Scheduled Medications[]Expand by Default    divalproex  250 mg Oral 2 times per day    OLANZapine  5 mg Oral BID    magnesium oxide  400 mg Oral Daily    levETIRAcetam  1,500 mg Oral BID    thiamine mononitrate  100 mg Oral Daily    carvedilol  3.125 mg Oral BID WC    melatonin  5 mg Oral Nightly    enoxaparin  40 mg SubCUTAneous Daily    lidocaine PF  5 mL IntraDERmal Once    sodium chloride flush  5-40 mL IntraVENous 2 times per day                      Code Status: Limited  PRN Meds: OLANZapine, ziprasidone, promethazine, diphenhydrAMINE, labetalol, glucose, dextrose bolus **OR** dextrose bolus, glucagon (rDNA), dextrose, sodium chloride flush, sodium chloride, polyethylene glycol, acetaminophen **OR** acetaminophen     Objective:   PE:    Vitals:    10/04/22 0905 10/04/22 1200 10/04/22 1600 10/04/22 2100   BP:  103/69 117/77 111/78   Pulse:   68 (!) 105   Resp:  16 14 16   Temp:  98.2 °F (36.8 °C) 98 °F (36.7 °C) 97.5 °F (36.4 °C)   TempSrc:  Oral Oral Axillary   SpO2: 100% 98% 100% 100%   Weight:       Height:          Motor / Gait: psychomotor agitation and restlessness, gait deferred lying in bed    Mental Status Examination:    Appearance: alert, awake, thin older male appears stated age,  in bed, wearing hospital attire, adequate grooming and hygiene   Behavior/Attitude Toward Examiner: somewhat cooperative, minimal eye contact, tries to answer questions although begins speaking non-sensically at times and at others answers in somewhat appropriate manner  Speech: Spontaneous, faster rate at times, soft in volume, less mumbled today, a little clearer than last week   Mood: \"pretty good\"-is able to answer how he is today  Affect: labile  Thought Processes: disorganized much of the time, talking about things that do not make any sense  Thought Content: no SI, no HI, voiced, appears to be responding to internal stimuli at times, constantly talks about \"work\"  Perceptions: reported and observed AVH, does appear to be RTIS at times  Attention: poor  Cognition: difficult to fully evaluate as is alert and oriented to name only-moderate neurocognitive issues   Insight: poor insight   Judgment: impaired judgment      LAB: Reviewed labs from last 24 hours      Today, I have spent greater than 35 minutes face to face with pt, speaking with members of the treatment team, reviewing medical records, and documenting within patient's chart    [facility], [address] [place] [address] Drive, [address] [place] [address]  10/4/2022

## 2022-10-04 NOTE — PROGRESS NOTES
Physical Therapy  Facility/Department: [address] [place] [address] ICU  Physical Therapy Daily Treatment    Name: Candice Garsia  : 1960  MRN: [id number]  Date of Service: 10/4/2022    Discharge Recommendations: Candice Garsia scored a 16/24 on the AM-PAC short mobility form. Current research shows that an AM-PAC score of 17 or less is typically not associated with a discharge to the patient's home setting. Based on the patient's AM-PAC score and their current functional mobility deficits, it is recommended that the patient have 3-5 sessions per week of Physical Therapy at d/c to increase the patient's independence. Please see assessment section for further patient specific details. If patient discharges prior to next session this note will serve as a discharge summary. Please see below for the latest assessment towards goals. PT Equipment Recommendations  Other: Defer to next level of care      Patient Diagnosis(es): The primary encounter diagnosis was Acute respiratory failure, unspecified whether with hypoxia or hypercapnia ([address] A diagnosis of Altered mental status, unspecified altered mental status type was also pertinent to this visit. Past Medical History:  has a past medical history of Alcohol abuse, CAD (coronary artery disease), CHF (congestive heart failure) ([address] Essential tremor, HTN (hypertension), Hx of blood clots, Hyperlipidemia, ICH (intracerebral hemorrhage) ([address] Lower extremity cellulitis, and MI (mitral incompetence). Past Surgical History:  has a past surgical history that includes Percutaneous Transluminal Coronary Angio and Upper gastrointestinal endoscopy (N/A, 2020). Assessment   Body Structures, Functions, Activity Limitations Requiring Skilled Therapeutic Intervention: Decreased functional mobility ; Decreased cognition;Decreased coordination;Decreased endurance;Decreased strength;Decreased safe awareness;Decreased balance  Assessment: Pt continues to remain confused during the session needing increase VC for redirecting during the session. Pt continues to be implusive at times and needing eyes on him at all times during the session. Pt needs min A for initation of all bed mobility, unable to follow multiple step commands and need Mod A for scooting EOB. Pt initally refused ambulation but with max encouragment was able to ambulate using the rolling walker for 140' CGA. Pt at times with ambulate has a difficult time with advancing RLE and needs a break in order to advance the leg. Pt continues to be implusive throughout the full session and is a increased risk for falling with limited to no safety awareness. PT continues to rec cont skilled PT in order to maximize mobility and independence before return to home.   Treatment Diagnosis: Dec'd balance & mobility  Therapy Prognosis: Good  Decision Making: Medium Complexity  Barriers to Learning: cognition  Requires PT Follow-Up: Yes  Activity Tolerance  Activity Tolerance: Treatment limited secondary to decreased cognition  Activity Tolerance Comments: Limited by participation     Plan   Physcial Therapy Plan  General Plan:  (2-5x/week)  Current Treatment Recommendations: Strengthening, Balance training, Gait training, Equipment evaluation, education, & procurement, Functional mobility training, Stair training, Neuromuscular re-education, Transfer training, Endurance training, Patient/Caregiver education & training, Therapeutic activities  Safety Devices  Type of Devices: Sitter present, Call light within reach, Nurse notified, Gait belt, Left in bed, Bed alarm in place, All fall risk precautions in place, Patient at risk for falls  Restraints  Restraints Initially in Place: No  Restraints: bilateral wrist restraints     Restrictions  Restrictions/Precautions  Restrictions/Precautions: Bed Alarm, Seizure  Position Activity Restriction  Other position/activity restrictions: transfer pt     Subjective   General  Chart Reviewed: Yes  Patient assessed for rehabilitation services?: Yes  Additional Pertinent Hx: 58 y/o M found down unresponsive. Dx of Acute encephalopathy 2/2 seizure. +acute hypoxia requiring intubation with extubation noted on 9/10. Subjective  Subjective: Pt supine in bed. Agreeable to therapy needing encouragment for functional mobility. Pt denies pain at this time. Continues to be confused during all conversation. Social/Functional History  Social/Functional History  Lives With: Family  Type of Home: House  Home Layout: Two level, Bed/Bath upstairs  Home Access: Stairs to enter with rails  Entrance Stairs - Number of Steps: 4  Entrance Stairs - Rails: Left  Bathroom Shower/Tub: Tub/Shower unit  Bathroom Equipment:  (none)  Home Equipment:  (none)  Has the patient had two or more falls in the past year or any fall with injury in the past year?: No  ADL Assistance: Independent  Homemaking Assistance: Independent  Homemaking Responsibilities: Yes  Ambulation Assistance: Independent  Transfer Assistance: Independent  Active : Yes  Occupation: Full time employment  Type of Occupation: construction  Additional Comments: Pt is a questionable historian. No family/friends present to assist with social hx. Will update as able. Vision/Hearing  Vision  Vision: Impaired  Vision Exceptions: Wears glasses for reading  Hearing  Hearing: Within functional limits    Cognition   Orientation  Overall Orientation Status: Impaired  Orientation Level: Oriented to person;Disoriented to time;Disoriented to situation;Oriented to place     Objective   Heart Rate: 84  Heart Rate Source: Monitor  BP: 123/78  BP Location: Left upper arm  BP Method: Automatic  Patient Position: Semi fowlers  MAP (Calculated): 93  Resp: 14  SpO2: 100 %  O2 Device: None (Room air)                             Bed mobility  Supine to Sit: Minimal assistance;Contact guard assistance (Min A for initation of the movement, CGA for the rest of the motion. Increased VC's for safety. HOB elevated, use of handrails.)  Sit to Supine: Contact guard assistance (HOB flat)  Scooting: Moderate assistance (To EOB, unable to follow commands, trying to sit sideways on the side of the bed.)  Transfers  Sit to Stand: Minimal Assistance (increased verbal and tactile cues for hand placement and commands. Difficulty understanding to push up from the bed.)  Stand to Sit: Minimal Assistance  Ambulation  Surface: Level tile  Device: Rolling Walker  Assistance: Contact guard assistance  Quality of Gait: CGA, unsteady at times, freezing gait with R LE, unable to advance at times needing VC's for reminders, some R veering, no scanning of head, increase VC for directions. Gait Deviations: Staggers; Increased ALEA  Distance: 140ft     Balance  Sitting - Static: Good;- (CGA)  Sitting - Dynamic: Good;- (CGA)  Standing - Static: Fair;+ (min -Mod A due to retropulsion and posterior lean without AD)  Standing - Dynamic: Fair           OutComes Score                                                  AM-PAC Score  AM-PAC Inpatient Mobility Raw Score : 16 (10/04/22 1049)  AM-PAC Inpatient T-Scale Score : 40.78 (10/04/22 1049)  Mobility Inpatient CMS 0-100% Score: 54.16 (10/04/22 1049)  Mobility Inpatient CMS G-Code Modifier : CK (10/04/22 1049)          Tinneti Score       Goals  Short Term Goals  Time Frame for Short Term Goals: DC  Short Term Goal 1: Pt will complete functional txfs with S.  Ongoing  Short Term Goal 2: Pt will tolerate ambulation assessment - goal met/revised 9/20: pt will ambulate 20ft with LRAD with min A x1. Partially met 9/27. Revised goal:  Pt will ambulate >100' with LRAD CGA. Goal met/revised 9/28: pt will amb 150ft with LRAD and SBA  Short Term Goal 3: Pt will tolerate stair assessment. Ongoing  Patient Goals   Patient Goals :  To get phone/speak with sister       Education  Patient Education  Education Given To: Patient  Education Provided: Role of Therapy;Transfer Training  Education Method: Verbal  Barriers to Learning: Cognition  Education Outcome: Continued education needed; Unable to demonstrate understanding; Unable to verbalize      Therapy Time   Individual Concurrent Group Co-treatment   Time In 1004         Time Out 1030         Minutes 26          Timed Code Treatment Minutes:  26 minutes    Total Treatment Minutes:  [address], [place] #[address]

## 2022-10-04 NOTE — CARE COORDINATION
CM received a call from Fozia at [facility] [phone]. Updated PT/OT notes are needed before she can submit pre-cert (last assessment 9/30).     Electronically signed by MIGNON Henley RN-[facility]  Case Management  [phone]

## 2022-10-04 NOTE — PROGRESS NOTES
Progress Note    Admit Date: 9/1/2022  Day: 18  Diet: ADULT ORAL NUTRITION SUPPLEMENT; Breakfast, Lunch, Dinner; Standard High Calorie/High Protein Oral Supplement  ADULT DIET; Easy to Chew; 3 carb choices (45 gm/meal)    Interval history:   Patient was seen at the bedside this morning. No acute events overnight. Patient was seen by the psychiatry nurse practitioner  who recommended initiating valproic acid 250 mg twice daily for symptomatic agitation. Also recommended discontinuing Geodon as needed as the patient already has Zyprexa as needed available. Plan for SNF placement after having 24 hours without a sitter which currently passed. We will continue to work with case management for placement. Otherwise, medically stable to be discharged. He was actively hallucinating and describes things not exists yesterday and asking the nurse weird requests like multiply random number by another one and give the results. He is more sleep this morning.     Medications:     Scheduled Meds:   divalproex  250 mg Oral 2 times per day    OLANZapine  5 mg Oral BID    magnesium oxide  400 mg Oral Daily    levETIRAcetam  1,500 mg Oral BID    thiamine mononitrate  100 mg Oral Daily    carvedilol  3.125 mg Oral BID WC    melatonin  5 mg Oral Nightly    enoxaparin  40 mg SubCUTAneous Daily    lidocaine PF  5 mL IntraDERmal Once    sodium chloride flush  5-40 mL IntraVENous 2 times per day     Continuous Infusions:   dextrose      sodium chloride 10 mL/hr at 09/17/22 0637     PRN Meds:OLANZapine, ziprasidone, promethazine, diphenhydrAMINE, labetalol, glucose, dextrose bolus **OR** dextrose bolus, glucagon (rDNA), dextrose, sodium chloride flush, sodium chloride, polyethylene glycol, acetaminophen **OR** acetaminophen    Objective:   Vitals:   T-max:  Patient Vitals for the past 8 hrs:   BP Temp Temp src Pulse Resp SpO2 Weight   10/04/22 0630 120/81 -- -- -- -- -- --   10/04/22 0600 -- -- -- 92 -- -- 181 lb 10.5 oz (82.4 kg) 10/04/22 0400 125/83 98 °F (36.7 °C) Axillary 88 16 98 % --         Intake/Output Summary (Last 24 hours) at 10/4/2022 0802  Last data filed at 10/3/2022 2349  Gross per 24 hour   Intake --   Output 625 ml   Net -625 ml         Physical Exam  Constitutional:       General: He is not in acute distress. HENT:      Mouth/Throat:      Mouth: Mucous membranes are dry. Eyes:      Pupils: Pupils are equal, round, and reactive to light. Comments: Uncooperative with EOM exam   Cardiovascular:      Rate and Rhythm: Regular rhythm. Tachycardia present. Pulses: Normal pulses. Heart sounds: Normal heart sounds. Pulmonary:      Effort: Pulmonary effort is normal.      Breath sounds: Normal breath sounds. Abdominal:      Tenderness: There is no abdominal tenderness. Musculoskeletal:      Right lower leg: No edema. Left lower leg: No edema. Skin:     Coloration: Skin is not jaundiced. Neurological:      Comments: Oriented to person, but not to place, time or situation. LABS:    CBC:   No results for input(s): WBC, HGB, HCT, PLT, MCV in the last 72 hours. Renal:    No results for input(s): NA, K, CL, CO2, BUN, CREATININE, GLUCOSE, CALCIUM, MG, PHOS, ANIONGAP in the last 72 hours. Hepatic:   No results for input(s): AST, ALT, BILITOT, BILIDIR, PROT, LABALBU, ALKPHOS in the last 72 hours. Troponin: No results for input(s): TROPONINI in the last 72 hours. BNP: No results for input(s): BNP in the last 72 hours. Lipids: No results for input(s): CHOL, HDL in the last 72 hours. Invalid input(s): LDLCALCU, TRIGLYCERIDE  ABGs:  No results for input(s): PHART, LPT3VLL, PO2ART, NLC5KDK, BEART, THGBART, Y7NFJJDE, PJH4GFR in the last 72 hours. INR: No results for input(s): INR in the last 72 hours. Lactate: No results for input(s): LACTATE in the last 72 hours.   Cultures:  -----------------------------------------------------------------  RAD:   FL MODIFIED BARIUM SWALLOW W VIDEO Final Result      No penetration or aspiration. XR CHEST PORTABLE   Final Result   1. Worsened pulmonary edema. 2.  Persistent bibasilar atelectasis. MRI BRAIN WO CONTRAST   Final Result      Faint diffusion restriction in the right hippocampus with associated FLAIR signal abnormality. This is favored to represent sequelae of seizures, however other infectious/inflammatory etiologies are also possible. Mild atrophy and chronic small vessel ischemic change. Areas of encephalomalacia and gliosis in both cerebral hemispheres with remote hemorrhagic staining from prior insults. IR LUMBAR PUNCTURE FOR DIAGNOSIS   Final Result   . IMPRESSION:   1. Uneventful diagnostic fluoroscopic guided lumbar puncture as the   2. The flow reversed are risk and therefore a closing pressure was obtained at the end of the procedure, 26 cm of water. XR ABDOMEN (KUB) (SINGLE AP VIEW)   Final Result   Findings/Impression:    The tip of the enteric tube terminates in the distal body of the stomach. CT CHEST WO CONTRAST   Final Result      CHEST:      1. Moderate dependent atelectasis bilaterally. ABDOMEN/PELVIS:      1.  No acute abnormality on noncontrast CT of the abdomen and pelvis. 2.  Cholelithiasis. CT ABDOMEN PELVIS WO CONTRAST Additional Contrast? None   Final Result      CHEST:      1. Moderate dependent atelectasis bilaterally. ABDOMEN/PELVIS:      1.  No acute abnormality on noncontrast CT of the abdomen and pelvis. 2.  Cholelithiasis. CTA HEAD NECK W CONTRAST   Final Result      CTA Head:   No acute CTA findings. No hemodynamically significant stenosis or focal aneurysm. No arteriovenous confirmation. CTA Neck:   No acute CTA findings. No hemodynamically significant stenosis or focal aneurysm. CT HEAD WO CONTRAST   Final Result      1. No findings for acute intracranial abnormality.       2.  Age-related atrophy with patchy periventricular white matter changes bilaterally consistent with chronic small vessel ischemia. 3.  Stable low attenuation left frontoparietal lobe consistent with previous insult from known prior intraparenchymal hematoma. Stable right frontoparietal cortical infarct. These findings were called to the emergency room physician Dr. Mavis Hernandez on 9/1/2022 at 5:30 p.m. XR CHEST PORTABLE   Final Result   1. No findings for acute cardiopulmonary disease. 2.  ET tube in good position in the mid trachea. Assessment/Plan:     Acute metabolic encephalopathy(unclear etiology)  Delirium  On presentation AMS, elevated WBC: 21.7, Tachypneic, Tachycardic, Hypotensive. Hx of ICH, SAH and SDH 2/2 to fall, hx of chronic alcohol abuse,   CT head: prior Intraparenchymal hematoma. Stable right frontoparietal cortical infarct. CT Abdomen, chest: mod atelectasis bilaterally. No acute abnorms on CT abd except stable cholelithiasis   CTA Head: No acute CTA findings. CTA Neck: No acute CTA findings. MRI: Faint diffusion restriction in the right hippocampus with associated FLAIR signal abnormality. This is favored to represent sequelae of seizures. cEEG readings previously: Generalized background abnormalities indicative of an underlying diffuse encephalopathy of non-specific etiology. Intermittent focal epileptiform discharges, right posterior temporal, conferring increased risk of focal onset seizures from this region.    -Lumbar puncture ordered 9/6/2022: No growth. -Blood Cultures were -ve x2  -On Keppra 1500mg bid  - inpatient psych consulted - no inpatient psych upon discharge warranted - decision remains on psych re-eval  - Palliative consult, able to contact family (multiple siblings), all agreed that sister (present on admission) can make decision for patient. - Zyprexa 5 mg BID  - PRN Geodon decreased q8>q12.   - Monitor QTC - daily EKG     Acute sinus tachycardia  Intermittently tachycardic into the 120s with stable pressures  -Most likely secondary to dehydration and poor p.o. intake with agitation      Alcohol use disorder  Ethanol level: none detected  -thiamine 100 mg daily, changed to PO  -monitor for withdrawal     Acute hypercapnic respiratory failure secondary to possible aspiration vs 2/2 medication (Extubated successfully 9/10) - on RA satting well    CAD s/p stent 2020  Hx of Mural thrombus  - in 2020, anterior apical MI with atrial thrombus s/p stenting, pt was put on Eliquis/Plavix. - Eliquis/Plavix stopped due to frequent falls 2/2 chronic alcoholism. [facility] 09/2021 suggesting acute and chronic ICH/SAH/SDH. Last echo in 2021 did not show evidence of thrombus     Hx of Cirrhosis - ammonia: 36 wnl(09/1/22), LFTs WNL.     Hypertension -Resumed home carvedilol dose 3.125 mg twice daily     Code Status: Full Code  FEN: Easy chew diet, oral nutrition supplement  PPX: Lovenox  DISPO: IP, awaiting SNF placement    Medically optimized for discharge  CM working on placement  Psychiatry reengaged today to provide an official diagnosis to his condition that could assist in placement    Lonza MD Lorrie  Hospitalist  Attending Physician    10/04/22  8:02 AM

## 2022-10-04 NOTE — PROGRESS NOTES
Pt able to get good rest throughout the night. Pt oriented to self only. Fall precautions in place. VSS.

## 2022-10-04 NOTE — PLAN OF CARE
Problem: Safety - Adult  Goal: Free from fall injury  10/4/2022 0929 by Vipul Gutierrez RN  Outcome: Progressing  Note: Pt is a fall risk. Fall risk protocol in place. See Stephanelynne Roman Fall Score. Pt bed is in low position, bed alarm is on, side rails up, fall risk bracelet applied. , non-skid footwear in use. Patient/family educated on fall risk protocol, instructed to call for assistance when needed and belongings are in reach. assistance. Will continue with hourly rounds for po intake, pain needs, toileting and repositioning as needed. Will continue to monitor for needs. Problem: Neurosensory - Adult  Goal: Achieves stable or improved neurological status  10/4/2022 0929 by Vipul Gutierrez RN  Outcome: Progressing  Note: Pt is alert to self. Disoriented to surroundings. Safety precautions in place. Will monitor.

## 2022-10-04 NOTE — PLAN OF CARE
Problem: Discharge Planning  Goal: Discharge to home or other facility with appropriate resources  10/4/2022 0157 by Danish López RN  Outcome: Progressing     Problem: Safety - Adult  Goal: Free from fall injury  10/4/2022 0157 by Danish López RN  Outcome: Progressing     Problem: Neurosensory - Adult  Goal: Achieves stable or improved neurological status  10/4/2022 0157 by Danish López RN  Outcome: Progressing     Problem: Neurosensory - Adult  Goal: Absence of seizures  10/4/2022 0157 by Danish López RN  Outcome: Progressing     Problem: Neurosensory - Adult  Goal: Remains free of injury related to seizures activity  10/4/2022 0157 by Danish López RN  Outcome: Progressing

## 2022-10-04 NOTE — PROGRESS NOTES
Comprehensive Nutrition Assessment    Type and Reason for Visit:  Reassess    Nutrition Recommendations/Plan:   Continue Easy to Chew; 3 carb choice diet  Continue Ensure TID  Monitor nutrition adequacy, pertinent labs, bowel habits, wt changes, and clinical progress     Malnutrition Assessment:  Malnutrition Status:  Severe malnutrition (09/20/22 0910)    Context:  Acute Illness     Findings of the 6 clinical characteristics of malnutrition:  Energy Intake:  50% or less of estimated energy requirements for 5 or more days  Weight Loss:  Greater than 2% over 1 week (6% in 1 week period)     Body Fat Loss:  No significant body fat loss     Muscle Mass Loss:  No significant muscle mass loss    Fluid Accumulation:  Mild      Nutrition Assessment:    Follow up: Pt continues on Easy to Chew; 3 carb choice diet w/ Ensur TID. Intakes have improved, w/ some meals @ 100%. Pt has also started drinking his ONS. Plan for SNF at d/c, precert to be done after PT/OT assessments. Will continue to monitor. Nutrition Related Findings:    . Active bowel sounds. +BM 10/2. Wound Type:  (scattered abrasions)       Current Nutrition Intake & Therapies:    Average Meal Intake: 26-50%, 51-75%, %  Average Supplements Intake: %  ADULT ORAL NUTRITION SUPPLEMENT; Breakfast, Lunch, Dinner; Standard High Calorie/High Protein Oral Supplement  ADULT DIET; Easy to Chew; 3 carb choices (45 gm/meal)    Anthropometric Measures:  Height: 5' 10\" (177.8 cm)  Ideal Body Weight (IBW): 166 lbs (75 kg)    Admission Body Weight: 190 lb 14.7 oz (86.6 kg)  Current Body Weight: 183 lb 13.8 oz (83.4 kg), 110.8 % IBW. Weight Source: Bed Scale  Current BMI (kg/m2): 26.4        Weight Adjustment For: No Adjustment                 BMI Categories: Normal Weight (BMI 18.5-24. 9)    Estimated Daily Nutrient Needs:  Energy Requirements Based On: Kcal/kg (20-25 kcal/kg admission wt)  Weight Used for Energy Requirements: Admission (Admission wt 86.8 kg)  Energy (kcal/day): 2716-4059  Weight Used for Protein Requirements: Admission (1.2-2.0 g/kg admission wt (86.8kg))  Protein (g/day): 104-174  Method Used for Fluid Requirements: 1 ml/kcal  Fluid (ml/day): or per MD    Nutrition Diagnosis:   Severe malnutrition related to inadequate protein-energy intake as evidenced by Criteria as identified in malnutrition assessment, intake 0-25%, weight loss greater than or equal to 2% in 1 week    Nutrition Interventions:   Food and/or Nutrient Delivery: Continue Current Diet, Continue Oral Nutrition Supplement  Nutrition Education/Counseling: Education not appropriate  Coordination of Nutrition Care: Continue to monitor while inpatient  Plan of Care discussed with: Pt    Goals:  Previous Goal Met: Progressing toward Goal(s)  Goals: PO intake 50% or greater, prior to discharge       Nutrition Monitoring and Evaluation:   Behavioral-Environmental Outcomes: None Identified  Food/Nutrient Intake Outcomes: Food and Nutrient Intake, Supplement Intake  Physical Signs/Symptoms Outcomes: Biochemical Data, Nutrition Focused Physical Findings, Weight, Meal Time Behavior, Skin    Discharge Planning:    Continue Oral Nutrition Supplement, Continue current diet     Lizabeth Aquino, [address] [place] [address],   Contact: [phone]

## 2022-10-05 LAB — AMMONIA: 10 UMOL/L (ref 16–60)

## 2022-10-05 PROCEDURE — 82140 ASSAY OF AMMONIA: CPT

## 2022-10-05 PROCEDURE — 36415 COLL VENOUS BLD VENIPUNCTURE: CPT

## 2022-10-05 PROCEDURE — 94761 N-INVAS EAR/PLS OXIMETRY MLT: CPT

## 2022-10-05 PROCEDURE — 6370000000 HC RX 637 (ALT 250 FOR IP): Performed by: INTERNAL MEDICINE

## 2022-10-05 PROCEDURE — 6370000000 HC RX 637 (ALT 250 FOR IP)

## 2022-10-05 PROCEDURE — 6370000000 HC RX 637 (ALT 250 FOR IP): Performed by: STUDENT IN AN ORGANIZED HEALTH CARE EDUCATION/TRAINING PROGRAM

## 2022-10-05 PROCEDURE — 6360000002 HC RX W HCPCS: Performed by: STUDENT IN AN ORGANIZED HEALTH CARE EDUCATION/TRAINING PROGRAM

## 2022-10-05 PROCEDURE — 6370000000 HC RX 637 (ALT 250 FOR IP): Performed by: NURSE PRACTITIONER

## 2022-10-05 PROCEDURE — 1200000000 HC SEMI PRIVATE

## 2022-10-05 PROCEDURE — 2500000003 HC RX 250 WO HCPCS

## 2022-10-05 RX ORDER — DIVALPROEX SODIUM 125 MG/1
500 CAPSULE, COATED PELLETS ORAL EVERY 12 HOURS SCHEDULED
Status: DISCONTINUED | OUTPATIENT
Start: 2022-10-05 | End: 2022-10-06 | Stop reason: HOSPADM

## 2022-10-05 RX ADMIN — OLANZAPINE 5 MG: 10 INJECTION, POWDER, LYOPHILIZED, FOR SOLUTION INTRAMUSCULAR at 05:24

## 2022-10-05 RX ADMIN — LEVETIRACETAM 1500 MG: 750 TABLET, FILM COATED ORAL at 19:39

## 2022-10-05 RX ADMIN — Medication 400 MG: at 08:00

## 2022-10-05 RX ADMIN — DIPHENHYDRAMINE HYDROCHLORIDE 25 MG: 25 TABLET ORAL at 00:48

## 2022-10-05 RX ADMIN — ZIPRASIDONE HYDROCHLORIDE 20 MG: 20 CAPSULE ORAL at 22:43

## 2022-10-05 RX ADMIN — CARVEDILOL 3.12 MG: 3.12 TABLET, FILM COATED ORAL at 08:00

## 2022-10-05 RX ADMIN — CARVEDILOL 3.12 MG: 3.12 TABLET, FILM COATED ORAL at 16:21

## 2022-10-05 RX ADMIN — OLANZAPINE 5 MG: 5 TABLET, FILM COATED ORAL at 08:00

## 2022-10-05 RX ADMIN — LEVETIRACETAM 1500 MG: 750 TABLET, FILM COATED ORAL at 08:00

## 2022-10-05 RX ADMIN — DIVALPROEX SODIUM 500 MG: 125 CAPSULE, COATED PELLETS ORAL at 08:03

## 2022-10-05 RX ADMIN — OLANZAPINE 5 MG: 10 INJECTION, POWDER, LYOPHILIZED, FOR SOLUTION INTRAMUSCULAR at 17:34

## 2022-10-05 RX ADMIN — DIPHENHYDRAMINE HYDROCHLORIDE 25 MG: 25 TABLET ORAL at 21:06

## 2022-10-05 RX ADMIN — THIAMINE HCL TAB 100 MG 100 MG: 100 TAB at 08:00

## 2022-10-05 RX ADMIN — DIVALPROEX SODIUM 500 MG: 125 CAPSULE, COATED PELLETS ORAL at 19:39

## 2022-10-05 RX ADMIN — ENOXAPARIN SODIUM 40 MG: 100 INJECTION SUBCUTANEOUS at 08:00

## 2022-10-05 RX ADMIN — Medication 5 MG: at 19:39

## 2022-10-05 RX ADMIN — OLANZAPINE 5 MG: 5 TABLET, FILM COATED ORAL at 19:39

## 2022-10-05 ASSESSMENT — PAIN SCALES - GENERAL
PAINLEVEL_OUTOF10: 0
PAINLEVEL_OUTOF10: 0

## 2022-10-05 NOTE — PROGRESS NOTES
Pt seen and examined no new issues or events d/w nursing  Seen by psych yesterday  Vitals:    10/05/22 0745 10/05/22 0851 10/05/22 1045 10/05/22 1300   BP: 105/71  93/67 108/76   Pulse: 90   80   Resp: 16   16   Temp: 97.7 °F (36.5 °C)   97.6 °F (36.4 °C)   TempSrc: Oral   Oral   SpO2: 97% 99%  98%   Weight:       Height:          geN: pleasant alert and not in distress  Neck: no jvd  Chest: clear  Cvs: s2s1 no murmurs  Abd: soft nd nt bs normal active  Ext: no edema positive pulses    Ammonia is less than 10    56 yo admitted with acute encephalopathy delirium origin appears uncertain hx of ich and sah w sdh and hx of chronic alcohol abuse. Reviewed prior imaging. CT head: prior Intraparenchymal hematoma. Stable right frontoparietal cortical infarct. CT Abdomen, chest: mod atelectasis bilaterally. No acute abnorms on CT abd except stable cholelithiasis   CTA Head: No acute CTA findings. CTA Neck: No acute CTA findings. MRI: Faint diffusion restriction in the right hippocampus with associated FLAIR signal abnormality. This is favored to represent sequelae of seizures. cEEG readings previously: Generalized background abnormalities indicative of an underlying diffuse encephalopathy of non-specific etiology. Intermittent focal epileptiform discharges, right posterior temporal, conferring increased risk of focal onset seizures from this region. Patient has a hx of cirrhosis and hx of mural thrombus, prior echo did not show a thrombus, currently we are waiting for him to be transfitioned out of the hospital, medically he is stable and ready, I dont see the need for further blood work, however if something is to change, will address.

## 2022-10-05 NOTE — CARE COORDINATION
Case management is following for discharge planning. The chart was reviewed. [facility] has accepted Mr. Otis Stokes for admission. [address] pre-cert started yesterday. Psychiatry saw the pt yesterday. Documented diagnoses by MERVIN Jaimes, [facility]-CNP. 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 Mr. Otis Stokes will need to be without a sitter  >24h prior to discharge.     PT AM-PAC: 16 / 24 per last evaluation on: 10/4    OT AM-PAC: 17 / 24 per last evaluation on: 10/4    Electronically signed by MIGNON Rahman RN-[facility]  Case Management  [phone]

## 2022-10-05 NOTE — PLAN OF CARE
Problem: Discharge Planning  Goal: Discharge to home or other facility with appropriate resources  Outcome: Progressing  Flowsheets (Taken 10/4/2022 2000)  Discharge to home or other facility with appropriate resources: Identify barriers to discharge with patient and caregiver     Problem: Chronic Conditions and Co-morbidities  Goal: Patient's chronic conditions and co-morbidity symptoms are monitored and maintained or improved  Outcome: Progressing  Flowsheets (Taken 10/4/2022 2000)  Care Plan - Patient's Chronic Conditions and Co-Morbidity Symptoms are Monitored and Maintained or Improved: Monitor and assess patient's chronic conditions and comorbid symptoms for stability, deterioration, or improvement     Problem: Safety - Adult  Goal: Free from fall injury  Outcome: Progressing     Problem: Neurosensory - Adult  Goal: Achieves stable or improved neurological status  Outcome: Progressing  Flowsheets (Taken 10/4/2022 2000)  Achieves stable or improved neurological status: Assess for and report changes in neurological status  Goal: Absence of seizures  Outcome: Progressing  Flowsheets (Taken 10/4/2022 2000)  Absence of seizures:   Monitor for seizure activity.   If seizure occurs, document type and location of movements and any associated apnea   If seizure occurs, turn head to side and suction secretions as needed   Administer anticonvulsants as ordered   Support airway/breathing, administer oxygen as needed   Diagnostic studies as ordered  Goal: Remains free of injury related to seizures activity  Outcome: Progressing  Flowsheets (Taken 10/4/2022 2000)  Remains free of injury related to seizure activity:   Maintain airway, patient safety  and administer oxygen as ordered   Monitor patient for seizure activity, document and report duration and description of seizure to Licensed Independent Practitioner   If seizure occurs, turn patient to side and suction secretions as needed   Reorient patient post seizure Problem: Nutrition Deficit:  Goal: Optimize nutritional status  Outcome: Progressing

## 2022-10-06 VITALS
SYSTOLIC BLOOD PRESSURE: 120 MMHG | RESPIRATION RATE: 18 BRPM | DIASTOLIC BLOOD PRESSURE: 70 MMHG | HEART RATE: 80 BPM | OXYGEN SATURATION: 97 % | WEIGHT: 181 LBS | BODY MASS INDEX: 25.91 KG/M2 | TEMPERATURE: 97.5 F | HEIGHT: 70 IN

## 2022-10-06 LAB — SARS-COV-2, NAAT: NOT DETECTED

## 2022-10-06 PROCEDURE — 6370000000 HC RX 637 (ALT 250 FOR IP): Performed by: INTERNAL MEDICINE

## 2022-10-06 PROCEDURE — 36415 COLL VENOUS BLD VENIPUNCTURE: CPT

## 2022-10-06 PROCEDURE — 6370000000 HC RX 637 (ALT 250 FOR IP)

## 2022-10-06 PROCEDURE — 87635 SARS-COV-2 COVID-19 AMP PRB: CPT

## 2022-10-06 PROCEDURE — 6370000000 HC RX 637 (ALT 250 FOR IP): Performed by: NURSE PRACTITIONER

## 2022-10-06 PROCEDURE — 6360000002 HC RX W HCPCS: Performed by: STUDENT IN AN ORGANIZED HEALTH CARE EDUCATION/TRAINING PROGRAM

## 2022-10-06 PROCEDURE — 6370000000 HC RX 637 (ALT 250 FOR IP): Performed by: STUDENT IN AN ORGANIZED HEALTH CARE EDUCATION/TRAINING PROGRAM

## 2022-10-06 RX ADMIN — ENOXAPARIN SODIUM 40 MG: 100 INJECTION SUBCUTANEOUS at 08:50

## 2022-10-06 RX ADMIN — THIAMINE HCL TAB 100 MG 100 MG: 100 TAB at 08:49

## 2022-10-06 RX ADMIN — Medication 400 MG: at 08:49

## 2022-10-06 RX ADMIN — LEVETIRACETAM 1500 MG: 750 TABLET, FILM COATED ORAL at 08:49

## 2022-10-06 RX ADMIN — DIVALPROEX SODIUM 500 MG: 125 CAPSULE, COATED PELLETS ORAL at 08:56

## 2022-10-06 RX ADMIN — CARVEDILOL 3.12 MG: 3.12 TABLET, FILM COATED ORAL at 08:49

## 2022-10-06 RX ADMIN — OLANZAPINE 5 MG: 5 TABLET, FILM COATED ORAL at 08:50

## 2022-10-06 NOTE — PROGRESS NOTES
Attempted to call facility to give report. RN unable to take report at this time. Name and number given. Will attempt again if we don't hear from her before transport arrives.

## 2022-10-06 NOTE — DISCHARGE SUMMARY
Discharge Summary    Name:  Gigi Islas /Age/Sex: 1960  (57 y.o. male)   MRN & CSN:  [id number] & [id number] Admission Date/Time: 2022  4:30 PM   Attending:  Hillary Hamman, MD Discharging Physician: Hillary Hamman, MD     Hospital Course:   Gigi Islas is a 57 y.o.  male  who presents with Seizure [facility]    The patient is a 73-year-old man with past medical history significant for chronic alcoholism, ICH, CAD, DVT who presented today to the ED with an episode of syncope. The history was mainly obtained by the sister since patient was intubated. He was last known well at 9 AM but the sister. According to the sister, she had just returned home from work which she had found him half lying down on the couch covered in the stool while his car outside was running outside with occasion. She denies any complaints of recent fever, chills, chest pain cough, shortness of breath, lightheadedness, palpitations, nausea, vomiting, abdominal pain, diarrhea, fatigue, visual disturbance, changes in urination frequency or burning pain well urination or headaches by the patient in the preceding days to this incident. She does reinstate that her brother is a chronic alcoholic and had just gotten out of rehab. He has been sober since however she was not aware if he had recently had any alcoholic drink. He had also informed that he had recent episode of intracerebral hemorrhage on 2021. In the ED, there was a concern for possibility of a stroke however he was not considered a thrombolytic candidate because of his previous ICH and long time since his last known well. According to the ED staff his physical exam was more pertinent for nonfocal delirium. It was also significant for nystagmus and rightward gaze not fixed. He was given 2 mg of Versed and it had worsened his oxygen saturations that had dropped to 88% on a nonrebreather and therefore he was intubated.   CT Abdomen, chest: Moderate dependent atelectasis bilaterally. No acute abnormality on noncontrast CT of the abdomen and pelvis. Cholelithiasis. CTA Head: No acute CTA findings. No hemodynamically significant stenosis or focal aneurysm. No arteriovenous confirmation. CTA Neck: No acute CTA findings. No hemodynamically significant stenosis or focal aneurysm. Patient was admitted to the ICU for further workup and management of his possible meningitis, on MV. After the patient no longer required ICU level of care he was transition to care by the hospital medicine team.  The hospital medicine team continue to treat his occasional outbursts and worked on placement. Patient was also seen by a psychiatry nurse practitioner who recommended inially inpatient psychiatric placement as all reversible causes of encephalopathy and delirium had been ruled out via the medical team.     Day of discharge: On the day of discharge, the patient was determined to be medically cleared to go to a psychiatric facility if needed. Although, if that psychiatric facility will not accept the patient, case management working and is able to find a SNF placement for the patient. His agitation has been well controlled and the patient has not required restraints for the last 48 hours. There has been no improvement in his cognition over the last 3+ weeks. Acute metabolic encephalopathy(unclear etiology)  Delirium  On presentation AMS, elevated WBC: 21.7, Tachypneic, Tachycardic, Hypotensive. Hx of ICH, SAH and SDH 2/2 to fall, hx of chronic alcohol abuse,   CT head: prior Intraparenchymal hematoma. Stable right frontoparietal cortical infarct. CT Abdomen, chest: mod atelectasis bilaterally. No acute abnorms on CT abd except stable cholelithiasis   CTA Head: No acute CTA findings. CTA Neck: No acute CTA findings. MRI: Faint diffusion restriction in the right hippocampus with associated FLAIR signal abnormality.  This is favored to represent sequelae of seizures. cEEG readings previously: Generalized background abnormalities indicative of an underlying diffuse encephalopathy of non-specific etiology. Intermittent focal epileptiform discharges, right posterior temporal, conferring increased risk of focal onset seizures from this region.     -Lumbar puncture ordered 9/6/2022: No growth. -Blood Cultures were -ve x2  -On Keppra 1500mg bid  - inpatient psych consulted - no inpatient psych upon discharge warranted - decision remains on psych re-eval  - Palliative consult, able to contact family (multiple siblings), all agreed that sister (present on admission) can make decision for patient. - Zyprexa 5 mg BID  - PRN Geodon decreased q8>q12. - Monitor QTC - daily EKG      Acute sinus tachycardia  Intermittently tachycardic into the 120s with stable pressures  -Most likely secondary to dehydration and poor p.o. intake with agitation   Resolved      Alcohol use disorder  Ethanol level: none detected  -thiamine 100 mg daily, changed to PO       Acute hypercapnic respiratory failure secondary to possible aspiration vs 2/2 medication (Extubated successfully 9/10) - on RA satting well     CAD s/p stent 2020  Hx of Mural thrombus  - in 2020, anterior apical MI with atrial thrombus s/p stenting, pt was put on Eliquis/Plavix. - Eliquis/Plavix stopped due to frequent falls 2/2 chronic alcoholism. [facility] 09/2021 suggesting acute and chronic ICH/SAH/SDH. Last echo in 2021 did not show evidence of thrombus     Hx of Cirrhosis - ammonia: 36 wnl(09/1/22), LFTs WNL. Hypertension -Resumed home carvedilol dose 3.125 mg twice daily      Code Status: Full Code  FEN: Easy chew diet, oral nutrition supplement  DISPO: IP, awaiting SNF placement          The patient expressed appropriate understanding of and agreement with the discharge recommendations, medications, and plan.      Consults this admission:  IP CONSULT TO CRITICAL CARE  IP CONSULT TO HOSPITALIST  IP CONSULT TO CARDIOLOGY  IP CONSULT TO NEUROLOGY  PHARMACY TO DOSE VANCOMYCIN  IP CONSULT TO INTERVENTIONAL RADIOLOGY  IP CONSULT TO DIETITIAN  IP CONSULT TO PALLIATIVE CARE  IP CONSULT TO PSYCHIATRY  IP CONSULT TO PSYCHIATRY    Discharge Instruction:   Follow up appointments:   Primary care physician:  within 2 weeks    Diet:  easy to chew   Activity: activity as tolerated  Disposition: Discharged to:   []Home, [][facility], [x]SNF, []Acute Rehab, []Hospice   Condition on discharge: Stable    Discharge Medications:        Medication List        START taking these medications      divalproex 125 MG capsule  Commonly known as: DEPAKOTE SPRINKLE  Take 2 capsules by mouth every 12 hours     levETIRAcetam 750 MG tablet  Commonly known as: KEPPRA  Take 2 tablets by mouth 2 times daily            CONTINUE taking these medications      aspirin 81 MG EC tablet     atorvastatin 40 MG tablet  Commonly known as: LIPITOR  Take 1 tablet by mouth in the morning. B1 100 MG Tabs  Take 100 mg by mouth daily     carvedilol 3.125 MG tablet  Commonly known as: COREG  Take 1 tablet by mouth in the morning and 1 tablet before bedtime. folic acid 1 MG tablet  Commonly known as: FOLVITE  Take 1 tablet by mouth in the morning. hydrocortisone 1 % cream     magnesium 200 MG Tabs tablet  Take 2 tablets by mouth in the morning. melatonin 5 MG Tbdp disintegrating tablet  Take 1 tablet by mouth nightly     vitamin B-12 100 MCG tablet  Commonly known as: CYANOCOBALAMIN  Take 1 tablet by mouth in the morning. vitamin D3 25 MCG (1000 UT) Tabs tablet  Commonly known as: CHOLECALCIFEROL  Take 1 tablet by mouth in the morning. Where to Get Your Medications        These medications were sent to [place], [address] [facility] [phone] - F [phone]  [address] [address]      Phone: [phone]   levETIRAcetam 750 MG tablet       These medications were sent to [address] [place] [address] STORE [address], [address], [address] [place] [id number]      Phone: [phone]   divalproex 125 MG capsule         Objective Findings at Discharge:   /70   Pulse 80   Temp 97.5 °F (36.4 °C) (Axillary)   Resp 18   Ht 5' 10\" (1.778 m)   Wt 181 lb (82.1 kg)   SpO2 97%   BMI 25.97 kg/m²            PHYSICAL EXAM   GEN Awake. Alert , not in respiratory distress, not in pain  HEENT: PEERLA, , supple neck,   Chest: air entry equal bilaterally, no wheezing or crepitation  Heart: S1 and S2 heard, no murmur, no gallop or rub, regular rate  Abdomen: soft, ND , Nt, +BS  Extremities: no cyanosis, tenderness or erythema, peripheral pulses audible  Neurology: alert, awake  able to move 4 limbs    BMP/CBC  No results for input(s): NA, K, CL, CO2, BUN, CREATININE, GLU, WBC, HEMOGLOBIN, HCT, PLT in the last 72 hours.     IMAGING:      Discharge Time of 35 minutes    Electronically signed by Leonardo Kerr MD on 10/6/2022 at 11:43 AM

## 2022-10-06 NOTE — PLAN OF CARE
Problem: Discharge Planning  Goal: Discharge to home or other facility with appropriate resources  Outcome: Progressing  Flowsheets (Taken 10/5/2022 2000)  Discharge to home or other facility with appropriate resources: Identify barriers to discharge with patient and caregiver     Problem: Chronic Conditions and Co-morbidities  Goal: Patient's chronic conditions and co-morbidity symptoms are monitored and maintained or improved  Outcome: Progressing  Flowsheets (Taken 10/5/2022 2000)  Care Plan - Patient's Chronic Conditions and Co-Morbidity Symptoms are Monitored and Maintained or Improved: Monitor and assess patient's chronic conditions and comorbid symptoms for stability, deterioration, or improvement     Problem: Safety - Adult  Goal: Free from fall injury  Outcome: Progressing     Problem: Neurosensory - Adult  Goal: Achieves stable or improved neurological status  Outcome: Progressing  Goal: Absence of seizures  Outcome: Progressing  Goal: Remains free of injury related to seizures activity  Outcome: Progressing     Problem: Nutrition Deficit:  Goal: Optimize nutritional status  Outcome: Progressing

## 2022-10-06 NOTE — CARE COORDINATION
Case Management Assessment            Discharge Note                    Date / Time of Note: 10/6/2022 8:49 AM                  Discharge Note Completed by: Brenden Freeman RN    Mr. Dc Miner has been accepted and pre-cert has been received for admission at [facility]. [facility] will transport @ 130p. CM confirmed Mr. Dc Miner has not had a 1:1 sitter for the last 24h. A rapid COVID swab is pending. A call was placed to his sister, Juwan Vigil. She remains in agreement with the plan of care.      RN CALL REPORT [phone]  AVS -[phone]    Patient Name: Hayley Whittaker   YOB: 1960  Diagnosis: Seizure ([address] [R56.9]  Acute respiratory failure, unspecified whether with hypoxia or hypercapnia ([address] [J96.00]  Altered mental status, unspecified altered mental status type [R41.82]   Date / Time: 9/1/2022  4:30 PM    Current PCP: Ayaka Hare MD    Advance Directives:  Code Status: Limited    Financial:  Payor: Jian Raymundo / Plan: [address] / Product Type: *No Product type* /      Pharmacy:    [id number] [address] [facility], [address] [place] [id number]  Phone: [phone] Fax: [phone]      ADLS:  Current PT AM-PAC Score: 16 /24  Current OT AM-PAC Score: 17 /24    DISCHARGE Disposition:   [facility]  Address: [address] [place] [address], [address]  Phone: [phone]    LOC at discharge: Skilled  [address] Completed: Yes    Notification completed in HENS/PAS?:  Submitted for admission at [address] [place] [address] ID: [id number]    Transportation:  Transportation PLAN for discharge: EMS transportation   Mode of Transport: Ambulance stretcher - BLS  Reason for medical transport: Dementia r/t alcohol abuse  Name of [address] [place] [address],[address]: [facility]  Phone: [phone]  Time of Transport: 130p    Transport form completed: Yes    The Patient and/or patient representative Luis Caraballo and his family were provided with a choice of provider and agrees with the discharge plan Yes    Freedom of choice list was provided with basic dialogue that supports the patient's individualized plan of care/goals and shares the quality data associated with the providers.  Yes    Vinita Perez RN  [facility]  [phone]

## 2022-12-14 ENCOUNTER — OFFICE VISIT (OUTPATIENT)
Dept: INTERNAL MEDICINE CLINIC | Age: 62
End: 2022-12-14
Payer: MEDICAID

## 2022-12-14 VITALS
HEART RATE: 65 BPM | TEMPERATURE: 98.2 F | BODY MASS INDEX: 26.4 KG/M2 | SYSTOLIC BLOOD PRESSURE: 107 MMHG | WEIGHT: 184.4 LBS | HEIGHT: 70 IN | OXYGEN SATURATION: 96 % | DIASTOLIC BLOOD PRESSURE: 71 MMHG

## 2022-12-14 DIAGNOSIS — I25.83 CORONARY ARTERY DISEASE DUE TO LIPID RICH PLAQUE: ICD-10-CM

## 2022-12-14 DIAGNOSIS — I50.30 DIASTOLIC CONGESTIVE HEART FAILURE, UNSPECIFIED HF CHRONICITY (HCC): ICD-10-CM

## 2022-12-14 DIAGNOSIS — R56.9 SEIZURE (HCC): ICD-10-CM

## 2022-12-14 DIAGNOSIS — E55.9 VITAMIN D INSUFFICIENCY: ICD-10-CM

## 2022-12-14 DIAGNOSIS — I25.10 CORONARY ARTERY DISEASE DUE TO LIPID RICH PLAQUE: ICD-10-CM

## 2022-12-14 DIAGNOSIS — Z23 FLU VACCINE NEED: ICD-10-CM

## 2022-12-14 PROCEDURE — 6360000002 HC RX W HCPCS: Performed by: STUDENT IN AN ORGANIZED HEALTH CARE EDUCATION/TRAINING PROGRAM

## 2022-12-14 PROCEDURE — G0008 ADMIN INFLUENZA VIRUS VAC: HCPCS | Performed by: STUDENT IN AN ORGANIZED HEALTH CARE EDUCATION/TRAINING PROGRAM

## 2022-12-14 PROCEDURE — 90674 CCIIV4 VAC NO PRSV 0.5 ML IM: CPT | Performed by: STUDENT IN AN ORGANIZED HEALTH CARE EDUCATION/TRAINING PROGRAM

## 2022-12-14 PROCEDURE — 99213 OFFICE O/P EST LOW 20 MIN: CPT | Performed by: STUDENT IN AN ORGANIZED HEALTH CARE EDUCATION/TRAINING PROGRAM

## 2022-12-14 RX ORDER — MELATONIN
1000 DAILY
Qty: 90 TABLET | Refills: 3 | Status: SHIPPED | OUTPATIENT
Start: 2022-12-14

## 2022-12-14 RX ORDER — CARVEDILOL 3.12 MG/1
3.12 TABLET ORAL 2 TIMES DAILY
Qty: 180 TABLET | Refills: 3 | Status: SHIPPED | OUTPATIENT
Start: 2022-12-14

## 2022-12-14 RX ORDER — FOLIC ACID 1 MG/1
1 TABLET ORAL DAILY
Qty: 90 TABLET | Refills: 3 | Status: SHIPPED | OUTPATIENT
Start: 2022-12-14

## 2022-12-14 RX ORDER — MECOBALAMIN 5000 MCG
5 TABLET,DISINTEGRATING ORAL NIGHTLY
Qty: 30 TABLET | Refills: 0 | Status: SHIPPED | OUTPATIENT
Start: 2022-12-14

## 2022-12-14 RX ORDER — LEVETIRACETAM 750 MG/1
1500 TABLET ORAL 2 TIMES DAILY
Qty: 60 TABLET | Refills: 3 | Status: SHIPPED | OUTPATIENT
Start: 2022-12-14

## 2022-12-14 RX ORDER — DIVALPROEX SODIUM 125 MG/1
250 CAPSULE, COATED PELLETS ORAL EVERY 12 HOURS SCHEDULED
Qty: 60 CAPSULE | Refills: 3 | Status: SHIPPED | OUTPATIENT
Start: 2022-12-14

## 2022-12-14 RX ORDER — UREA 10 %
100 LOTION (ML) TOPICAL DAILY
Qty: 90 TABLET | Refills: 3 | Status: SHIPPED | OUTPATIENT
Start: 2022-12-14

## 2022-12-14 RX ORDER — ATORVASTATIN CALCIUM 40 MG/1
40 TABLET, FILM COATED ORAL DAILY
Qty: 90 TABLET | Refills: 3 | Status: SHIPPED | OUTPATIENT
Start: 2022-12-14

## 2022-12-14 RX ORDER — MAGNESIUM 200 MG
400 TABLET ORAL DAILY
Qty: 90 TABLET | Refills: 3 | Status: SHIPPED | OUTPATIENT
Start: 2022-12-14

## 2022-12-14 RX ORDER — ASPIRIN 81 MG/1
81 TABLET ORAL DAILY
Qty: 30 TABLET | Refills: 3 | Status: SHIPPED | OUTPATIENT
Start: 2022-12-14

## 2022-12-14 RX ORDER — CALCIUM CARBONATE/VITAMIN D3 600 MG-10
100 TABLET ORAL DAILY
Qty: 90 TABLET | Refills: 3 | Status: SHIPPED | OUTPATIENT
Start: 2022-12-14

## 2022-12-14 RX ADMIN — INFLUENZA A VIRUS A/DELAWARE/55/2019 CVR-45 (H1N1) ANTIGEN (MDCK CELL DERIVED, PROPIOLACTONE INACTIVATED), INFLUENZA A VIRUS A/DARWIN/11/2021 (H3N2) ANTIGEN (MDCK CELL DERIVED, PROPIOLACTONE INACTIVATED), INFLUENZA B VIRUS B/SINGAPORE/WUH4618/2021 ANTIGEN (MDCK CELL DERIVED, PROPIOLACTONE INACTIVATED), INFLUENZA B VIRUS B/SINGAPORE/INFTT-16-0610/2016 ANTIGEN (MDCK CELL DERIVED, PROPIOLACTONE INACTIVATED) 0.5 ML: 15; 15; 15; 15 INJECTION, SUSPENSION INTRAMUSCULAR at 10:18

## 2022-12-14 ASSESSMENT — ENCOUNTER SYMPTOMS
GASTROINTESTINAL NEGATIVE: 1
RESPIRATORY NEGATIVE: 1
ALLERGIC/IMMUNOLOGIC NEGATIVE: 1
EYES NEGATIVE: 1

## 2022-12-14 NOTE — PROGRESS NOTES
[facility]    Bony Prince is a 57 y.o. male, here for evaluation of the following concerns:    HPI  Mr. Rosita Ruiz is a 68-year-old male with a history of alcohol abuse, diastolic heart failure, hypertension, and recent hospitalization on 9/2022 for which he is here for a hospital follow-up. Patient was admitted for alleged seizure-like activity and subsequent altered mental status. Patient was intubated for fear of airway compromise. Patient was eventually extubated but had a complication of delirium an agitation. Patient was seen by inpatient psychiatry. Patient was placed on continuous EEG but was found not to have any like activity. Eventually patient was discharged with seizure medications and has had no further episodes since. Patient has been compliant with all his medications. Patient states that he has not been drinking since his admission. Patient would like to establish care. Patient denies any headaches, vision changes, chest pain, shortness of breath, muscle weakness, abdominal pain, constipation or diarrhea. Review of Systems   Constitutional: Negative. HENT: Negative. Eyes: Negative. Respiratory: Negative. Cardiovascular: Negative. Gastrointestinal: Negative. Endocrine: Negative. Genitourinary: Negative. Musculoskeletal: Negative. Skin: Negative. Allergic/Immunologic: Negative. Neurological: Negative. Hematological: Negative. Psychiatric/Behavioral: Negative. MEDICATIONS:  Prior to Visit Medications    Medication Sig Taking?  Authorizing Provider   carboxymethylcellulose 1 % ophthalmic solution Place 1 drop into both eyes 2 times daily Yes Historical Provider, MD   divalproex (DEPAKOTE SPRINKLE) 125 MG capsule Take 2 capsules by mouth every 12 hours Yes Edwina Sands MD   levETIRAcetam (KEPPRA) 750 MG tablet Take 2 tablets by mouth 2 times daily Yes Edwina Sands MD   aspirin 81 MG EC tablet Take 1 tablet by mouth daily Yes Shanell Madden MD   atorvastatin (LIPITOR) 40 MG tablet Take 1 tablet by mouth daily Yes Shanell Madden MD   carvedilol (COREG) 3.125 MG tablet Take 1 tablet by mouth 2 times daily Yes Shanell Madden MD   folic acid (FOLVITE) 1 MG tablet Take 1 tablet by mouth daily Yes Shanell Madden MD   magnesium 200 MG TABS tablet Take 2 tablets by mouth daily Yes Shanell Madden MD   vitamin B-12 (CYANOCOBALAMIN) 100 MCG tablet Take 1 tablet by mouth daily Yes Shanell Madden MD   vitamin D3 (CHOLECALCIFEROL) 25 MCG (1000 UT) TABS tablet Take 1 tablet by mouth daily Yes Shanell Madden MD   Thiamine Mononitrate (B1) 100 MG TABS Take 100 mg by mouth daily Yes Shanell Madden MD   melatonin 5 MG TBDP disintegrating tablet Take 1 tablet by mouth nightly Yes Shanell Madden MD   hydrocortisone 1 % cream Apply topically 2 times daily Yes Flavia Colunga MD   thiamine 100 MG tablet Take 1 tablet by mouth daily  MERVIN Finn - CNP        Vitals:    12/14/22 0939   BP: 107/71   Site: Left Upper Arm   Position: Sitting   Cuff Size: Medium Adult   Pulse: 65   Temp: 98.2 °F (36.8 °C)   TempSrc: Temporal   SpO2: 96%   Weight: 184 lb 6.4 oz (83.6 kg)   Height: 5' 10\" (1.778 m)      Estimated body mass index is 26.46 kg/m² as calculated from the following:    Height as of this encounter: 5' 10\" (1.778 m). Weight as of this encounter: 184 lb 6.4 oz (83.6 kg). Physical Exam  Constitutional:       Appearance: Normal appearance. He is normal weight. HENT:      Head: Normocephalic and atraumatic. Nose: Nose normal.      Mouth/Throat:      Mouth: Mucous membranes are moist.      Pharynx: Oropharynx is clear. Eyes:      Conjunctiva/sclera: Conjunctivae normal.      Pupils: Pupils are equal, round, and reactive to light. Cardiovascular:      Rate and Rhythm: Normal rate and regular rhythm. Pulses: Normal pulses. Heart sounds: Normal heart sounds.    Pulmonary:      Effort: Pulmonary effort is normal.      Breath sounds: Normal breath sounds. Abdominal:      General: Abdomen is flat. Bowel sounds are normal.      Palpations: Abdomen is soft. Musculoskeletal:         General: Normal range of motion. Cervical back: Normal range of motion and neck supple. Skin:     General: Skin is warm and dry. Capillary Refill: Capillary refill takes less than 2 seconds. Neurological:      General: No focal deficit present. Mental Status: He is alert and oriented to person, place, and time. Mental status is at baseline. ASSESSMENT/PLAN:     1. Flu vaccine need  -     influenza quadrivalent subunit vaccine (FLUCELVAX) injection 0.5 mL; 0.5 mL, IntraMUSCular, ONCE, 1 dose, On Wed 12/14/22 at 1000If not given at scheduled administration time, reschedule for next day. 2. Seizure ([facility] [address]  Assessment & Plan:   Well-controlled, continue current medications  Orders:  -     divalproex (DEPAKOTE SPRINKLE) 125 MG capsule; Take 2 capsules by mouth every 12 hours, Disp-60 capsule, R-3Normal  -     levETIRAcetam (KEPPRA) 750 MG tablet; Take 2 tablets by mouth 2 times daily, Disp-60 tablet, [id number]  -     folic acid (FOLVITE) 1 MG tablet; Take 1 tablet by mouth daily, Disp-90 tablet, R-3Normal  -     magnesium 200 MG TABS tablet; Take 2 tablets by mouth daily, Disp-90 tablet, R-3Normal  -     vitamin B-12 (CYANOCOBALAMIN) 100 MCG tablet; Take 1 tablet by mouth daily, Disp-90 tablet, R-3Normal  -     vitamin D3 (CHOLECALCIFEROL) 25 MCG (1000 UT) TABS tablet; Take 1 tablet by mouth daily, Disp-90 tablet, R-3Normal  -     Thiamine Mononitrate (B1) 100 MG TABS; Take 100 mg by mouth daily, Disp-90 tablet, R-3Normal  -     melatonin 5 MG TBDP disintegrating tablet; Take 1 tablet by mouth nightly, Disp-30 tablet, R-0Normal  3. Coronary artery disease due to lipid rich plaque  -     aspirin 81 MG EC tablet; Take 1 tablet by mouth daily, Disp-30 tablet, R-3Normal  -     atorvastatin (LIPITOR) 40 MG tablet;  Take 1 tablet by mouth daily, Disp-90 tablet, R-3Normal  4. Diastolic congestive heart failure, unspecified HF chronicity ([facility] [address]  Assessment & Plan:  Stable, continue current medications. Next appointment with Dr. Reza Shah in 2/2023   Orders:  -     carvedilol (COREG) 3.125 MG tablet; Take 1 tablet by mouth 2 times daily, Disp-180 tablet, R-3Normal  -     TSH with Reflex; Future  -     Basic Metabolic Panel; Future  -     CBC with Auto Differential; Future  -     LIPID PANEL; Future  5. Vitamin D insufficiency  -     Vitamin D 25 Hydroxy; Future    Return in about 3 months (around 3/14/2023). The patient was staffed with the teaching attending: Dr. Sandi Ca. An electronic signature was used to authenticate this note.     --Dilma Rios MD

## 2022-12-14 NOTE — PATIENT INSTRUCTIONS
Please continue taking medications as prescribed. Please obtain lab work up done 1 week prior to returning to clinic. Please return to clinic in 3 months.

## 2022-12-14 NOTE — PROGRESS NOTES
09/08/2022  AM Shift (0700H - 1930HH)    NEURO AND DISABILITY  Propofol 50 mcg/kg/mn   - titrated down to 40mcg @ 0800H as ordered by resident   - titrated down to 20mcg @ 0900H as ordered by ICU clinician   - stopped @ 11:30H    Fentanyl 175 mcg/hr   - titrated down to 150 mcg @ 0900H as ordered by ICU clinician   - weaned to 100mcg @ 1000H   - weaned to 50mcg @ 1200H    Dexmedetomidine 1.5 mcg/hr   - titrated down to 1.2mcg/hr @ 1200H   - stopped @ 1300H   - restarted 1mcg/kg/hr @ 1600H RASS +2  GCS 3/15 E1 Vtubed M1  Pupils isocoric; UO 2-3mm, brisk  UE 0/5  LE 0/5    AIRWAY  ETT 8.0 @ 24cm lip level  Secured by AnchorFast  On A/C(PRVC)  FiO2 30%  Vt 575ml  PEEP 5 cmH2O  Pplat 17 cmH2O    Changed to CPAP/PS @ 1000H  Changed back to A/C PRVC    BREATHING  Respiration deep, regular, unlabored  Bilateral air entry with coarse crackles - relieved by ETT suctioning. ETT secretions minimal, whitish, thick  Oral secretions small, thin, clear  Present, weak cough    CIRCULATION  Generally warm to touch  Peripheral and central CRT <3s  Bilateral radial and pedal pulses strong and regular  Hypertensive sBP > 150 mmHg  On bilateral soft restraints UE; circulation assessed q2H    EXPOSURE AND LDA's  Peripheral IVs @ R antecubital vein, R accessory cephalic, L cephalic vein. Clean, dry, and intact. No signs of infiltration or infection. ETT 8.0 @ 24cm lip level  OG @ 60cm taped  Excoriations at R foot and at the back. Pressure areas clean, dry, intacy    GASTRO AND ELIMINATION  Abdomen soft, round, nontender, undistended  Hypoactive bowel sounds in the upper quadrants; normoactive in the lower quadrants  OG @ 60cm    INFECTION  Afebrile  Completed antibiotic regimen    PAIN MANAGEMENT  On Fentanyl IV infusion  CPOT - 0    SOCIAL/FAMILY  Pam Gutierrezchata (sibling) phoned and asked for an update. Verbalized satisfaction with her brother's care. Will call again tomorrow. [facility]  Safety bedside checks done.   Electrolytes optimized, as ordered. Provided rest and relaxation. Opened windows and lights to aid with restoring normal sleep-wake cycle. Ensured safety and security. On bilateral soft restraints d/t previous agitation. Assessed q2H. Repositioned q2. Tolerated well. Eye care done PRN. Tolerated well. Oral and ETT suctioning done PRN. Tolerated well. Split-Thickness Skin Graft Text: The defect edges were debeveled with a #15 scalpel blade.  Given the location of the defect, shape of the defect and the proximity to free margins a split thickness skin graft was deemed most appropriate.  Using a sterile surgical marker, the primary defect shape was transferred to the donor site. The split thickness graft was then harvested.  The skin graft was then placed in the primary defect and oriented appropriately.

## 2023-01-31 DIAGNOSIS — R56.9 SEIZURE (HCC): ICD-10-CM

## 2023-01-31 RX ORDER — LEVETIRACETAM 750 MG/1
TABLET ORAL
Qty: 60 TABLET | Refills: 3 | Status: SHIPPED | OUTPATIENT
Start: 2023-01-31

## 2023-01-31 NOTE — TELEPHONE ENCOUNTER
Requested Prescriptions     Pending Prescriptions Disp Refills    levETIRAcetam (KEPPRA) 750 MG tablet [Pharmacy Med Name: LEVETIRACETAM 750MG TABLETS] 60 tablet 3     Sig: TAKE 2 TABLETS BY MOUTH TWICE DAILY       Last Clinic Visit:  12/14/2022     Next Clinic Appointment:  3/15/2023

## 2023-03-11 DIAGNOSIS — R56.9 SEIZURE (HCC): ICD-10-CM

## 2023-03-13 DIAGNOSIS — I50.30 DIASTOLIC CONGESTIVE HEART FAILURE, UNSPECIFIED HF CHRONICITY (HCC): ICD-10-CM

## 2023-03-13 DIAGNOSIS — R56.9 SEIZURE (HCC): ICD-10-CM

## 2023-03-13 DIAGNOSIS — E55.9 VITAMIN D INSUFFICIENCY: ICD-10-CM

## 2023-03-13 LAB
ANION GAP SERPL CALCULATED.3IONS-SCNC: 12 MMOL/L (ref 3–16)
BASOPHILS ABSOLUTE: 0 K/UL (ref 0–0.2)
BASOPHILS RELATIVE PERCENT: 0.5 %
BUN BLDV-MCNC: 12 MG/DL (ref 7–20)
CALCIUM SERPL-MCNC: 8.9 MG/DL (ref 8.3–10.6)
CHLORIDE BLD-SCNC: 104 MMOL/L (ref 99–110)
CHOLESTEROL, TOTAL: 128 MG/DL (ref 0–199)
CO2: 25 MMOL/L (ref 21–32)
CREAT SERPL-MCNC: 0.7 MG/DL (ref 0.8–1.3)
EOSINOPHILS ABSOLUTE: 0.1 K/UL (ref 0–0.6)
EOSINOPHILS RELATIVE PERCENT: 2 %
GFR SERPL CREATININE-BSD FRML MDRD: >60 ML/MIN/{1.73_M2}
GLUCOSE BLD-MCNC: 97 MG/DL (ref 70–99)
HCT VFR BLD CALC: 43.1 % (ref 40.5–52.5)
HDLC SERPL-MCNC: 62 MG/DL (ref 40–60)
HEMOGLOBIN: 14.9 G/DL (ref 13.5–17.5)
LDL CHOLESTEROL CALCULATED: 54 MG/DL
LYMPHOCYTES ABSOLUTE: 2.2 K/UL (ref 1–5.1)
LYMPHOCYTES RELATIVE PERCENT: 28.5 %
MCH RBC QN AUTO: 33.3 PG (ref 26–34)
MCHC RBC AUTO-ENTMCNC: 34.5 G/DL (ref 31–36)
MCV RBC AUTO: 96.7 FL (ref 80–100)
MONOCYTES ABSOLUTE: 0.7 K/UL (ref 0–1.3)
MONOCYTES RELATIVE PERCENT: 8.8 %
NEUTROPHILS ABSOLUTE: 4.6 K/UL (ref 1.7–7.7)
NEUTROPHILS RELATIVE PERCENT: 60.2 %
PDW BLD-RTO: 16.2 % (ref 12.4–15.4)
PLATELET # BLD: 113 K/UL (ref 135–450)
PMV BLD AUTO: 9.4 FL (ref 5–10.5)
POTASSIUM SERPL-SCNC: 4.3 MMOL/L (ref 3.5–5.1)
RBC # BLD: 4.46 M/UL (ref 4.2–5.9)
SODIUM BLD-SCNC: 141 MMOL/L (ref 136–145)
TRIGL SERPL-MCNC: 62 MG/DL (ref 0–150)
TSH REFLEX: 3.24 UIU/ML (ref 0.27–4.2)
VITAMIN D 25-HYDROXY: 36.4 NG/ML
VLDLC SERPL CALC-MCNC: 12 MG/DL
WBC # BLD: 7.6 K/UL (ref 4–11)

## 2023-03-13 RX ORDER — MECOBALAMIN 5000 MCG
TABLET,DISINTEGRATING ORAL
Qty: 90 TABLET | OUTPATIENT
Start: 2023-03-13

## 2023-03-13 RX ORDER — MECOBALAMIN 5000 MCG
5 TABLET,DISINTEGRATING ORAL NIGHTLY
Qty: 30 TABLET | Refills: 0 | Status: SHIPPED | OUTPATIENT
Start: 2023-03-13 | End: 2023-03-15 | Stop reason: SDUPTHER

## 2023-03-15 ENCOUNTER — OFFICE VISIT (OUTPATIENT)
Dept: INTERNAL MEDICINE CLINIC | Age: 63
End: 2023-03-15
Payer: MEDICAID

## 2023-03-15 VITALS
DIASTOLIC BLOOD PRESSURE: 77 MMHG | TEMPERATURE: 98 F | HEART RATE: 68 BPM | HEIGHT: 70 IN | SYSTOLIC BLOOD PRESSURE: 113 MMHG | OXYGEN SATURATION: 98 % | BODY MASS INDEX: 25.91 KG/M2 | RESPIRATION RATE: 14 BRPM | WEIGHT: 181 LBS

## 2023-03-15 DIAGNOSIS — I25.83 CORONARY ARTERY DISEASE DUE TO LIPID RICH PLAQUE: ICD-10-CM

## 2023-03-15 DIAGNOSIS — I50.30 DIASTOLIC CONGESTIVE HEART FAILURE, UNSPECIFIED HF CHRONICITY (HCC): ICD-10-CM

## 2023-03-15 DIAGNOSIS — R56.9 SEIZURE (HCC): ICD-10-CM

## 2023-03-15 DIAGNOSIS — I25.10 CORONARY ARTERY DISEASE DUE TO LIPID RICH PLAQUE: ICD-10-CM

## 2023-03-15 PROCEDURE — 99213 OFFICE O/P EST LOW 20 MIN: CPT | Performed by: STUDENT IN AN ORGANIZED HEALTH CARE EDUCATION/TRAINING PROGRAM

## 2023-03-15 RX ORDER — FOLIC ACID 1 MG/1
1 TABLET ORAL DAILY
Qty: 90 TABLET | Refills: 3 | Status: SHIPPED | OUTPATIENT
Start: 2023-03-15

## 2023-03-15 RX ORDER — MAGNESIUM 200 MG
400 TABLET ORAL DAILY
Qty: 90 TABLET | Refills: 3 | Status: SHIPPED | OUTPATIENT
Start: 2023-03-15

## 2023-03-15 RX ORDER — LEVETIRACETAM 750 MG/1
TABLET ORAL
Qty: 60 TABLET | Refills: 3 | Status: SHIPPED | OUTPATIENT
Start: 2023-03-15

## 2023-03-15 RX ORDER — MECOBALAMIN 5000 MCG
5 TABLET,DISINTEGRATING ORAL NIGHTLY
Qty: 30 TABLET | Refills: 0 | Status: SHIPPED | OUTPATIENT
Start: 2023-03-15

## 2023-03-15 RX ORDER — ASPIRIN 81 MG/1
81 TABLET ORAL DAILY
Qty: 30 TABLET | Refills: 3 | Status: SHIPPED | OUTPATIENT
Start: 2023-03-15

## 2023-03-15 RX ORDER — UREA 10 %
100 LOTION (ML) TOPICAL DAILY
Qty: 90 TABLET | Refills: 3 | Status: SHIPPED | OUTPATIENT
Start: 2023-03-15

## 2023-03-15 RX ORDER — MELATONIN
1000 DAILY
Qty: 90 TABLET | Refills: 3 | Status: SHIPPED | OUTPATIENT
Start: 2023-03-15

## 2023-03-15 RX ORDER — CARVEDILOL 3.12 MG/1
3.12 TABLET ORAL 2 TIMES DAILY
Qty: 180 TABLET | Refills: 3 | Status: SHIPPED | OUTPATIENT
Start: 2023-03-15

## 2023-03-15 RX ORDER — CALCIUM CARBONATE/VITAMIN D3 600 MG-10
100 TABLET ORAL DAILY
Qty: 90 TABLET | Refills: 3 | Status: SHIPPED | OUTPATIENT
Start: 2023-03-15

## 2023-03-15 ASSESSMENT — ENCOUNTER SYMPTOMS
CHANGE IN BOWEL HABIT: 0
COUGH: 0
SWOLLEN GLANDS: 0
VOMITING: 0
VISUAL CHANGE: 0
ABDOMINAL PAIN: 0
NAUSEA: 0
SORE THROAT: 0

## 2023-03-15 NOTE — PROGRESS NOTES
[facility]    Rell Batres is a 57 y.o. male, here for evaluation of the following concerns:    Seizures  This is a chronic problem. The current episode started more than 1 month ago. The problem occurs rarely. The problem has been resolved. Pertinent negatives include no abdominal pain, anorexia, arthralgias, change in bowel habit, chest pain, chills, congestion, coughing, diaphoresis, fatigue, fever, headaches, joint swelling, myalgias, nausea, neck pain, numbness, rash, sore throat, swollen glands, urinary symptoms, vertigo, visual change, vomiting or weakness. The symptoms are aggravated by drinking and stress. Treatments tried: ASM. The treatment provided significant relief. Review of Systems   Constitutional:  Negative for chills, diaphoresis, fatigue and fever. HENT:  Negative for congestion and sore throat. Respiratory:  Negative for cough. Cardiovascular:  Negative for chest pain. Gastrointestinal:  Negative for abdominal pain, anorexia, change in bowel habit, nausea and vomiting. Musculoskeletal:  Negative for arthralgias, joint swelling, myalgias and neck pain. Skin:  Negative for rash. Neurological:  Positive for seizures. Negative for vertigo, weakness, numbness and headaches. MEDICATIONS:  Prior to Visit Medications    Medication Sig Taking?  Authorizing Provider   melatonin 5 MG TBDP disintegrating tablet Take 1 tablet by mouth nightly Yes Holly Cabezas MD   levETIRAcetam (KEPPRA) 750 MG tablet TAKE 2 TABLETS BY MOUTH TWICE DAILY Yes Holly Cabezas MD   carboxymethylcellulose 1 % ophthalmic solution Place 1 drop into both eyes 2 times daily Yes Holly Cabezas MD   aspirin 81 MG EC tablet Take 1 tablet by mouth daily Yes Holly Cabezas MD   carvedilol (COREG) 3.125 MG tablet Take 1 tablet by mouth 2 times daily Yes Holly Cabezas MD   folic acid (FOLVITE) 1 MG tablet Take 1 tablet by mouth daily Yes Holly Cabezas MD   magnesium 200 MG TABS tablet Take 2 tablets by mouth daily Yes Amaya Colon MD   vitamin B-12 (CYANOCOBALAMIN) 100 MCG tablet Take 1 tablet by mouth daily Yes Amaya Colon MD   vitamin D3 (CHOLECALCIFEROL) 25 MCG (1000 UT) TABS tablet Take 1 tablet by mouth daily Yes Amaya Colon MD   Thiamine Mononitrate (B1) 100 MG TABS Take 100 mg by mouth daily Yes Amaya Colon MD   atorvastatin (LIPITOR) 40 MG tablet Take 1 tablet by mouth daily Yes Amaya Colon MD   hydrocortisone 1 % cream Apply topically 2 times daily  Patient not taking: Reported on 3/15/2023  Historical Provider, MD   thiamine 100 MG tablet Take 1 tablet by mouth daily  EMRVIN Quiñones - CNP        Vitals:    03/15/23 0935   BP: 113/77   Site: Right Upper Arm   Position: Sitting   Cuff Size: Medium Adult   Pulse: 68   Resp: 14   Temp: 98 °F (36.7 °C)   TempSrc: Oral   SpO2: 98%   Weight: 181 lb (82.1 kg)   Height: 5' 10\" (1.778 m)      Estimated body mass index is 25.97 kg/m² as calculated from the following:    Height as of this encounter: 5' 10\" (1.778 m). Weight as of this encounter: 181 lb (82.1 kg). Physical Exam  Constitutional:       Appearance: Normal appearance. He is normal weight. HENT:      Head: Normocephalic and atraumatic. Mouth/Throat:      Mouth: Mucous membranes are moist.      Pharynx: Oropharynx is clear. Eyes:      Conjunctiva/sclera: Conjunctivae normal.      Pupils: Pupils are equal, round, and reactive to light. Cardiovascular:      Rate and Rhythm: Normal rate and regular rhythm. Pulses: Normal pulses. Heart sounds: Normal heart sounds. Pulmonary:      Effort: Pulmonary effort is normal.      Breath sounds: Normal breath sounds. Abdominal:      General: Abdomen is flat. Bowel sounds are normal.      Palpations: Abdomen is soft. Musculoskeletal:      Cervical back: Normal range of motion and neck supple. Skin:     General: Skin is warm and dry. Capillary Refill: Capillary refill takes less than 2 seconds. Neurological:      General: No focal deficit present. Mental Status: He is alert and oriented to person, place, and time. Mental status is at baseline. Psychiatric:         Mood and Affect: Mood normal.         Behavior: Behavior normal.         Thought Content: Thought content normal.       ASSESSMENT/PLAN:     1. Seizure [facility])  Assessment & Plan:   Well-controlled, changes made today: discontinue Depakote  Orders:  -     melatonin 5 MG TBDP disintegrating tablet; Take 1 tablet by mouth nightly, Disp-30 tablet, R-0Normal  -     levETIRAcetam (KEPPRA) 750 MG tablet; TAKE 2 TABLETS BY MOUTH TWICE DAILY, Disp-60 tablet, [id number]  -     folic acid (FOLVITE) 1 MG tablet; Take 1 tablet by mouth daily, Disp-90 tablet, R-3Normal  -     magnesium 200 MG TABS tablet; Take 2 tablets by mouth daily, Disp-90 tablet, R-3Normal  -     vitamin B-12 (CYANOCOBALAMIN) 100 MCG tablet; Take 1 tablet by mouth daily, Disp-90 tablet, R-3Normal  -     vitamin D3 (CHOLECALCIFEROL) 25 MCG (1000 UT) TABS tablet; Take 1 tablet by mouth daily, Disp-90 tablet, R-3Normal  -     Thiamine Mononitrate (B1) 100 MG TABS; Take 100 mg by mouth daily, Disp-90 tablet, R-3Normal  2. Diastolic congestive heart failure, unspecified HF chronicity ([facility] [address]  Assessment & Plan:  Stable. No signs of overload. Followed by Dr. Negrita Pierre. Orders:  -     carvedilol (COREG) 3.125 MG tablet; Take 1 tablet by mouth 2 times daily, Disp-180 tablet, R-3Normal  3. Coronary artery disease due to lipid rich plaque  Assessment & Plan:  Stable. Continue current medications. Orders:  -     aspirin 81 MG EC tablet; Take 1 tablet by mouth daily, Disp-30 tablet, R-3Normal    Return in about 3 months (around 6/15/2023). The patient was staffed with teaching attending: Dr. Josephine Stauffer. An electronic signature was used to authenticate this note.     --Wallace Angela MD

## 2023-03-23 ENCOUNTER — APPOINTMENT (OUTPATIENT)
Dept: CT IMAGING | Age: 63
End: 2023-03-23
Payer: MEDICAID

## 2023-03-23 ENCOUNTER — HOSPITAL ENCOUNTER (INPATIENT)
Age: 63
LOS: 4 days | Discharge: NURSING, REHAB OR LONG-TERM CARE | End: 2023-03-28
Attending: EMERGENCY MEDICINE | Admitting: INTERNAL MEDICINE
Payer: MEDICAID

## 2023-03-23 DIAGNOSIS — F10.920 ACUTE ALCOHOLIC INTOXICATION WITHOUT COMPLICATION (HCC): ICD-10-CM
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
## 2023-03-23 LAB
ANION GAP SERPL CALCULATED.3IONS-SCNC: 9 MMOL/L (ref 3–16)
BACTERIA URNS QL MICRO: ABNORMAL /HPF
BASOPHILS # BLD: 0 K/UL (ref 0–0.2)
BASOPHILS NFR BLD: 0.4 %
BILIRUB UR QL STRIP.AUTO: NEGATIVE
BUN SERPL-MCNC: 7 MG/DL (ref 7–20)
CALCIUM SERPL-MCNC: 8.5 MG/DL (ref 8.3–10.6)
CHLORIDE SERPL-SCNC: 102 MMOL/L (ref 99–110)
CLARITY UR: CLEAR
CO2 SERPL-SCNC: 27 MMOL/L (ref 21–32)
COLOR UR: YELLOW
CREAT SERPL-MCNC: 0.8 MG/DL (ref 0.8–1.3)
DEPRECATED RDW RBC AUTO: 15.9 % (ref 12.4–15.4)
EOSINOPHIL # BLD: 0.1 K/UL (ref 0–0.6)
EOSINOPHIL NFR BLD: 1.3 %
EPI CELLS #/AREA URNS HPF: ABNORMAL /HPF (ref 0–5)
ETHANOLAMINE SERPL-MCNC: 253 MG/DL (ref 0–0.08)
GFR SERPLBLD CREATININE-BSD FMLA CKD-EPI: >60 ML/MIN/{1.73_M2}
GLUCOSE SERPL-MCNC: 96 MG/DL (ref 70–99)
GLUCOSE UR STRIP.AUTO-MCNC: NEGATIVE MG/DL
HCT VFR BLD AUTO: 43.3 % (ref 40.5–52.5)
HGB BLD-MCNC: 15.2 G/DL (ref 13.5–17.5)
HGB UR QL STRIP.AUTO: NEGATIVE
KETONES UR STRIP.AUTO-MCNC: NEGATIVE MG/DL
LEUKOCYTE ESTERASE UR QL STRIP.AUTO: NEGATIVE
LYMPHOCYTES # BLD: 2.4 K/UL (ref 1–5.1)
LYMPHOCYTES NFR BLD: 35.5 %
MCH RBC QN AUTO: 33.6 PG (ref 26–34)
MCHC RBC AUTO-ENTMCNC: 35.1 G/DL (ref 31–36)
MCV RBC AUTO: 95.8 FL (ref 80–100)
MONOCYTES # BLD: 0.5 K/UL (ref 0–1.3)
MONOCYTES NFR BLD: 6.7 %
NEUTROPHILS # BLD: 3.8 K/UL (ref 1.7–7.7)
NEUTROPHILS NFR BLD: 56.1 %
NITRITE UR QL STRIP.AUTO: NEGATIVE
PH UR STRIP.AUTO: 6 [PH] (ref 5–8)
PLATELET # BLD AUTO: 145 K/UL (ref 135–450)
PMV BLD AUTO: 7.9 FL (ref 5–10.5)
POTASSIUM SERPL-SCNC: 3.6 MMOL/L (ref 3.5–5.1)
PROT UR STRIP.AUTO-MCNC: NEGATIVE MG/DL
RBC # BLD AUTO: 4.52 M/UL (ref 4.2–5.9)
RBC #/AREA URNS HPF: ABNORMAL /HPF (ref 0–4)
SODIUM SERPL-SCNC: 138 MMOL/L (ref 136–145)
SP GR UR STRIP.AUTO: 1.01 (ref 1–1.03)
UA DIPSTICK W REFLEX MICRO PNL UR: ABNORMAL
URN SPEC COLLECT METH UR: ABNORMAL
UROBILINOGEN UR STRIP-ACNC: 0.2 E.U./DL
WBC # BLD AUTO: 6.8 K/UL (ref 4–11)
WBC #/AREA URNS HPF: ABNORMAL /HPF (ref 0–5)

## 2023-03-23 PROCEDURE — 6370000000 HC RX 637 (ALT 250 FOR IP): Performed by: NURSE PRACTITIONER

## 2023-03-23 PROCEDURE — 36415 COLL VENOUS BLD VENIPUNCTURE: CPT

## 2023-03-23 PROCEDURE — 82077 ASSAY SPEC XCP UR&BREATH IA: CPT

## 2023-03-23 PROCEDURE — 99285 EMERGENCY DEPT VISIT HI MDM: CPT

## 2023-03-23 PROCEDURE — 81001 URINALYSIS AUTO W/SCOPE: CPT

## 2023-03-23 PROCEDURE — 72125 CT NECK SPINE W/O DYE: CPT

## 2023-03-23 PROCEDURE — 80048 BASIC METABOLIC PNL TOTAL CA: CPT

## 2023-03-23 PROCEDURE — 70450 CT HEAD/BRAIN W/O DYE: CPT

## 2023-03-23 PROCEDURE — 80307 DRUG TEST PRSMV CHEM ANLYZR: CPT

## 2023-03-23 PROCEDURE — 85025 COMPLETE CBC W/AUTO DIFF WBC: CPT

## 2023-03-23 RX ORDER — MECOBALAMIN 5000 MCG
5 TABLET,DISINTEGRATING ORAL NIGHTLY PRN
Status: DISCONTINUED | OUTPATIENT
Start: 2023-03-23 | End: 2023-03-28 | Stop reason: HOSPADM

## 2023-03-23 RX ORDER — ASPIRIN 81 MG/1
81 TABLET, CHEWABLE ORAL DAILY
Status: DISCONTINUED | OUTPATIENT
Start: 2023-03-24 | End: 2023-03-28 | Stop reason: HOSPADM

## 2023-03-23 RX ORDER — UBIDECARENONE 75 MG
50 CAPSULE ORAL DAILY
Status: DISCONTINUED | OUTPATIENT
Start: 2023-03-24 | End: 2023-03-28 | Stop reason: HOSPADM

## 2023-03-23 RX ORDER — ATORVASTATIN CALCIUM 40 MG/1
40 TABLET, FILM COATED ORAL DAILY
Status: DISCONTINUED | OUTPATIENT
Start: 2023-03-24 | End: 2023-03-28 | Stop reason: HOSPADM

## 2023-03-23 RX ORDER — LANOLIN ALCOHOL/MO/W.PET/CERES
200 CREAM (GRAM) TOPICAL 2 TIMES DAILY
Status: DISCONTINUED | OUTPATIENT
Start: 2023-03-23 | End: 2023-03-28 | Stop reason: HOSPADM

## 2023-03-23 RX ORDER — FOLIC ACID 1 MG/1
1 TABLET ORAL DAILY
Status: DISCONTINUED | OUTPATIENT
Start: 2023-03-24 | End: 2023-03-28 | Stop reason: HOSPADM

## 2023-03-23 RX ORDER — CARVEDILOL 3.12 MG/1
3.12 TABLET ORAL 2 TIMES DAILY
Status: DISCONTINUED | OUTPATIENT
Start: 2023-03-23 | End: 2023-03-28 | Stop reason: HOSPADM

## 2023-03-23 RX ORDER — GAUZE BANDAGE 2" X 2"
100 BANDAGE TOPICAL DAILY
Status: DISCONTINUED | OUTPATIENT
Start: 2023-03-24 | End: 2023-03-24

## 2023-03-23 RX ADMIN — CARVEDILOL 3.12 MG: 6.25 TABLET, FILM COATED ORAL at 20:45

## 2023-03-23 RX ADMIN — MAGNESIUM OXIDE TAB 400 MG (240 MG ELEMENTAL MG) 200 MG: 400 (240 MG) TAB at 20:45

## 2023-03-23 RX ADMIN — LEVETIRACETAM 750 MG: 500 TABLET, FILM COATED ORAL at 20:45

## 2023-03-23 ASSESSMENT — PAIN - FUNCTIONAL ASSESSMENT
PAIN_FUNCTIONAL_ASSESSMENT: NONE - DENIES PAIN
PAIN_FUNCTIONAL_ASSESSMENT: NONE - DENIES PAIN

## 2023-03-23 NOTE — ED NOTES
Spoke to Phani Silvia Shiva (Sister of patient) who informed this nurse she called ems today for concerns of intoxication and unwitnessed fall after finding pt laying on back in bathroom. Prior to this finding today the sister described intermittent aggressive behavior from pt including yelling at her. Sister went on to express concern of her home not being safe for pt due to his dementia and alcohol use.       Liat Pena RN  03/23/23 5088

## 2023-03-24 PROBLEM — F10.930 ALCOHOL WITHDRAWAL SYNDROME WITHOUT COMPLICATION (HCC): Status: ACTIVE | Noted: 2023-03-24

## 2023-03-24 PROBLEM — F10.239 ALCOHOL DEPENDENCE WITH WITHDRAWAL (HCC): Status: ACTIVE | Noted: 2023-03-24

## 2023-03-24 LAB
ALBUMIN SERPL-MCNC: 4 G/DL (ref 3.4–5)
ALP SERPL-CCNC: 93 U/L (ref 40–129)
ALT SERPL-CCNC: 21 U/L (ref 10–40)
AMMONIA PLAS-SCNC: 14 UMOL/L (ref 16–60)
AMPHETAMINES UR QL SCN>1000 NG/ML: NORMAL
AST SERPL-CCNC: 26 U/L (ref 15–37)
BARBITURATES UR QL SCN>200 NG/ML: NORMAL
BENZODIAZ UR QL SCN>200 NG/ML: NORMAL
BILIRUB DIRECT SERPL-MCNC: 0.3 MG/DL (ref 0–0.3)
BILIRUB INDIRECT SERPL-MCNC: 1.4 MG/DL (ref 0–1)
BILIRUB SERPL-MCNC: 1.7 MG/DL (ref 0–1)
CANNABINOIDS UR QL SCN>50 NG/ML: NORMAL
COCAINE UR QL SCN: NORMAL
DRUG SCREEN COMMENT UR-IMP: NORMAL
FENTANYL SCREEN, URINE: NORMAL
METHADONE UR QL SCN>300 NG/ML: NORMAL
OPIATES UR QL SCN>300 NG/ML: NORMAL
OXYCODONE UR QL SCN: NORMAL
PCP UR QL SCN>25 NG/ML: NORMAL
PH UR STRIP: 6 [PH]
PROT SERPL-MCNC: 6.7 G/DL (ref 6.4–8.2)

## 2023-03-24 PROCEDURE — 6360000002 HC RX W HCPCS: Performed by: INTERNAL MEDICINE

## 2023-03-24 PROCEDURE — 2060000000 HC ICU INTERMEDIATE R&B

## 2023-03-24 PROCEDURE — 97166 OT EVAL MOD COMPLEX 45 MIN: CPT

## 2023-03-24 PROCEDURE — 97162 PT EVAL MOD COMPLEX 30 MIN: CPT

## 2023-03-24 PROCEDURE — 82140 ASSAY OF AMMONIA: CPT

## 2023-03-24 PROCEDURE — 6370000000 HC RX 637 (ALT 250 FOR IP): Performed by: STUDENT IN AN ORGANIZED HEALTH CARE EDUCATION/TRAINING PROGRAM

## 2023-03-24 PROCEDURE — 6370000000 HC RX 637 (ALT 250 FOR IP)

## 2023-03-24 PROCEDURE — 1200000000 HC SEMI PRIVATE

## 2023-03-24 PROCEDURE — HZ2ZZZZ DETOXIFICATION SERVICES FOR SUBSTANCE ABUSE TREATMENT: ICD-10-PCS | Performed by: INTERNAL MEDICINE

## 2023-03-24 PROCEDURE — 36415 COLL VENOUS BLD VENIPUNCTURE: CPT

## 2023-03-24 PROCEDURE — 6360000002 HC RX W HCPCS

## 2023-03-24 PROCEDURE — 80076 HEPATIC FUNCTION PANEL: CPT

## 2023-03-24 PROCEDURE — 6370000000 HC RX 637 (ALT 250 FOR IP): Performed by: NURSE PRACTITIONER

## 2023-03-24 PROCEDURE — 97530 THERAPEUTIC ACTIVITIES: CPT

## 2023-03-24 PROCEDURE — 97116 GAIT TRAINING THERAPY: CPT

## 2023-03-24 RX ORDER — LORAZEPAM 2 MG/ML
4 INJECTION INTRAMUSCULAR
Status: DISCONTINUED | OUTPATIENT
Start: 2023-03-24 | End: 2023-03-28 | Stop reason: HOSPADM

## 2023-03-24 RX ORDER — ACETAMINOPHEN 650 MG/1
650 SUPPOSITORY RECTAL EVERY 6 HOURS PRN
Status: DISCONTINUED | OUTPATIENT
Start: 2023-03-24 | End: 2023-03-28 | Stop reason: HOSPADM

## 2023-03-24 RX ORDER — LORAZEPAM 1 MG/1
2 TABLET ORAL
Status: DISCONTINUED | OUTPATIENT
Start: 2023-03-24 | End: 2023-03-28 | Stop reason: HOSPADM

## 2023-03-24 RX ORDER — LORAZEPAM 1 MG/1
1 TABLET ORAL
Status: DISCONTINUED | OUTPATIENT
Start: 2023-03-24 | End: 2023-03-28 | Stop reason: HOSPADM

## 2023-03-24 RX ORDER — ENOXAPARIN SODIUM 100 MG/ML
40 INJECTION SUBCUTANEOUS DAILY
Status: DISCONTINUED | OUTPATIENT
Start: 2023-03-24 | End: 2023-03-28 | Stop reason: HOSPADM

## 2023-03-24 RX ORDER — MULTIVITAMIN WITH IRON
1 TABLET ORAL DAILY
Status: DISCONTINUED | OUTPATIENT
Start: 2023-03-24 | End: 2023-03-28 | Stop reason: HOSPADM

## 2023-03-24 RX ORDER — SODIUM CHLORIDE 0.9 % (FLUSH) 0.9 %
5-40 SYRINGE (ML) INJECTION PRN
Status: DISCONTINUED | OUTPATIENT
Start: 2023-03-24 | End: 2023-03-28 | Stop reason: HOSPADM

## 2023-03-24 RX ORDER — LORAZEPAM 1 MG/1
4 TABLET ORAL
Status: DISCONTINUED | OUTPATIENT
Start: 2023-03-24 | End: 2023-03-28 | Stop reason: HOSPADM

## 2023-03-24 RX ORDER — GAUZE BANDAGE 2" X 2"
100 BANDAGE TOPICAL DAILY
Status: DISCONTINUED | OUTPATIENT
Start: 2023-03-24 | End: 2023-03-24

## 2023-03-24 RX ORDER — SODIUM CHLORIDE 9 MG/ML
INJECTION, SOLUTION INTRAVENOUS PRN
Status: DISCONTINUED | OUTPATIENT
Start: 2023-03-24 | End: 2023-03-28 | Stop reason: HOSPADM

## 2023-03-24 RX ORDER — LORAZEPAM 2 MG/ML
3 INJECTION INTRAMUSCULAR
Status: DISCONTINUED | OUTPATIENT
Start: 2023-03-24 | End: 2023-03-28 | Stop reason: HOSPADM

## 2023-03-24 RX ORDER — ACETAMINOPHEN 325 MG/1
650 TABLET ORAL EVERY 6 HOURS PRN
Status: DISCONTINUED | OUTPATIENT
Start: 2023-03-24 | End: 2023-03-28 | Stop reason: HOSPADM

## 2023-03-24 RX ORDER — SODIUM CHLORIDE 0.9 % (FLUSH) 0.9 %
5-40 SYRINGE (ML) INJECTION EVERY 12 HOURS SCHEDULED
Status: DISCONTINUED | OUTPATIENT
Start: 2023-03-24 | End: 2023-03-28 | Stop reason: HOSPADM

## 2023-03-24 RX ORDER — LORAZEPAM 1 MG/1
3 TABLET ORAL
Status: DISCONTINUED | OUTPATIENT
Start: 2023-03-24 | End: 2023-03-28 | Stop reason: HOSPADM

## 2023-03-24 RX ORDER — ONDANSETRON 4 MG/1
4 TABLET, ORALLY DISINTEGRATING ORAL EVERY 8 HOURS PRN
Status: DISCONTINUED | OUTPATIENT
Start: 2023-03-24 | End: 2023-03-28 | Stop reason: HOSPADM

## 2023-03-24 RX ORDER — LORAZEPAM 2 MG/ML
1 INJECTION INTRAMUSCULAR
Status: DISCONTINUED | OUTPATIENT
Start: 2023-03-24 | End: 2023-03-28 | Stop reason: HOSPADM

## 2023-03-24 RX ORDER — THIAMINE HYDROCHLORIDE 100 MG/ML
200 INJECTION, SOLUTION INTRAMUSCULAR; INTRAVENOUS 3 TIMES DAILY
Status: DISCONTINUED | OUTPATIENT
Start: 2023-03-24 | End: 2023-03-26

## 2023-03-24 RX ORDER — POLYETHYLENE GLYCOL 3350 17 G/17G
17 POWDER, FOR SOLUTION ORAL DAILY PRN
Status: DISCONTINUED | OUTPATIENT
Start: 2023-03-24 | End: 2023-03-28 | Stop reason: HOSPADM

## 2023-03-24 RX ORDER — DIAZEPAM 10 MG/1
10 TABLET ORAL ONCE
Status: COMPLETED | OUTPATIENT
Start: 2023-03-24 | End: 2023-03-24

## 2023-03-24 RX ORDER — HALOPERIDOL 5 MG/ML
2 INJECTION INTRAMUSCULAR ONCE
Status: COMPLETED | OUTPATIENT
Start: 2023-03-24 | End: 2023-03-25

## 2023-03-24 RX ORDER — ONDANSETRON 2 MG/ML
4 INJECTION INTRAMUSCULAR; INTRAVENOUS EVERY 6 HOURS PRN
Status: DISCONTINUED | OUTPATIENT
Start: 2023-03-24 | End: 2023-03-28 | Stop reason: HOSPADM

## 2023-03-24 RX ORDER — LORAZEPAM 2 MG/ML
2 INJECTION INTRAMUSCULAR
Status: DISCONTINUED | OUTPATIENT
Start: 2023-03-24 | End: 2023-03-28 | Stop reason: HOSPADM

## 2023-03-24 RX ADMIN — ACETAMINOPHEN 650 MG: 325 TABLET ORAL at 22:33

## 2023-03-24 RX ADMIN — THIAMINE HYDROCHLORIDE 200 MG: 100 INJECTION, SOLUTION INTRAMUSCULAR; INTRAVENOUS at 23:57

## 2023-03-24 RX ADMIN — LORAZEPAM 4 MG: 2 INJECTION INTRAMUSCULAR; INTRAVENOUS at 16:50

## 2023-03-24 RX ADMIN — ONDANSETRON 4 MG: 4 TABLET, ORALLY DISINTEGRATING ORAL at 22:34

## 2023-03-24 RX ADMIN — FOLIC ACID 1 MG: 1 TABLET ORAL at 09:57

## 2023-03-24 RX ADMIN — LEVETIRACETAM 750 MG: 500 TABLET, FILM COATED ORAL at 11:42

## 2023-03-24 RX ADMIN — MAGNESIUM OXIDE TAB 400 MG (240 MG ELEMENTAL MG) 200 MG: 400 (240 MG) TAB at 22:34

## 2023-03-24 RX ADMIN — CARVEDILOL 3.12 MG: 6.25 TABLET, FILM COATED ORAL at 10:00

## 2023-03-24 RX ADMIN — ATORVASTATIN CALCIUM 40 MG: 40 TABLET, FILM COATED ORAL at 09:56

## 2023-03-24 RX ADMIN — THERA TABS 1 TABLET: TAB at 14:03

## 2023-03-24 RX ADMIN — DIAZEPAM 10 MG: 10 TABLET ORAL at 10:35

## 2023-03-24 RX ADMIN — LEVETIRACETAM 750 MG: 500 TABLET, FILM COATED ORAL at 22:34

## 2023-03-24 RX ADMIN — THIAMINE HYDROCHLORIDE 200 MG: 100 INJECTION, SOLUTION INTRAMUSCULAR; INTRAVENOUS at 15:07

## 2023-03-24 RX ADMIN — CARVEDILOL 3.12 MG: 6.25 TABLET, FILM COATED ORAL at 22:34

## 2023-03-24 RX ADMIN — MAGNESIUM OXIDE TAB 400 MG (240 MG ELEMENTAL MG) 200 MG: 400 (240 MG) TAB at 09:56

## 2023-03-24 RX ADMIN — LORAZEPAM 2 MG: 2 INJECTION INTRAMUSCULAR; INTRAVENOUS at 15:08

## 2023-03-24 RX ADMIN — LORAZEPAM 1 MG: 1 TABLET ORAL at 14:03

## 2023-03-24 RX ADMIN — ENOXAPARIN SODIUM 40 MG: 100 INJECTION SUBCUTANEOUS at 14:03

## 2023-03-24 RX ADMIN — VITAM B12 50 MCG: 100 TAB at 09:57

## 2023-03-24 RX ADMIN — Medication 100 MG: at 09:57

## 2023-03-24 RX ADMIN — ASPIRIN 81 MG 81 MG: 81 TABLET ORAL at 09:55

## 2023-03-24 ASSESSMENT — PAIN SCALES - GENERAL
PAINLEVEL_OUTOF10: 3
PAINLEVEL_OUTOF10: 0
PAINLEVEL_OUTOF10: 4
PAINLEVEL_OUTOF10: 0

## 2023-03-24 ASSESSMENT — PAIN - FUNCTIONAL ASSESSMENT: PAIN_FUNCTIONAL_ASSESSMENT: NONE - DENIES PAIN

## 2023-03-24 NOTE — CARE COORDINATION
A case management consult is noted to see Mr. Kiah Rocha in the emergency department regarding rehab placement. He is well known to  from a previous admission. The chart was reviewed. Met with him at the bedside. He is alert and oriented self and hospital. He is anxious. Poor short term memory. He does not understand why he is here. He cannot recall events that happened yesterday. He deferred to his sister, Ruby Salgado. Spoke with Merarygiselle Damian via phone. Explained therapy has recommended a skilled nursing facility. She is interested in a referral to [facility] where Raymond Miles received rehab late last year. A referral to [facility] was made via [employer]. Left a message for Tim Vickers in admissions [phone]. Waiting to hear back regarding a decision.     Ronak Mora RN  Case Management  [phone]

## 2023-03-24 NOTE — ED PROVIDER NOTES
ED Attending Attestation Note     Date of evaluation: 3/23/2023    This patient was seen by the advance practice provider. I have seen and examined the patient, agree with the workup, evaluation, management and diagnosis. The care plan has been discussed. My assessment reveals an here after a fall while intoxicated. He was found by his sister laying on his back in the bathroom. The patient was not found to have any injuries on examination but was found to be intoxicated. Sister cannot care for the patient any longer. The patient's remaining lab evaluation was unremarkable. He will need to be kept in the emergency department for PT and OT as well as case management for the morning.      Rhina Slaughter MD  03/23/23 1001
[facility] Emergency Department  ED Observation Disposition Note   Emergency Physicians         Diagnostic Evaluation      Diagnostic studies germane to this period of clinical observation include:    Case management  PT/OT evaluation     Consultant(s) Final Recommendations      - Consider admission d/t concern for acute alcohol withdrawal     Impression and Plan      Sunitha Alston has been cared for according to the standard [facility] observation protocol for skilled nursing placement. This extended period of observation was specifically required to determine the need for hospitalization. Prior to discharge from observation, the final physical exam is documented below. Significant events during the course of observation based on the goals of the clinical problem list include:    - Patient was seen by case management  - During the course of his ED observation period, patient became increasingly tremulous, tachycardic, and disoriented concerning for alcohol withdrawal.  He was started on PO benzodiazepines. As he is in acute withdrawal, he is not appropriate for SNF placement and will require hospital admission for further management. Based on the patient's condition, clinical response, and diagnostic information obtained during this period of observation, the plan is to Admit to telemetry    The total length of observation was 14 hours. Dr. Prosper Rivas is the observation disposition attending. Subjective      Sunitha Alston has undergone comprehensive diagnostic evaluation and therapeutic management in accordance with the CDU guidelines for skilled nursing placement. At the time of disposition, the patient was awake and alert but remained extremely confused. Physical Examination      Physical Exam  Constitutional:       General: He is not in acute distress. HENT:      Head: Normocephalic and atraumatic. Eyes:      Extraocular Movements: Extraocular movements intact.
trauma. Given concerns for alcohol intoxication, head CT, C-spine CT, BMP, CBC, urinalysis, urine drug screen, EtOH were obtained. Urinalysis without evidence of infection  CBC without leukocytosis or anemia  BMP without electrolyte abnormalities  Urine drug screen pending  Ethanol 253    Head CT and C-spine CT unremarkable. Patient's sister told the nurse that she is uncomfortable with him coming back home and that she feels that he is unsafe due to his ongoing drinking and dementia. She is unable to be with him 24 hours a day, and does not feel that he is safe alone. He evidently was in a facility previously, and she would like him evaluated for possible facility placement. PT/OT orders placed, case management orders placed and patient will be placed in ED observation overnight. Medical Decision Making  Problems Addressed:  Acute alcoholic intoxication without complication ([facility] [address]): acute illness or injury  Fall, initial encounter: acute illness or injury    Amount and/or Complexity of Data Reviewed  Labs: ordered. Decision-making details documented in ED Course. Radiology: ordered. Decision-making details documented in ED Course. Risk  OTC drugs. Prescription drug management. This patient was also evaluated by the attending physician. All care plans werediscussed and agreed upon. Clinical Impression     1. Fall, initial encounter    2. Acute alcoholic intoxication without complication ([address]        Disposition       DISPOSITION    ED observation for PT/OT evaluation and case management in the morning due to need for facility placement        Diagnostic Results And Other Data         RADIOLOGY:  CT CERVICAL SPINE WO CONTRAST   Final Result      1. No acute traumatic abnormality. CT HEAD WO CONTRAST   Final Result      1. No acute intracranial hemorrhage or mass effect. 2. Bilateral parietal encephalomalacia, unchanged.           LABS:   Results for orders placed or performed

## 2023-03-24 NOTE — ED NOTES
[facility] Emergency Department  Status Note   Emergency Physicians          Assessment      Chiki Gruber has been placed into observation status in the Emergency Department awaiting PT and OT as well as case management consultation for possible placement given that the patient is unable to care for himself adequately given his dementia and alcohol history and family can no longer care for him. .    The patient's condition has improved with regard to intoxication level.      Plan      -PT and OT in the morning  -Case management consult in the morning        Mendoza Masterson MD  03/23/23 7416

## 2023-03-24 NOTE — H&P
Internal Medicine  History & Physical      CC:  alcohol withdrawal    HistoryObtained From:  electronic medical record, reason patient could not give history:  expressive aphasia    HISTORY OF PRESENT ILLNESS:  Nik Sevilla is a 57 y.o. male with PMH of alcohol use disorder, CAD, CHF, HTN, HLD, DVT, intracranial hemorrhage who p/w alcohol intoxication and an unwitnessed fall. Per chart review, the patient's sister found him on the floor in the bathroom and called EMS. The patient states that he does not know why he was brought to the hospital, and that he has not had alcohol in \"several months\". History and ROS was limited due to the patient's mental status; he exhibits paraphrasic errors and neologisms when answering most questions. Called patient's sister who reported that patient drinks daily and tries to hide it by drinking in his car or at gas stations. He has become increasingly aggressive and hostile while intoxicated. She states that on the night of admission patient was both verbally aggressive and physically threatening towards her. She does not think he has had any DT or seizures from withdrawal.    ED course:   Patient presented to the ED with concern for unwitnessed fall and alcohol intoxication. CT head/spine were obtained an unremarkable. The patient's sister is concerned for his safety at home and is interested in having him evaluated for placement.  He was initially kept in the ED for observation, but due to his symptomatic alcohol withdrawal (CIWA 18) he was instead admitted for alcohol withdrawal.    Past Medical History:        Diagnosis Date    Alcohol abuse     CAD (coronary artery disease)     with complete LAD occlusion    CHF (congestive heart failure) (HCC)     LVEF    Essential tremor     HTN (hypertension)     Hx of blood clots 05/2021    Hyperlipidemia     ICH (intracerebral hemorrhage) (HCC)     Lower extremity cellulitis     MI (mitral incompetence)        Past Surgical

## 2023-03-25 LAB
ANION GAP SERPL CALCULATED.3IONS-SCNC: 11 MMOL/L (ref 3–16)
BASOPHILS # BLD: 0.1 K/UL (ref 0–0.2)
BASOPHILS NFR BLD: 0.8 %
BUN SERPL-MCNC: 10 MG/DL (ref 7–20)
CALCIUM SERPL-MCNC: 8.6 MG/DL (ref 8.3–10.6)
CHLORIDE SERPL-SCNC: 102 MMOL/L (ref 99–110)
CO2 SERPL-SCNC: 22 MMOL/L (ref 21–32)
CREAT SERPL-MCNC: 0.8 MG/DL (ref 0.8–1.3)
DEPRECATED RDW RBC AUTO: 15.3 % (ref 12.4–15.4)
EOSINOPHIL # BLD: 0.1 K/UL (ref 0–0.6)
EOSINOPHIL NFR BLD: 1.1 %
GFR SERPLBLD CREATININE-BSD FMLA CKD-EPI: >60 ML/MIN/{1.73_M2}
GLUCOSE SERPL-MCNC: 92 MG/DL (ref 70–99)
HCT VFR BLD AUTO: 40.2 % (ref 40.5–52.5)
HGB BLD-MCNC: 14.2 G/DL (ref 13.5–17.5)
LYMPHOCYTES # BLD: 2.2 K/UL (ref 1–5.1)
LYMPHOCYTES NFR BLD: 30.8 %
MCH RBC QN AUTO: 33.5 PG (ref 26–34)
MCHC RBC AUTO-ENTMCNC: 35.3 G/DL (ref 31–36)
MCV RBC AUTO: 94.8 FL (ref 80–100)
MONOCYTES # BLD: 0.8 K/UL (ref 0–1.3)
MONOCYTES NFR BLD: 11.3 %
NEUTROPHILS # BLD: 4 K/UL (ref 1.7–7.7)
NEUTROPHILS NFR BLD: 56 %
PHOSPHATE SERPL-MCNC: 3.3 MG/DL (ref 2.5–4.9)
PLATELET # BLD AUTO: 125 K/UL (ref 135–450)
PMV BLD AUTO: 7.8 FL (ref 5–10.5)
POTASSIUM SERPL-SCNC: 3.7 MMOL/L (ref 3.5–5.1)
RBC # BLD AUTO: 4.25 M/UL (ref 4.2–5.9)
SODIUM SERPL-SCNC: 135 MMOL/L (ref 136–145)
WBC # BLD AUTO: 7.1 K/UL (ref 4–11)

## 2023-03-25 PROCEDURE — 36415 COLL VENOUS BLD VENIPUNCTURE: CPT

## 2023-03-25 PROCEDURE — 6370000000 HC RX 637 (ALT 250 FOR IP): Performed by: STUDENT IN AN ORGANIZED HEALTH CARE EDUCATION/TRAINING PROGRAM

## 2023-03-25 PROCEDURE — 80048 BASIC METABOLIC PNL TOTAL CA: CPT

## 2023-03-25 PROCEDURE — 6360000002 HC RX W HCPCS: Performed by: INTERNAL MEDICINE

## 2023-03-25 PROCEDURE — 6370000000 HC RX 637 (ALT 250 FOR IP)

## 2023-03-25 PROCEDURE — 85025 COMPLETE CBC W/AUTO DIFF WBC: CPT

## 2023-03-25 PROCEDURE — 1200000000 HC SEMI PRIVATE

## 2023-03-25 PROCEDURE — 84100 ASSAY OF PHOSPHORUS: CPT

## 2023-03-25 PROCEDURE — 6360000002 HC RX W HCPCS

## 2023-03-25 PROCEDURE — 2580000003 HC RX 258

## 2023-03-25 RX ADMIN — ATORVASTATIN CALCIUM 40 MG: 40 TABLET, FILM COATED ORAL at 08:40

## 2023-03-25 RX ADMIN — THIAMINE HYDROCHLORIDE 200 MG: 100 INJECTION, SOLUTION INTRAMUSCULAR; INTRAVENOUS at 21:23

## 2023-03-25 RX ADMIN — THIAMINE HYDROCHLORIDE 200 MG: 100 INJECTION, SOLUTION INTRAMUSCULAR; INTRAVENOUS at 11:26

## 2023-03-25 RX ADMIN — LORAZEPAM 2 MG: 1 TABLET ORAL at 03:50

## 2023-03-25 RX ADMIN — MAGNESIUM OXIDE TAB 400 MG (240 MG ELEMENTAL MG) 200 MG: 400 (240 MG) TAB at 21:00

## 2023-03-25 RX ADMIN — ENOXAPARIN SODIUM 40 MG: 100 INJECTION SUBCUTANEOUS at 08:41

## 2023-03-25 RX ADMIN — MAGNESIUM OXIDE TAB 400 MG (240 MG ELEMENTAL MG) 200 MG: 400 (240 MG) TAB at 08:41

## 2023-03-25 RX ADMIN — ASPIRIN 81 MG 81 MG: 81 TABLET ORAL at 08:41

## 2023-03-25 RX ADMIN — SODIUM CHLORIDE, PRESERVATIVE FREE 10 ML: 5 INJECTION INTRAVENOUS at 21:26

## 2023-03-25 RX ADMIN — SODIUM CHLORIDE, PRESERVATIVE FREE 5 ML: 5 INJECTION INTRAVENOUS at 00:10

## 2023-03-25 RX ADMIN — THIAMINE HYDROCHLORIDE 200 MG: 100 INJECTION, SOLUTION INTRAMUSCULAR; INTRAVENOUS at 15:56

## 2023-03-25 RX ADMIN — HALOPERIDOL LACTATE 2 MG: 5 INJECTION, SOLUTION INTRAMUSCULAR at 00:30

## 2023-03-25 RX ADMIN — SODIUM CHLORIDE, PRESERVATIVE FREE 10 ML: 5 INJECTION INTRAVENOUS at 08:41

## 2023-03-25 RX ADMIN — CARVEDILOL 3.12 MG: 6.25 TABLET, FILM COATED ORAL at 08:41

## 2023-03-25 RX ADMIN — SODIUM CHLORIDE, PRESERVATIVE FREE 10 ML: 5 INJECTION INTRAVENOUS at 15:56

## 2023-03-25 RX ADMIN — SODIUM CHLORIDE, PRESERVATIVE FREE 10 ML: 5 INJECTION INTRAVENOUS at 11:27

## 2023-03-25 RX ADMIN — THERA TABS 1 TABLET: TAB at 08:41

## 2023-03-25 RX ADMIN — LEVETIRACETAM 750 MG: 500 TABLET, FILM COATED ORAL at 21:00

## 2023-03-25 RX ADMIN — FOLIC ACID 1 MG: 1 TABLET ORAL at 08:41

## 2023-03-25 RX ADMIN — CARVEDILOL 3.12 MG: 6.25 TABLET, FILM COATED ORAL at 21:00

## 2023-03-25 RX ADMIN — LEVETIRACETAM 750 MG: 500 TABLET, FILM COATED ORAL at 08:40

## 2023-03-25 RX ADMIN — VITAM B12 50 MCG: 100 TAB at 08:40

## 2023-03-25 ASSESSMENT — PAIN SCALES - GENERAL
PAINLEVEL_OUTOF10: 0
PAINLEVEL_OUTOF10: 2

## 2023-03-25 NOTE — DISCHARGE INSTRUCTIONS
Extra Heart Failure sites:     https://Ablynx.com/   --- this is American Heart Association interactive Healthier Living with Heart Failure guidebook. Please click hyperlink or copy / paste link into search bar. Use your mouse to scroll through the pages. Lots of information about weight monitoring, diet tips, activity, meds, etc    HF Caputa ingris  -- this is a free smart phone ingris available for iPhone and Android download. Use your phone to track sodium / fluid intake, zone tool symptom tracking, weights, medications, etc. Click on this hyperlink  HF Caputa Ingris   for QR code for easy download. DASH (Dietary Approach to Stop Hypertension) diet --  SeekAlumni.no -- this diet is a flexible eating plan that promotes heart healthy eating style. Click on hyperlink or copy / paste link into search bar. Lots of low sodium recipes and tips. CigarRepair.ca  -- more free recipes         Activity as tolerated.

## 2023-03-26 LAB
ANION GAP SERPL CALCULATED.3IONS-SCNC: 11 MMOL/L (ref 3–16)
BASOPHILS # BLD: 0.1 K/UL (ref 0–0.2)
BASOPHILS NFR BLD: 0.9 %
BUN SERPL-MCNC: 12 MG/DL (ref 7–20)
CALCIUM SERPL-MCNC: 9 MG/DL (ref 8.3–10.6)
CHLORIDE SERPL-SCNC: 100 MMOL/L (ref 99–110)
CO2 SERPL-SCNC: 25 MMOL/L (ref 21–32)
CREAT SERPL-MCNC: 0.8 MG/DL (ref 0.8–1.3)
DEPRECATED RDW RBC AUTO: 14.8 % (ref 12.4–15.4)
EOSINOPHIL # BLD: 0.2 K/UL (ref 0–0.6)
EOSINOPHIL NFR BLD: 2.6 %
GFR SERPLBLD CREATININE-BSD FMLA CKD-EPI: >60 ML/MIN/{1.73_M2}
GLUCOSE SERPL-MCNC: 85 MG/DL (ref 70–99)
HCT VFR BLD AUTO: 42.5 % (ref 40.5–52.5)
HGB BLD-MCNC: 14.7 G/DL (ref 13.5–17.5)
LYMPHOCYTES # BLD: 1.8 K/UL (ref 1–5.1)
LYMPHOCYTES NFR BLD: 23.3 %
MCH RBC QN AUTO: 33.2 PG (ref 26–34)
MCHC RBC AUTO-ENTMCNC: 34.6 G/DL (ref 31–36)
MCV RBC AUTO: 96 FL (ref 80–100)
MONOCYTES # BLD: 0.7 K/UL (ref 0–1.3)
MONOCYTES NFR BLD: 9.1 %
NEUTROPHILS # BLD: 4.8 K/UL (ref 1.7–7.7)
NEUTROPHILS NFR BLD: 64.1 %
PHOSPHATE SERPL-MCNC: 4 MG/DL (ref 2.5–4.9)
PLATELET # BLD AUTO: 120 K/UL (ref 135–450)
PMV BLD AUTO: 7.5 FL (ref 5–10.5)
POTASSIUM SERPL-SCNC: 4.5 MMOL/L (ref 3.5–5.1)
RBC # BLD AUTO: 4.43 M/UL (ref 4.2–5.9)
SODIUM SERPL-SCNC: 136 MMOL/L (ref 136–145)
WBC # BLD AUTO: 7.5 K/UL (ref 4–11)

## 2023-03-26 PROCEDURE — 6370000000 HC RX 637 (ALT 250 FOR IP): Performed by: STUDENT IN AN ORGANIZED HEALTH CARE EDUCATION/TRAINING PROGRAM

## 2023-03-26 PROCEDURE — 36415 COLL VENOUS BLD VENIPUNCTURE: CPT

## 2023-03-26 PROCEDURE — 6360000002 HC RX W HCPCS: Performed by: INTERNAL MEDICINE

## 2023-03-26 PROCEDURE — 6370000000 HC RX 637 (ALT 250 FOR IP)

## 2023-03-26 PROCEDURE — 6360000002 HC RX W HCPCS

## 2023-03-26 PROCEDURE — 1200000000 HC SEMI PRIVATE

## 2023-03-26 PROCEDURE — 84100 ASSAY OF PHOSPHORUS: CPT

## 2023-03-26 PROCEDURE — 80048 BASIC METABOLIC PNL TOTAL CA: CPT

## 2023-03-26 PROCEDURE — 85025 COMPLETE CBC W/AUTO DIFF WBC: CPT

## 2023-03-26 PROCEDURE — 2580000003 HC RX 258

## 2023-03-26 RX ORDER — MULTIVITAMIN WITH IRON
1 TABLET ORAL DAILY
Refills: 0 | COMMUNITY
Start: 2023-03-27

## 2023-03-26 RX ORDER — GAUZE BANDAGE 2" X 2"
100 BANDAGE TOPICAL DAILY
Status: DISCONTINUED | OUTPATIENT
Start: 2023-03-27 | End: 2023-03-28 | Stop reason: HOSPADM

## 2023-03-26 RX ADMIN — THERA TABS 1 TABLET: TAB at 08:19

## 2023-03-26 RX ADMIN — MAGNESIUM OXIDE TAB 400 MG (240 MG ELEMENTAL MG) 200 MG: 400 (240 MG) TAB at 08:19

## 2023-03-26 RX ADMIN — LEVETIRACETAM 750 MG: 500 TABLET, FILM COATED ORAL at 20:50

## 2023-03-26 RX ADMIN — ATORVASTATIN CALCIUM 40 MG: 40 TABLET, FILM COATED ORAL at 08:19

## 2023-03-26 RX ADMIN — VITAM B12 50 MCG: 100 TAB at 08:20

## 2023-03-26 RX ADMIN — ASPIRIN 81 MG 81 MG: 81 TABLET ORAL at 08:19

## 2023-03-26 RX ADMIN — CARVEDILOL 3.12 MG: 6.25 TABLET, FILM COATED ORAL at 08:19

## 2023-03-26 RX ADMIN — SODIUM CHLORIDE, PRESERVATIVE FREE 10 ML: 5 INJECTION INTRAVENOUS at 20:55

## 2023-03-26 RX ADMIN — THIAMINE HYDROCHLORIDE 200 MG: 100 INJECTION, SOLUTION INTRAMUSCULAR; INTRAVENOUS at 08:27

## 2023-03-26 RX ADMIN — CARVEDILOL 3.12 MG: 6.25 TABLET, FILM COATED ORAL at 20:49

## 2023-03-26 RX ADMIN — SODIUM CHLORIDE, PRESERVATIVE FREE 10 ML: 5 INJECTION INTRAVENOUS at 08:27

## 2023-03-26 RX ADMIN — LEVETIRACETAM 750 MG: 500 TABLET, FILM COATED ORAL at 08:19

## 2023-03-26 RX ADMIN — FOLIC ACID 1 MG: 1 TABLET ORAL at 08:19

## 2023-03-26 RX ADMIN — MAGNESIUM OXIDE TAB 400 MG (240 MG ELEMENTAL MG) 200 MG: 400 (240 MG) TAB at 20:51

## 2023-03-26 RX ADMIN — ENOXAPARIN SODIUM 40 MG: 100 INJECTION SUBCUTANEOUS at 08:18

## 2023-03-26 ASSESSMENT — PAIN SCALES - GENERAL
PAINLEVEL_OUTOF10: 0

## 2023-03-26 NOTE — DISCHARGE INSTR - COC
agitation ([facility] F10.97    Alcohol dependence with withdrawal ([facility] F10.239    Alcohol withdrawal syndrome without complication ([facility] D39.043       Isolation/Infection:   Isolation            No Isolation          Patient Infection Status       Infection Onset Added Last Indicated Last Indicated By Review Planned Expiration Resolved Resolved By    None active    Resolved    C-diff Rule Out 08/22/21 08/22/21 08/22/21 Clostridium Difficile Toxin/Antigen (Ordered)   09/07/21 Juan Francisco Luciano RN    From prior admission    C-diff Rule Out 07/25/20 07/25/20 07/25/20 Clostridium difficile toxin/antigen (Ordered)   07/25/20 Rule-Out Test Resulted            Nurse Assessment:  Last Vital Signs: /73   Pulse 73   Temp 97.7 °F (36.5 °C) (Oral)   Resp 18   Ht 5' 10\" (1.778 m)   Wt 191 lb 2.2 oz (86.7 kg)   SpO2 95%   BMI 27.43 kg/m²     Last documented pain score (0-10 scale): Pain Level: 0  Last Weight:   Wt Readings from Last 1 Encounters:   03/25/23 191 lb 2.2 oz (86.7 kg)     Mental Status:  alert and oriented x2/3 confused to time     IV Access:  - None    Nursing Mobility/ADLs:  Walking   Assisted  Transfer  Assisted  Bathing  Assisted  Dressing  Assisted  Toileting  Independent  Feeding  Independent  Med Admin  Dependent  Med Delivery   whole    Wound Care Documentation and Therapy:  Wound 05/12/20 Leg Lower; Left excoriation (scratches) linear scattered around in dark cellulitic skin (Active)   Number of days: 1047       Wound 05/12/20 Leg Lower;Right excoriation (linear scratches) scattered within dark cellujlitic discolored skin (Active)   Number of days: 1047        Elimination:  Continence:    Bowel: Yes  Bladder: Yes  Urinary Catheter: None   Colostomy/Ileostomy/Ileal Conduit: No       Date of Last BM: 3/26/2023    Intake/Output Summary (Last 24 hours) at 3/26/2023 1033  Last data filed at 3/26/2023 0452  Gross per 24 hour   Intake 120 ml   Output 800 ml   Net -680 ml     I/O last 3 completed

## 2023-03-26 NOTE — CARE COORDINATION
12:33 PM  Call placed to [facility]. LVM with call back. Electronically signed by Jackson Orlando RN, CM on 3/26/2023 at 12:33 PM.  Phone: [phone]  Fax: [phone]      10:38 AM  Call placed to [facility]. LVM with call back.      Electronically signed by Jackson Orlando RN, CM on 3/26/2023 at 10:40 AM.  Phone: [phone]  Fax: [phone]

## 2023-03-27 LAB
ANION GAP SERPL CALCULATED.3IONS-SCNC: 11 MMOL/L (ref 3–16)
BASOPHILS # BLD: 0 K/UL (ref 0–0.2)
BASOPHILS NFR BLD: 0.6 %
BUN SERPL-MCNC: 16 MG/DL (ref 7–20)
CALCIUM SERPL-MCNC: 8.8 MG/DL (ref 8.3–10.6)
CHLORIDE SERPL-SCNC: 103 MMOL/L (ref 99–110)
CO2 SERPL-SCNC: 23 MMOL/L (ref 21–32)
CREAT SERPL-MCNC: 0.7 MG/DL (ref 0.8–1.3)
DEPRECATED RDW RBC AUTO: 14.8 % (ref 12.4–15.4)
EOSINOPHIL # BLD: 0.2 K/UL (ref 0–0.6)
EOSINOPHIL NFR BLD: 3.1 %
GFR SERPLBLD CREATININE-BSD FMLA CKD-EPI: >60 ML/MIN/{1.73_M2}
GLUCOSE SERPL-MCNC: 86 MG/DL (ref 70–99)
HCT VFR BLD AUTO: 42.2 % (ref 40.5–52.5)
HGB BLD-MCNC: 14.8 G/DL (ref 13.5–17.5)
LYMPHOCYTES # BLD: 2.2 K/UL (ref 1–5.1)
LYMPHOCYTES NFR BLD: 28.8 %
MAGNESIUM SERPL-MCNC: 1.7 MG/DL (ref 1.8–2.4)
MCH RBC QN AUTO: 33.6 PG (ref 26–34)
MCHC RBC AUTO-ENTMCNC: 35.1 G/DL (ref 31–36)
MCV RBC AUTO: 95.7 FL (ref 80–100)
MONOCYTES # BLD: 0.8 K/UL (ref 0–1.3)
MONOCYTES NFR BLD: 10.4 %
NEUTROPHILS # BLD: 4.3 K/UL (ref 1.7–7.7)
NEUTROPHILS NFR BLD: 57.1 %
PHOSPHATE SERPL-MCNC: 3.3 MG/DL (ref 2.5–4.9)
PLATELET # BLD AUTO: 142 K/UL (ref 135–450)
PMV BLD AUTO: 8.1 FL (ref 5–10.5)
POTASSIUM SERPL-SCNC: 3.7 MMOL/L (ref 3.5–5.1)
RBC # BLD AUTO: 4.41 M/UL (ref 4.2–5.9)
SODIUM SERPL-SCNC: 137 MMOL/L (ref 136–145)
WBC # BLD AUTO: 7.6 K/UL (ref 4–11)

## 2023-03-27 PROCEDURE — 6370000000 HC RX 637 (ALT 250 FOR IP): Performed by: INTERNAL MEDICINE

## 2023-03-27 PROCEDURE — 84100 ASSAY OF PHOSPHORUS: CPT

## 2023-03-27 PROCEDURE — 83735 ASSAY OF MAGNESIUM: CPT

## 2023-03-27 PROCEDURE — 1200000000 HC SEMI PRIVATE

## 2023-03-27 PROCEDURE — 97116 GAIT TRAINING THERAPY: CPT

## 2023-03-27 PROCEDURE — 2580000003 HC RX 258

## 2023-03-27 PROCEDURE — 36415 COLL VENOUS BLD VENIPUNCTURE: CPT

## 2023-03-27 PROCEDURE — 80048 BASIC METABOLIC PNL TOTAL CA: CPT

## 2023-03-27 PROCEDURE — 6370000000 HC RX 637 (ALT 250 FOR IP): Performed by: STUDENT IN AN ORGANIZED HEALTH CARE EDUCATION/TRAINING PROGRAM

## 2023-03-27 PROCEDURE — 6360000002 HC RX W HCPCS

## 2023-03-27 PROCEDURE — 85025 COMPLETE CBC W/AUTO DIFF WBC: CPT

## 2023-03-27 PROCEDURE — 97530 THERAPEUTIC ACTIVITIES: CPT

## 2023-03-27 PROCEDURE — 6370000000 HC RX 637 (ALT 250 FOR IP)

## 2023-03-27 RX ADMIN — FOLIC ACID 1 MG: 1 TABLET ORAL at 09:26

## 2023-03-27 RX ADMIN — VITAM B12 50 MCG: 100 TAB at 09:26

## 2023-03-27 RX ADMIN — MAGNESIUM OXIDE TAB 400 MG (240 MG ELEMENTAL MG) 200 MG: 400 (240 MG) TAB at 20:15

## 2023-03-27 RX ADMIN — MAGNESIUM OXIDE TAB 400 MG (240 MG ELEMENTAL MG) 200 MG: 400 (240 MG) TAB at 09:25

## 2023-03-27 RX ADMIN — ASPIRIN 81 MG 81 MG: 81 TABLET ORAL at 09:25

## 2023-03-27 RX ADMIN — ATORVASTATIN CALCIUM 40 MG: 40 TABLET, FILM COATED ORAL at 09:26

## 2023-03-27 RX ADMIN — THIAMINE HCL TAB 100 MG 100 MG: 100 TAB at 09:26

## 2023-03-27 RX ADMIN — CARVEDILOL 3.12 MG: 6.25 TABLET, FILM COATED ORAL at 09:25

## 2023-03-27 RX ADMIN — THERA TABS 1 TABLET: TAB at 09:25

## 2023-03-27 RX ADMIN — ENOXAPARIN SODIUM 40 MG: 100 INJECTION SUBCUTANEOUS at 09:25

## 2023-03-27 RX ADMIN — SODIUM CHLORIDE, PRESERVATIVE FREE 10 ML: 5 INJECTION INTRAVENOUS at 09:26

## 2023-03-27 RX ADMIN — LEVETIRACETAM 750 MG: 500 TABLET, FILM COATED ORAL at 09:25

## 2023-03-27 RX ADMIN — LEVETIRACETAM 750 MG: 500 TABLET, FILM COATED ORAL at 20:14

## 2023-03-27 RX ADMIN — CARVEDILOL 3.12 MG: 6.25 TABLET, FILM COATED ORAL at 20:14

## 2023-03-27 ASSESSMENT — PAIN SCALES - GENERAL
PAINLEVEL_OUTOF10: 0

## 2023-03-27 NOTE — CARE COORDINATION
CARLY spoke with Fozia in admissions at [facility] [place] [place]. Anya oliviers feels like patient is not SNF appropriate and would not accept. SW contacted patient and patient's sister Jose Love ( [phone] ) and discussed the SNF denial. Patient and family do not know of any other SNF's in the area and want SW to search for SNF's in the area. Patient and family open to any option. SW following.      Electronically signed by LUBNA Eduardo on 3/27/2023 at 9:09 AM

## 2023-03-28 VITALS
TEMPERATURE: 97.8 F | HEART RATE: 78 BPM | BODY MASS INDEX: 25.06 KG/M2 | DIASTOLIC BLOOD PRESSURE: 82 MMHG | SYSTOLIC BLOOD PRESSURE: 122 MMHG | HEIGHT: 70 IN | OXYGEN SATURATION: 96 % | WEIGHT: 175.02 LBS | RESPIRATION RATE: 18 BRPM

## 2023-03-28 LAB
ANION GAP SERPL CALCULATED.3IONS-SCNC: 9 MMOL/L (ref 3–16)
BASOPHILS # BLD: 0 K/UL (ref 0–0.2)
BASOPHILS NFR BLD: 0.6 %
BUN SERPL-MCNC: 21 MG/DL (ref 7–20)
CALCIUM SERPL-MCNC: 9.2 MG/DL (ref 8.3–10.6)
CHLORIDE SERPL-SCNC: 103 MMOL/L (ref 99–110)
CO2 SERPL-SCNC: 25 MMOL/L (ref 21–32)
CREAT SERPL-MCNC: 0.8 MG/DL (ref 0.8–1.3)
DEPRECATED RDW RBC AUTO: 14.8 % (ref 12.4–15.4)
EOSINOPHIL # BLD: 0.2 K/UL (ref 0–0.6)
EOSINOPHIL NFR BLD: 2.9 %
GFR SERPLBLD CREATININE-BSD FMLA CKD-EPI: >60 ML/MIN/{1.73_M2}
GLUCOSE SERPL-MCNC: 108 MG/DL (ref 70–99)
HCT VFR BLD AUTO: 40.6 % (ref 40.5–52.5)
HGB BLD-MCNC: 14.4 G/DL (ref 13.5–17.5)
LYMPHOCYTES # BLD: 2.2 K/UL (ref 1–5.1)
LYMPHOCYTES NFR BLD: 29.4 %
MCH RBC QN AUTO: 34.1 PG (ref 26–34)
MCHC RBC AUTO-ENTMCNC: 35.5 G/DL (ref 31–36)
MCV RBC AUTO: 95.9 FL (ref 80–100)
MONOCYTES # BLD: 0.8 K/UL (ref 0–1.3)
MONOCYTES NFR BLD: 11 %
NEUTROPHILS # BLD: 4.3 K/UL (ref 1.7–7.7)
NEUTROPHILS NFR BLD: 56.1 %
PLATELET # BLD AUTO: 127 K/UL (ref 135–450)
PMV BLD AUTO: 8 FL (ref 5–10.5)
POTASSIUM SERPL-SCNC: 3.9 MMOL/L (ref 3.5–5.1)
RBC # BLD AUTO: 4.23 M/UL (ref 4.2–5.9)
SODIUM SERPL-SCNC: 137 MMOL/L (ref 136–145)
WBC # BLD AUTO: 7.6 K/UL (ref 4–11)

## 2023-03-28 PROCEDURE — 80048 BASIC METABOLIC PNL TOTAL CA: CPT

## 2023-03-28 PROCEDURE — 6370000000 HC RX 637 (ALT 250 FOR IP): Performed by: INTERNAL MEDICINE

## 2023-03-28 PROCEDURE — 97530 THERAPEUTIC ACTIVITIES: CPT

## 2023-03-28 PROCEDURE — 6370000000 HC RX 637 (ALT 250 FOR IP): Performed by: STUDENT IN AN ORGANIZED HEALTH CARE EDUCATION/TRAINING PROGRAM

## 2023-03-28 PROCEDURE — 2580000003 HC RX 258

## 2023-03-28 PROCEDURE — 6370000000 HC RX 637 (ALT 250 FOR IP)

## 2023-03-28 PROCEDURE — 85025 COMPLETE CBC W/AUTO DIFF WBC: CPT

## 2023-03-28 PROCEDURE — 97535 SELF CARE MNGMENT TRAINING: CPT

## 2023-03-28 PROCEDURE — 6360000002 HC RX W HCPCS

## 2023-03-28 PROCEDURE — 36415 COLL VENOUS BLD VENIPUNCTURE: CPT

## 2023-03-28 RX ADMIN — ENOXAPARIN SODIUM 40 MG: 100 INJECTION SUBCUTANEOUS at 09:55

## 2023-03-28 RX ADMIN — LORAZEPAM 2 MG: 2 INJECTION INTRAMUSCULAR; INTRAVENOUS at 15:32

## 2023-03-28 RX ADMIN — LEVETIRACETAM 750 MG: 500 TABLET, FILM COATED ORAL at 09:55

## 2023-03-28 RX ADMIN — VITAM B12 50 MCG: 100 TAB at 09:58

## 2023-03-28 RX ADMIN — MAGNESIUM OXIDE TAB 400 MG (240 MG ELEMENTAL MG) 200 MG: 400 (240 MG) TAB at 09:57

## 2023-03-28 RX ADMIN — ATORVASTATIN CALCIUM 40 MG: 40 TABLET, FILM COATED ORAL at 09:58

## 2023-03-28 RX ADMIN — FOLIC ACID 1 MG: 1 TABLET ORAL at 09:57

## 2023-03-28 RX ADMIN — ASPIRIN 81 MG 81 MG: 81 TABLET ORAL at 09:57

## 2023-03-28 RX ADMIN — CARVEDILOL 3.12 MG: 6.25 TABLET, FILM COATED ORAL at 09:58

## 2023-03-28 RX ADMIN — THERA TABS 1 TABLET: TAB at 09:58

## 2023-03-28 RX ADMIN — THIAMINE HCL TAB 100 MG 100 MG: 100 TAB at 09:58

## 2023-03-28 RX ADMIN — SODIUM CHLORIDE, PRESERVATIVE FREE 10 ML: 5 INJECTION INTRAVENOUS at 09:58

## 2023-03-28 RX ADMIN — LORAZEPAM 2 MG: 2 INJECTION INTRAMUSCULAR; INTRAVENOUS at 14:29

## 2023-03-28 ASSESSMENT — PAIN SCALES - GENERAL
PAINLEVEL_OUTOF10: 0

## 2023-03-28 NOTE — CARE COORDINATION
Case Management Assessment            Discharge Note                    Date / Time of Note: 3/28/2023 4:10 PM                  Discharge Note Completed by: Hiwot Collins RN    Patient Name: Jericho Barron   YOB: 1960  Diagnosis: Fall, initial encounter [W19. XXXA]  Alcohol withdrawal syndrome without complication ([facility] [address] [U84.013]  Acute alcoholic intoxication without complication [facility]) [L05.962]   Date / Time: 3/23/2023  4:56 PM    Current PCP: Jasmin Ramirez MD  Clinic patient: No    Hospitalization in the last 30 days: No       Advance Directives:  Code Status: [address] DNR form completed and on chart: Yes    Financial:  Payor: Brenita Burkitt / Plan: Brenita Burkitt [place] / Product Type: *No Product type* /      Pharmacy:    [facility] [address]  Phone: [phone] Fax: [phone]      Assistance purchasing medications?:    Assistance provided by Case Management: None at this time    Does patient want to participate in local refill/ meds to beds program?: Not Assessed    Meds To Beds General Rules:  1. Can ONLY be done Monday- Friday between 8:30am-5pm  2. Prescription(s) must be in pharmacy by 3pm to be filled same day  3. Copy of patient's insurance/ prescription drug card and patient face sheet must be sent along with the prescription(s)  4. Cost of Rx cannot be added to hospital bill. If financial assistance is needed, please contact unit  or ;  or  CANNOT provide pharmacy voucher for patients co-pays  5.  Patients can then  the prescription on their way out of the hospital at discharge, or pharmacy can deliver to the bedside if staff is available. (payment due at time of pick-up or delivery - cash, check, or card accepted)     Able to afford home medications/ Have Your Skin Lesions Been Treated?: not been treated Is This A New Presentation, Or A Follow-Up?: Skin Lesions How Severe Is Your Skin Lesion?: moderate No

## 2023-03-28 NOTE — PROGRESS NOTES
4 Eyes Admission Assessment     I agree as the admission nurse that 2 RN's have performed a thorough Head to Toe Skin Assessment on the patient. ALL assessment sites listed below have been assessed on admission. Areas assessed by both nurses:   [x]   Head, Face, and Ears   [x]   Shoulders, Back, and Chest  [x]   Arms, Elbows, and Hands   [x]   Coccyx, Sacrum, and Ischium  [x]   Legs, Feet, and Heels    Abrasion bilat knees, abrasions left hand/elbow, scattered smaller abrasions, red spot r lower abdomen, redness/excoriation around anus. Does the Patient have Skin Breakdown?   No         Mitch Prevention initiated:  Yes   Wound Care Orders initiated:  No      WOC nurse consulted for Pressure Injury (Stage 3,4, Unstageable, DTI, NWPT, and Complex wounds) or Mitch score 18 or lower:  No      Nurse 1 eSignature: Electronically signed by Sarah Lima RN on 3/25/23 at 8:28 AM EDT    **SHARE this note so that the co-signing nurse is able to place an eSignature**    Nurse 2 eSignature: Electronically signed by Mike Ferrell RN on 7/17/41 at 8:23 PM EDT
Comprehensive Nutrition Assessment    RECOMMENDATIONS:  PO Diet: Continue regular; low fat/low chol/high fiber/2g Na diet  ONS: Add Ensure BID  Nutrition Education: Education not appropriate     NUTRITION ASSESSMENT:   Nutritional summary & status: Positive nutrition screen: Currently on a Regular; low fat/low chol/high fiber/2g Na diet, no meals recorded on this admission, unable to obtain information from pt. Pt awake during visit, but A&O to self only. Pt reported poor appetite prior to admission, 5% wt loss over the past year, not significant. Poor appetite likely d/t chronic alcohol use. 600 mg B1 started as well as B12. Will add Ensure BID as pt was receptive to ONS on previous admission. Will continue to monitor. Admission/PMH: Alcohol w/drawal, wernicks encephalopathy, dementia, CHF, HTN/HLD    MALNUTRITION ASSESSMENT  Context of Malnutrition: Acute Illness   Malnutrition Status: At risk for malnutrition (Comment)  Findings of the 6 clinical characteristics of malnutrition (Minimum of 2 out of 6 clinical characteristics is required to make the diagnosis of moderate or severe Protein Calorie Malnutrition based on AND/ASPEN Guidelines):  Energy Intake:  Mild decrease in energy intake (Comment)  Weight Loss:  No significant weight loss     Body Fat Loss:  No significant body fat loss     Muscle Mass Loss:  No significant muscle mass loss    Fluid Accumulation:  No significant fluid accumulation      NUTRITION DIAGNOSIS   Inadequate oral intake related to other (comment), inadequate protein-energy intake (chronic alcohol use) as evidenced by poor intake prior to admission    Nutrition Monitoring and Evaluation:   Food/Nutrient Intake Outcomes:  Food and Nutrient Intake, Supplement Intake  Physical Signs/Symptoms Outcomes:  Biochemical Data, Nutrition Focused Physical Findings, Weight     OBJECTIVE DATA: Significant to nutrition assessment  Nutrition Related Findings: Na 135.   Wounds: None  Nutrition Goals:
Occupational Therapy  Daily Treatment Note  Patient Name: Bulmaro Ramos  MRN: [id number]    Assessment: Pt continues to require CGA for majority of mobility and encouragement and cueing to participate in self care. Pt remains anxious and a bit perseverative but is redirectable and able to participate in therapy. He requires 24 hr spvn for safety and would benefit from continued inpt OT/PT at d/c to improve balance and independence with ADLs. Discharge Recommendations: Bulmaro Ramos scored a 17/24 on the AM-PAC ADL Inpatient form. Current research shows that an AM-PAC score of 17 or less is typically not associated with a discharge to the patient's home setting. Based on the patient's AM-PAC score and their current ADL deficits, it is recommended that the patient have 3-5 sessions per week of Occupational Therapy at d/c to increase the patient's independence. Please see assessment section for further patient specific details. Equipment Needs:  No    Chart Reviewed: Yes     Other position/activity restrictions: no activity restrictions noted   Additional Pertinent Hx: Patient is a 56 y/o male admitted 3/23 with fall and alcohol intoxication. CT head and CT cervical spine (-) for acute findings. Treatment Diagnosis: Decreased activity tolerance, impaired ADLs and mobility    Subjective: Pt in bed on entry. Pleasant, anxious, cooperative. \"I don't even know how to get out of the bed. \"     Pain: No c/o pain      Objective:    Cognition/Orientation: Impaired - alert, oriented to self, place, partial situation; cues to initiate and sequence; encouragement to participate in basic self care; limited by anxiety, a bit perseverative on topics; impaired short term memory    Bed mobility   Supine to sit: SBA, min cues    Functional Mobility   Sit to Stand: CGA  Stand to Sit: CGA  Bed to Chair Transfer: CGA, walker  Static stance: SBA to CGA with UE support  Ambulation: CGA with rolling walker in room,
Patient's sister is in to see patient. Complete update was given. She is taking patient's clothes,keys and wallet. Patient is aware.
Physical Therapy  Facility/Department: [place] [address] [facility]  Daily Treatment Note  NAME: Hansel Alba  : 1960  MRN: [id number]    Date of Service: 3/27/2023    Discharge Recommendations: Hansel Alba scored a 17/24 on the AM-PAC short mobility form. Current research shows that an AM-PAC score of 17 or less is typically not associated with a discharge to the patient's home setting. Based on the patient's AM-PAC score and their current functional mobility deficits, it is recommended that the patient have 3-5 sessions per week of Physical Therapy at d/c to increase the patient's independence. Please see assessment section for further patient specific details. If patient discharges prior to next session this note will serve as a discharge summary. Please see below for the latest assessment towards goals. PT Equipment Recommendations  Equipment Needed:  (defer)    Patient Diagnosis(es): The primary encounter diagnosis was Fall, initial encounter. A diagnosis of Acute alcoholic intoxication without complication (HCC) was also pertinent to this visit. Assessment   Assessment: pt tolerated session well limited by overall endurance and generalized weakness from multiple days in bed. pt requiring CGA- min A for mobility, demonstrating improved steadiness with gait while using RW vs no AD or HHA. pt with several posterior LOB when performing standing balance tasks requiring min A to correct. pt reports feeling unsteady and required frequent rest breaks during session. pt demos poor safety awareness and dec insight into deficits. pt well below IND baseline and is a high fall risk, recommend further IP PT at dc to maximize independence. Continue PT POC  Activity Tolerance: Patient tolerated treatment well;Patient limited by endurance; Patient limited by fatigue  Equipment Needed:  (defer)     Plan    Physcial Therapy Plan  General Plan:  (2-5)  Current Treatment Recommendations: Strengthening; Endurance
Report called to [employer]. All questions answered. Pt disoriented but calm and resting comfortably with eyes closed. VSS on RA. CIWA prior to transport to IP room was 7, down from 26. Bed alarm in place.
No  Referring Practitioner: ROBINSON Tafoya  Referral Date : 03/23/23  Diagnosis: fall  Follows Commands: Within Functional Limits  General Comment  Comments: Patient supine in bed upon arrival.  Subjective  Subjective: Patient anxious but agreeable to PT evaluation. Social/Functional History  Social/Functional History  Lives With: Family (sister - works days)  Type of Home: House  Home Layout: Two level, Bed/Bath upstairs  Home Access: Stairs to enter with rails  Entrance Stairs - Number of Steps: a couple  Bathroom Shower/Tub: Walk-in shower  Bathroom Toilet: Standard  Bathroom Equipment: [address]:  (no DME)  Has the patient had two or more falls in the past year or any fall with injury in the past year?: Yes (unknown amount, seems to have had several)  ADL Assistance: Independent  Homemaking Assistance:  (light meal prep; otherwise sister does all)  Ambulation Assistance: Independent  Transfer Assistance: Independent  Active : No (\"not for about a month\")  Occupation: On disability  Vision/Hearing  Vision  Vision: Within Functional Limits  Hearing  Hearing: Within functional limits    Cognition   Orientation  Overall Orientation Status: Impaired  Orientation Level: Oriented to person;Oriented to place; Disoriented to situation;Disoriented to time (oriented to [employer]")  Cognition  Overall Cognitive Status: Exceptions  Following Commands:  Follows one step commands with repetition  Attention Span: Attends with cues to redirect  Memory: Decreased recall of precautions;Decreased recall of recent events;Decreased short term memory  Safety Judgement: Decreased awareness of need for safety  Problem Solving: Assistance required to generate solutions;Assistance required to implement solutions;Assistance required to identify errors made;Assistance required to correct errors made  Insights: Decreased awareness of deficits  Initiation: Requires cues for some  Sequencing:
cellulitis     MI (mitral incompetence)        Past Surgical Hx:      Procedure Laterality Date    PTCA      UPPER GASTROINTESTINAL ENDOSCOPY N/A 7/28/2020    EGD BIOPSY performed by Stuart Lazaro MD at [facility] ENDOSCOPY        Home Medication:  Prior to Admission medications    Medication Sig Start Date End Date Taking? Authorizing Provider   melatonin 5 MG TBDP disintegrating tablet Take 1 tablet by mouth nightly 3/15/23   Preet Dickerson MD   levETIRAcetam (KEPPRA) 750 MG tablet TAKE 2 TABLETS BY MOUTH TWICE DAILY 3/15/23   Preet Dickerson MD   carboxymethylcellulose 1 % ophthalmic solution Place 1 drop into both eyes 2 times daily 3/15/23   Preet Dickerson MD   aspirin 81 MG EC tablet Take 1 tablet by mouth daily 3/15/23   Preet Dickerson MD   carvedilol (COREG) 3.125 MG tablet Take 1 tablet by mouth 2 times daily 3/15/23   Preet Dickerson MD   folic acid (FOLVITE) 1 MG tablet Take 1 tablet by mouth daily 3/15/23   Preet Dickerson MD   magnesium 200 MG TABS tablet Take 2 tablets by mouth daily 3/15/23   Preet Dickerson MD   vitamin B-12 (CYANOCOBALAMIN) 100 MCG tablet Take 1 tablet by mouth daily 3/15/23   Preet Dickerson MD   vitamin D3 (CHOLECALCIFEROL) 25 MCG (1000 UT) TABS tablet Take 1 tablet by mouth daily 3/15/23   Preet Dickerson MD   Thiamine Mononitrate (B1) 100 MG TABS Take 100 mg by mouth daily 3/15/23   Preet Dickerson MD   atorvastatin (LIPITOR) 40 MG tablet Take 1 tablet by mouth daily 12/14/22   Preet Dickerson MD   hydrocortisone 1 % cream Apply topically 2 times daily  Patient not taking: Reported on 3/15/2023    Historical Provider, MD   thiamine 100 MG tablet Take 1 tablet by mouth daily 8/27/20 6/16/21  MERVIN Ramírez CNP     Allergies:  No Known Allergies    Social Hx:  Social History     Socioeconomic History    Marital status: Single     Spouse name: None    Number of children: 0    Years of education: None    Highest education level: None   Tobacco Use    Smoking status: Former     Packs/day: 0.50     Years: 
Number of children: 0    Years of education: None    Highest education level: None   Tobacco Use    Smoking status: Former     Packs/day: 0.50     Years: 40.00     Pack years: 20.00     Types: Cigarettes     Quit date:      Years since quittin.2    Smokeless tobacco: Never   Vaping Use    Vaping Use: Never used   Substance and Sexual Activity    Alcohol use: Yes     Alcohol/week: 2.0 standard drinks     Types: 2 Cans of beer per week     Comment: PATIENT DRINKING A PINT/DAY    Drug use: Never    Sexual activity: Not Currently        Family Hx:      Problem Relation Age of Onset    Stroke Mother     Heart Disease Father          Objective   Vital Signs:  Patient Vitals for the past 8 hrs:   BP Temp Temp src Pulse Resp SpO2 Height Weight   23 0518 -- -- -- -- -- -- 5' 10\" (1.778 m) 189 lb 6 oz (85.9 kg)   23 0341 117/67 98.6 °F (37 °C) Oral 84 20 95 % -- 189 lb 6 oz (85.9 kg)   23 0007 113/69 99.2 °F (37.3 °C) Oral 85 12 94 % -- --     Physical Exam  Constitutional:       Appearance: He is ill-appearing. HENT:      Head: Normocephalic and atraumatic. Mouth/Throat:      Mouth: Mucous membranes are moist.   Eyes:      Extraocular Movements: Extraocular movements intact. Pupils: Pupils are equal, round, and reactive to light. Cardiovascular:      Rate and Rhythm: Normal rate and regular rhythm. Pulses: Normal pulses. Heart sounds: Normal heart sounds. No murmur heard. No gallop. Pulmonary:      Effort: Pulmonary effort is normal. No respiratory distress. Breath sounds: Normal breath sounds. No wheezing or rales. Abdominal:      General: Abdomen is flat. Bowel sounds are normal. There is no distension. Palpations: Abdomen is soft. Tenderness: There is no abdominal tenderness. There is no guarding. Musculoskeletal:         General: No swelling. Right lower leg: No edema. Left lower leg: No edema.    Skin:     Capillary Refill: Capillary
Requires cues for some  Cognition Comment: mild aphasia (? baseline from prior ICH)  Orientation  Overall Orientation Status: Impaired  Orientation Level: Oriented to person;Oriented to place; Disoriented to situation;Disoriented to time (oriented to [employer]")                  Education Given To: Patient  Education Provided: Role of Therapy;Precautions;Orientation; Fall Prevention Strategies  Education Method: Verbal  Barriers to Learning: Cognition  Education Outcome: Continued education needed; Unable to demonstrate understanding            AM-PAC Score        AM-PAC Inpatient Daily Activity Raw Score: 16 (03/24/23 1050)  AM-PAC Inpatient ADL T-Scale Score : 35.96 (03/24/23 1050)  ADL Inpatient CMS 0-100% Score: 53.32 (03/24/23 1050)  ADL Inpatient CMS G-Code Modifier : CK (03/24/23 1050)    Tinneti Score       Goals  Short Term Goals  Time Frame for Short Term Goals: by D/C  Short Term Goal 1: Transfer to/from all common surfaces spvn - Not met  Short Term Goal 2: Dynamic stance during ADLs spvn - Not met  Short Term Goal 3: Verbalize 3 fall prevention strategies - Not met       Therapy Time   Individual Concurrent Group Co-treatment   Time In 0832         Time Out 0856         Minutes 24          Times Code Tx Min: 11  Total Tx Time: 24       Addie Rivera, OT
swelling. Right lower leg: No edema. Left lower leg: No edema. Skin:     Capillary Refill: Capillary refill takes less than 2 seconds. Coloration: Skin is not jaundiced or pale. Neurological:      General: No focal deficit present. Mental Status: He is alert and oriented to person, place, and time. Cranial Nerves: No cranial nerve deficit. Psychiatric:         Mood and Affect: Mood normal.         Behavior: Behavior normal.      Labs:  CBC:   Recent Labs     03/25/23  0625 03/26/23  0519 03/27/23  0436   WBC 7.1 7.5 7.6   HGB 14.2 14.7 14.8   HCT 40.2* 42.5 42.2   * 120* 142       BMP:   Recent Labs     03/25/23 0625 03/26/23  0519 03/27/23  0437   * 136 137   K 3.7 4.5 3.7    100 103   CO2 22 25 23   BUN 10 12 16   CREATININE 0.8 0.8 0.7*   GLUCOSE 92 85 86   PHOS 3.3 4.0  --     Magnesium:   Recent Labs     03/27/23  0436   MG 1.70*     LFT's:   Recent Labs     03/24/23  1214   AST 26   ALT 21   BILITOT 1.7*   ALKPHOS 93     INR: No results for input(s): INR in the last 72 hours. COVID-19: No results for input(s): COVID19 in the last 72 hours. Radiology:  CT CERVICAL SPINE WO CONTRAST   Final Result      1. No acute traumatic abnormality. CT HEAD WO CONTRAST   Final Result      1. No acute intracranial hemorrhage or mass effect. 2. Bilateral parietal encephalomalacia, unchanged.         Medications:   thiamine  100 mg Oral Daily    sodium chloride flush  5-40 mL IntraVENous 2 times per day    enoxaparin  40 mg SubCUTAneous Daily    multivitamin  1 tablet Oral Daily    aspirin  81 mg Oral Daily    atorvastatin  40 mg Oral Daily    carvedilol  3.125 mg Oral BID    folic acid  1 mg Oral Daily    levETIRAcetam  750 mg Oral BID    magnesium oxide  200 mg Oral BID    vitamin B-12  50 mcg Oral Daily       sodium chloride        sodium chloride flush, sodium chloride, ondansetron **OR** ondansetron, polyethylene glycol, acetaminophen **OR** acetaminophen,

## 2023-03-28 NOTE — DISCHARGE INSTR - DIET

## 2023-03-28 NOTE — PLAN OF CARE
Patient being discharged to [facility].
Problem: Safety - Adult  Goal: Free from fall injury  Outcome: Progressing
Problem: Safety - Adult  Goal: Free from fall injury  Outcome: Progressing  Flowsheets (Taken 3/25/2023 1004 by Nika Schilling RN)  Free From Fall Injury: Instruct family/caregiver on patient safety  Note: Fall and seizure precautions in place. Video monitoring for safety. Problem: Pain  Goal: Verbalizes/displays adequate comfort level or baseline comfort level  Outcome: Progressing  Flowsheets (Taken 3/26/2023 0415)  Verbalizes/displays adequate comfort level or baseline comfort level:   Encourage patient to monitor pain and request assistance   Assess pain using appropriate pain scale  Note: Pt denies any pain. No intervention at this time. Problem: Discharge Planning  Goal: Discharge to home or other facility with appropriate resources  Outcome: Progressing  Flowsheets (Taken 3/26/2023 0415)  Discharge to home or other facility with appropriate resources: Identify barriers to discharge with patient and caregiver  Note: Pt will go to SNF at discharge.       Problem: Nutrition Deficit:  Goal: Optimize nutritional status  Outcome: Progressing  Flowsheets (Taken 3/26/2023 0415)  Nutrient intake appropriate for improving, restoring, or maintaining nutritional needs:   Assess nutritional status and recommend course of action   Monitor oral intake, labs, and treatment plans
Monitor and assess patient's chronic conditions and comorbid symptoms for stability, deterioration, or improvement  Taken 3/24/2023 2151 by Sanjuanita Cobb 34 - Patient's Chronic Conditions and Co-Morbidity Symptoms are Monitored and Maintained or Improved: Monitor and assess patient's chronic conditions and comorbid symptoms for stability, deterioration, or improvement
with multidisciplinary team to address chronic and comorbid conditions and prevent exacerbation or deterioration   Update acute care plan with appropriate goals if chronic or comorbid symptoms are exacerbated and prevent overall improvement and discharge     Problem: Skin/Tissue Integrity  Goal: Absence of new skin breakdown  Description: 1. Monitor for areas of redness and/or skin breakdown  2. Assess vascular access sites hourly  3. Every 4-6 hours minimum:  Change oxygen saturation probe site  4. Every 4-6 hours:  If on nasal continuous positive airway pressure, respiratory therapy assess nares and determine need for appliance change or resting period.   Outcome: Progressing     Problem: Nutrition Deficit:  Goal: Optimize nutritional status  3/26/2023 0853 by Guevara Castro RN  Outcome: Progressing  Flowsheets (Taken 3/26/2023 9624)  Nutrient intake appropriate for improving, restoring, or maintaining nutritional needs:   Assess nutritional status and recommend course of action   Monitor oral intake, labs, and treatment plans   Order, calculate, and assess calorie counts as needed   Provide specific nutrition education to patient or family as appropriate

## 2023-03-28 NOTE — FLOWSHEET NOTE
03/27/23 2056   Assessment   Charting Type Shift assessment   Psychosocial   Psychosocial (WDL) WDL   Neurological   Neuro (WDL) X   Level of Consciousness 0   Orientation Level Oriented to situation;Oriented to person;Oriented to place;Oriented/disoriented at times   Cognition Follows commands   Speech Clear   R Pupil Size (mm) 3   R Pupil Shape Round   R Pupil Reaction Brisk   L Pupil Size (mm) 3   L Pupil Shape Round   L Pupil Reaction Brisk   Tremors   Tremor Location Hands   Tremor Severity Mild   Cr Coma Scale   Eye Opening 4   Best Verbal Response 5   Best Motor Response 6   Cr Coma Scale Score 15   HEENT (Head, Ears, Eyes, Nose, & Throat)   HEENT (WDL) X   Mucous Membrane Dry   Teeth Missing teeth   Respiratory   Respiratory (WDL) WDL   Cardiac   Cardiac (WDL) WDL   Cardiac Rhythm Sinus rhythm   Cardiac Monitor   Telemetry Box Number [id number]   Alarm Audible Yes   Telemetry Monitor Alarm Parameters    Gastrointestinal   Abdominal (WDL) WDL   Genitourinary   Genitourinary (WDL) WDL   Flank Tenderness JR   Suprapubic Tenderness No   Dysuria (Pain/Burning w/Urination) No   Urine Assessment   Urine Color Kiya   Urine Appearance Clear   Urine Odor No odor   Peripheral Vascular   Peripheral Vascular (WDL) WDL   Edema None   Skin Integumentary    Skin Integumentary (WDL) WDL   Musculoskeletal   Musculoskeletal (WDL) X   RL Extremity Weakness; Unsteady   LL Extremity Weakness; Unsteady   Elopement Risk Screen   Nursing Interventions for Elopement Risk Assist with personal care needs such as toileting, eating, dressing, as needed to reduce the risk of wandering

## 2023-03-28 NOTE — CARE COORDINATION
To [address] [facility]) today at 2130 (9:30 pm) by [facility]. Nurse report: [phone]  Fax: [phone]  Electronically signed by Quang Mckeon RN on 3/28/2023 at 1:39 PM    Addendum:   Faxed AVS to [facility] [place].  Electronically signed by Quang Mckeon RN on 3/28/2023 at 3:53 PM

## 2023-03-28 NOTE — DISCHARGE SUMMARY
You don't need a prescription for these medications  multivitamin Tabs tablet       Activity: activity as tolerated  Diet: regular diet  Wound Care: none needed    Time Spent on discharge is more than 45 minutes    Signed:  Rebel Estevez MD   3/26/2023
(CYANOCOBALAMIN) 100 MCG tablet Take 1 tablet by mouth daily  Qty: 90 tablet, Refills: 3    Associated Diagnoses: Seizure (HCC)      vitamin D3 (CHOLECALCIFEROL) 25 MCG (1000 UT) TABS tablet Take 1 tablet by mouth daily  Qty: 90 tablet, Refills: 3    Associated Diagnoses: Seizure (HCC)      Thiamine Mononitrate (B1) 100 MG TABS Take 100 mg by mouth daily  Qty: 90 tablet, Refills: 3    Associated Diagnoses: Seizure (HCC)      atorvastatin (LIPITOR) 40 MG tablet Take 1 tablet by mouth daily  Qty: 90 tablet, Refills: 3    Associated Diagnoses: Coronary artery disease due to lipid rich plaque      hydrocortisone 1 % cream Apply topically 2 times daily             Time Spent on discharge is more than 30 minutes in the examination, evaluation, counseling and review of medications and discharge plan. Signed:    Vilma Avila MD   3/28/2023      Thank you Emily Davalos MD for the opportunity to be involved in this patient's care. If you have any questions or concerns please feel free to contact me at [phone].

## 2023-05-22 ENCOUNTER — OFFICE VISIT (OUTPATIENT)
Dept: CARDIOLOGY CLINIC | Age: 63
End: 2023-05-22
Payer: MEDICAID

## 2023-05-22 VITALS
HEIGHT: 70 IN | DIASTOLIC BLOOD PRESSURE: 72 MMHG | HEART RATE: 70 BPM | SYSTOLIC BLOOD PRESSURE: 120 MMHG | WEIGHT: 181 LBS | BODY MASS INDEX: 25.91 KG/M2

## 2023-05-22 DIAGNOSIS — R93.89 ABNORMAL ANGIOGRAM: Primary | ICD-10-CM

## 2023-05-22 DIAGNOSIS — R06.02 SOB (SHORTNESS OF BREATH): ICD-10-CM

## 2023-05-22 DIAGNOSIS — I50.30 DIASTOLIC CONGESTIVE HEART FAILURE, UNSPECIFIED HF CHRONICITY (HCC): ICD-10-CM

## 2023-05-22 PROCEDURE — G8427 DOCREV CUR MEDS BY ELIG CLIN: HCPCS | Performed by: INTERNAL MEDICINE

## 2023-05-22 PROCEDURE — 3017F COLORECTAL CA SCREEN DOC REV: CPT | Performed by: INTERNAL MEDICINE

## 2023-05-22 PROCEDURE — 1036F TOBACCO NON-USER: CPT | Performed by: INTERNAL MEDICINE

## 2023-05-22 PROCEDURE — 99214 OFFICE O/P EST MOD 30 MIN: CPT | Performed by: INTERNAL MEDICINE

## 2023-05-22 PROCEDURE — G8419 CALC BMI OUT NRM PARAM NOF/U: HCPCS | Performed by: INTERNAL MEDICINE

## 2023-05-22 NOTE — PROGRESS NOTES
[facility]   Dr Razia Hernandez. Paras Cruz MD, [address] [facility]    Outpatient Follow Up Note    2023,9:51 AM  Subjective:   CHIEF COMPLAINT / HPI:  Follow Up secondary to hypertension and status post CABG     Heather Leaver is 57 y.o. male who presents today for a routine follow up. Here today with his caregiver. Not have any current issues or symptoms. Still smoking occasionally. No chest pains or shortness of breath and no edema. Corona virus no infection   Vaccine  x 3 Pfizer     Still smoking some. CAD with stent 5/15/20  Hypertension  Hyperlipidemia  LDL 72 on Lipitor   Brain bleed /CVA  secondary to ETOH fall. Now not consuming /    Past Medical History:    Past Medical History:   Diagnosis Date    Alcohol abuse     CAD (coronary artery disease)     with complete LAD occlusion    CHF (congestive heart failure) (HCC)     LVEF    Essential tremor     HTN (hypertension)     Hx of blood clots 2021    Hyperlipidemia     ICH (intracerebral hemorrhage) (HCC)     Lower extremity cellulitis     MI (mitral incompetence)      Past Surgical History  Past Surgical History:   Procedure Laterality Date    PTCA      UPPER GASTROINTESTINAL ENDOSCOPY N/A 2020    EGD BIOPSY performed by Parrish Porter MD at [facility] ENDOSCOPY     Social History:       Social History     Tobacco Use   Smoking Status Former    Packs/day: 0.50    Years: 40.00    Pack years: 20.00    Types: Cigarettes    Quit date:     Years since quittin.3   Smokeless Tobacco Never     Current Medications:  Prior to Visit Medications    Medication Sig Taking?  Authorizing Provider   Multiple Vitamin (MULTIVITAMIN) TABS tablet Take 1 tablet by mouth daily Yes Gamaliel Laguerre MD   melatonin 5 MG TBDP disintegrating tablet Take 1 tablet by mouth nightly Yes Loi Rios MD   levETIRAcetam (KEPPRA) 750 MG tablet TAKE 2 TABLETS BY MOUTH TWICE DAILY Yes Loi Rios MD   carboxymethylcellulose 1 % ophthalmic

## 2023-07-17 LAB
ALBUMIN/GLOB SERPL: 1.4 {RATIO} (ref 1.1–2.2)
ALP SERPL-CCNC: 79 U/L (ref 40–129)
ALT SERPL-CCNC: 14 U/L (ref 10–40)
AST SERPL-CCNC: 35 U/L (ref 15–37)
BILIRUB SERPL-MCNC: 1.1 MG/DL (ref 0–1)
POTASSIUM SERPL-SCNC: 3.7 MMOL/L (ref 3.5–5.1)
PROT SERPL-MCNC: 6.3 G/DL (ref 6.4–8.2)

## 2023-11-22 ENCOUNTER — OFFICE VISIT (OUTPATIENT)
Dept: CARDIOLOGY CLINIC | Age: 63
End: 2023-11-22

## 2023-11-22 VITALS
WEIGHT: 195 LBS | DIASTOLIC BLOOD PRESSURE: 68 MMHG | HEART RATE: 78 BPM | BODY MASS INDEX: 27.98 KG/M2 | SYSTOLIC BLOOD PRESSURE: 124 MMHG

## 2023-11-22 DIAGNOSIS — I25.10 CORONARY ARTERY DISEASE INVOLVING NATIVE CORONARY ARTERY OF NATIVE HEART WITHOUT ANGINA PECTORIS: Primary | ICD-10-CM

## 2023-11-22 DIAGNOSIS — I25.83 CORONARY ARTERY DISEASE DUE TO LIPID RICH PLAQUE: ICD-10-CM

## 2023-11-22 DIAGNOSIS — I25.10 CORONARY ARTERY DISEASE DUE TO LIPID RICH PLAQUE: ICD-10-CM

## 2023-11-22 PROCEDURE — 99214 OFFICE O/P EST MOD 30 MIN: CPT | Performed by: INTERNAL MEDICINE

## 2023-11-22 PROCEDURE — 1123F ACP DISCUSS/DSCN MKR DOCD: CPT | Performed by: INTERNAL MEDICINE

## 2023-11-22 RX ORDER — SENNA AND DOCUSATE SODIUM 50; 8.6 MG/1; MG/1
1 TABLET, FILM COATED ORAL DAILY
COMMUNITY

## 2023-11-22 NOTE — PROGRESS NOTES
further [phone]. Allen Gibbons MD, [facility]      This note was likely completed using voice recognition technology and may contain unintended errors
